# Patient Record
Sex: FEMALE | URBAN - METROPOLITAN AREA
[De-identification: names, ages, dates, MRNs, and addresses within clinical notes are randomized per-mention and may not be internally consistent; named-entity substitution may affect disease eponyms.]

---

## 2022-01-10 ENCOUNTER — APPOINTMENT (OUTPATIENT)
Dept: URBAN - METROPOLITAN AREA CLINIC 263 | Age: 77
Setting detail: DERMATOLOGY
End: 2022-01-11

## 2022-01-10 DIAGNOSIS — L57.0 ACTINIC KERATOSIS: ICD-10-CM

## 2022-01-10 DIAGNOSIS — L30.4 ERYTHEMA INTERTRIGO: ICD-10-CM

## 2022-01-10 DIAGNOSIS — L72.8 OTHER FOLLICULAR CYSTS OF THE SKIN AND SUBCUTANEOUS TISSUE: ICD-10-CM

## 2022-01-10 DIAGNOSIS — L82.1 OTHER SEBORRHEIC KERATOSIS: ICD-10-CM

## 2022-01-10 DIAGNOSIS — L65.8 OTHER SPECIFIED NONSCARRING HAIR LOSS: ICD-10-CM

## 2022-01-10 PROBLEM — L30.9 DERMATITIS, UNSPECIFIED: Status: ACTIVE | Noted: 2022-01-10

## 2022-01-10 PROCEDURE — 17000 DESTRUCT PREMALG LESION: CPT

## 2022-01-10 PROCEDURE — 17003 DESTRUCT PREMALG LES 2-14: CPT

## 2022-01-10 PROCEDURE — OTHER LIQUID NITROGEN: OTHER

## 2022-01-10 PROCEDURE — OTHER COUNSELING: OTHER

## 2022-01-10 PROCEDURE — OTHER DIAGNOSIS COMMENT: OTHER

## 2022-01-10 PROCEDURE — OTHER TREATMENT REGIMEN: OTHER

## 2022-01-10 PROCEDURE — OTHER PRESCRIPTION MEDICATION MANAGEMENT: OTHER

## 2022-01-10 PROCEDURE — OTHER MIPS QUALITY: OTHER

## 2022-01-10 PROCEDURE — 99203 OFFICE O/P NEW LOW 30 MIN: CPT | Mod: 25

## 2022-01-10 PROCEDURE — OTHER CONSULTATION EXCISION: OTHER

## 2022-01-10 PROCEDURE — OTHER PRESCRIPTION: OTHER

## 2022-01-10 RX ORDER — TACROLIMUS 1 MG/G
OINTMENT TOPICAL BID
Qty: 100 | Refills: 0 | Status: ERX | COMMUNITY
Start: 2022-01-10

## 2022-01-10 ASSESSMENT — LOCATION DETAILED DESCRIPTION DERM
LOCATION DETAILED: RIGHT SUPERIOR MEDIAL MIDBACK
LOCATION DETAILED: EPIGASTRIC SKIN
LOCATION DETAILED: RIGHT RADIAL DORSAL HAND
LOCATION DETAILED: RIGHT ELBOW
LOCATION DETAILED: RIGHT LATERAL ABDOMEN

## 2022-01-10 ASSESSMENT — LOCATION SIMPLE DESCRIPTION DERM
LOCATION SIMPLE: RIGHT LOWER BACK
LOCATION SIMPLE: RIGHT HAND
LOCATION SIMPLE: RIGHT ELBOW
LOCATION SIMPLE: ABDOMEN

## 2022-01-10 ASSESSMENT — LOCATION ZONE DERM
LOCATION ZONE: TRUNK
LOCATION ZONE: ARM
LOCATION ZONE: HAND

## 2022-01-10 NOTE — PROCEDURE: LIQUID NITROGEN
Number Of Freeze-Thaw Cycles: 1 freeze-thaw cycle
Post-Care Instructions: I reviewed with the patient in detail post-care instructions. Patient is to wear sunprotection, and avoid picking at any of the treated lesions. Pt may apply Vaseline to crusted or scabbing areas.
Render Post-Care Instructions In Note?: no
Show Applicator Variable?: Yes
Consent: The patient's consent was obtained including but not limited to risks of crusting, scabbing, blistering, scarring, darker or lighter pigmentary change, recurrence, incomplete removal and infection.
Detail Level: Detailed
Duration Of Freeze Thaw-Cycle (Seconds): 5

## 2022-01-10 NOTE — PROCEDURE: PRESCRIPTION MEDICATION MANAGEMENT
Initiate Treatment: tacrolimus 0.1 % topical ointment Bid as needed
Render In Strict Bullet Format?: No
Detail Level: Zone

## 2022-01-10 NOTE — PROCEDURE: DIAGNOSIS COMMENT
Render Risk Assessment In Note?: no
Detail Level: Simple
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
Comment: Patient uses laser cap, will think about medications for future

## 2022-01-10 NOTE — HPI: SECONDARY COMPLAINT
How Severe Is This Condition?: moderate
Additional History: Can’t use minoxidil
How Severe Is This Condition?: mild
Additional History: It was excised before

## 2022-01-10 NOTE — PROCEDURE: MIPS QUALITY
Detail Level: Detailed
Quality 431: Preventive Care And Screening: Unhealthy Alcohol Use - Screening: Patient not identified as an unhealthy alcohol user when screened for unhealthy alcohol use using a systematic screening method
Quality 111:Pneumonia Vaccination Status For Older Adults: Pneumococcal Vaccination Previously Received
Quality 226: Preventive Care And Screening: Tobacco Use: Screening And Cessation Intervention: Patient screened for tobacco use and is an ex/non-smoker
Quality 130: Documentation Of Current Medications In The Medical Record: Current Medications Documented
Quality 110: Preventive Care And Screening: Influenza Immunization: Influenza Immunization Administered during Influenza season

## 2022-01-10 NOTE — HPI: RASH
What Type Of Note Output Would You Prefer (Optional)?: Bullet Format
How Severe Is Your Rash?: moderate
Is This A New Presentation, Or A Follow-Up?: Rash
Additional History: Biopsy was done and it was proven to be lichenoid keratosis

## 2022-01-20 ENCOUNTER — APPOINTMENT (OUTPATIENT)
Dept: URBAN - METROPOLITAN AREA CLINIC 261 | Age: 77
Setting detail: DERMATOLOGY
End: 2022-01-21

## 2022-01-20 DIAGNOSIS — L91.8 OTHER HYPERTROPHIC DISORDERS OF THE SKIN: ICD-10-CM

## 2022-01-20 DIAGNOSIS — L72.8 OTHER FOLLICULAR CYSTS OF THE SKIN AND SUBCUTANEOUS TISSUE: ICD-10-CM

## 2022-01-20 DIAGNOSIS — L57.0 ACTINIC KERATOSIS: ICD-10-CM

## 2022-01-20 PROCEDURE — OTHER EXCISION: OTHER

## 2022-01-20 PROCEDURE — OTHER COUNSELING: OTHER

## 2022-01-20 PROCEDURE — 17000 DESTRUCT PREMALG LESION: CPT | Mod: 79,59

## 2022-01-20 PROCEDURE — OTHER SHAVE REMOVAL (NO PATHOLOGY): OTHER

## 2022-01-20 PROCEDURE — OTHER LIQUID NITROGEN: OTHER

## 2022-01-20 PROCEDURE — 11300 SHAVE SKIN LESION 0.5 CM/<: CPT | Mod: 76,79

## 2022-01-20 PROCEDURE — 12031 INTMD RPR S/A/T/EXT 2.5 CM/<: CPT | Mod: 79,59

## 2022-01-20 PROCEDURE — OTHER MIPS QUALITY: OTHER

## 2022-01-20 PROCEDURE — 11401 EXC TR-EXT B9+MARG 0.6-1 CM: CPT | Mod: 79

## 2022-01-20 PROCEDURE — 11300 SHAVE SKIN LESION 0.5 CM/<: CPT | Mod: 79,76

## 2022-01-20 PROCEDURE — 11300 SHAVE SKIN LESION 0.5 CM/<: CPT | Mod: 79

## 2022-01-20 ASSESSMENT — LOCATION SIMPLE DESCRIPTION DERM
LOCATION SIMPLE: RIGHT POSTERIOR AXILLA
LOCATION SIMPLE: ABDOMEN
LOCATION SIMPLE: RIGHT UPPER ARM
LOCATION SIMPLE: RIGHT AXILLARY VAULT
LOCATION SIMPLE: LEFT LIP

## 2022-01-20 ASSESSMENT — LOCATION ZONE DERM
LOCATION ZONE: AXILLAE
LOCATION ZONE: ARM
LOCATION ZONE: TRUNK
LOCATION ZONE: LIP

## 2022-01-20 ASSESSMENT — LOCATION DETAILED DESCRIPTION DERM
LOCATION DETAILED: LEFT UPPER CUTANEOUS LIP
LOCATION DETAILED: RIGHT POSTERIOR AXILLA
LOCATION DETAILED: RIGHT RIB CAGE
LOCATION DETAILED: RIGHT AXILLARY VAULT
LOCATION DETAILED: RIGHT ANTERIOR PROXIMAL UPPER ARM

## 2022-01-20 NOTE — PROCEDURE: EXCISION
Did You Provide Opioid Counseling: No
Estimated Blood Loss (Cc): minimal
W Plasty Text: The lesion was extirpated to the level of the fat with a #15 scalpel blade.  Given the location of the defect, shape of the defect and the proximity to free margins a W-plasty was deemed most appropriate for repair.  Using a sterile surgical marker, the appropriate transposition arms of the W-plasty were drawn incorporating the defect and placing the expected incisions within the relaxed skin tension lines where possible.    The area thus outlined was incised deep to adipose tissue with a #15 scalpel blade.  The skin margins were undermined to an appropriate distance in all directions utilizing iris scissors.  The opposing transposition arms were then transposed into place in opposite direction and anchored with interrupted buried subcutaneous sutures.
Where Do You Want The Question To Include Opioid Counseling Located?: Case Summary Tab
Intermediate / Complex Repair - Final Wound Length In Cm: 1
Complex Repair And Transposition Flap Text: The defect edges were debeveled with a #15 scalpel blade.  The primary defect was closed partially with a complex linear closure.  Given the location of the remaining defect, shape of the defect and the proximity to free margins a transposition flap was deemed most appropriate for complete closure of the defect.  Using a sterile surgical marker, an appropriate advancement flap was drawn incorporating the defect and placing the expected incisions within the relaxed skin tension lines where possible.    The area thus outlined was incised deep to adipose tissue with a #15 scalpel blade.  The skin margins were undermined to an appropriate distance in all directions utilizing iris scissors.
Suturegard Intro: Intraoperative tissue expansion was performed, utilizing the SUTUREGARD device, in order to reduce wound tension.
Anesthesia Type: 1% lidocaine with epinephrine
Deep Sutures: 3-0 Monocryl
Show Referring Physician Variable: Yes
Retention Suture Text: Retention sutures were placed to support the closure and prevent dehiscence.
Additional Anesthesia Volume In Cc: 6
Post-Care Instructions: I reviewed with the patient in detail post-care instructions. Patient is not to engage in any heavy lifting, exercise, or swimming for the next 14 days. Should the patient develop any fevers, chills, bleeding, severe pain patient will contact the office immediately.
Excision Depth: adipose tissue
Vermilion Border Text: The closure involved the vermilion border.
Skin Substitute Text: The defect edges were debeveled with a #15 scalpel blade.  Given the location of the defect, shape of the defect and the proximity to free margins a skin substitute graft was deemed most appropriate.  The graft material was trimmed to fit the size of the defect. The graft was then placed in the primary defect and oriented appropriately.
O-L Flap Text: The defect edges were debeveled with a #15 scalpel blade.  Given the location of the defect, shape of the defect and the proximity to free margins an O-L flap was deemed most appropriate.  Using a sterile surgical marker, an appropriate advancement flap was drawn incorporating the defect and placing the expected incisions within the relaxed skin tension lines where possible.    The area thus outlined was incised deep to adipose tissue with a #15 scalpel blade.  The skin margins were undermined to an appropriate distance in all directions utilizing iris scissors.
Length To Time In Minutes Device Was In Place: 10
Repair Performed By Another Provider Text (Leave Blank If You Do Not Want): After the tissue was excised the defect was repaired by another provider.
Repair Type: Intermediate
Paramedian Forehead Flap Text: A decision was made to reconstruct the defect utilizing an interpolation axial flap and a staged reconstruction.  A telfa template was made of the defect.  This telfa template was then used to outline the paramedian forehead pedicle flap.  The donor area for the pedicle flap was then injected with anesthesia.  The flap was excised through the skin and subcutaneous tissue down to the layer of the underlying musculature.  The pedicle flap was carefully excised within this deep plane to maintain its blood supply.  The edges of the donor site were undermined.   The donor site was closed in a primary fashion.  The pedicle was then rotated into position and sutured.  Once the tube was sutured into place, adequate blood supply was confirmed with blanching and refill.  The pedicle was then wrapped with xeroform gauze and dressed appropriately with a telfa and gauze bandage to ensure continued blood supply and protect the attached pedicle.
Retention Suture Bite Size: 3 mm
Detail Level: Detailed
Island Pedicle Flap-Requiring Vessel Identification Text: The defect edges were debeveled with a #15 scalpel blade.  Given the location of the defect, shape of the defect and the proximity to free margins an island pedicle advancement flap was deemed most appropriate.  Using a sterile surgical marker, an appropriate advancement flap was drawn, based on the axial vessel mentioned above, incorporating the defect, outlining the appropriate donor tissue and placing the expected incisions within the relaxed skin tension lines where possible.    The area thus outlined was incised deep to adipose tissue with a #15 scalpel blade.  The skin margins were undermined to an appropriate distance in all directions around the primary defect and laterally outward around the island pedicle utilizing iris scissors.  There was minimal undermining beneath the pedicle flap.
Excision Method: Slit
Crescentic Complex Repair Preamble Text (Leave Blank If You Do Not Want): Extensive wide undermining was performed.
Intermediate Repair Preamble Text (Leave Blank If You Do Not Want): Undermining was performed with blunt dissection.
Melolabial Interpolation Flap Text: A decision was made to reconstruct the defect utilizing an interpolation axial flap and a staged reconstruction.  A telfa template was made of the defect.  This telfa template was then used to outline the melolabial interpolation flap.  The donor area for the pedicle flap was then injected with anesthesia.  The flap was excised through the skin and subcutaneous tissue down to the layer of the underlying musculature.  The pedicle flap was carefully excised within this deep plane to maintain its blood supply.  The edges of the donor site were undermined.   The donor site was closed in a primary fashion.  The pedicle was then rotated into position and sutured.  Once the tube was sutured into place, adequate blood supply was confirmed with blanching and refill.  The pedicle was then wrapped with xeroform gauze and dressed appropriately with a telfa and gauze bandage to ensure continued blood supply and protect the attached pedicle.
Ftsg Text: The defect edges were debeveled with a #15 scalpel blade.  Given the location of the defect, shape of the defect and the proximity to free margins a full thickness skin graft was deemed most appropriate.  Using a sterile surgical marker, the primary defect shape was transferred to the donor site. The area thus outlined was incised deep to adipose tissue with a #15 scalpel blade.  The harvested graft was then trimmed of adipose tissue until only dermis and epidermis was left.  The skin margins of the secondary defect were undermined to an appropriate distance in all directions utilizing iris scissors.  The secondary defect was closed with interrupted buried subcutaneous sutures.  The skin edges were then re-apposed with running  sutures.  The skin graft was then placed in the primary defect and oriented appropriately.
Anesthesia Volume In Cc: 0
Complex Repair And Tissue Cultured Epidermal Autograft Text: The defect edges were debeveled with a #15 scalpel blade.  The primary defect was closed partially with a complex linear closure.  Given the location of the defect, shape of the defect and the proximity to free margins an tissue cultured epidermal autograft was deemed most appropriate to repair the remaining defect.  The graft was trimmed to fit the size of the remaining defect.  The graft was then placed in the primary defect, oriented appropriately, and sutured into place.
Complex Repair And Rhombic Flap Text: The defect edges were debeveled with a #15 scalpel blade.  The primary defect was closed partially with a complex linear closure.  Given the location of the remaining defect, shape of the defect and the proximity to free margins a rhombic flap was deemed most appropriate for complete closure of the defect.  Using a sterile surgical marker, an appropriate advancement flap was drawn incorporating the defect and placing the expected incisions within the relaxed skin tension lines where possible.    The area thus outlined was incised deep to adipose tissue with a #15 scalpel blade.  The skin margins were undermined to an appropriate distance in all directions utilizing iris scissors.
Modified Advancement Flap Text: The defect edges were debeveled with a #15 scalpel blade.  Given the location of the defect, shape of the defect and the proximity to free margins a modified advancement flap was deemed most appropriate.  Using a sterile surgical marker, an appropriate advancement flap was drawn incorporating the defect and placing the expected incisions within the relaxed skin tension lines where possible.    The area thus outlined was incised deep to adipose tissue with a #15 scalpel blade.  The skin margins were undermined to an appropriate distance in all directions utilizing iris scissors.
Composite Graft Text: The defect edges were debeveled with a #15 scalpel blade.  Given the location of the defect, shape of the defect, the proximity to free margins and the fact the defect was full thickness a composite graft was deemed most appropriate.  The defect was outline and then transferred to the donor site.  A full thickness graft was then excised from the donor site. The graft was then placed in the primary defect, oriented appropriately and then sutured into place.  The secondary defect was then repaired using a primary closure.
Transposition Flap Text: The defect edges were debeveled with a #15 scalpel blade.  Given the location of the defect and the proximity to free margins a transposition flap was deemed most appropriate.  Using a sterile surgical marker, an appropriate transposition flap was drawn incorporating the defect.    The area thus outlined was incised deep to adipose tissue with a #15 scalpel blade.  The skin margins were undermined to an appropriate distance in all directions utilizing iris scissors.
Split-Thickness Skin Graft Text: The defect edges were debeveled with a #15 scalpel blade.  Given the location of the defect, shape of the defect and the proximity to free margins a split thickness skin graft was deemed most appropriate.  Using a sterile surgical marker, the primary defect shape was transferred to the donor site. The split thickness graft was then harvested.  The skin graft was then placed in the primary defect and oriented appropriately.
Hemostasis: Electrocautery
Trilobed Flap Text: The defect edges were debeveled with a #15 scalpel blade.  Given the location of the defect and the proximity to free margins a trilobed flap was deemed most appropriate.  Using a sterile surgical marker, an appropriate trilobed flap drawn around the defect.    The area thus outlined was incised deep to adipose tissue with a #15 scalpel blade.  The skin margins were undermined to an appropriate distance in all directions utilizing iris scissors.
O-T Advancement Flap Text: The defect edges were debeveled with a #15 scalpel blade.  Given the location of the defect, shape of the defect and the proximity to free margins an O-T advancement flap was deemed most appropriate.  Using a sterile surgical marker, an appropriate advancement flap was drawn incorporating the defect and placing the expected incisions within the relaxed skin tension lines where possible.    The area thus outlined was incised deep to adipose tissue with a #15 scalpel blade.  The skin margins were undermined to an appropriate distance in all directions utilizing iris scissors.
Suturegard Body: The suture ends were repeatedly re-tightened and re-clamped to achieve the desired tissue expansion.
Complex Repair And Melolabial Flap Text: The defect edges were debeveled with a #15 scalpel blade.  The primary defect was closed partially with a complex linear closure.  Given the location of the remaining defect, shape of the defect and the proximity to free margins a melolabial flap was deemed most appropriate for complete closure of the defect.  Using a sterile surgical marker, an appropriate advancement flap was drawn incorporating the defect and placing the expected incisions within the relaxed skin tension lines where possible.    The area thus outlined was incised deep to adipose tissue with a #15 scalpel blade.  The skin margins were undermined to an appropriate distance in all directions utilizing iris scissors.
Complex Repair And Burow's Graft Text: The defect edges were debeveled with a #15 scalpel blade.  The primary defect was closed partially with a complex linear closure.  Given the location of the defect, shape of the defect, the proximity to free margins and the presence of a standing cone deformity a Burow's graft was deemed most appropriate to repair the remaining defect.  The graft was trimmed to fit the size of the remaining defect.  The graft was then placed in the primary defect, oriented appropriately, and sutured into place.
Xenograft Text: The defect edges were debeveled with a #15 scalpel blade.  Given the location of the defect, shape of the defect and the proximity to free margins a xenograft was deemed most appropriate.  The graft was then trimmed to fit the size of the defect.  The graft was then placed in the primary defect and oriented appropriately.
A-T Advancement Flap Text: The defect edges were debeveled with a #15 scalpel blade.  Given the location of the defect, shape of the defect and the proximity to free margins an A-T advancement flap was deemed most appropriate.  Using a sterile surgical marker, an appropriate advancement flap was drawn incorporating the defect and placing the expected incisions within the relaxed skin tension lines where possible.    The area thus outlined was incised deep to adipose tissue with a #15 scalpel blade.  The skin margins were undermined to an appropriate distance in all directions utilizing iris scissors.
Adjacent Tissue Transfer Text: The defect edges were debeveled with a #15 scalpel blade.  Given the location of the defect and the proximity to free margins an adjacent tissue transfer was deemed most appropriate.  Using a sterile surgical marker, an appropriate flap was drawn incorporating the defect and placing the expected incisions within the relaxed skin tension lines where possible.    The area thus outlined was incised deep to adipose tissue with a #15 scalpel blade.  The skin margins were undermined to an appropriate distance in all directions utilizing iris scissors.
Complex Repair And Xenograft Text: The defect edges were debeveled with a #15 scalpel blade.  The primary defect was closed partially with a complex linear closure.  Given the location of the defect, shape of the defect and the proximity to free margins a xenograft was deemed most appropriate to repair the remaining defect.  The graft was trimmed to fit the size of the remaining defect.  The graft was then placed in the primary defect, oriented appropriately, and sutured into place.
Purse String (Simple) Text: Given the location of the defect and the characteristics of the surrounding skin a purse string simple closure was deemed most appropriate.  Undermining was performed circumferentially around the surgical defect.  A purse string suture was then placed and tightened.
Wound Care: Petrolatum
Zygomaticofacial Flap Text: Given the location of the defect, shape of the defect and the proximity to free margins a zygomaticofacial flap was deemed most appropriate for repair.  Using a sterile surgical marker, the appropriate flap was drawn incorporating the defect and placing the expected incisions within the relaxed skin tension lines where possible. The area thus outlined was incised deep to adipose tissue with a #15 scalpel blade with preservation of a vascular pedicle.  The skin margins were undermined to an appropriate distance in all directions utilizing iris scissors.  The flap was then placed into the defect and anchored with interrupted buried subcutaneous sutures.
Hemigard Postcare Instructions: The HEMIGARD strips are to remain completely dry for at least 5-7 days.
Interpolation Flap Text: A decision was made to reconstruct the defect utilizing an interpolation axial flap and a staged reconstruction.  A telfa template was made of the defect.  This telfa template was then used to outline the interpolation flap.  The donor area for the pedicle flap was then injected with anesthesia.  The flap was excised through the skin and subcutaneous tissue down to the layer of the underlying musculature.  The interpolation flap was carefully excised within this deep plane to maintain its blood supply.  The edges of the donor site were undermined.   The donor site was closed in a primary fashion.  The pedicle was then rotated into position and sutured.  Once the tube was sutured into place, adequate blood supply was confirmed with blanching and refill.  The pedicle was then wrapped with xeroform gauze and dressed appropriately with a telfa and gauze bandage to ensure continued blood supply and protect the attached pedicle.
Island Pedicle Flap With Canthal Suspension Text: The defect edges were debeveled with a #15 scalpel blade.  Given the location of the defect, shape of the defect and the proximity to free margins an island pedicle advancement flap was deemed most appropriate.  Using a sterile surgical marker, an appropriate advancement flap was drawn incorporating the defect, outlining the appropriate donor tissue and placing the expected incisions within the relaxed skin tension lines where possible. The area thus outlined was incised deep to adipose tissue with a #15 scalpel blade.  The skin margins were undermined to an appropriate distance in all directions around the primary defect and laterally outward around the island pedicle utilizing iris scissors.  There was minimal undermining beneath the pedicle flap. A suspension suture was placed in the canthal tendon to prevent tension and prevent ectropion.
Mercedes Flap Text: The defect edges were debeveled with a #15 scalpel blade.  Given the location of the defect, shape of the defect and the proximity to free margins a Mercedes flap was deemed most appropriate.  Using a sterile surgical marker, an appropriate advancement flap was drawn incorporating the defect and placing the expected incisions within the relaxed skin tension lines where possible. The area thus outlined was incised deep to adipose tissue with a #15 scalpel blade.  The skin margins were undermined to an appropriate distance in all directions utilizing iris scissors.
Complex Repair And Single Advancement Flap Text: The defect edges were debeveled with a #15 scalpel blade.  The primary defect was closed partially with a complex linear closure.  Given the location of the remaining defect, shape of the defect and the proximity to free margins a single advancement flap was deemed most appropriate for complete closure of the defect.  Using a sterile surgical marker, an appropriate advancement flap was drawn incorporating the defect and placing the expected incisions within the relaxed skin tension lines where possible.    The area thus outlined was incised deep to adipose tissue with a #15 scalpel blade.  The skin margins were undermined to an appropriate distance in all directions utilizing iris scissors.
Complex Repair And Double M Plasty Text: The defect edges were debeveled with a #15 scalpel blade.  The primary defect was closed partially with a complex linear closure.  Given the location of the remaining defect, shape of the defect and the proximity to free margins a double M plasty was deemed most appropriate for complete closure of the defect.  Using a sterile surgical marker, an appropriate advancement flap was drawn incorporating the defect and placing the expected incisions within the relaxed skin tension lines where possible.    The area thus outlined was incised deep to adipose tissue with a #15 scalpel blade.  The skin margins were undermined to an appropriate distance in all directions utilizing iris scissors.
Mustarde Flap Text: The defect edges were debeveled with a #15 scalpel blade.  Given the size, depth and location of the defect and the proximity to free margins a Mustarde flap was deemed most appropriate.  Using a sterile surgical marker, an appropriate flap was drawn incorporating the defect. The area thus outlined was incised with a #15 scalpel blade.  The skin margins were undermined to an appropriate distance in all directions utilizing iris scissors.
Bilobed Transposition Flap Text: The defect edges were debeveled with a #15 scalpel blade.  Given the location of the defect and the proximity to free margins a bilobed transposition flap was deemed most appropriate.  Using a sterile surgical marker, an appropriate bilobe flap drawn around the defect.    The area thus outlined was incised deep to adipose tissue with a #15 scalpel blade.  The skin margins were undermined to an appropriate distance in all directions utilizing iris scissors.
Hatchet Flap Text: The defect edges were debeveled with a #15 scalpel blade.  Given the location of the defect, shape of the defect and the proximity to free margins a hatchet flap was deemed most appropriate.  Using a sterile surgical marker, an appropriate hatchet flap was drawn incorporating the defect and placing the expected incisions within the relaxed skin tension lines where possible.    The area thus outlined was incised deep to adipose tissue with a #15 scalpel blade.  The skin margins were undermined to an appropriate distance in all directions utilizing iris scissors.
Burow's Graft Text: The defect edges were debeveled with a #15 scalpel blade.  Given the location of the defect, shape of the defect, the proximity to free margins and the presence of a standing cone deformity a Burow's skin graft was deemed most appropriate. The standing cone was removed and this tissue was then trimmed to the shape of the primary defect. The adipose tissue was also removed until only dermis and epidermis were left.  The skin margins of the secondary defect were undermined to an appropriate distance in all directions utilizing iris scissors.  The secondary defect was closed with interrupted buried subcutaneous sutures.  The skin edges were then re-apposed with running  sutures.  The skin graft was then placed in the primary defect and oriented appropriately.
Complex Repair And Split-Thickness Skin Graft Text: The defect edges were debeveled with a #15 scalpel blade.  The primary defect was closed partially with a complex linear closure.  Given the location of the defect, shape of the defect and the proximity to free margins a split thickness skin graft was deemed most appropriate to repair the remaining defect.  The graft was trimmed to fit the size of the remaining defect.  The graft was then placed in the primary defect, oriented appropriately, and sutured into place.
Star Wedge Flap Text: The defect edges were debeveled with a #15 scalpel blade.  Given the location of the defect, shape of the defect and the proximity to free margins a star wedge flap was deemed most appropriate.  Using a sterile surgical marker, an appropriate rotation flap was drawn incorporating the defect and placing the expected incisions within the relaxed skin tension lines where possible. The area thus outlined was incised deep to adipose tissue with a #15 scalpel blade.  The skin margins were undermined to an appropriate distance in all directions utilizing iris scissors.
O-Z Flap Text: The defect edges were debeveled with a #15 scalpel blade.  Given the location of the defect, shape of the defect and the proximity to free margins an O-Z flap was deemed most appropriate.  Using a sterile surgical marker, an appropriate transposition flap was drawn incorporating the defect and placing the expected incisions within the relaxed skin tension lines where possible. The area thus outlined was incised deep to adipose tissue with a #15 scalpel blade.  The skin margins were undermined to an appropriate distance in all directions utilizing iris scissors.
No Repair - Repaired With Adjacent Surgical Defect Text (Leave Blank If You Do Not Want): After the excision the defect was repaired concurrently with another surgical defect which was in close approximation.
H Plasty Text: Given the location of the defect, shape of the defect and the proximity to free margins a H-plasty was deemed most appropriate for repair.  Using a sterile surgical marker, the appropriate advancement arms of the H-plasty were drawn incorporating the defect and placing the expected incisions within the relaxed skin tension lines where possible. The area thus outlined was incised deep to adipose tissue with a #15 scalpel blade. The skin margins were undermined to an appropriate distance in all directions utilizing iris scissors.  The opposing advancement arms were then advanced into place in opposite direction and anchored with interrupted buried subcutaneous sutures.
Graft Donor Site Bandage (Optional-Leave Blank If You Don't Want In Note): Steri-strips and a pressure bandage were applied to the donor site.
O-Z Plasty Text: The defect edges were debeveled with a #15 scalpel blade.  Given the location of the defect, shape of the defect and the proximity to free margins an O-Z plasty (double transposition flap) was deemed most appropriate.  Using a sterile surgical marker, the appropriate transposition flaps were drawn incorporating the defect and placing the expected incisions within the relaxed skin tension lines where possible.    The area thus outlined was incised deep to adipose tissue with a #15 scalpel blade.  The skin margins were undermined to an appropriate distance in all directions utilizing iris scissors.  Hemostasis was achieved with electrocautery.  The flaps were then transposed into place, one clockwise and the other counterclockwise, and anchored with interrupted buried subcutaneous sutures.
Tissue Cultured Epidermal Autograft Text: The defect edges were debeveled with a #15 scalpel blade.  Given the location of the defect, shape of the defect and the proximity to free margins a tissue cultured epidermal autograft was deemed most appropriate.  The graft was then trimmed to fit the size of the defect.  The graft was then placed in the primary defect and oriented appropriately.
Z Plasty Text: The lesion was extirpated to the level of the fat with a #15 scalpel blade.  Given the location of the defect, shape of the defect and the proximity to free margins a Z-plasty was deemed most appropriate for repair.  Using a sterile surgical marker, the appropriate transposition arms of the Z-plasty were drawn incorporating the defect and placing the expected incisions within the relaxed skin tension lines where possible.    The area thus outlined was incised deep to adipose tissue with a #15 scalpel blade.  The skin margins were undermined to an appropriate distance in all directions utilizing iris scissors.  The opposing transposition arms were then transposed into place in opposite direction and anchored with interrupted buried subcutaneous sutures.
Dermal Closure: buried vertical mattress
Nasalis-Muscle-Based Myocutaneous Island Pedicle Flap Text: Using a #15 blade, an incision was made around the donor flap to the level of the nasalis muscle. Wide lateral undermining was then performed in both the subcutaneous plane above the nasalis muscle, and in a submuscular plane just above periosteum. This allowed the formation of a free nasalis muscle axial pedicle (based on the angular artery) which was still attached to the actual cutaneous flap, increasing its mobility and vascular viability. Hemostasis was obtained with pinpoint electrocoagulation. The flap was mobilized into position and the pivotal anchor points positioned and stabilized with buried interrupted sutures. Subcutaneous and dermal tissues were closed in a multilayered fashion with sutures. Tissue redundancies were excised, and the epidermal edges were apposed without significant tension and sutured with sutures.
Banner Transposition Flap Text: The defect edges were debeveled with a #15 scalpel blade.  Given the location of the defect and the proximity to free margins a Banner transposition flap was deemed most appropriate.  Using a sterile surgical marker, an appropriate flap drawn around the defect. The area thus outlined was incised deep to adipose tissue with a #15 scalpel blade.  The skin margins were undermined to an appropriate distance in all directions utilizing iris scissors.
V-Y Flap Text: The defect edges were debeveled with a #15 scalpel blade.  Given the location of the defect, shape of the defect and the proximity to free margins a V-Y flap was deemed most appropriate.  Using a sterile surgical marker, an appropriate advancement flap was drawn incorporating the defect and placing the expected incisions within the relaxed skin tension lines where possible.    The area thus outlined was incised deep to adipose tissue with a #15 scalpel blade.  The skin margins were undermined to an appropriate distance in all directions utilizing iris scissors.
Cartilage Graft Text: The defect edges were debeveled with a #15 scalpel blade.  Given the location of the defect, shape of the defect, the fact the defect involved a full thickness cartilage defect a cartilage graft was deemed most appropriate.  An appropriate donor site was identified, cleansed, and anesthetized. The cartilage graft was then harvested and transferred to the recipient site, oriented appropriately and then sutured into place.  The secondary defect was then repaired using a primary closure.
Ear Star Wedge Flap Text: The defect edges were debeveled with a #15 blade scalpel.  Given the location of the defect and the proximity to free margins (helical rim) an ear star wedge flap was deemed most appropriate.  Using a sterile surgical marker, the appropriate flap was drawn incorporating the defect and placing the expected incisions between the helical rim and antihelix where possible.  The area thus outlined was incised through and through with a #15 scalpel blade.
Complex Repair And Rotation Flap Text: The defect edges were debeveled with a #15 scalpel blade.  The primary defect was closed partially with a complex linear closure.  Given the location of the remaining defect, shape of the defect and the proximity to free margins a rotation flap was deemed most appropriate for complete closure of the defect.  Using a sterile surgical marker, an appropriate advancement flap was drawn incorporating the defect and placing the expected incisions within the relaxed skin tension lines where possible.    The area thus outlined was incised deep to adipose tissue with a #15 scalpel blade.  The skin margins were undermined to an appropriate distance in all directions utilizing iris scissors.
Complex Repair And Z Plasty Text: The defect edges were debeveled with a #15 scalpel blade.  The primary defect was closed partially with a complex linear closure.  Given the location of the remaining defect, shape of the defect and the proximity to free margins a Z plasty was deemed most appropriate for complete closure of the defect.  Using a sterile surgical marker, an appropriate advancement flap was drawn incorporating the defect and placing the expected incisions within the relaxed skin tension lines where possible.    The area thus outlined was incised deep to adipose tissue with a #15 scalpel blade.  The skin margins were undermined to an appropriate distance in all directions utilizing iris scissors.
Complex Repair And Modified Advancement Flap Text: The defect edges were debeveled with a #15 scalpel blade.  The primary defect was closed partially with a complex linear closure.  Given the location of the remaining defect, shape of the defect and the proximity to free margins a modified advancement flap was deemed most appropriate for complete closure of the defect.  Using a sterile surgical marker, an appropriate advancement flap was drawn incorporating the defect and placing the expected incisions within the relaxed skin tension lines where possible.    The area thus outlined was incised deep to adipose tissue with a #15 scalpel blade.  The skin margins were undermined to an appropriate distance in all directions utilizing iris scissors.
Island Pedicle Flap Text: The defect edges were debeveled with a #15 scalpel blade.  Given the location of the defect, shape of the defect and the proximity to free margins an island pedicle advancement flap was deemed most appropriate.  Using a sterile surgical marker, an appropriate advancement flap was drawn incorporating the defect, outlining the appropriate donor tissue and placing the expected incisions within the relaxed skin tension lines where possible.    The area thus outlined was incised deep to adipose tissue with a #15 scalpel blade.  The skin margins were undermined to an appropriate distance in all directions around the primary defect and laterally outward around the island pedicle utilizing iris scissors.  There was minimal undermining beneath the pedicle flap.
Epidermal Closure Graft Donor Site (Optional): simple interrupted
Double Island Pedicle Flap Text: The defect edges were debeveled with a #15 scalpel blade.  Given the location of the defect, shape of the defect and the proximity to free margins a double island pedicle advancement flap was deemed most appropriate.  Using a sterile surgical marker, an appropriate advancement flap was drawn incorporating the defect, outlining the appropriate donor tissue and placing the expected incisions within the relaxed skin tension lines where possible.    The area thus outlined was incised deep to adipose tissue with a #15 scalpel blade.  The skin margins were undermined to an appropriate distance in all directions around the primary defect and laterally outward around the island pedicle utilizing iris scissors.  There was minimal undermining beneath the pedicle flap.
Mucosal Advancement Flap Text: Given the location of the defect, shape of the defect and the proximity to free margins a mucosal advancement flap was deemed most appropriate. Incisions were made with a 15 blade scalpel in the appropriate fashion along the cutaneous vermillion border and the mucosal lip. The remaining actinically damaged mucosal tissue was excised.  The mucosal advancement flap was then elevated to the gingival sulcus with care taken to preserve the neurovascular structures and advanced into the primary defect. Care was taken to ensure that precise realignment of the vermilion border was achieved.
Suturegard Retention Suture: 2-0 Nylon
Epidermal Autograft Text: The defect edges were debeveled with a #15 scalpel blade.  Given the location of the defect, shape of the defect and the proximity to free margins an epidermal autograft was deemed most appropriate.  Using a sterile surgical marker, the primary defect shape was transferred to the donor site. The epidermal graft was then harvested.  The skin graft was then placed in the primary defect and oriented appropriately.
Complex Repair And M Plasty Text: The defect edges were debeveled with a #15 scalpel blade.  The primary defect was closed partially with a complex linear closure.  Given the location of the remaining defect, shape of the defect and the proximity to free margins an M plasty was deemed most appropriate for complete closure of the defect.  Using a sterile surgical marker, an appropriate advancement flap was drawn incorporating the defect and placing the expected incisions within the relaxed skin tension lines where possible.    The area thus outlined was incised deep to adipose tissue with a #15 scalpel blade.  The skin margins were undermined to an appropriate distance in all directions utilizing iris scissors.
Orbicularis Oris Muscle Flap Text: The defect edges were debeveled with a #15 scalpel blade.  Given that the defect affected the competency of the oral sphincter an obicularis oris muscle flap was deemed most appropriate to restore this competency and normal muscle function.  Using a sterile surgical marker, an appropriate flap was drawn incorporating the defect. The area thus outlined was incised with a #15 scalpel blade.
Hemigard Intro: Due to skin fragility and wound tension, it was decided to use HEMIGARD adhesive retention suture devices to permit a linear closure. The skin was cleaned and dried for a 6cm distance away from the wound. Excessive hair, if present, was removed to allow for adhesion.
Suture Removal: 14 days
Lip Wedge Excision Repair Text: Given the location of the defect and the proximity to free margins a full thickness wedge repair was deemed most appropriate.  Using a sterile surgical marker, the appropriate repair was drawn incorporating the defect and placing the expected incisions perpendicular to the vermilion border.  The vermilion border was also meticulously outlined to ensure appropriate reapproximation during the repair.  The area thus outlined was incised through and through with a #15 scalpel blade.  The muscularis and dermis were reaproximated with deep sutures following hemostasis. Care was taken to realign the vermilion border before proceeding with the superficial closure.  Once the vermilion was realigned the superfical and mucosal closure was finished.
V-Y Plasty Text: The defect edges were debeveled with a #15 scalpel blade.  Given the location of the defect, shape of the defect and the proximity to free margins an V-Y advancement flap was deemed most appropriate.  Using a sterile surgical marker, an appropriate advancement flap was drawn incorporating the defect and placing the expected incisions within the relaxed skin tension lines where possible.    The area thus outlined was incised deep to adipose tissue with a #15 scalpel blade.  The skin margins were undermined to an appropriate distance in all directions utilizing iris scissors.
Debridement Text: The wound edges were debrided prior to proceeding with the closure to facilitate wound healing.
Dorsal Nasal Flap Text: The defect edges were debeveled with a #15 scalpel blade.  Given the location of the defect and the proximity to free margins a dorsal nasal flap was deemed most appropriate.  Using a sterile surgical marker, an appropriate dorsal nasal flap was drawn around the defect.    The area thus outlined was incised deep to adipose tissue with a #15 scalpel blade.  The skin margins were undermined to an appropriate distance in all directions utilizing iris scissors.
Mastoid Interpolation Flap Text: A decision was made to reconstruct the defect utilizing an interpolation axial flap and a staged reconstruction.  A telfa template was made of the defect.  This telfa template was then used to outline the mastoid interpolation flap.  The donor area for the pedicle flap was then injected with anesthesia.  The flap was excised through the skin and subcutaneous tissue down to the layer of the underlying musculature.  The pedicle flap was carefully excised within this deep plane to maintain its blood supply.  The edges of the donor site were undermined.   The donor site was closed in a primary fashion.  The pedicle was then rotated into position and sutured.  Once the tube was sutured into place, adequate blood supply was confirmed with blanching and refill.  The pedicle was then wrapped with xeroform gauze and dressed appropriately with a telfa and gauze bandage to ensure continued blood supply and protect the attached pedicle.
Complex Repair And Dorsal Nasal Flap Text: The defect edges were debeveled with a #15 scalpel blade.  The primary defect was closed partially with a complex linear closure.  Given the location of the remaining defect, shape of the defect and the proximity to free margins a dorsal nasal flap was deemed most appropriate for complete closure of the defect.  Using a sterile surgical marker, an appropriate flap was drawn incorporating the defect and placing the expected incisions within the relaxed skin tension lines where possible.    The area thus outlined was incised deep to adipose tissue with a #15 scalpel blade.  The skin margins were undermined to an appropriate distance in all directions utilizing iris scissors.
Burow's Advancement Flap Text: The defect edges were debeveled with a #15 scalpel blade.  Given the location of the defect and the proximity to free margins a Burow's advancement flap was deemed most appropriate.  Using a sterile surgical marker, the appropriate advancement flap was drawn incorporating the defect and placing the expected incisions within the relaxed skin tension lines where possible.    The area thus outlined was incised deep to adipose tissue with a #15 scalpel blade.  The skin margins were undermined to an appropriate distance in all directions utilizing iris scissors.
Saucerization Excision Additional Text (Leave Blank If You Do Not Want): The margin was drawn around the clinically apparent lesion.  Incisions were then made along these lines, in a tangential fashion, to the appropriate tissue plane and the lesion was extirpated.
Posterior Auricular Interpolation Flap Text: A decision was made to reconstruct the defect utilizing an interpolation axial flap and a staged reconstruction.  A telfa template was made of the defect.  This telfa template was then used to outline the posterior auricular interpolation flap.  The donor area for the pedicle flap was then injected with anesthesia.  The flap was excised through the skin and subcutaneous tissue down to the layer of the underlying musculature.  The pedicle flap was carefully excised within this deep plane to maintain its blood supply.  The edges of the donor site were undermined.   The donor site was closed in a primary fashion.  The pedicle was then rotated into position and sutured.  Once the tube was sutured into place, adequate blood supply was confirmed with blanching and refill.  The pedicle was then wrapped with xeroform gauze and dressed appropriately with a telfa and gauze bandage to ensure continued blood supply and protect the attached pedicle.
Complex Repair And V-Y Plasty Text: The defect edges were debeveled with a #15 scalpel blade.  The primary defect was closed partially with a complex linear closure.  Given the location of the remaining defect, shape of the defect and the proximity to free margins a V-Y plasty was deemed most appropriate for complete closure of the defect.  Using a sterile surgical marker, an appropriate advancement flap was drawn incorporating the defect and placing the expected incisions within the relaxed skin tension lines where possible.    The area thus outlined was incised deep to adipose tissue with a #15 scalpel blade.  The skin margins were undermined to an appropriate distance in all directions utilizing iris scissors.
Saucerization Depth: dermis and superficial adipose tissue
Dermal Autograft Text: The defect edges were debeveled with a #15 scalpel blade.  Given the location of the defect, shape of the defect and the proximity to free margins a dermal autograft was deemed most appropriate.  Using a sterile surgical marker, the primary defect shape was transferred to the donor site. The area thus outlined was incised deep to adipose tissue with a #15 scalpel blade.  The harvested graft was then trimmed of adipose and epidermal tissue until only dermis was left.  The skin graft was then placed in the primary defect and oriented appropriately.
Perilesional Excision Additional Text (Leave Blank If You Do Not Want): The margin was drawn around the clinically apparent lesion. Incisions were then made along these lines to the appropriate tissue plane and the lesion was extirpated.
Medical Necessity Information: It is in your best interest to select a reason for this procedure from the list below. All of these items fulfill various CMS LCD requirements except lesion extends to a margin.
Undermining Type: Entire Wound
Dressing: pressure dressing
Consent was obtained from the patient. The risks and benefits to therapy were discussed in detail. Specifically, the risks of infection, scarring, bleeding, prolonged wound healing, incomplete removal, allergy to anesthesia, nerve injury and recurrence were addressed. Prior to the procedure, the treatment site was clearly identified and confirmed by the patient. All components of Universal Protocol/PAUSE Rule completed.
Bilateral Helical Rim Advancement Flap Text: The defect edges were debeveled with a #15 blade scalpel.  Given the location of the defect and the proximity to free margins (helical rim) a bilateral helical rim advancement flap was deemed most appropriate.  Using a sterile surgical marker, the appropriate advancement flaps were drawn incorporating the defect and placing the expected incisions between the helical rim and antihelix where possible.  The area thus outlined was incised through and through with a #15 scalpel blade.  With a skin hook and iris scissors, the flaps were gently and sharply undermined and freed up.
Staged Advancement Flap Text: The defect edges were debeveled with a #15 scalpel blade.  Given the location of the defect, shape of the defect and the proximity to free margins a staged advancement flap was deemed most appropriate.  Using a sterile surgical marker, an appropriate advancement flap was drawn incorporating the defect and placing the expected incisions within the relaxed skin tension lines where possible. The area thus outlined was incised deep to adipose tissue with a #15 scalpel blade.  The skin margins were undermined to an appropriate distance in all directions utilizing iris scissors.
Complex Repair And W Plasty Text: The defect edges were debeveled with a #15 scalpel blade.  The primary defect was closed partially with a complex linear closure.  Given the location of the remaining defect, shape of the defect and the proximity to free margins a W plasty was deemed most appropriate for complete closure of the defect.  Using a sterile surgical marker, an appropriate advancement flap was drawn incorporating the defect and placing the expected incisions within the relaxed skin tension lines where possible.    The area thus outlined was incised deep to adipose tissue with a #15 scalpel blade.  The skin margins were undermined to an appropriate distance in all directions utilizing iris scissors.
Complex Repair And Skin Substitute Graft Text: The defect edges were debeveled with a #15 scalpel blade.  The primary defect was closed partially with a complex linear closure.  Given the location of the remaining defect, shape of the defect and the proximity to free margins a skin substitute graft was deemed most appropriate to repair the remaining defect.  The graft was trimmed to fit the size of the remaining defect.  The graft was then placed in the primary defect, oriented appropriately, and sutured into place.
Alar Island Pedicle Flap Text: The defect edges were debeveled with a #15 scalpel blade.  Given the location of the defect, shape of the defect and the proximity to the alar rim an island pedicle advancement flap was deemed most appropriate.  Using a sterile surgical marker, an appropriate advancement flap was drawn incorporating the defect, outlining the appropriate donor tissue and placing the expected incisions within the nasal ala running parallel to the alar rim. The area thus outlined was incised with a #15 scalpel blade.  The skin margins were undermined minimally to an appropriate distance in all directions around the primary defect and laterally outward around the island pedicle utilizing iris scissors.  There was minimal undermining beneath the pedicle flap.
Complex Repair And Double Advancement Flap Text: The defect edges were debeveled with a #15 scalpel blade.  The primary defect was closed partially with a complex linear closure.  Given the location of the remaining defect, shape of the defect and the proximity to free margins a double advancement flap was deemed most appropriate for complete closure of the defect.  Using a sterile surgical marker, an appropriate advancement flap was drawn incorporating the defect and placing the expected incisions within the relaxed skin tension lines where possible.    The area thus outlined was incised deep to adipose tissue with a #15 scalpel blade.  The skin margins were undermined to an appropriate distance in all directions utilizing iris scissors.
Medical Necessity Clause: This procedure was medically necessary because the lesion that was treated was:
Helical Rim Advancement Flap Text: The defect edges were debeveled with a #15 blade scalpel.  Given the location of the defect and the proximity to free margins (helical rim) a double helical rim advancement flap was deemed most appropriate.  Using a sterile surgical marker, the appropriate advancement flaps were drawn incorporating the defect and placing the expected incisions between the helical rim and antihelix where possible.  The area thus outlined was incised through and through with a #15 scalpel blade.  With a skin hook and iris scissors, the flaps were gently and sharply undermined and freed up.
Eliptical Excision Additional Text (Leave Blank If You Do Not Want): The margin was drawn around the clinically apparent lesion.  An elliptical shape was then drawn on the skin incorporating the lesion and margins.  Incisions were then made along these lines to the appropriate tissue plane and the lesion was extirpated.
Advancement Flap (Double) Text: The defect edges were debeveled with a #15 scalpel blade.  Given the location of the defect and the proximity to free margins a double advancement flap was deemed most appropriate.  Using a sterile surgical marker, the appropriate advancement flaps were drawn incorporating the defect and placing the expected incisions within the relaxed skin tension lines where possible.    The area thus outlined was incised deep to adipose tissue with a #15 scalpel blade.  The skin margins were undermined to an appropriate distance in all directions utilizing iris scissors.
Purse String (Intermediate) Text: Given the location of the defect and the characteristics of the surrounding skin a purse string intermediate closure was deemed most appropriate.  Undermining was performed circumferentially around the surgical defect.  A purse string suture was then placed and tightened.
Scalpel Size: 15 blade
Complex Repair And Epidermal Autograft Text: The defect edges were debeveled with a #15 scalpel blade.  The primary defect was closed partially with a complex linear closure.  Given the location of the defect, shape of the defect and the proximity to free margins an epidermal autograft was deemed most appropriate to repair the remaining defect.  The graft was trimmed to fit the size of the remaining defect.  The graft was then placed in the primary defect, oriented appropriately, and sutured into place.
Complex Repair And Bilobe Flap Text: The defect edges were debeveled with a #15 scalpel blade.  The primary defect was closed partially with a complex linear closure.  Given the location of the remaining defect, shape of the defect and the proximity to free margins a bilobe flap was deemed most appropriate for complete closure of the defect.  Using a sterile surgical marker, an appropriate advancement flap was drawn incorporating the defect and placing the expected incisions within the relaxed skin tension lines where possible.    The area thus outlined was incised deep to adipose tissue with a #15 scalpel blade.  The skin margins were undermined to an appropriate distance in all directions utilizing iris scissors.
Rhombic Flap Text: The defect edges were debeveled with a #15 scalpel blade.  Given the location of the defect and the proximity to free margins a rhombic flap was deemed most appropriate.  Using a sterile surgical marker, an appropriate rhombic flap was drawn incorporating the defect.    The area thus outlined was incised deep to adipose tissue with a #15 scalpel blade.  The skin margins were undermined to an appropriate distance in all directions utilizing iris scissors.
Fusiform Excision Additional Text (Leave Blank If You Do Not Want): The margin was drawn around the clinically apparent lesion.  A fusiform shape was then drawn on the skin incorporating the lesion and margins.  Incisions were then made along these lines to the appropriate tissue plane and the lesion was extirpated.
Crescentic Advancement Flap Text: The defect edges were debeveled with a #15 scalpel blade.  Given the location of the defect and the proximity to free margins a crescentic advancement flap was deemed most appropriate.  Using a sterile surgical marker, the appropriate advancement flap was drawn incorporating the defect and placing the expected incisions within the relaxed skin tension lines where possible.    The area thus outlined was incised deep to adipose tissue with a #15 scalpel blade.  The skin margins were undermined to an appropriate distance in all directions utilizing iris scissors.
Excisional Biopsy Additional Text (Leave Blank If You Do Not Want): The margin was drawn around the clinically apparent lesion. An elliptical shape was then drawn on the skin incorporating the lesion and margins.  Incisions were then made along these lines to the appropriate tissue plane and the lesion was extirpated.
Epidermal Sutures: 4-0 Nylon
Cheek Interpolation Flap Text: A decision was made to reconstruct the defect utilizing an interpolation axial flap and a staged reconstruction.  A telfa template was made of the defect.  This telfa template was then used to outline the Cheek Interpolation flap.  The donor area for the pedicle flap was then injected with anesthesia.  The flap was excised through the skin and subcutaneous tissue down to the layer of the underlying musculature.  The interpolation flap was carefully excised within this deep plane to maintain its blood supply.  The edges of the donor site were undermined.   The donor site was closed in a primary fashion.  The pedicle was then rotated into position and sutured.  Once the tube was sutured into place, adequate blood supply was confirmed with blanching and refill.  The pedicle was then wrapped with xeroform gauze and dressed appropriately with a telfa and gauze bandage to ensure continued blood supply and protect the attached pedicle.
Billing Type: Third-Party Bill
Complex Repair And Ftsg Text: The defect edges were debeveled with a #15 scalpel blade.  The primary defect was closed partially with a complex linear closure.  Given the location of the defect, shape of the defect and the proximity to free margins a full thickness skin graft was deemed most appropriate to repair the remaining defect.  The graft was trimmed to fit the size of the remaining defect.  The graft was then placed in the primary defect, oriented appropriately, and sutured into place.
Complex Repair And O-T Advancement Flap Text: The defect edges were debeveled with a #15 scalpel blade.  The primary defect was closed partially with a complex linear closure.  Given the location of the remaining defect, shape of the defect and the proximity to free margins an O-T advancement flap was deemed most appropriate for complete closure of the defect.  Using a sterile surgical marker, an appropriate advancement flap was drawn incorporating the defect and placing the expected incisions within the relaxed skin tension lines where possible.    The area thus outlined was incised deep to adipose tissue with a #15 scalpel blade.  The skin margins were undermined to an appropriate distance in all directions utilizing iris scissors.
Home Suture Removal Text: Patient was provided a home suture removal kit and will remove their sutures at home.  If they have any questions or difficulties they will call the office.
Epidermal Closure: running
Cheek-To-Nose Interpolation Flap Text: A decision was made to reconstruct the defect utilizing an interpolation axial flap and a staged reconstruction.  A telfa template was made of the defect.  This telfa template was then used to outline the Cheek-To-Nose Interpolation flap.  The donor area for the pedicle flap was then injected with anesthesia.  The flap was excised through the skin and subcutaneous tissue down to the layer of the underlying musculature.  The interpolation flap was carefully excised within this deep plane to maintain its blood supply.  The edges of the donor site were undermined.   The donor site was closed in a primary fashion.  The pedicle was then rotated into position and sutured.  Once the tube was sutured into place, adequate blood supply was confirmed with blanching and refill.  The pedicle was then wrapped with xeroform gauze and dressed appropriately with a telfa and gauze bandage to ensure continued blood supply and protect the attached pedicle.
Complex Repair And O-L Flap Text: The defect edges were debeveled with a #15 scalpel blade.  The primary defect was closed partially with a complex linear closure.  Given the location of the remaining defect, shape of the defect and the proximity to free margins an O-L flap was deemed most appropriate for complete closure of the defect.  Using a sterile surgical marker, an appropriate flap was drawn incorporating the defect and placing the expected incisions within the relaxed skin tension lines where possible.    The area thus outlined was incised deep to adipose tissue with a #15 scalpel blade.  The skin margins were undermined to an appropriate distance in all directions utilizing iris scissors.
Spiral Flap Text: The defect edges were debeveled with a #15 scalpel blade.  Given the location of the defect, shape of the defect and the proximity to free margins a spiral flap was deemed most appropriate.  Using a sterile surgical marker, an appropriate rotation flap was drawn incorporating the defect and placing the expected incisions within the relaxed skin tension lines where possible. The area thus outlined was incised deep to adipose tissue with a #15 scalpel blade.  The skin margins were undermined to an appropriate distance in all directions utilizing iris scissors.
Advancement Flap (Single) Text: The defect edges were debeveled with a #15 scalpel blade.  Given the location of the defect and the proximity to free margins a single advancement flap was deemed most appropriate.  Using a sterile surgical marker, an appropriate advancement flap was drawn incorporating the defect and placing the expected incisions within the relaxed skin tension lines where possible.    The area thus outlined was incised deep to adipose tissue with a #15 scalpel blade.  The skin margins were undermined to an appropriate distance in all directions utilizing iris scissors.
Bi-Rhombic Flap Text: The defect edges were debeveled with a #15 scalpel blade.  Given the location of the defect and the proximity to free margins a bi-rhombic flap was deemed most appropriate.  Using a sterile surgical marker, an appropriate rhombic flap was drawn incorporating the defect. The area thus outlined was incised deep to adipose tissue with a #15 scalpel blade.  The skin margins were undermined to an appropriate distance in all directions utilizing iris scissors.
Nasal Turnover Hinge Flap Text: The defect edges were debeveled with a #15 scalpel blade.  Given the size, depth, location of the defect and the defect being full thickness a nasal turnover hinge flap was deemed most appropriate.  Using a sterile surgical marker, an appropriate hinge flap was drawn incorporating the defect. The area thus outlined was incised with a #15 scalpel blade. The flap was designed to recreate the nasal mucosal lining and the alar rim. The skin margins were undermined to an appropriate distance in all directions utilizing iris scissors.
Nostril Rim Text: The closure involved the nostril rim.
Double O-Z Plasty Text: The defect edges were debeveled with a #15 scalpel blade.  Given the location of the defect, shape of the defect and the proximity to free margins a Double O-Z plasty (double transposition flap) was deemed most appropriate.  Using a sterile surgical marker, the appropriate transposition flaps were drawn incorporating the defect and placing the expected incisions within the relaxed skin tension lines where possible. The area thus outlined was incised deep to adipose tissue with a #15 scalpel blade.  The skin margins were undermined to an appropriate distance in all directions utilizing iris scissors.  Hemostasis was achieved with electrocautery.  The flaps were then transposed into place, one clockwise and the other counterclockwise, and anchored with interrupted buried subcutaneous sutures.
Double O-Z Flap Text: The defect edges were debeveled with a #15 scalpel blade.  Given the location of the defect, shape of the defect and the proximity to free margins a Double O-Z flap was deemed most appropriate.  Using a sterile surgical marker, an appropriate transposition flap was drawn incorporating the defect and placing the expected incisions within the relaxed skin tension lines where possible. The area thus outlined was incised deep to adipose tissue with a #15 scalpel blade.  The skin margins were undermined to an appropriate distance in all directions utilizing iris scissors.
Keystone Flap Text: The defect edges were debeveled with a #15 scalpel blade.  Given the location of the defect, shape of the defect a keystone flap was deemed most appropriate.  Using a sterile surgical marker, an appropriate keystone flap was drawn incorporating the defect, outlining the appropriate donor tissue and placing the expected incisions within the relaxed skin tension lines where possible. The area thus outlined was incised deep to adipose tissue with a #15 scalpel blade.  The skin margins were undermined to an appropriate distance in all directions around the primary defect and laterally outward around the flap utilizing iris scissors.
Melolabial Transposition Flap Text: The defect edges were debeveled with a #15 scalpel blade.  Given the location of the defect and the proximity to free margins a melolabial flap was deemed most appropriate.  Using a sterile surgical marker, an appropriate melolabial transposition flap was drawn incorporating the defect.    The area thus outlined was incised deep to adipose tissue with a #15 scalpel blade.  The skin margins were undermined to an appropriate distance in all directions utilizing iris scissors.
Muscle Hinge Flap Text: The defect edges were debeveled with a #15 scalpel blade.  Given the size, depth and location of the defect and the proximity to free margins a muscle hinge flap was deemed most appropriate.  Using a sterile surgical marker, an appropriate hinge flap was drawn incorporating the defect. The area thus outlined was incised with a #15 scalpel blade.  The skin margins were undermined to an appropriate distance in all directions utilizing iris scissors.
Bilobed Flap Text: The defect edges were debeveled with a #15 scalpel blade.  Given the location of the defect and the proximity to free margins a bilobe flap was deemed most appropriate.  Using a sterile surgical marker, an appropriate bilobe flap drawn around the defect.    The area thus outlined was incised deep to adipose tissue with a #15 scalpel blade.  The skin margins were undermined to an appropriate distance in all directions utilizing iris scissors.
Chonodrocutaneous Helical Advancement Flap Text: The defect edges were debeveled with a #15 scalpel blade.  Given the location of the defect and the proximity to free margins a chondrocutaneous helical advancement flap was deemed most appropriate.  Using a sterile surgical marker, the appropriate advancement flap was drawn incorporating the defect and placing the expected incisions within the relaxed skin tension lines where possible.    The area thus outlined was incised deep to adipose tissue with a #15 scalpel blade.  The skin margins were undermined to an appropriate distance in all directions utilizing iris scissors.
Slit Excision Additional Text (Leave Blank If You Do Not Want): A linear line was drawn on the skin overlying the lesion. An incision was made slowly until the lesion was visualized.  Once visualized, the lesion was removed with blunt dissection.
Information: Selecting Yes will display possible errors in your note based on the variables you have selected. This validation is only offered as a suggestion for you. PLEASE NOTE THAT THE VALIDATION TEXT WILL BE REMOVED WHEN YOU FINALIZE YOUR NOTE. IF YOU WANT TO FAX A PRELIMINARY NOTE YOU WILL NEED TO TOGGLE THIS TO 'NO' IF YOU DO NOT WANT IT IN YOUR FAXED NOTE.
Helical Rim Text: The closure involved the helical rim.
Complex Repair And Dermal Autograft Text: The defect edges were debeveled with a #15 scalpel blade.  The primary defect was closed partially with a complex linear closure.  Given the location of the defect, shape of the defect and the proximity to free margins an dermal autograft was deemed most appropriate to repair the remaining defect.  The graft was trimmed to fit the size of the remaining defect.  The graft was then placed in the primary defect, oriented appropriately, and sutured into place.
Rhomboid Transposition Flap Text: The defect edges were debeveled with a #15 scalpel blade.  Given the location of the defect and the proximity to free margins a rhomboid transposition flap was deemed most appropriate.  Using a sterile surgical marker, an appropriate rhomboid flap was drawn incorporating the defect.    The area thus outlined was incised deep to adipose tissue with a #15 scalpel blade.  The skin margins were undermined to an appropriate distance in all directions utilizing iris scissors.
Complex Repair And A-T Advancement Flap Text: The defect edges were debeveled with a #15 scalpel blade.  The primary defect was closed partially with a complex linear closure.  Given the location of the remaining defect, shape of the defect and the proximity to free margins an A-T advancement flap was deemed most appropriate for complete closure of the defect.  Using a sterile surgical marker, an appropriate advancement flap was drawn incorporating the defect and placing the expected incisions within the relaxed skin tension lines where possible.    The area thus outlined was incised deep to adipose tissue with a #15 scalpel blade.  The skin margins were undermined to an appropriate distance in all directions utilizing iris scissors.
O-T Plasty Text: The defect edges were debeveled with a #15 scalpel blade.  Given the location of the defect, shape of the defect and the proximity to free margins an O-T plasty was deemed most appropriate.  Using a sterile surgical marker, an appropriate O-T plasty was drawn incorporating the defect and placing the expected incisions within the relaxed skin tension lines where possible.    The area thus outlined was incised deep to adipose tissue with a #15 scalpel blade.  The skin margins were undermined to an appropriate distance in all directions utilizing iris scissors.
Rotation Flap Text: The defect edges were debeveled with a #15 scalpel blade.  Given the location of the defect, shape of the defect and the proximity to free margins a rotation flap was deemed most appropriate.  Using a sterile surgical marker, an appropriate rotation flap was drawn incorporating the defect and placing the expected incisions within the relaxed skin tension lines where possible.    The area thus outlined was incised deep to adipose tissue with a #15 scalpel blade.  The skin margins were undermined to an appropriate distance in all directions utilizing iris scissors.

## 2022-01-20 NOTE — PROCEDURE: MIPS QUALITY
Quality 431: Preventive Care And Screening: Unhealthy Alcohol Use - Screening: Patient not identified as an unhealthy alcohol user when screened for unhealthy alcohol use using a systematic screening method
Detail Level: Detailed
Quality 111:Pneumonia Vaccination Status For Older Adults: Pneumococcal Vaccination Previously Received
Quality 226: Preventive Care And Screening: Tobacco Use: Screening And Cessation Intervention: Patient screened for tobacco use and is an ex/non-smoker
Quality 130: Documentation Of Current Medications In The Medical Record: Current Medications Documented
Quality 110: Preventive Care And Screening: Influenza Immunization: Influenza Immunization Administered during Influenza season

## 2022-01-20 NOTE — PROCEDURE: LIQUID NITROGEN
Duration Of Freeze Thaw-Cycle (Seconds): 5
Detail Level: Detailed
Post-Care Instructions: I reviewed with the patient in detail post-care instructions. Patient is to wear sunprotection, and avoid picking at any of the treated lesions. Pt may apply Vaseline to crusted or scabbing areas.
Number Of Freeze-Thaw Cycles: 1 freeze-thaw cycle
Show Aperture Variable?: Yes
Consent: The patient's consent was obtained including but not limited to risks of crusting, scabbing, blistering, scarring, darker or lighter pigmentary change, recurrence, incomplete removal and infection.
Render Post-Care Instructions In Note?: no

## 2022-01-20 NOTE — PROCEDURE: SHAVE REMOVAL (NO PATHOLOGY)
X Size Of Lesion In Cm (Optional): 0
Medical Necessity Clause: This procedure was medically necessary because the lesion that was treated was:
Size Of Lesion In Cm: 0.2
Anesthesia Type: 1% lidocaine with epinephrine
Bill For Surgical Tray: no
Wound Care: Petrolatum
Consent was obtained from the patient. The risks and benefits to therapy were discussed in detail. Specifically, the risks of infection, scarring, bleeding, prolonged wound healing, incomplete removal, allergy to anesthesia, nerve injury and recurrence were addressed. Prior to the procedure, the treatment site was clearly identified and confirmed by the patient. All components of Universal Protocol/PAUSE Rule completed.
Hemostasis: Drysol
Post-Care Instructions: I reviewed with the patient in detail post-care instructions. Patient is to keep the biopsy site dry overnight, and then apply bacitracin twice daily until healed. Patient may apply hydrogen peroxide soaks to remove any crusting.
Medical Necessity Information: It is in your best interest to select a reason for this procedure from the list below. All of these items fulfill various CMS LCD requirements except the new and changing color options.
Detail Level: Detailed

## 2022-01-31 ENCOUNTER — APPOINTMENT (OUTPATIENT)
Dept: URBAN - METROPOLITAN AREA CLINIC 261 | Age: 77
Setting detail: DERMATOLOGY
End: 2022-02-01

## 2022-01-31 DIAGNOSIS — Z48.02 ENCOUNTER FOR REMOVAL OF SUTURES: ICD-10-CM

## 2022-01-31 PROCEDURE — OTHER COUNSELING: OTHER

## 2022-01-31 PROCEDURE — OTHER SUTURE REMOVAL (GLOBAL PERIOD): OTHER

## 2022-01-31 PROCEDURE — 99024 POSTOP FOLLOW-UP VISIT: CPT

## 2022-01-31 ASSESSMENT — LOCATION ZONE DERM: LOCATION ZONE: TRUNK

## 2022-01-31 ASSESSMENT — LOCATION DETAILED DESCRIPTION DERM: LOCATION DETAILED: RIGHT RIB CAGE

## 2022-01-31 ASSESSMENT — LOCATION SIMPLE DESCRIPTION DERM: LOCATION SIMPLE: ABDOMEN

## 2022-01-31 NOTE — PROCEDURE: SUTURE REMOVAL (GLOBAL PERIOD)
Detail Level: Detailed
Add 72704 Cpt? (Important Note: In 2017 The Use Of 70549 Is Being Tracked By Cms To Determine Future Global Period Reimbursement For Global Periods): yes

## 2022-06-02 ENCOUNTER — APPOINTMENT (OUTPATIENT)
Dept: URBAN - METROPOLITAN AREA CLINIC 261 | Age: 77
Setting detail: DERMATOLOGY
End: 2022-06-02

## 2022-06-02 DIAGNOSIS — L81.4 OTHER MELANIN HYPERPIGMENTATION: ICD-10-CM

## 2022-06-02 DIAGNOSIS — D485 NEOPLASM OF UNCERTAIN BEHAVIOR OF SKIN: ICD-10-CM

## 2022-06-02 DIAGNOSIS — D22 MELANOCYTIC NEVI: ICD-10-CM

## 2022-06-02 DIAGNOSIS — D18.0 HEMANGIOMA: ICD-10-CM

## 2022-06-02 DIAGNOSIS — L82.1 OTHER SEBORRHEIC KERATOSIS: ICD-10-CM

## 2022-06-02 DIAGNOSIS — L57.0 ACTINIC KERATOSIS: ICD-10-CM

## 2022-06-02 DIAGNOSIS — L82.0 INFLAMED SEBORRHEIC KERATOSIS: ICD-10-CM

## 2022-06-02 DIAGNOSIS — Z12.83 ENCOUNTER FOR SCREENING FOR MALIGNANT NEOPLASM OF SKIN: ICD-10-CM

## 2022-06-02 PROBLEM — D23.71 OTHER BENIGN NEOPLASM OF SKIN OF RIGHT LOWER LIMB, INCLUDING HIP: Status: ACTIVE | Noted: 2022-06-02

## 2022-06-02 PROBLEM — D22.5 MELANOCYTIC NEVI OF TRUNK: Status: ACTIVE | Noted: 2022-06-02

## 2022-06-02 PROBLEM — D48.5 NEOPLASM OF UNCERTAIN BEHAVIOR OF SKIN: Status: ACTIVE | Noted: 2022-06-02

## 2022-06-02 PROBLEM — D18.01 HEMANGIOMA OF SKIN AND SUBCUTANEOUS TISSUE: Status: ACTIVE | Noted: 2022-06-02

## 2022-06-02 PROCEDURE — OTHER COUNSELING: OTHER

## 2022-06-02 PROCEDURE — OTHER MIPS QUALITY: OTHER

## 2022-06-02 PROCEDURE — 99213 OFFICE O/P EST LOW 20 MIN: CPT | Mod: 25

## 2022-06-02 PROCEDURE — 17000 DESTRUCT PREMALG LESION: CPT | Mod: 59

## 2022-06-02 PROCEDURE — 17110 DESTRUCT B9 LESION 1-14: CPT

## 2022-06-02 PROCEDURE — OTHER DIAGNOSIS COMMENT: OTHER

## 2022-06-02 PROCEDURE — 17003 DESTRUCT PREMALG LES 2-14: CPT | Mod: 59

## 2022-06-02 PROCEDURE — OTHER LIQUID NITROGEN: OTHER

## 2022-06-02 ASSESSMENT — LOCATION DETAILED DESCRIPTION DERM
LOCATION DETAILED: SUPERIOR LUMBAR SPINE
LOCATION DETAILED: RIGHT INFERIOR LATERAL NECK
LOCATION DETAILED: LEFT INFERIOR MEDIAL UPPER BACK
LOCATION DETAILED: RIGHT INFERIOR MEDIAL MALAR CHEEK
LOCATION DETAILED: LEFT LATERAL PROXIMAL PRETIBIAL REGION
LOCATION DETAILED: EPIGASTRIC SKIN
LOCATION DETAILED: RIGHT SUPERIOR MEDIAL LOWER BACK
LOCATION DETAILED: RIGHT ANTERIOR PROXIMAL UPPER ARM
LOCATION DETAILED: LEFT POSTERIOR NECK
LOCATION DETAILED: RIGHT DISTAL PRETIBIAL REGION
LOCATION DETAILED: MIDDLE STERNUM
LOCATION DETAILED: RIGHT SUPERIOR UPPER BACK
LOCATION DETAILED: LEFT DISTAL PRETIBIAL REGION
LOCATION DETAILED: RIGHT DISTAL PRETIBIAL REGION
LOCATION DETAILED: LEFT LATERAL FOREHEAD
LOCATION DETAILED: RIGHT MID-UPPER BACK
LOCATION DETAILED: LEFT NASAL SIDEWALL
LOCATION DETAILED: SUBXIPHOID
LOCATION DETAILED: LEFT SUPERIOR TEMPLE

## 2022-06-02 ASSESSMENT — LOCATION SIMPLE DESCRIPTION DERM
LOCATION SIMPLE: RIGHT UPPER ARM
LOCATION SIMPLE: LEFT NOSE
LOCATION SIMPLE: ABDOMEN
LOCATION SIMPLE: RIGHT PRETIBIAL REGION
LOCATION SIMPLE: RIGHT UPPER BACK
LOCATION SIMPLE: RIGHT ANTERIOR NECK
LOCATION SIMPLE: RIGHT PRETIBIAL REGION
LOCATION SIMPLE: RIGHT LOWER BACK
LOCATION SIMPLE: POSTERIOR NECK
LOCATION SIMPLE: LEFT UPPER BACK
LOCATION SIMPLE: RIGHT CHEEK
LOCATION SIMPLE: LEFT FOREHEAD
LOCATION SIMPLE: CHEST
LOCATION SIMPLE: LEFT TEMPLE
LOCATION SIMPLE: LOWER BACK
LOCATION SIMPLE: LEFT PRETIBIAL REGION

## 2022-06-02 ASSESSMENT — LOCATION ZONE DERM
LOCATION ZONE: ARM
LOCATION ZONE: LEG
LOCATION ZONE: NOSE
LOCATION ZONE: FACE
LOCATION ZONE: TRUNK
LOCATION ZONE: NECK
LOCATION ZONE: LEG

## 2022-06-02 NOTE — PROCEDURE: DIAGNOSIS COMMENT
Render Risk Assessment In Note?: no
Comment: patient declined biopsy, feels that it is resolving.
Detail Level: Simple

## 2022-06-02 NOTE — PROCEDURE: LIQUID NITROGEN
Render Post-Care Instructions In Note?: no
Consent: The patient's consent was obtained including but not limited to risks of crusting, scabbing, blistering, scarring, darker or lighter pigmentary change, recurrence, incomplete removal and infection.
Spray Paint Text: The liquid nitrogen was applied to the skin utilizing a spray paint frosting technique.
Show Spray Paint Technique Variable?: Yes
Detail Level: Detailed
Duration Of Freeze Thaw-Cycle (Seconds): 5-10
Post-Care Instructions: I reviewed with the patient in detail post-care instructions. Patient is to wear sunprotection, and avoid picking at any of the treated lesions. Pt may apply Vaseline to crusted or scabbing areas.
Medical Necessity Information: It is in your best interest to select a reason for this procedure from the list below. All of these items fulfill various CMS LCD requirements except the new and changing color options.
Duration Of Freeze Thaw-Cycle (Seconds): 5
Number Of Freeze-Thaw Cycles: 1 freeze-thaw cycle
Medical Necessity Clause: This procedure was medically necessary because the lesions that were treated were:

## 2022-06-02 NOTE — PROCEDURE: MIPS QUALITY
Quality 111:Pneumonia Vaccination Status For Older Adults: Pneumococcal Vaccination Previously Received
Quality 431: Preventive Care And Screening: Unhealthy Alcohol Use - Screening: Patient not identified as an unhealthy alcohol user when screened for unhealthy alcohol use using a systematic screening method
Detail Level: Detailed
Quality 130: Documentation Of Current Medications In The Medical Record: Current Medications Documented
Quality 226: Preventive Care And Screening: Tobacco Use: Screening And Cessation Intervention: Patient screened for tobacco use and is an ex/non-smoker
Quality 110: Preventive Care And Screening: Influenza Immunization: Influenza Immunization Administered during Influenza season

## 2022-07-28 ENCOUNTER — APPOINTMENT (OUTPATIENT)
Dept: URBAN - METROPOLITAN AREA CLINIC 261 | Age: 77
Setting detail: DERMATOLOGY
End: 2022-07-28

## 2022-07-28 DIAGNOSIS — L82.0 INFLAMED SEBORRHEIC KERATOSIS: ICD-10-CM

## 2022-07-28 PROCEDURE — 99212 OFFICE O/P EST SF 10 MIN: CPT

## 2022-07-28 PROCEDURE — OTHER MIPS QUALITY: OTHER

## 2022-07-28 PROCEDURE — OTHER DEFER: OTHER

## 2022-07-28 PROCEDURE — OTHER COUNSELING: OTHER

## 2022-07-28 ASSESSMENT — LOCATION SIMPLE DESCRIPTION DERM: LOCATION SIMPLE: RIGHT SHOULDER

## 2022-07-28 ASSESSMENT — LOCATION DETAILED DESCRIPTION DERM: LOCATION DETAILED: RIGHT ANTERIOR SHOULDER

## 2022-07-28 ASSESSMENT — LOCATION ZONE DERM: LOCATION ZONE: ARM

## 2022-07-28 NOTE — PROCEDURE: DEFER
Introduction Text (Please End With A Colon): The following procedure was deferred:
Instructions (Optional): Patient will call to schedule after lumpectomy
Detail Level: Detailed

## 2022-07-28 NOTE — HPI: SKIN LESION
What Type Of Note Output Would You Prefer (Optional)?: Bullet Format
Has Your Skin Lesion Been Treated?: not been treated
Is This A New Presentation, Or A Follow-Up?: Skin Lesions
Additional History: Patient states NUB on right lower leg is resolving and doesn’t need a biopsy.

## 2022-08-29 ENCOUNTER — APPOINTMENT (OUTPATIENT)
Dept: URBAN - METROPOLITAN AREA CLINIC 261 | Age: 77
Setting detail: DERMATOLOGY
End: 2022-08-29

## 2022-08-29 DIAGNOSIS — L82.0 INFLAMED SEBORRHEIC KERATOSIS: ICD-10-CM

## 2022-08-29 PROCEDURE — OTHER MIPS QUALITY: OTHER

## 2022-08-29 PROCEDURE — OTHER LIQUID NITROGEN: OTHER

## 2022-08-29 PROCEDURE — OTHER COUNSELING: OTHER

## 2022-08-29 PROCEDURE — 17111 DESTRUCT LESION 15 OR MORE: CPT

## 2022-08-29 ASSESSMENT — LOCATION DETAILED DESCRIPTION DERM
LOCATION DETAILED: LEFT LATERAL SUPERIOR CHEST
LOCATION DETAILED: LEFT ANTERIOR SHOULDER
LOCATION DETAILED: LEFT DORSAL RING METACARPOPHALANGEAL JOINT
LOCATION DETAILED: LEFT DORSAL SMALL METACARPOPHALANGEAL JOINT
LOCATION DETAILED: LEFT MEDIAL BREAST 10-11:00 REGION
LOCATION DETAILED: LEFT SUPERIOR UPPER BACK
LOCATION DETAILED: LEFT SUPERIOR MEDIAL UPPER BACK
LOCATION DETAILED: RIGHT LATERAL SUPERIOR CHEST
LOCATION DETAILED: LEFT LATERAL ABDOMEN
LOCATION DETAILED: UPPER STERNUM
LOCATION DETAILED: LEFT ULNAR DORSAL HAND
LOCATION DETAILED: RIGHT RADIAL DORSAL HAND
LOCATION DETAILED: LEFT MEDIAL SUPERIOR CHEST
LOCATION DETAILED: RIGHT ULNAR DORSAL HAND

## 2022-08-29 ASSESSMENT — LOCATION SIMPLE DESCRIPTION DERM
LOCATION SIMPLE: LEFT BREAST
LOCATION SIMPLE: ABDOMEN
LOCATION SIMPLE: RIGHT HAND
LOCATION SIMPLE: LEFT SHOULDER
LOCATION SIMPLE: LEFT UPPER BACK
LOCATION SIMPLE: LEFT HAND
LOCATION SIMPLE: CHEST

## 2022-08-29 ASSESSMENT — LOCATION ZONE DERM
LOCATION ZONE: TRUNK
LOCATION ZONE: HAND
LOCATION ZONE: ARM

## 2022-08-29 NOTE — PROCEDURE: MIPS QUALITY
Lens Treatment:
Quality 431: Preventive Care And Screening: Unhealthy Alcohol Use - Screening: Patient not identified as an unhealthy alcohol user when screened for unhealthy alcohol use using a systematic screening method
Detail Level: Detailed
Quality 226: Preventive Care And Screening: Tobacco Use: Screening And Cessation Intervention: Patient screened for tobacco use and is an ex/non-smoker
Quality 130: Documentation Of Current Medications In The Medical Record: Current Medications Documented

## 2022-08-29 NOTE — PROCEDURE: LIQUID NITROGEN
Show Spray Paint Technique Variable?: Yes
Render Note In Bullet Format When Appropriate: No
Duration Of Freeze Thaw-Cycle (Seconds): 5-10
Number Of Freeze-Thaw Cycles: 1 freeze-thaw cycle
Detail Level: Detailed
Medical Necessity Information: It is in your best interest to select a reason for this procedure from the list below. All of these items fulfill various CMS LCD requirements except the new and changing color options.
Post-Care Instructions: I reviewed with the patient in detail post-care instructions. Patient is to wear sunprotection, and avoid picking at any of the treated lesions. Pt may apply Vaseline to crusted or scabbing areas.
Spray Paint Text: The liquid nitrogen was applied to the skin utilizing a spray paint frosting technique.
Medical Necessity Clause: This procedure was medically necessary because the lesions that were treated were:
Consent: The patient's consent was obtained including but not limited to risks of crusting, scabbing, blistering, scarring, darker or lighter pigmentary change, recurrence, incomplete removal and infection.

## 2022-08-29 NOTE — PROCEDURE: COUNSELING
[phone]    Detail Level: Detailed

## 2023-02-09 ENCOUNTER — APPOINTMENT (OUTPATIENT)
Dept: URBAN - METROPOLITAN AREA CLINIC 261 | Age: 78
Setting detail: DERMATOLOGY
End: 2023-02-16

## 2023-02-09 DIAGNOSIS — L81.4 OTHER MELANIN HYPERPIGMENTATION: ICD-10-CM

## 2023-02-09 DIAGNOSIS — D22 MELANOCYTIC NEVI: ICD-10-CM

## 2023-02-09 DIAGNOSIS — L82.0 INFLAMED SEBORRHEIC KERATOSIS: ICD-10-CM

## 2023-02-09 DIAGNOSIS — Z12.83 ENCOUNTER FOR SCREENING FOR MALIGNANT NEOPLASM OF SKIN: ICD-10-CM

## 2023-02-09 DIAGNOSIS — D18.0 HEMANGIOMA: ICD-10-CM

## 2023-02-09 DIAGNOSIS — L82.1 OTHER SEBORRHEIC KERATOSIS: ICD-10-CM

## 2023-02-09 PROBLEM — D18.01 HEMANGIOMA OF SKIN AND SUBCUTANEOUS TISSUE: Status: ACTIVE | Noted: 2023-02-09

## 2023-02-09 PROBLEM — D22.5 MELANOCYTIC NEVI OF TRUNK: Status: ACTIVE | Noted: 2023-02-09

## 2023-02-09 PROCEDURE — OTHER MIPS QUALITY: OTHER

## 2023-02-09 PROCEDURE — 17111 DESTRUCT LESION 15 OR MORE: CPT

## 2023-02-09 PROCEDURE — 99213 OFFICE O/P EST LOW 20 MIN: CPT | Mod: 25

## 2023-02-09 PROCEDURE — OTHER COUNSELING: OTHER

## 2023-02-09 PROCEDURE — OTHER LIQUID NITROGEN: OTHER

## 2023-02-09 ASSESSMENT — LOCATION SIMPLE DESCRIPTION DERM
LOCATION SIMPLE: RIGHT ANTERIOR NECK
LOCATION SIMPLE: LEFT FOREARM
LOCATION SIMPLE: RIGHT HAND
LOCATION SIMPLE: LEFT UPPER BACK
LOCATION SIMPLE: RIGHT WRIST
LOCATION SIMPLE: POSTERIOR NECK
LOCATION SIMPLE: ABDOMEN
LOCATION SIMPLE: RIGHT THUMB
LOCATION SIMPLE: CHEST
LOCATION SIMPLE: LEFT ANTERIOR NECK
LOCATION SIMPLE: RIGHT LOWER BACK
LOCATION SIMPLE: RIGHT UPPER BACK
LOCATION SIMPLE: RIGHT FOREARM
LOCATION SIMPLE: LEFT HAND

## 2023-02-09 ASSESSMENT — LOCATION DETAILED DESCRIPTION DERM
LOCATION DETAILED: LEFT RADIAL DORSAL HAND
LOCATION DETAILED: LEFT DORSAL THUMB METACARPOPHALANGEAL JOINT
LOCATION DETAILED: RIGHT INFERIOR UPPER BACK
LOCATION DETAILED: RIGHT DISTAL DORSAL FOREARM
LOCATION DETAILED: RIGHT DORSAL WRIST
LOCATION DETAILED: RIGHT RADIAL DORSAL HAND
LOCATION DETAILED: LEFT MID-UPPER BACK
LOCATION DETAILED: RIGHT SUPERIOR UPPER BACK
LOCATION DETAILED: 3RD WEB SPACE RIGHT HAND
LOCATION DETAILED: LEFT DORSAL SMALL FINGER METACARPOPHALANGEAL JOINT
LOCATION DETAILED: LEFT LATERAL TRAPEZIAL NECK
LOCATION DETAILED: EPIGASTRIC SKIN
LOCATION DETAILED: RIGHT CLAVICULAR NECK
LOCATION DETAILED: 1ST WEB SPACE RIGHT HAND
LOCATION DETAILED: LEFT INFERIOR ANTERIOR NECK
LOCATION DETAILED: RIGHT MEDIAL SUPERIOR CHEST
LOCATION DETAILED: RIGHT INFERIOR MEDIAL MIDBACK
LOCATION DETAILED: LEFT DISTAL DORSAL FOREARM

## 2023-02-09 ASSESSMENT — LOCATION ZONE DERM
LOCATION ZONE: TRUNK
LOCATION ZONE: ARM
LOCATION ZONE: NECK
LOCATION ZONE: HAND
LOCATION ZONE: FINGER

## 2023-02-09 NOTE — PROCEDURE: MIPS QUALITY
Quality 130: Documentation Of Current Medications In The Medical Record: Current Medications Documented
Detail Level: Detailed
Quality 110: Preventive Care And Screening: Influenza Immunization: Influenza Immunization Administered during Influenza season
Quality 226: Preventive Care And Screening: Tobacco Use: Screening And Cessation Intervention: Patient screened for tobacco use and is an ex/non-smoker
Quality 431: Preventive Care And Screening: Unhealthy Alcohol Use - Screening: Patient not identified as an unhealthy alcohol user when screened for unhealthy alcohol use using a systematic screening method
Quality 111:Pneumonia Vaccination Status For Older Adults: Pneumococcal vaccine (PPSV23) administered on or after patient’s 60th birthday and before the end of the measurement period

## 2023-02-09 NOTE — PROCEDURE: LIQUID NITROGEN
Medical Necessity Information: It is in your best interest to select a reason for this procedure from the list below. All of these items fulfill various CMS LCD requirements except the new and changing color options.
Spray Paint Technique: No
Show Spray Paint Technique Variable?: Yes
Medical Necessity Clause: This procedure was medically necessary because the lesions that were treated were:
Post-Care Instructions: I reviewed with the patient in detail post-care instructions. Patient is to wear sunprotection, and avoid picking at any of the treated lesions. Pt may apply Vaseline to crusted or scabbing areas.
Consent: The patient's consent was obtained including but not limited to risks of crusting, scabbing, blistering, scarring, darker or lighter pigmentary change, recurrence, incomplete removal and infection.
Spray Paint Text: The liquid nitrogen was applied to the skin utilizing a spray paint frosting technique.
Duration Of Freeze Thaw-Cycle (Seconds): 5-10
Number Of Freeze-Thaw Cycles: 1 freeze-thaw cycle
Detail Level: Detailed

## 2023-10-30 ENCOUNTER — APPOINTMENT (OUTPATIENT)
Dept: URBAN - METROPOLITAN AREA CLINIC 294 | Age: 78
Setting detail: DERMATOLOGY
End: 2023-11-01

## 2023-10-30 DIAGNOSIS — L82.1 OTHER SEBORRHEIC KERATOSIS: ICD-10-CM

## 2023-10-30 DIAGNOSIS — L82.0 INFLAMED SEBORRHEIC KERATOSIS: ICD-10-CM

## 2023-10-30 PROCEDURE — OTHER LIQUID NITROGEN: OTHER

## 2023-10-30 PROCEDURE — OTHER COUNSELING: OTHER

## 2023-10-30 PROCEDURE — OTHER MIPS QUALITY: OTHER

## 2023-10-30 PROCEDURE — 99212 OFFICE O/P EST SF 10 MIN: CPT | Mod: 25

## 2023-10-30 PROCEDURE — 17110 DESTRUCT B9 LESION 1-14: CPT

## 2023-10-30 ASSESSMENT — LOCATION SIMPLE DESCRIPTION DERM
LOCATION SIMPLE: UPPER BACK
LOCATION SIMPLE: LEFT LOWER BACK
LOCATION SIMPLE: CHEST

## 2023-10-30 ASSESSMENT — LOCATION DETAILED DESCRIPTION DERM
LOCATION DETAILED: LEFT SUPERIOR MEDIAL MIDBACK
LOCATION DETAILED: SUPERIOR THORACIC SPINE
LOCATION DETAILED: LEFT INFERIOR LATERAL LOWER BACK
LOCATION DETAILED: LEFT MEDIAL SUPERIOR CHEST

## 2023-10-30 ASSESSMENT — LOCATION ZONE DERM: LOCATION ZONE: TRUNK

## 2023-10-30 NOTE — PROCEDURE: LIQUID NITROGEN
Show Spray Paint Technique Variable?: Yes
Post-Care Instructions: I reviewed with the patient in detail post-care instructions. Patient is to wear sunprotection, and avoid picking at any of the treated lesions. Pt may apply Vaseline to crusted or scabbing areas.
Detail Level: Detailed
Consent: The patient's consent was obtained including but not limited to risks of crusting, scabbing, blistering, scarring, darker or lighter pigmentary change, recurrence, incomplete removal and infection.
Medical Necessity Information: It is in your best interest to select a reason for this procedure from the list below. All of these items fulfill various CMS LCD requirements except the new and changing color options.
Add 52 Modifier (Optional): no
Spray Paint Text: The liquid nitrogen was applied to the skin utilizing a spray paint frosting technique.
Medical Necessity Clause: This procedure was medically necessary because the lesions that were treated were:

## 2024-02-14 ENCOUNTER — RX ONLY (RX ONLY)
Age: 79
End: 2024-02-14

## 2024-02-14 ENCOUNTER — DOCTOR'S OFFICE (OUTPATIENT)
Dept: URBAN - NONMETROPOLITAN AREA CLINIC 1 | Facility: CLINIC | Age: 79
Setting detail: OPHTHALMOLOGY
End: 2024-02-14
Payer: COMMERCIAL

## 2024-02-14 DIAGNOSIS — H02.834: ICD-10-CM

## 2024-02-14 DIAGNOSIS — H15.9: ICD-10-CM

## 2024-02-14 DIAGNOSIS — H43.813: ICD-10-CM

## 2024-02-14 DIAGNOSIS — H02.831: ICD-10-CM

## 2024-02-14 DIAGNOSIS — H25.13: ICD-10-CM

## 2024-02-14 DIAGNOSIS — H35.373: ICD-10-CM

## 2024-02-14 PROCEDURE — 92134 CPTRZ OPH DX IMG PST SGM RTA: CPT | Performed by: OPHTHALMOLOGY

## 2024-02-14 PROCEDURE — 92004 COMPRE OPH EXAM NEW PT 1/>: CPT | Performed by: OPHTHALMOLOGY

## 2024-02-14 ASSESSMENT — LID POSITION - DERMATOCHALASIS
OS_DERMATOCHALASIS: LUL 1+
OD_DERMATOCHALASIS: RUL 1+

## 2024-02-14 ASSESSMENT — CONFRONTATIONAL VISUAL FIELD TEST (CVF)
OD_FINDINGS: FULL
OS_FINDINGS: FULL

## 2024-02-19 ENCOUNTER — APPOINTMENT (OUTPATIENT)
Dept: URBAN - METROPOLITAN AREA CLINIC 294 | Age: 79
Setting detail: DERMATOLOGY
End: 2024-02-20

## 2024-02-19 DIAGNOSIS — D18.0 HEMANGIOMA: ICD-10-CM

## 2024-02-19 DIAGNOSIS — Z12.83 ENCOUNTER FOR SCREENING FOR MALIGNANT NEOPLASM OF SKIN: ICD-10-CM

## 2024-02-19 DIAGNOSIS — L81.4 OTHER MELANIN HYPERPIGMENTATION: ICD-10-CM

## 2024-02-19 DIAGNOSIS — L82.1 OTHER SEBORRHEIC KERATOSIS: ICD-10-CM

## 2024-02-19 DIAGNOSIS — D485 NEOPLASM OF UNCERTAIN BEHAVIOR OF SKIN: ICD-10-CM

## 2024-02-19 DIAGNOSIS — L82.0 INFLAMED SEBORRHEIC KERATOSIS: ICD-10-CM

## 2024-02-19 DIAGNOSIS — D22 MELANOCYTIC NEVI: ICD-10-CM

## 2024-02-19 PROBLEM — D22.5 MELANOCYTIC NEVI OF TRUNK: Status: ACTIVE | Noted: 2024-02-19

## 2024-02-19 PROBLEM — D48.5 NEOPLASM OF UNCERTAIN BEHAVIOR OF SKIN: Status: ACTIVE | Noted: 2024-02-19

## 2024-02-19 PROBLEM — D18.01 HEMANGIOMA OF SKIN AND SUBCUTANEOUS TISSUE: Status: ACTIVE | Noted: 2024-02-19

## 2024-02-19 PROCEDURE — OTHER BIOPSY BY SHAVE METHOD: OTHER

## 2024-02-19 PROCEDURE — OTHER LIQUID NITROGEN: OTHER

## 2024-02-19 PROCEDURE — 99213 OFFICE O/P EST LOW 20 MIN: CPT | Mod: 25

## 2024-02-19 PROCEDURE — 17111 DESTRUCT LESION 15 OR MORE: CPT

## 2024-02-19 PROCEDURE — 11102 TANGNTL BX SKIN SINGLE LES: CPT | Mod: 59

## 2024-02-19 PROCEDURE — OTHER COUNSELING: OTHER

## 2024-02-19 PROCEDURE — OTHER MIPS QUALITY: OTHER

## 2024-02-19 ASSESSMENT — LOCATION DETAILED DESCRIPTION DERM
LOCATION DETAILED: LEFT MID-UPPER BACK
LOCATION DETAILED: 1ST WEB SPACE RIGHT HAND
LOCATION DETAILED: LEFT DORSAL SMALL FINGER METACARPOPHALANGEAL JOINT
LOCATION DETAILED: RIGHT INFERIOR UPPER BACK
LOCATION DETAILED: RIGHT CLAVICULAR NECK
LOCATION DETAILED: RIGHT INFERIOR MEDIAL MIDBACK
LOCATION DETAILED: LEFT INFERIOR ANTERIOR NECK
LOCATION DETAILED: RIGHT DISTAL DORSAL FOREARM
LOCATION DETAILED: LEFT DORSAL THUMB METACARPOPHALANGEAL JOINT
LOCATION DETAILED: RIGHT DORSAL WRIST
LOCATION DETAILED: LEFT DISTAL DORSAL FOREARM
LOCATION DETAILED: RIGHT RADIAL DORSAL HAND
LOCATION DETAILED: 3RD WEB SPACE RIGHT HAND
LOCATION DETAILED: RIGHT SUPERIOR UPPER BACK
LOCATION DETAILED: EPIGASTRIC SKIN
LOCATION DETAILED: LEFT LATERAL TRAPEZIAL NECK
LOCATION DETAILED: LEFT RADIAL DORSAL HAND
LOCATION DETAILED: RIGHT MEDIAL SUPERIOR CHEST

## 2024-02-19 ASSESSMENT — LOCATION SIMPLE DESCRIPTION DERM
LOCATION SIMPLE: RIGHT HAND
LOCATION SIMPLE: LEFT HAND
LOCATION SIMPLE: RIGHT ANTERIOR NECK
LOCATION SIMPLE: RIGHT WRIST
LOCATION SIMPLE: LEFT ANTERIOR NECK
LOCATION SIMPLE: ABDOMEN
LOCATION SIMPLE: CHEST
LOCATION SIMPLE: POSTERIOR NECK
LOCATION SIMPLE: RIGHT LOWER BACK
LOCATION SIMPLE: RIGHT THUMB
LOCATION SIMPLE: LEFT UPPER BACK
LOCATION SIMPLE: RIGHT FOREARM
LOCATION SIMPLE: LEFT FOREARM
LOCATION SIMPLE: RIGHT UPPER BACK

## 2024-02-19 ASSESSMENT — LOCATION ZONE DERM
LOCATION ZONE: TRUNK
LOCATION ZONE: NECK
LOCATION ZONE: FINGER
LOCATION ZONE: HAND
LOCATION ZONE: ARM

## 2024-02-19 NOTE — PROCEDURE: BIOPSY BY SHAVE METHOD
Detail Level: Detailed
Depth Of Biopsy: dermis
Was A Bandage Applied: Yes
Size Of Lesion In Cm: 0
Biopsy Type: H and E
Biopsy Method: Dermablade
Anesthesia Type: 1% lidocaine with epinephrine
Anesthesia Volume In Cc: 0.5
Hemostasis: Drysol
Wound Care: Petrolatum
Dressing: bandage
Destruction After The Procedure: No
Type Of Destruction Used: Curettage
Curettage Text: The wound bed was treated with curettage after the biopsy was performed.
Cryotherapy Text: The wound bed was treated with cryotherapy after the biopsy was performed.
Electrodesiccation Text: The wound bed was treated with electrodesiccation after the biopsy was performed.
Electrodesiccation And Curettage Text: The wound bed was treated with electrodesiccation and curettage after the biopsy was performed.
Silver Nitrate Text: The wound bed was treated with silver nitrate after the biopsy was performed.
Lab: [id number]
Consent: Written consent was obtained and risks were reviewed including but not limited to scarring, infection, bleeding, scabbing, incomplete removal, nerve damage and allergy to anesthesia.
Post-Care Instructions: I reviewed with the patient in detail post-care instructions. Patient is to keep the biopsy site dry overnight, and then apply bacitracin twice daily until healed. Patient may apply hydrogen peroxide soaks to remove any crusting.
Notification Instructions: Patient will be notified of biopsy results. However, patient instructed to call the office if not contacted within 2 weeks.
Billing Type: Third-Party Bill
Information: Selecting Yes will display possible errors in your note based on the variables you have selected. This validation is only offered as a suggestion for you. PLEASE NOTE THAT THE VALIDATION TEXT WILL BE REMOVED WHEN YOU FINALIZE YOUR NOTE. IF YOU WANT TO FAX A PRELIMINARY NOTE YOU WILL NEED TO TOGGLE THIS TO 'NO' IF YOU DO NOT WANT IT IN YOUR FAXED NOTE.

## 2024-02-29 ENCOUNTER — DOCTOR'S OFFICE (OUTPATIENT)
Dept: URBAN - NONMETROPOLITAN AREA CLINIC 1 | Facility: CLINIC | Age: 79
Setting detail: OPHTHALMOLOGY
End: 2024-02-29
Payer: COMMERCIAL

## 2024-02-29 DIAGNOSIS — H25.11: ICD-10-CM

## 2024-02-29 PROCEDURE — 92136 OPHTHALMIC BIOMETRY: CPT | Mod: RT | Performed by: OPHTHALMOLOGY

## 2024-03-12 ENCOUNTER — AMBUL SURGICAL CARE (OUTPATIENT)
Dept: URBAN - NONMETROPOLITAN AREA SURGERY 1 | Facility: SURGERY | Age: 79
Setting detail: OPHTHALMOLOGY
End: 2024-03-12
Payer: COMMERCIAL

## 2024-03-12 DIAGNOSIS — H25.11: ICD-10-CM

## 2024-03-12 DIAGNOSIS — H25.011: ICD-10-CM

## 2024-03-12 PROCEDURE — G8918 PT W/O PREOP ORDER IV AB PRO: HCPCS | Mod: RT | Performed by: OPHTHALMOLOGY

## 2024-03-12 PROCEDURE — G8907 PT DOC NO EVENTS ON DISCHARG: HCPCS | Mod: RT | Performed by: OPHTHALMOLOGY

## 2024-03-12 PROCEDURE — 66982 XCAPSL CTRC RMVL CPLX WO ECP: CPT | Mod: RT | Performed by: OPHTHALMOLOGY

## 2024-03-13 ENCOUNTER — RX ONLY (RX ONLY)
Age: 79
End: 2024-03-13

## 2024-03-13 ENCOUNTER — DOCTOR'S OFFICE (OUTPATIENT)
Dept: URBAN - NONMETROPOLITAN AREA CLINIC 1 | Facility: CLINIC | Age: 79
Setting detail: OPHTHALMOLOGY
End: 2024-03-13
Payer: COMMERCIAL

## 2024-03-13 DIAGNOSIS — Z96.1: ICD-10-CM

## 2024-03-13 DIAGNOSIS — H25.12: ICD-10-CM

## 2024-03-13 PROCEDURE — 99024 POSTOP FOLLOW-UP VISIT: CPT | Performed by: OPHTHALMOLOGY

## 2024-03-13 PROCEDURE — 92136 OPHTHALMIC BIOMETRY: CPT | Mod: 26,LT | Performed by: OPHTHALMOLOGY

## 2024-03-22 ENCOUNTER — DOCTOR'S OFFICE (OUTPATIENT)
Dept: URBAN - NONMETROPOLITAN AREA CLINIC 1 | Facility: CLINIC | Age: 79
Setting detail: OPHTHALMOLOGY
End: 2024-03-22
Payer: COMMERCIAL

## 2024-03-22 ENCOUNTER — RX ONLY (RX ONLY)
Age: 79
End: 2024-03-22

## 2024-03-22 DIAGNOSIS — Z96.1: ICD-10-CM

## 2024-03-22 DIAGNOSIS — H25.12: ICD-10-CM

## 2024-03-22 PROCEDURE — 99024 POSTOP FOLLOW-UP VISIT: CPT | Performed by: OPHTHALMOLOGY

## 2024-03-22 ASSESSMENT — LID POSITION - DERMATOCHALASIS
OS_DERMATOCHALASIS: LUL 1+
OD_DERMATOCHALASIS: RUL 1+

## 2024-03-25 ENCOUNTER — AMBUL SURGICAL CARE (OUTPATIENT)
Dept: URBAN - NONMETROPOLITAN AREA SURGERY 1 | Facility: SURGERY | Age: 79
Setting detail: OPHTHALMOLOGY
End: 2024-03-25
Payer: COMMERCIAL

## 2024-03-25 DIAGNOSIS — H25.12: ICD-10-CM

## 2024-03-25 DIAGNOSIS — H25.012: ICD-10-CM

## 2024-03-25 PROCEDURE — G8907 PT DOC NO EVENTS ON DISCHARG: HCPCS | Mod: LT | Performed by: OPHTHALMOLOGY

## 2024-03-25 PROCEDURE — G8918 PT W/O PREOP ORDER IV AB PRO: HCPCS | Mod: LT | Performed by: OPHTHALMOLOGY

## 2024-03-25 PROCEDURE — 66982 XCAPSL CTRC RMVL CPLX WO ECP: CPT | Mod: LT | Performed by: OPHTHALMOLOGY

## 2024-03-25 PROCEDURE — 66982 XCAPSL CTRC RMVL CPLX WO ECP: CPT | Mod: 79,LT | Performed by: OPHTHALMOLOGY

## 2024-03-26 ENCOUNTER — DOCTOR'S OFFICE (OUTPATIENT)
Dept: URBAN - NONMETROPOLITAN AREA CLINIC 1 | Facility: CLINIC | Age: 79
Setting detail: OPHTHALMOLOGY
End: 2024-03-26
Payer: COMMERCIAL

## 2024-03-26 DIAGNOSIS — Z96.1: ICD-10-CM

## 2024-03-26 PROBLEM — H25.12 CATARACT NUCLEAR SCLEROSIS;  , LEFT EYE: Status: RESOLVED | Noted: 2024-03-13 | Resolved: 2024-03-26

## 2024-03-26 PROCEDURE — 99024 POSTOP FOLLOW-UP VISIT: CPT | Performed by: OPHTHALMOLOGY

## 2024-03-26 ASSESSMENT — LID POSITION - DERMATOCHALASIS
OS_DERMATOCHALASIS: LUL 1+
OD_DERMATOCHALASIS: RUL 1+

## 2024-04-02 ENCOUNTER — DOCTOR'S OFFICE (OUTPATIENT)
Dept: URBAN - NONMETROPOLITAN AREA CLINIC 1 | Facility: CLINIC | Age: 79
Setting detail: OPHTHALMOLOGY
End: 2024-04-02

## 2024-04-02 DIAGNOSIS — Z96.1: ICD-10-CM

## 2024-04-02 PROCEDURE — 99024 POSTOP FOLLOW-UP VISIT: CPT | Performed by: OPHTHALMOLOGY

## 2024-04-02 ASSESSMENT — LID POSITION - DERMATOCHALASIS
OD_DERMATOCHALASIS: RUL 1+
OS_DERMATOCHALASIS: LUL 1+

## 2024-04-30 ENCOUNTER — DOCTOR'S OFFICE (OUTPATIENT)
Dept: URBAN - NONMETROPOLITAN AREA CLINIC 1 | Facility: CLINIC | Age: 79
Setting detail: OPHTHALMOLOGY
End: 2024-04-30
Payer: COMMERCIAL

## 2024-04-30 DIAGNOSIS — Z96.1: ICD-10-CM

## 2024-04-30 DIAGNOSIS — H52.223: ICD-10-CM

## 2024-04-30 DIAGNOSIS — H52.4: ICD-10-CM

## 2024-04-30 PROCEDURE — 99024 POSTOP FOLLOW-UP VISIT: CPT

## 2024-04-30 PROCEDURE — 92015 DETERMINE REFRACTIVE STATE: CPT

## 2024-04-30 PROCEDURE — 92014 COMPRE OPH EXAM EST PT 1/>: CPT

## 2024-04-30 ASSESSMENT — LID POSITION - DERMATOCHALASIS
OS_DERMATOCHALASIS: LUL 1+
OD_DERMATOCHALASIS: RUL 1+

## 2024-06-06 ENCOUNTER — APPOINTMENT (OUTPATIENT)
Dept: URBAN - METROPOLITAN AREA CLINIC 294 | Age: 79
Setting detail: DERMATOLOGY
End: 2024-06-13

## 2024-06-06 DIAGNOSIS — L82.0 INFLAMED SEBORRHEIC KERATOSIS: ICD-10-CM

## 2024-06-06 PROCEDURE — 17110 DESTRUCT B9 LESION 1-14: CPT

## 2024-06-06 PROCEDURE — OTHER LIQUID NITROGEN: OTHER

## 2024-06-06 PROCEDURE — OTHER MIPS QUALITY: OTHER

## 2024-06-06 PROCEDURE — OTHER COUNSELING: OTHER

## 2024-06-06 ASSESSMENT — LOCATION SIMPLE DESCRIPTION DERM
LOCATION SIMPLE: LEFT SHOULDER
LOCATION SIMPLE: LEFT TEMPLE
LOCATION SIMPLE: CHEST

## 2024-06-06 ASSESSMENT — LOCATION DETAILED DESCRIPTION DERM
LOCATION DETAILED: LEFT MEDIAL TEMPLE
LOCATION DETAILED: LEFT LATERAL SUPERIOR CHEST
LOCATION DETAILED: LEFT ANTERIOR SHOULDER
LOCATION DETAILED: LEFT MID TEMPLE

## 2024-06-06 ASSESSMENT — LOCATION ZONE DERM
LOCATION ZONE: TRUNK
LOCATION ZONE: ARM
LOCATION ZONE: FACE

## 2024-06-06 NOTE — PROCEDURE: LIQUID NITROGEN
Spray Paint Technique: No
Medical Necessity Information: It is in your best interest to select a reason for this procedure from the list below. All of these items fulfill various CMS LCD requirements except the new and changing color options.
Detail Level: Detailed
Show Topical Anesthesia Variable?: Yes
Consent: The patient's consent was obtained including but not limited to risks of crusting, scabbing, blistering, scarring, darker or lighter pigmentary change, recurrence, incomplete removal and infection.
Post-Care Instructions: I reviewed with the patient in detail post-care instructions. Patient is to wear sunprotection, and avoid picking at any of the treated lesions. Pt may apply Vaseline to crusted or scabbing areas.
Spray Paint Text: The liquid nitrogen was applied to the skin utilizing a spray paint frosting technique.
Medical Necessity Clause: This procedure was medically necessary because the lesions that were treated were:

## 2024-07-29 ENCOUNTER — DOCTOR'S OFFICE (OUTPATIENT)
Dept: URBAN - NONMETROPOLITAN AREA CLINIC 1 | Facility: CLINIC | Age: 79
Setting detail: OPHTHALMOLOGY
End: 2024-07-29
Payer: COMMERCIAL

## 2024-07-29 DIAGNOSIS — H35.363: ICD-10-CM

## 2024-07-29 DIAGNOSIS — H35.373: ICD-10-CM

## 2024-07-29 DIAGNOSIS — H26.493: ICD-10-CM

## 2024-07-29 DIAGNOSIS — H02.834: ICD-10-CM

## 2024-07-29 DIAGNOSIS — H15.9: ICD-10-CM

## 2024-07-29 DIAGNOSIS — H02.831: ICD-10-CM

## 2024-07-29 DIAGNOSIS — H43.813: ICD-10-CM

## 2024-07-29 PROCEDURE — 99213 OFFICE O/P EST LOW 20 MIN: CPT | Performed by: OPHTHALMOLOGY

## 2024-07-29 PROCEDURE — 92134 CPTRZ OPH DX IMG PST SGM RTA: CPT | Performed by: OPHTHALMOLOGY

## 2024-07-29 ASSESSMENT — CONFRONTATIONAL VISUAL FIELD TEST (CVF)
OD_FINDINGS: FULL
OS_FINDINGS: FULL

## 2024-07-29 ASSESSMENT — LID POSITION - DERMATOCHALASIS
OS_DERMATOCHALASIS: LUL 1+
OD_DERMATOCHALASIS: RUL 1+

## 2024-08-16 ENCOUNTER — AMBUL SURGICAL CARE (OUTPATIENT)
Dept: URBAN - NONMETROPOLITAN AREA SURGERY 1 | Facility: SURGERY | Age: 79
Setting detail: OPHTHALMOLOGY
End: 2024-08-16
Payer: COMMERCIAL

## 2024-08-16 DIAGNOSIS — H26.491: ICD-10-CM

## 2024-08-16 PROBLEM — H11.31 SUBCONJUCTIVAL HEMORRHAGE; RIGHT EYE: Status: ACTIVE | Noted: 2024-08-16

## 2024-08-16 PROCEDURE — G8907 PT DOC NO EVENTS ON DISCHARG: HCPCS | Performed by: OPHTHALMOLOGY

## 2024-08-16 PROCEDURE — 66821 AFTER CATARACT LASER SURGERY: CPT | Mod: RT | Performed by: OPHTHALMOLOGY

## 2024-08-16 PROCEDURE — G8918 PT W/O PREOP ORDER IV AB PRO: HCPCS | Performed by: OPHTHALMOLOGY

## 2024-08-19 ENCOUNTER — APPOINTMENT (OUTPATIENT)
Dept: URBAN - METROPOLITAN AREA CLINIC 294 | Age: 79
Setting detail: DERMATOLOGY
End: 2024-08-22

## 2024-08-19 DIAGNOSIS — L82.1 OTHER SEBORRHEIC KERATOSIS: ICD-10-CM

## 2024-08-19 DIAGNOSIS — L57.0 ACTINIC KERATOSIS: ICD-10-CM

## 2024-08-19 PROCEDURE — 99212 OFFICE O/P EST SF 10 MIN: CPT | Mod: 25

## 2024-08-19 PROCEDURE — OTHER MIPS QUALITY: OTHER

## 2024-08-19 PROCEDURE — OTHER COUNSELING: OTHER

## 2024-08-19 PROCEDURE — 17000 DESTRUCT PREMALG LESION: CPT

## 2024-08-19 PROCEDURE — OTHER LIQUID NITROGEN: OTHER

## 2024-08-19 PROCEDURE — 17003 DESTRUCT PREMALG LES 2-14: CPT

## 2024-08-19 ASSESSMENT — LOCATION SIMPLE DESCRIPTION DERM
LOCATION SIMPLE: NOSE
LOCATION SIMPLE: RIGHT EYEBROW

## 2024-08-19 ASSESSMENT — LOCATION ZONE DERM
LOCATION ZONE: FACE
LOCATION ZONE: NOSE

## 2024-08-19 ASSESSMENT — LOCATION DETAILED DESCRIPTION DERM
LOCATION DETAILED: NASAL DORSUM
LOCATION DETAILED: RIGHT LATERAL EYEBROW

## 2024-09-20 ENCOUNTER — AMBUL SURGICAL CARE (OUTPATIENT)
Dept: URBAN - NONMETROPOLITAN AREA SURGERY 1 | Facility: SURGERY | Age: 79
Setting detail: OPHTHALMOLOGY
End: 2024-09-20
Payer: COMMERCIAL

## 2024-09-20 DIAGNOSIS — H26.492: ICD-10-CM

## 2024-09-20 PROCEDURE — 66821 AFTER CATARACT LASER SURGERY: CPT | Mod: LT | Performed by: OPHTHALMOLOGY

## 2024-09-20 PROCEDURE — G8918 PT W/O PREOP ORDER IV AB PRO: HCPCS | Performed by: OPHTHALMOLOGY

## 2024-09-20 PROCEDURE — G8907 PT DOC NO EVENTS ON DISCHARG: HCPCS | Performed by: OPHTHALMOLOGY

## 2024-09-20 PROCEDURE — 66821 AFTER CATARACT LASER SURGERY: CPT | Mod: 79,LT | Performed by: OPHTHALMOLOGY

## 2025-01-27 ENCOUNTER — HOSPITAL ENCOUNTER (EMERGENCY)
Facility: HOSPITAL | Age: 80
Discharge: HOME | End: 2025-01-28
Attending: EMERGENCY MEDICINE
Payer: COMMERCIAL

## 2025-01-27 ENCOUNTER — APPOINTMENT (EMERGENCY)
Dept: RADIOLOGY | Facility: HOSPITAL | Age: 80
End: 2025-01-27
Payer: COMMERCIAL

## 2025-01-27 DIAGNOSIS — T14.8XXA BLEEDING FROM WOUND: Primary | ICD-10-CM

## 2025-01-27 LAB
ABO GROUP BLD: NORMAL
ABO GROUP BLD: NORMAL
ALBUMIN SERPL BCG-MCNC: 2.8 G/DL (ref 3.5–5)
ALP SERPL-CCNC: 39 U/L (ref 34–104)
ALT SERPL W P-5'-P-CCNC: 25 U/L (ref 7–52)
ANION GAP SERPL CALCULATED.3IONS-SCNC: 4 MMOL/L (ref 4–13)
ANISOCYTOSIS BLD QL SMEAR: PRESENT
APTT PPP: 34 SECONDS (ref 23–34)
AST SERPL W P-5'-P-CCNC: 22 U/L (ref 13–39)
BASO STIPL BLD QL SMEAR: PRESENT
BASOPHILS # BLD MANUAL: 0.1 THOUSAND/UL (ref 0–0.1)
BASOPHILS NFR MAR MANUAL: 1 % (ref 0–1)
BILIRUB SERPL-MCNC: 0.38 MG/DL (ref 0.2–1)
BLD GP AB SCN SERPL QL: NEGATIVE
BUN SERPL-MCNC: 17 MG/DL (ref 5–25)
CALCIUM ALBUM COR SERPL-MCNC: 9 MG/DL (ref 8.3–10.1)
CALCIUM SERPL-MCNC: 8 MG/DL (ref 8.4–10.2)
CHLORIDE SERPL-SCNC: 100 MMOL/L (ref 96–108)
CO2 SERPL-SCNC: 30 MMOL/L (ref 21–32)
CREAT SERPL-MCNC: 1.55 MG/DL (ref 0.6–1.3)
EOSINOPHIL # BLD MANUAL: 0.61 THOUSAND/UL (ref 0–0.4)
EOSINOPHIL NFR BLD MANUAL: 6 % (ref 0–6)
ERYTHROCYTE [DISTWIDTH] IN BLOOD BY AUTOMATED COUNT: 15.6 % (ref 11.6–15.1)
GFR SERPL CREATININE-BSD FRML MDRD: 31 ML/MIN/1.73SQ M
GLUCOSE SERPL-MCNC: 139 MG/DL (ref 65–140)
HCT VFR BLD AUTO: 21.9 % (ref 34.8–46.1)
HGB BLD-MCNC: 7.2 G/DL (ref 11.5–15.4)
HYPERCHROMIA BLD QL SMEAR: PRESENT
INR PPP: 4.31 (ref 0.85–1.19)
LG PLATELETS BLD QL SMEAR: PRESENT
LYMPHOCYTES # BLD AUTO: 2.03 THOUSAND/UL (ref 0.6–4.47)
LYMPHOCYTES # BLD AUTO: 20 % (ref 14–44)
MCH RBC QN AUTO: 30.6 PG (ref 26.8–34.3)
MCHC RBC AUTO-ENTMCNC: 32.9 G/DL (ref 31.4–37.4)
MCV RBC AUTO: 93 FL (ref 82–98)
MONOCYTES # BLD AUTO: 0.71 THOUSAND/UL (ref 0–1.22)
MONOCYTES NFR BLD: 7 % (ref 4–12)
MYELOCYTE ABSOLUTE CT: 0.2 THOUSAND/UL (ref 0–0.1)
MYELOCYTES NFR BLD MANUAL: 2 % (ref 0–1)
NEUTROPHILS # BLD MANUAL: 6.51 THOUSAND/UL (ref 1.85–7.62)
NEUTS SEG NFR BLD AUTO: 64 % (ref 43–75)
PLATELET # BLD AUTO: 357 THOUSANDS/UL (ref 149–390)
PLATELET BLD QL SMEAR: ADEQUATE
PMV BLD AUTO: 9.2 FL (ref 8.9–12.7)
POIKILOCYTOSIS BLD QL SMEAR: PRESENT
POLYCHROMASIA BLD QL SMEAR: PRESENT
POTASSIUM SERPL-SCNC: 4.6 MMOL/L (ref 3.5–5.3)
PROT SERPL-MCNC: 5.5 G/DL (ref 6.4–8.4)
PROTHROMBIN TIME: 40.8 SECONDS (ref 12.3–15)
RBC # BLD AUTO: 2.35 MILLION/UL (ref 3.81–5.12)
RBC MORPH BLD: PRESENT
RH BLD: POSITIVE
RH BLD: POSITIVE
SODIUM SERPL-SCNC: 134 MMOL/L (ref 135–147)
SPECIMEN EXPIRATION DATE: NORMAL
TARGETS BLD QL SMEAR: PRESENT
WBC # BLD AUTO: 10.17 THOUSAND/UL (ref 4.31–10.16)

## 2025-01-27 PROCEDURE — 86901 BLOOD TYPING SEROLOGIC RH(D): CPT | Performed by: EMERGENCY MEDICINE

## 2025-01-27 PROCEDURE — 99285 EMERGENCY DEPT VISIT HI MDM: CPT | Performed by: EMERGENCY MEDICINE

## 2025-01-27 PROCEDURE — 36430 TRANSFUSION BLD/BLD COMPNT: CPT

## 2025-01-27 PROCEDURE — 85027 COMPLETE CBC AUTOMATED: CPT | Performed by: EMERGENCY MEDICINE

## 2025-01-27 PROCEDURE — 85025 COMPLETE CBC W/AUTO DIFF WBC: CPT | Performed by: EMERGENCY MEDICINE

## 2025-01-27 PROCEDURE — 99283 EMERGENCY DEPT VISIT LOW MDM: CPT

## 2025-01-27 PROCEDURE — 96366 THER/PROPH/DIAG IV INF ADDON: CPT

## 2025-01-27 PROCEDURE — 86850 RBC ANTIBODY SCREEN: CPT | Performed by: EMERGENCY MEDICINE

## 2025-01-27 PROCEDURE — 96365 THER/PROPH/DIAG IV INF INIT: CPT

## 2025-01-27 PROCEDURE — 86900 BLOOD TYPING SEROLOGIC ABO: CPT | Performed by: EMERGENCY MEDICINE

## 2025-01-27 PROCEDURE — 85730 THROMBOPLASTIN TIME PARTIAL: CPT | Performed by: EMERGENCY MEDICINE

## 2025-01-27 PROCEDURE — 86923 COMPATIBILITY TEST ELECTRIC: CPT

## 2025-01-27 PROCEDURE — 85610 PROTHROMBIN TIME: CPT | Performed by: EMERGENCY MEDICINE

## 2025-01-27 PROCEDURE — P9017 PLASMA 1 DONOR FRZ W/IN 8 HR: HCPCS

## 2025-01-27 PROCEDURE — 73630 X-RAY EXAM OF FOOT: CPT

## 2025-01-27 PROCEDURE — 96367 TX/PROPH/DG ADDL SEQ IV INF: CPT

## 2025-01-27 PROCEDURE — P9016 RBC LEUKOCYTES REDUCED: HCPCS

## 2025-01-27 PROCEDURE — 36415 COLL VENOUS BLD VENIPUNCTURE: CPT | Performed by: EMERGENCY MEDICINE

## 2025-01-27 PROCEDURE — 85007 BL SMEAR W/DIFF WBC COUNT: CPT | Performed by: EMERGENCY MEDICINE

## 2025-01-27 PROCEDURE — 80053 COMPREHEN METABOLIC PANEL: CPT | Performed by: EMERGENCY MEDICINE

## 2025-01-27 RX ORDER — AMOXICILLIN 250 MG
1 CAPSULE ORAL DAILY
COMMUNITY

## 2025-01-27 RX ORDER — METOPROLOL TARTRATE 50 MG
50 TABLET ORAL EVERY 12 HOURS SCHEDULED
COMMUNITY

## 2025-01-27 RX ORDER — PANTOPRAZOLE SODIUM 40 MG/1
40 TABLET, DELAYED RELEASE ORAL DAILY
COMMUNITY

## 2025-01-27 RX ORDER — HYDROCODONE BITARTRATE AND ACETAMINOPHEN 5; 325 MG/1; MG/1
1 TABLET ORAL EVERY 6 HOURS PRN
COMMUNITY

## 2025-01-27 RX ORDER — TORSEMIDE 10 MG/1
10 TABLET ORAL DAILY
COMMUNITY

## 2025-01-27 RX ORDER — AMLODIPINE BESYLATE 10 MG/1
10 TABLET ORAL DAILY
COMMUNITY

## 2025-01-27 RX ORDER — SACCHAROMYCES BOULARDII 250 MG
250 CAPSULE ORAL 2 TIMES DAILY
COMMUNITY

## 2025-01-27 RX ORDER — POTASSIUM CHLORIDE 1500 MG/1
20 TABLET, EXTENDED RELEASE ORAL 2 TIMES DAILY
COMMUNITY

## 2025-01-27 RX ORDER — ROSUVASTATIN CALCIUM 10 MG/1
10 TABLET, COATED ORAL DAILY
COMMUNITY

## 2025-01-27 RX ORDER — ACETAMINOPHEN 325 MG/1
650 TABLET ORAL EVERY 6 HOURS PRN
COMMUNITY

## 2025-01-27 RX ORDER — LEVOTHYROXINE SODIUM 75 UG/1
75 TABLET ORAL DAILY
COMMUNITY

## 2025-01-27 RX ORDER — MECLIZINE HYDROCHLORIDE 25 MG/1
TABLET ORAL 3 TIMES DAILY PRN
COMMUNITY

## 2025-01-27 RX ORDER — BENZOCAINE/MENTHOL 6 MG-10 MG
LOZENGE MUCOUS MEMBRANE 4 TIMES DAILY PRN
COMMUNITY

## 2025-01-27 RX ADMIN — PHYTONADIONE 10 MG: 10 INJECTION, EMULSION INTRAMUSCULAR; INTRAVENOUS; SUBCUTANEOUS at 12:49

## 2025-01-27 RX ADMIN — AMPICILLIN SODIUM AND SULBACTAM SODIUM 3 G: 100; 50 INJECTION, POWDER, FOR SOLUTION INTRAVENOUS at 16:57

## 2025-01-27 RX ADMIN — AMPICILLIN SODIUM AND SULBACTAM SODIUM 3 G: 100; 50 INJECTION, POWDER, FOR SOLUTION INTRAVENOUS at 23:19

## 2025-01-27 NOTE — ED PROVIDER NOTES
Time reflects when diagnosis was documented in both MDM as applicable and the Disposition within this note       Time User Action Codes Description Comment    1/27/2025 11:48 AM Timbo Mcneill Add [T14.8XXA] Bleeding from wound           ED Disposition       ED Disposition   Discharge    Condition   Stable    Date/Time   Tue Jan 28, 2025  4:43 PM    Comment                  Assessment & Plan       Medical Decision Making  1009: Patient appears chronically ill but in no acute distress.  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  The patient had bleeding from her surgical site this morning.  Patient is on Coumadin.  INR was sent however this has not returned yet.  Plan to check H&H and coags.  Pressure dressing was placed to the right heel wound.  I will consult with her podiatrist for assistance with care Dr. Anel Good [phone].      1100: Discussed with Dr. Cisse, she accepts patient for transfer to [facility].  No further bleeding with pressure dressing.    Amount and/or Complexity of Data Reviewed  Labs: ordered.  Radiology: ordered.     Details: Right foot x-rays--Expected postoperative changes from the resection of the calcaneus plantar aspect     No discrete new lytic lesion               Medications   phytonadione (AQUA-MEPHYTON) 10 mg/mL 10 mg in sodium chloride 0.9 % 50 mL IVPB (0 mg Intravenous Stopped 1/27/25 1319)       ED Risk Strat Scores                          SBIRT 20yo+      Flowsheet Row Most Recent Value   Initial Alcohol Screen: US AUDIT-C     1. How often do you have a drink containing alcohol? 0 Filed at: 01/27/2025 0948   2. How many drinks containing alcohol do you have on a typical day you are drinking?  0 Filed at: 01/27/2025 0948   3b. FEMALE Any Age, or MALE 65+: How often do you have 4 or more drinks on one occassion?  0 Filed at: 01/27/2025 0948   Audit-C Score 0 Filed at: 01/27/2025 0948   MARRY: How many times in the past year have you...    Used an illegal drug or used a prescription medication for non-medical reasons? Never Filed at: 01/27/2025 0948                            History of Present Illness       Chief Complaint   Patient presents with    Wound Check     Had R calcaneous procedure done 1/17 at [facility]. Wound vac was in place per EMS and staff removed it Staff reports since 0915 bleeding. Pt c/o pain  Ace wrap in place as a pressure bandage from staff at SNF, no active bleeding noted       Past Medical History:   Diagnosis Date    Anemia     Cellulitis     Disease of thyroid gland     GERD (gastroesophageal reflux disease)     Hyperlipidemia     Hypertension     Obesity     Osteomyelitis (HCC)     Pneumonia     Pulmonary embolism (HCC)       Past Surgical History:   Procedure Laterality Date    FOOT SURGERY      JOINT REPLACEMENT        History reviewed. No pertinent family history.   Social History     Tobacco Use    Smoking status: Never     Passive exposure: Never    Smokeless tobacco: Never   Vaping Use    Vaping status: Never Used   Substance Use Topics    Alcohol use: Never    Drug use: Never      E-Cigarette/Vaping    E-Cigarette Use Never User       E-Cigarette/Vaping Substances      I have reviewed and agree with the history as documented.       History provided by:  Medical records, patient and nursing home ([facility] nurse)  Medical Problem  Location:  Right foot bleeding  Severity:  Moderate  Onset quality:  Sudden  Duration:  1 hour  Timing:  Constant  Progression:  Unchanged  Chronicity:  New  Context:  Pt underwent surgical debridement for calcaneal osteomyelitis right foot 1/17/25, placed in wound vac, PICC for IV antibx, at NH, nurse noted blood at foot of bed with rounds at 9 am  Relieved by:  Nothing  Worsened by:  Nothing  Ineffective treatments:  None  tried  Associated symptoms: no abdominal pain, no chest pain, no cough, no diarrhea, no ear pain, no fatigue, no fever, no headaches, no nausea, no rash, no rhinorrhea, no shortness of breath, no sore throat and no vomiting    Risk factors:  On coumadin      Review of Systems   Constitutional:  Negative for chills, fatigue and fever.   HENT:  Negative for ear discharge, ear pain, rhinorrhea and sore throat.    Eyes:  Negative for pain and visual disturbance.   Respiratory:  Negative for cough and shortness of breath.    Cardiovascular:  Negative for chest pain and palpitations.   Gastrointestinal:  Negative for abdominal pain, diarrhea, nausea and vomiting.   Endocrine: Negative for polydipsia, polyphagia and polyuria.   Genitourinary:  Negative for difficulty urinating, dysuria, flank pain and hematuria.   Musculoskeletal:  Negative for arthralgias and back pain.   Skin:  Positive for wound. Negative for color change and rash.   Allergic/Immunologic: Negative for immunocompromised state.   Neurological:  Negative for dizziness, seizures, syncope, weakness and headaches.   Psychiatric/Behavioral:  Negative for confusion and self-injury. The patient is not nervous/anxious.    All other systems reviewed and are negative.          Objective       ED Triage Vitals [01/27/25 0948]   Temperature Pulse Blood Pressure Respirations SpO2 Patient Position - Orthostatic VS   97.8 °F (36.6 °C) 78 123/59 18 99 % Sitting      Temp Source Heart Rate Source BP Location FiO2 (%) Pain Score    Temporal Monitor Right arm -- 8      Vitals      Date and Time Temp Pulse SpO2 Resp BP Pain Score FACES Pain Rating User   01/28/25 1900 -- 74 95 % 22 124/70 -- -- LAK   01/28/25 1700 -- 61 96 % 18 130/63 -- -- LAK   01/28/25 1600 -- 55 96 % 16 132/63 -- -- LAK   01/28/25 1500 -- 60 97 % -- -- -- -- LAK   01/28/25 1400 -- 53 96 % 17 126/60 -- -- PK   01/28/25 1200 -- 67 96 % 15 113/59 -- -- PK   01/28/25 1000 -- 79 94 % -- 118/58 -- -- PK    01/28/25 0953 97.6 °F (36.4 °C) 75 95 % 19 114/58 -- -- PK   01/28/25 0715 -- 69 94 % 17 109/53 -- -- PK   01/28/25 0600 -- 49 96 % 16 128/59 -- -- RR   01/28/25 0400 -- 50 91 % -- 120/58 -- -- SR   01/27/25 2300 -- 45 98 % 16 119/55 -- -- NB   01/27/25 2245 -- 45 99 % 16 129/57 -- -- NB   01/27/25 2230 -- 44 100 % 16 97/51 -- -- NB   01/27/25 2215 -- 45 99 % 16 102/55 -- -- NB   01/27/25 2200 -- 51 100 % 16 103/50 -- -- NB   01/27/25 2145 -- 52 99 % 15 120/59 -- -- NB   01/27/25 2130 -- 58 100 % 15 120/59 -- -- NB   01/27/25 2115 -- 50 100 % 16 109/55 -- -- NB   01/27/25 2100 -- 48 99 % 16 112/55 -- -- NB   01/27/25 2045 97.5 °F (36.4 °C) 51 99 % 15 105/57 No Pain -- NB   01/27/25 2037 97.4 °F (36.3 °C) 51 99 % 16 103/51 -- -- NB   01/27/25 2030 97.4 °F (36.3 °C) 51 100 % 16 103/51 No Pain -- NB   01/27/25 2015 -- 51 100 % 16 113/58 -- -- NB   01/27/25 2000 97.7 °F (36.5 °C) 61 99 % 16 105/53 -- -- NB   01/27/25 1945 -- 77 95 % 17 105/53 No Pain -- NB   01/27/25 1930 -- 57 95 % 16 99/56 -- -- NB   01/27/25 1915 -- 58 96 % 16 111/60 -- -- NB   01/27/25 1910 97.6 °F (36.4 °C) 59 97 % 16 102/56 -- -- BF   01/27/25 1900 -- 63 96 % 16 101/59 -- -- BF   01/27/25 1858 -- 63 96 % -- 101/59 -- -- BF   01/27/25 1845 98.4 °F (36.9 °C) 60 96 % 16 98/57 -- -- BF   01/27/25 1815 -- 57 96 % 16 97/55 -- -- BF   01/27/25 1800 -- 58 96 % 14 108/57 -- -- BF   01/27/25 1745 -- 61 96 % 16 105/59 -- -- BF   01/27/25 1730 -- 61 96 % 18 110/57 -- -- BF   01/27/25 1716 -- -- 95 % 16 -- -- -- BF   01/27/25 1715 97.5 °F (36.4 °C) 61 -- 16 107/59 -- -- BF   01/27/25 1645 97.7 °F (36.5 °C) 62 95 % 16 117/59 -- -- BF   01/27/25 1630 -- 60 96 % 16 109/57 -- -- BF   01/27/25 1615 -- 70 95 % 16 111/59 -- -- BF   01/27/25 1600 -- 61 96 % 16 112/56 -- -- BF   01/27/25 1544 -- 73 96 % 16 120/65 -- -- BF   01/27/25 1500 -- 68 97 % 16 109/56 -- -- BF   01/27/25 1440 -- 69 96 % 16 100/59 -- -- BF   01/27/25 1435 98.2 °F (36.8 °C) 70 97 % 16 99/55 --  --    01/27/25 1430 98.2 °F (36.8 °C) 70 97 % 16 96/54 -- --    01/27/25 1422 -- 70 97 % -- 98/57 -- --    01/27/25 1419 97.8 °F (36.6 °C) 71 -- 16 98/57 -- --    01/27/25 1200 -- 55 97 % 16 101/57 -- --    01/27/25 1015 -- 56 100 % 16 101/55 -- --    01/27/25 1000 -- 61 99 % 16 108/55 -- --    01/27/25 0948 97.8 °F (36.6 °C) 78 99 % 18 123/59 8 -- KK            Physical Exam  Vitals and nursing note reviewed.   Constitutional:       General: She is not in acute distress.     Appearance: Normal appearance. She is not ill-appearing, toxic-appearing or diaphoretic.   HENT:      Head: Normocephalic and atraumatic.      Nose: Nose normal. No congestion or rhinorrhea.      Mouth/Throat:      Mouth: Mucous membranes are moist.      Pharynx: Oropharynx is clear. No oropharyngeal exudate or posterior oropharyngeal erythema.   Eyes:      General:         Right eye: No discharge.         Left eye: No discharge.      Extraocular Movements: Extraocular movements intact.      Conjunctiva/sclera: Conjunctivae normal.      Pupils: Pupils are equal, round, and reactive to light.   Cardiovascular:      Rate and Rhythm: Normal rate and regular rhythm.      Pulses: Normal pulses.      Heart sounds: Normal heart sounds. No murmur heard.     No gallop.      Comments: PICC to right upper arm, no swelling, no erythema or tenderness  Pulmonary:      Effort: Pulmonary effort is normal. No respiratory distress.      Breath sounds: Normal breath sounds. No stridor. No wheezing, rhonchi or rales.   Chest:      Chest wall: No tenderness.   Abdominal:      General: Bowel sounds are normal. There is no distension.      Palpations: Abdomen is soft. There is no mass.      Tenderness: There is no abdominal tenderness. There is no right CVA tenderness, left CVA tenderness, guarding or rebound.      Hernia: No hernia is present.   Musculoskeletal:         General: Normal range of motion.      Cervical back: Normal range of motion and  neck supple.   Skin:     General: Skin is warm and dry.      Capillary Refill: Capillary refill takes less than 2 seconds.      Coloration: Skin is pale.      Comments: Deep ulcerative wound to right heel, slow venous bleeding, picture in chart   Neurological:      General: No focal deficit present.      Mental Status: She is alert and oriented to person, place, and time.      Cranial Nerves: No cranial nerve deficit.      Sensory: No sensory deficit.      Motor: No weakness.      Coordination: Coordination normal.      Gait: Gait normal.      Deep Tendon Reflexes: Reflexes normal.   Psychiatric:         Mood and Affect: Mood normal.         Behavior: Behavior normal.         Thought Content: Thought content normal.         Judgment: Judgment normal.         Results Reviewed       Procedure Component Value Units Date/Time    APTT [[id number]]  (Normal) Collected: 01/28/25 1547    Lab Status: Final result Specimen: Blood from Arm, Left Updated: 01/28/25 1622     PTT 24 seconds     Protime-INR [[id number]]  (Abnormal) Collected: 01/28/25 1547    Lab Status: Final result Specimen: Blood from Arm, Left Updated: 01/28/25 1622     Protime 15.6 seconds      INR 1.20    Narrative:      INR Therapeutic Range    Indication                                             INR Range      Atrial Fibrillation                                               2.0-3.0  Hypercoagulable State                                    2.0.2.3  Left Ventricular Asist Device                            2.0-3.0  Mechanical Heart Valve                                  -    Aortic(with afib, MI, embolism, HF, LA enlargement,    and/or coagulopathy)                                     2.0-3.0 (2.5-3.5)     Mitral                                                             2.5-3.5  Prosthetic/Bioprosthetic Heart Valve               2.0-3.0  Venous thromboembolism (VTE: VT, PE        2.0-3.0    CBC and differential [[id number]]  (Abnormal) Collected: 01/28/25  1547    Lab Status: Final result Specimen: Blood from Arm, Left Updated: 01/28/25 1556     WBC 9.96 Thousand/uL      RBC 2.39 Million/uL      Hemoglobin 7.3 g/dL      Hematocrit 22.3 %      MCV 93 fL      MCH 30.5 pg      MCHC 32.7 g/dL      RDW 15.3 %      MPV 9.3 fL      Platelets 247 Thousands/uL     Narrative:      See Manual Diff Done Within 3 Days  This is an appended report.  These results have been appended to a previously verified report.    CBC and differential [[id number]]  (Abnormal) Collected: 01/28/25 0012    Lab Status: Final result Specimen: Blood from Central Venous Line Updated: 01/28/25 0040     WBC 15.57 Thousand/uL      RBC 2.40 Million/uL      Hemoglobin 7.4 g/dL      Hematocrit 22.3 %      MCV 93 fL      MCH 30.8 pg      MCHC 33.2 g/dL      RDW 14.9 %      MPV 9.2 fL      Platelets 245 Thousands/uL      nRBC 0 /100 WBCs      Absolute Immature Grans >0.50 Thousand/uL     Narrative:      See Manual Diff Done Within 3 Days    Manual Differential(PHLEBS Do Not Order) [[id number]]  (Abnormal) Collected: 01/27/25 1036    Lab Status: Final result Specimen: Blood from Arm, Right Updated: 01/27/25 1125     Segmented % 64 %      Lymphocytes % 20 %      Monocytes % 7 %      Eosinophils % 6 %      Basophils % 1 %      Myelocytes % 2 %      Absolute Neutrophils 6.51 Thousand/uL      Absolute Lymphocytes 2.03 Thousand/uL      Absolute Monocytes 0.71 Thousand/uL      Absolute Eosinophils 0.61 Thousand/uL      Absolute Basophils 0.10 Thousand/uL      Absolute Myelocytes 0.20 Thousand/uL      Total Counted --     RBC Morphology Present     Platelet Estimate Adequate     Large Platelet Present     Anisocytosis Present     Basophilic Stippling Present     Hypochromia Present     Poikilocytes Present     Polychromasia Present     Target Cells Present    Comprehensive metabolic panel [[id number]]  (Abnormal) Collected: 01/27/25 1036    Lab Status: Final result Specimen: Blood from Arm, Right Updated: 01/27/25 1104      Sodium 134 mmol/L      Potassium 4.6 mmol/L      Chloride 100 mmol/L      CO2 30 mmol/L      ANION GAP 4 mmol/L      BUN 17 mg/dL      Creatinine 1.55 mg/dL      Glucose 139 mg/dL      Calcium 8.0 mg/dL      Corrected Calcium 9.0 mg/dL      AST 22 U/L      ALT 25 U/L      Alkaline Phosphatase 39 U/L      Total Protein 5.5 g/dL      Albumin 2.8 g/dL      Total Bilirubin 0.38 mg/dL      eGFR 31 ml/min/1.73sq m     Narrative:      National Kid[employer] Foundation guidelines for Chronic Kidney Disease (CKD):     Stage 1 with normal or high GFR (GFR > 90 mL/min/1.73 square meters)    Stage 2 Mild CKD (GFR = 60-89 mL/min/1.73 square meters)    Stage 3A Moderate CKD (GFR = 45-59 mL/min/1.73 square meters)    Stage 3B Moderate CKD (GFR = 30-44 mL/min/1.73 square meters)    Stage 4 Severe CKD (GFR = 15-29 mL/min/1.73 square meters)    Stage 5 End Stage CKD (GFR <15 mL/min/1.73 square meters)  Note: GFR calculation is accurate only with a steady state creatinine    Protime-INR [[id number]]  (Abnormal) Collected: 01/27/25 1036    Lab Status: Final result Specimen: Blood from Arm, Right Updated: 01/27/25 1102     Protime 40.8 seconds      INR 4.31    Narrative:      INR Therapeutic Range    Indication                                             INR Range      Atrial Fibrillation                                               2.0-3.0  Hypercoagulable State                                    2.0.2.3  Left Ventricular Asist Device                            2.0-3.0  Mechanical Heart Valve                                  -    Aortic(with afib, MI, embolism, HF, LA enlargement,    and/or coagulopathy)                                     2.0-3.0 (2.5-3.5)     Mitral                                                             2.5-3.5  Prosthetic/Bioprosthetic Heart Valve               2.0-3.0  Venous thromboembolism (VTE: VT, PE        2.0-3.0    APTT [[id number]]  (Normal) Collected: 01/27/25 1036    Lab Status: Final result  Specimen: Blood from Arm, Right Updated: 01/27/25 1102     PTT 34 seconds     CBC and differential [[id number]]  (Abnormal) Collected: 01/27/25 1036    Lab Status: Final result Specimen: Blood from Arm, Right Updated: 01/27/25 1048     WBC 10.17 Thousand/uL      RBC 2.35 Million/uL      Hemoglobin 7.2 g/dL      Hematocrit 21.9 %      MCV 93 fL      MCH 30.6 pg      MCHC 32.9 g/dL      RDW 15.6 %      MPV 9.2 fL      Platelets 357 Thousands/uL     Narrative:      This is an appended report.  These results have been appended to a previously verified report.            XR foot 3+ views RIGHT   Final Interpretation by Cait Voss MD (01/27 1146)      Expected postoperative changes from the resection of the calcaneus plantar aspect      No discrete new lytic lesion         Computerized Assisted Algorithm (CAA) may have been used to analyze all applicable images.         Resident: Arvind Rollins I, the attending radiologist, have reviewed the images and agree with the final report above.      Workstation performed: [id number]             Procedures    ED Medication and Procedure Management   Prior to Admission Medications   Prescriptions Last Dose Informant Patient Reported? Taking?   HYDROcodone-acetaminophen (NORCO) 5-325 mg per tablet   Yes Yes   Sig: Take 1 tablet by mouth every 6 (six) hours as needed for pain   acetaminophen (TYLENOL) 325 mg tablet   Yes Yes   Sig: Take 650 mg by mouth every 6 (six) hours as needed for mild pain   amLODIPine (NORVASC) 10 mg tablet   Yes Yes   Sig: Take 10 mg by mouth daily   hydrocortisone 1 % cream  Self Yes Yes   Sig: Apply topically 4 (four) times a day as needed for rash   levothyroxine 75 mcg tablet   Yes Yes   Sig: Take 75 mcg by mouth daily   magnesium hydroxide (MILK OF MAGNESIA) 400 mg/5 mL oral suspension   Yes Yes   Sig: Take by mouth daily as needed for constipation   meclizine (ANTIVERT) 25 mg tablet  Self Yes Yes   Sig: Take by mouth 3 (three) times a day as  needed for dizziness   metoprolol tartrate (LOPRESSOR) 50 mg tablet   Yes Yes   Sig: Take 50 mg by mouth every 12 (twelve) hours   pantoprazole (PROTONIX) 40 mg tablet   Yes Yes   Sig: Take 40 mg by mouth daily   potassium chloride (Klor-Con M20) 20 mEq tablet   Yes Yes   Sig: Take 20 mEq by mouth 2 (two) times a day   rosuvastatin (CRESTOR) 10 MG tablet   Yes Yes   Sig: Take 10 mg by mouth daily   saccharomyces boulardii (FLORASTOR) 250 mg capsule  Self Yes Yes   Sig: Take 250 mg by mouth 2 (two) times a day   senna-docusate sodium (SENOKOT S) 8.6-50 mg per tablet   Yes Yes   Sig: Take 1 tablet by mouth daily   torsemide (DEMADEX) 10 mg tablet   Yes Yes   Sig: Take 10 mg by mouth daily      Facility-Administered Medications: None     Discharge Medication List as of 1/28/2025  4:46 PM        CONTINUE these medications which have NOT CHANGED    Details   acetaminophen (TYLENOL) 325 mg tablet Take 650 mg by mouth every 6 (six) hours as needed for mild pain, Historical Med      amLODIPine (NORVASC) 10 mg tablet Take 10 mg by mouth daily, Historical Med      HYDROcodone-acetaminophen (NORCO) 5-325 mg per tablet Take 1 tablet by mouth every 6 (six) hours as needed for pain, Historical Med      hydrocortisone 1 % cream Apply topically 4 (four) times a day as needed for rash, Historical Med      levothyroxine 75 mcg tablet Take 75 mcg by mouth daily, Historical Med      magnesium hydroxide (MILK OF MAGNESIA) 400 mg/5 mL oral suspension Take by mouth daily as needed for constipation, Historical Med      meclizine (ANTIVERT) 25 mg tablet Take by mouth 3 (three) times a day as needed for dizziness, Historical Med      metoprolol tartrate (LOPRESSOR) 50 mg tablet Take 50 mg by mouth every 12 (twelve) hours, Historical Med      pantoprazole (PROTONIX) 40 mg tablet Take 40 mg by mouth daily, Historical Med      potassium chloride (Klor-Con M20) 20 mEq tablet Take 20 mEq by mouth 2 (two) times a day, Historical Med       rosuvastatin (CRESTOR) 10 MG tablet Take 10 mg by mouth daily, Historical Med      saccharomyces boulardii (FLORASTOR) 250 mg capsule Take 250 mg by mouth 2 (two) times a day, Historical Med      senna-docusate sodium (SENOKOT S) 8.6-50 mg per tablet Take 1 tablet by mouth daily, Historical Med      torsemide (DEMADEX) 10 mg tablet Take 10 mg by mouth daily, Historical Med           No discharge procedures on file.  ED SEPSIS DOCUMENTATION   Time reflects when diagnosis was documented in both MDM as applicable and the Disposition within this note       Time User Action Codes Description Comment    1/27/2025 11:48 AM Timbo Mcneill Add [T14.8XXA] Bleeding from wound                  Timbo Mcneill MD  01/31/25 0490

## 2025-01-27 NOTE — ED CARE HANDOFF
Emergency Department Sign Out Note        Sign out and transfer of care from Dr. Mcneill. See Separate Emergency Department note.     The patient, Alecia Kay, was evaluated by the previous provider for bleeding wound.    Workup Completed:  Labs, imaging.     ED Course / Workup Pending (followup):  78 yo F. Bleeding from foot wound, wound infection. Dr. Duckworth accepted transfer at [facility]. IV Vit K, FFP administered. Has a PICC for IV abx--recent osteomyelitis.     IV abx ordered. Repeat CBC pending. Awaiting transport. The case was signed out to Dr. Anna. Plan discussed.     Procedures  Medical Decision Making  Amount and/or Complexity of Data Reviewed  Labs: ordered.  Radiology: ordered.      Disposition  Final diagnoses:   Bleeding from wound     Time reflects when diagnosis was documented in both MDM as applicable and the Disposition within this note       Time User Action Codes Description Comment    1/27/2025 11:48 AM Timbo Mcneill Add [T14.8XXA] Bleeding from wound           ED Disposition       ED Disposition   Transfer to Another Facility - Out of Network    Condition   --    Date/Time   Mon Jan 27, 2025 11:48 AM    Comment                  MD Documentation      Flowsheet Row Most Recent Value   Patient Condition The patient has been stabilized such that within reasonable medical probability, no material deterioration of the patient condition or the condition of the unborn child(peewee) is likely to result from the transfer   Reason for Transfer Level of Care needed not available at this facility   Benefits of Transfer Specialized equipment and/or services available at the receiving facility (Include comment)________________________, Continuity of care   Risks of Transfer Potential for delay in receiving treatment, Loss of IV, Increased discomfort during transfer, Possible worsening of condition or death during transfer, Potential deterioration of medical condition   Accepting Physician   Anel Cisse   Accepting Facility Name, [facility]   Sending MD Timbo Mcneill MD   Provider Certification General risk, such as traffic hazards, adverse weather conditions, rough terrain or turbulence, possible failure of equipment (including vehicle or aircraft), or consequences of actions of persons outside the control of the transport personnel, Unanticipated needs of medical equipment and personnel during transport, Risk of worsening condition, The possibility of a transport vehicle being unavailable          RN Documentation      Flowsheet Row Most Recent Value   Accepting Facility Name, [facility]          Follow-up Information    None       Patient's Medications   Discharge Prescriptions    No medications on file     No discharge procedures on file.       ED Provider  Electronically Signed by     Maxi Fuller DO  01/28/25 0005

## 2025-01-27 NOTE — EMTALA/ACUTE CARE TRANSFER
[facility] EMERGENCY DEPARTMENT  [address] [address]  Dept: [phone]      EMTALA TRANSFER CONSENT    NAME Alecia Kay                                         1945                              MRN [id number]    I have been informed of my rights regarding examination, treatment, and transfer   by Dr. Timbo Mcneill MD    Benefits: Specialized equipment and/or services available at the receiving facility (Include comment)________________________, Continuity of care    Risks: Potential for delay in receiving treatment, Loss of IV, Increased discomfort during transfer, Possible worsening of condition or death during transfer, Potential deterioration of medical condition      Consent for Transfer:  I acknowledge that my medical condition has been evaluated and explained to me by the emergency department physician or other qualified medical person and/or my attending physician, who has recommended that I be transferred to the service of  Accepting Physician: Dr. Anel Cisse at Accepting Facility Name, City & State : [facility]. The above potential benefits of such transfer, the potential risks associated with such transfer, and the probable risks of not being transferred have been explained to me, and I fully understand them.  The doctor has explained that, in my case, the benefits of transfer outweigh the risks.  I agree to be transferred.    I authorize the performance of emergency medical procedures and treatments upon me in both transit and upon arrival at the receiving facility.  Additionally, I authorize the release of any and all medical records to the receiving facility and request they be transported with me, if possible.  I understand that the safest mode of transportation during a medical emergency is an ambulance and that the Hospital advocates the use of this mode of transport. Risks of traveling to the receiving facility by car, including  absence of medical control, life sustaining equipment, such as oxygen, and medical personnel has been explained to me and I fully understand them.    (ANTHONY CORRECT BOX BELOW)  [  ]  I consent to the stated transfer and to be transported by ambulance/helicopter.  [  ]  I consent to the stated transfer, but refuse transportation by ambulance and accept full responsibility for my transportation by car.  I understand the risks of non-ambulance transfers and I exonerate the Hospital and its staff from any deterioration in my condition that results from this refusal.    X___________________________________________    DATE  25  TIME________  Signature of patient or legally responsible individual signing on patient behalf           RELATIONSHIP TO PATIENT_________________________          Provider Certification    NAME Alecia Kay                                         1945                              MRN [id number]    A medical screening exam was performed on the above named patient.  Based on the examination:    Condition Necessitating Transfer The encounter diagnosis was Bleeding from wound.    Patient Condition: The patient has been stabilized such that within reasonable medical probability, no material deterioration of the patient condition or the condition of the unborn child(peewee) is likely to result from the transfer    Reason for Transfer: Level of Care needed not available at this facility    Transfer Requirements: Facility [facility]   Space available and qualified personnel available for treatment as acknowledged by    Agreed to accept transfer and to provide appropriate medical treatment as acknowledged by       Dr. Anel iCsse  Appropriate medical records of the examination and treatment of the patient are provided at the time of transfer   STAFF INITIAL WHEN COMPLETED _______  Transfer will be performed by qualified personnel from    and appropriate transfer equipment as  required, including the use of necessary and appropriate life support measures.    Provider Certification: I have examined the patient and explained the following risks and benefits of being transferred/refusing transfer to the patient/family:  General risk, such as traffic hazards, adverse weather conditions, rough terrain or turbulence, possible failure of equipment (including vehicle or aircraft), or consequences of actions of persons outside the control of the transport personnel, Unanticipated needs of medical equipment and personnel during transport, Risk of worsening condition, The possibility of a transport vehicle being unavailable      Based on these reasonable risks and benefits to the patient and/or the unborn child(peewee), and based upon the information available at the time of the patient’s examination, I certify that the medical benefits reasonably to be expected from the provision of appropriate medical treatments at another medical facility outweigh the increasing risks, if any, to the individual’s medical condition, and in the case of labor to the unborn child, from effecting the transfer.    X____________________________________________ DATE 01/27/25        TIME_______      ORIGINAL - SEND TO MEDICAL RECORDS   COPY - SEND WITH PATIENT DURING TRANSFER

## 2025-01-28 VITALS
DIASTOLIC BLOOD PRESSURE: 70 MMHG | HEIGHT: 63 IN | RESPIRATION RATE: 22 BRPM | SYSTOLIC BLOOD PRESSURE: 124 MMHG | HEART RATE: 74 BPM | TEMPERATURE: 97.6 F | BODY MASS INDEX: 36.02 KG/M2 | WEIGHT: 203.26 LBS | OXYGEN SATURATION: 95 %

## 2025-01-28 LAB
ABO GROUP BLD BPU: NORMAL
APTT PPP: 24 SECONDS (ref 23–34)
BPU ID: NORMAL
CROSSMATCH: NORMAL
ERYTHROCYTE [DISTWIDTH] IN BLOOD BY AUTOMATED COUNT: 14.9 % (ref 11.6–15.1)
ERYTHROCYTE [DISTWIDTH] IN BLOOD BY AUTOMATED COUNT: 15.3 % (ref 11.6–15.1)
HCT VFR BLD AUTO: 22.3 % (ref 34.8–46.1)
HCT VFR BLD AUTO: 22.3 % (ref 34.8–46.1)
HGB BLD-MCNC: 7.3 G/DL (ref 11.5–15.4)
HGB BLD-MCNC: 7.4 G/DL (ref 11.5–15.4)
IMM GRANULOCYTES # BLD AUTO: >0.5 THOUSAND/UL (ref 0–0.2)
INR PPP: 1.2 (ref 0.85–1.19)
MCH RBC QN AUTO: 30.5 PG (ref 26.8–34.3)
MCH RBC QN AUTO: 30.8 PG (ref 26.8–34.3)
MCHC RBC AUTO-ENTMCNC: 32.7 G/DL (ref 31.4–37.4)
MCHC RBC AUTO-ENTMCNC: 33.2 G/DL (ref 31.4–37.4)
MCV RBC AUTO: 93 FL (ref 82–98)
MCV RBC AUTO: 93 FL (ref 82–98)
NRBC BLD AUTO-RTO: 0 /100 WBCS
PLATELET # BLD AUTO: 245 THOUSANDS/UL (ref 149–390)
PLATELET # BLD AUTO: 247 THOUSANDS/UL (ref 149–390)
PMV BLD AUTO: 9.2 FL (ref 8.9–12.7)
PMV BLD AUTO: 9.3 FL (ref 8.9–12.7)
PROTHROMBIN TIME: 15.6 SECONDS (ref 12.3–15)
RBC # BLD AUTO: 2.39 MILLION/UL (ref 3.81–5.12)
RBC # BLD AUTO: 2.4 MILLION/UL (ref 3.81–5.12)
UNIT DISPENSE STATUS: NORMAL
UNIT PRODUCT CODE: NORMAL
UNIT PRODUCT VOLUME: 331 ML
UNIT PRODUCT VOLUME: 341 ML
UNIT PRODUCT VOLUME: 350 ML
UNIT RH: NORMAL
WBC # BLD AUTO: 15.57 THOUSAND/UL (ref 4.31–10.16)
WBC # BLD AUTO: 9.96 THOUSAND/UL (ref 4.31–10.16)

## 2025-01-28 PROCEDURE — 36415 COLL VENOUS BLD VENIPUNCTURE: CPT | Performed by: STUDENT IN AN ORGANIZED HEALTH CARE EDUCATION/TRAINING PROGRAM

## 2025-01-28 PROCEDURE — 85730 THROMBOPLASTIN TIME PARTIAL: CPT | Performed by: EMERGENCY MEDICINE

## 2025-01-28 PROCEDURE — 85027 COMPLETE CBC AUTOMATED: CPT | Performed by: STUDENT IN AN ORGANIZED HEALTH CARE EDUCATION/TRAINING PROGRAM

## 2025-01-28 PROCEDURE — 96366 THER/PROPH/DIAG IV INF ADDON: CPT

## 2025-01-28 PROCEDURE — 85610 PROTHROMBIN TIME: CPT | Performed by: EMERGENCY MEDICINE

## 2025-01-28 PROCEDURE — 85027 COMPLETE CBC AUTOMATED: CPT | Performed by: EMERGENCY MEDICINE

## 2025-01-28 RX ADMIN — AMPICILLIN SODIUM AND SULBACTAM SODIUM 3 G: 100; 50 INJECTION, POWDER, FOR SOLUTION INTRAVENOUS at 10:32

## 2025-01-28 RX ADMIN — AMPICILLIN SODIUM AND SULBACTAM SODIUM 3 G: 100; 50 INJECTION, POWDER, FOR SOLUTION INTRAVENOUS at 15:53

## 2025-01-28 RX ADMIN — AMPICILLIN SODIUM AND SULBACTAM SODIUM 3 G: 100; 50 INJECTION, POWDER, FOR SOLUTION INTRAVENOUS at 05:01

## 2025-01-28 NOTE — ED NOTES
Patient placed into inpatient bed for comfort.  Bed is in lowest position with rails up and call bell within reach.  Patient states no complaints at this time.     Edan Ordonez  01/27/25 1375

## 2025-01-28 NOTE — ED NOTES
Patient used bed pan.  Patient given water and Cheerios per request.   Patient denies pain at this time. All questions answered.      James Singh  01/28/25 0922

## 2025-01-28 NOTE — ED NOTES
Call placed to [facility] inquiring about patient returning to their facility. I informed them our physician spoke with the orthopedic team from [facility] and said if the bleeding is stopped and the wound vac can get replaced she can be discharged home. Nurse stated they were under the assumption she was not coming back there because her son (nephew) took her clothing for her at [facility] and not everything out of her room.     [facility] called and stated they wanted and INR to see where her level is before she returns because of the suction of the wound vac causing her foot to bleed again. I told them they just put an order in for it and it should be back in 30-45 minutes. They asked for a call with the result so they can notify their physician to adjust her medications.               Norman Bass RN  01/28/25 1672

## 2025-01-28 NOTE — ED NOTES
[facility] called back asking if we could keep her another night because they are short staffed. They were informed if there is no reason to admit her then we can not keep her and she will be returning     Norman Bass RN  01/28/25 2601

## 2025-01-28 NOTE — ED NOTES
[facility] She still refused to take the patient. Our house supervisor name and number was given, round trip placed.     Jose Bland, KARIS  01/28/25 1175

## 2025-01-28 NOTE — ED CARE HANDOFF
Emergency Department Sign Out Note        Sign out and transfer of care from Dr. Dowling. See Separate Emergency Department note.     The patient, Alecia Kay, was evaluated by the previous provider for bleeding operative wound, supratherapeutic INR.    Workup Completed:  Labs, imaging.    ED Course / Workup Pending (followup):  Wound vac reapplication.                                  ED Course as of 01/28/25 1647   Tue Jan 28, 2025   1528 Assumed care of patient awaiting disposition.  Needs wound vac that was removed at nursing home.  Status post wound debridement, had wound vac in place, but she began bleeding and it was removed.  Her INR was supratherapeutic.  This was treated, and bleeding has since stopped.  She had been waiting for transfer to [facility], but had not yet been transferred due to capacity.  With bleeding stopped, she will be able to be discharged from the ED back to [facility] if the wound vac can be reapplied.  [facility] is currently working to locate the wound VAC.  If wound VAC cannot be reapplied, may need admission for wound care.   [facility] was able to locate the patient's wound VAC.  Anticipate being able to reapply it tomorrow.  INR is now 1.2.  Hemoglobin is stable.  Bleeding is controlled.  Patient is stable for discharge from the emergency department back to [facility].     Procedures  Medical Decision Making  Amount and/or Complexity of Data Reviewed  Labs: ordered.  Radiology: ordered.            Disposition  Final diagnoses:   Bleeding from wound     Time reflects when diagnosis was documented in both MDM as applicable and the Disposition within this note       Time User Action Codes Description Comment    1/27/2025 11:48 AM Timbo Mcneill Add [T14.8XXA] Bleeding from wound           ED Disposition       ED Disposition   Discharge    Condition   Stable    Date/Time   Tue Jan 28, 2025  4:43 PM    Comment                  MD  Documentation      Flowsheet Row Most Recent Value   Patient Condition The patient has been stabilized such that within reasonable medical probability, no material deterioration of the patient condition or the condition of the unborn child(peewee) is likely to result from the transfer   Reason for Transfer Level of Care needed not available at this facility   Benefits of Transfer Specialized equipment and/or services available at the receiving facility (Include comment)________________________, Continuity of care   Risks of Transfer Potential for delay in receiving treatment, Loss of IV, Increased discomfort during transfer, Possible worsening of condition or death during transfer, Potential deterioration of medical condition   Accepting Physician Dr. Anel Cisse   Accepting Facility Name, [facility]   Sending MD Timbo Mcneill MD   Provider Certification General risk, such as traffic hazards, adverse weather conditions, rough terrain or turbulence, possible failure of equipment (including vehicle or aircraft), or consequences of actions of persons outside the control of the transport personnel, Unanticipated needs of medical equipment and personnel during transport, Risk of worsening condition, The possibility of a transport vehicle being unavailable          RN Documentation      Flowsheet Row Most Recent Value   Accepting Facility Name, [facility]          Follow-up Information       Follow up With Specialties Details Why Contact Info Additional Information    Rambo Perera,  Family Medicine   [address] [address] [phone]       Follow-up with your podiatrist as previously arranged/instructed.         [facility] Emergency Department Emergency Medicine  As needed, If symptoms worsen [address] [facility] [address] [phone] [facility] Emergency Department, [address] [facility] [facility]  [place], [address]          Patient's Medications   Discharge Prescriptions    No medications on file     No discharge procedures on file.       ED Provider  Electronically Signed by     Velasquez Domínguez MD  01/28/25 2616

## 2025-01-28 NOTE — ED NOTES
Called patients nephew and informed him of the delay in transferring her to [facility] and he understood and had no questions.      Norman Bass RN  01/27/25 3433

## 2025-01-28 NOTE — ED NOTES
Patient placed on bedpan.   States that she would not like a diet order at this time.     James Singh  01/28/25 0708

## 2025-01-28 NOTE — DISCHARGE INSTRUCTIONS
"Patient Education     Caring for an open surgical wound   The Basics   Written by the doctors and editors at [facility]   What is a surgical wound? -- A surgical wound is a cut that the doctor makes during surgery. It is also called an \"incision.\"  After surgery, the wound is typically closed with stitches. Stitches are also called \"sutures.\" Sometimes, a surgical wound breaks open. This is most often due to infection, but can happen for other reasons, too. Depending on the cause, the doctor might not close the wound right away, and instead leave it open to heal on its own.  It's important to take care of a surgical wound as it heals. This is especially true for open wounds. That's because if germs get into the body through the wound, it could cause a serious infection. The medical term for this is \"surgical site infection.\"  You might need a family member or friend to help you care for your surgical wound. Some people have a home health nurse come to their home to help with this.  How do I care for myself at home? -- Ask the doctor or nurse what you should do when you go home. Make sure that you understand exactly what you need to do to care for yourself. Ask questions if there is anything you do not understand.  You should also:   Keep your wound covered as it heals. This will help protect it from germs that could cause infection.   Follow your doctor's instructions for when and how to change your wound dressing. If your wound needs to be \"packed,\" your doctor or nurse will show you how to do this. It involves putting gauze or other special material into the wound. This packing material helps absorb drainage if the wound is wet or provide moisture if the wound is dried out. It also helps keep the wound clean as it heals. The packing material is covered with a special bandage to keep the dressing in place and protect your clothing.   Always wash your hands before and after touching the wound, dressing, packing, or " "bandage.   Look closely at the wound to check that the area is healing each time you change the dressings. If there is a lot of drainage or the amount of pus is increasing, tell your doctor. Look at the skin around the open wound for signs of infection like skin color changes or swelling.   Follow your doctor's instructions about whether it is OK to get your wound wet or soak it in water.   Avoid smoking. If you smoke, it can take longer for your wound to heal.   Avoid activities or sports that could hurt your wound while it is healing. Your doctor or nurse will tell you when you can do these things again.  What follow-up care do I need? -- Your doctor or nurse will tell you when you need to make a follow-up appointment. If so, make sure that you know when and where to go.  People with open wounds often see their doctor once every 1 to 2 weeks. If the wound is healing well, the time between visits might be longer.  Your doctor will tell you if your wound can be re-closed with stitches. In some cases, you might need a separate surgery to cover and protect the wound. This can involve placement of a \"skin graft\" or other tissue to fill in the space. Your doctor will tell you if you are likely to need this.  When should I call the doctor? -- Call your doctor or nurse if you have any signs of an infection. These might include:   Changes in skin color, swelling, warmth, or increased pain around the wound   Any fluid draining from the wound, including pus, blood, or watery fluid, especially if it has a bad smell   Streaks on the skin going away from the wound, or streaks going up your arm or leg   Fever  All topics are updated as new evidence becomes available and our peer review process is complete.  This topic retrieved from WebNotes on: May 02, 2024.  Topic 142433 Version 4.0  Release: 32.4.3 - C32.122  © 2024 UpToDate, Inc. and/or its affiliates. All rights reserved.  Consumer Information Use and Disclaimer "   Disclaimer: This generalized information is a limited summary of diagnosis, treatment, and/or medication information. It is not meant to be comprehensive and should be used as a tool to help the user understand and/or assess potential diagnostic and treatment options. It does NOT include all information about conditions, treatments, medications, side effects, or risks that may apply to a specific patient. It is not intended to be medical advice or a substitute for the medical advice, diagnosis, or treatment of a health care provider based on the health care provider's examination and assessment of a patient's specific and unique circumstances. Patients must speak with a health care provider for complete information about their health, medical questions, and treatment options, including any risks or benefits regarding use of medications. This information does not endorse any treatments or medications as safe, effective, or approved for treating a specific patient. UpToDate, Inc. and its affiliates disclaim any warranty or liability relating to this information or the use thereof.The use of this information is governed by the Terms of Use, available at https://www.wolterssendwithusuwer.com/en/know/clinical-effectiveness-terms. 2024© UpToDate, Inc. and its affiliates and/or licensors. All rights reserved.  Copyright   © 2024 UpToDate, Inc. and/or its affiliates. All rights reserved.

## 2025-01-28 NOTE — ED CARE HANDOFF
Emergency Department Sign Out Note        Sign out and transfer of care from Dr. Anna at 6 AM. See Separate Emergency Department note.     The patient, Alecia Kay, was evaluated by the previous provider for bleeding foot ulcer.    Workup Completed:  Initial evaluation as well as wound packing and supratherapeutic INR reversed    ED Course / Workup Pending (followup):  Patient has remained stable throughout the ED course.  The accepting facility has stated that there will be no bed available today.  I therefore called and spoke with patient's podiatrist, Dr. Good.  She states that the patient was initially being transferred because of the bleeding from the wound.  She states as this has resolved the patient may be discharged back to the nursing facility for continued wound care at that site.  Currently awaiting callback from the nursing facility to determine if patient's wound VAC is available at that facility.  Will sign patient out to oncoming provider Dr Domínguez for final disposition                                  ED Course as of 01/28/25 1533   Tue Jan 28, 2025   1515 Discussed with patient's podiatrist, agrees patient may be safely discharged to her nursing facility as bleeding from wound has currently stopped.   1525 Signed out to Dr. Domínguez pending dispo back to nursing facility most likely     Procedures  Medical Decision Making  Amount and/or Complexity of Data Reviewed  Labs: ordered.  Radiology: ordered.            Disposition  Final diagnoses:   Bleeding from wound     Time reflects when diagnosis was documented in both MDM as applicable and the Disposition within this note       Time User Action Codes Description Comment    1/27/2025 11:48 AM Timbo Mcneill Add [T14.8XXA] Bleeding from wound           ED Disposition       ED Disposition   Transfer to Another Facility - Out of Network    Condition   --    Date/Time   Mon Jan 27, 2025 11:48 AM    Comment                  MD Documentation       Flowsheet Row Most Recent Value   Patient Condition The patient has been stabilized such that within reasonable medical probability, no material deterioration of the patient condition or the condition of the unborn child(peewee) is likely to result from the transfer   Reason for Transfer Level of Care needed not available at this facility   Benefits of Transfer Specialized equipment and/or services available at the receiving facility (Include comment)________________________, Continuity of care   Risks of Transfer Potential for delay in receiving treatment, Loss of IV, Increased discomfort during transfer, Possible worsening of condition or death during transfer, Potential deterioration of medical condition   Accepting Physician Dr. Anel Cisse   Accepting Facility Name, [facility]   Sending MD Timbo Mcneill MD   Provider Certification General risk, such as traffic hazards, adverse weather conditions, rough terrain or turbulence, possible failure of equipment (including vehicle or aircraft), or consequences of actions of persons outside the control of the transport personnel, Unanticipated needs of medical equipment and personnel during transport, Risk of worsening condition, The possibility of a transport vehicle being unavailable          RN Documentation      Flowsheet Row Most Recent Value   Accepting Facility Name, [facility]          Follow-up Information    None       Patient's Medications   Discharge Prescriptions    No medications on file     No discharge procedures on file.       ED Provider  Electronically Signed by     Demar Dowling MD  01/28/25 9277

## 2025-02-10 ENCOUNTER — APPOINTMENT (OUTPATIENT)
Dept: URBAN - METROPOLITAN AREA CLINIC 294 | Age: 80
Setting detail: DERMATOLOGY
End: 2025-02-11

## 2025-02-10 DIAGNOSIS — L82.1 OTHER SEBORRHEIC KERATOSIS: ICD-10-CM

## 2025-02-10 DIAGNOSIS — Z12.83 ENCOUNTER FOR SCREENING FOR MALIGNANT NEOPLASM OF SKIN: ICD-10-CM

## 2025-02-10 DIAGNOSIS — D22 MELANOCYTIC NEVI: ICD-10-CM

## 2025-02-10 DIAGNOSIS — L82.0 INFLAMED SEBORRHEIC KERATOSIS: ICD-10-CM

## 2025-02-10 DIAGNOSIS — L81.4 OTHER MELANIN HYPERPIGMENTATION: ICD-10-CM

## 2025-02-10 DIAGNOSIS — D18.0 HEMANGIOMA: ICD-10-CM

## 2025-02-10 PROBLEM — D22.5 MELANOCYTIC NEVI OF TRUNK: Status: ACTIVE | Noted: 2025-02-10

## 2025-02-10 PROBLEM — D18.01 HEMANGIOMA OF SKIN AND SUBCUTANEOUS TISSUE: Status: ACTIVE | Noted: 2025-02-10

## 2025-02-10 PROCEDURE — 99213 OFFICE O/P EST LOW 20 MIN: CPT | Mod: 25

## 2025-02-10 PROCEDURE — 17110 DESTRUCT B9 LESION 1-14: CPT

## 2025-02-10 PROCEDURE — OTHER COUNSELING: OTHER

## 2025-02-10 PROCEDURE — OTHER LIQUID NITROGEN: OTHER

## 2025-02-10 PROCEDURE — OTHER MIPS QUALITY: OTHER

## 2025-02-10 ASSESSMENT — LOCATION SIMPLE DESCRIPTION DERM
LOCATION SIMPLE: CHEST
LOCATION SIMPLE: LEFT UPPER BACK
LOCATION SIMPLE: RIGHT EYEBROW
LOCATION SIMPLE: LEFT POSTERIOR THIGH
LOCATION SIMPLE: ABDOMEN
LOCATION SIMPLE: RIGHT UPPER BACK
LOCATION SIMPLE: RIGHT THIGH

## 2025-02-10 ASSESSMENT — LOCATION DETAILED DESCRIPTION DERM
LOCATION DETAILED: LEFT DISTAL POSTERIOR THIGH
LOCATION DETAILED: LEFT MID-UPPER BACK
LOCATION DETAILED: RIGHT INFERIOR UPPER BACK
LOCATION DETAILED: RIGHT LATERAL EYEBROW
LOCATION DETAILED: RIGHT ANTERIOR PROXIMAL THIGH
LOCATION DETAILED: RIGHT SUPERIOR UPPER BACK
LOCATION DETAILED: LEFT DISTAL LATERAL POSTERIOR THIGH
LOCATION DETAILED: RIGHT MEDIAL SUPERIOR CHEST
LOCATION DETAILED: EPIGASTRIC SKIN

## 2025-02-10 ASSESSMENT — LOCATION ZONE DERM
LOCATION ZONE: TRUNK
LOCATION ZONE: FACE
LOCATION ZONE: LEG

## 2025-02-10 NOTE — PROCEDURE: LIQUID NITROGEN
Show Aperture Variable?: Yes
Render Note In Bullet Format When Appropriate: No
Spray Paint Text: The liquid nitrogen was applied to the skin utilizing a spray paint frosting technique.
Medical Necessity Clause: This procedure was medically necessary because the lesions that were treated were:
Medical Necessity Information: It is in your best interest to select a reason for this procedure from the list below. All of these items fulfill various CMS LCD requirements except the new and changing color options.
Consent: The patient's consent was obtained including but not limited to risks of crusting, scabbing, blistering, scarring, darker or lighter pigmentary change, recurrence, incomplete removal and infection.
Post-Care Instructions: I reviewed with the patient in detail post-care instructions. Patient is to wear sunprotection, and avoid picking at any of the treated lesions. Pt may apply Vaseline to crusted or scabbing areas.
Detail Level: Detailed

## 2025-03-03 ENCOUNTER — HOSPITAL ENCOUNTER (INPATIENT)
Facility: HOSPITAL | Age: 80
LOS: 2 days | Discharge: NURSING, REHAB OR LONG-TERM CARE | DRG: 682 | End: 2025-03-05
Attending: STUDENT IN AN ORGANIZED HEALTH CARE EDUCATION/TRAINING PROGRAM | Admitting: INTERNAL MEDICINE
Payer: COMMERCIAL

## 2025-03-03 ENCOUNTER — APPOINTMENT (INPATIENT)
Dept: ULTRASOUND IMAGING | Facility: HOSPITAL | Age: 80
DRG: 682 | End: 2025-03-03
Payer: COMMERCIAL

## 2025-03-03 DIAGNOSIS — N17.9 ACUTE KIDNEY INJURY (HCC): Primary | ICD-10-CM

## 2025-03-03 PROBLEM — I10 HYPERTENSION: Status: ACTIVE | Noted: 2025-03-03

## 2025-03-03 PROBLEM — M86.171 ACUTE OSTEOMYELITIS OF RIGHT CALCANEUS (HCC): Status: ACTIVE | Noted: 2025-03-03

## 2025-03-03 PROBLEM — E03.9 HYPOTHYROIDISM: Status: ACTIVE | Noted: 2025-03-03

## 2025-03-03 PROBLEM — N18.30 CHRONIC KIDNEY DISEASE, STAGE III (MODERATE) (HCC): Status: ACTIVE | Noted: 2025-03-03

## 2025-03-03 PROBLEM — E87.8 ELECTROLYTE IMBALANCE: Status: ACTIVE | Noted: 2025-03-03

## 2025-03-03 LAB
ANION GAP SERPL CALCULATED.3IONS-SCNC: 11 MMOL/L (ref 4–13)
BACTERIA UR QL AUTO: ABNORMAL /HPF
BASE EX.OXY STD BLDV CALC-SCNC: 80.2 % (ref 60–80)
BASE EXCESS BLDV CALC-SCNC: -1.6 MMOL/L
BASOPHILS # BLD AUTO: 0.09 THOUSANDS/ÂΜL (ref 0–0.1)
BASOPHILS NFR BLD AUTO: 2 % (ref 0–1)
BILIRUB UR QL STRIP: ABNORMAL
BUN SERPL-MCNC: 17 MG/DL (ref 5–25)
CALCIUM SERPL-MCNC: 8.6 MG/DL (ref 8.4–10.2)
CHLORIDE SERPL-SCNC: 98 MMOL/L (ref 96–108)
CLARITY UR: ABNORMAL
CO2 SERPL-SCNC: 25 MMOL/L (ref 21–32)
COLOR UR: YELLOW
CREAT SERPL-MCNC: 2.54 MG/DL (ref 0.6–1.3)
EOSINOPHIL # BLD AUTO: 0.3 THOUSAND/ÂΜL (ref 0–0.61)
EOSINOPHIL NFR BLD AUTO: 6 % (ref 0–6)
ERYTHROCYTE [DISTWIDTH] IN BLOOD BY AUTOMATED COUNT: 13.8 % (ref 11.6–15.1)
GFR SERPL CREATININE-BSD FRML MDRD: 17 ML/MIN/1.73SQ M
GLUCOSE SERPL-MCNC: 81 MG/DL (ref 65–140)
GLUCOSE UR STRIP-MCNC: NEGATIVE MG/DL
HCO3 BLDV-SCNC: 23.7 MMOL/L (ref 24–30)
HCT VFR BLD AUTO: 30.9 % (ref 34.8–46.1)
HGB BLD-MCNC: 10.1 G/DL (ref 11.5–15.4)
HGB UR QL STRIP.AUTO: NEGATIVE
HYALINE CASTS #/AREA URNS LPF: ABNORMAL /LPF
IMM GRANULOCYTES # BLD AUTO: 0.02 THOUSAND/UL (ref 0–0.2)
IMM GRANULOCYTES NFR BLD AUTO: 0 % (ref 0–2)
KETONES UR STRIP-MCNC: NEGATIVE MG/DL
LEUKOCYTE ESTERASE UR QL STRIP: ABNORMAL
LYMPHOCYTES # BLD AUTO: 1.41 THOUSANDS/ÂΜL (ref 0.6–4.47)
LYMPHOCYTES NFR BLD AUTO: 26 % (ref 14–44)
MAGNESIUM SERPL-MCNC: 1.4 MG/DL (ref 1.9–2.7)
MCH RBC QN AUTO: 30.2 PG (ref 26.8–34.3)
MCHC RBC AUTO-ENTMCNC: 32.7 G/DL (ref 31.4–37.4)
MCV RBC AUTO: 93 FL (ref 82–98)
MONOCYTES # BLD AUTO: 0.53 THOUSAND/ÂΜL (ref 0.17–1.22)
MONOCYTES NFR BLD AUTO: 10 % (ref 4–12)
NEUTROPHILS # BLD AUTO: 3.14 THOUSANDS/ÂΜL (ref 1.85–7.62)
NEUTS SEG NFR BLD AUTO: 56 % (ref 43–75)
NITRITE UR QL STRIP: NEGATIVE
NON-SQ EPI CELLS URNS QL MICRO: ABNORMAL /HPF
NRBC BLD AUTO-RTO: 0 /100 WBCS
O2 CT BLDV-SCNC: 12.5 ML/DL
OTHER STN SPEC: ABNORMAL
PCO2 BLDV: 42 MM HG (ref 42–50)
PH BLDV: 7.37 [PH] (ref 7.3–7.4)
PH UR STRIP.AUTO: 5.5 [PH]
PLATELET # BLD AUTO: 183 THOUSANDS/UL (ref 149–390)
PLATELET # BLD AUTO: 219 THOUSANDS/UL (ref 149–390)
PMV BLD AUTO: 11.2 FL (ref 8.9–12.7)
PMV BLD AUTO: 11.5 FL (ref 8.9–12.7)
PO2 BLDV: 45.9 MM HG (ref 35–45)
POTASSIUM SERPL-SCNC: 4.3 MMOL/L (ref 3.5–5.3)
PROT UR STRIP-MCNC: NEGATIVE MG/DL
RBC # BLD AUTO: 3.34 MILLION/UL (ref 3.81–5.12)
RBC #/AREA URNS AUTO: ABNORMAL /HPF
SODIUM SERPL-SCNC: 134 MMOL/L (ref 135–147)
SP GR UR STRIP.AUTO: 1.02 (ref 1–1.03)
UROBILINOGEN UR QL STRIP.AUTO: 0.2 E.U./DL
WBC # BLD AUTO: 5.49 THOUSAND/UL (ref 4.31–10.16)
WBC #/AREA URNS AUTO: ABNORMAL /HPF

## 2025-03-03 PROCEDURE — 85025 COMPLETE CBC W/AUTO DIFF WBC: CPT | Performed by: STUDENT IN AN ORGANIZED HEALTH CARE EDUCATION/TRAINING PROGRAM

## 2025-03-03 PROCEDURE — 83735 ASSAY OF MAGNESIUM: CPT | Performed by: STUDENT IN AN ORGANIZED HEALTH CARE EDUCATION/TRAINING PROGRAM

## 2025-03-03 PROCEDURE — 96365 THER/PROPH/DIAG IV INF INIT: CPT

## 2025-03-03 PROCEDURE — 81001 URINALYSIS AUTO W/SCOPE: CPT | Performed by: INTERNAL MEDICINE

## 2025-03-03 PROCEDURE — 99283 EMERGENCY DEPT VISIT LOW MDM: CPT

## 2025-03-03 PROCEDURE — 82805 BLOOD GASES W/O2 SATURATION: CPT | Performed by: STUDENT IN AN ORGANIZED HEALTH CARE EDUCATION/TRAINING PROGRAM

## 2025-03-03 PROCEDURE — 76775 US EXAM ABDO BACK WALL LIM: CPT

## 2025-03-03 PROCEDURE — 36415 COLL VENOUS BLD VENIPUNCTURE: CPT | Performed by: STUDENT IN AN ORGANIZED HEALTH CARE EDUCATION/TRAINING PROGRAM

## 2025-03-03 PROCEDURE — 87205 SMEAR GRAM STAIN: CPT | Performed by: INTERNAL MEDICINE

## 2025-03-03 PROCEDURE — 85049 AUTOMATED PLATELET COUNT: CPT | Performed by: INTERNAL MEDICINE

## 2025-03-03 PROCEDURE — 99285 EMERGENCY DEPT VISIT HI MDM: CPT | Performed by: STUDENT IN AN ORGANIZED HEALTH CARE EDUCATION/TRAINING PROGRAM

## 2025-03-03 PROCEDURE — 80048 BASIC METABOLIC PNL TOTAL CA: CPT | Performed by: STUDENT IN AN ORGANIZED HEALTH CARE EDUCATION/TRAINING PROGRAM

## 2025-03-03 PROCEDURE — 99223 1ST HOSP IP/OBS HIGH 75: CPT | Performed by: INTERNAL MEDICINE

## 2025-03-03 RX ORDER — PANTOPRAZOLE SODIUM 40 MG/1
40 TABLET, DELAYED RELEASE ORAL DAILY
Status: DISCONTINUED | OUTPATIENT
Start: 2025-03-04 | End: 2025-03-05 | Stop reason: HOSPADM

## 2025-03-03 RX ORDER — HEPARIN SODIUM 5000 [USP'U]/ML
5000 INJECTION, SOLUTION INTRAVENOUS; SUBCUTANEOUS EVERY 8 HOURS SCHEDULED
Status: DISCONTINUED | OUTPATIENT
Start: 2025-03-03 | End: 2025-03-05 | Stop reason: HOSPADM

## 2025-03-03 RX ORDER — LEVOTHYROXINE SODIUM 75 UG/1
75 TABLET ORAL DAILY
Status: DISCONTINUED | OUTPATIENT
Start: 2025-03-04 | End: 2025-03-05 | Stop reason: HOSPADM

## 2025-03-03 RX ORDER — METOPROLOL TARTRATE 50 MG
50 TABLET ORAL EVERY 12 HOURS SCHEDULED
Status: DISCONTINUED | OUTPATIENT
Start: 2025-03-03 | End: 2025-03-05 | Stop reason: HOSPADM

## 2025-03-03 RX ORDER — SODIUM CHLORIDE, SODIUM GLUCONATE, SODIUM ACETATE, POTASSIUM CHLORIDE, MAGNESIUM CHLORIDE, SODIUM PHOSPHATE, DIBASIC, AND POTASSIUM PHOSPHATE .53; .5; .37; .037; .03; .012; .00082 G/100ML; G/100ML; G/100ML; G/100ML; G/100ML; G/100ML; G/100ML
100 INJECTION, SOLUTION INTRAVENOUS CONTINUOUS
Status: DISPENSED | OUTPATIENT
Start: 2025-03-03 | End: 2025-03-05

## 2025-03-03 RX ORDER — PRAVASTATIN SODIUM 80 MG/1
80 TABLET ORAL
Status: DISCONTINUED | OUTPATIENT
Start: 2025-03-03 | End: 2025-03-05 | Stop reason: HOSPADM

## 2025-03-03 RX ORDER — SODIUM CHLORIDE, SODIUM GLUCONATE, SODIUM ACETATE, POTASSIUM CHLORIDE, MAGNESIUM CHLORIDE, SODIUM PHOSPHATE, DIBASIC, AND POTASSIUM PHOSPHATE .53; .5; .37; .037; .03; .012; .00082 G/100ML; G/100ML; G/100ML; G/100ML; G/100ML; G/100ML; G/100ML
1000 INJECTION, SOLUTION INTRAVENOUS ONCE
Status: COMPLETED | OUTPATIENT
Start: 2025-03-03 | End: 2025-03-03

## 2025-03-03 RX ORDER — MAGNESIUM SULFATE HEPTAHYDRATE 40 MG/ML
2 INJECTION, SOLUTION INTRAVENOUS ONCE
Status: COMPLETED | OUTPATIENT
Start: 2025-03-03 | End: 2025-03-03

## 2025-03-03 RX ORDER — AMOXICILLIN 250 MG
1 CAPSULE ORAL DAILY
Status: DISCONTINUED | OUTPATIENT
Start: 2025-03-04 | End: 2025-03-05 | Stop reason: HOSPADM

## 2025-03-03 RX ORDER — ACETAMINOPHEN 325 MG/1
650 TABLET ORAL EVERY 6 HOURS PRN
Status: DISCONTINUED | OUTPATIENT
Start: 2025-03-03 | End: 2025-03-05 | Stop reason: HOSPADM

## 2025-03-03 RX ADMIN — SODIUM CHLORIDE, SODIUM GLUCONATE, SODIUM ACETATE, POTASSIUM CHLORIDE, MAGNESIUM CHLORIDE, SODIUM PHOSPHATE, DIBASIC, AND POTASSIUM PHOSPHATE 100 ML/HR: .53; .5; .37; .037; .03; .012; .00082 INJECTION, SOLUTION INTRAVENOUS at 17:26

## 2025-03-03 RX ADMIN — HEPARIN SODIUM 5000 UNITS: 5000 INJECTION INTRAVENOUS; SUBCUTANEOUS at 20:28

## 2025-03-03 RX ADMIN — MAGNESIUM SULFATE HEPTAHYDRATE 2 G: 40 INJECTION, SOLUTION INTRAVENOUS at 17:25

## 2025-03-03 RX ADMIN — SODIUM CHLORIDE, SODIUM GLUCONATE, SODIUM ACETATE, POTASSIUM CHLORIDE, MAGNESIUM CHLORIDE, SODIUM PHOSPHATE, DIBASIC, AND POTASSIUM PHOSPHATE 1000 ML: .53; .5; .37; .037; .03; .012; .00082 INJECTION, SOLUTION INTRAVENOUS at 14:48

## 2025-03-03 RX ADMIN — METOPROLOL TARTRATE 50 MG: 50 TABLET, FILM COATED ORAL at 20:28

## 2025-03-03 RX ADMIN — PRAVASTATIN SODIUM 80 MG: 80 TABLET ORAL at 17:25

## 2025-03-03 NOTE — H&P
H&P - Hospitalist   Name: Alecia Kay 80 y.o. female I MRN: [id number]  Unit/Bed#: ED 12 I Date of Admission: 3/3/2025   Date of Service: 3/3/2025 I Hospital Day: 0     Assessment & Plan  Acute kidney injury (HCC)  Admission creatinine of 2.5.  Creatinine yesterday at the nursing facility was 2.6.  Baseline between 1.4-1.6  GAY likely in the setting of poor oral intake as patient reports poor appetite  Cannot rule out interstitial nephritis with recent IV antibiotic use.  Follow-up on urinalysis with microscopy  Gentle IV hydration  Hold torsemide  Follow-up on renal ultrasound  Avoid hypotension and nephrotoxic medications  Follow-up on urine eosinophils  If renal functions do not improve with hydration, will have nephrology evaluate for further management.    Acute osteomyelitis of right calcaneus (Formerly [facility])  Patient has a history of right calcaneus osteomyelitis.    She is s/p bedside I&D per Podiatry 1/10/25, wound culture 1/10 with Streptococcus canis, MRSA nares negative, blood cultures neg,   MRI 1/11 consistent with medial calcaneus osteomyelitis,   s/p operative debridement with partial calcanectomy 1/13/25 by Podiatry,   operative culture with E faecalis, E avium, CoNS and Diphtheroids  pathology with osteomyelitis,  Patient completed 6 weeks of IV Unasyn on 228.  Subsequently placed on Augmentin  Currently nonweightbearing right foot  Continue with outpatient podiatry and ID follow-up   PT OT evaluation will be obtained.  Patient will remain nonweightbearing right heel until she follows up with her outpatient podiatrist  Chronic kidney disease, stage III (moderate) (Formerly [facility])  Lab Results   Component Value Date    EGFR 17 03/03/2025    EGFR 18 (L) 03/01/2025    EGFR 21 (L) 02/27/2025    CREATININE 2.54 (H) 03/03/2025    CREATININE 2.6 (H) 03/01/2025    CREATININE 2.31 (H) 02/27/2025   Baseline creatinine 1.4-1.6.  Creatinine elevated today.  See management under GAY  Hypothyroidism  Continue with thyroid  replacement therapy  Hypertension  Continue metoprolol with holding parameters.  Hold amlodipine and torsemide.  Electrolyte imbalance  Mild hyponatremia and hypomagnesemia likely in setting of poor oral intake and ongoing diuretic use.  Hold diuretics.  Continue with electrolyte replacement.  Continue with gentle IV hydration and magnesium replacement.  Follow-up labs in AM.      VTE Pharmacologic Prophylaxis:   High Risk (Score >/= 5) - Pharmacological DVT Prophylaxis Ordered: heparin. Sequential Compression Devices Ordered.  Code Status: level 3 DNR  Discussion with family: Patient declined call to .     Anticipated Length of Stay: Patient will be admitted on an inpatient basis with an anticipated length of stay of greater than 2 midnights secondary to management as documented above.    History of Present Illness   Chief Complaint: Abnormal labs    Alecia Kay is a 80 y.o. female with a PMH of hypertension, hyperlipidemia, CKD stage III, GERD, hypothyroidism, right calcaneal osteomyelitis s/p completion of 6 weeks of IV Unasyn now on Augmentin who presents with abnormal labs, poor oral intake..  Creatinine on admission around 2.54.  Patient recently completed IV Unasyn for right calcaneal osteomyelitis on 228 and subsequently started on Augmentin.  States for the last 1 month she has not had very poor oral intake.  Has not been able to get around secondary to nonweightbearing status of the right foot.  Denies any nausea, vomiting but does report poor oral intake.  Denies any urinary symptoms.  Denies any fever, chills    Review of Systems   Constitutional:  Positive for activity change, appetite change and fatigue.   HENT: Negative.     Respiratory: Negative.     Gastrointestinal: Negative.    Endocrine: Negative.    Musculoskeletal:  Positive for gait problem.   Neurological:  Positive for weakness.   Hematological: Negative.    Psychiatric/Behavioral: Negative.         Historical  Information   Past Medical History:   Diagnosis Date    Anemia     Cellulitis     Disease of thyroid gland     GERD (gastroesophageal reflux disease)     Hyperlipidemia     Hypertension     Obesity     Osteomyelitis (HCC)     Pneumonia     Pulmonary embolism (HCC)      Past Surgical History:   Procedure Laterality Date    FOOT SURGERY      JOINT REPLACEMENT       Social History     Tobacco Use    Smoking status: Never     Passive exposure: Never    Smokeless tobacco: Never   Vaping Use    Vaping status: Never Used   Substance and Sexual Activity    Alcohol use: Never    Drug use: Never    Sexual activity: Not on file     E-Cigarette/Vaping    E-Cigarette Use Never User      E-Cigarette/Vaping Substances       Social History:  Marital Status:      Meds/Allergies   I have reveiwed home medications using records provided by [facility].  Prior to Admission medications    Medication Sig Start Date End Date Taking? Authorizing Provider   acetaminophen (TYLENOL) 325 mg tablet Take 650 mg by mouth every 6 (six) hours as needed for mild pain    Historical Provider, MD   amLODIPine (NORVASC) 10 mg tablet Take 10 mg by mouth daily    Historical Provider, MD   HYDROcodone-acetaminophen (NORCO) 5-325 mg per tablet Take 1 tablet by mouth every 6 (six) hours as needed for pain    Historical Provider, MD   hydrocortisone 1 % cream Apply topically 4 (four) times a day as needed for rash    Historical Provider, MD   levothyroxine 75 mcg tablet Take 75 mcg by mouth daily    Historical Provider, MD   magnesium hydroxide (MILK OF MAGNESIA) 400 mg/5 mL oral suspension Take by mouth daily as needed for constipation    Historical Provider, MD   meclizine (ANTIVERT) 25 mg tablet Take by mouth 3 (three) times a day as needed for dizziness    Historical Provider, MD   metoprolol tartrate (LOPRESSOR) 50 mg tablet Take 50 mg by mouth every 12 (twelve) hours    Historical Provider, MD   pantoprazole (PROTONIX) 40 mg tablet Take 40 mg by mouth  daily    Historical Provider, MD   potassium chloride (Klor-Con M20) 20 mEq tablet Take 20 mEq by mouth 2 (two) times a day    Historical Provider, MD   rosuvastatin (CRESTOR) 10 MG tablet Take 10 mg by mouth daily    Historical Provider, MD   saccharomyces boulardii (FLORASTOR) 250 mg capsule Take 250 mg by mouth 2 (two) times a day    Historical Provider, MD   senna-docusate sodium (SENOKOT S) 8.6-50 mg per tablet Take 1 tablet by mouth daily    Historical Provider, MD   torsemide (DEMADEX) 10 mg tablet Take 10 mg by mouth daily    Historical Provider, MD     Allergies   Allergen Reactions    Neomycin-Polymyxin-Hc Other (See Comments)     Unknown reaction      Penicillins Hives     Patient states she got hive, but has had penicillin medication in the past and was ok.     Quinine Other (See Comments)     Unknown       Objective :  Temp:  [98.2 °F (36.8 °C)] 98.2 °F (36.8 °C)  HR:  [52-56] 56  BP: (110-131)/(55-58) 110/55  Resp:  [16-18] 16  SpO2:  [92 %-93 %] 92 %  O2 Device: None (Room air)    Physical Exam  Constitutional:       General: She is not in acute distress.     Appearance: She is not ill-appearing.   HENT:      Mouth/Throat:      Mouth: Mucous membranes are dry.   Eyes:      Extraocular Movements: Extraocular movements intact.      Pupils: Pupils are equal, round, and reactive to light.   Cardiovascular:      Rate and Rhythm: Normal rate and regular rhythm.   Pulmonary:      Effort: Pulmonary effort is normal.   Abdominal:      General: Abdomen is flat. Bowel sounds are normal.      Palpations: Abdomen is soft.   Musculoskeletal:      Right lower leg: No edema.      Comments: Rt foot dressing in place   Skin:     General: Skin is warm.   Neurological:      General: No focal deficit present.      Mental Status: She is alert and oriented to person, place, and time. Mental status is at baseline.          Lines/Drains:      Central Line:  Goal for removal: Will discontinue when meds requiring line are  completed. Will discontinue when peripheral access obtained.              Lab Results: I have reviewed the following results:  Results from last 7 days   Lab Units 03/03/25  1342   WBC Thousand/uL 5.49   HEMOGLOBIN g/dL 10.1*   HEMATOCRIT % 30.9*   PLATELETS Thousands/uL 183   SEGS PCT % 56   LYMPHO PCT % 26   MONO PCT % 10   EOS PCT % 6     Results from last 7 days   Lab Units 03/03/25  1342 03/01/25  1234 02/27/25  0522   SODIUM mmol/L 134*   < > 140   POTASSIUM mmol/L 4.3   < > 4.3   CHLORIDE mmol/L 98   < > 99*   CO2 mmol/L 25   < > 25   BUN mg/dL 17   < > 16   CREATININE mg/dL 2.54*   < > 2.31*   ANION GAP mmol/L 11   < > 16*   CALCIUM mg/dL 8.6   < > 8.3*   ALBUMIN g/dL  --   --  2.8*   TOTAL BILIRUBIN mg/dL  --   --  0.3   ALK PHOS U/L  --   --  41   ALT U/L  --   --  4   AST U/L  --   --  13   GLUCOSE RANDOM mg/dL 81   < > 65    < > = values in this interval not displayed.     Results from last 7 days   Lab Units 02/28/25  0508   INR  3         Lab Results   Component Value Date    HGBA1C 5.7 (H) 11/13/2024    HGBA1C 5.8 (H) 06/17/2024    HGBA1C 5.8 (H) 12/04/2023                 Administrative Statements       ** Please Note: This note has been constructed using a voice recognition system. **

## 2025-03-03 NOTE — ASSESSMENT & PLAN NOTE
Patient has a history of right calcaneus osteomyelitis.    She is s/p bedside I&D per Podiatry 1/10/25, wound culture 1/10 with Streptococcus canis, MRSA nares negative, blood cultures neg,   MRI 1/11 consistent with medial calcaneus osteomyelitis,   s/p operative debridement with partial calcanectomy 1/13/25 by Podiatry,   operative culture with E faecalis, E avium, CoNS and Diphtheroids  pathology with osteomyelitis,  Patient completed 6 weeks of IV Unasyn on 228.  Subsequently placed on Augmentin  Currently nonweightbearing right foot  Continue with outpatient podiatry and ID follow-up   PT OT evaluation will be obtained.  Patient will remain nonweightbearing right heel until she follows up with her outpatient podiatrist

## 2025-03-03 NOTE — ASSESSMENT & PLAN NOTE
Mild hyponatremia and hypomagnesemia likely in setting of poor oral intake and ongoing diuretic use.  Hold diuretics.  Continue with electrolyte replacement.  Continue with gentle IV hydration and magnesium replacement.  Follow-up labs in AM.

## 2025-03-03 NOTE — ASSESSMENT & PLAN NOTE
Lab Results   Component Value Date    EGFR 17 03/03/2025    EGFR 18 (L) 03/01/2025    EGFR 21 (L) 02/27/2025    CREATININE 2.54 (H) 03/03/2025    CREATININE 2.6 (H) 03/01/2025    CREATININE 2.31 (H) 02/27/2025   Baseline creatinine 1.4-1.6.  Creatinine elevated today.  See management under GAY

## 2025-03-03 NOTE — ASSESSMENT & PLAN NOTE
Admission creatinine of 2.5.  Creatinine yesterday at the nursing facility was 2.6.  Baseline between 1.4-1.6  GAY likely in the setting of poor oral intake as patient reports poor appetite  Cannot rule out interstitial nephritis with recent IV antibiotic use.  Follow-up on urinalysis with microscopy  Gentle IV hydration  Hold torsemide  Follow-up on renal ultrasound  Avoid hypotension and nephrotoxic medications  Follow-up on urine eosinophils  If renal functions do not improve with hydration, will have nephrology evaluate for further management.

## 2025-03-03 NOTE — ED PROVIDER NOTES
Time reflects when diagnosis was documented in both MDM as applicable and the Disposition within this note       Time User Action Codes Description Comment    3/3/2025  3:06 PM Huajohnshilpi Maxi Add [N17.9] Acute kidney injury (HCC)           ED Disposition       ED Disposition   Admit    Condition   Stable    Date/Time   Mon Mar 3, 2025  3:06 PM    Comment                  Assessment & Plan       Medical Decision Making  This patient presents with abnormal labs.   Diagnostic considerations include acute kidney injury, obstructed ureteral stone, cystitis, renal failure, dehydration.    1:33 PM  Vital signs reviewed.  Patient states that she has been having decreased oral intake since January.  Creatinine 2.6 from 2.31.  Per chart review, the patient's baseline creatinine is between 1.4 and 1.6.  Will repeat labs/urinalysis, administer IV fluids.    2:26 PM  Creatinine 2.54.  Will obtain renal ultrasound.  IV fluids administered.  Admitted to the hospitalist service.      Problems Addressed:  Acute kidney injury (HCC): acute illness or injury    Amount and/or Complexity of Data Reviewed  Labs: ordered.  Radiology: ordered.  Discussion of management or test interpretation with external provider(s): The case and treatment plan discussed with hospital medicine.    Risk  Prescription drug management.  Decision regarding hospitalization.      Medications   multi-electrolyte (ISOLYTE-S PH 7.4) bolus 1,000 mL (1,000 mL Intravenous New Bag 3/3/25 1448)       ED Risk Strat Scores        SBIRT 20yo+      Flowsheet Row Most Recent Value   Initial Alcohol Screen: US AUDIT-C     1. How often do you have a drink containing alcohol? 0 Filed at: 03/03/2025 1235   2. How many drinks containing alcohol do you have on a typical day you are drinking?  0 Filed at: 03/03/2025 1235   3b. FEMALE Any Age, or MALE 65+: How often do you have 4 or more drinks on one occassion? 0 Filed at: 03/03/2025 1235   Audit-C Score 0 Filed at: 03/03/2025  1235   MARRY: How many times in the past year have you...    Used an illegal drug or used a prescription medication for non-medical reasons? Never Filed at: 03/03/2025 1235          History of Present Illness       Chief Complaint   Patient presents with    Abnormal Lab     Elevated creat level of 2.31 that was collected 2/27  Sent from SNF today for same.   Has been taking IV unaysn for recent R calcenectomy for osteo. Taking PO Augmentin  Patient admits to decreased appetite and decreased fluids.       Past Medical History:   Diagnosis Date    Anemia     Cellulitis     Disease of thyroid gland     GERD (gastroesophageal reflux disease)     Hyperlipidemia     Hypertension     Obesity     Osteomyelitis (HCC)     Pneumonia     Pulmonary embolism (HCC)       Past Surgical History:   Procedure Laterality Date    FOOT SURGERY      JOINT REPLACEMENT        History reviewed. No pertinent family history.   Social History     Tobacco Use    Smoking status: Never     Passive exposure: Never    Smokeless tobacco: Never   Vaping Use    Vaping status: Never Used   Substance Use Topics    Alcohol use: Never    Drug use: Never      E-Cigarette/Vaping    E-Cigarette Use Never User       E-Cigarette/Vaping Substances      I have reviewed and agree with the history as documented.     Patient presents with abnormal labs.  It was reported by the patient skilled nursing facility that her creatinine has been uptrending since 2/27/2025.  Patient states that she has been having decreased oral intake since that time.      History provided by:  Patient, EMS personnel and medical records  Evaluation of Abnormal Diagnostic Test  Time since result:  2/27, 3/1  Patient referred by:  Nursing home  Resulting agency:  External  Result type: chemistry    Chemistry:     Creatinine:  High    Review of Systems   Constitutional:  Positive for activity change, appetite change and fatigue.   Genitourinary:  Positive for decreased urine volume. Negative  for difficulty urinating, dysuria, flank pain, frequency, pelvic pain and urgency.   Musculoskeletal:  Negative for back pain.   All other systems reviewed and are negative.    Objective       ED Triage Vitals [03/03/25 1235]   Temperature Pulse Blood Pressure Respirations SpO2 Patient Position - Orthostatic VS   98.2 °F (36.8 °C) 55 131/58 18 92 % Sitting      Temp Source Heart Rate Source BP Location FiO2 (%) Pain Score    Temporal Monitor Right arm -- No Pain      Vitals      Date and Time Temp Pulse SpO2 Resp BP Pain Score FACES Pain Rating User   03/03/25 1626 -- -- -- -- -- No Pain -- AL   03/03/25 1621 97.7 °F (36.5 °C) 66 92 % 18 129/55 -- -- DII   03/03/25 1620 97.7 °F (36.5 °C) 64 83 % 18 129/55 -- -- DII   03/03/25 1300 -- 56 92 % 16 110/55 -- -- RR   03/03/25 1245 -- 52 93 % 18 131/58 -- -- BF   03/03/25 1235 98.2 °F (36.8 °C) 55 92 % 18 131/58 No Pain -- KK            Physical Exam  Vitals and nursing note reviewed.   Constitutional:       General: She is not in acute distress.     Appearance: She is ill-appearing. She is not toxic-appearing.   HENT:      Head: Normocephalic and atraumatic.      Right Ear: External ear normal.      Left Ear: External ear normal.   Eyes:      General: No scleral icterus.        Right eye: No discharge.         Left eye: No discharge.      Extraocular Movements: Extraocular movements intact.      Conjunctiva/sclera: Conjunctivae normal.   Cardiovascular:      Rate and Rhythm: Normal rate and regular rhythm.      Heart sounds: Murmur heard.   Pulmonary:      Effort: Pulmonary effort is normal. No respiratory distress.      Breath sounds: Normal breath sounds. No wheezing or rhonchi.   Chest:      Chest wall: No tenderness.   Abdominal:      General: Bowel sounds are normal.      Palpations: Abdomen is soft.      Tenderness: There is no abdominal tenderness. There is no right CVA tenderness, left CVA tenderness, guarding or rebound.   Skin:     General: Skin is warm and  dry.   Neurological:      General: No focal deficit present.      Mental Status: She is alert and oriented to person, place, and time.   Psychiatric:         Mood and Affect: Mood normal.         Behavior: Behavior normal.         Results Reviewed       Procedure Component Value Units Date/Time    UA w Reflex to Microscopic w Reflex to Culture [[id number]]  (Abnormal) Collected: 03/03/25 1630    Lab Status: Final result Specimen: Urine, Clean Catch Updated: 03/03/25 1634     Color, UA Yellow     Clarity, UA Slightly Cloudy     Specific Gravity, UA 1.020     pH, UA 5.5     Leukocytes, UA Small     Nitrite, UA Negative     Protein, UA Negative mg/dl      Glucose, UA Negative mg/dl      Ketones, UA Negative mg/dl      Urobilinogen, UA 0.2 E.U./dl      Bilirubin, UA Small     Occult Blood, UA Negative    Basic metabolic panel [[id number]]  (Abnormal) Collected: 03/03/25 1342    Lab Status: Final result Specimen: Blood from Arm, Right Updated: 03/03/25 1410     Sodium 134 mmol/L      Potassium 4.3 mmol/L      Chloride 98 mmol/L      CO2 25 mmol/L      ANION GAP 11 mmol/L      BUN 17 mg/dL      Creatinine 2.54 mg/dL      Glucose 81 mg/dL      Calcium 8.6 mg/dL      eGFR 17 ml/min/1.73sq m     Narrative:      National Kidney Disease Foundation guidelines for Chronic Kidney Disease (CKD):     Stage 1 with normal or high GFR (GFR > 90 mL/min/1.73 square meters)    Stage 2 Mild CKD (GFR = 60-89 mL/min/1.73 square meters)    Stage 3A Moderate CKD (GFR = 45-59 mL/min/1.73 square meters)    Stage 3B Moderate CKD (GFR = 30-44 mL/min/1.73 square meters)    Stage 4 Severe CKD (GFR = 15-29 mL/min/1.73 square meters)    Stage 5 End Stage CKD (GFR <15 mL/min/1.73 square meters)  Note: GFR calculation is accurate only with a steady state creatinine    Magnesium [[id number]]  (Abnormal) Collected: 03/03/25 1342    Lab Status: Final result Specimen: Blood from Arm, Right Updated: 03/03/25 1410     Magnesium 1.4 mg/dL     Blood gas, venous  [[id number]]  (Abnormal) Collected: 03/03/25 1342    Lab Status: Final result Specimen: Blood from Arm, Right Updated: 03/03/25 1353     pH, Chris 7.369     pCO2, Chris 42.0 mm Hg      pO2, Chris 45.9 mm Hg      HCO3, Chris 23.7 mmol/L      Base Excess, Chris -1.6 mmol/L      O2 Content, Chris 12.5 ml/dL      O2 HGB, VENOUS 80.2 %     CBC and differential [[id number]]  (Abnormal) Collected: 03/03/25 1342    Lab Status: Final result Specimen: Blood from Arm, Right Updated: 03/03/25 1350     WBC 5.49 Thousand/uL      RBC 3.34 Million/uL      Hemoglobin 10.1 g/dL      Hematocrit 30.9 %      MCV 93 fL      MCH 30.2 pg      MCHC 32.7 g/dL      RDW 13.8 %      MPV 11.5 fL      Platelets 183 Thousands/uL      nRBC 0 /100 WBCs      Segmented % 56 %      Immature Grans % 0 %      Lymphocytes % 26 %      Monocytes % 10 %      Eosinophils Relative 6 %      Basophils Relative 2 %      Absolute Neutrophils 3.14 Thousands/µL      Absolute Immature Grans 0.02 Thousand/uL      Absolute Lymphocytes 1.41 Thousands/µL      Absolute Monocytes 0.53 Thousand/µL      Eosinophils Absolute 0.30 Thousand/µL      Basophils Absolute 0.09 Thousands/µL             US kidney and bladder   Final Interpretation by Ignacio Barragan MD (03/03 1638)      Normal.            Workstation performed: [id number]             Procedures    ED Medication and Procedure Management   Prior to Admission Medications   Prescriptions Last Dose Informant Patient Reported? Taking?   HYDROcodone-acetaminophen (NORCO) 5-325 mg per tablet   Yes No   Sig: Take 1 tablet by mouth every 6 (six) hours as needed for pain   acetaminophen (TYLENOL) 325 mg tablet   Yes No   Sig: Take 650 mg by mouth every 6 (six) hours as needed for mild pain   amLODIPine (NORVASC) 10 mg tablet   Yes No   Sig: Take 10 mg by mouth daily   hydrocortisone 1 % cream  Self Yes No   Sig: Apply topically 4 (four) times a day as needed for rash   levothyroxine 75 mcg tablet   Yes No   Sig: Take 75 mcg by mouth daily    magnesium hydroxide (MILK OF MAGNESIA) 400 mg/5 mL oral suspension   Yes No   Sig: Take by mouth daily as needed for constipation   meclizine (ANTIVERT) 25 mg tablet  Self Yes No   Sig: Take by mouth 3 (three) times a day as needed for dizziness   metoprolol tartrate (LOPRESSOR) 50 mg tablet   Yes No   Sig: Take 50 mg by mouth every 12 (twelve) hours   pantoprazole (PROTONIX) 40 mg tablet   Yes No   Sig: Take 40 mg by mouth daily   potassium chloride (Klor-Con M20) 20 mEq tablet   Yes No   Sig: Take 20 mEq by mouth 2 (two) times a day   rosuvastatin (CRESTOR) 10 MG tablet   Yes No   Sig: Take 10 mg by mouth daily   saccharomyces boulardii (FLORASTOR) 250 mg capsule  Self Yes No   Sig: Take 250 mg by mouth 2 (two) times a day   senna-docusate sodium (SENOKOT S) 8.6-50 mg per tablet   Yes No   Sig: Take 1 tablet by mouth daily   torsemide (DEMADEX) 10 mg tablet   Yes No   Sig: Take 10 mg by mouth daily      Facility-Administered Medications: None     Current Discharge Medication List        CONTINUE these medications which have NOT CHANGED    Details   acetaminophen (TYLENOL) 325 mg tablet Take 650 mg by mouth every 6 (six) hours as needed for mild pain      amLODIPine (NORVASC) 10 mg tablet Take 10 mg by mouth daily      HYDROcodone-acetaminophen (NORCO) 5-325 mg per tablet Take 1 tablet by mouth every 6 (six) hours as needed for pain      hydrocortisone 1 % cream Apply topically 4 (four) times a day as needed for rash      levothyroxine 75 mcg tablet Take 75 mcg by mouth daily      magnesium hydroxide (MILK OF MAGNESIA) 400 mg/5 mL oral suspension Take by mouth daily as needed for constipation      meclizine (ANTIVERT) 25 mg tablet Take by mouth 3 (three) times a day as needed for dizziness      metoprolol tartrate (LOPRESSOR) 50 mg tablet Take 50 mg by mouth every 12 (twelve) hours      pantoprazole (PROTONIX) 40 mg tablet Take 40 mg by mouth daily      potassium chloride (Klor-Con M20) 20 mEq tablet Take 20 mEq  by mouth 2 (two) times a day      rosuvastatin (CRESTOR) 10 MG tablet Take 10 mg by mouth daily      saccharomyces boulardii (FLORASTOR) 250 mg capsule Take 250 mg by mouth 2 (two) times a day      senna-docusate sodium (SENOKOT S) 8.6-50 mg per tablet Take 1 tablet by mouth daily      torsemide (DEMADEX) 10 mg tablet Take 10 mg by mouth daily           No discharge procedures on file.  ED SEPSIS DOCUMENTATION   Time reflects when diagnosis was documented in both MDM as applicable and the Disposition within this note       Time User Action Codes Description Comment    3/3/2025  3:06 PM Maxi Fuller Add [N17.9] Acute kidney injury (HCC)                  Maxi Fuller DO  03/03/25 1959

## 2025-03-04 PROBLEM — M86.671 CHRONIC OSTEOMYELITIS OF TOE OF RIGHT FOOT (HCC): Status: ACTIVE | Noted: 2025-03-03

## 2025-03-04 LAB
ALBUMIN SERPL BCG-MCNC: 2.8 G/DL (ref 3.5–5)
ALP SERPL-CCNC: 40 U/L (ref 34–104)
ALT SERPL W P-5'-P-CCNC: 6 U/L (ref 7–52)
ANION GAP SERPL CALCULATED.3IONS-SCNC: 11 MMOL/L (ref 4–13)
AST SERPL W P-5'-P-CCNC: 14 U/L (ref 13–39)
BILIRUB SERPL-MCNC: 0.33 MG/DL (ref 0.2–1)
BUN SERPL-MCNC: 14 MG/DL (ref 5–25)
CALCIUM ALBUM COR SERPL-MCNC: 9.2 MG/DL (ref 8.3–10.1)
CALCIUM SERPL-MCNC: 8.2 MG/DL (ref 8.4–10.2)
CHLORIDE SERPL-SCNC: 100 MMOL/L (ref 96–108)
CO2 SERPL-SCNC: 26 MMOL/L (ref 21–32)
CREAT SERPL-MCNC: 2.19 MG/DL (ref 0.6–1.3)
EOSINOPHIL NFR URNS MANUAL: 0 %
GFR SERPL CREATININE-BSD FRML MDRD: 20 ML/MIN/1.73SQ M
GLUCOSE SERPL-MCNC: 75 MG/DL (ref 65–140)
MAGNESIUM SERPL-MCNC: 2.2 MG/DL (ref 1.9–2.7)
POTASSIUM SERPL-SCNC: 3.8 MMOL/L (ref 3.5–5.3)
PROT SERPL-MCNC: 5.4 G/DL (ref 6.4–8.4)
SODIUM SERPL-SCNC: 137 MMOL/L (ref 135–147)

## 2025-03-04 PROCEDURE — 97530 THERAPEUTIC ACTIVITIES: CPT

## 2025-03-04 PROCEDURE — 97167 OT EVAL HIGH COMPLEX 60 MIN: CPT

## 2025-03-04 PROCEDURE — 83735 ASSAY OF MAGNESIUM: CPT | Performed by: INTERNAL MEDICINE

## 2025-03-04 PROCEDURE — 80053 COMPREHEN METABOLIC PANEL: CPT | Performed by: INTERNAL MEDICINE

## 2025-03-04 PROCEDURE — 97163 PT EVAL HIGH COMPLEX 45 MIN: CPT

## 2025-03-04 PROCEDURE — 99232 SBSQ HOSP IP/OBS MODERATE 35: CPT | Performed by: FAMILY MEDICINE

## 2025-03-04 RX ADMIN — HEPARIN SODIUM 5000 UNITS: 5000 INJECTION INTRAVENOUS; SUBCUTANEOUS at 05:27

## 2025-03-04 RX ADMIN — PRAVASTATIN SODIUM 80 MG: 80 TABLET ORAL at 16:35

## 2025-03-04 RX ADMIN — HEPARIN SODIUM 5000 UNITS: 5000 INJECTION INTRAVENOUS; SUBCUTANEOUS at 16:35

## 2025-03-04 RX ADMIN — SENNOSIDES AND DOCUSATE SODIUM 1 TABLET: 8.6; 5 TABLET ORAL at 08:28

## 2025-03-04 RX ADMIN — SODIUM CHLORIDE, SODIUM GLUCONATE, SODIUM ACETATE, POTASSIUM CHLORIDE, MAGNESIUM CHLORIDE, SODIUM PHOSPHATE, DIBASIC, AND POTASSIUM PHOSPHATE 100 ML/HR: .53; .5; .37; .037; .03; .012; .00082 INJECTION, SOLUTION INTRAVENOUS at 16:36

## 2025-03-04 RX ADMIN — LEVOTHYROXINE SODIUM 75 MCG: 75 TABLET ORAL at 05:27

## 2025-03-04 RX ADMIN — HEPARIN SODIUM 5000 UNITS: 5000 INJECTION INTRAVENOUS; SUBCUTANEOUS at 20:46

## 2025-03-04 RX ADMIN — SODIUM CHLORIDE, SODIUM GLUCONATE, SODIUM ACETATE, POTASSIUM CHLORIDE, MAGNESIUM CHLORIDE, SODIUM PHOSPHATE, DIBASIC, AND POTASSIUM PHOSPHATE 100 ML/HR: .53; .5; .37; .037; .03; .012; .00082 INJECTION, SOLUTION INTRAVENOUS at 05:26

## 2025-03-04 RX ADMIN — METOPROLOL TARTRATE 50 MG: 50 TABLET, FILM COATED ORAL at 20:46

## 2025-03-04 RX ADMIN — PANTOPRAZOLE SODIUM 40 MG: 40 TABLET, DELAYED RELEASE ORAL at 08:28

## 2025-03-04 RX ADMIN — METOPROLOL TARTRATE 50 MG: 50 TABLET, FILM COATED ORAL at 08:28

## 2025-03-04 NOTE — ASSESSMENT & PLAN NOTE
Patient has a history of right calcaneus osteomyelitis.    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    Subsequently placed on Augmentin  Currently nonweightbearing right foot  Continue with outpatient podiatry and ID follow-up   PT OT evaluation will be obtained.  Patient will remain nonweightbearing right heel until she follows up with her outpatient podiatrist  Patient record reviewed-patient is to remain nonweightbearing until wound is completely healed

## 2025-03-04 NOTE — PROGRESS NOTES
Progress Note - Hospitalist   Name: Alecia Kay 80 y.o. female I MRN: [id number]  Unit/Bed#: -01 I Date of Admission: 3/3/2025   Date of Service: 3/4/2025 I Hospital Day: 1    Assessment & Plan  Acute kidney injury (HCC)  Patient presented yesterday (3/3) from skilled nursing facility with reports of generalized weakness  Admission creatinine of 2.5. Baseline between 1.4-1.6  GAY l prerenal and secondary to poor oral intake as patient reports poor appetite  Creatinine improving today  Continue gentle IV hydration over next 24 hours  Hold torsemide over next 24 hours  renal ultrasound completed today (3/4): Normal  Avoid hypotension and nephrotoxic medications  If renal functions do not improve with hydration, will have nephrology evaluate for further management.    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    Subsequently placed on Augmentin  Currently nonweightbearing right foot  Continue with outpatient podiatry and ID follow-up   PT OT evaluation will be obtained.  Patient will remain nonweightbearing right heel until she follows up with her outpatient podiatrist  Patient record reviewed-patient is to remain nonweightbearing until wound is completely healed  Chronic kidney disease, stage III (moderate) ([facility])  Lab Results   Component Value Date    EGFR 20 03/04/2025    EGFR 17 03/03/2025    EGFR 18 (L) 03/01/2025    CREATININE 2.19 (H) 03/04/2025    CREATININE 2.54 (H) 03/03/2025    CREATININE 2.6 (H) 03/01/2025   Baseline  creatinine 1.4-1.6.  Creatinine elevated on admission see management under GAY  Hypothyroidism  Continue with thyroid replacement therapy  Chronic condition/stable  Hypertension  Continue metoprolol with holding parameters.  Hold amlodipine and torsemide.  Normotensive-chronic condition/stable  Electrolyte imbalance  Mild hyponatremia and hypomagnesemia likely in setting of poor oral intake and ongoing diuretic use.  Hold diuretics.  Continue with electrolyte replacement.  Continue with gentle IV hydration and magnesium replacement.  Follow-up labs in AM.    VTE Pharmacologic Prophylaxis: VTE Score: 4 High Risk (Score >/= 5) - Pharmacological DVT Prophylaxis Ordered: heparin. Sequential Compression Devices Ordered.    Mobility:   Basic Mobility Inpatient Raw Score: 8  -HL Goal: 3: Sit at edge of bed  JH-HLM Achieved: 5: Stand (1 or more minutes)  -HLM Goal NOT achieved. Continue with multidisciplinary rounding and encourage appropriate mobility to improve upon -HLM goals.    Patient Centered Rounds: I performed bedside rounds with nursing staff today.   Discussions with Specialists or Other Care Team Provider:     Education and Discussions with Family / Patient: Patient declined call to .     Current Length of Stay: 1 day(s)  Current Patient Status: Inpatient   Certification Statement: The patient will continue to require additional inpatient hospital stay due to acute kidney injury  Discharge Plan: Anticipate discharge in 24-48 hrs to rehab facility.    Code Status: Level 3 - DNAR and DNI    Subjective   Patient examined at bedside.  Feels tired.  Tells me that she does not drink water or fluids often when not in the hospital.  Denies pain.    Objective :  Temp:  [97.2 °F (36.2 °C)-97.9 °F (36.6 °C)] 97.2 °F (36.2 °C)  HR:  [55-61] 56  BP: (119-126)/(55-62) 119/61  Resp:  [18-20] 18  SpO2:  [87 %-100 %] 99 %  O2 Device: None (Room air)  Nasal Cannula O2 Flow Rate (L/min):  [1 L/min] 1  L/min    Body mass index is 34.64 kg/m².     Input and Output Summary (last 24 hours):     Intake/Output Summary (Last 24 hours) at 3/4/2025 1621  Last data filed at 3/4/2025 0820  Gross per 24 hour   Intake 220 ml   Output 500 ml   Net -280 ml       Physical Exam  Constitutional:       General: She is not in acute distress.     Appearance: She is not ill-appearing.   HENT:      Mouth/Throat:      Mouth: Mucous membranes are dry.   Eyes:      Extraocular Movements: Extraocular movements intact.      Pupils: Pupils are equal, round, and reactive to light.   Cardiovascular:      Rate and Rhythm: Normal rate and regular rhythm.   Pulmonary:      Effort: Pulmonary effort is normal.   Abdominal:      General: Abdomen is flat. Bowel sounds are normal.      Palpations: Abdomen is soft.   Musculoskeletal:      Right lower leg: No edema.      Comments: Rt foot dressing in place   Skin:     General: Skin is warm.   Neurological:      General: No focal deficit present.      Mental Status: She is alert and oriented to person, place, and time. Mental status is at baseline.     Lines/Drains:      Central Line:  Goal for removal: Will discontinue when hemodynamically stable.               Lab Results: I have reviewed the following results:   Results from last 7 days   Lab Units 03/03/25  1729 03/03/25  1342   WBC Thousand/uL  --  5.49   HEMOGLOBIN g/dL  --  10.1*   HEMATOCRIT %  --  30.9*   PLATELETS Thousands/uL 219 183   SEGS PCT %  --  56   LYMPHO PCT %  --  26   MONO PCT %  --  10   EOS PCT %  --  6     Results from last 7 days   Lab Units 03/04/25  0611   SODIUM mmol/L 137   POTASSIUM mmol/L 3.8   CHLORIDE mmol/L 100   CO2 mmol/L 26   BUN mg/dL 14   CREATININE mg/dL 2.19*   ANION GAP mmol/L 11   CALCIUM mg/dL 8.2*   ALBUMIN g/dL 2.8*   TOTAL BILIRUBIN mg/dL 0.33   ALK PHOS U/L 40   ALT U/L 6*   AST U/L 14   GLUCOSE RANDOM mg/dL 75     Results from last 7 days   Lab Units 02/28/25  0508   INR  3                   Recent  Cultures (last 7 days):         Imaging Results Review: No pertinent imaging studies reviewed.  Other Study Results Review: No additional pertinent studies reviewed.    Last 24 Hours Medication List:     Current Facility-Administered Medications:     acetaminophen (TYLENOL) tablet 650 mg, Q6H PRN    heparin (porcine) subcutaneous injection 5,000 Units, Q8H MELANY **AND** [COMPLETED] Platelet count, Once    levothyroxine tablet 75 mcg, Daily    magnesium hydroxide (MILK OF MAGNESIA) oral suspension 15 mL, Daily PRN    metoprolol tartrate (LOPRESSOR) tablet 50 mg, Q12H MELANY    multi-electrolyte (PLASMALYTE-A/ISOLYTE-S PH 7.4) IV solution, Continuous, Last Rate: 100 mL/hr (03/04/25 0526)    pantoprazole (PROTONIX) EC tablet 40 mg, Daily    pravastatin (PRAVACHOL) tablet 80 mg, Daily With Dinner    senna-docusate sodium (SENOKOT S) 8.6-50 mg per tablet 1 tablet, Daily    Administrative Statements   Today, Patient Was Seen By: Norman Blake, DO  I have spent a total time of 34 minutes in caring for this patient on the day of the visit/encounter including Patient and family education, Risk factor reductions, Counseling / Coordination of care, Reviewing/placing orders in the medical record (including tests, medications, and/or procedures), and Obtaining or reviewing history  .    **Please Note: This note may have been constructed using a voice recognition system.**

## 2025-03-04 NOTE — ASSESSMENT & PLAN NOTE
Lab Results   Component Value Date    EGFR 20 03/04/2025    EGFR 17 03/03/2025    EGFR 18 (L) 03/01/2025    CREATININE 2.19 (H) 03/04/2025    CREATININE 2.54 (H) 03/03/2025    CREATININE 2.6 (H) 03/01/2025   Baseline creatinine 1.4-1.6.  Creatinine elevated on admission see management under GAY

## 2025-03-04 NOTE — PHYSICAL THERAPY NOTE
PHYSICAL THERAPY EVALUATION        NAME:  Alecia Kay  DATE: 03/04/25    AGE:   80 y.o.  Mrn:   [id number]  ADMIT DX:  Abnormal laboratory test [R89.9]  Acute kidney injury (HCC) [N17.9]    Past Medical History:   Diagnosis Date    Anemia     Cellulitis     Disease of thyroid gland     GERD (gastroesophageal reflux disease)     Hyperlipidemia     Hypertension     Obesity     Osteomyelitis (HCC)     Pneumonia     Pulmonary embolism (HCC)      Length Of Stay: 1  Performed at least 2 patient identifiers during session: Name and Birthday         PHYSICAL THERAPY EVALUATION:       03/04/25 1424   PT Last Visit   PT Visit Date 03/04/25   Note Type   Note type Evaluation   Pain Assessment   Pain Assessment Tool 0-10   Pain Score No Pain   Restrictions/Precautions   Weight Bearing Precautions Per Order Yes   RLE Weight Bearing Per Order NWB   Other Precautions Chair Alarm;Bed Alarm;Fall Risk;Multiple lines;WBS   Home Living   Type of Home House   Home Layout Two level;Performs ADLs on one level;Able to live on main level with bedroom/bathroom  (4-5 RHODA no rail; but reports she can ascend 1 RHODA on her son's side of the half double home)   Bathroom Shower/Tub Walk-in shower   Bathroom Toilet Standard  (with foam seat)   Home Equipment Walker  (lift chair recliner)   Additional Comments Pt arrives to [facility] from STR.  Has been using [employer] Lift at the facility.   Prior Function   Level of [place] Independent with ADLs;Independent with functional mobility  (prior to January)   Lives With   (Nephew (pt reports he is disabled))   Receives Help From Family   IADLs Independent with meal prep;Independent with medication management;Family/Friend/Other provides transportation   Falls in the last 6 months   (3)   Comments IND prior to January.   General   Family/Caregiver Present No   Cognition   Overall Cognitive Status WFL   Arousal/Participation Alert   Orientation Level Oriented X4   Comments impaired insight   LLE  "Assessment   LLE Assessment X   Strength LLE   L Hip Flexion 3/5   L Hip Extension 3-/5   L Knee Extension 4-/5   L Ankle Dorsiflexion 4-/5   L Ankle Plantar Flexion 3/5   Light Touch   RLE Light Touch Grossly intact   LLE Light Touch Grossly intact   Transfers   Sit to Stand 3  Moderate assistance  (pt with difficulty maintaining NWB status of R LE)   Additional items Assist x 1;Increased time required;Verbal cues   Stand to Sit 3  Moderate assistance   Additional items Assist x 1;Increased time required;Verbal cues   Stand pivot Unable to assess  (pt unable)   Additional Comments RW   Ambulation/Elevation   Gait Assistance Not tested  (Pt unable/unable to hop)   Stair Management Assistance Not tested   Balance   Static Sitting Fair +   Dynamic Sitting Fair   Static Standing Poor +   Endurance Deficit   Endurance Deficit Yes   Activity Tolerance   Activity Tolerance Patient limited by fatigue   Medical Staff Made Aware OT [place]   Assessment   Prognosis Fair   Problem List Decreased strength;Decreased endurance;Impaired balance;Decreased mobility;Obesity;Decreased skin integrity;Orthopedic restrictions;Impaired judgement   Barriers to Discharge Other (Comment)   Barriers to Discharge Comments NWB status; high fall risk; current assist level for mobility   Goals   Patient Goals \"I want to go home to my cats\"   STG Expiration Date 03/18/25   PT Treatment Day 1   Plan   Treatment/Interventions Functional transfer training;LE strengthening/ROM;Elevations;Therapeutic exercise;Endurance training;Patient/family training;Equipment eval/education;Bed mobility;Gait training;Compensatory technique education;Spoke to case management;OT   PT Frequency 3-5x/wk   Discharge Recommendation   Rehab Resource Intensity Level, PT II (Moderate Resource Intensity)   Additional Comments return to STR   AM-PAC Basic Mobility Inpatient   Turning in Flat Bed Without Bedrails 2   Lying on Back to Sitting on Edge of Flat Bed Without Bedrails 1 "   Moving Bed to Chair 1   Standing Up From Chair Using Arms 2   Walk in Room 1   Climb 3-5 Stairs With Railing 1   Basic Mobility Inpatient Raw Score 8   Turning Head Towards Sound 4   Follow Simple Instructions 4   Low Function Basic Mobility Raw Score  16   Low Function Basic Mobility Standardized Score  25.72   [facility] Highest Level Of Mobility   -HL Goal 3: Sit at edge of bed   JH-HLM Achieved 5: Stand (1 or more minutes)   Additional Treatment Session   Start Time 1500   End Time 1513   Treatment Assessment Pt tolerates tx session poor - fair.  Her NWB status of the R LE is a major limiting factor.  TB transfer completed however pt requiring A x 2 to complete.   Equipment Use Pt completes TB transfer from drop arm recliner to bed with mod A x 1 and a 2nd person to ensure NWB of the R LE.  She completes sit to supine max A x 1.  Requires A x 2 for boost up in bed/repositioning.   End of Consult   Patient Position at End of Consult Supine;Bed/Chair alarm activated;All needs within reach     (Please find full objective findings from PT assessment regarding body systems outlined above).     Assessment: Pt is a 80 y.o. female seen for PT evaluation s/p admission to [facility] on 3/3/2025 with Acute kidney injury (HCC).  Order placed for PT services.  Upon evaluation: Pt is presenting with impaired functional mobility due to decreased strength, decreased endurance, impaired balance, impaired judgment, orthopedic restrictions, fall risk, and impaired skin integrity requiring  mod assistance for transfers. Pt's clinical presentation is currently unstable/unpredictable given the functional mobility deficits above, especially weakness and decreased activity tolerance, coupled with fall risks as indicated by AM-PAC 6-Clicks: 08/24 as well as hx of falls, impaired balance, and obesity and combined with medical complications of abnormal CBC, abnormal sodium values, readmission to hospital, and  need for input for mobility technique/safety.  Pt's PMHx and comorbidities that may affect physical performance and progress include: h/o PE, HTN, and obesity. Personal factors affecting pt at time of IE include: anxiety, advanced age, inability to perform IADLs, inability to perform ADLs, inability to ambulate household distances, inability to navigate community distances, limited insight into impairments, and recent fall(s)/fall history. Pt will benefit from continued skilled PT services to address deficits as defined above and to maximize level of functional mobility to facilitate return toward PLOF and improved QOL. From PT/mobility standpoint, recommendation at time of d/c would be Level II (Moderate Resource Intensity pending progress in order to reduce fall risk and maximize pt's functional independence and consistency with mobility in order to facilitate return to PLOF.     The patient's AM-PAC Basic Mobility Inpatient Short Form Raw Score is 8. A Raw score of less than or equal to 16 suggests the patient may benefit from discharge to post-acute rehabilitation services. Please also refer to the recommendation of the Physical Therapist for safe discharge planning.       Goals:   Pt will perform bed mobility tasks with modified I to reposition in bed and prepare for transfers.  Pt will perform transfers with  min A  to decrease burden of care and demonstrate compliance with WB limitations and prepare for ambulation. Pt will ambulate with RW for >/= 10 ft with   min A   to decrease risk for falls and improve activity tolerance and to access home environment.            Jorge Alberto Gilbert, PT,DPT

## 2025-03-04 NOTE — PLAN OF CARE
Problem: Prexisting or High Potential for Compromised Skin Integrity  Goal: Skin integrity is maintained or improved  Description: INTERVENTIONS:  - Identify patients at risk for skin breakdown  - Assess and monitor skin integrity  - Assess and monitor nutrition and hydration status  - Monitor labs   - Assess for incontinence   - Turn and reposition patient  - Assist with mobility/ambulation  - Relieve pressure over bony prominences  - Avoid friction and shearing  - Provide appropriate hygiene as needed including keeping skin clean and dry  - Evaluate need for skin moisturizer/barrier cream  - Collaborate with interdisciplinary team   - Patient/family teaching  - Consider wound care consult   3/4/2025 0022 by Dorothy Leary RN  Outcome: Progressing  3/4/2025 0022 by Dorothy Leary RN  Outcome: Progressing     Problem: PAIN - ADULT  Goal: Verbalizes/displays adequate comfort level or baseline comfort level  Description: Interventions:  - Encourage patient to monitor pain and request assistance  - Assess pain using appropriate pain scale  - Administer analgesics based on type and severity of pain and evaluate response  - Implement non-pharmacological measures as appropriate and evaluate response  - Consider cultural and social influences on pain and pain management  - Notify physician/advanced practitioner if interventions unsuccessful or patient reports new pain  Outcome: Progressing     Problem: INFECTION - ADULT  Goal: Absence or prevention of progression during hospitalization  Description: INTERVENTIONS:  - Assess and monitor for signs and symptoms of infection  - Monitor lab/diagnostic results  - Monitor all insertion sites, i.e. indwelling lines, tubes, and drains  - Monitor endotracheal if appropriate and nasal secretions for changes in amount and color  - [place] appropriate cooling/warming therapies per order  - Administer medications as ordered  - Instruct and encourage patient and family to use good hand  hygiene technique  - Identify and instruct in appropriate isolation precautions for identified infection/condition  Outcome: Progressing  Goal: Absence of fever/infection during neutropenic period  Description: INTERVENTIONS:  - Monitor WBC    Outcome: Progressing     Problem: SAFETY ADULT  Goal: Patient will remain free of falls  Description: INTERVENTIONS:  - Educate patient/family on patient safety including physical limitations  - Instruct patient to call for assistance with activity   - Consult OT/PT to assist with strengthening/mobility   - Keep Call bell within reach  - Keep bed low and locked with side rails adjusted as appropriate  - Keep care items and personal belongings within reach  - Initiate and maintain comfort rounds  - Make Fall Risk Sign visible to staff  - Offer Toileting every 2 Hours, in advance of need  - Initiate/Maintain bed alarm  - Obtain necessary fall risk management equipment: non-skid socks  - Apply yellow socks and bracelet for high fall risk patients  - Consider moving patient to room near nurses station  Outcome: Progressing  Goal: Maintain or return to baseline ADL function  Description: INTERVENTIONS:  -  Assess patient's ability to carry out ADLs; assess patient's baseline for ADL function and identify physical deficits which impact ability to perform ADLs (bathing, care of mouth/teeth, toileting, grooming, dressing, etc.)  - Assess/evaluate cause of self-care deficits   - Assess range of motion  - Assess patient's mobility; develop plan if impaired  - Assess patient's need for assistive devices and provide as appropriate  - Encourage maximum independence but intervene and supervise when necessary  - Involve family in performance of ADLs  - Assess for home care needs following discharge   - Consider OT consult to assist with ADL evaluation and planning for discharge  - Provide patient education as appropriate  Outcome: Progressing  Goal: Maintains/Returns to pre admission  functional level  Description: INTERVENTIONS:  - Perform AM-PAC 6 Click Basic Mobility/ Daily Activity assessment daily.  - Set and communicate daily mobility goal to care team and patient/family/caregiver.   - Collaborate with rehabilitation services on mobility goals if consulted  - Perform Range of Motion 3 times a day.  - Reposition patient every 2 hours.  - Dangle patient 3 times a day  - Stand patient 3 times a day  - Ambulate patient 3 times a day  - Out of bed to chair 3 times a day   - Out of bed for meals 3 times a day  - Out of bed for toileting  - Record patient progress and toleration of activity level   Outcome: Progressing     Problem: DISCHARGE PLANNING  Goal: Discharge to home or other facility with appropriate resources  Description: INTERVENTIONS:  - Identify barriers to discharge w/patient and caregiver  - Arrange for needed discharge resources and transportation as appropriate  - Identify discharge learning needs (meds, wound care, etc.)  - Arrange for interpretive services to assist at discharge as needed  - Refer to Case Management Department for coordinating discharge planning if the patient needs post-hospital services based on physician/advanced practitioner order or complex needs related to functional status, cognitive ability, or social support system  Outcome: Progressing     Problem: Knowledge Deficit  Goal: Patient/family/caregiver demonstrates understanding of disease process, treatment plan, medications, and discharge instructions  Description: Complete learning assessment and assess knowledge base.  Interventions:  - Provide teaching at level of understanding  - Provide teaching via preferred learning methods  Outcome: Progressing

## 2025-03-04 NOTE — ASSESSMENT & PLAN NOTE
Patient presented yesterday (3/3) from skilled nursing facility with reports of generalized weakness  Admission creatinine of 2.5. Baseline between 1.4-1.6  GAY l prerenal and secondary to poor oral intake as patient reports poor appetite  Creatinine improving today  Continue gentle IV hydration over next 24 hours  Hold torsemide over next 24 hours  renal ultrasound completed today (3/4): Normal  Avoid hypotension and nephrotoxic medications  If renal functions do not improve with hydration, will have nephrology evaluate for further management.

## 2025-03-04 NOTE — CASE MANAGEMENT
Case Management Assessment & Discharge Planning Note    Patient name Alecia Kay  Location /-01 MRN [id number]  : 1945 Date 3/4/2025       Current Admission Date: 3/3/2025  Current Admission Diagnosis:Acute kidney injury (HCC)   Patient Active Problem List    Diagnosis Date Noted Date Diagnosed    Acute osteomyelitis of right calcaneus (HCC) 2025     Acute kidney injury (HCC) 2025     Chronic kidney disease, stage III (moderate) (HCC) 2025     Hypothyroidism 2025     Hypertension 2025     Electrolyte imbalance 2025       LOS (days): 1  Geometric Mean LOS (GMLOS) (days): 3  Days to GMLOS:2.1     OBJECTIVE:    Risk of Unplanned Readmission Score: 14.45         Current admission status: Inpatient  Referral Reason: Other (Disposition Planning)    Preferred Pharmacy:   [facility] [place] [address]  [address] [address]  Phone: [phone] Fax: [phone]    Primary Care Provider: Rambo Perera DO    Primary Insurance: BLUE CROSS [employer] REP  Secondary Insurance:     ASSESSMENT:  Active Health Care Proxies    There are no active Health Care Proxies on file.       Advance Directives  Does patient have a Health Care POA?: No  Was patient offered paperwork?: Yes (pt declined)  Does patient currently have a Health Care decision maker?: Yes, please see Health Care Proxy section  Does patient have Advance Directives?: No  Was patient offered paperwork?: Yes (pt declined)  Primary Contact: Red Capellan, nephew         Readmission Root Cause  30 Day Readmission: No    Patient Information  Admitted from:: Facility  Mental Status: Alert  During Assessment patient was accompanied by: Not accompanied during assessment  Assessment information provided by:: Patient  Primary Caregiver: Other (Comment)  Caregiver's Name:: [facility] staff  Caregiver's Relationship to Patient:: Other  (Specify)  Caregiver's Telephone Number:: [phone]  Support Systems: Son, Family members  County of Residence: [facility]  What city do you live in?: [place]  Home entry access options. Select all that apply.: Stairs  Number of steps to enter home.: 5  Do the steps have railings?: No  Type of Current Residence: 2 [place] home  Upon entering residence, is there a bedroom on the main floor (no further steps)?: No (pt sleeps in recliner located on first floor)  A bedroom is located on the following floor levels of residence (select all that apply):: 2nd Floor  Upon entering residence, is there a bathroom on the main floor (no further steps)?: Yes (bathroom w/ shower)  Number of steps to 2nd floor from main floor: One Flight  Living Arrangements: Lives w/ Extended Family  Is patient a ?: No    Activities of Daily Living Prior to Admission  Functional Status: Assistance  Completes ADLs independently?: No  Level of ADL dependence: Assistance  Ambulates independently?: No  Level of ambulatory dependence: Assistance  Does patient use assisted devices?: Yes  Assisted Devices (DME) used: Lamberto lift  Does patient currently own DME?: Yes  What DME does the patient currently own?: Walker  Does patient have a history of Outpatient Therapy (PT/OT)?: No  Does the patient have a history of Short-Term Rehab?: Yes ([facility])  Does patient have a history of HHC?: No  Does patient currently have HHC?: No         Patient Information Continued  Income Source: SSI/SSD  Does patient have prescription coverage?: Yes  Does patient receive dialysis treatments?: No  Does patient have a history of substance abuse?: No  Does patient have a history of Mental Health Diagnosis?: No         Means of Transportation  Means of Transport to Appts:: Family transport          DISCHARGE DETAILS:    Discharge planning discussed with:: patient  Freedom of Choice: Yes  Comments - Freedom of Choice: AIDIN referral to [facility] STR  for SUJATA  CM contacted family/caregiver?: No- see comments (CM will follow up with family when discahrge planning known)                  Requested Home Health Care         Is the patient interested in HHC at discharge?: No    DME Referral Provided  Referral made for DME?: No    Other Referral/Resources/Interventions Provided:  Interventions: Facility Return, Short Term Rehab  Referral Comments: [facility]                                                           Accepting Facility Name, City & State : [facility]  Receiving Facility/Agency Phone Number: [phone]  Facility/Agency Fax Number: [phone]         CM met with patient at the bedside, baseline information was obtained. CM discussed the role of CM in helping the patient develop a discharge plan and assist the patient in carry out their plan.     Patient reports she lives in 2 story home with first floor living space, 5 steps to enter.  Patient reports if she does the one step, however, to get into her sons home, which is next to her home, she can walk through his living room and be at her front porch, not having to do the steps.  Patient reports her nephew, Red, resides with her to assist with ADLs.    Patient reports she has been at [facility] since 1/24/25 following surgery to right heel and need for antibiotics.  Patient reports because she has been non weight bearing the facility has been using the guerrero to get her out of bed to the chair.  Patient reports she has not practiced walking with RW non weight bearing.    CM submitted AIDIN referral to [facility] for SUJATA.  Patient will require auth to return.     CM to follow for medical clearance for discharge.

## 2025-03-04 NOTE — OCCUPATIONAL THERAPY NOTE
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 (HCC)    Chronic kidney disease, stage III (moderate) (HCC)    Hypothyroidism    Hypertension    Electrolyte imbalance    Past Medical History  Past Medical History:   Diagnosis Date    Anemia     Cellulitis     Disease of thyroid gland     GERD (gastroesophageal reflux disease)     Hyperlipidemia     Hypertension     Obesity     Osteomyelitis (HCC)     Pneumonia     Pulmonary embolism (HCC)      Past Surgical History  Past Surgical History:   Procedure Laterality Date    FOOT SURGERY      JOINT REPLACEMENT          03/04/25 6001   Note Type   Note type Evaluation   Pain Assessment   Pain Assessment Tool 0-10   Pain Score No Pain   Restrictions/Precautions   Weight Bearing Precautions Per Order Yes   RLE Weight Bearing Per Order NWB   Other Precautions Chair Alarm;Bed Alarm;WBS;Fall Risk;Limb alert  (RUE limb alert)   Home Living   Type of Home House  (4-5 RHODA with a rail vs 1 RHODA)   Home Layout Two level;Performs ADLs on one level;Able to live on main level with bedroom/bathroom   Bathroom Shower/Tub Walk-in shower   Bathroom Toilet Standard  (with a foam seat over top to raise it)   Bathroom Equipment Grab bars in shower;Shower chair   Home Equipment Walker;Other (Comment)  (recliner lift chair)   Additional Comments Pt [facility] from [facility] where she had been receiving STR. She wants to return home. Previously was living in a half double home with nephew.   Prior Function   Level of [place] Independent with ADLs;Independent with functional mobility   Lives With Family  (Nephew)   Receives Help From Family   IADLs Independent with meal prep;Family/Friend/Other provides transportation;Independent with medication management   Falls in the last 6 months 1 to 4  (3)   Comments  Prior to January of this year pt was independent with ADLs and functional mobility without AD.   ADL   UB Dressing Assistance 5  Supervision/Setup   UB Dressing Deficit Verbal cueing;Supervision/safety;Increased time to complete   LB Dressing Assistance 3  Moderate Assistance   LB Dressing Deficit Thread RLE into underwear;Thread LLE into underwear;Pull up over hips   Additional Comments Pt able to doff/don the L sock while seated in recliner chair. Would require more assistance for pulling up pants/underwear up over hips in standing and for pericare.   Bed Mobility   Additional Comments Pt received sitting OOB in recliner chair upon start of session.   Transfers   Sit to Stand 3  Moderate assistance   Additional items Assist x 1;Increased time required;Verbal cues   Stand to Sit 3  Moderate assistance   Additional items Assist x 1;Increased time required;Verbal cues   Stand pivot Unable to assess   Additional Comments Using RW, pt noted to be WBing through the RLE despite cues to NWB   Balance   Static Sitting Fair +   Dynamic Sitting Fair   Static Standing Poor +   Activity Tolerance   Activity Tolerance Patient limited by fatigue   Medical Staff Made Aware PT, Jorge Alberto and Cora Olmos   Nurse Made Aware RN, Tracy   RUE Assessment   RUE Assessment WFL   LUE Assessment   LUE Assessment WFL   Hand Function   Gross Motor Coordination Functional   Fine Motor Coordination Functional   Cognition   Overall Cognitive Status WFL   Arousal/Participation Alert;Cooperative   Attention Within functional limits   Orientation Level Oriented X4   Memory Within functional limits   Following Commands Follows one step commands without difficulty   Assessment   Limitation Decreased ADL status;Decreased endurance;Decreased self-care trans;Decreased high-level ADLs   Prognosis Fair   Assessment Pt is a 80 y.o. female, admitted to [facility] 3/3/2025 d/t experiencing abnormal creatinine levels. Dx: GAY. Pt with PMHx impacting  their performance during ADL tasks, including: anemia, cellulitis, disease of thyroid gland, GERD, HLD, HTN, obesity, osteomyelitis, pneumonia, foot surgery, joint replacement. Prior to admissions to the hospital/STR, Pt was performing ADLs without physical assistance. Most IADLs without physical assistance. Functional transfers/ambulation without physical assistance. Cognitive status PTA was WFL. OT order placed to assess Pt's ADLs, cognitive status, and performance during functional tasks in order to maximize safety and independence while making most appropriate d/c recommendations. PT/OT co-evaluation completed at this time d/t significant mobility deficits and safety concerns. Pt's clinical presentation is currently unstable/unpredictable given new onset deficits that affect Pt's occupational performance and ability to safely return to PLOF including decrease activity tolerance, decrease standing balance, decrease performance during ADL tasks, decrease generalized strength, decrease activity engagement, decrease performance during functional transfers, limited family support, and high fall risk combined with medical complications of abnormal renal lab values, abnormal H&H, abnormal CBC, abnormal sodium values, fear/retreat, and need for input for mobility technique/safety. Personal factors affecting Pt at time of initial evaluation include: step(s) to enter environment, multi-level environment, advanced age, inability to perform IADLs, inability to perform ADLs, inability to ambulate household distances, inability to navigate community distances, limited insight into impairments, decreased initiation and engagement, recent fall(s)/fall history, and questionable non-compliance. Pt will benefit from continued skilled OT services to address deficits as defined above and to maximize level independence/participation during ADLs and functional tasks to facilitate return toward PLOF and improved quality of life. From  an occupational therapy standpoint, Level II (Moderate Resource Intensity is recommended upon d/c.   Plan   Treatment Interventions ADL retraining;Functional transfer training;Endurance training;Patient/family training;Equipment evaluation/education;Compensatory technique education;Continued evaluation;Cardiac education;Energy conservation;Activityengagement   Goal Expiration Date 03/18/25   OT Treatment Day 1   OT Frequency 2-3x/wk   Discharge Recommendation   Rehab Resource Intensity Level, OT II (Moderate Resource Intensity)   AM-PAC Daily Activity Inpatient   Lower Body Dressing 2   Bathing 2   Toileting 2   Upper Body Dressing 3   Grooming 3   Eating 4   Daily Activity Raw Score 16   Daily Activity Standardized Score (Calc for Raw Score >=11) 35.96   AM-PAC Applied Cognition Inpatient   Following a Speech/Presentation 3   Understanding Ordinary Conversation 4   Taking Medications 3   Remembering Where Things Are Placed or Put Away 4   Remembering List of 4-5 Errands 3   Taking Care of Complicated Tasks 3   Applied Cognition Raw Score 20   Applied Cognition Standardized Score 41.76   Additional Treatment Session   Start Time 1500   End Time 1513   Treatment Assessment Pt tolerated treatment fairly. She is limited by fatigue and inability to maintain NWB status of RLE. She will benefit from continued skilled OT services to maximize her independence with ADLs and functional mobility. Pt able to comb her hair while seated in recliner chair with setup. Pt seated in drop arm recliner chair, laterally scooting to her L back to bed via transfer board with mod assist x 1 and assist x 2nd person to maintain NWB status of RLE. Max assist x 1 for LE management for sit > supine. Assist x 2 in bed for repositioning.   End of Consult   Patient Position at End of Consult Supine;Bed/Chair alarm activated;All needs within reach     The patient's raw score on the AM-PAC Daily Activity Inpatient Short Form is 16. A raw score of  less than 19 suggests the patient may benefit from discharge to post-acute rehabilitation services. Please refer to the recommendation of the Occupational Therapist for safe discharge planning.    Pt goals to be met by 3/18/2025:    Pt will demonstrate ability to complete grooming/hygiene tasks @ mod I after set-up.  Pt will demonstrate ability to complete supine<>sit @ mod I in order to increase safety and independence during ADL tasks.  Pt will demonstrate ability to complete UB ADLs including washing/dressing @ mod I in order to increase performance and participation during meaningful tasks  Pt will demonstrate ability to complete LB dressing @ mod I in order to increase safety and independence during meaningful tasks.   Pt will demonstrate ability to buzz/doff socks/shoes while sitting EOB/chair @ mod I in order to increase safety and independence during meaningful tasks.   Pt will demonstrate ability to complete toileting tasks including CM and pericare @ mod I in order to increase safety and independence during meaningful tasks.  Pt will demonstrate ability to complete EOB, chair, toilet/commode transfers @ mod I in order to increase performance and participation during functional tasks.  Pt will demonstrate ability to stand for 3 minutes while maintaining F+ balance with use of RW for UB support PRN.  Pt will demonstrate ability to tolerate 10 minute OT session with no vc'ing for deep breathing or use of energy conservation techniques in order to increase activity tolerance during functional tasks.   Pt will demonstrate Good carryover of use of energy conservation/compensatory strategies during ADLs and functional tasks in order to increase safety and reduce risk for falls.   Pt will demonstrate Good attention and participation in continued evaluation of functional ambulation house hold distances in order to assist with safe d/c planning.  Pt will attend to continued cognitive assessments 100% of the time in  order to provide most appropriate d/c recommendations.   Pt will follow 100% simple 2-step commands and be A&O x4 consistently with environmental cues to increase participation in functional activities.   Pt will identify 3 areas of interest/hobbies and 1 intervention on how to incorporate into daily life in order to increase interaction with environment and peers as well as increase participation in meaningful tasks.   Pt will demonstrate 100% carryover of BUE HEP in order to increase BUE MS and increase performance during functional tasks upon d/c home.    Santino Hendricks, MS, OTR/L

## 2025-03-04 NOTE — PROGRESS NOTES
Patient:    MRN:  [id number]    Randy Request ID:  [id number]    Level of care reserved:  Skilled Nursing Facility    Partner Reserved:  [facility], [place] [address] [phone]    Clinical needs requested:    Geography searched:  10 miles around [phone]    Start of Service:    Request sent:  10:10am EST on 3/4/2025 by Annmarie Mendoza    Partner reserved:  11:26am EST on 3/4/2025 by Annmarie Mendoza    Choice list shared:  11:26am EST on 3/4/2025 by Annmarie Mendoza

## 2025-03-04 NOTE — PLAN OF CARE
Problem: PHYSICAL THERAPY ADULT  Goal: Performs mobility at highest level of function for planned discharge setting.  See evaluation for individualized goals.  Description: Treatment/Interventions: Functional transfer training, LE strengthening/ROM, Elevations, Therapeutic exercise, Endurance training, Patient/family training, Equipment eval/education, Bed mobility, Gait training, Compensatory technique education, Spoke to case management, OT          See flowsheet documentation for full assessment, interventions and recommendations.  Note: Prognosis: Fair  Problem List: Decreased strength, Decreased endurance, Impaired balance, Decreased mobility, Obesity, Decreased skin integrity, Orthopedic restrictions, Impaired judgement  Assessment: Pt is a 80 y.o. female seen for PT evaluation s/p admission to [facility] on 3/3/2025 with Acute kidney injury (HCC).  Order placed for PT services.  Upon evaluation: Pt is presenting with impaired functional mobility due to decreased strength, decreased endurance, impaired balance, impaired judgment, orthopedic restrictions, fall risk, and impaired skin integrity requiring  mod assistance for transfers. Pt's clinical presentation is currently unstable/unpredictable given the functional mobility deficits above, especially weakness and decreased activity tolerance, coupled with fall risks as indicated by AM-PAC 6-Clicks: 08/24 as well as hx of falls, impaired balance, and obesity and combined with medical complications of abnormal CBC, abnormal sodium values, readmission to hospital, and need for input for mobility technique/safety.  Pt's PMHx and comorbidities that may affect physical performance and progress include: h/o PE, HTN, and obesity. Personal factors affecting pt at time of IE include: anxiety, advanced age, inability to perform IADLs, inability to perform ADLs, inability to ambulate household distances, inability to navigate community distances,  limited insight into impairments, and recent fall(s)/fall history. Pt will benefit from continued skilled PT services to address deficits as defined above and to maximize level of functional mobility to facilitate return toward PLOF and improved QOL. From PT/mobility standpoint, recommendation at time of d/c would be Level II (Moderate Resource Intensity pending progress in order to reduce fall risk and maximize pt's functional independence and consistency with mobility in order to facilitate return to PLOF.  Barriers to Discharge: Other (Comment)  Barriers to Discharge Comments: NWB status; high fall risk; current assist level for mobility  Rehab Resource Intensity Level, PT: II (Moderate Resource Intensity)    See flowsheet documentation for full assessment.

## 2025-03-04 NOTE — UTILIZATION REVIEW
Initial Clinical Review    Admission: Date/Time/Statement:   Admission Orders (From admission, onward)       Ordered        03/03/25 1507  INPATIENT ADMISSION  Once                          Orders Placed This Encounter   Procedures    INPATIENT ADMISSION     Standing Status:   Standing     Number of Occurrences:   1     Level of Care:   Med Surg [16]     Estimated length of stay:   More than 2 Midnights     Certification:   I certify that inpatient services are medically necessary for this patient for a duration of greater than two midnights. See H&P and MD Progress Notes for additional information about the patient's course of treatment.     ED Arrival Information       Expected   3/3/2025 00:00    Arrival   3/3/2025 12:29    Acuity   Urgent              Means of arrival   Ambulance    Escorted by   [facility]    Service   Hospitalist    Admission type   Emergency              Arrival complaint   abnormal lab             Chief Complaint   Patient presents with    Abnormal Lab     Elevated creat level of 2.31 that was collected 2/27  Sent from SNF today for same.   Has been taking IV unaysn for recent R calcenectomy for osteo. Taking PO Augmentin  Patient admits to decreased appetite and decreased fluids.       Initial Presentation: 80 y.o. female to ED via EMS from SNF  Present to ED with abnormal labs, poor oral intake..  Creatinine on admission around 2.54 (baseline 1.4-1.6). also endorses has not been able to get around secondary to nonweightbearing status of the right foot. Na 134; Mg 1.4  PMHX hypertension, hyperlipidemia, CKD stage III, GERD, hypothyroidism, right calcaneal osteomyelitis s/p completion of 6 weeks of IV Unasyn now on Augmentin   Admitted to [facility] with DX: Acute kidney injury   PLAN: cont ivf; recd Mg iv x1; monitor labs; f/u renal U/S; hold diuretic; f/u urine eosinophils; PT/ OT eval - tx      Anticipated Length of Stay/Certification Statement: Patient will be admitted on an inpatient  basis with an anticipated length of stay of greater than 2 midnights secondary to management as documented above.       Date: 3/4/25      Day 2      Plan:       Date: 3/5/25    Day 3: Has surpassed a 2nd midnight with active treatments and services.          ED Treatment-Medication Administration from 03/03/2025 1214 to 03/03/2025 1615         Date/Time Order Dose Route Action     03/03/2025 1448 multi-electrolyte (ISOLYTE-S PH 7.4) bolus 1,000 mL 1,000 mL Intravenous New Bag            Scheduled Medications:  heparin (porcine), 5,000 Units, Subcutaneous, Q8H MELANY  levothyroxine, 75 mcg, Oral, Daily  metoprolol tartrate, 50 mg, Oral, Q12H MELANY  pantoprazole, 40 mg, Oral, Daily  pravastatin, 80 mg, Oral, Daily With Dinner  senna-docusate sodium, 1 tablet, Oral, Daily    magnesium sulfate 2 g/50 mL IVPB (premix) 2 g  Dose: 2 g  Freq: Once Route: IV  Last Dose: 2 g (03/03/25 1725)  Start: 03/03/25 1700 End: 03/03/25 1925      Continuous IV Infusions:  multi-electrolyte, 100 mL/hr, Intravenous, Continuous      PRN Meds:  acetaminophen, 650 mg, Oral, Q6H PRN  magnesium hydroxide, 15 mL, Oral, Daily PRN      ED Triage Vitals [03/03/25 1235]   Temperature Pulse Respirations Blood Pressure SpO2 Pain Score   98.2 °F (36.8 °C) 55 18 131/58 92 % No Pain     Weight (last 2 days)       Date/Time Weight    03/04/25 0600 85.9 (189.38)    03/03/25 1626 85.6 (188.71)    03/03/25 1235 89.2 (196.65)            Vital Signs (last 3 days)       Date/Time Temp Pulse Resp BP MAP (mmHg) SpO2 Calculated FIO2 (%) - Nasal Cannula Nasal Cannula O2 Flow Rate (L/min) O2 Device Patient Position - Orthostatic VS Pain    03/04/25 0954 -- -- -- -- -- -- -- -- None (Room air) -- No Pain    03/04/25 07:33:02 97.5 °F (36.4 °C) 55 20 124/62 83 97 % -- -- -- -- --    03/03/25 2336 -- -- -- -- -- 100 % 24 1 L/min Nasal cannula -- --    03/03/25 2300 -- -- -- -- -- 87 % -- -- None (Room air) -- --    03/03/25 20:25:55 97.9 °F (36.6 °C) 61 -- 126/55 79 93 % --  -- -- -- --    03/03/25 1958 -- -- -- -- -- 94 % -- -- None (Room air) -- No Pain    03/03/25 1626 -- -- -- -- -- -- -- -- -- -- No Pain    03/03/25 16:21:55 97.7 °F (36.5 °C) 66 18 129/55 80 92 % -- -- -- -- --    03/03/25 16:20:03 97.7 °F (36.5 °C) 64 18 129/55 80 83 % -- -- -- -- --    03/03/25 1300 -- 56 16 110/55 78 92 % -- -- None (Room air) Sitting --    03/03/25 1245 -- 52 18 131/58 84 93 % -- -- None (Room air) -- --    03/03/25 1235 98.2 °F (36.8 °C) 55 18 131/58 -- 92 % -- -- None (Room air) Sitting No Pain              Pertinent Labs/Diagnostic Test Results:   Radiology:  US kidney and bladder   Final Interpretation by Ignacio Barragan MD (03/03 1638)      Normal.            Workstation performed: [id number]             Results from last 7 days   Lab Units 03/03/25  1729 03/03/25  1342   WBC Thousand/uL  --  5.49   HEMOGLOBIN g/dL  --  10.1*   HEMATOCRIT %  --  30.9*   PLATELETS Thousands/uL 219 183   TOTAL NEUT ABS Thousands/µL  --  3.14        Results from last 7 days   Lab Units 03/04/25  0611 03/03/25  1342   SODIUM mmol/L 137 134*   POTASSIUM mmol/L 3.8 4.3   CHLORIDE mmol/L 100 98   CO2 mmol/L 26 25   ANION GAP mmol/L 11 11   BUN mg/dL 14 17   CREATININE mg/dL 2.19* 2.54*   EGFR ml/min/1.73sq m 20 17   CALCIUM mg/dL 8.2* 8.6   MAGNESIUM mg/dL 2.2 1.4*     Results from last 7 days   Lab Units 03/04/25  0611   AST U/L 14   ALT U/L 6*   ALK PHOS U/L 40   TOTAL PROTEIN g/dL 5.4*   ALBUMIN g/dL 2.8*   TOTAL BILIRUBIN mg/dL 0.33        Results from last 7 days   Lab Units 03/04/25  0611 03/03/25  1342   GLUCOSE RANDOM mg/dL 75 81        Results from last 7 days   Lab Units 03/03/25  1342   PH THONY  7.369   PCO2 THONY mm Hg 42.0   PO2 THONY mm Hg 45.9*   HCO3 THONY mmol/L 23.7*   BASE EXC THONY mmol/L -1.6   O2 CONTENT THONY ml/dL 12.5   O2 HGB, VENOUS % 80.2*           Results from last 7 days   Lab Units 03/03/25  1630   CLARITY UA  Slightly Cloudy   COLOR UA  Yellow   SPEC GRAV UA  1.020   PH UA  5.5   GLUCOSE  UA mg/dl Negative   KETONES UA mg/dl Negative   BLOOD UA  Negative   PROTEIN UA mg/dl Negative   NITRITE UA  Negative   BILIRUBIN UA  Small*   UROBILINOGEN UA E.U./dl 0.2   LEUKOCYTES UA  Small*   WBC UA /hpf 2-4   RBC UA /hpf None Seen   BACTERIA UA /hpf Occasional   EPITHELIAL CELLS WET PREP /hpf Occasional          Past Medical History:   Diagnosis Date    Anemia     Cellulitis     Disease of thyroid gland     GERD (gastroesophageal reflux disease)     Hyperlipidemia     Hypertension     Obesity     Osteomyelitis (HCC)     Pneumonia     Pulmonary embolism (HCC)      Present on Admission:   Acute osteomyelitis of right calcaneus (HCC)   Acute kidney injury (HCC)   Chronic kidney disease, stage III (moderate) (HCC)   Hypothyroidism   Hypertension   Electrolyte imbalance      Admitting Diagnosis: Abnormal laboratory test [R89.9]  Acute kidney injury (HCC) [N17.9]  Age/Sex: 80 y.o. female    Network Utilization Review Department  ATTENTION: Please call with any questions or concerns to [phone] and carefully listen to the prompts so that you are directed to the right person. All voicemails are confidential.   For Discharge needs, contact Care Management DC Support Team at [phone] opt. 2  Send all requests for admission clinical reviews, approved or denied determinations and any other requests to dedicated fax number below belonging to the campus where the patient is receiving treatment. List of dedicated fax numbers for the Facilities:  FACILITY NAME UR FAX NUMBER   ADMISSION DENIALS (Administrative/Medical Necessity) [phone]   DISCHARGE SUPPORT TEAM (NETWORK) [phone]   PARENT CHILD HEALTH (Maternity/NICU/Pediatrics) [phone]   [facility] [phone]   [facility] [phone]   [facility] [phone]   [facility] [phone]   [facility] [phone]   [facility]  [facility] [phone]   [facility] [phone]   [facility] [phone]   [facility] [phone]   [facility] [phone]   [facility] [phone]   [facility] [phone]

## 2025-03-04 NOTE — ASSESSMENT & PLAN NOTE
Continue metoprolol with holding parameters.  Hold amlodipine and torsemide.  Normotensive-chronic condition/stable

## 2025-03-05 VITALS
HEIGHT: 62 IN | BODY MASS INDEX: 36.07 KG/M2 | TEMPERATURE: 97.9 F | WEIGHT: 195.99 LBS | DIASTOLIC BLOOD PRESSURE: 58 MMHG | HEART RATE: 54 BPM | RESPIRATION RATE: 18 BRPM | SYSTOLIC BLOOD PRESSURE: 124 MMHG | OXYGEN SATURATION: 92 %

## 2025-03-05 PROBLEM — E43 SEVERE PROTEIN-CALORIE MALNUTRITION (HCC): Status: ACTIVE | Noted: 2025-03-05

## 2025-03-05 LAB
ANION GAP SERPL CALCULATED.3IONS-SCNC: 11 MMOL/L (ref 4–13)
BUN SERPL-MCNC: 12 MG/DL (ref 5–25)
CALCIUM SERPL-MCNC: 8.1 MG/DL (ref 8.4–10.2)
CHLORIDE SERPL-SCNC: 100 MMOL/L (ref 96–108)
CO2 SERPL-SCNC: 26 MMOL/L (ref 21–32)
CREAT SERPL-MCNC: 2.04 MG/DL (ref 0.6–1.3)
ERYTHROCYTE [DISTWIDTH] IN BLOOD BY AUTOMATED COUNT: 13.9 % (ref 11.6–15.1)
GFR SERPL CREATININE-BSD FRML MDRD: 22 ML/MIN/1.73SQ M
GLUCOSE SERPL-MCNC: 71 MG/DL (ref 65–140)
HCT VFR BLD AUTO: 25.6 % (ref 34.8–46.1)
HGB BLD-MCNC: 8.6 G/DL (ref 11.5–15.4)
MAGNESIUM SERPL-MCNC: 2 MG/DL (ref 1.9–2.7)
MCH RBC QN AUTO: 30.6 PG (ref 26.8–34.3)
MCHC RBC AUTO-ENTMCNC: 33.6 G/DL (ref 31.4–37.4)
MCV RBC AUTO: 91 FL (ref 82–98)
PLATELET # BLD AUTO: 216 THOUSANDS/UL (ref 149–390)
PMV BLD AUTO: 10.8 FL (ref 8.9–12.7)
POTASSIUM SERPL-SCNC: 3.3 MMOL/L (ref 3.5–5.3)
RBC # BLD AUTO: 2.81 MILLION/UL (ref 3.81–5.12)
SODIUM SERPL-SCNC: 137 MMOL/L (ref 135–147)
WBC # BLD AUTO: 5.18 THOUSAND/UL (ref 4.31–10.16)

## 2025-03-05 PROCEDURE — 99239 HOSP IP/OBS DSCHRG MGMT >30: CPT | Performed by: FAMILY MEDICINE

## 2025-03-05 PROCEDURE — 80048 BASIC METABOLIC PNL TOTAL CA: CPT | Performed by: INTERNAL MEDICINE

## 2025-03-05 PROCEDURE — 85027 COMPLETE CBC AUTOMATED: CPT | Performed by: FAMILY MEDICINE

## 2025-03-05 PROCEDURE — 83735 ASSAY OF MAGNESIUM: CPT | Performed by: FAMILY MEDICINE

## 2025-03-05 RX ORDER — SODIUM CHLORIDE, SODIUM GLUCONATE, SODIUM ACETATE, POTASSIUM CHLORIDE, MAGNESIUM CHLORIDE, SODIUM PHOSPHATE, DIBASIC, AND POTASSIUM PHOSPHATE .53; .5; .37; .037; .03; .012; .00082 G/100ML; G/100ML; G/100ML; G/100ML; G/100ML; G/100ML; G/100ML
100 INJECTION, SOLUTION INTRAVENOUS CONTINUOUS
Status: DISCONTINUED | OUTPATIENT
Start: 2025-03-05 | End: 2025-03-05 | Stop reason: HOSPADM

## 2025-03-05 RX ADMIN — METOPROLOL TARTRATE 50 MG: 50 TABLET, FILM COATED ORAL at 10:15

## 2025-03-05 RX ADMIN — HEPARIN SODIUM 5000 UNITS: 5000 INJECTION INTRAVENOUS; SUBCUTANEOUS at 05:00

## 2025-03-05 RX ADMIN — SODIUM CHLORIDE, SODIUM GLUCONATE, SODIUM ACETATE, POTASSIUM CHLORIDE, MAGNESIUM CHLORIDE, SODIUM PHOSPHATE, DIBASIC, AND POTASSIUM PHOSPHATE 100 ML/HR: .53; .5; .37; .037; .03; .012; .00082 INJECTION, SOLUTION INTRAVENOUS at 02:57

## 2025-03-05 RX ADMIN — HEPARIN SODIUM 5000 UNITS: 5000 INJECTION INTRAVENOUS; SUBCUTANEOUS at 15:40

## 2025-03-05 RX ADMIN — LEVOTHYROXINE SODIUM 75 MCG: 75 TABLET ORAL at 05:00

## 2025-03-05 RX ADMIN — PANTOPRAZOLE SODIUM 40 MG: 40 TABLET, DELAYED RELEASE ORAL at 05:00

## 2025-03-05 RX ADMIN — PRAVASTATIN SODIUM 80 MG: 80 TABLET ORAL at 15:40

## 2025-03-05 NOTE — DISCHARGE SUMMARY
Discharge Summary - Hospitalist   Name: Alecia Kay 80 y.o. female I MRN: [id number]  Unit/Bed#: -01 I Date of Admission: 3/3/2025   Date of Service: 3/5/2025 I Hospital Day: 2     Assessment & Plan  Acute kidney injury (HCC)  Patient presented  (3/3) from skilled nursing facility with reports of generalized weakness  Admission creatinine of 2.5. 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    Subsequently placed on Augmentin  Currently nonweightbearing right foot  Continue with outpatient podiatry and ID follow-up   PT OT evaluation occurred and patient will discharge to short-term rehab  Patient will remain nonweightbearing right heel until she follows up with her outpatient podiatrist  Patient record reviewed-patient is to remain nonweightbearing until wound is completely healed  Chronic kidney disease, stage III (moderate) ([facility])  Lab Results   Component Value Date    EGFR 22 03/05/2025    EGFR 20 03/04/2025    EGFR 17 03/03/2025    CREATININE 2.04 (H) 03/05/2025     CREATININE 2.19 (H) 03/04/2025    CREATININE 2.54 (H) 03/03/2025   Baseline creatinine 1.4-1.6.  Creatinine elevated on admission see management under GAY  Hypothyroidism  Continue with thyroid replacement therapy  Chronic condition/stable  Hypertension  Continue metoprolol with holding parameters.  Hold torsemide.  Normotensive-chronic condition/stable  Electrolyte imbalance  Repleted and resolved  Severe protein-calorie malnutrition (HCC)  Malnutrition Findings:   Adult Malnutrition type: Acute illness  Adult Degree of Malnutrition: Other severe protein calorie malnutrition  Malnutrition Characteristics: Inadequate energy, Weight loss                  360 Statement: Acute severe malnutrition related to poor oral intake/poor appetite as evidenced by < 50% estimated energy intake > 5 days, 10.6-14.5% weight loss x 2 mo (218-228lb UBW in January, 3/5/25 195lb). Treated with: Continue regular diet. Trial ensure clear daily. Recommend daily weights for nutrition monitoring.    BMI Findings:           Body mass index is 35.85 kg/m².        Medical Problems        MESSAGE TO PCP (Rambo Perera DO) FOR FOLLOW UP:   Thank you for allowing us to participate in the care of your patient, Alecia Kay, who was hospitalized from 3/3/2025 through 03/05/25 with the admitting diagnosis of acute kidney injury/volume depletion.      Medication Changes:  Torsemide being held on discharge-will require daily weight/volume monitoring to determine appropriateness of reinitiation in the outpatient setting.  Patient with repeated episodes of acute kidney injury/hypovolemia  Outpatient testing recommended:    If you have any additional questions or would like to discuss further, please feel free to contact me.  Norman Blake DO  [facility] Internal Medicine, Hospitalist, [phone]     Admission Date:   Admission Orders (From admission, onward)       Ordered        03/03/25 1507  INPATIENT ADMISSION  Once             "03/03/25 1507  Inpatient Admission  Once                          Discharge Date: 03/05/25    Consultations During Hospital Stay:  None    Procedures Performed:   None none    Significant Findings / Test Results:   None    Incidental Findings:   None      Test Results Pending at Discharge (will require follow up):   None     Complications: None    Reason for Admission: GAY    Hospital Course:   Alecia Kay is a 80 y.o. female patient who originally presented to the hospital on 3/3/2025 due to hypovolemia.  Noted acute on chronic renal dysfunction.  Prerenal in the setting of decreased intake in the outpatient setting.  With compliance of diuretic regimen.  Diuretic held over the course of hospitalization and patient initiated on fluids with improvement in creatinine.  Index suspicion for prerenal acute kidney injury.  Electing to discontinue torsemide low-dose upon discharge.  Will require close outpatient monitoring of daily weights/BP, overall volume status.  Evaluated by physical and Occupational Therapy and deemed appropriate candidate for discharge to short-term rehab.  Notably, history of lower extremity osteomyelitis with wound remaining.  Outpatient podiatry record reviewed-to remain nonweightbearing until fully healed.          Please see above list of diagnoses and related plan for additional information.     Condition at Discharge: good    Discharge Day Visit / Exam:   Subjective: Feeling well without complaint  Vitals: Blood Pressure: (!) 133/104 (03/05/25 1015)  Pulse: 58 (03/05/25 1015)  Temperature: 97.7 °F (36.5 °C) (03/05/25 0613)  Temp Source: Temporal (03/03/25 1235)  Respirations: 18 (03/04/25 1517)  Height: 5' 2\" (157.5 cm) (03/05/25 1026)  Weight - Scale: 88.9 kg (195 lb 15.8 oz) (03/05/25 0524)  SpO2: 93 % (03/05/25 0814)  Physical Exam   Constitutional:       General: She is not in acute distress.     Appearance: She is not ill-appearing.   HENT:      Mouth/Throat:      Mouth: Mucous " membranes are dry.   Eyes:      Extraocular Movements: Extraocular movements intact.      Pupils: Pupils are equal, round, and reactive to light.   Cardiovascular:      Rate and Rhythm: Normal rate and regular rhythm.   Pulmonary:      Effort: Pulmonary effort is normal.   Abdominal:      General: Abdomen is flat. Bowel sounds are normal.      Palpations: Abdomen is soft.   Musculoskeletal:      Right lower leg: No edema.      Comments: Rt foot dressing in place   Skin:     General: Skin is warm.   Neurological:      General: No focal deficit present.      Mental Status: She is alert and oriented to person, place, and time. Mental status is at baseline.       Discussion with Family: Patient declined call to .     Discharge instructions/Information to patient and family:   See after visit summary for information provided to patient and family.      Provisions for Follow-Up Care:  See after visit summary for information related to follow-up care and any pertinent home health orders.      Mobility at time of Discharge:   Basic Mobility Inpatient Raw Score: 12  JH-HLM Goal: 4: Move to chair/commode  JH-HLM Achieved: 2: Bed activities/Dependent transfer  HLM Goal achieved. Continue to encourage appropriate mobility.     Disposition:   Other Skilled Nursing Facility at      Planned Readmission:     Discharge Medications:  See after visit summary for reconciled discharge medications provided to patient and/or family.      Administrative Statements   Discharge Statement:  I have spent a total time of 34 minutes in caring for this patient on the day of the visit/encounter. >30 minutes of time was spent on: Prognosis, Risks and benefits of tx options, Importance of tx compliance, Risk factor reductions, Impressions, and Documenting in the medical record.    **Please Note: This note may have been constructed using a voice recognition system**

## 2025-03-05 NOTE — CASE MANAGEMENT
Case Management Discharge Planning Note    Patient name Alecia Kay  Location /-01 MRN [id number]  : 1945 Date 3/5/2025       Current Admission Date: 3/3/2025  Current Admission Diagnosis:Acute kidney injury (HCC)   Patient Active Problem List    Diagnosis Date Noted Date Diagnosed    Chronic osteomyelitis of toe of right foot (HCC) 2025     Acute kidney injury (HCC) 2025     Chronic kidney disease, stage III (moderate) (HCC) 2025     Hypothyroidism 2025     Hypertension 2025     Electrolyte imbalance 2025       LOS (days): 2  Geometric Mean LOS (GMLOS) (days): 3  Days to GMLOS:1.1     OBJECTIVE:  Risk of Unplanned Readmission Score: 14.51         Current admission status: Inpatient   Preferred Pharmacy:   [place], [place] - [address]  [address] [address]  Phone: [phone] Fax: [phone]    Primary Care Provider: Rambo Perera DO    Primary Insurance: BLUE CROSS  REP  Secondary Insurance:     DISCHARGE DETAILS:                                                                                                               Facility Insurance Auth Number: [id number]

## 2025-03-05 NOTE — CASE MANAGEMENT
[facility] received request for authorization from Care Manager.  Authorization request submitted for: [facility]  Facility Name: [facility] NPI: [id number]   Facility MD: Latanya Wesley NPI: [id number]  Authorization initiated by contacting insurance: [facility]  Via: [employer]   Clinicals submitted via Portal attachment   Pending Reference #: [id number]     Care Manager notified: Annmarie Mendoza     Updates to authorization status will be noted in chart. Please reach out to CM for updates on any clinical information.

## 2025-03-05 NOTE — CASE MANAGEMENT
Case Management Discharge Planning Note    Patient name Alecia Kay  Location /-01 MRN [id number]  : 1945 Date 3/5/2025       Current Admission Date: 3/3/2025  Current Admission Diagnosis:Acute kidney injury (HCC)   Patient Active Problem List    Diagnosis Date Noted Date Diagnosed    Severe protein-calorie malnutrition (HCC) 2025     Chronic osteomyelitis of toe of right foot (HCC) 2025     Acute kidney injury (HCC) 2025     Chronic kidney disease, stage III (moderate) (HCC) 2025     Hypothyroidism 2025     Hypertension 2025     Electrolyte imbalance 2025       LOS (days): 2  Geometric Mean LOS (GMLOS) (days): 3  Days to GMLOS:1     OBJECTIVE:  Risk of Unplanned Readmission Score: 14.51         Current admission status: Inpatient   Preferred Pharmacy:   [facility] [address]  [address] [address]  Phone: [phone] Fax: [phone]    Primary Care Provider: Rambo Perera DO    Primary Insurance: BLUE CROSS [facility] REP  Secondary Insurance:     DISCHARGE DETAILS:    Discharge planning discussed with:: patient and Red martinez           Were Treatment Team discharge recommendations reviewed with patient/caregiver?: Yes  Did patient/caregiver verbalize understanding of patient care needs?: Yes       Contacts  Patient Contacts: michelle Hathaway  Relationship to Patient:: Family  Contact Method: Phone  Phone Number: [phone]  Reason/Outcome: Continuity of Care, Discharge Planning                        Treatment Team Recommendation: Facility Return, SNF  Discharge Destination Plan:: Facility Return, SNF  Transport at Discharge : Stretcher van        Transported by (Company and Unit #): [employer]  ETA of Transport (Date): 25  ETA of Transport (Time): 1655                          CM Met with patient and nephRed vo, to let them know patient transport time  to [facility] SNF.     CM Made [facility] aware in AIDIN of transport time.

## 2025-03-05 NOTE — ASSESSMENT & PLAN NOTE
Malnutrition Findings:   Adult Malnutrition type: Acute illness  Adult Degree of Malnutrition: Other severe protein calorie malnutrition  Malnutrition Characteristics: Inadequate energy, Weight loss                  360 Statement: Acute severe malnutrition related to poor oral intake/poor appetite as evidenced by < 50% estimated energy intake > 5 days, 10.6-14.5% weight loss x 2 mo (218-228lb UBW in January, 3/5/25 195lb). Treated with: Continue regular diet. Trial ensure clear daily. Recommend daily weights for nutrition monitoring.    BMI Findings:           Body mass index is 35.85 kg/m².

## 2025-03-05 NOTE — MALNUTRITION/BMI
This medical record reflects one or more clinical indicators suggestive of malnutrition.    Malnutrition Findings:   Adult Malnutrition type: Acute illness  Adult Degree of Malnutrition: Other severe protein calorie malnutrition  Malnutrition Characteristics: Inadequate energy, Weight loss                360 Statement: Acute severe malnutrition related to poor oral intake/poor appetite as evidenced by < 50% estimated energy intake > 5 days, 10.6-14.5% weight loss x 2 mo (218-228lb UBW in January, 3/5/25 195lb). Treated with: Continue regular diet. Trial ensure clear daily. Recommend daily weights for nutrition monitoring.    BMI Findings:           Body mass index is 35.85 kg/m².     See Nutrition note dated 3/5/25 for additional details.  Completed nutrition assessment is viewable in the nutrition documentation.

## 2025-03-05 NOTE — ASSESSMENT & PLAN NOTE
Patient presented  (3/3) from skilled nursing facility with reports of generalized weakness  Admission creatinine of 2.5. Baseline between 1.4-1.6  GAY l prerenal and secondary to poor oral intake as patient reports poor appetite  Creatinine continues to improve today (3/5)  Continued gentle IV hydration over hospitalization  Hold torsemide upon discharge  Reinitiation of diuretic therapy based on daily weights/overall volume status in the outpatient setting-will require close outpatient monitoring  renal ultrasound completed(3/4): Normal  Avoid hypotension and nephrotoxic medications

## 2025-03-05 NOTE — ASSESSMENT & PLAN NOTE
Continue metoprolol with holding parameters.  Hold torsemide.  Normotensive-chronic condition/stable

## 2025-03-05 NOTE — ASSESSMENT & PLAN NOTE
Lab Results   Component Value Date    EGFR 22 03/05/2025    EGFR 20 03/04/2025    EGFR 17 03/03/2025    CREATININE 2.04 (H) 03/05/2025    CREATININE 2.19 (H) 03/04/2025    CREATININE 2.54 (H) 03/03/2025   Baseline creatinine 1.4-1.6.  Creatinine elevated on admission see management under GAY

## 2025-03-05 NOTE — UTILIZATION REVIEW
Continued Stay Review  SEE INITIAL REVIEW COMPLETED BY [facility] RN  Date: 3/5                          Current Patient Class: Inpatient  Current Level of Care:      HPI:80 y.o. female initially admitted on 3/3     Assessment/Plan: Date: 3/5  Day 3: Has surpassed a 2nd midnight with active treatments and services. Initially admitted for gui, chronic osteomyelitis. Creat improved to 2.04  Continue NWB.  As per PT/OT eval, recommending SNF. Continue with gentle iv hydration.     3/4 UPdate:  Gui prerenal and due to poor po intake.  NWB to RLE. Renal US WNL.  Creat today is 2.19, from initial 2.6.    Medications:   Scheduled Medications:  heparin (porcine), 5,000 Units, Subcutaneous, Q8H MELANY  levothyroxine, 75 mcg, Oral, Daily  metoprolol tartrate, 50 mg, Oral, Q12H MELANY  pantoprazole, 40 mg, Oral, Daily  pravastatin, 80 mg, Oral, Daily With Dinner  senna-docusate sodium, 1 tablet, Oral, Daily      Continuous IV Infusions:  multi-electrolyte, 100 mL/hr, Intravenous, Continuous      PRN Meds:  acetaminophen, 650 mg, Oral, Q6H PRN  magnesium hydroxide, 15 mL, Oral, Daily PRN      Discharge Plan: TBD    Vital Signs (last 3 days)       Date/Time Temp Pulse Resp BP MAP (mmHg) SpO2 Calculated FIO2 (%) - Nasal Cannula Nasal Cannula O2 Flow Rate (L/min) O2 Device Patient Position - Orthostatic VS Pain    03/05/25 1015 -- 58 -- 133/104 -- -- -- -- -- -- --    03/05/25 0814 -- 63 -- -- -- 93 % -- -- None (Room air) -- --    03/05/25 06:13:19 97.7 °F (36.5 °C) -- -- 124/59 81 -- -- -- -- -- --    03/05/25 06:13:04 97.7 °F (36.5 °C) -- -- 124/59 81 -- -- -- -- -- --    03/04/25 20:49:16 97.7 °F (36.5 °C) 56 -- 127/65 86 97 % -- -- -- -- --    03/04/25 2046 -- 56 -- 127/65 -- -- -- -- -- -- --    03/04/25 2001 -- -- -- -- -- 92 % -- -- None (Room air) -- No Pain    03/04/25 15:17:23 97.2 °F (36.2 °C) 56 18 119/61 80 99 % -- -- -- -- --    03/04/25 1425 -- -- -- -- -- -- -- -- -- -- No Pain    03/04/25 1424 -- -- -- -- -- -- -- --  -- -- No Pain    03/04/25 0954 -- -- -- -- -- -- -- -- None (Room air) -- No Pain    03/04/25 07:33:02 97.5 °F (36.4 °C) 55 20 124/62 83 97 % -- -- -- -- --    03/03/25 2336 -- -- -- -- -- 100 % 24 1 L/min Nasal cannula -- --    03/03/25 2300 -- -- -- -- -- 87 % -- -- None (Room air) -- --    03/03/25 20:25:55 97.9 °F (36.6 °C) 61 -- 126/55 79 93 % -- -- -- -- --    03/03/25 1958 -- -- -- -- -- 94 % -- -- None (Room air) -- No Pain    03/03/25 1626 -- -- -- -- -- -- -- -- -- -- No Pain    03/03/25 16:21:55 97.7 °F (36.5 °C) 66 18 129/55 80 92 % -- -- -- -- --    03/03/25 16:20:03 97.7 °F (36.5 °C) 64 18 129/55 80 83 % -- -- -- -- --    03/03/25 1300 -- 56 16 110/55 78 92 % -- -- None (Room air) Sitting --    03/03/25 1245 -- 52 18 131/58 84 93 % -- -- None (Room air) -- --    03/03/25 1235 98.2 °F (36.8 °C) 55 18 131/58 -- 92 % -- -- None (Room air) Sitting No Pain          Weight (last 2 days)       Date/Time Weight    03/05/25 0524 88.9 (195.99)    03/04/25 0600 85.9 (189.38)    03/03/25 1626 85.6 (188.71)    03/03/25 1235 89.2 (196.65)            Pertinent Labs/Diagnostic Results:   Radiology:  US kidney and bladder   Final Interpretation by Ignacio Barragan MD (03/03 1638)      Normal.            Workstation performed: [id number]           Cardiology:        Results from last 7 days   Lab Units 03/05/25  0500 03/03/25  1729 03/03/25  1342   WBC Thousand/uL 5.18  --  5.49   HEMOGLOBIN g/dL 8.6*  --  10.1*   HEMATOCRIT % 25.6*  --  30.9*   PLATELETS Thousands/uL 216 219 183   TOTAL NEUT ABS Thousands/µL  --   --  3.14         Results from last 7 days   Lab Units 03/05/25  0500 03/04/25  0611 03/03/25  1342 03/01/25  1234 02/27/25  0522   SODIUM mmol/L 137 137 134* 135 140   POTASSIUM mmol/L 3.3* 3.8 4.3 4.8 4.3   CHLORIDE mmol/L 100 100 98 98* 99*   CO2 mmol/L 26 26 25 25 25   ANION GAP mmol/L 11 11 11 12* 16*   BUN mg/dL 12 14 17 19 16   CREATININE mg/dL 2.04* 2.19* 2.54* 2.6* 2.31*   EGFR ml/min/1.73sq m 22 20  "17 18* 21*   CALCIUM mg/dL 8.1* 8.2* 8.6 8* 8.3*   MAGNESIUM mg/dL 2.0 2.2 1.4*  --   --      Results from last 7 days   Lab Units 03/04/25  0611 02/27/25  0522   AST U/L 14 13   ALT U/L 6* 4   ALK PHOS U/L 40 41   TOTAL PROTEIN g/dL 5.4* 5.2*   ALBUMIN g/dL 2.8* 2.8*   TOTAL BILIRUBIN mg/dL 0.33 0.3         Results from last 7 days   Lab Units 03/05/25  0500 03/04/25  0611 03/03/25  1342 03/01/25  1234 02/27/25  0522   GLUCOSE RANDOM mg/dL 71 75 81 65 65             No results found for: \"BETA-HYDROXYBUTYRATE\"       Results from last 7 days   Lab Units 03/03/25  1342   PH THONY  7.369   PCO2 THONY mm Hg 42.0   PO2 THONY mm Hg 45.9*   HCO3 THONY mmol/L 23.7*   BASE EXC THONY mmol/L -1.6   O2 CONTENT THONY ml/dL 12.5   O2 HGB, VENOUS % 80.2*           Results from last 7 days   Lab Units 02/28/25  0508 02/27/25  0522   INR  3 3.7           Results from last 7 days   Lab Units 02/27/25  0522   C-REACTIVE PROTEIN mg/L 4.7             Results from last 7 days   Lab Units 03/03/25  1630   CLARITY UA  Slightly Cloudy   COLOR UA  Yellow   SPEC GRAV UA  1.020   PH UA  5.5   GLUCOSE UA mg/dl Negative   KETONES UA mg/dl Negative   BLOOD UA  Negative   PROTEIN UA mg/dl Negative   NITRITE UA  Negative   BILIRUBIN UA  Small*   UROBILINOGEN UA E.U./dl 0.2   LEUKOCYTES UA  Small*   WBC UA /hpf 2-4   RBC UA /hpf None Seen   BACTERIA UA /hpf Occasional   EPITHELIAL CELLS WET PREP /hpf Occasional         Network Utilization Review Department  ATTENTION: Please call with any questions or concerns to [phone] and carefully listen to the prompts so that you are directed to the right person. All voicemails are confidential.   For Discharge needs, contact Care Management DC Support Team at [phone] opt. [phone]  Send all requests for admission clinical reviews, approved or denied determinations and any other requests to dedicated fax number below belonging to the campus where the patient is receiving treatment. List of dedicated fax numbers for the " Facilities:  FACILITY NAME UR FAX NUMBER   ADMISSION DENIALS (Administrative/Medical Necessity) [phone]   DISCHARGE SUPPORT TEAM (NETWORK) [phone]   PARENT CHILD HEALTH (Maternity/NICU/Pediatrics) [phone]   [facility] [phone]   [facility] [phone]   [facility] [phone]   [facility] [phone]   [facility] [phone]   [facility] [phone]   [facility] [phone]   [facility] [phone]   [facility] [phone]   [facility] [phone]   [facility] [phone]   [facility] [phone]

## 2025-03-05 NOTE — PLAN OF CARE
Problem: Prexisting or High Potential for Compromised Skin Integrity  Goal: Skin integrity is maintained or improved  Description: INTERVENTIONS:  - Identify patients at risk for skin breakdown  - Assess and monitor skin integrity  - Assess and monitor nutrition and hydration status  - Monitor labs   - Assess for incontinence   - Turn and reposition patient  - Assist with mobility/ambulation  - Relieve pressure over bony prominences  - Avoid friction and shearing  - Provide appropriate hygiene as needed including keeping skin clean and dry  - Evaluate need for skin moisturizer/barrier cream  - Collaborate with interdisciplinary team   - Patient/family teaching  - Consider wound care consult   Outcome: Progressing     Problem: PAIN - ADULT  Goal: Verbalizes/displays adequate comfort level or baseline comfort level  Description: Interventions:  - Encourage patient to monitor pain and request assistance  - Assess pain using appropriate pain scale  - Administer analgesics based on type and severity of pain and evaluate response  - Implement non-pharmacological measures as appropriate and evaluate response  - Consider cultural and social influences on pain and pain management  - Notify physician/advanced practitioner if interventions unsuccessful or patient reports new pain  Outcome: Progressing     Problem: INFECTION - ADULT  Goal: Absence or prevention of progression during hospitalization  Description: INTERVENTIONS:  - Assess and monitor for signs and symptoms of infection  - Monitor lab/diagnostic results  - Monitor all insertion sites, i.e. indwelling lines, tubes, and drains  - Monitor endotracheal if appropriate and nasal secretions for changes in amount and color  - [place] appropriate cooling/warming therapies per order  - Administer medications as ordered  - Instruct and encourage patient and family to use good hand hygiene technique  - Identify and instruct in appropriate isolation precautions for  identified infection/condition  Outcome: Progressing  Goal: Absence of fever/infection during neutropenic period  Description: INTERVENTIONS:  - Monitor WBC    Outcome: Progressing     Problem: SAFETY ADULT  Goal: Patient will remain free of falls  Description: INTERVENTIONS:  - Educate patient/family on patient safety including physical limitations  - Instruct patient to call for assistance with activity   - Consult OT/PT to assist with strengthening/mobility   - Keep Call bell within reach  - Keep bed low and locked with side rails adjusted as appropriate  - Keep care items and personal belongings within reach  - Initiate and maintain comfort rounds  - Make Fall Risk Sign visible to staff  - Offer Toileting every 2 Hours, in advance of need  - Initiate/Maintain bed alarm  - Obtain necessary fall risk management equipment: non-skid socks  - Apply yellow socks and bracelet for high fall risk patients  - Consider moving patient to room near nurses station  Outcome: Progressing  Goal: Maintain or return to baseline ADL function  Description: INTERVENTIONS:  -  Assess patient's ability to carry out ADLs; assess patient's baseline for ADL function and identify physical deficits which impact ability to perform ADLs (bathing, care of mouth/teeth, toileting, grooming, dressing, etc.)  - Assess/evaluate cause of self-care deficits   - Assess range of motion  - Assess patient's mobility; develop plan if impaired  - Assess patient's need for assistive devices and provide as appropriate  - Encourage maximum independence but intervene and supervise when necessary  - Involve family in performance of ADLs  - Assess for home care needs following discharge   - Consider OT consult to assist with ADL evaluation and planning for discharge  - Provide patient education as appropriate  Outcome: Progressing  Goal: Maintains/Returns to pre admission functional level  Description: INTERVENTIONS:  - Perform AM-PAC 6 Click Basic Mobility/  Daily Activity assessment daily.  - Set and communicate daily mobility goal to care team and patient/family/caregiver.   - Collaborate with rehabilitation services on mobility goals if consulted  - Perform Range of Motion 3 times a day.  - Reposition patient every 2 hours.  - Dangle patient 3 times a day  - Stand patient 3 times a day  - Ambulate patient 3 times a day  - Out of bed to chair 3 times a day   - Out of bed for meals 3 times a day  - Out of bed for toileting  - Record patient progress and toleration of activity level   Outcome: Progressing     Problem: DISCHARGE PLANNING  Goal: Discharge to home or other facility with appropriate resources  Description: INTERVENTIONS:  - Identify barriers to discharge w/patient and caregiver  - Arrange for needed discharge resources and transportation as appropriate  - Identify discharge learning needs (meds, wound care, etc.)  - Arrange for interpretive services to assist at discharge as needed  - Refer to Case Management Department for coordinating discharge planning if the patient needs post-hospital services based on physician/advanced practitioner order or complex needs related to functional status, cognitive ability, or social support system  Outcome: Progressing     Problem: Knowledge Deficit  Goal: Patient/family/caregiver demonstrates understanding of disease process, treatment plan, medications, and discharge instructions  Description: Complete learning assessment and assess knowledge base.  Interventions:  - Provide teaching at level of understanding  - Provide teaching via preferred learning methods  Outcome: Progressing     Problem: Nutrition/Hydration-ADULT  Goal: Nutrient/Hydration intake appropriate for improving, restoring or maintaining nutritional needs  Description: Monitor and assess patient's nutrition/hydration status for malnutrition. Collaborate with interdisciplinary team and initiate plan and interventions as ordered.  Monitor patient's weight  and dietary intake as ordered or per policy. Utilize nutrition screening tool and intervene as necessary. Determine patient's food preferences and provide high-protein, high-caloric foods as appropriate.     INTERVENTIONS:  - Monitor oral intake, urinary output, labs, and treatment plans  - Assess nutrition and hydration status and recommend course of action  - Evaluate amount of meals eaten  - Assist patient with eating if necessary   - Allow adequate time for meals  - Recommend/ encourage appropriate diets, oral nutritional supplements, and vitamin/mineral supplements  - Order, calculate, and assess calorie counts as needed  - Recommend, monitor, and adjust tube feedings and TPN/PPN based on assessed needs  - Assess need for intravenous fluids  - Provide specific nutrition/hydration education as appropriate  - Include patient/family/caregiver in decisions related to nutrition  Outcome: Progressing

## 2025-03-05 NOTE — CASE MANAGEMENT
Case Management Discharge Planning Note    Patient name Alecia Kay  Location /-01 MRN [id number]  : 1945 Date 3/5/2025       Current Admission Date: 3/3/2025  Current Admission Diagnosis:Acute kidney injury (HCC)   Patient Active Problem List    Diagnosis Date Noted Date Diagnosed    Chronic osteomyelitis of toe of right foot (HCC) 2025     Acute kidney injury (HCC) 2025     Chronic kidney disease, stage III (moderate) (HCC) 2025     Hypothyroidism 2025     Hypertension 2025     Electrolyte imbalance 2025       LOS (days): 2  Geometric Mean LOS (GMLOS) (days): 3  Days to GMLOS:1.3     OBJECTIVE:  Risk of Unplanned Readmission Score: 14.32         Current admission status: Inpatient   Preferred Pharmacy:   [facility] [address]  [address] [address]  Phone: [phone] Fax: [phone]    Primary Care Provider: Rambo Perera DO    Primary Insurance: [employer] REP  Secondary Insurance:     DISCHARGE DETAILS:           CM left voice message for Red martinez, asking for call back from him or patients son confirming patients discharge disposition is to return to [facility] as patient requests.    CM submitted auth for patient to return to [facility] STR.  CM to follow.     1045 CM received message from eRd martinez, confirming plan is for patient to return to [facility] STR.

## 2025-03-05 NOTE — CASE MANAGEMENT
Received call from Alecia @ [facility] (P#: [phone]). Stated new auth will be voided ([id number]). Rep stated there is an Interruptive Stay policy which allows patient to readmit to facility if it is within 3 consecutive days. Stated auth will continue previous approval.     Facility Name: [facility]   Approved Auth#: [id number]  NRD: 03/07  Submit next review to #: [phone]    Care Manager notified: Annmarie Mendoza     Please reach out to  for updates on any clinical information.

## 2025-03-05 NOTE — CASE MANAGEMENT
Case Management Discharge Planning Note    Patient name Alecia Kay  Location /-01 MRN [id number]  : 1945 Date 3/5/2025       Current Admission Date: 3/3/2025  Current Admission Diagnosis:Acute kidney injury (HCC)   Patient Active Problem List    Diagnosis Date Noted Date Diagnosed    Chronic osteomyelitis of toe of right foot (HCC) 2025     Acute kidney injury (HCC) 2025     Chronic kidney disease, stage III (moderate) (HCC) 2025     Hypothyroidism 2025     Hypertension 2025     Electrolyte imbalance 2025       LOS (days): 2  Geometric Mean LOS (GMLOS) (days): 3  Days to GMLOS:1     OBJECTIVE:  Risk of Unplanned Readmission Score: 14.51         Current admission status: Inpatient   Preferred Pharmacy:   [facility] [address]  [address] [address]  Phone: [phone] Fax: [phone]    Primary Care Provider: Rambo Perera DO    Primary Insurance: [employer] REP  Secondary Insurance:     DISCHARGE DETAILS:    Discharge planning discussed with:: patient and nepheweRd           Were Treatment Team discharge recommendations reviewed with patient/caregiver?: Yes  Did patient/caregiver verbalize understanding of patient care needs?: Yes       Contacts  Patient Contacts: michelle Hathaway  Relationship to Patient:: Family  Contact Method: Phone  Phone Number: [phone]  Reason/Outcome: Continuity of Care, Discharge Planning                        Treatment Team Recommendation: Facility Return, SNF  Discharge Destination Plan:: Facility Return, SNF  Transport at Discharge : Stretcher van                  SANJANA made [facility] aware, in AIDIN, of auth information for patients return.    SANJANA submitted [employer] referral for stretcher van transport of patient.

## 2025-03-05 NOTE — PLAN OF CARE
Problem: Prexisting or High Potential for Compromised Skin Integrity  Goal: Skin integrity is maintained or improved  Description: INTERVENTIONS:  - Identify patients at risk for skin breakdown  - Assess and monitor skin integrity  - Assess and monitor nutrition and hydration status  - Monitor labs   - Assess for incontinence   - Turn and reposition patient  - Assist with mobility/ambulation  - Relieve pressure over bony prominences  - Avoid friction and shearing  - Provide appropriate hygiene as needed including keeping skin clean and dry  - Evaluate need for skin moisturizer/barrier cream  - Collaborate with interdisciplinary team   - Patient/family teaching  - Consider wound care consult   3/5/2025 1415 by Rayna Méndez RN  Outcome: Adequate for Discharge  3/5/2025 1257 by Rayna Méndez RN  Outcome: Progressing     Problem: PAIN - ADULT  Goal: Verbalizes/displays adequate comfort level or baseline comfort level  Description: Interventions:  - Encourage patient to monitor pain and request assistance  - Assess pain using appropriate pain scale  - Administer analgesics based on type and severity of pain and evaluate response  - Implement non-pharmacological measures as appropriate and evaluate response  - Consider cultural and social influences on pain and pain management  - Notify physician/advanced practitioner if interventions unsuccessful or patient reports new pain  3/5/2025 1415 by Rayna Méndez RN  Outcome: Adequate for Discharge  3/5/2025 1257 by Rayna Méndez RN  Outcome: Progressing     Problem: INFECTION - ADULT  Goal: Absence or prevention of progression during hospitalization  Description: INTERVENTIONS:  - Assess and monitor for signs and symptoms of infection  - Monitor lab/diagnostic results  - Monitor all insertion sites, i.e. indwelling lines, tubes, and drains  - Monitor endotracheal if appropriate and nasal secretions for changes in amount and color  - [place] appropriate cooling/warming therapies  per order  - Administer medications as ordered  - Instruct and encourage patient and family to use good hand hygiene technique  - Identify and instruct in appropriate isolation precautions for identified infection/condition  3/5/2025 1415 by Rayna Méndez RN  Outcome: Adequate for Discharge  3/5/2025 1257 by Rayna Méndez RN  Outcome: Progressing  Goal: Absence of fever/infection during neutropenic period  Description: INTERVENTIONS:  - Monitor WBC    3/5/2025 1415 by Rayna Méndez RN  Outcome: Adequate for Discharge  3/5/2025 1257 by Rayna Méndez RN  Outcome: Progressing     Problem: SAFETY ADULT  Goal: Patient will remain free of falls  Description: INTERVENTIONS:  - Educate patient/family on patient safety including physical limitations  - Instruct patient to call for assistance with activity   - Consult OT/PT to assist with strengthening/mobility   - Keep Call bell within reach  - Keep bed low and locked with side rails adjusted as appropriate  - Keep care items and personal belongings within reach  - Initiate and maintain comfort rounds  - Make Fall Risk Sign visible to staff  - Offer Toileting every 2 Hours, in advance of need  - Initiate/Maintain bed alarm  - Obtain necessary fall risk management equipment: non-skid socks  - Apply yellow socks and bracelet for high fall risk patients  - Consider moving patient to room near nurses station  3/5/2025 1415 by Rayna Méndez RN  Outcome: Adequate for Discharge  3/5/2025 1257 by Rayna Méndez RN  Outcome: Progressing  Goal: Maintain or return to baseline ADL function  Description: INTERVENTIONS:  -  Assess patient's ability to carry out ADLs; assess patient's baseline for ADL function and identify physical deficits which impact ability to perform ADLs (bathing, care of mouth/teeth, toileting, grooming, dressing, etc.)  - Assess/evaluate cause of self-care deficits   - Assess range of motion  - Assess patient's mobility; develop plan if impaired  - Assess patient's need for  assistive devices and provide as appropriate  - Encourage maximum independence but intervene and supervise when necessary  - Involve family in performance of ADLs  - Assess for home care needs following discharge   - Consider OT consult to assist with ADL evaluation and planning for discharge  - Provide patient education as appropriate  3/5/2025 1415 by Rayna Méndez RN  Outcome: Adequate for Discharge  3/5/2025 1257 by Rayna Méndez RN  Outcome: Progressing  Goal: Maintains/Returns to pre admission functional level  Description: INTERVENTIONS:  - Perform AM-PAC 6 Click Basic Mobility/ Daily Activity assessment daily.  - Set and communicate daily mobility goal to care team and patient/family/caregiver.   - Collaborate with rehabilitation services on mobility goals if consulted  - Perform Range of Motion 3 times a day.  - Reposition patient every 2 hours.  - Dangle patient 3 times a day  - Stand patient 3 times a day  - Ambulate patient 3 times a day  - Out of bed to chair 3 times a day   - Out of bed for meals 3 times a day  - Out of bed for toileting  - Record patient progress and toleration of activity level   3/5/2025 1415 by Rayna Méndez RN  Outcome: Adequate for Discharge  3/5/2025 1257 by Rayna Méndez RN  Outcome: Progressing     Problem: DISCHARGE PLANNING  Goal: Discharge to home or other facility with appropriate resources  Description: INTERVENTIONS:  - Identify barriers to discharge w/patient and caregiver  - Arrange for needed discharge resources and transportation as appropriate  - Identify discharge learning needs (meds, wound care, etc.)  - Arrange for interpretive services to assist at discharge as needed  - Refer to Case Management Department for coordinating discharge planning if the patient needs post-hospital services based on physician/advanced practitioner order or complex needs related to functional status, cognitive ability, or social support system  3/5/2025 1415 by Rayna Méndez RN  Outcome:  Adequate for Discharge  3/5/2025 1257 by Rayna Méndez RN  Outcome: Progressing     Problem: Knowledge Deficit  Goal: Patient/family/caregiver demonstrates understanding of disease process, treatment plan, medications, and discharge instructions  Description: Complete learning assessment and assess knowledge base.  Interventions:  - Provide teaching at level of understanding  - Provide teaching via preferred learning methods  3/5/2025 1415 by Rayna Méndez RN  Outcome: Adequate for Discharge  3/5/2025 1257 by Rayna Méndez RN  Outcome: Progressing     Problem: PHYSICAL THERAPY ADULT  Goal: Performs mobility at highest level of function for planned discharge setting.  See evaluation for individualized goals.  Description: Treatment/Interventions: Functional transfer training, LE strengthening/ROM, Elevations, Therapeutic exercise, Endurance training, Patient/family training, Equipment eval/education, Bed mobility, Gait training, Compensatory technique education, Spoke to case management, OT          See flowsheet documentation for full assessment, interventions and recommendations.  Outcome: Adequate for Discharge     Problem: OCCUPATIONAL THERAPY ADULT  Goal: Performs self-care activities at highest level of function for planned discharge setting.  See evaluation for individualized goals.  Description: Treatment Interventions: ADL retraining, Functional transfer training, Endurance training, Patient/family training, Equipment evaluation/education, Compensatory technique education, Continued evaluation, Cardiac education, Energy conservation, Activityengagement          See flowsheet documentation for full assessment, interventions and recommendations.   Outcome: Adequate for Discharge     Problem: Nutrition/Hydration-ADULT  Goal: Nutrient/Hydration intake appropriate for improving, restoring or maintaining nutritional needs  Description: Monitor and assess patient's nutrition/hydration status for malnutrition. Collaborate  with interdisciplinary team and initiate plan and interventions as ordered.  Monitor patient's weight and dietary intake as ordered or per policy. Utilize nutrition screening tool and intervene as necessary. Determine patient's food preferences and provide high-protein, high-caloric foods as appropriate.     INTERVENTIONS:  - Monitor oral intake, urinary output, labs, and treatment plans  - Assess nutrition and hydration status and recommend course of action  - Evaluate amount of meals eaten  - Assist patient with eating if necessary   - Allow adequate time for meals  - Recommend/ encourage appropriate diets, oral nutritional supplements, and vitamin/mineral supplements  - Order, calculate, and assess calorie counts as needed  - Recommend, monitor, and adjust tube feedings and TPN/PPN based on assessed needs  - Assess need for intravenous fluids  - Provide specific nutrition/hydration education as appropriate  - Include patient/family/caregiver in decisions related to nutrition  3/5/2025 1415 by Rayna Méndez RN  Outcome: Adequate for Discharge  3/5/2025 1257 by Rayna Méndez RN  Outcome: Progressing

## 2025-03-05 NOTE — PLAN OF CARE
Problem: Prexisting or High Potential for Compromised Skin Integrity  Goal: Skin integrity is maintained or improved  Description: INTERVENTIONS:  - Identify patients at risk for skin breakdown  - Assess and monitor skin integrity  - Assess and monitor nutrition and hydration status  - Monitor labs   - Assess for incontinence   - Turn and reposition patient  - Assist with mobility/ambulation  - Relieve pressure over bony prominences  - Avoid friction and shearing  - Provide appropriate hygiene as needed including keeping skin clean and dry  - Evaluate need for skin moisturizer/barrier cream  - Collaborate with interdisciplinary team   - Patient/family teaching  - Consider wound care consult   Outcome: Progressing     Problem: PAIN - ADULT  Goal: Verbalizes/displays adequate comfort level or baseline comfort level  Description: Interventions:  - Encourage patient to monitor pain and request assistance  - Assess pain using appropriate pain scale  - Administer analgesics based on type and severity of pain and evaluate response  - Implement non-pharmacological measures as appropriate and evaluate response  - Consider cultural and social influences on pain and pain management  - Notify physician/advanced practitioner if interventions unsuccessful or patient reports new pain  Outcome: Progressing     Problem: INFECTION - ADULT  Goal: Absence or prevention of progression during hospitalization  Description: INTERVENTIONS:  - Assess and monitor for signs and symptoms of infection  - Monitor lab/diagnostic results  - Monitor all insertion sites, i.e. indwelling lines, tubes, and drains  - Monitor endotracheal if appropriate and nasal secretions for changes in amount and color  - [place] appropriate cooling/warming therapies per order  - Administer medications as ordered  - Instruct and encourage patient and family to use good hand hygiene technique  - Identify and instruct in appropriate isolation precautions for  identified infection/condition  Outcome: Progressing  Goal: Absence of fever/infection during neutropenic period  Description: INTERVENTIONS:  - Monitor WBC    Outcome: Progressing     Problem: SAFETY ADULT  Goal: Patient will remain free of falls  Description: INTERVENTIONS:  - Educate patient/family on patient safety including physical limitations  - Instruct patient to call for assistance with activity   - Consult OT/PT to assist with strengthening/mobility   - Keep Call bell within reach  - Keep bed low and locked with side rails adjusted as appropriate  - Keep care items and personal belongings within reach  - Initiate and maintain comfort rounds  - Make Fall Risk Sign visible to staff  - Offer Toileting every 2 Hours, in advance of need  - Initiate/Maintain bed alarm  - Obtain necessary fall risk management equipment: non-skid socks  - Apply yellow socks and bracelet for high fall risk patients  - Consider moving patient to room near nurses station  Outcome: Progressing  Goal: Maintain or return to baseline ADL function  Description: INTERVENTIONS:  -  Assess patient's ability to carry out ADLs; assess patient's baseline for ADL function and identify physical deficits which impact ability to perform ADLs (bathing, care of mouth/teeth, toileting, grooming, dressing, etc.)  - Assess/evaluate cause of self-care deficits   - Assess range of motion  - Assess patient's mobility; develop plan if impaired  - Assess patient's need for assistive devices and provide as appropriate  - Encourage maximum independence but intervene and supervise when necessary  - Involve family in performance of ADLs  - Assess for home care needs following discharge   - Consider OT consult to assist with ADL evaluation and planning for discharge  - Provide patient education as appropriate  Outcome: Progressing  Goal: Maintains/Returns to pre admission functional level  Description: INTERVENTIONS:  - Perform AM-PAC 6 Click Basic Mobility/  Daily Activity assessment daily.  - Set and communicate daily mobility goal to care team and patient/family/caregiver.   - Collaborate with rehabilitation services on mobility goals if consulted  - Perform Range of Motion 3 times a day.  - Reposition patient every 2 hours.  - Dangle patient 3 times a day  - Stand patient 3 times a day  - Ambulate patient 3 times a day  - Out of bed to chair 3 times a day   - Out of bed for meals 3 times a day  - Out of bed for toileting  - Record patient progress and toleration of activity level   Outcome: Progressing     Problem: DISCHARGE PLANNING  Goal: Discharge to home or other facility with appropriate resources  Description: INTERVENTIONS:  - Identify barriers to discharge w/patient and caregiver  - Arrange for needed discharge resources and transportation as appropriate  - Identify discharge learning needs (meds, wound care, etc.)  - Arrange for interpretive services to assist at discharge as needed  - Refer to Case Management Department for coordinating discharge planning if the patient needs post-hospital services based on physician/advanced practitioner order or complex needs related to functional status, cognitive ability, or social support system  Outcome: Progressing     Problem: Knowledge Deficit  Goal: Patient/family/caregiver demonstrates understanding of disease process, treatment plan, medications, and discharge instructions  Description: Complete learning assessment and assess knowledge base.  Interventions:  - Provide teaching at level of understanding  - Provide teaching via preferred learning methods  Outcome: Progressing

## 2025-03-05 NOTE — ASSESSMENT & PLAN NOTE
Patient has a history of right calcaneus osteomyelitis.    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    Subsequently placed on Augmentin  Currently nonweightbearing right foot  Continue with outpatient podiatry and ID follow-up   PT OT evaluation occurred and patient will discharge to short-term rehab  Patient will remain nonweightbearing right heel until she follows up with her outpatient podiatrist  Patient record reviewed-patient is to remain nonweightbearing until wound is completely healed

## 2025-03-06 NOTE — UTILIZATION REVIEW
NOTIFICATION OF ADMISSION DISCHARGE   This is a Notification of Discharge from [facility]. Please be advised that this patient has been discharge from our facility. Below you will find the admission and discharge date and time including the patient’s disposition.   UTILIZATION REVIEW CONTACT:  Katherine Dwyer  Utilization   Network Utilization Review Department  Phone: [phone] x carefully listen to the prompts. All voicemails are confidential.  Email: [email or web address]     ADMISSION INFORMATION  PRESENTATION DATE: 3/3/2025 12:29 PM  OBERVATION ADMISSION DATE: N/A  INPATIENT ADMISSION DATE: 3/3/25  3:07 PM   DISCHARGE DATE: 3/5/2025  5:06 PM   DISPOSITION:Non [facility] SNF/TCU/SNU    Network Utilization Review Department  ATTENTION: Please call with any questions or concerns to [phone] and carefully listen to the prompts so that you are directed to the right person. All voicemails are confidential.   For Discharge needs, contact Care Management DC Support Team at [phone] opt. 2  Send all requests for admission clinical reviews, approved or denied determinations and any other requests to dedicated fax number below belonging to the campus where the patient is receiving treatment. List of dedicated fax numbers for the Facilities:  FACILITY NAME UR FAX NUMBER   ADMISSION DENIALS (Administrative/Medical Necessity) [phone]   DISCHARGE SUPPORT TEAM ([facility]) [phone]   PARENT CHILD HEALTH (Maternity/NICU/Pediatrics) [phone]   [facility] [phone]   [facility] [phone]   [facility] [phone]   [facility] [phone]   [facility] [phone]   [facility] [phone]   [facility] [phone]   [facility]  [place] [phone]   [facility] [phone]   [facility] [phone]   [facility] [phone]   [facility] [phone]

## 2025-03-31 ENCOUNTER — APPOINTMENT (OUTPATIENT)
Dept: URBAN - METROPOLITAN AREA CLINIC 294 | Age: 80
Setting detail: DERMATOLOGY
End: 2025-03-31

## 2025-03-31 DIAGNOSIS — D18.0 HEMANGIOMA: ICD-10-CM

## 2025-03-31 DIAGNOSIS — L259 CONTACT DERMATITIS AND OTHER ECZEMA, UNSPECIFIED CAUSE: ICD-10-CM

## 2025-03-31 DIAGNOSIS — Z71.89 OTHER SPECIFIED COUNSELING: ICD-10-CM

## 2025-03-31 DIAGNOSIS — L57.0 ACTINIC KERATOSIS: ICD-10-CM

## 2025-03-31 DIAGNOSIS — L82.1 OTHER SEBORRHEIC KERATOSIS: ICD-10-CM

## 2025-03-31 PROBLEM — L23.9 ALLERGIC CONTACT DERMATITIS, UNSPECIFIED CAUSE: Status: ACTIVE | Noted: 2025-03-31

## 2025-03-31 PROBLEM — D18.01 HEMANGIOMA OF SKIN AND SUBCUTANEOUS TISSUE: Status: ACTIVE | Noted: 2025-03-31

## 2025-03-31 PROCEDURE — 17000 DESTRUCT PREMALG LESION: CPT

## 2025-03-31 PROCEDURE — OTHER ADDITIONAL NOTES: OTHER

## 2025-03-31 PROCEDURE — OTHER COUNSELING: OTHER

## 2025-03-31 PROCEDURE — 99213 OFFICE O/P EST LOW 20 MIN: CPT | Mod: 25

## 2025-03-31 PROCEDURE — OTHER LIQUID NITROGEN: OTHER

## 2025-03-31 PROCEDURE — OTHER MIPS QUALITY: OTHER

## 2025-03-31 ASSESSMENT — LOCATION DETAILED DESCRIPTION DERM
LOCATION DETAILED: LEFT MEDIAL MALAR CHEEK
LOCATION DETAILED: RIGHT SUPERIOR LATERAL NECK
LOCATION DETAILED: RIGHT DISTAL PRETIBIAL REGION
LOCATION DETAILED: LEFT CENTRAL TEMPLE
LOCATION DETAILED: LEFT SUPERIOR VERMILION LIP

## 2025-03-31 ASSESSMENT — LOCATION SIMPLE DESCRIPTION DERM
LOCATION SIMPLE: RIGHT PRETIBIAL REGION
LOCATION SIMPLE: LEFT LIP
LOCATION SIMPLE: LEFT TEMPLE
LOCATION SIMPLE: RIGHT ANTERIOR NECK
LOCATION SIMPLE: LEFT CHEEK

## 2025-03-31 ASSESSMENT — LOCATION ZONE DERM
LOCATION ZONE: LIP
LOCATION ZONE: LEG
LOCATION ZONE: NECK
LOCATION ZONE: FACE

## 2025-03-31 NOTE — HPI: SKIN LESION
What Type Of Note Output Would You Prefer (Optional)?: Bullet Format
How Severe Is Your Skin Lesion?: mild
Is This A New Presentation, Or A Follow-Up?: Skin Lesion
Which Family Member (Optional)?: Paternal uncle

## 2025-03-31 NOTE — HPI: RASH
What Type Of Note Output Would You Prefer (Optional)?: Bullet Format
How Severe Is Your Rash?: mild
Is This A New Presentation, Or A Follow-Up?: Rash
Additional History: Patient states she had foot surgery about a month ago where they placed gauze near right ankle and she reacted with a red itchy rash. \\n
No

## 2025-03-31 NOTE — PROCEDURE: LIQUID NITROGEN
98.5
Show Applicator Variable?: Yes
Consent: The patient's consent was obtained including but not limited to risks of crusting, scabbing, blistering, scarring, darker or lighter pigmentary change, recurrence, incomplete removal and infection.
Render Post-Care Instructions In Note?: no
Detail Level: Detailed
Number Of Freeze-Thaw Cycles: 1 freeze-thaw cycle
Post-Care Instructions: I reviewed with the patient in detail post-care instructions. Patient is to wear sunprotection, and avoid picking at any of the treated lesions. Pt may apply Vaseline to crusted or scabbing areas.
Duration Of Freeze Thaw-Cycle (Seconds): 0

## 2025-04-25 ENCOUNTER — APPOINTMENT (EMERGENCY)
Dept: CT IMAGING | Facility: HOSPITAL | Age: 80
End: 2025-04-25
Payer: COMMERCIAL

## 2025-04-25 ENCOUNTER — HOSPITAL ENCOUNTER (INPATIENT)
Facility: HOSPITAL | Age: 80
LOS: 7 days | Discharge: ANOTHER HOSPITAL | End: 2025-05-02
Attending: EMERGENCY MEDICINE | Admitting: FAMILY MEDICINE
Payer: COMMERCIAL

## 2025-04-25 DIAGNOSIS — R26.2 AMBULATORY DYSFUNCTION: ICD-10-CM

## 2025-04-25 DIAGNOSIS — R11.2 NAUSEA AND VOMITING, UNSPECIFIED VOMITING TYPE: ICD-10-CM

## 2025-04-25 DIAGNOSIS — M86.671 CHRONIC OSTEOMYELITIS OF TOE OF RIGHT FOOT (HCC): ICD-10-CM

## 2025-04-25 DIAGNOSIS — N17.9 ACUTE KIDNEY INJURY (HCC): ICD-10-CM

## 2025-04-25 DIAGNOSIS — E83.42 HYPOMAGNESEMIA: ICD-10-CM

## 2025-04-25 DIAGNOSIS — R53.1 GENERALIZED WEAKNESS: Primary | ICD-10-CM

## 2025-04-25 DIAGNOSIS — E87.6 HYPOKALEMIA: ICD-10-CM

## 2025-04-25 DIAGNOSIS — N39.0 ACUTE UTI: ICD-10-CM

## 2025-04-25 DIAGNOSIS — I26.99 ACUTE PULMONARY EMBOLISM (HCC): ICD-10-CM

## 2025-04-25 PROBLEM — R60.0 BILATERAL LOWER EXTREMITY EDEMA: Status: ACTIVE | Noted: 2025-04-25

## 2025-04-25 PROBLEM — N30.00 ACUTE CYSTITIS: Status: ACTIVE | Noted: 2025-04-25

## 2025-04-25 LAB
2HR DELTA HS TROPONIN: -1 NG/L
ALBUMIN SERPL BCG-MCNC: 3.3 G/DL (ref 3.5–5)
ALP SERPL-CCNC: 58 U/L (ref 34–104)
ALT SERPL W P-5'-P-CCNC: 6 U/L (ref 7–52)
ANION GAP SERPL CALCULATED.3IONS-SCNC: 17 MMOL/L (ref 4–13)
APTT PPP: 28 SECONDS (ref 23–34)
AST SERPL W P-5'-P-CCNC: 13 U/L (ref 13–39)
ATRIAL RATE: 61 BPM
BACTERIA UR QL AUTO: ABNORMAL /HPF
BASOPHILS # BLD AUTO: 0.02 THOUSANDS/ÂΜL (ref 0–0.1)
BASOPHILS NFR BLD AUTO: 0 % (ref 0–1)
BILIRUB SERPL-MCNC: 0.81 MG/DL (ref 0.2–1)
BILIRUB UR QL STRIP: ABNORMAL
BUN SERPL-MCNC: 27 MG/DL (ref 5–25)
CALCIUM ALBUM COR SERPL-MCNC: 9.9 MG/DL (ref 8.3–10.1)
CALCIUM SERPL-MCNC: 9.3 MG/DL (ref 8.4–10.2)
CARDIAC TROPONIN I PNL SERPL HS: 47 NG/L (ref ?–50)
CARDIAC TROPONIN I PNL SERPL HS: 48 NG/L (ref ?–50)
CHLORIDE SERPL-SCNC: 102 MMOL/L (ref 96–108)
CLARITY UR: ABNORMAL
CO2 SERPL-SCNC: 22 MMOL/L (ref 21–32)
COLOR UR: YELLOW
CREAT SERPL-MCNC: 1.87 MG/DL (ref 0.6–1.3)
EOSINOPHIL # BLD AUTO: 0 THOUSAND/ÂΜL (ref 0–0.61)
EOSINOPHIL NFR BLD AUTO: 0 % (ref 0–6)
ERYTHROCYTE [DISTWIDTH] IN BLOOD BY AUTOMATED COUNT: 14.6 % (ref 11.6–15.1)
FLUAV AG UPPER RESP QL IA.RAPID: NEGATIVE
FLUBV AG UPPER RESP QL IA.RAPID: NEGATIVE
GFR SERPL CREATININE-BSD FRML MDRD: 24 ML/MIN/1.73SQ M
GLUCOSE SERPL-MCNC: 89 MG/DL (ref 65–140)
GLUCOSE UR STRIP-MCNC: NEGATIVE MG/DL
HCT VFR BLD AUTO: 30 % (ref 34.8–46.1)
HGB BLD-MCNC: 9.7 G/DL (ref 11.5–15.4)
HGB UR QL STRIP.AUTO: ABNORMAL
IMM GRANULOCYTES # BLD AUTO: 0.09 THOUSAND/UL (ref 0–0.2)
IMM GRANULOCYTES NFR BLD AUTO: 1 % (ref 0–2)
INR PPP: 1.18 (ref 0.85–1.19)
KETONES UR STRIP-MCNC: NEGATIVE MG/DL
LEUKOCYTE ESTERASE UR QL STRIP: ABNORMAL
LIPASE SERPL-CCNC: 14 U/L (ref 11–82)
LYMPHOCYTES # BLD AUTO: 1.11 THOUSANDS/ÂΜL (ref 0.6–4.47)
LYMPHOCYTES NFR BLD AUTO: 15 % (ref 14–44)
MAGNESIUM SERPL-MCNC: 1.5 MG/DL (ref 1.9–2.7)
MCH RBC QN AUTO: 28.9 PG (ref 26.8–34.3)
MCHC RBC AUTO-ENTMCNC: 32.3 G/DL (ref 31.4–37.4)
MCV RBC AUTO: 89 FL (ref 82–98)
MONOCYTES # BLD AUTO: 0.44 THOUSAND/ÂΜL (ref 0.17–1.22)
MONOCYTES NFR BLD AUTO: 6 % (ref 4–12)
NEUTROPHILS # BLD AUTO: 5.88 THOUSANDS/ÂΜL (ref 1.85–7.62)
NEUTS SEG NFR BLD AUTO: 78 % (ref 43–75)
NITRITE UR QL STRIP: NEGATIVE
NON-SQ EPI CELLS URNS QL MICRO: ABNORMAL /HPF
NRBC BLD AUTO-RTO: 0 /100 WBCS
P AXIS: 17 DEGREES
PH UR STRIP.AUTO: 6 [PH]
PLATELET # BLD AUTO: 223 THOUSANDS/UL (ref 149–390)
PMV BLD AUTO: 9.8 FL (ref 8.9–12.7)
POTASSIUM SERPL-SCNC: 3 MMOL/L (ref 3.5–5.3)
PR INTERVAL: 170 MS
PROT SERPL-MCNC: 6.6 G/DL (ref 6.4–8.4)
PROT UR STRIP-MCNC: ABNORMAL MG/DL
PROTHROMBIN TIME: 15.4 SECONDS (ref 12.3–15)
QRS AXIS: -21 DEGREES
QRSD INTERVAL: 100 MS
QT INTERVAL: 424 MS
QTC INTERVAL: 426 MS
RBC # BLD AUTO: 3.36 MILLION/UL (ref 3.81–5.12)
RBC #/AREA URNS AUTO: ABNORMAL /HPF
SARS-COV+SARS-COV-2 AG RESP QL IA.RAPID: NEGATIVE
SODIUM SERPL-SCNC: 141 MMOL/L (ref 135–147)
SP GR UR STRIP.AUTO: 1.02 (ref 1–1.03)
T WAVE AXIS: 119 DEGREES
UROBILINOGEN UR QL STRIP.AUTO: 0.2 E.U./DL
VENTRICULAR RATE: 61 BPM
WBC # BLD AUTO: 7.54 THOUSAND/UL (ref 4.31–10.16)
WBC #/AREA URNS AUTO: ABNORMAL /HPF

## 2025-04-25 PROCEDURE — 87086 URINE CULTURE/COLONY COUNT: CPT | Performed by: EMERGENCY MEDICINE

## 2025-04-25 PROCEDURE — 99285 EMERGENCY DEPT VISIT HI MDM: CPT

## 2025-04-25 PROCEDURE — 87186 SC STD MICRODIL/AGAR DIL: CPT | Performed by: EMERGENCY MEDICINE

## 2025-04-25 PROCEDURE — 85610 PROTHROMBIN TIME: CPT | Performed by: EMERGENCY MEDICINE

## 2025-04-25 PROCEDURE — 83690 ASSAY OF LIPASE: CPT | Performed by: EMERGENCY MEDICINE

## 2025-04-25 PROCEDURE — 71250 CT THORAX DX C-: CPT

## 2025-04-25 PROCEDURE — 85025 COMPLETE CBC W/AUTO DIFF WBC: CPT | Performed by: EMERGENCY MEDICINE

## 2025-04-25 PROCEDURE — 80053 COMPREHEN METABOLIC PANEL: CPT | Performed by: EMERGENCY MEDICINE

## 2025-04-25 PROCEDURE — 96375 TX/PRO/DX INJ NEW DRUG ADDON: CPT

## 2025-04-25 PROCEDURE — 87077 CULTURE AEROBIC IDENTIFY: CPT | Performed by: EMERGENCY MEDICINE

## 2025-04-25 PROCEDURE — 99285 EMERGENCY DEPT VISIT HI MDM: CPT | Performed by: EMERGENCY MEDICINE

## 2025-04-25 PROCEDURE — 96367 TX/PROPH/DG ADDL SEQ IV INF: CPT

## 2025-04-25 PROCEDURE — 84484 ASSAY OF TROPONIN QUANT: CPT | Performed by: EMERGENCY MEDICINE

## 2025-04-25 PROCEDURE — 87804 INFLUENZA ASSAY W/OPTIC: CPT | Performed by: EMERGENCY MEDICINE

## 2025-04-25 PROCEDURE — 93010 ELECTROCARDIOGRAM REPORT: CPT | Performed by: INTERNAL MEDICINE

## 2025-04-25 PROCEDURE — 96361 HYDRATE IV INFUSION ADD-ON: CPT

## 2025-04-25 PROCEDURE — 96365 THER/PROPH/DIAG IV INF INIT: CPT

## 2025-04-25 PROCEDURE — 74176 CT ABD & PELVIS W/O CONTRAST: CPT

## 2025-04-25 PROCEDURE — 81001 URINALYSIS AUTO W/SCOPE: CPT | Performed by: EMERGENCY MEDICINE

## 2025-04-25 PROCEDURE — 87811 SARS-COV-2 COVID19 W/OPTIC: CPT | Performed by: EMERGENCY MEDICINE

## 2025-04-25 PROCEDURE — 83735 ASSAY OF MAGNESIUM: CPT | Performed by: EMERGENCY MEDICINE

## 2025-04-25 PROCEDURE — 36415 COLL VENOUS BLD VENIPUNCTURE: CPT | Performed by: EMERGENCY MEDICINE

## 2025-04-25 PROCEDURE — 99223 1ST HOSP IP/OBS HIGH 75: CPT | Performed by: FAMILY MEDICINE

## 2025-04-25 PROCEDURE — 85730 THROMBOPLASTIN TIME PARTIAL: CPT | Performed by: EMERGENCY MEDICINE

## 2025-04-25 PROCEDURE — 93005 ELECTROCARDIOGRAM TRACING: CPT

## 2025-04-25 RX ORDER — ONDANSETRON 2 MG/ML
4 INJECTION INTRAMUSCULAR; INTRAVENOUS ONCE
Status: COMPLETED | OUTPATIENT
Start: 2025-04-25 | End: 2025-04-25

## 2025-04-25 RX ORDER — HEPARIN SODIUM 5000 [USP'U]/ML
5000 INJECTION, SOLUTION INTRAVENOUS; SUBCUTANEOUS EVERY 8 HOURS SCHEDULED
Status: DISCONTINUED | OUTPATIENT
Start: 2025-04-25 | End: 2025-04-27

## 2025-04-25 RX ORDER — WARFARIN SODIUM 5 MG/1
5 TABLET ORAL
COMMUNITY
End: 2025-05-20

## 2025-04-25 RX ORDER — PANTOPRAZOLE SODIUM 40 MG/1
40 TABLET, DELAYED RELEASE ORAL DAILY
Status: DISCONTINUED | OUTPATIENT
Start: 2025-04-26 | End: 2025-04-28

## 2025-04-25 RX ORDER — AMOXICILLIN 250 MG
1 CAPSULE ORAL DAILY
Status: DISCONTINUED | OUTPATIENT
Start: 2025-04-26 | End: 2025-05-02 | Stop reason: HOSPADM

## 2025-04-25 RX ORDER — CEFTRIAXONE 1 G/50ML
1000 INJECTION, SOLUTION INTRAVENOUS EVERY 24 HOURS
Status: DISCONTINUED | OUTPATIENT
Start: 2025-04-26 | End: 2025-04-27

## 2025-04-25 RX ORDER — SODIUM CHLORIDE 9 MG/ML
75 INJECTION, SOLUTION INTRAVENOUS CONTINUOUS
Status: DISCONTINUED | OUTPATIENT
Start: 2025-04-25 | End: 2025-05-01

## 2025-04-25 RX ORDER — POTASSIUM CHLORIDE 1500 MG/1
40 TABLET, EXTENDED RELEASE ORAL ONCE
Status: COMPLETED | OUTPATIENT
Start: 2025-04-25 | End: 2025-04-25

## 2025-04-25 RX ORDER — PRAVASTATIN SODIUM 80 MG/1
80 TABLET ORAL
Status: DISCONTINUED | OUTPATIENT
Start: 2025-04-25 | End: 2025-05-02 | Stop reason: HOSPADM

## 2025-04-25 RX ORDER — ONDANSETRON 2 MG/ML
4 INJECTION INTRAMUSCULAR; INTRAVENOUS EVERY 6 HOURS PRN
Status: DISCONTINUED | OUTPATIENT
Start: 2025-04-25 | End: 2025-05-02 | Stop reason: HOSPADM

## 2025-04-25 RX ORDER — CEFTRIAXONE 1 G/50ML
1000 INJECTION, SOLUTION INTRAVENOUS EVERY 24 HOURS
Status: DISCONTINUED | OUTPATIENT
Start: 2025-04-25 | End: 2025-04-25

## 2025-04-25 RX ORDER — MAGNESIUM SULFATE 1 G/100ML
1 INJECTION INTRAVENOUS ONCE
Status: COMPLETED | OUTPATIENT
Start: 2025-04-25 | End: 2025-04-25

## 2025-04-25 RX ORDER — CEFTRIAXONE 1 G/50ML
1000 INJECTION, SOLUTION INTRAVENOUS ONCE
Status: COMPLETED | OUTPATIENT
Start: 2025-04-25 | End: 2025-04-25

## 2025-04-25 RX ORDER — POTASSIUM CHLORIDE 1500 MG/1
20 TABLET, EXTENDED RELEASE ORAL 2 TIMES DAILY
Status: DISCONTINUED | OUTPATIENT
Start: 2025-04-26 | End: 2025-04-26

## 2025-04-25 RX ORDER — MIRTAZAPINE 15 MG/1
7.5 TABLET, FILM COATED ORAL
Status: DISCONTINUED | OUTPATIENT
Start: 2025-04-25 | End: 2025-05-02 | Stop reason: HOSPADM

## 2025-04-25 RX ORDER — AMLODIPINE BESYLATE 10 MG/1
10 TABLET ORAL DAILY
Status: DISCONTINUED | OUTPATIENT
Start: 2025-04-26 | End: 2025-05-02 | Stop reason: HOSPADM

## 2025-04-25 RX ORDER — METOPROLOL TARTRATE 50 MG
50 TABLET ORAL EVERY 12 HOURS SCHEDULED
Status: DISCONTINUED | OUTPATIENT
Start: 2025-04-25 | End: 2025-05-02 | Stop reason: HOSPADM

## 2025-04-25 RX ORDER — ACETAMINOPHEN 325 MG/1
650 TABLET ORAL EVERY 6 HOURS PRN
Status: DISCONTINUED | OUTPATIENT
Start: 2025-04-25 | End: 2025-05-02 | Stop reason: HOSPADM

## 2025-04-25 RX ORDER — MAGNESIUM SULFATE HEPTAHYDRATE 40 MG/ML
2 INJECTION, SOLUTION INTRAVENOUS ONCE
Status: COMPLETED | OUTPATIENT
Start: 2025-04-25 | End: 2025-04-25

## 2025-04-25 RX ORDER — LEVOTHYROXINE SODIUM 75 UG/1
75 TABLET ORAL DAILY
Status: DISCONTINUED | OUTPATIENT
Start: 2025-04-26 | End: 2025-05-02 | Stop reason: HOSPADM

## 2025-04-25 RX ORDER — MECLIZINE HYDROCHLORIDE 25 MG/1
25 TABLET ORAL 3 TIMES DAILY PRN
Status: DISCONTINUED | OUTPATIENT
Start: 2025-04-25 | End: 2025-05-02 | Stop reason: HOSPADM

## 2025-04-25 RX ADMIN — SODIUM CHLORIDE 1000 ML: 0.9 INJECTION, SOLUTION INTRAVENOUS at 13:05

## 2025-04-25 RX ADMIN — POTASSIUM CHLORIDE 40 MEQ: 1500 TABLET, EXTENDED RELEASE ORAL at 14:26

## 2025-04-25 RX ADMIN — MIRTAZAPINE 7.5 MG: 15 TABLET, FILM COATED ORAL at 22:51

## 2025-04-25 RX ADMIN — MAGNESIUM SULFATE HEPTAHYDRATE 1 G: 1 INJECTION, SOLUTION INTRAVENOUS at 14:26

## 2025-04-25 RX ADMIN — HEPARIN SODIUM 5000 UNITS: 5000 INJECTION INTRAVENOUS; SUBCUTANEOUS at 22:51

## 2025-04-25 RX ADMIN — MAGNESIUM SULFATE HEPTAHYDRATE 2 G: 2 INJECTION, SOLUTION INTRAVENOUS at 17:33

## 2025-04-25 RX ADMIN — CEFTRIAXONE 1000 MG: 1 INJECTION, SOLUTION INTRAVENOUS at 15:44

## 2025-04-25 RX ADMIN — ONDANSETRON 4 MG: 2 INJECTION INTRAMUSCULAR; INTRAVENOUS at 13:05

## 2025-04-25 RX ADMIN — PRAVASTATIN SODIUM 80 MG: 80 TABLET ORAL at 19:23

## 2025-04-25 RX ADMIN — SODIUM CHLORIDE 100 ML/HR: 0.9 INJECTION, SOLUTION INTRAVENOUS at 19:15

## 2025-04-25 NOTE — ASSESSMENT & PLAN NOTE
Low mag and K on admission  Replenish and monitor daily   Managed on chronic K 20meq BID, continue

## 2025-04-25 NOTE — H&P
H&P - Hospitalist   Name: Alecia Kay 80 y.o. female I MRN: [id number]  Unit/Bed#: -01 I Date of Admission: 4/25/2025   Date of Service: 4/25/2025 I Hospital Day: 0     Assessment & Plan  Generalized weakness  POA with complaints of generalized weakness and poor appetite. Signed herself out of [facility] on 4/24, was home for one day when she started feeling weaker  Per OP , having cyclic vomiting at home and not taking any of her medications  Admits to poor appetite for the past month, will trial remeron   Had dilation of esophagus outpatient 4/22  Acute cystitis and mild electrolyte derangements with larry on admission  IV hydration and antibiotics  Replenish electrolytes  Flu/covid/rsv negative   CT CAP without acute findings   PT/OT consulted   Acute kidney injury (HCC)  Baseline around 1.3, admitted at 1.8  Start gentle IVF   Monitor daily bmp  Avoid nephrotoxic agents and hypotension   Hypertension  Continue home amlodipine and metoprolol   Electrolyte imbalance  Low mag and K on admission  Replenish and monitor daily   Managed on chronic K 20meq BID, continue   Acute cystitis  UA + and endorses dysuria  Await urine culture  Most recent being 4/3 with klebsiella, susceptible to rocephin   Start rocephin   Chronic osteomyelitis of toe of right foot (HCC)  Patient record reviewed-patient is to remain nonweightbearing until wound is completely healed   Completed 6 weeks IV abx  Wound care   Bilateral lower extremity edema  Was recently taken off of torsemide during last hospitalization  Caution with gentle hydration at this time  May need diuretics as needed  Also may benefit from changing amlodipine to a different blood pressure medication  Venous duplex pending, as patient does not ambulate at baseline      VTE Pharmacologic Prophylaxis:   Moderate Risk (Score 3-4) - Pharmacological DVT Prophylaxis Ordered: heparin.  Code Status: Level 1 - Full Code   Discussion with family:  "Attempted to update  (nephew) via phone. Left voicemail.     Anticipated Length of Stay: Patient will be admitted on an inpatient basis with an anticipated length of stay of greater than 2 midnights secondary to generalized weakness requiring pt/ot  consulted and iv hydration .    History of Present Illness   Chief Complaint: \"I havent been eating and I was throwing up this morning\"    Alecia Kay is a 80 y.o. female with a PMH of CKD, HTN,  hypothyrodisim and chronic osteo of right foot who presents with generalized weakness. Patient signed herself out of [facility] on 4/24, because she was \"sick of it\" and went home. Since coming home, patient has not been ambulatory, eaten or taken medications. Was throwing up morning of admission. States that she did not eat for the last month due to poor appetite. Endorses dysuria. Denies any falls or overt pain. Does not have trouble swallowing, states that she was nauseous this morning when trying to take medications and that is why she threw up.    Review of Systems   Constitutional:  Negative for fatigue and fever.   HENT:  Negative for congestion and rhinorrhea.    Respiratory:  Negative for cough, chest tightness, shortness of breath and wheezing.    Cardiovascular:  Negative for chest pain, palpitations and leg swelling.   Gastrointestinal:  Positive for nausea and vomiting. Negative for abdominal distention, abdominal pain, constipation and diarrhea.   Genitourinary:  Positive for dysuria. Negative for difficulty urinating.   Musculoskeletal:  Negative for arthralgias and back pain.   Skin:  Positive for wound.   Neurological:  Positive for weakness. Negative for dizziness, syncope, light-headedness and headaches.   Psychiatric/Behavioral:  Negative for agitation, behavioral problems and confusion.        Historical Information   Past Medical History:   Diagnosis Date    Anemia     Cellulitis     Disease of thyroid gland     GERD " (gastroesophageal reflux disease)     Hyperlipidemia     Hypertension     Obesity     Osteomyelitis (HCC)     Pneumonia     Pulmonary embolism (HCC)      Past Surgical History:   Procedure Laterality Date    FOOT SURGERY      JOINT REPLACEMENT       Social History     Tobacco Use    Smoking status: Never     Passive exposure: Never    Smokeless tobacco: Never   Vaping Use    Vaping status: Never Used   Substance and Sexual Activity    Alcohol use: Never    Drug use: Never    Sexual activity: Not on file     E-Cigarette/Vaping    E-Cigarette Use Never User      E-Cigarette/Vaping Substances     History reviewed. No pertinent family history.  Social History:  Marital Status:    Patient Pre-hospital Living Situation: Home  Patient Pre-hospital Level of Mobility: walks  Patient Pre-hospital Diet Restrictions: none    Meds/Allergies   I have reviewed home medications with patient personally.  Prior to Admission medications    Medication Sig Start Date End Date Taking? Authorizing Provider   acetaminophen (TYLENOL) 325 mg tablet Take 650 mg by mouth every 6 (six) hours as needed for mild pain    Historical Provider, MD   amLODIPine (NORVASC) 10 mg tablet Take 10 mg by mouth daily    Historical Provider, MD   hydrocortisone 1 % cream Apply topically 4 (four) times a day as needed for rash    Historical Provider, MD   levothyroxine 75 mcg tablet Take 75 mcg by mouth daily    Historical Provider, MD   magnesium hydroxide (MILK OF MAGNESIA) 400 mg/5 mL oral suspension Take by mouth daily as needed for constipation    Historical Provider, MD   meclizine (ANTIVERT) 25 mg tablet Take by mouth 3 (three) times a day as needed for dizziness    Historical Provider, MD   metoprolol tartrate (LOPRESSOR) 50 mg tablet Take 50 mg by mouth every 12 (twelve) hours    Historical Provider, MD   pantoprazole (PROTONIX) 40 mg tablet Take 40 mg by mouth daily    Historical Provider, MD   potassium chloride (Klor-Con M20) 20 mEq tablet  Take 20 mEq by mouth 2 (two) times a day    Historical Provider, MD   rosuvastatin (CRESTOR) 10 MG tablet Take 10 mg by mouth daily    Historical Provider, MD   saccharomyces boulardii (FLORASTOR) 250 mg capsule Take 250 mg by mouth 2 (two) times a day    Historical Provider, MD   senna-docusate sodium (SENOKOT S) 8.6-50 mg per tablet Take 1 tablet by mouth daily    Historical Provider, MD     Allergies   Allergen Reactions    Neomycin-Polymyxin-Hc Other (See Comments)     Unknown reaction      Penicillins Hives     Patient states she got hive, but has had penicillin medication in the past and was ok.     Quinine Other (See Comments)     Unknown    Amoxicillin-Pot Clavulanate Itching       Objective :  Temp:  [97.8 °F (36.6 °C)] 97.8 °F (36.6 °C)  HR:  [46-77] 46  BP: ()/(48-58) 132/56  Resp:  [16] 16  SpO2:  [92 %-97 %] 97 %  O2 Device: None (Room air)    Physical Exam  Vitals reviewed.   Constitutional:       General: She is not in acute distress.     Appearance: Normal appearance. She is obese. She is ill-appearing.   HENT:      Head: Normocephalic and atraumatic.      Nose: Nose normal.      Mouth/Throat:      Mouth: Mucous membranes are dry.      Pharynx: Oropharynx is clear.   Eyes:      Extraocular Movements: Extraocular movements intact.      Conjunctiva/sclera: Conjunctivae normal.   Cardiovascular:      Rate and Rhythm: Normal rate and regular rhythm.      Pulses: Normal pulses.      Heart sounds: Normal heart sounds. No murmur heard.  Pulmonary:      Effort: Pulmonary effort is normal. No respiratory distress.      Breath sounds: Normal breath sounds. No wheezing or rhonchi.   Abdominal:      General: Abdomen is flat. Bowel sounds are normal. There is no distension.      Palpations: Abdomen is soft.      Tenderness: There is no abdominal tenderness. There is no guarding.   Musculoskeletal:         General: Normal range of motion.      Cervical back: Normal range of motion.      Right lower leg:  Edema present.      Left lower leg: Edema present.   Skin:     General: Skin is warm.   Neurological:      General: No focal deficit present.      Mental Status: She is alert and oriented to person, place, and time. Mental status is at baseline.      Motor: No weakness.   Psychiatric:         Mood and Affect: Mood normal.         Behavior: Behavior normal.         Thought Content: Thought content normal.         Judgment: Judgment normal.          Lines/Drains:               Lab Results: I have reviewed the following results:  Results from last 7 days   Lab Units 04/25/25  1307   WBC Thousand/uL 7.54   HEMOGLOBIN g/dL 9.7*   HEMATOCRIT % 30.0*   PLATELETS Thousands/uL 223   SEGS PCT % 78*   LYMPHO PCT % 15   MONO PCT % 6   EOS PCT % 0     Results from last 7 days   Lab Units 04/25/25  1307   SODIUM mmol/L 141   POTASSIUM mmol/L 3.0*   CHLORIDE mmol/L 102   CO2 mmol/L 22   BUN mg/dL 27*   CREATININE mg/dL 1.87*   ANION GAP mmol/L 17*   CALCIUM mg/dL 9.3   ALBUMIN g/dL 3.3*   TOTAL BILIRUBIN mg/dL 0.81   ALK PHOS U/L 58   ALT U/L 6*   AST U/L 13   GLUCOSE RANDOM mg/dL 89     Results from last 7 days   Lab Units 04/25/25  1307   INR  1.18         Lab Results   Component Value Date    HGBA1C 5.7 (H) 11/13/2024    HGBA1C 5.8 (H) 06/17/2024    HGBA1C 5.8 (H) 12/04/2023           Imaging Results Review: I reviewed radiology reports from this admission including: CT chest and CT abdomen/pelvis.      Administrative Statements     ** Please Note: This note has been constructed using a voice recognition system. **

## 2025-04-25 NOTE — PLAN OF CARE
Problem: Prexisting or High Potential for Compromised Skin Integrity  Goal: Skin integrity is maintained or improved  Description: INTERVENTIONS:- Identify patients at risk for skin breakdown- Assess and monitor skin integrity- Assess and monitor nutrition and hydration status- Monitor labs - Assess for incontinence - Turn and reposition patient- Assist with mobility/ambulation- Relieve pressure over bony prominences- Avoid friction and shearing- Provide appropriate hygiene as needed including keeping skin clean and dry- Evaluate need for skin moisturizer/barrier cream- Collaborate with interdisciplinary team - Patient/family teaching- Consider wound care consult   Outcome: Progressing     Problem: PAIN - ADULT  Goal: Verbalizes/displays adequate comfort level or baseline comfort level  Description: Interventions:- Encourage patient to monitor pain and request assistance- Assess pain using appropriate pain scale- Administer analgesics based on type and severity of pain and evaluate response- Implement non-pharmacological measures as appropriate and evaluate response- Consider cultural and social influences on pain and pain management- Notify physician/advanced practitioner if interventions unsuccessful or patient reports new pain  Outcome: Progressing     Problem: INFECTION - ADULT  Goal: Absence or prevention of progression during hospitalization  Description: INTERVENTIONS:- Assess and monitor for signs and symptoms of infection- Monitor lab/diagnostic results- Monitor all insertion sites, i.e. indwelling lines, tubes, and drains- Monitor endotracheal if appropriate and nasal secretions for changes in amount and color- [place] appropriate cooling/warming therapies per order- Administer medications as ordered- Instruct and encourage patient and family to use good hand hygiene technique- Identify and instruct in appropriate isolation precautions for identified infection/condition  Outcome: Progressing  Goal:  Absence of fever/infection during neutropenic period  Description: INTERVENTIONS:- Monitor WBC  Outcome: Progressing     Problem: SAFETY ADULT  Goal: Patient will remain free of falls  Description: INTERVENTIONS:- Educate patient/family on patient safety including physical limitations- Instruct patient to call for assistance with activity - Consult OT/PT to assist with strengthening/mobility - Keep Call bell within reach- Keep bed low and locked with side rails adjusted as appropriate- Keep care items and personal belongings within reach- Initiate and maintain comfort rounds- Make Fall Risk Sign visible to staff- Offer Toileting every 2 Hours, in advance of need- Initiate/Maintain bed alarm- Obtain necessary fall risk management equipment: - Apply yellow socks and bracelet for high fall risk patients- Consider moving patient to room near nurses station  Outcome: Progressing  Goal: Maintain or return to baseline ADL function  Description: INTERVENTIONS:-  Assess patient's ability to carry out ADLs; assess patient's baseline for ADL function and identify physical deficits which impact ability to perform ADLs (bathing, care of mouth/teeth, toileting, grooming, dressing, etc.)- Assess/evaluate cause of self-care deficits - Assess range of motion- Assess patient's mobility; develop plan if impaired- Assess patient's need for assistive devices and provide as appropriate- Encourage maximum independence but intervene and supervise when necessary- Involve family in performance of ADLs- Assess for home care needs following discharge - Consider OT consult to assist with ADL evaluation and planning for discharge- Provide patient education as appropriate  Outcome: Progressing  Goal: Maintains/Returns to pre admission functional level  Description: INTERVENTIONS:- Perform AM-PAC 6 Click Basic Mobility/ Daily Activity assessment daily.- Set and communicate daily mobility goal to care team and patient/family/caregiver. -  Collaborate with rehabilitation services on mobility goals if consulted- Perform Range of Motion 3 times a day.- Reposition patient every 2 hours.- Dangle patient 3 times a day- Stand patient 3 times a day- Ambulate patient 3 times a day- Out of bed to chair 3 times a day - Out of bed for meals 3 times a day- Out of bed for toileting- Record patient progress and toleration of activity level   Outcome: Progressing

## 2025-04-25 NOTE — ASSESSMENT & PLAN NOTE
UA + and endorses dysuria  Await urine culture  Most recent being 4/3 with klebsiella, susceptible to rocephin   Start rocephin

## 2025-04-25 NOTE — ASSESSMENT & PLAN NOTE
POA with complaints of generalized weakness and poor appetite. Signed herself out of [facility] on 4/24, was home for one day when she started feeling weaker  Per OP , having cyclic vomiting at home and not taking any of her medications  Admits to poor appetite for the past month, will trial remeron   Had dilation of esophagus outpatient 4/22  Acute cystitis and mild electrolyte derangements with larry on admission  IV hydration and antibiotics  Replenish electrolytes  Flu/covid/rsv negative   CT CAP without acute findings   PT/OT consulted

## 2025-04-25 NOTE — ASSESSMENT & PLAN NOTE
Baseline around 1.3, admitted at 1.8  Start gentle IVF   Monitor daily bmp  Avoid nephrotoxic agents and hypotension

## 2025-04-25 NOTE — ASSESSMENT & PLAN NOTE
Was recently taken off of torsemide during last hospitalization  Caution with gentle hydration at this time  May need diuretics as needed  Also may benefit from changing amlodipine to a different blood pressure medication  Venous duplex pending, as patient does not ambulate at baseline

## 2025-04-25 NOTE — ED PROVIDER NOTES
Time reflects when diagnosis was documented in both MDM as applicable and the Disposition within this note       Time User Action Codes Description Comment    4/25/2025  4:55 PM Velasquez Domínguez [R53.1] Generalized weakness     4/25/2025  4:55 PM Velasquez Domínguez [R26.2] Ambulatory dysfunction     4/25/2025  4:55 PM Velasquez Domínguez [N17.9] Acute kidney injury (HCC)     4/25/2025  4:56 PM Velasquez Domínguez [N39.0] Acute UTI     4/25/2025  4:56 PM Velasquez Domínguez [E87.6] Hypokalemia     4/25/2025  9:17 PM Velasquez Domínguez [E83.42] Hypomagnesemia           ED Disposition       ED Disposition   Admit    Condition   Stable    Date/Time   Fri Apr 25, 2025  4:55 PM    Comment   Case was discussed with Dr. Salas and the patient's admission status was agreed to be Admission Status: inpatient status to the service of Dr. Salas.               Assessment & Plan       Medical Decision Making  Patient was seen and evaluated for her presentation as outlined.  Patient at risk for acute coronary syndrome, dehydration, electrolyte disturbance, acute infection, acute abdominal process, other.  Workup as shown.  Found to have mild acute kidney injury as well as mild hypokalemia.  Replacement given.  IV hydration given.  CT scan of the chest, abdomen, pelvis shows small hiatal hernia, no complication from recent esophageal dilatation.  Urinalysis shows urinary tract infection.  IV antibiotics given.  Also mild hypomagnesemia noted, replacement given.  Hospitalist consulted for admission, case discussed with Dr. Salas, who accepted the patient for admission and further management as indicated.    Amount and/or Complexity of Data Reviewed  Labs: ordered.  Radiology: ordered.  ECG/medicine tests: ordered and independent interpretation performed.     Details: EKG by my interpretation demonstrates normal sinus rhythm at a ventricular rate of 61 bpm.  Normal axis, normal intervals.  No acute ST elevations.  Lateral ST and T wave  abnormality present.  Minimal LVH present.  No prior EKGs available for comparison.    Risk  Prescription drug management.  Decision regarding hospitalization.             Medications   sodium chloride 0.9 % bolus 1,000 mL (0 mL Intravenous Stopped 4/25/25 1509)   ondansetron (ZOFRAN) injection 4 mg (4 mg Intravenous Given 4/25/25 1305)   potassium chloride (Klor-Con M20) CR tablet 40 mEq (40 mEq Oral Given 4/25/25 1426)   magnesium sulfate IVPB (premix) SOLN 1 g (0 g Intravenous Stopped 4/25/25 1544)   cefTRIAXone (ROCEPHIN) IVPB (premix in dextrose) 1,000 mg 50 mL (0 mg Intravenous Stopped 4/25/25 1614)   magnesium sulfate 2 g/50 mL IVPB (premix) 2 g (2 g Intravenous New Bag 4/25/25 1733)       ED Risk Strat Scores                    No data recorded        SBIRT 22yo+      Flowsheet Row Most Recent Value   Initial Alcohol Screen: US AUDIT-C     1. How often do you have a drink containing alcohol? 0 Filed at: 04/25/2025 1256   2. How many drinks containing alcohol do you have on a typical day you are drinking?  0 Filed at: 04/25/2025 1256   3b. FEMALE Any Age, or MALE 65+: How often do you have 4 or more drinks on one occassion? 0 Filed at: 04/25/2025 1256   Audit-C Score 0 Filed at: 04/25/2025 1256   MARRY: How many times in the past year have you...    Used an illegal drug or used a prescription medication for non-medical reasons? Never Filed at: 04/25/2025 1256                            History of Present Illness       Chief Complaint   Patient presents with    Weakness - Generalized     Pt arrives via EMS from home with c/o generalized weakness, no appetite. Pt signed herself out of [facility] yesterday.       Past Medical History:   Diagnosis Date    Anemia     Cellulitis     Disease of thyroid gland     GERD (gastroesophageal reflux disease)     Hyperlipidemia     Hypertension     Obesity     Osteomyelitis (HCC)     Pneumonia     Pulmonary embolism (HCC)       Past Surgical History:   Procedure  Laterality Date    FOOT SURGERY      JOINT REPLACEMENT        History reviewed. No pertinent family history.   Social History     Tobacco Use    Smoking status: Never     Passive exposure: Never    Smokeless tobacco: Never   Vaping Use    Vaping status: Never Used   Substance Use Topics    Alcohol use: Never    Drug use: Never      E-Cigarette/Vaping    E-Cigarette Use Never User       E-Cigarette/Vaping Substances      I have reviewed and agree with the history as documented.     Patient presents to the emergency department for evaluation of nausea, poor appetite, difficulty eating, and generalized weakness.  Patient states that she underwent EGD on Tuesday, had esophageal dilatation.  She has a history of hiatal hernia, Wang's esophagus.  Has had nausea and decreased appetite, poor oral intake.  Had been at [facility], but signed her self out yesterday.  This morning, she vomited after trying to take her pills.  Denies any fevers or chills.  Denies any chest or abdominal pain.  No shortness of breath.  No diarrhea.  Reports generalized weakness, difficulty ambulating.  States that she was immobile for a while due to a wound on her right heel that took a while to heal.  She described being deconditioned after being immobile due to her foot healing.  No other complaints, modifying factors, or associated symptoms.        Review of Systems   All other systems reviewed and are negative.          Objective       ED Triage Vitals   Temperature Pulse Blood Pressure Respirations SpO2 Patient Position - Orthostatic VS   04/25/25 1241 04/25/25 1241 04/25/25 1241 04/25/25 1242 04/25/25 1241 04/25/25 1500   97.8 °F (36.6 °C) 77 (!) 96/48 16 96 % Lying      Temp Source Heart Rate Source BP Location FiO2 (%) Pain Score    04/25/25 1241 04/25/25 1600 04/25/25 1500 -- 04/25/25 1240    Temporal Monitor Right arm  No Pain      Vitals      Date and Time Temp Pulse SpO2 Resp BP Pain Score FACES Pain Rating User    04/25/25 1840 -- 44 -- -- -- -- -- MB   04/25/25 1836 -- -- -- 16 -- -- -- MB   04/25/25 1836 97.3 °F (36.3 °C) -- -- -- 107/50 -- -- PL   04/25/25 1821 -- -- 93 % -- -- -- -- PL   04/25/25 1811 -- -- -- -- -- No Pain -- MB   04/25/25 1700 -- -- 97 % 16 132/56 -- -- RR   04/25/25 1600 -- 46 93 % 16 123/58 -- -- RR   04/25/25 1500 -- 47 92 % 16 117/56 No Pain -- RR   04/25/25 1259 -- -- -- -- -- No Pain -- RR   04/25/25 1242 -- -- -- 16 -- -- -- MD   04/25/25 1241 97.8 °F (36.6 °C) 77 96 % -- -- No Pain -- MD   04/25/25 1241 -- -- -- -- 96/48 -- -- RR   04/25/25 1240 -- -- -- -- -- No Pain -- MD            Physical Exam  Vitals and nursing note reviewed.   Constitutional:       General: She is not in acute distress.     Appearance: She is well-developed. She is not ill-appearing or toxic-appearing.   HENT:      Head: Normocephalic and atraumatic.      Mouth/Throat:      Mouth: Mucous membranes are dry.   Eyes:      Extraocular Movements: Extraocular movements intact.      Conjunctiva/sclera: Conjunctivae normal.      Pupils: Pupils are equal, round, and reactive to light.   Cardiovascular:      Rate and Rhythm: Normal rate and regular rhythm.      Pulses: Normal pulses.      Heart sounds: Normal heart sounds. No murmur heard.     No friction rub. No gallop.   Pulmonary:      Effort: Pulmonary effort is normal. No respiratory distress.      Breath sounds: Normal breath sounds. No wheezing, rhonchi or rales.   Abdominal:      General: Abdomen is flat. There is no distension.      Palpations: Abdomen is soft.      Tenderness: There is no abdominal tenderness. There is no guarding or rebound.   Musculoskeletal:         General: No swelling or signs of injury. Normal range of motion.      Cervical back: Normal range of motion and neck supple.   Skin:     General: Skin is warm and dry.      Capillary Refill: Capillary refill takes less than 2 seconds.   Neurological:      General: No focal deficit present.      Mental  Status: She is alert and oriented to person, place, and time.   Psychiatric:         Mood and Affect: Mood normal.         Behavior: Behavior normal.         Results Reviewed       Procedure Component Value Units Date/Time    HS Troponin I 2hr [[id number]]  (Normal) Collected: 04/25/25 1513    Lab Status: Final result Specimen: Blood from Arm, Left Updated: 04/25/25 1557     hs TnI 2hr 47 ng/L      Delta 2hr hsTnI -1 ng/L     Urine Microscopic [[id number]]  (Abnormal) Collected: 04/25/25 1424    Lab Status: Final result Specimen: Urine, Other Updated: 04/25/25 1438     RBC, UA 2-4 /hpf      WBC, UA 30-50 /hpf      Epithelial Cells Occasional /hpf      Bacteria, UA Moderate /hpf     Urine culture [[id number]] Collected: 04/25/25 1424    Lab Status: In process Specimen: Urine, Other Updated: 04/25/25 1438    UA w Reflex to Microscopic w Reflex to Culture [[id number]]  (Abnormal) Collected: 04/25/25 1424    Lab Status: Final result Specimen: Urine, Other Updated: 04/25/25 1433     Color, UA Yellow     Clarity, UA Cloudy     Specific Gravity, UA 1.020     pH, UA 6.0     Leukocytes, UA Moderate     Nitrite, UA Negative     Protein, UA 30 (1+) mg/dl      Glucose, UA Negative mg/dl      Ketones, UA Negative mg/dl      Urobilinogen, UA 0.2 E.U./dl      Bilirubin, UA Moderate     Occult Blood, UA Small    HS Troponin 0hr (reflex protocol) [[id number]]  (Normal) Collected: 04/25/25 1307    Lab Status: Final result Specimen: Blood from Arm, Left Updated: 04/25/25 1340     hs TnI 0hr 48 ng/L     Protime-INR [[id number]]  (Abnormal) Collected: 04/25/25 1307    Lab Status: Final result Specimen: Blood from Arm, Left Updated: 04/25/25 1333     Protime 15.4 seconds      INR 1.18    Narrative:      INR Therapeutic Range    Indication                                             INR Range      Atrial Fibrillation                                               2.0-3.0  Hypercoagulable State                                    2.0.2.3  Left  Ventricular Asist Device                            2.0-3.0  Mechanical Heart Valve                                  -    Aortic(with afib, MI, embolism, HF, LA enlargement,    and/or coagulopathy)                                     2.0-3.0 (2.5-3.5)     Mitral                                                             2.5-3.5  Prosthetic/Bioprosthetic Heart Valve               2.0-3.0  Venous thromboembolism (VTE: VT, PE        2.0-3.0    APTT [[id number]]  (Normal) Collected: 04/25/25 1307    Lab Status: Final result Specimen: Blood from Arm, Left Updated: 04/25/25 1333     PTT 28 seconds     Comprehensive metabolic panel [[id number]]  (Abnormal) Collected: 04/25/25 1307    Lab Status: Final result Specimen: Blood from Arm, Left Updated: 04/25/25 1332     Sodium 141 mmol/L      Potassium 3.0 mmol/L      Chloride 102 mmol/L      CO2 22 mmol/L      ANION GAP 17 mmol/L      BUN 27 mg/dL      Creatinine 1.87 mg/dL      Glucose 89 mg/dL      Calcium 9.3 mg/dL      Corrected Calcium 9.9 mg/dL      AST 13 U/L      ALT 6 U/L      Alkaline Phosphatase 58 U/L      Total Protein 6.6 g/dL      Albumin 3.3 g/dL      Total Bilirubin 0.81 mg/dL      eGFR 24 ml/min/1.73sq m     Narrative:      National Kidney Disease Foundation guidelines for Chronic Kidney Disease (CKD):     Stage 1 with normal or high GFR (GFR > 90 mL/min/1.73 square meters)    Stage 2 Mild CKD (GFR = 60-89 mL/min/1.73 square meters)    Stage 3A Moderate CKD (GFR = 45-59 mL/min/1.73 square meters)    Stage 3B Moderate CKD (GFR = 30-44 mL/min/1.73 square meters)    Stage 4 Severe CKD (GFR = 15-29 mL/min/1.73 square meters)    Stage 5 End Stage CKD (GFR <15 mL/min/1.73 square meters)  Note: GFR calculation is accurate only with a steady state creatinine    Lipase [[id number]]  (Normal) Collected: 04/25/25 1307    Lab Status: Final result Specimen: Blood from Arm, Left Updated: 04/25/25 1332     Lipase 14 u/L     Magnesium [[id number]]  (Abnormal) Collected:  04/25/25 1307    Lab Status: Final result Specimen: Blood from Arm, Left Updated: 04/25/25 1332     Magnesium 1.5 mg/dL     FLU/COVID Rapid Antigen (30 min. TAT) - Preferred screening test in ED [[id number]]  (Normal) Collected: 04/25/25 1307    Lab Status: Final result Specimen: Nares from Nose Updated: 04/25/25 1330     SARS COV Rapid Antigen Negative     Influenza A Rapid Antigen Negative     Influenza B Rapid Antigen Negative    Narrative:      This test has been performed using the [employer] Here[employer] Radha 2 FLU+SARS Antigen test under the Emergency Use Authorization (EUA). This test has been validated by the  and verified by the performing laboratory. The Radha uses lateral flow immunofluorescent sandwich assay to detect SARS-COV, Influenza A and Influenza B Antigen.     The Quidel Radha 2 SARS Antigen test does not differentiate between SARS-CoV and SARS-CoV-2.     Negative results are presumptive and may be confirmed with a molecular assay, if necessary, for patient management. Negative results do not rule out SARS-CoV-2 or influenza infection and should not be used as the sole basis for treatment or patient management decisions. A negative test result may occur if the level of antigen in a sample is below the limit of detection of this test.     Positive results are indicative of the presence of viral antigens, but do not rule out bacterial infection or co-infection with other viruses.     All test results should be used as an adjunct to clinical observations and other information available to the provider.    FOR PEDIATRIC PATIENTS - copy/paste COVID Guidelines URL to browser: [email or web address]    CBC and differential [[id number]]  (Abnormal) Collected: 04/25/25 1307    Lab Status: Final result Specimen: Blood from Arm, Left Updated: 04/25/25 1316     WBC 7.54 Thousand/uL      RBC 3.36 Million/uL      Hemoglobin 9.7 g/dL      Hematocrit 30.0 %      MCV  89 fL      MCH 28.9 pg      MCHC 32.3 g/dL      RDW 14.6 %      MPV 9.8 fL      Platelets 223 Thousands/uL      nRBC 0 /100 WBCs      Segmented % 78 %      Immature Grans % 1 %      Lymphocytes % 15 %      Monocytes % 6 %      Eosinophils Relative 0 %      Basophils Relative 0 %      Absolute Neutrophils 5.88 Thousands/µL      Absolute Immature Grans 0.09 Thousand/uL      Absolute Lymphocytes 1.11 Thousands/µL      Absolute Monocytes 0.44 Thousand/µL      Eosinophils Absolute 0.00 Thousand/µL      Basophils Absolute 0.02 Thousands/µL             CT chest abdomen pelvis wo contrast   Final Interpretation by Misha Encarnacion MD (04/25 1500)      Status post reported esophageal dilatation. No evidence for pneumomediastinum. Small hiatal hernia is noted.      No acute intra-abdominal or pelvic abnormality identified.      Colonic diverticulosis without evidence for acute diverticulitis.               Workstation performed: [id number]          VAS VENOUS DUPLEX - LOWER LIMB BILATERAL    (Results Pending)       Procedures    ED Medication and Procedure Management   Prior to Admission Medications   Prescriptions Last Dose Informant Patient Reported? Taking?   acetaminophen (TYLENOL) 325 mg tablet   Yes No   Sig: Take 650 mg by mouth every 6 (six) hours as needed for mild pain   amLODIPine (NORVASC) 10 mg tablet   Yes Yes   Sig: Take 10 mg by mouth daily   hydrocortisone 1 % cream Not Taking Self Yes No   Sig: Apply topically 4 (four) times a day as needed for rash   Patient not taking: Reported on 4/25/2025   levothyroxine 75 mcg tablet   Yes Yes   Sig: Take 75 mcg by mouth daily   magnesium hydroxide (MILK OF MAGNESIA) 400 mg/5 mL oral suspension   Yes No   Sig: Take by mouth daily as needed for constipation   meclizine (ANTIVERT) 25 mg tablet  Self Yes No   Sig: Take by mouth 3 (three) times a day as needed for dizziness   metoprolol tartrate (LOPRESSOR) 50 mg tablet   Yes Yes   Sig: Take 50 mg by mouth every 12 (twelve) hours    pantoprazole (PROTONIX) 40 mg tablet   Yes Yes   Sig: Take 40 mg by mouth daily   rosuvastatin (CRESTOR) 10 MG tablet   Yes No   Sig: Take 10 mg by mouth daily   saccharomyces boulardii (FLORASTOR) 250 mg capsule  Self Yes No   Sig: Take 250 mg by mouth 2 (two) times a day   senna-docusate sodium (SENOKOT S) 8.6-50 mg per tablet   Yes No   Sig: Take 1 tablet by mouth daily   warfarin (COUMADIN) 5 mg tablet   Yes Yes   Sig: Take 5 mg by mouth daily TAKE 1 AND 1/2 TABLETS(7.5MG) ON MONDAY AND FRIDAY, AND 1 TABLET(5MG) ON THE OTHER DAYS      Facility-Administered Medications: None     Current Discharge Medication List        CONTINUE these medications which have NOT CHANGED    Details   warfarin (COUMADIN) 5 mg tablet Take 5 mg by mouth daily TAKE 1 AND 1/2 TABLETS(7.5MG) ON MONDAY AND FRIDAY, AND 1 TABLET(5MG) ON THE OTHER DAYS      acetaminophen (TYLENOL) 325 mg tablet Take 650 mg by mouth every 6 (six) hours as needed for mild pain      amLODIPine (NORVASC) 10 mg tablet Take 10 mg by mouth daily      hydrocortisone 1 % cream Apply topically 4 (four) times a day as needed for rash      levothyroxine 75 mcg tablet Take 75 mcg by mouth daily      magnesium hydroxide (MILK OF MAGNESIA) 400 mg/5 mL oral suspension Take by mouth daily as needed for constipation      meclizine (ANTIVERT) 25 mg tablet Take by mouth 3 (three) times a day as needed for dizziness      metoprolol tartrate (LOPRESSOR) 50 mg tablet Take 50 mg by mouth every 12 (twelve) hours      pantoprazole (PROTONIX) 40 mg tablet Take 40 mg by mouth daily      rosuvastatin (CRESTOR) 10 MG tablet Take 10 mg by mouth daily      saccharomyces boulardii (FLORASTOR) 250 mg capsule Take 250 mg by mouth 2 (two) times a day      senna-docusate sodium (SENOKOT S) 8.6-50 mg per tablet Take 1 tablet by mouth daily           STOP taking these medications       potassium chloride (Klor-Con M20) 20 mEq tablet Comments:   Reason for Stopping:             No discharge  procedures on file.  ED SEPSIS DOCUMENTATION   Time reflects when diagnosis was documented in both MDM as applicable and the Disposition within this note       Time User Action Codes Description Comment    4/25/2025  4:55 PM Velasquez Domínguez [R53.1] Generalized weakness     4/25/2025  4:55 PM Velasquez Domínguez [R26.2] Ambulatory dysfunction     4/25/2025  4:55 PM Velasquez Domínguez [N17.9] Acute kidney injury (HCC)     4/25/2025  4:56 PM Velasquez Domínguez [N39.0] Acute UTI     4/25/2025  4:56 PM Velasquez Domínguez [E87.6] Hypokalemia     4/25/2025  9:17 PM Velasquez Domínguez [E83.42] Hypomagnesemia                  Velasquez Domínguez MD  04/25/25 0559

## 2025-04-25 NOTE — ASSESSMENT & PLAN NOTE
Patient record reviewed-patient is to remain nonweightbearing until wound is completely healed   Completed 6 weeks IV abx  Wound care

## 2025-04-26 PROBLEM — R11.2 NAUSEA AND VOMITING: Status: ACTIVE | Noted: 2025-04-26

## 2025-04-26 LAB
ALBUMIN SERPL BCG-MCNC: 2.6 G/DL (ref 3.5–5)
ALP SERPL-CCNC: 46 U/L (ref 34–104)
ALT SERPL W P-5'-P-CCNC: 5 U/L (ref 7–52)
ANION GAP SERPL CALCULATED.3IONS-SCNC: 13 MMOL/L (ref 4–13)
AST SERPL W P-5'-P-CCNC: 10 U/L (ref 13–39)
BILIRUB SERPL-MCNC: 0.51 MG/DL (ref 0.2–1)
BUN SERPL-MCNC: 22 MG/DL (ref 5–25)
CALCIUM ALBUM COR SERPL-MCNC: 9.1 MG/DL (ref 8.3–10.1)
CALCIUM SERPL-MCNC: 8 MG/DL (ref 8.4–10.2)
CHLORIDE SERPL-SCNC: 107 MMOL/L (ref 96–108)
CO2 SERPL-SCNC: 22 MMOL/L (ref 21–32)
CREAT SERPL-MCNC: 1.43 MG/DL (ref 0.6–1.3)
ERYTHROCYTE [DISTWIDTH] IN BLOOD BY AUTOMATED COUNT: 14.8 % (ref 11.6–15.1)
GFR SERPL CREATININE-BSD FRML MDRD: 34 ML/MIN/1.73SQ M
GLUCOSE SERPL-MCNC: 69 MG/DL (ref 65–140)
HCT VFR BLD AUTO: 24 % (ref 34.8–46.1)
HGB BLD-MCNC: 7.8 G/DL (ref 11.5–15.4)
MAGNESIUM SERPL-MCNC: 2.1 MG/DL (ref 1.9–2.7)
MCH RBC QN AUTO: 28.9 PG (ref 26.8–34.3)
MCHC RBC AUTO-ENTMCNC: 32.5 G/DL (ref 31.4–37.4)
MCV RBC AUTO: 89 FL (ref 82–98)
PLATELET # BLD AUTO: 185 THOUSANDS/UL (ref 149–390)
PMV BLD AUTO: 9.4 FL (ref 8.9–12.7)
POTASSIUM SERPL-SCNC: 2.4 MMOL/L (ref 3.5–5.3)
POTASSIUM SERPL-SCNC: 3 MMOL/L (ref 3.5–5.3)
PROT SERPL-MCNC: 5.2 G/DL (ref 6.4–8.4)
RBC # BLD AUTO: 2.7 MILLION/UL (ref 3.81–5.12)
SODIUM SERPL-SCNC: 142 MMOL/L (ref 135–147)
WBC # BLD AUTO: 6.28 THOUSAND/UL (ref 4.31–10.16)

## 2025-04-26 PROCEDURE — 83735 ASSAY OF MAGNESIUM: CPT

## 2025-04-26 PROCEDURE — 97166 OT EVAL MOD COMPLEX 45 MIN: CPT

## 2025-04-26 PROCEDURE — 80053 COMPREHEN METABOLIC PANEL: CPT

## 2025-04-26 PROCEDURE — 99232 SBSQ HOSP IP/OBS MODERATE 35: CPT

## 2025-04-26 PROCEDURE — 84132 ASSAY OF SERUM POTASSIUM: CPT

## 2025-04-26 PROCEDURE — 85027 COMPLETE CBC AUTOMATED: CPT

## 2025-04-26 PROCEDURE — 97535 SELF CARE MNGMENT TRAINING: CPT

## 2025-04-26 RX ORDER — POTASSIUM CHLORIDE 14.9 MG/ML
20 INJECTION INTRAVENOUS ONCE
Status: COMPLETED | OUTPATIENT
Start: 2025-04-26 | End: 2025-04-26

## 2025-04-26 RX ORDER — METOCLOPRAMIDE 5 MG/1
5 TABLET ORAL
Status: DISCONTINUED | OUTPATIENT
Start: 2025-04-26 | End: 2025-04-29

## 2025-04-26 RX ORDER — HYDROXYZINE HYDROCHLORIDE 25 MG/1
25 TABLET, FILM COATED ORAL EVERY 6 HOURS PRN
Status: DISCONTINUED | OUTPATIENT
Start: 2025-04-26 | End: 2025-05-01

## 2025-04-26 RX ORDER — POTASSIUM CHLORIDE 29.8 MG/ML
40 INJECTION INTRAVENOUS ONCE
Status: DISCONTINUED | OUTPATIENT
Start: 2025-04-26 | End: 2025-04-26

## 2025-04-26 RX ORDER — POTASSIUM CHLORIDE 1500 MG/1
40 TABLET, EXTENDED RELEASE ORAL 2 TIMES DAILY
Status: DISCONTINUED | OUTPATIENT
Start: 2025-04-26 | End: 2025-05-02 | Stop reason: HOSPADM

## 2025-04-26 RX ADMIN — SODIUM CHLORIDE 75 ML/HR: 0.9 INJECTION, SOLUTION INTRAVENOUS at 21:36

## 2025-04-26 RX ADMIN — POTASSIUM CHLORIDE 20 MEQ: 14.9 INJECTION, SOLUTION INTRAVENOUS at 19:20

## 2025-04-26 RX ADMIN — POTASSIUM CHLORIDE 20 MEQ: 14.9 INJECTION, SOLUTION INTRAVENOUS at 09:25

## 2025-04-26 RX ADMIN — MIRTAZAPINE 7.5 MG: 15 TABLET, FILM COATED ORAL at 21:08

## 2025-04-26 RX ADMIN — HEPARIN SODIUM 5000 UNITS: 5000 INJECTION INTRAVENOUS; SUBCUTANEOUS at 13:12

## 2025-04-26 RX ADMIN — HEPARIN SODIUM 5000 UNITS: 5000 INJECTION INTRAVENOUS; SUBCUTANEOUS at 21:08

## 2025-04-26 RX ADMIN — ONDANSETRON 4 MG: 2 INJECTION INTRAMUSCULAR; INTRAVENOUS at 09:22

## 2025-04-26 RX ADMIN — POTASSIUM CHLORIDE 20 MEQ: 14.9 INJECTION, SOLUTION INTRAVENOUS at 16:48

## 2025-04-26 RX ADMIN — CEFTRIAXONE 1000 MG: 1 INJECTION, SOLUTION INTRAVENOUS at 14:31

## 2025-04-26 RX ADMIN — METOCLOPRAMIDE 5 MG: 5 TABLET ORAL at 16:48

## 2025-04-26 RX ADMIN — HYDROXYZINE HYDROCHLORIDE 25 MG: 25 TABLET, FILM COATED ORAL at 13:12

## 2025-04-26 RX ADMIN — HEPARIN SODIUM 5000 UNITS: 5000 INJECTION INTRAVENOUS; SUBCUTANEOUS at 06:31

## 2025-04-26 RX ADMIN — POTASSIUM CHLORIDE 20 MEQ: 14.9 INJECTION, SOLUTION INTRAVENOUS at 06:24

## 2025-04-26 RX ADMIN — SODIUM CHLORIDE 100 ML/HR: 0.9 INJECTION, SOLUTION INTRAVENOUS at 04:03

## 2025-04-26 RX ADMIN — PANTOPRAZOLE SODIUM 40 MG: 40 TABLET, DELAYED RELEASE ORAL at 09:33

## 2025-04-26 RX ADMIN — METOCLOPRAMIDE 5 MG: 5 TABLET ORAL at 13:12

## 2025-04-26 RX ADMIN — LEVOTHYROXINE SODIUM 75 MCG: 75 TABLET ORAL at 06:30

## 2025-04-26 NOTE — ASSESSMENT & PLAN NOTE
POA with complaints of generalized weakness and poor appetite. Signed herself out of [facility] on 4/24, was home for one day when she started feeling weaker  Per OP , having cyclic vomiting at home and not taking any of her medications  Admits to poor appetite for the past month, will trial remeron   Had dilation of esophagus outpatient 4/22  Acute cystitis and mild electrolyte derangements with larry on admission  IV hydration and antibiotics  Replenish electrolytes  Flu/covid/rsv negative   CT CAP without acute findings   PT/OT consulted

## 2025-04-26 NOTE — PLAN OF CARE
Problem: Prexisting or High Potential for Compromised Skin Integrity  Goal: Skin integrity is maintained or improved  Description: INTERVENTIONS:- Identify patients at risk for skin breakdown- Assess and monitor skin integrity- Assess and monitor nutrition and hydration status- Monitor labs - Assess for incontinence - Turn and reposition patient- Assist with mobility/ambulation- Relieve pressure over bony prominences- Avoid friction and shearing- Provide appropriate hygiene as needed including keeping skin clean and dry- Evaluate need for skin moisturizer/barrier cream- Collaborate with interdisciplinary team - Patient/family teaching- Consider wound care consult   Outcome: Progressing     Problem: PAIN - ADULT  Goal: Verbalizes/displays adequate comfort level or baseline comfort level  Description: Interventions:- Encourage patient to monitor pain and request assistance- Assess pain using appropriate pain scale- Administer analgesics based on type and severity of pain and evaluate response- Implement non-pharmacological measures as appropriate and evaluate response- Consider cultural and social influences on pain and pain management- Notify physician/advanced practitioner if interventions unsuccessful or patient reports new pain  Outcome: Progressing     Problem: INFECTION - ADULT  Goal: Absence or prevention of progression during hospitalization  Description: INTERVENTIONS:- Assess and monitor for signs and symptoms of infection- Monitor lab/diagnostic results- Monitor all insertion sites, i.e. indwelling lines, tubes, and drains- Monitor endotracheal if appropriate and nasal secretions for changes in amount and color- [place] appropriate cooling/warming therapies per order- Administer medications as ordered- Instruct and encourage patient and family to use good hand hygiene technique- Identify and instruct in appropriate isolation precautions for identified infection/condition  Outcome: Progressing  Goal:  Absence of fever/infection during neutropenic period  Description: INTERVENTIONS:- Monitor WBC  Outcome: Progressing     Problem: SAFETY ADULT  Goal: Patient will remain free of falls  Description: INTERVENTIONS:- Educate patient/family on patient safety including physical limitations- Instruct patient to call for assistance with activity - Consult OT/PT to assist with strengthening/mobility - Keep Call bell within reach- Keep bed low and locked with side rails adjusted as appropriate- Keep care items and personal belongings within reach- Initiate and maintain comfort rounds- Make Fall Risk Sign visible to staff- Offer Toileting every   Hours, in advance of need- Initiate/Maintain  alarm- Obtain necessary fall risk management equipment:  - Apply yellow socks and bracelet for high fall risk patients- Consider moving patient to room near nurses station  Outcome: Progressing  Goal: Maintain or return to baseline ADL function  Description: INTERVENTIONS:-  Assess patient's ability to carry out ADLs; assess patient's baseline for ADL function and identify physical deficits which impact ability to perform ADLs (bathing, care of mouth/teeth, toileting, grooming, dressing, etc.)- Assess/evaluate cause of self-care deficits - Assess range of motion- Assess patient's mobility; develop plan if impaired- Assess patient's need for assistive devices and provide as appropriate- Encourage maximum independence but intervene and supervise when necessary- Involve family in performance of ADLs- Assess for home care needs following discharge - Consider OT consult to assist with ADL evaluation and planning for discharge- Provide patient education as appropriate  Outcome: Progressing  Goal: Maintains/Returns to pre admission functional level  Description: INTERVENTIONS:- Perform AM-PAC 6 Click Basic Mobility/ Daily Activity assessment daily.- Set and communicate daily mobility goal to care team and patient/family/caregiver. - Collaborate  with rehabilitation services on mobility goals if consulted- Perform Range of Motion   times a day.- Reposition patient every   hours.- Dangle patient   times a day- Stand patient   times a day- Ambulate patient   times a day- Out of bed to chair   times a day - Out of bed for meals   times a day- Out of bed for toileting- Record patient progress and toleration of activity level   Outcome: Progressing     Problem: DISCHARGE PLANNING  Goal: Discharge to home or other facility with appropriate resources  Description: INTERVENTIONS:- Identify barriers to discharge w/patient and caregiver- Arrange for needed discharge resources and transportation as appropriate- Identify discharge learning needs (meds, wound care, etc.)- Arrange for interpretive services to assist at discharge as needed- Refer to Case Management Department for coordinating discharge planning if the patient needs post-hospital services based on physician/advanced practitioner order or complex needs related to functional status, cognitive ability, or social support system  Outcome: Progressing     Problem: Knowledge Deficit  Goal: Patient/family/caregiver demonstrates understanding of disease process, treatment plan, medications, and discharge instructions  Description: Complete learning assessment and assess knowledge base.Interventions:- Provide teaching at level of understanding- Provide teaching via preferred learning methods  Outcome: Progressing

## 2025-04-26 NOTE — OCCUPATIONAL THERAPY NOTE
"    Occupational Therapy Evaluation     Patient Name: Alecia Kay  Today's Date: 4/26/2025  Problem List  Principal Problem:    Generalized weakness  Active Problems:    Chronic osteomyelitis of toe of right foot (HCC)    Acute kidney injury (HCC)    Hypertension    Electrolyte imbalance    Acute cystitis    Bilateral lower extremity edema    Past Medical History  Past Medical History:   Diagnosis Date    Anemia     Cellulitis     Disease of thyroid gland     GERD (gastroesophageal reflux disease)     Hyperlipidemia     Hypertension     Obesity     Osteomyelitis (HCC)     Pneumonia     Pulmonary embolism (HCC)      Past Surgical History  Past Surgical History:   Procedure Laterality Date    FOOT SURGERY      JOINT REPLACEMENT           04/26/25 0833   OT Last Visit   OT Visit Date 04/26/25   Note Type   Note type Evaluation   Pain Assessment   Pain Assessment Tool 0-10   Pain Score No Pain   Restrictions/Precautions   Weight Bearing Precautions Per Order Yes   RLE Weight Bearing Per Order NWB  (\"Patient record reviewed-patient is to remain nonweightbearing until wound is completely healed\" per MD note 4/25-- pt reports that she was told she is able to weightbear -- will need further clarification)   Other Precautions Chair Alarm;Bed Alarm;Multiple lines;O2;Fall Risk;Pain   Home Living   Type of Home House   Home Layout Two level;Performs ADLs on one level;Able to live on main level with bedroom/bathroom  (pt reports she has first floor set up)   Bathroom Shower/Tub Walk-in shower   Bathroom Toilet Raised   Bathroom Equipment Shower chair;Grab bars in shower;Grab bars around toilet   Bathroom Accessibility Accessible via walker  (not accessible with w/c)   Home Equipment Walker;Wheelchair-manual;Grab bars  (sleeps in a lift recliner that lays back flat like a bed, pt uses lift feature as well to get up out of recliner)   Additional Comments son lives next door in other side of half double and pt can enter up " "his steps with only 1 RHODA with no railing and then get to her home   Prior Function   Level of [place] Needs assistance with ADLs;Needs assistance with functional mobility;Needs assistance with IADLS   Lives With Family  (Nephgilda Moon lives with patient)   Receives Help From Family   IADLs Family/Friend/Other provides transportation;Independent with medication management;Family/Friend/Other provides meals;Family/Friend/Other provides medication management   Falls in the last 6 months 0   Vocational Retired   Comments pt reports nephew is home with her AATs; pt was recently in [facility] for ~1 year per her report and signed herself out and was just back home for less than 48 hours prior to coming into hospital d/t weakness at home   General   Family/Caregiver Present No   Subjective   Subjective \"I feel shaky and nauseous\"   ADL   Where Assessed Chair  (recliner)   Eating Assistance 5  Supervision/Setup   Eating Deficit Setup;Increased time to complete  (pt became nauseous and began vomiting several moments after starting to eat)   Grooming Assistance 5  Supervision/Setup   Grooming Deficit Setup   LB Dressing Assistance 1  Total Assistance   LB Dressing Deficit Setup;Steadying;Requires assistive device for steadying;Verbal cueing;Supervision/safety;Increased time to complete;Don/doff R sock;Don/doff L sock;Thread RLE into underwear;Thread LLE into underwear;Pull up over hips   Bed Mobility   Rolling R 5  Supervision   Supine to Sit 3  Moderate assistance   Additional items Assist x 1;Verbal cues;LE management;Increased time required  (+use of bed rail, HOB slightly elevated)   Transfers   Sit to Stand 3  Moderate assistance   Additional items Assist x 1;Increased time required;Other;Verbal cues  (pt with poor adherence to NWB of R LE)   Stand to Sit 3  Moderate assistance   Additional items Assist x 1;Increased time required;Verbal cues  (pt with poor adherence to NWB of R LE)   Stand pivot 3  Moderate " assistance   Additional items Assist x 1;Increased time required;Verbal cues  (pt with poor adherence to NWB of R LE)   Toilet transfer 3  Moderate assistance   Additional items Assist x 1;Increased time required;Verbal cues  (pt with poor adherence to NWB of R LE)   Additional Comments c RW; pt with poor adherence to NWB of R LE, per her report she is able to bear weight through R LE now-- OT continued to encourage pt to maintain NWB status per MD note   Functional Mobility   Additional Comments did not attempt during session 2* poor application of NWB status to R LE with transfers   Balance   Static Sitting Normal   Dynamic Sitting Good   Static Standing Fair +   Dynamic Standing Fair   Activity Tolerance   Activity Tolerance Patient limited by fatigue  (became sick after attempting to eat breakfast)   Nurse Made Aware Bettye DYSON   RUPATRICIO Assessment   RUE Assessment WFL   LUE Assessment   LUE Assessment WFL   Hand Function   Fine Motor Coordination Functional   Sensation   Light Touch No apparent deficits   Cognition   Overall Cognitive Status Impaired   Arousal/Participation Alert;Responsive;Cooperative   Attention Within functional limits   Orientation Level Oriented X4   Following Commands Follows one step commands without difficulty   Assessment   Limitation Decreased ADL status;Decreased UE strength;Decreased Safe judgement during ADL;Decreased endurance;Decreased high-level ADLs   Prognosis Fair   Assessment Pt is a 80 y.o. female, admitted to [facility] 4/25/2025 d/t experiencing generalized weakness. Dx: generalized weakness. Pt with PMHx impacting their performance during ADL tasks, including: CKD, HTN,  hypothyrodisim and chronic osteo of right foot . Prior to admission to the hospital Pt was performing ADLs with physical assistance. IADLs with physical assistance. Functional transfers/ambulation with physical assistance. Cognitive status was PTA was WFL. OT order placed to assess Pt's ADLs, cognitive status, and  performance during functional tasks in order to maximize safety and independence while making most appropriate d/c recommendations. Pt's clinical presentation is currently evolving given new onset deficits that effect Pt's occupational performance and ability to safely return to PLOF including decrease activity tolerance, decrease standing balance, decrease sitting balance, decrease performance during ADL tasks, decrease safety awareness , increase impulsiveness, decrease UB MS, increased pain, decrease generalized strength, decrease activity engagement, decrease performance during functional transfers, high fall risk, and limited insight to deficits combined with medical complications of hypertension , poor blood pressure control, abnormal renal lab values, low SpO2 values, wounds, decreased skin integrity, multiple readmissions, incontinence, and need for input for mobility technique/safety. Personal factors affecting Pt at time of initial evaluation include: inaccessible home environment, step(s) to enter environment, multi-level environment, availability as recommended, advanced age, past experience, inability to perform IADLs, inability to perform ADLs, new need for AD, inability to ambulate household distances, inability to navigate community distances, limited insight into impairments, decreased initiation and engagement, and questionable non-compliance. Pt will benefit from continued skilled OT services to address deficits as defined above and to maximize level independence/participation during ADLs and functional tasks to facilitate return toward PLOF and improved quality of life. From an occupational therapy standpoint, recommendation at time of d/c would be Level II (Moderate Resource Intensity.   Plan   Treatment Interventions ADL retraining;Functional transfer training;UE strengthening/ROM;Endurance training;Cognitive reorientation;Patient/family training;Equipment evaluation/education;Compensatory  technique education;Activityengagement;Energy conservation   Goal Expiration Date 05/10/25   OT Treatment Day 1   OT Frequency 3-5x/wk   Discharge Recommendation   Rehab Resource Intensity Level, OT II (Moderate Resource Intensity)   AM-PAC Daily Activity Inpatient   Lower Body Dressing 2   Bathing 2   Toileting 2   Upper Body Dressing 3   Grooming 3   Eating 3   Daily Activity Raw Score 15   Daily Activity Standardized Score (Calc for Raw Score >=11) 34.69   AM-PAC Applied Cognition Inpatient   Following a Speech/Presentation 3   Understanding Ordinary Conversation 3   Taking Medications 3   Remembering Where Things Are Placed or Put Away 3   Remembering List of 4-5 Errands 3   Taking Care of Complicated Tasks 3   Applied Cognition Raw Score 18   Applied Cognition Standardized Score 38.07   Additional Treatment Session   Start Time 0900   End Time 0910   Treatment Assessment Pt tolerated today's treatment fair and was pleasant and cooperative throughout. Pt reported need to have BM. OT encouraged pt to complete on BSC after she asked for bed pan-- pt was reluctant but agreeable. Pt completed transfer from EOB to BSC on R side c mod A -- pt with poor adherence to NWB of R LE despite cues and A from therapist. Pt continent of liquid BM-- nursing made aware; and required total A for hygiene and clothing management c use of RW. Pt completed sit > stand from BSC and recliner chair placed behind her to sit down for breakfast. Pt safely seated in recliner chair with chair alarm activated and all needs in reach.   Additional Treatment Day 1       The patient's raw score on the AM-PAC Daily Activity inpatient short form is 15, standardized score is 34.69, less than 39.4. Patients at this level are likely to benefit from DC to post-acute rehabilitation services. Please refer to the recommendation of the Occupational Therapist for safe DC planning.    Pt goals to be met by 5/10:    Pt will demonstrate ability to complete  grooming/hygiene tasks @ mod I after set-up.  Pt will demonstrate ability to complete supine<>sit @ mod I in order to increase safety and independence during ADL tasks.  Pt will demonstrate ability to complete UB ADLs including washing/dressing @ mod I in order to increase performance and participation during meaningful tasks  Pt will demonstrate ability to complete LB dressing @ min A in order to increase safety and independence during meaningful tasks.   Pt will demonstrate ability to buzz/doff socks/shoes while sitting EOB @ supervision in order to increase safety and independence during meaningful tasks.   Pt will demonstrate ability to complete toileting tasks including CM and pericare @ mod A in order to increase safety and independence during meaningful tasks.  Pt will demonstrate ability to complete EOB, chair, toilet/commode transfers @ min A in order to increase performance and participation during functional tasks.  Pt will demonstrate ability to stand for 5 minutes while maintaining fair+ balance with use of LRAD for UB support PRN.  Pt will demonstrate ability to tolerate 30-35 minute OT session with no vc'ing for deep breathing or use of energy conservation techniques in order to increase activity tolerance during functional tasks.   Pt will demonstrate Good carryover of use of energy conservation/compensatory strategies during ADLs and functional tasks in order to increase safety and reduce risk for falls.   Pt will demonstrate Good attention and participation in continued evaluation of functional ambulation house hold distances in order to assist with safe d/c planning.  Pt will attend to continued cognitive assessments 100% of the time in order to provide most appropriate d/c recommendations.   Pt will follow 100% simple 2-step commands and be A&O x4 consistently with environmental cues to increase participation in functional activities.   Pt will identify 3 areas of interest/hobbies and 1  "intervention on how to incorporate into daily life in order to increase interaction with environment and peers as well as increase participation in meaningful tasks.   Pt will demonstrate 100% carryover of BUE HEP in order to increase BUE MS and increase performance during functional tasks upon d/c home.      Pt tolerated today's evaluation and treatment fair and was pleasant and cooperative throughout. Pt required encouragement to attempt tasks and also required education on NWB R LE compliance as she reports she was told she is now able to put weight through R LE however per documentation during hospitalization it is noted that pt should maintain NWB of R LE until wound is healed. Did not progress with any mobility during today's session d/t inability to maintain NWB status [place] [facility]. [place] reports that she just D/C'd home for less than 48 hours after participating in rehab at [facility] for ~1 year-- pt notes she signed herself out because she was \"sick of it and wanted to go home\". Pt noted to feel progressively weaker at home and was brought into hospital-- she noted she has been having a lot of difficulty with lack of appetite and ability for eat for the last few weeks. OT provided pt with breakfast tray towards end of session and after eating a few bites of oatmeal and a banana she noted to feel \"shaky and nauseous\"; emesis bag provided and pt began to vomit. Nursing made aware and came in to room to assess. At end of session, pt safely seated in recliner chair with all needs in reach, chair alarm activated, and nursing present.    Eliz Pruett MS OTR/L    "

## 2025-04-26 NOTE — ASSESSMENT & PLAN NOTE
Low mag and K on admission  Replenish and monitor daily   Managed on chronic K 20meq BID, will increase to 40 meq BID due to hypokalemia

## 2025-04-26 NOTE — PLAN OF CARE
Problem: OCCUPATIONAL THERAPY ADULT  Goal: Performs self-care activities at highest level of function for planned discharge setting.  See evaluation for individualized goals.  Description: Treatment Interventions: ADL retraining, Functional transfer training, UE strengthening/ROM, Endurance training, Cognitive reorientation, Patient/family training, Equipment evaluation/education, Compensatory technique education, Activityengagement, Energy conservation          See flowsheet documentation for full assessment, interventions and recommendations.   Note: Limitation: Decreased ADL status, Decreased UE strength, Decreased Safe judgement during ADL, Decreased endurance, Decreased high-level ADLs  Prognosis: Fair  Assessment: Pt is a 80 y.o. female, admitted to [facility] 4/25/2025 d/t experiencing generalized weakness. Dx: generalized weakness. Pt with PMHx impacting their performance during ADL tasks, including: CKD, HTN,  hypothyrodisim and chronic osteo of right foot . Prior to admission to the hospital Pt was performing ADLs with physical assistance. IADLs with physical assistance. Functional transfers/ambulation with physical assistance. Cognitive status was PTA was WFL. OT order placed to assess Pt's ADLs, cognitive status, and performance during functional tasks in order to maximize safety and independence while making most appropriate d/c recommendations. Pt's clinical presentation is currently evolving given new onset deficits that effect Pt's occupational performance and ability to safely return to OF including decrease activity tolerance, decrease standing balance, decrease sitting balance, decrease performance during ADL tasks, decrease safety awareness , increase impulsiveness, decrease UB MS, increased pain, decrease generalized strength, decrease activity engagement, decrease performance during functional transfers, high fall risk, and limited insight to deficits combined with medical complications of  hypertension , poor blood pressure control, abnormal renal lab values, low SpO2 values, wounds, decreased skin integrity, multiple readmissions, incontinence, and need for input for mobility technique/safety. Personal factors affecting Pt at time of initial evaluation include: inaccessible home environment, step(s) to enter environment, multi-level environment, availability as recommended, advanced age, past experience, inability to perform IADLs, inability to perform ADLs, new need for AD, inability to ambulate household distances, inability to navigate community distances, limited insight into impairments, decreased initiation and engagement, and questionable non-compliance. Pt will benefit from continued skilled OT services to address deficits as defined above and to maximize level independence/participation during ADLs and functional tasks to facilitate return toward PLOF and improved quality of life. From an occupational therapy standpoint, recommendation at time of d/c would be Level II (Moderate Resource Intensity.     Rehab Resource Intensity Level, OT: II (Moderate Resource Intensity)

## 2025-04-26 NOTE — ASSESSMENT & PLAN NOTE
Patient states that she has been having nausea and vomiting for the last month or so. Said that she had imaging done and EGD and nothing was definitive.   States that she has nausea and vomiting mostly associated with meals and around mealtime, will trial with reglan scheduled and see if this helps with patient's nausea.

## 2025-04-26 NOTE — PROGRESS NOTES
Progress Note - Hospitalist   Name: Alecia Kay 80 y.o. female I MRN: [id number]  Unit/Bed#: -01 I Date of Admission: 4/25/2025   Date of Service: 4/26/2025 I Hospital Day: 1    Assessment & Plan  Generalized weakness  POA with complaints of generalized weakness and poor appetite. Signed herself out of [facility] on 4/24, was home for one day when she started feeling weaker  Per OP , having cyclic vomiting at home and not taking any of her medications  Admits to poor appetite for the past month, will trial remeron   Had dilation of esophagus outpatient 4/22  Acute cystitis and mild electrolyte derangements with larry on admission  IV hydration and antibiotics  Replenish electrolytes  Flu/covid/rsv negative   CT CAP without acute findings   PT/OT consulted   Acute kidney injury (HCC)  Baseline around 1.3, admitted at 1.8  Start gentle IVF   Monitor daily bmp  Avoid nephrotoxic agents and hypotension   Hypertension  Continue home amlodipine and metoprolol   Electrolyte imbalance  Low mag and K on admission  Replenish and monitor daily   Managed on chronic K 20meq BID, will increase to 40 meq BID due to hypokalemia  Acute cystitis  UA + and endorses dysuria  Await urine culture  Most recent being 4/3 with klebsiella, susceptible to rocephin   Start rocephin   Chronic osteomyelitis of toe of right foot (HCC)  Patient record reviewed-patient is to remain nonweightbearing until wound is completely healed   Completed 6 weeks IV abx  Wound care   Bilateral lower extremity edema  Was recently taken off of torsemide during last hospitalization  Caution with gentle hydration at this time  May need diuretics as needed  Also may benefit from changing amlodipine to a different blood pressure medication  Venous duplex pending, as patient does not ambulate at baseline  Nausea and vomiting  Patient states that she has been having nausea and vomiting for the last month or so. Said that she had imaging  done and EGD and nothing was definitive.   States that she has nausea and vomiting mostly associated with meals and around mealtime, will trial with reglan scheduled and see if this helps with patient's nausea.     VTE Pharmacologic Prophylaxis:   Moderate Risk (Score 3-4) - Pharmacological DVT Prophylaxis Ordered: heparin.    Mobility:   Basic Mobility Inpatient Raw Score: 11  JH-HLM Goal: 4: Move to chair/commode  JH-HLM Achieved: 4: Move to chair/commode  JH-HLM Goal achieved. Continue to encourage appropriate mobility.    Patient Centered Rounds: I performed bedside rounds with nursing staff today.   Discussions with Specialists or Other Care Team Provider: nursing, case management    Education and Discussions with Family / Patient: Updated  (Red martinez) via phone.    Current Length of Stay: 1 day(s)  Current Patient Status: Inpatient   Certification Statement: The patient will continue to require additional inpatient hospital stay due to electrolyte abnormalities, ivf rehydration, larry, acute cystitis  Discharge Plan: Anticipate discharge in 48-72 hrs to discharge location to be determined pending rehab evaluations.    Code Status: Level 1 - Full Code    Subjective   Seen and examined today, said that she is not doing great today. She said she is still having nausea and vomiting and not feeling her best. She is hopeful that the new medications will help her feel better. No chest pain or SOB.     Objective :  Temp:  [97.2 °F (36.2 °C)-98.3 °F (36.8 °C)] 97.2 °F (36.2 °C)  HR:  [44-92] 57  BP: ()/(50-70) 112/61  Resp:  [16-18] 18  SpO2:  [89 %-100 %] 99 %  O2 Device: Nasal cannula  Nasal Cannula O2 Flow Rate (L/min):  [1 L/min] 1 L/min    Body mass index is 30.19 kg/m².     Input and Output Summary (last 24 hours):     Intake/Output Summary (Last 24 hours) at 4/26/2025 1322  Last data filed at 4/26/2025 0405  Gross per 24 hour   Intake 1150 ml   Output 300 ml   Net 850 ml       Physical  Exam  Vitals reviewed.   Constitutional:       General: She is not in acute distress.     Appearance: She is ill-appearing. She is not toxic-appearing.   HENT:      Head: Normocephalic and atraumatic.      Mouth/Throat:      Mouth: Mucous membranes are dry.   Cardiovascular:      Rate and Rhythm: Normal rate and regular rhythm.      Heart sounds: No murmur heard.  Pulmonary:      Effort: No respiratory distress.      Breath sounds: No stridor. No wheezing.   Abdominal:      General: Bowel sounds are normal. There is no distension.      Palpations: Abdomen is soft. There is no mass.      Tenderness: There is no abdominal tenderness.   Musculoskeletal:      Right lower leg: Edema present.      Left lower leg: Edema present.   Skin:     General: Skin is warm and dry.   Neurological:      Mental Status: She is alert and oriented to person, place, and time.   Psychiatric:         Mood and Affect: Mood normal.         Behavior: Behavior normal.           Lines/Drains:        Telemetry:  Telemetry Orders (From admission, onward)               24 Hour Telemetry Monitoring  Continuous x 24 Hours (Telem)        Expiring   Question:  Reason for 24 Hour Telemetry  Answer:  Metabolic/electrolyte disturbance with high probability of dysrhythmia. K level <3 or >6 OR KCL infusion >10mEq/hr                     Telemetry Reviewed: Normal Sinus Rhythm  Indication for Continued Telemetry Use: Arrthymias requiring medical therapy               Lab Results: I have reviewed the following results:   Results from last 7 days   Lab Units 04/26/25  0513 04/25/25  1307   WBC Thousand/uL 6.28 7.54   HEMOGLOBIN g/dL 7.8* 9.7*   HEMATOCRIT % 24.0* 30.0*   PLATELETS Thousands/uL 185 223   SEGS PCT %  --  78*   LYMPHO PCT %  --  15   MONO PCT %  --  6   EOS PCT %  --  0     Results from last 7 days   Lab Units 04/26/25  0513   SODIUM mmol/L 142   POTASSIUM mmol/L 2.4*   CHLORIDE mmol/L 107   CO2 mmol/L 22   BUN mg/dL 22   CREATININE mg/dL 1.43*    ANION GAP mmol/L 13   CALCIUM mg/dL 8.0*   ALBUMIN g/dL 2.6*   TOTAL BILIRUBIN mg/dL 0.51   ALK PHOS U/L 46   ALT U/L 5*   AST U/L 10*   GLUCOSE RANDOM mg/dL 69     Results from last 7 days   Lab Units 04/25/25  1307   INR  1.18                   Recent Cultures (last 7 days):   Results from last 7 days   Lab Units 04/25/25  1424   URINE CULTURE  >100,000 cfu/ml Gram Negative Holden*       Imaging Results Review: I reviewed radiology reports from this admission including: CT chest and CT abdomen/pelvis.  Other Study Results Review: EKG was reviewed.  EKG was personally reviewed and my interpretation is: Personally Reviewed. NSR. HR 65..    Last 24 Hours Medication List:     Current Facility-Administered Medications:     acetaminophen (TYLENOL) tablet 650 mg, Q6H PRN    amLODIPine (NORVASC) tablet 10 mg, Daily    cefTRIAXone (ROCEPHIN) IVPB (premix in dextrose) 1,000 mg 50 mL, Q24H    heparin (porcine) subcutaneous injection 5,000 Units, Q8H MELANY    hydrOXYzine HCL (ATARAX) tablet 25 mg, Q6H PRN    levothyroxine tablet 75 mcg, Daily    meclizine (ANTIVERT) tablet 25 mg, TID PRN    metoclopramide (REGLAN) tablet 5 mg, TID AC    metoprolol tartrate (LOPRESSOR) tablet 50 mg, Q12H MELANY    mirtazapine (REMERON) tablet 7.5 mg, HS    ondansetron (ZOFRAN) injection 4 mg, Q6H PRN    pantoprazole (PROTONIX) EC tablet 40 mg, Daily    potassium chloride (Klor-Con M20) CR tablet 40 mEq, BID    pravastatin (PRAVACHOL) tablet 80 mg, Daily With Dinner    senna-docusate sodium (SENOKOT S) 8.6-50 mg per tablet 1 tablet, Daily    sodium chloride 0.9 % infusion, Continuous, Last Rate: 75 mL/hr (04/26/25 0825)    Administrative Statements   Today, Patient Was Seen By: Nelly Carnes PA-C    **Please Note: This note may have been constructed using a voice recognition system.**

## 2025-04-26 NOTE — PLAN OF CARE
Problem: Prexisting or High Potential for Compromised Skin Integrity  Goal: Skin integrity is maintained or improved  Description: INTERVENTIONS:- Identify patients at risk for skin breakdown- Assess and monitor skin integrity- Assess and monitor nutrition and hydration status- Monitor labs - Assess for incontinence - Turn and reposition patient- Assist with mobility/ambulation- Relieve pressure over bony prominences- Avoid friction and shearing- Provide appropriate hygiene as needed including keeping skin clean and dry- Evaluate need for skin moisturizer/barrier cream- Collaborate with interdisciplinary team - Patient/family teaching- Consider wound care consult   Outcome: Progressing     Problem: PAIN - ADULT  Goal: Verbalizes/displays adequate comfort level or baseline comfort level  Description: Interventions:- Encourage patient to monitor pain and request assistance- Assess pain using appropriate pain scale- Administer analgesics based on type and severity of pain and evaluate response- Implement non-pharmacological measures as appropriate and evaluate response- Consider cultural and social influences on pain and pain management- Notify physician/advanced practitioner if interventions unsuccessful or patient reports new pain  Outcome: Progressing     Problem: INFECTION - ADULT  Goal: Absence or prevention of progression during hospitalization  Description: INTERVENTIONS:- Assess and monitor for signs and symptoms of infection- Monitor lab/diagnostic results- Monitor all insertion sites, i.e. indwelling lines, tubes, and drains- Monitor endotracheal if appropriate and nasal secretions for changes in amount and color- [place] appropriate cooling/warming therapies per order- Administer medications as ordered- Instruct and encourage patient and family to use good hand hygiene technique- Identify and instruct in appropriate isolation precautions for identified infection/condition  Outcome: Progressing  Goal:  Absence of fever/infection during neutropenic period  Description: INTERVENTIONS:- Monitor WBC  Outcome: Progressing     Problem: SAFETY ADULT  Goal: Patient will remain free of falls  Description: INTERVENTIONS:- Educate patient/family on patient safety including physical limitations- Instruct patient to call for assistance with activity - Consult OT/PT to assist with strengthening/mobility - Keep Call bell within reach- Keep bed low and locked with side rails adjusted as appropriate- Keep care items and personal belongings within reach- Initiate and maintain comfort rounds- Make Fall Risk Sign visible to staff- Offer Toileting every   Hours, in advance of need- Initiate/Maintain  alarm- Obtain necessary fall risk management equipment:  - Apply yellow socks and bracelet for high fall risk patients- Consider moving patient to room near nurses station  Outcome: Progressing  Goal: Maintain or return to baseline ADL function  Description: INTERVENTIONS:-  Assess patient's ability to carry out ADLs; assess patient's baseline for ADL function and identify physical deficits which impact ability to perform ADLs (bathing, care of mouth/teeth, toileting, grooming, dressing, etc.)- Assess/evaluate cause of self-care deficits - Assess range of motion- Assess patient's mobility; develop plan if impaired- Assess patient's need for assistive devices and provide as appropriate- Encourage maximum independence but intervene and supervise when necessary- Involve family in performance of ADLs- Assess for home care needs following discharge - Consider OT consult to assist with ADL evaluation and planning for discharge- Provide patient education as appropriate  Outcome: Progressing  Goal: Maintains/Returns to pre admission functional level  Description: INTERVENTIONS:- Perform AM-PAC 6 Click Basic Mobility/ Daily Activity assessment daily.- Set and communicate daily mobility goal to care team and patient/family/caregiver. - Collaborate  with rehabilitation services on mobility goals if consulted- Perform Range of Motion   times a day.- Reposition patient every   hours.- Dangle patient   times a day- Stand patient   times a day- Ambulate patient   times a day- Out of bed to chair   times a day - Out of bed for meals   times a day- Out of bed for toileting- Record patient progress and toleration of activity level   Outcome: Progressing     Problem: DISCHARGE PLANNING  Goal: Discharge to home or other facility with appropriate resources  Description: INTERVENTIONS:- Identify barriers to discharge w/patient and caregiver- Arrange for needed discharge resources and transportation as appropriate- Identify discharge learning needs (meds, wound care, etc.)- Arrange for interpretive services to assist at discharge as needed- Refer to Case Management Department for coordinating discharge planning if the patient needs post-hospital services based on physician/advanced practitioner order or complex needs related to functional status, cognitive ability, or social support system  Outcome: Progressing     Problem: Knowledge Deficit  Goal: Patient/family/caregiver demonstrates understanding of disease process, treatment plan, medications, and discharge instructions  Description: Complete learning assessment and assess knowledge base.Interventions:- Provide teaching at level of understanding- Provide teaching via preferred learning methods  Outcome: Progressing

## 2025-04-27 ENCOUNTER — APPOINTMENT (INPATIENT)
Dept: RADIOLOGY | Facility: HOSPITAL | Age: 80
End: 2025-04-27
Payer: COMMERCIAL

## 2025-04-27 PROBLEM — R00.0 TACHYCARDIA: Status: ACTIVE | Noted: 2025-04-27

## 2025-04-27 PROBLEM — Z86.718 HISTORY OF DVT (DEEP VEIN THROMBOSIS): Status: ACTIVE | Noted: 2025-04-27

## 2025-04-27 LAB
ANION GAP SERPL CALCULATED.3IONS-SCNC: 10 MMOL/L (ref 4–13)
APTT PPP: 37 SECONDS (ref 23–34)
APTT PPP: 67 SECONDS (ref 23–34)
BUN SERPL-MCNC: 15 MG/DL (ref 5–25)
CALCIUM SERPL-MCNC: 7.7 MG/DL (ref 8.4–10.2)
CHLORIDE SERPL-SCNC: 108 MMOL/L (ref 96–108)
CO2 SERPL-SCNC: 24 MMOL/L (ref 21–32)
CREAT SERPL-MCNC: 1.14 MG/DL (ref 0.6–1.3)
ERYTHROCYTE [DISTWIDTH] IN BLOOD BY AUTOMATED COUNT: 14.8 % (ref 11.6–15.1)
ERYTHROCYTE [DISTWIDTH] IN BLOOD BY AUTOMATED COUNT: 15 % (ref 11.6–15.1)
GFR SERPL CREATININE-BSD FRML MDRD: 45 ML/MIN/1.73SQ M
GLUCOSE SERPL-MCNC: 77 MG/DL (ref 65–140)
HCT VFR BLD AUTO: 25.3 % (ref 34.8–46.1)
HCT VFR BLD AUTO: 26.1 % (ref 34.8–46.1)
HGB BLD-MCNC: 8.1 G/DL (ref 11.5–15.4)
HGB BLD-MCNC: 8.5 G/DL (ref 11.5–15.4)
INR PPP: 1.11 (ref 0.85–1.19)
MAGNESIUM SERPL-MCNC: 1.7 MG/DL (ref 1.9–2.7)
MCH RBC QN AUTO: 28.6 PG (ref 26.8–34.3)
MCH RBC QN AUTO: 29 PG (ref 26.8–34.3)
MCHC RBC AUTO-ENTMCNC: 32 G/DL (ref 31.4–37.4)
MCHC RBC AUTO-ENTMCNC: 32.6 G/DL (ref 31.4–37.4)
MCV RBC AUTO: 89 FL (ref 82–98)
MCV RBC AUTO: 89 FL (ref 82–98)
PLATELET # BLD AUTO: 192 THOUSANDS/UL (ref 149–390)
PLATELET # BLD AUTO: 216 THOUSANDS/UL (ref 149–390)
PMV BLD AUTO: 10 FL (ref 8.9–12.7)
PMV BLD AUTO: 9.7 FL (ref 8.9–12.7)
POTASSIUM SERPL-SCNC: 3 MMOL/L (ref 3.5–5.3)
POTASSIUM SERPL-SCNC: 3.2 MMOL/L (ref 3.5–5.3)
PROTHROMBIN TIME: 14.7 SECONDS (ref 12.3–15)
RBC # BLD AUTO: 2.83 MILLION/UL (ref 3.81–5.12)
RBC # BLD AUTO: 2.93 MILLION/UL (ref 3.81–5.12)
SODIUM SERPL-SCNC: 142 MMOL/L (ref 135–147)
WBC # BLD AUTO: 6.39 THOUSAND/UL (ref 4.31–10.16)
WBC # BLD AUTO: 6.67 THOUSAND/UL (ref 4.31–10.16)

## 2025-04-27 PROCEDURE — 84132 ASSAY OF SERUM POTASSIUM: CPT

## 2025-04-27 PROCEDURE — 99232 SBSQ HOSP IP/OBS MODERATE 35: CPT

## 2025-04-27 PROCEDURE — 85610 PROTHROMBIN TIME: CPT

## 2025-04-27 PROCEDURE — 93005 ELECTROCARDIOGRAM TRACING: CPT

## 2025-04-27 PROCEDURE — 73630 X-RAY EXAM OF FOOT: CPT

## 2025-04-27 PROCEDURE — 83735 ASSAY OF MAGNESIUM: CPT

## 2025-04-27 PROCEDURE — 85730 THROMBOPLASTIN TIME PARTIAL: CPT

## 2025-04-27 PROCEDURE — 85027 COMPLETE CBC AUTOMATED: CPT

## 2025-04-27 PROCEDURE — 85730 THROMBOPLASTIN TIME PARTIAL: CPT | Performed by: FAMILY MEDICINE

## 2025-04-27 PROCEDURE — 80048 BASIC METABOLIC PNL TOTAL CA: CPT

## 2025-04-27 RX ORDER — HEPARIN SODIUM 1000 [USP'U]/ML
2000 INJECTION, SOLUTION INTRAVENOUS; SUBCUTANEOUS EVERY 6 HOURS PRN
Status: DISCONTINUED | OUTPATIENT
Start: 2025-04-27 | End: 2025-04-28

## 2025-04-27 RX ORDER — METOPROLOL TARTRATE 1 MG/ML
5 INJECTION, SOLUTION INTRAVENOUS ONCE
Status: DISCONTINUED | OUTPATIENT
Start: 2025-04-27 | End: 2025-04-29

## 2025-04-27 RX ORDER — POTASSIUM CHLORIDE 14.9 MG/ML
20 INJECTION INTRAVENOUS ONCE
Status: COMPLETED | OUTPATIENT
Start: 2025-04-27 | End: 2025-04-27

## 2025-04-27 RX ORDER — WARFARIN SODIUM 5 MG/1
5 TABLET ORAL
Status: DISCONTINUED | OUTPATIENT
Start: 2025-04-27 | End: 2025-05-02 | Stop reason: HOSPADM

## 2025-04-27 RX ORDER — METOPROLOL TARTRATE 1 MG/ML
INJECTION, SOLUTION INTRAVENOUS
Status: COMPLETED
Start: 2025-04-27 | End: 2025-04-27

## 2025-04-27 RX ORDER — WARFARIN SODIUM 7.5 MG/1
7.5 TABLET ORAL
Status: DISCONTINUED | OUTPATIENT
Start: 2025-04-27 | End: 2025-04-27

## 2025-04-27 RX ORDER — WARFARIN SODIUM 7.5 MG/1
7.5 TABLET ORAL
Status: DISCONTINUED | OUTPATIENT
Start: 2025-04-28 | End: 2025-05-02 | Stop reason: HOSPADM

## 2025-04-27 RX ORDER — HEPARIN SODIUM 10000 [USP'U]/100ML
3-20 INJECTION, SOLUTION INTRAVENOUS
Status: DISCONTINUED | OUTPATIENT
Start: 2025-04-27 | End: 2025-04-28

## 2025-04-27 RX ORDER — CEFAZOLIN SODIUM 2 G/50ML
2000 SOLUTION INTRAVENOUS EVERY 8 HOURS
Status: DISCONTINUED | OUTPATIENT
Start: 2025-04-27 | End: 2025-05-01

## 2025-04-27 RX ORDER — HEPARIN SODIUM 1000 [USP'U]/ML
4000 INJECTION, SOLUTION INTRAVENOUS; SUBCUTANEOUS EVERY 6 HOURS PRN
Status: DISCONTINUED | OUTPATIENT
Start: 2025-04-27 | End: 2025-04-28

## 2025-04-27 RX ORDER — METOCLOPRAMIDE HYDROCHLORIDE 5 MG/ML
10 INJECTION INTRAMUSCULAR; INTRAVENOUS ONCE
Status: DISCONTINUED | OUTPATIENT
Start: 2025-04-27 | End: 2025-04-29

## 2025-04-27 RX ORDER — MAGNESIUM SULFATE HEPTAHYDRATE 40 MG/ML
2 INJECTION, SOLUTION INTRAVENOUS ONCE
Status: COMPLETED | OUTPATIENT
Start: 2025-04-27 | End: 2025-04-27

## 2025-04-27 RX ADMIN — CEFAZOLIN SODIUM 2000 MG: 2 SOLUTION INTRAVENOUS at 23:04

## 2025-04-27 RX ADMIN — CEFAZOLIN SODIUM 2000 MG: 2 SOLUTION INTRAVENOUS at 15:29

## 2025-04-27 RX ADMIN — MAGNESIUM SULFATE HEPTAHYDRATE 2 G: 40 INJECTION, SOLUTION INTRAVENOUS at 07:59

## 2025-04-27 RX ADMIN — SENNOSIDES AND DOCUSATE SODIUM 1 TABLET: 8.6; 5 TABLET ORAL at 08:00

## 2025-04-27 RX ADMIN — METOPROLOL TARTRATE 50 MG: 50 TABLET, FILM COATED ORAL at 08:00

## 2025-04-27 RX ADMIN — POTASSIUM CHLORIDE 20 MEQ: 14.9 INJECTION, SOLUTION INTRAVENOUS at 07:59

## 2025-04-27 RX ADMIN — METOCLOPRAMIDE 5 MG: 5 TABLET ORAL at 15:31

## 2025-04-27 RX ADMIN — HEPARIN SODIUM 5000 UNITS: 5000 INJECTION INTRAVENOUS; SUBCUTANEOUS at 05:03

## 2025-04-27 RX ADMIN — SODIUM CHLORIDE 75 ML/HR: 0.9 INJECTION, SOLUTION INTRAVENOUS at 17:55

## 2025-04-27 RX ADMIN — ONDANSETRON 4 MG: 2 INJECTION INTRAMUSCULAR; INTRAVENOUS at 05:09

## 2025-04-27 RX ADMIN — POTASSIUM CHLORIDE 20 MEQ: 14.9 INJECTION, SOLUTION INTRAVENOUS at 10:48

## 2025-04-27 RX ADMIN — METOCLOPRAMIDE 5 MG: 5 TABLET ORAL at 05:03

## 2025-04-27 RX ADMIN — POTASSIUM CHLORIDE 40 MEQ: 1500 TABLET, EXTENDED RELEASE ORAL at 08:00

## 2025-04-27 RX ADMIN — METOPROLOL TARTRATE 5 MG: 5 INJECTION INTRAVENOUS at 08:24

## 2025-04-27 RX ADMIN — PANTOPRAZOLE SODIUM 40 MG: 40 TABLET, DELAYED RELEASE ORAL at 08:00

## 2025-04-27 RX ADMIN — ERTAPENEM SODIUM 1000 MG: 1 INJECTION, POWDER, LYOPHILIZED, FOR SOLUTION INTRAMUSCULAR; INTRAVENOUS at 15:29

## 2025-04-27 RX ADMIN — LEVOTHYROXINE SODIUM 75 MCG: 75 TABLET ORAL at 05:03

## 2025-04-27 RX ADMIN — WARFARIN SODIUM 5 MG: 5 TABLET ORAL at 17:53

## 2025-04-27 RX ADMIN — AMLODIPINE BESYLATE 10 MG: 10 TABLET ORAL at 08:00

## 2025-04-27 RX ADMIN — HEPARIN SODIUM 12 UNITS/KG/HR: 10000 INJECTION, SOLUTION INTRAVENOUS at 14:15

## 2025-04-27 RX ADMIN — PRAVASTATIN SODIUM 80 MG: 80 TABLET ORAL at 15:31

## 2025-04-27 RX ADMIN — METOCLOPRAMIDE 5 MG: 5 TABLET ORAL at 10:49

## 2025-04-27 NOTE — PHYSICAL THERAPY NOTE
PHYSICAL THERAPY TREATMENT NOTE  NAME:  Alecia Kay  DATE: 04/27/25 04/27/25 0840   Note Type   Note type Cancelled Session   Cancel Reasons Medical status   Additional Comments Per LETA Marmolejo pt not medically appropriate, HR into 160s with rest         PT order received, chart review completed. Pt admitted to [facility] on 4/25/2025 w/ dx of Generalized weakness. Attempted to see pt for PT session this date, however per LETA Marmolejo pt not medically appropriate, HR into 160s with rest. Will continue to follow pt and provide care as pt is appropriate and available.    Bing Torrez, PT,DPT

## 2025-04-27 NOTE — ASSESSMENT & PLAN NOTE
Patient states that she has been having nausea and vomiting for the last month or so. Said that she had imaging done and EGD and nothing was definitive.   States that she has nausea and vomiting mostly associated with meals and around mealtime, will trial with reglan scheduled and see if this helps with patient's nausea.   Said that she felt the Reglan improved her symptoms but still having nausea and vomiting.

## 2025-04-27 NOTE — ASSESSMENT & PLAN NOTE
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 Said that she had unknown cause for her PE/DVT and was placed on coumadin for this. She is still supposed to be on coumadin but is not sure the dosing  States that she doesn't have any known history of atrial fibrillation that she knows of.   PT-INR

## 2025-04-27 NOTE — CASE MANAGEMENT
Case Management Assessment & Discharge Planning Note    Patient name Alecia Kay  Location /-01 MRN [id number]  : 1945 Date 2025       Current Admission Date: 2025  Current Admission Diagnosis:Generalized weakness   Patient Active Problem List    Diagnosis Date Noted Date Diagnosed    History of DVT (deep vein thrombosis) 2025     Tachycardia 2025     Nausea and vomiting 2025     Acute cystitis 2025     Generalized weakness 2025     Bilateral lower extremity edema 2025     Severe protein-calorie malnutrition (HCC) 2025     Chronic osteomyelitis of toe of right foot (HCC) 2025     Acute kidney injury (HCC) 2025     Chronic kidney disease, stage III (moderate) (HCC) 2025     Hypothyroidism 2025     Hypertension 2025     Electrolyte imbalance 2025       LOS (days): 2  Geometric Mean LOS (GMLOS) (days):   Days to GMLOS:     OBJECTIVE:    Risk of Unplanned Readmission Score: 21.1         Current admission status: Inpatient  Referral Reason: Other (Disposition Planning)    Preferred Pharmacy:   [facility] [place] [address] [facility]  [address] [facility] [address]  Phone: [phone] Fax: [phone]    Primary Care Provider: Rambo Perera DO    Primary Insurance: [employer] RE[employer]  Secondary Insurance:     ASSESSMENT:  Active Health Care Proxies    There are no active Health Care Proxies on file.       Advance Directives  Does patient have a Health Care POA?: No  Was patient offered paperwork?: Yes (pt declined)  Does patient currently have a Health Care decision maker?: Yes, please see Health Care Proxy section  Does patient have Advance Directives?: No  Was patient offered paperwork?: Yes (pt declined)  Primary Contact: Red Capellan, nephew         Readmission Root Cause  30 Day Readmission: No    Patient Information  Admitted from:: Home  Mental Status:  Alert  During Assessment patient was accompanied by: Not accompanied during assessment  Assessment information provided by:: Patient  Primary Caregiver: Other (Comment)  Caregiver's Name:: Red Capellan, nephgilda  Caregiver's Relationship to Patient:: Family Member  Caregiver's Telephone Number:: [phone]  Support Systems: Son, Family members  County [facility] Residence: [facility]  What city do you live in?: [place]  Home entry access options. Select all that apply.: Stairs  Number of steps to enter home.: 5  Do the steps have railings?: Yes  Type of Current Residence: 2 [place] home  Upon entering residence, is there a bedroom on the main floor (no further steps)?: No (pt sleeps on first floor in lift recliner chair)  A bedroom is located on the following floor levels of residence (select all that apply):: 2nd Floor  Upon entering residence, is there a bathroom on the main floor (no further steps)?: Yes  Number of steps to 2nd floor from main floor: One Flight  Living Arrangements: Lives w/ Extended Family  Is patient a ?: No    Activities of Daily Living Prior to Admission  Functional Status: Assistance  Completes ADLs independently?: No  Level of ADL dependence: Assistance  Ambulates independently?: Yes  Does patient use assisted devices?: Yes  Assisted Devices (DME) used: Bedside Commode, Wheelchair, Walker, Shower Chair  Does patient currently own DME?: Yes  What DME does the patient currently own?: Bedside Commode, Wheelchair, Shower Chair, Walker  Does patient have a history of Outpatient Therapy (PT/OT)?: No  Does the patient have a history of Short-Term Rehab?: Yes ([facility])  Does patient have a history of HHC?: Yes (unknown agency)  Does patient currently have HHC?: Yes (unknown agency)         Patient Information Continued  Income Source: SSI/SSD  Does patient have prescription coverage?: Yes  Can the patient afford their medications and any related supplies (such as glucometers or test  strips)?: Yes  Does patient receive dialysis treatments?: No  Does patient have a history of substance abuse?: No  Does patient have a history of Mental Health Diagnosis?: No         Means of Transportation  Means of Transport to Appts:: Family transport          DISCHARGE DETAILS:    Discharge planning discussed with:: patient  Freedom of Choice: Yes     CM contacted family/caregiver?: Yes (message left for Red martinez)           04/26/25 CM met with patient at the bedside, baseline information was obtained. CM discussed the role of CM in helping the patient develop a discharge plan and assist the patient in carry out their plan.     Patient resides in 2 story home with first floor living as patient sleeps in recliner lift chair and has bathroom with shower on first floor. Patients nephewRed, resides with patient; he does not work. Patients son, Nabor, resides, next door. Nabor works outside the home.    Patient reports she was at [facility] due to NWB status and need for IV antibiotics. Patient reports she is now allowed to bear weight on right foot so family took patient out of facility. Sounds like this was AMA as no HHC was set up although patient reports she called her insurance and nephew set up HHC for her. CM left voice message for Red martinez, 4/27/25, to gather additional information and clarify.     CM to follow.

## 2025-04-27 NOTE — UTILIZATION REVIEW
"Initial Clinical Review    Admission: Date/Time/Statement:   Admission Orders (From admission, onward)       Ordered        04/25/25 1656  INPATIENT ADMISSION  Once                          Orders Placed This Encounter   Procedures    INPATIENT ADMISSION     Standing Status:   Standing     Number of Occurrences:   1     Level of Care:   Med Surg [16]     Estimated length of stay:   More than 2 Midnights     Certification:   I certify that inpatient services are medically necessary for this patient for a duration of greater than two midnights. See H&P and MD Progress Notes for additional information about the patient's course of treatment.     ED Arrival Information       Expected   -    Arrival   4/25/2025 12:37    Acuity   Urgent              Means of arrival   Ambulance    Escorted by   [facility] [place]ist    Admission type   Emergency              Arrival complaint   Weakness             Chief Complaint   Patient presents with    Weakness - Generalized     Pt arrives via EMS from home with c/o generalized weakness, no appetite. Pt signed herself out of [facility] yesterday.       Initial Presentation: 80 y.o. female presents to ED via  EMS  from home with generalized weakness.   Signed herself out of  SNF  on  4/24, was  \" sick of it\", went home.  Since coming home, has not  ambulated, eaten or taken any meds.  Vomiting the  morning of admission.  States she has not eaten in  1 month due to poor appetite.  + dysuria    Denies any falls.  PMH  is  CKD, HTN, chronic osteo right foot and hypothyroidism.  Recently completed 6 weeks  TAMEKA,  instructed to remain NWB  R foot until wound completely healed. UA  +.   COVID/FLU/RSV negative.   Ct abdomen no acute findings.  Labs show  low MG and  K.   Creatinine  1.8.  Admit  Ip with Generalized weakness, GAY, Acute Cystitis, Electrolyte imbalance and B/L  LE  edema and plan is  monitor labs and replace " electrolytes, PT/OT,  IVF,   TAMEKA, urine culture, wound care  R  foot  and  VDE.      Anticipated Length of Stay/Certification Statement: Patient will be admitted on an inpatient basis with an anticipated length of stay of greater than 2 midnights secondary to generalized weakness requiring pt/ot  consulted and iv hydration .     Date:   4/26   Day 2:   Still with nausea and vomiting, not feeling great.  Continue  PT/OT. Continue  TAMEKA and IVF.  On  O2 1L  NC.   Continue current meds.    Date:    4/27  Day 3: Has surpassed a 2nd midnight with active treatments and services.  Episode of nausea and vomiting this am.  Tachycardic on tele with heart rate  up to 160's sustaining.  EKG  shows Afib, HRE  150's, no H/O afib.  Given  1 dose  IV lopressor, HR improved   to the  60's - 70's.  Placed on  IV heparin and resume coumadin.  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       ED Treatment-Medication Administration from 04/25/2025 1236 to 04/25/2025 1800         Date/Time Order Dose Route Action     04/25/2025 1305 sodium chloride 0.9 % bolus 1,000 mL 1,000 mL Intravenous New Bag     04/25/2025 1305 ondansetron (ZOFRAN) injection 4 mg 4 mg Intravenous Given     04/25/2025 1426 potassium chloride (Klor-Con M20) CR tablet 40 mEq 40 mEq Oral Given     04/25/2025 1426 magnesium sulfate IVPB (premix) SOLN 1 g 1 g Intravenous New Bag     04/25/2025 1544 cefTRIAXone (ROCEPHIN) IVPB (premix in dextrose) 1,000 mg 50 mL 1,000 mg Intravenous New Bag     04/25/2025 1733 magnesium sulfate 2 g/50 mL IVPB (premix) 2 g 2 g Intravenous New Bag            Scheduled Medications:  amLODIPine, 10 mg, Oral, Daily  cefazolin, 2,000 mg, Intravenous, Q8H  ertapenem, 1,000 mg, Intravenous, Q24H  levothyroxine, 75 mcg, Oral, Daily  metoclopramide, 10 mg, Intravenous, Once  metoclopramide, 5 mg, Oral, TID AC  metoprolol, 5 mg, Intravenous, Once  metoprolol tartrate, 50 mg, Oral, Q12H MELANY  mirtazapine, 7.5 mg, Oral,  HS  pantoprazole, 40 mg, Oral, Daily  potassium chloride, 40 mEq, Oral, BID  pravastatin, 80 mg, Oral, Daily With Dinner  senna-docusate sodium, 1 tablet, Oral, Daily  warfarin, 5 mg, Oral, Once per day on Sunday Tuesday Wednesday Thursday Saturday   Or  warfarin, 7.5 mg, Oral, Once per day on Sunday Tuesday Wednesday Thursday Saturday      Continuous IV Infusions:  heparin (porcine), 3-20 Units/kg/hr (Order-Specific), Intravenous, Titrated  sodium chloride, 75 mL/hr, Intravenous, Continuous      PRN Meds:  acetaminophen, 650 mg, Oral, Q6H PRN  heparin (porcine), 2,000 Units, Intravenous, Q6H PRN  heparin (porcine), 4,000 Units, Intravenous, Q6H PRN  hydrOXYzine HCL, 25 mg, Oral, Q6H PRN  meclizine, 25 mg, Oral, TID PRN  ondansetron, 4 mg, Intravenous, Q6H PRN      ED Triage Vitals   Temperature Pulse Respirations Blood Pressure SpO2 Pain Score   04/25/25 1241 04/25/25 1241 04/25/25 1242 04/25/25 1241 04/25/25 1241 04/25/25 1240   97.8 °F (36.6 °C) 77 16 (!) 96/48 96 % No Pain     Weight (last 2 days)       Date/Time Weight    04/25/25 1811 74.9 (165.12)    04/25/25 1241 76.9 (169.53)            Vital Signs (last 3 days)       Date/Time Temp Pulse Resp BP MAP (mmHg) SpO2 Calculated FIO2 (%) - Nasal Cannula Nasal Cannula O2 Flow Rate (L/min) O2 Device Patient Position - Orthostatic VS Sean Coma Scale Score Pain    04/27/25 1100 97.2 °F (36.2 °C) 57 20 123/74 90 98 % 24 1 L/min Nasal cannula Sitting -- --    04/27/25 0700 97.5 °F (36.4 °C) 61 18 129/71 90 93 % 24 1 L/min Nasal cannula Lying -- --    04/27/25 0508 -- 80 -- 132/69 90 -- -- -- -- -- -- --    04/26/25 2108 -- 67 -- 108/59 -- -- -- -- -- -- -- --    04/26/25 1926 -- -- -- -- -- 95 % 24 1 L/min None (Room air) -- 15 No Pain    04/26/25 1500 97.7 °F (36.5 °C) 53 16 158/59 92 97 % 24 1 L/min Nasal cannula Lying -- --    04/26/25 1100 97.2 °F (36.2 °C) 57 18 112/61 78 99 % 24 1 L/min Nasal cannula Sitting -- --    04/26/25 0934 -- 60 -- 124/61 -- -- -- --  -- -- -- --    04/26/25 0925 -- -- -- -- -- 100 % 24 1 L/min -- -- 15 No Pain    04/26/25 0900 -- 92 -- 90/59 69 -- -- -- -- -- -- --    04/26/25 0833 -- -- -- -- -- -- -- -- -- -- -- No Pain    04/26/25 0827 -- 49 -- -- -- -- -- -- -- -- -- --    04/26/25 0700 98.3 °F (36.8 °C) 52 16 130/58 82 97 % 24 1 L/min Nasal cannula Lying -- --    04/25/25 2300 98.2 °F (36.8 °C) 45 -- 108/70 -- 89 % -- -- -- -- -- --    04/25/25 2004 -- -- -- -- -- 91 % -- -- None (Room air) -- 15 No Pain    04/25/25 1840 -- 44 -- -- -- -- -- -- -- -- -- --    04/25/25 1836 97.3 °F (36.3 °C) -- 16 107/50 69 -- -- -- -- -- -- --    04/25/25 1821 -- -- -- -- 127 93 % -- -- None (Room air) -- -- --    04/25/25 1811 -- -- -- -- -- -- -- -- -- -- 15 No Pain    04/25/25 1700 -- -- 16 132/56 84 97 % -- -- None (Room air) Lying -- --    04/25/25 1600 -- 46 16 123/58 84 93 % -- -- None (Room air) Lying -- --    04/25/25 1500 -- 47 16 117/56 81 92 % -- -- None (Room air) Lying -- No Pain    04/25/25 1259 -- -- -- -- -- -- -- -- -- -- 15 No Pain    04/25/25 1242 -- -- 16 -- -- -- -- -- -- -- -- --    04/25/25 1241 97.8 °F (36.6 °C) 77 -- 96/48 -- 96 % -- -- None (Room air) -- -- No Pain    04/25/25 1240 -- -- -- -- -- -- -- -- -- -- -- No Pain              Pertinent Labs/Diagnostic Test Results:   Radiology:  CT chest abdomen pelvis wo contrast   Final Interpretation by Misha Encarnacion MD (04/25 1500)      Status post reported esophageal dilatation. No evidence for pneumomediastinum. Small hiatal hernia is noted.      No acute intra-abdominal or pelvic abnormality identified.      Colonic diverticulosis without evidence for acute diverticulitis.               Workstation performed: [id number]          VAS VENOUS DUPLEX - LOWER LIMB BILATERAL    (Results Pending)     Cardiology:  ECG 12 lead   Final Result by Berto Slater MD (04/25 1244)   Normal sinus rhythm   Minimal voltage criteria for LVH, may be normal variant ( R in aVL )   ST & T wave abnormality,  consider lateral ischemia   Abnormal ECG   No previous ECGs available   Confirmed by Berto Slater [id number]) on 4/25/2025 12:45:56 PM          Results from last 7 days   Lab Units 04/27/25  1351 04/27/25  0507 04/26/25  0513 04/25/25  1307   WBC Thousand/uL 6.67 6.39 6.28 7.54   HEMOGLOBIN g/dL 8.5* 8.1* 7.8* 9.7*   HEMATOCRIT % 26.1* 25.3* 24.0* 30.0*   PLATELETS Thousands/uL 216 192 185 223   TOTAL NEUT ABS Thousands/µL  --   --   --  5.88         Results from last 7 days   Lab Units 04/27/25  0507 04/26/25  1606 04/26/25  0513 04/25/25  1307 04/23/25  0516   SODIUM mmol/L 142  --  142 141 143   POTASSIUM mmol/L 3.0* 3.0* 2.4* 3.0* 2.9*   CHLORIDE mmol/L 108  --  107 102 103   CO2 mmol/L 24  --  22 22 27   ANION GAP mmol/L 10  --  13 17* 13*   BUN mg/dL 15  --  22 27* 23   CREATININE mg/dL 1.14  --  1.43* 1.87* 1.39*   EGFR ml/min/1.73sq m 45  --  34 24 38*   CALCIUM mg/dL 7.7*  --  8.0* 9.3 8.6   MAGNESIUM mg/dL 1.7*  --  2.1 1.5*  --      Results from last 7 days   Lab Units 04/26/25  0513 04/25/25  1307   AST U/L 10* 13   ALT U/L 5* 6*   ALK PHOS U/L 46 58   TOTAL PROTEIN g/dL 5.2* 6.6   ALBUMIN g/dL 2.6* 3.3*   TOTAL BILIRUBIN mg/dL 0.51 0.81         Results from last 7 days   Lab Units 04/27/25  0507 04/26/25  0513 04/25/25  1307 04/23/25  0516   GLUCOSE RANDOM mg/dL 77 69 89 76                   Results from last 7 days   Lab Units 04/25/25  1513 04/25/25  1307   HS TNI 0HR ng/L  --  48   HS TNI 2HR ng/L 47  --    HSTNI D2 ng/L -1  --          Results from last 7 days   Lab Units 04/25/25  1307   PROTIME seconds 15.4*   INR  1.18   PTT seconds 28               Results from last 7 days   Lab Units 04/25/25  1307   LIPASE u/L 14                 Results from last 7 days   Lab Units 04/25/25  1424   CLARITY UA  Cloudy   COLOR UA  Yellow   SPEC GRAV UA  1.020   PH UA  6.0   GLUCOSE UA mg/dl Negative   KETONES UA mg/dl Negative   BLOOD UA  Small*   PROTEIN UA mg/dl 30 (1+)*   NITRITE UA  Negative   BILIRUBIN UA   Moderate*   UROBILINOGEN UA E.U./dl 0.2   LEUKOCYTES UA  Moderate*   WBC UA /hpf 30-50*   RBC UA /hpf 2-4   BACTERIA UA /hpf Moderate*   EPITHELIAL CELLS WET PREP /hpf Occasional               Results from last 7 days   Lab Units 04/25/25  1424   URINE CULTURE  >100,000 cfu/ml Klebsiella pneumoniae*  >100,000 cfu/ml Escherichia coli ESBL*               Present on Admission:   Electrolyte imbalance   Hypertension   Acute kidney injury (HCC)   Chronic osteomyelitis of toe of right foot (HCC)      Admitting Diagnosis: Hypokalemia [E87.6]  Weakness [R53.1]  Acute UTI [N39.0]  Acute kidney injury (HCC) [N17.9]  Generalized weakness [R53.1]  Ambulatory dysfunction [R26.2]  Age/Sex: 80 y.o. female    Network Utilization Review Department  ATTENTION: Please call with any questions or concerns to [phone] and carefully listen to the prompts so that you are directed to the right person. All voicemails are confidential.   For Discharge needs, contact Care Management DC Support Team at [phone] opt. [phone]  Send all requests for admission clinical reviews, approved or denied determinations and any other requests to dedicated fax number below belonging to the campus where the patient is receiving treatment. List of dedicated fax numbers for the Facilities:  FACILITY NAME UR FAX NUMBER   ADMISSION DENIALS (Administrative/Medical Necessity) [phone]   DISCHARGE SUPPORT TEAM (NETWORK) [phone]   PARENT CHILD HEALTH (Maternity/NICU/Pediatrics) [phone]   [facility] [phone]   [facility] [phone]   [facility] [phone]   [facility] [phone]   [facility] [phone]   [facility] [phone]   General acute hospital [phone]   [facility] [phone]   [facility]  [phone]   [facility] [phone]   [facility] [phone]   [facility] [phone]

## 2025-04-27 NOTE — PLAN OF CARE
Problem: Prexisting or High Potential for Compromised Skin Integrity  Goal: Skin integrity is maintained or improved  Description: INTERVENTIONS:- Identify patients at risk for skin breakdown- Assess and monitor skin integrity- Assess and monitor nutrition and hydration status- Monitor labs - Assess for incontinence - Turn and reposition patient- Assist with mobility/ambulation- Relieve pressure over bony prominences- Avoid friction and shearing- Provide appropriate hygiene as needed including keeping skin clean and dry- Evaluate need for skin moisturizer/barrier cream- Collaborate with interdisciplinary team - Patient/family teaching- Consider wound care consult   Outcome: Progressing     Problem: PAIN - ADULT  Goal: Verbalizes/displays adequate comfort level or baseline comfort level  Description: Interventions:- Encourage patient to monitor pain and request assistance- Assess pain using appropriate pain scale- Administer analgesics based on type and severity of pain and evaluate response- Implement non-pharmacological measures as appropriate and evaluate response- Consider cultural and social influences on pain and pain management- Notify physician/advanced practitioner if interventions unsuccessful or patient reports new pain  Outcome: Progressing     Problem: INFECTION - ADULT  Goal: Absence or prevention of progression during hospitalization  Description: INTERVENTIONS:- Assess and monitor for signs and symptoms of infection- Monitor lab/diagnostic results- Monitor all insertion sites, i.e. indwelling lines, tubes, and drains- Monitor endotracheal if appropriate and nasal secretions for changes in amount and color- Terrell appropriate cooling/warming therapies per order- Administer medications as ordered- Instruct and encourage patient and family to use good hand hygiene technique- Identify and instruct in appropriate isolation precautions for identified infection/condition  Outcome: Progressing  Goal:  Absence of fever/infection during neutropenic period  Description: INTERVENTIONS:- Monitor WBC  Outcome: Progressing     Problem: SAFETY ADULT  Goal: Patient will remain free of falls  Description: INTERVENTIONS:- Educate patient/family on patient safety including physical limitations- Instruct patient to call for assistance with activity - Consult OT/PT to assist with strengthening/mobility - Keep Call bell within reach- Keep bed low and locked with side rails adjusted as appropriate- Keep care items and personal belongings within reach- Initiate and maintain comfort rounds- Make Fall Risk Sign visible to staff- Offer Toileting every    Hours, in advance of need- Initiate/Maintain   alarm- Obtain necessary fall risk management equipment:   - Apply yellow socks and bracelet for high fall risk patients- Consider moving patient to room near nurses station  Outcome: Progressing  Goal: Maintain or return to baseline ADL function  Description: INTERVENTIONS:-  Assess patient's ability to carry out ADLs; assess patient's baseline for ADL function and identify physical deficits which impact ability to perform ADLs (bathing, care of mouth/teeth, toileting, grooming, dressing, etc.)- Assess/evaluate cause of self-care deficits - Assess range of motion- Assess patient's mobility; develop plan if impaired- Assess patient's need for assistive devices and provide as appropriate- Encourage maximum independence but intervene and supervise when necessary- Involve family in performance of ADLs- Assess for home care needs following discharge - Consider OT consult to assist with ADL evaluation and planning for discharge- Provide patient education as appropriate  Outcome: Progressing  Goal: Maintains/Returns to pre admission functional level  Description: INTERVENTIONS:- Perform AM-PAC 6 Click Basic Mobility/ Daily Activity assessment daily.- Set and communicate daily mobility goal to care team and patient/family/caregiver. -  Collaborate with rehabilitation services on mobility goals if consulted- Perform Range of Motion    times a day.- Reposition patient every    hours.- Dangle patient    times a day- Stand patient    times a day- Ambulate patient    times a day- Out of bed to chair    times a day - Out of bed for meals                times a day- Out of bed for toileting- Record patient progress and toleration of activity level   Outcome: Progressing     Problem: DISCHARGE PLANNING  Goal: Discharge to home or other facility with appropriate resources  Description: INTERVENTIONS:- Identify barriers to discharge w/patient and caregiver- Arrange for needed discharge resources and transportation as appropriate- Identify discharge learning needs (meds, wound care, etc.)- Arrange for interpretive services to assist at discharge as needed- Refer to Case Management Department for coordinating discharge planning if the patient needs post-hospital services based on physician/advanced practitioner order or complex needs related to functional status, cognitive ability, or social support system  Outcome: Progressing     Problem: Knowledge Deficit  Goal: Patient/family/caregiver demonstrates understanding of disease process, treatment plan, medications, and discharge instructions  Description: Complete learning assessment and assess knowledge base.Interventions:- Provide teaching at level of understanding- Provide teaching via preferred learning methods  Outcome: Progressing

## 2025-04-27 NOTE — ASSESSMENT & PLAN NOTE
Patient record reviewed-patient is to remain nonweightbearing until wound is completely healed   Completed 6 weeks IV abx  Wound care   There is a note from outpatient podiatry on 3/25 to be WBAT in post op shoe   Will consult podiatry for follow up on foot wound and to verify weight bearing status to work with PT/OT

## 2025-04-27 NOTE — ASSESSMENT & PLAN NOTE
Patient with episode of nausea and vomiting on 4/27 in the morning and on telemetry found to have tachycardia up to 160s sustaining. Patient is on lopressor 50 mg bid which patient states is for HTN.   ECG obtained and looked like atrial fibrillation rvr with HR of 150s however patient without history of atrial fibrillation  Given 1 dose IV lopressor and HR improved now sustaining in the 60s-70s.   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   Placed on heparin gtt at this time and resume coumadin.

## 2025-04-27 NOTE — ASSESSMENT & PLAN NOTE
Baseline around 1.3, admitted at 1.8  Start gentle IVF   Improved and normalized.   Monitor daily bmp  Avoid nephrotoxic agents and hypotension

## 2025-04-27 NOTE — ASSESSMENT & PLAN NOTE
UA + and endorses dysuria  Await urine culture  Most recent being 4/3 with klebsiella, susceptible to rocephin   Still having symptoms, urine culture with klebsiella and ESBL e coli - switch to ancef and ertapenem

## 2025-04-27 NOTE — PROGRESS NOTES
Progress Note - Hospitalist   Name: Alecia Kay 80 y.o. female I MRN: [id number]  Unit/Bed#: -01 I Date of Admission: 4/25/2025   Date of Service: 4/27/2025 I Hospital Day: 2    Assessment & Plan  Generalized weakness  POA with complaints of generalized weakness and poor appetite. Signed herself out of [facility] on 4/24, was home for one day when she started feeling weaker  Per OP , having cyclic vomiting at home and not taking any of her medications  Admits to poor appetite for the past month, will trial remeron   Had dilation of esophagus outpatient 4/22  Acute cystitis and mild electrolyte derangements with larry on admission  IV hydration and antibiotics  Replenish electrolytes  Flu/covid/rsv negative   CT CAP without acute findings   PT/OT consulted   Acute kidney injury (HCC)  Baseline around 1.3, admitted at 1.8  Start gentle IVF   Improved and normalized.   Monitor daily bmp  Avoid nephrotoxic agents and hypotension   Acute cystitis  UA + and endorses dysuria  Await urine culture  Most recent being 4/3 with klebsiella, susceptible to rocephin   Still having symptoms, urine culture with klebsiella and ESBL e coli - switch to ancef and ertapenem  Chronic osteomyelitis of toe of right foot (HCC)  Patient record reviewed-patient is to remain nonweightbearing until wound is completely healed   Completed 6 weeks IV abx  Wound care   There is a note from outpatient podiatry on 3/25 to be WBAT in post op shoe   Will consult podiatry for follow up on foot wound and to verify weight bearing status to work with PT/OT   Nausea and vomiting  Patient states that she has been having nausea and vomiting for the last month or so. Said that she had imaging done and EGD and nothing was definitive.   States that she has nausea and vomiting mostly associated with meals and around mealtime, will trial with reglan scheduled and see if this helps with patient's nausea.   Said that she felt the Reglan  improved her symptoms but still having nausea and vomiting.   Tachycardia  Patient with episode of nausea and vomiting on 4/27 in the morning and on telemetry found to have tachycardia up to 160s sustaining. Patient is on lopressor 50 mg bid which patient states is for HTN.   ECG obtained and looked like atrial fibrillation rvr with HR of 150s however patient without history of atrial fibrillation  Given 1 dose IV lopressor and HR improved now sustaining in the 60s-70s.   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   Placed on heparin gtt at this time and resume coumadin.  History of DVT (deep vein thrombosis)  Patient states that she has a history of DVT/PE. Said that she had unknown cause for her PE/DVT and was placed on coumadin for this. She is still supposed to be on coumadin but is not sure the dosing  States that she doesn't have any known history of atrial fibrillation that she knows of.   PT-INR   Bilateral lower extremity edema  Was recently taken off of torsemide during last hospitalization  Caution with gentle hydration at this time  May need diuretics as needed  Also may benefit from changing amlodipine to a different blood pressure medication  Venous duplex pending, as patient does not ambulate at baseline  Hypertension  Continue home amlodipine and metoprolol   Electrolyte imbalance  Low mag and K on admission  Replenish and monitor daily   Managed on chronic K 20meq BID, will increase to 40 meq BID due to hypokalemia    VTE Pharmacologic Prophylaxis:   Moderate Risk (Score 3-4) - Pharmacological DVT Prophylaxis Ordered: heparin drip.    Mobility:   Basic Mobility Inpatient Raw Score: 11  JH-HLM Goal: 4: Move to chair/commode  JH-HLM Achieved: 4: Move to chair/commode  JH-HLM Goal achieved. Continue to encourage appropriate mobility.    Patient Centered Rounds: I performed bedside rounds with nursing staff today.   Discussions with Specialists or Other Care Team  Provider: nursing, case management    Education and Discussions with Family / Patient: Attempted to update  (nephew) via phone. Left voicemail.     Current Length of Stay: 2 day(s)  Current Patient Status: Inpatient   Certification Statement: The patient will continue to require additional inpatient hospital stay due to nausea, vomiting, IVF rehydration, GI and podiatry evaluation  Discharge Plan: Anticipate discharge in 48-72 hrs to discharge location to be determined pending rehab evaluations.    Code Status: Level 1 - Full Code    Subjective   Seen and examined at bedside this morning.  Patient states that she just got done trying to take her pills when she got very nauseous and threw everything back up.  At that point in time her heart rate jumped up into the 160s on the monitor.  Patient denies any current chest pain or shortness of breath.  Feels that the nausea and vomiting is improved at this time.    Reexamined the patient and in the afternoon and patient stated that she was feeling better.  She did not have any further nausea or vomiting episodes.  Heart rate is back down into the 60s.  States that she does take Coumadin at home for history of DVT and PE but she is unsure the dosing.  Discussed results of her EKG showing possible atrial fibrillation and she states that she is not sure if she has ever had a history of that but does not believe so.  She is hopeful that she will continue to feel better    Objective :  Temp:  [97.2 °F (36.2 °C)-97.7 °F (36.5 °C)] 97.2 °F (36.2 °C)  HR:  [53-80] 57  BP: (108-158)/(59-74) 123/74  Resp:  [16-20] 20  SpO2:  [93 %-98 %] 98 %  O2 Device: Nasal cannula  Nasal Cannula O2 Flow Rate (L/min):  [1 L/min] 1 L/min    Body mass index is 30.19 kg/m².     Input and Output Summary (last 24 hours):     Intake/Output Summary (Last 24 hours) at 4/27/2025 1348  Last data filed at 4/27/2025 0501  Gross per 24 hour   Intake 370 ml   Output 1050 ml   Net -680 ml        Physical Exam  Vitals reviewed.   Constitutional:       General: She is not in acute distress.     Appearance: She is ill-appearing. She is not toxic-appearing.   HENT:      Head: Normocephalic and atraumatic.      Mouth/Throat:      Mouth: Mucous membranes are dry.   Cardiovascular:      Rate and Rhythm: Regular rhythm. Tachycardia present.      Heart sounds: No murmur heard.  Pulmonary:      Effort: No respiratory distress.      Breath sounds: No stridor. No wheezing, rhonchi or rales.      Comments: Saturating well on 1 L nasal cannula.  99%  Abdominal:      General: Bowel sounds are normal. There is no distension.      Palpations: Abdomen is soft. There is no mass.      Tenderness: There is no abdominal tenderness.      Comments: No tenderness on palpation of abdomen   Musculoskeletal:      Right lower leg: Edema present.      Left lower leg: Edema present.      Comments: Trace edema bilaterally   Skin:     General: Skin is warm and dry.   Neurological:      Mental Status: She is alert and oriented to person, place, and time.   Psychiatric:         Mood and Affect: Mood normal.         Behavior: Behavior normal.           Lines/Drains:        Telemetry:  Telemetry Orders (From admission, onward)               24 Hour Telemetry Monitoring  Continuous x 24 Hours (Telem)        Expiring   Question:  Reason for 24 Hour Telemetry  Answer:  Metabolic/electrolyte disturbance with high probability of dysrhythmia. K level <3 or >6 OR KCL infusion >10mEq/hr                     Telemetry Reviewed: Normal Sinus Rhythm  Indication for Continued Telemetry Use: Arrthymias requiring medical therapy               Lab Results: I have reviewed the following results:   Results from last 7 days   Lab Units 04/27/25  0507 04/26/25  0513 04/25/25  1307   WBC Thousand/uL 6.39   < > 7.54   HEMOGLOBIN g/dL 8.1*   < > 9.7*   HEMATOCRIT % 25.3*   < > 30.0*   PLATELETS Thousands/uL 192   < > 223   SEGS PCT %  --   --  78*   LYMPHO PCT  %  --   --  15   MONO PCT %  --   --  6   EOS PCT %  --   --  0    < > = values in this interval not displayed.     Results from last 7 days   Lab Units 04/27/25  0507 04/26/25  1606 04/26/25  0513   SODIUM mmol/L 142  --  142   POTASSIUM mmol/L 3.0*   < > 2.4*   CHLORIDE mmol/L 108  --  107   CO2 mmol/L 24  --  22   BUN mg/dL 15  --  22   CREATININE mg/dL 1.14  --  1.43*   ANION GAP mmol/L 10  --  13   CALCIUM mg/dL 7.7*  --  8.0*   ALBUMIN g/dL  --   --  2.6*   TOTAL BILIRUBIN mg/dL  --   --  0.51   ALK PHOS U/L  --   --  46   ALT U/L  --   --  5*   AST U/L  --   --  10*   GLUCOSE RANDOM mg/dL 77  --  69    < > = values in this interval not displayed.     Results from last 7 days   Lab Units 04/25/25  1307   INR  1.18                   Recent Cultures (last 7 days):   Results from last 7 days   Lab Units 04/25/25  1424   URINE CULTURE  >100,000 cfu/ml Klebsiella pneumoniae*  >100,000 cfu/ml Escherichia coli ESBL*       Imaging Results Review: No pertinent imaging studies reviewed.  Other Study Results Review: EKG was reviewed.  EKG was personally reviewed and my interpretation is: Personally Reviewed.  Questionable atrial fibrillation heart rate 150s.  Due to tachycardia, unclear if patient was in SVT or atrial fibrillation, repeating EKG..    Last 24 Hours Medication List:     Current Facility-Administered Medications:     acetaminophen (TYLENOL) tablet 650 mg, Q6H PRN    amLODIPine (NORVASC) tablet 10 mg, Daily    ceFAZolin (ANCEF) IVPB (premix in dextrose) 2,000 mg 50 mL, Q8H    ertapenem (INVanz) 1,000 mg in sodium chloride 0.9 % 50 mL IVPB, Q24H    heparin (porcine) 25,000 units in 0.45% NaCl 250 mL infusion (premix), Titrated    heparin (porcine) injection 2,000 Units, Q6H PRN    heparin (porcine) injection 4,000 Units, Q6H PRN    hydrOXYzine HCL (ATARAX) tablet 25 mg, Q6H PRN    levothyroxine tablet 75 mcg, Daily    meclizine (ANTIVERT) tablet 25 mg, TID PRN    metoclopramide (REGLAN) injection 10 mg,  Once    metoclopramide (REGLAN) tablet 5 mg, TID AC    metoprolol (LOPRESSOR) injection 5 mg, Once    metoprolol tartrate (LOPRESSOR) tablet 50 mg, Q12H MELANY    mirtazapine (REMERON) tablet 7.5 mg, HS    ondansetron (ZOFRAN) injection 4 mg, Q6H PRN    pantoprazole (PROTONIX) EC tablet 40 mg, Daily    potassium chloride (Klor-Con M20) CR tablet 40 mEq, BID    pravastatin (PRAVACHOL) tablet 80 mg, Daily With Dinner    senna-docusate sodium (SENOKOT S) 8.6-50 mg per tablet 1 tablet, Daily    sodium chloride 0.9 % infusion, Continuous, Last Rate: 75 mL/hr (04/26/25 2136)    warfarin (COUMADIN) tablet 5 mg, Once per day on Sunday Tuesday Wednesday Thursday Saturday **OR** warfarin (COUMADIN) tablet 7.5 mg, Once per day on Sunday Tuesday Wednesday Thursday Saturday    Administrative Statements   Today, Patient Was Seen By: Nelly Carnes PA-C    **Please Note: This note may have been constructed using a voice recognition system.**

## 2025-04-28 ENCOUNTER — APPOINTMENT (INPATIENT)
Dept: NON INVASIVE DIAGNOSTICS | Facility: HOSPITAL | Age: 80
End: 2025-04-28
Payer: COMMERCIAL

## 2025-04-28 ENCOUNTER — APPOINTMENT (INPATIENT)
Dept: CT IMAGING | Facility: HOSPITAL | Age: 80
End: 2025-04-28
Payer: COMMERCIAL

## 2025-04-28 ENCOUNTER — APPOINTMENT (INPATIENT)
Dept: MRI IMAGING | Facility: HOSPITAL | Age: 80
End: 2025-04-28
Payer: COMMERCIAL

## 2025-04-28 PROBLEM — K92.1 HEMATOCHEZIA: Status: ACTIVE | Noted: 2025-04-28

## 2025-04-28 PROBLEM — I82.409 ACUTE DVT (DEEP VENOUS THROMBOSIS) (HCC): Status: ACTIVE | Noted: 2025-04-28

## 2025-04-28 LAB
ABO GROUP BLD: NORMAL
ANION GAP SERPL CALCULATED.3IONS-SCNC: 11 MMOL/L (ref 4–13)
APTT PPP: 194 SECONDS (ref 23–34)
APTT PPP: 30 SECONDS (ref 23–34)
ATRIAL RATE: 62 BPM
BACTERIA UR CULT: ABNORMAL
BACTERIA UR CULT: ABNORMAL
BLD GP AB SCN SERPL QL: NEGATIVE
BUN SERPL-MCNC: 10 MG/DL (ref 5–25)
C COLI+JEJUNI TUF STL QL NAA+PROBE: NEGATIVE
C DIFF TOX GENS STL QL NAA+PROBE: NEGATIVE
CALCIUM SERPL-MCNC: 7.4 MG/DL (ref 8.4–10.2)
CHLORIDE SERPL-SCNC: 107 MMOL/L (ref 96–108)
CO2 SERPL-SCNC: 23 MMOL/L (ref 21–32)
CREAT SERPL-MCNC: 1.01 MG/DL (ref 0.6–1.3)
EC STX1+STX2 GENES STL QL NAA+PROBE: NEGATIVE
ERYTHROCYTE [DISTWIDTH] IN BLOOD BY AUTOMATED COUNT: 15 % (ref 11.6–15.1)
ERYTHROCYTE [DISTWIDTH] IN BLOOD BY AUTOMATED COUNT: 15 % (ref 11.6–15.1)
GFR SERPL CREATININE-BSD FRML MDRD: 52 ML/MIN/1.73SQ M
GLUCOSE SERPL-MCNC: 79 MG/DL (ref 65–140)
HCT VFR BLD AUTO: 24.4 % (ref 34.8–46.1)
HCT VFR BLD AUTO: 24.9 % (ref 34.8–46.1)
HCT VFR BLD AUTO: 26 % (ref 34.8–46.1)
HEMOCCULT STL QL IA: POSITIVE
HGB BLD-MCNC: 8 G/DL (ref 11.5–15.4)
HGB BLD-MCNC: 8.1 G/DL (ref 11.5–15.4)
HGB BLD-MCNC: 8.5 G/DL (ref 11.5–15.4)
INR PPP: 1.25 (ref 0.85–1.19)
INR PPP: 1.31 (ref 0.85–1.19)
MAGNESIUM SERPL-MCNC: 2 MG/DL (ref 1.9–2.7)
MCH RBC QN AUTO: 28.6 PG (ref 26.8–34.3)
MCH RBC QN AUTO: 28.9 PG (ref 26.8–34.3)
MCHC RBC AUTO-ENTMCNC: 32.5 G/DL (ref 31.4–37.4)
MCHC RBC AUTO-ENTMCNC: 32.8 G/DL (ref 31.4–37.4)
MCV RBC AUTO: 88 FL (ref 82–98)
MCV RBC AUTO: 88 FL (ref 82–98)
P AXIS: -7 DEGREES
PLATELET # BLD AUTO: 181 THOUSANDS/UL (ref 149–390)
PLATELET # BLD AUTO: 190 THOUSANDS/UL (ref 149–390)
PMV BLD AUTO: 9.5 FL (ref 8.9–12.7)
PMV BLD AUTO: 9.7 FL (ref 8.9–12.7)
POTASSIUM SERPL-SCNC: 2.8 MMOL/L (ref 3.5–5.3)
POTASSIUM SERPL-SCNC: 3.1 MMOL/L (ref 3.5–5.3)
PR INTERVAL: 164 MS
PROTHROMBIN TIME: 16.1 SECONDS (ref 12.3–15)
PROTHROMBIN TIME: 16.7 SECONDS (ref 12.3–15)
QRS AXIS: -14 DEGREES
QRS AXIS: -20 DEGREES
QRS AXIS: -23 DEGREES
QRSD INTERVAL: 88 MS
QRSD INTERVAL: 92 MS
QRSD INTERVAL: 94 MS
QT INTERVAL: 308 MS
QT INTERVAL: 330 MS
QT INTERVAL: 458 MS
QTC INTERVAL: 462 MS
QTC INTERVAL: 464 MS
QTC INTERVAL: 473 MS
RBC # BLD AUTO: 2.77 MILLION/UL (ref 3.81–5.12)
RBC # BLD AUTO: 2.83 MILLION/UL (ref 3.81–5.12)
RH BLD: POSITIVE
SALMONELLA SP SPAO STL QL NAA+PROBE: NEGATIVE
SHIGELLA SP+EIEC IPAH STL QL NAA+PROBE: NEGATIVE
SODIUM SERPL-SCNC: 141 MMOL/L (ref 135–147)
SPECIMEN EXPIRATION DATE: NORMAL
T WAVE AXIS: 11 DEGREES
T WAVE AXIS: 176 DEGREES
T WAVE AXIS: 177 DEGREES
VENTRICULAR RATE: 118 BPM
VENTRICULAR RATE: 142 BPM
VENTRICULAR RATE: 62 BPM
WBC # BLD AUTO: 5.96 THOUSAND/UL (ref 4.31–10.16)
WBC # BLD AUTO: 6.35 THOUSAND/UL (ref 4.31–10.16)

## 2025-04-28 PROCEDURE — 99222 1ST HOSP IP/OBS MODERATE 55: CPT | Performed by: STUDENT IN AN ORGANIZED HEALTH CARE EDUCATION/TRAINING PROGRAM

## 2025-04-28 PROCEDURE — 93970 EXTREMITY STUDY: CPT

## 2025-04-28 PROCEDURE — 97530 THERAPEUTIC ACTIVITIES: CPT

## 2025-04-28 PROCEDURE — 71275 CT ANGIOGRAPHY CHEST: CPT

## 2025-04-28 PROCEDURE — 93970 EXTREMITY STUDY: CPT | Performed by: SURGERY

## 2025-04-28 PROCEDURE — 97535 SELF CARE MNGMENT TRAINING: CPT

## 2025-04-28 PROCEDURE — 85730 THROMBOPLASTIN TIME PARTIAL: CPT

## 2025-04-28 PROCEDURE — 85018 HEMOGLOBIN: CPT

## 2025-04-28 PROCEDURE — 86900 BLOOD TYPING SEROLOGIC ABO: CPT

## 2025-04-28 PROCEDURE — 85014 HEMATOCRIT: CPT

## 2025-04-28 PROCEDURE — 85027 COMPLETE CBC AUTOMATED: CPT

## 2025-04-28 PROCEDURE — 85610 PROTHROMBIN TIME: CPT

## 2025-04-28 PROCEDURE — 97163 PT EVAL HIGH COMPLEX 45 MIN: CPT

## 2025-04-28 PROCEDURE — 87505 NFCT AGENT DETECTION GI: CPT

## 2025-04-28 PROCEDURE — 97110 THERAPEUTIC EXERCISES: CPT

## 2025-04-28 PROCEDURE — G0328 FECAL BLOOD SCRN IMMUNOASSAY: HCPCS

## 2025-04-28 PROCEDURE — 83735 ASSAY OF MAGNESIUM: CPT

## 2025-04-28 PROCEDURE — 85730 THROMBOPLASTIN TIME PARTIAL: CPT | Performed by: FAMILY MEDICINE

## 2025-04-28 PROCEDURE — 84132 ASSAY OF SERUM POTASSIUM: CPT

## 2025-04-28 PROCEDURE — 99232 SBSQ HOSP IP/OBS MODERATE 35: CPT

## 2025-04-28 PROCEDURE — 86901 BLOOD TYPING SEROLOGIC RH(D): CPT

## 2025-04-28 PROCEDURE — 74177 CT ABD & PELVIS W/CONTRAST: CPT

## 2025-04-28 PROCEDURE — 86850 RBC ANTIBODY SCREEN: CPT

## 2025-04-28 PROCEDURE — 80048 BASIC METABOLIC PNL TOTAL CA: CPT

## 2025-04-28 RX ORDER — POTASSIUM CHLORIDE 14.9 MG/ML
20 INJECTION INTRAVENOUS ONCE
Status: COMPLETED | OUTPATIENT
Start: 2025-04-28 | End: 2025-04-28

## 2025-04-28 RX ORDER — HEPARIN SODIUM 1000 [USP'U]/ML
2800 INJECTION, SOLUTION INTRAVENOUS; SUBCUTANEOUS EVERY 6 HOURS PRN
Status: DISCONTINUED | OUTPATIENT
Start: 2025-04-28 | End: 2025-05-02

## 2025-04-28 RX ORDER — HEPARIN SODIUM 1000 [USP'U]/ML
5600 INJECTION, SOLUTION INTRAVENOUS; SUBCUTANEOUS EVERY 6 HOURS PRN
Status: DISCONTINUED | OUTPATIENT
Start: 2025-04-28 | End: 2025-05-02

## 2025-04-28 RX ORDER — PANTOPRAZOLE SODIUM 40 MG/10ML
40 INJECTION, POWDER, LYOPHILIZED, FOR SOLUTION INTRAVENOUS
Status: DISCONTINUED | OUTPATIENT
Start: 2025-04-28 | End: 2025-05-02 | Stop reason: HOSPADM

## 2025-04-28 RX ORDER — HEPARIN SODIUM 10000 [USP'U]/100ML
3-30 INJECTION, SOLUTION INTRAVENOUS
Status: DISCONTINUED | OUTPATIENT
Start: 2025-04-28 | End: 2025-05-02

## 2025-04-28 RX ADMIN — CEFAZOLIN SODIUM 2000 MG: 2 SOLUTION INTRAVENOUS at 08:30

## 2025-04-28 RX ADMIN — PANTOPRAZOLE SODIUM 40 MG: 40 TABLET, DELAYED RELEASE ORAL at 08:13

## 2025-04-28 RX ADMIN — AMLODIPINE BESYLATE 10 MG: 10 TABLET ORAL at 08:12

## 2025-04-28 RX ADMIN — METOCLOPRAMIDE 5 MG: 5 TABLET ORAL at 17:12

## 2025-04-28 RX ADMIN — POTASSIUM CHLORIDE 20 MEQ: 14.9 INJECTION, SOLUTION INTRAVENOUS at 08:13

## 2025-04-28 RX ADMIN — METOCLOPRAMIDE 5 MG: 5 TABLET ORAL at 08:12

## 2025-04-28 RX ADMIN — METOPROLOL TARTRATE 50 MG: 50 TABLET, FILM COATED ORAL at 08:12

## 2025-04-28 RX ADMIN — PANTOPRAZOLE SODIUM 40 MG: 40 INJECTION, POWDER, FOR SOLUTION INTRAVENOUS at 09:34

## 2025-04-28 RX ADMIN — CEFAZOLIN SODIUM 2000 MG: 2 SOLUTION INTRAVENOUS at 20:53

## 2025-04-28 RX ADMIN — POTASSIUM CHLORIDE 20 MEQ: 14.9 INJECTION, SOLUTION INTRAVENOUS at 19:38

## 2025-04-28 RX ADMIN — PRAVASTATIN SODIUM 80 MG: 80 TABLET ORAL at 17:12

## 2025-04-28 RX ADMIN — METOCLOPRAMIDE 5 MG: 5 TABLET ORAL at 11:58

## 2025-04-28 RX ADMIN — ERTAPENEM SODIUM 1000 MG: 1 INJECTION, POWDER, LYOPHILIZED, FOR SOLUTION INTRAMUSCULAR; INTRAVENOUS at 13:23

## 2025-04-28 RX ADMIN — IOHEXOL 75 ML: 350 INJECTION, SOLUTION INTRAVENOUS at 16:42

## 2025-04-28 RX ADMIN — POTASSIUM CHLORIDE 40 MEQ: 1500 TABLET, EXTENDED RELEASE ORAL at 08:13

## 2025-04-28 RX ADMIN — POTASSIUM CHLORIDE 20 MEQ: 14.9 INJECTION, SOLUTION INTRAVENOUS at 09:35

## 2025-04-28 RX ADMIN — POTASSIUM CHLORIDE 20 MEQ: 14.9 INJECTION, SOLUTION INTRAVENOUS at 17:27

## 2025-04-28 RX ADMIN — HEPARIN SODIUM 18 UNITS/KG/HR: 10000 INJECTION, SOLUTION INTRAVENOUS at 17:12

## 2025-04-28 RX ADMIN — CEFAZOLIN SODIUM 2000 MG: 2 SOLUTION INTRAVENOUS at 13:23

## 2025-04-28 RX ADMIN — MIRTAZAPINE 7.5 MG: 15 TABLET, FILM COATED ORAL at 21:00

## 2025-04-28 RX ADMIN — METOPROLOL TARTRATE 50 MG: 50 TABLET, FILM COATED ORAL at 21:00

## 2025-04-28 RX ADMIN — SENNOSIDES AND DOCUSATE SODIUM 1 TABLET: 8.6; 5 TABLET ORAL at 08:12

## 2025-04-28 NOTE — NURSING NOTE
Patient alert staff for the need to void/have BM on bedpan. Assisted as requested, after completion, noted to have maroon tinged liquid with some flecks of stool throughout in bedpan. Shan Garcia PA-C notified. BP stable, new orders noted for bloodwork and verbal order for heparin drip to be held for now. New orders also requested and ordered for fecal tests, done as ordered and results communicated to Shan. Patient denies pain or further complaints, remains lying comfortably in bed. Telemetry monitor recording NSR/ SB heart rate 50-60s

## 2025-04-28 NOTE — PROGRESS NOTES
Patient:    MRN:  [id number]    Aidin Request ID:  [id number]    Level of care reserved:  Home Health Agency    Partner Reserved:  [facility] [place], [place] [id number] [phone]    Clinical needs requested:    Geography searched:  [id number]    Start of Service:    Request sent:  12:47pm EDT on 4/28/2025 by Marilee Justin    Partner reserved:  1:49pm EDT on 4/28/2025 by Marilee Justin    Choice list shared:  1:49pm EDT on 4/28/2025 by Marilee Justin

## 2025-04-28 NOTE — PLAN OF CARE
Problem: Prexisting or High Potential for Compromised Skin Integrity  Goal: Skin integrity is maintained or improved  Description: INTERVENTIONS:- Identify patients at risk for skin breakdown- Assess and monitor skin integrity- Assess and monitor nutrition and hydration status- Monitor labs - Assess for incontinence - Turn and reposition patient- Assist with mobility/ambulation- Relieve pressure over bony prominences- Avoid friction and shearing- Provide appropriate hygiene as needed including keeping skin clean and dry- Evaluate need for skin moisturizer/barrier cream- Collaborate with interdisciplinary team - Patient/family teaching- Consider wound care consult   Outcome: Progressing     Problem: PAIN - ADULT  Goal: Verbalizes/displays adequate comfort level or baseline comfort level  Description: Interventions:- Encourage patient to monitor pain and request assistance- Assess pain using appropriate pain scale- Administer analgesics based on type and severity of pain and evaluate response- Implement non-pharmacological measures as appropriate and evaluate response- Consider cultural and social influences on pain and pain management- Notify physician/advanced practitioner if interventions unsuccessful or patient reports new pain  Outcome: Progressing     Problem: INFECTION - ADULT  Goal: Absence or prevention of progression during hospitalization  Description: INTERVENTIONS:- Assess and monitor for signs and symptoms of infection- Monitor lab/diagnostic results- Monitor all insertion sites, i.e. indwelling lines, tubes, and drains- Monitor endotracheal if appropriate and nasal secretions for changes in amount and color- [place] appropriate cooling/warming therapies per order- Administer medications as ordered- Instruct and encourage patient and family to use good hand hygiene technique- Identify and instruct in appropriate isolation precautions for identified infection/condition  Outcome: Progressing  Goal:  Absence of fever/infection during neutropenic period  Description: INTERVENTIONS:- Monitor WBC  Outcome: Progressing     Problem: SAFETY ADULT  Goal: Patient will remain free of falls  Description: INTERVENTIONS:- Educate patient/family on patient safety including physical limitations- Instruct patient to call for assistance with activity - Consult OT/PT to assist with strengthening/mobility - Keep Call bell within reach- Keep bed low and locked with side rails adjusted as appropriate- Keep care items and personal belongings within reach- Initiate and maintain comfort rounds- Make Fall Risk Sign visible to staff- Offer Toileting every    Hours, in advance of need- Initiate/Maintain   alarm- Obtain necessary fall risk management equipment:   - Apply yellow socks and bracelet for high fall risk patients- Consider moving patient to room near nurses station  Outcome: Progressing  Goal: Maintain or return to baseline ADL function  Description: INTERVENTIONS:-  Assess patient's ability to carry out ADLs; assess patient's baseline for ADL function and identify physical deficits which impact ability to perform ADLs (bathing, care of mouth/teeth, toileting, grooming, dressing, etc.)- Assess/evaluate cause of self-care deficits - Assess range of motion- Assess patient's mobility; develop plan if impaired- Assess patient's need for assistive devices and provide as appropriate- Encourage maximum independence but intervene and supervise when necessary- Involve family in performance of ADLs- Assess for home care needs following discharge - Consider OT consult to assist with ADL evaluation and planning for discharge- Provide patient education as appropriate  Outcome: Progressing  Goal: Maintains/Returns to pre admission functional level  Description: INTERVENTIONS:- Perform AM-PAC 6 Click Basic Mobility/ Daily Activity assessment daily.- Set and communicate daily mobility goal to care team and patient/family/caregiver. -  Collaborate with rehabilitation services on mobility goals if consulted- Perform Range of Motion    times a day.- Reposition patient every    hours.- Dangle patient    times a day- Stand patient    times a day- Ambulate patient    times a day- Out of bed to chair    times a day - Out of bed for meals          times a day- Out of bed for toileting- Record patient progress and toleration of activity level   Outcome: Progressing     Problem: DISCHARGE PLANNING  Goal: Discharge to home or other facility with appropriate resources  Description: INTERVENTIONS:- Identify barriers to discharge w/patient and caregiver- Arrange for needed discharge resources and transportation as appropriate- Identify discharge learning needs (meds, wound care, etc.)- Arrange for interpretive services to assist at discharge as needed- Refer to Case Management Department for coordinating discharge planning if the patient needs post-hospital services based on physician/advanced practitioner order or complex needs related to functional status, cognitive ability, or social support system  Outcome: Progressing     Problem: Knowledge Deficit  Goal: Patient/family/caregiver demonstrates understanding of disease process, treatment plan, medications, and discharge instructions  Description: Complete learning assessment and assess knowledge base.Interventions:- Provide teaching at level of understanding- Provide teaching via preferred learning methods  Outcome: Progressing

## 2025-04-28 NOTE — ASSESSMENT & PLAN NOTE
Was recently taken off of torsemide during last hospitalization  Caution with gentle hydration at this time  May need diuretics as needed  Also may benefit from changing amlodipine to a different blood pressure medication  Venous duplex positive for acute vs subacute DVT bilaterally.

## 2025-04-28 NOTE — ASSESSMENT & PLAN NOTE
Patient with bilateral lower extremity swelling on admission. Venous duplex was ordered and showing acute vs. sub acute DVT in the right popliteal, gastrocnemius, posterior tibial, and peroneal veins and acute vs. sub acute DVT in the left popliteal, posterior tibial, and peroneal veins.   Initially was on heparin gtt due to subtherapeutic INR, held then because patient had bloody stools.   Discussed with GI ok to resume heparin gtt with close monitoring of stools.   Will order CTA chest PE study to rule out any PE as patient does have history.   Could consider sending eliquis for price check to the pharmacy as well - patient states that previously she was not on this because of cost she thinks.

## 2025-04-28 NOTE — OCCUPATIONAL THERAPY NOTE
"  Occupational Therapy Progress Note     Patient Name: Alecia Kay  Today's Date: 4/28/2025  Problem List  Principal Problem:    Generalized weakness  Active Problems:    Chronic osteomyelitis of toe of right foot (HCC)    Acute kidney injury (HCC)    Hypertension    Electrolyte imbalance    Acute cystitis    Bilateral lower extremity edema    Nausea and vomiting    History of DVT (deep vein thrombosis)    Tachycardia            04/28/25 1018   OT Last Visit   OT Visit Date 04/28/25   Pain Assessment   Pain Assessment Tool 0-10   Pain Score No Pain   Restrictions/Precautions   Weight Bearing Precautions Per Order Yes   RLE Weight Bearing Per Order Other  (NWB per chart review, pt reports \"I can put weight on it\")   Other Precautions Chair Alarm;Multiple lines;Telemetry;Fall Risk  (NPO - possible scope.)   ADL   Where Assessed Chair   Grooming Assistance 5  Supervision/Setup   Grooming Deficit Increased time to complete;Brushing hair;Wash/dry hands;Wash/dry face   UB Bathing Assistance 4  Minimal Assistance   UB Bathing Deficit Increased time to complete;Verbal cueing  (A for thoroughness)   LB Bathing Assistance 2  Maximal Assistance   UB Dressing Assistance 4  Minimal Assistance   LB Dressing Assistance 2  Maximal Assistance   Toileting Assistance  2  Maximal Assistance   Toileting Comments Given fxl performance skills + medical complexity, therapist suspecting via clinical judgement + skilled analysis; pt currently requires stated assist above to perform each area of ADL d/t limitations including: low BP's, NWB  to R LE, generalized muscle weakness, sitting/standing balance deficits, fxl activity tolerance limitations, difficulty performing bend/reach-anterior trunk flexion, requires external assist to complete transitional movements, decreased core strength, decreased postural control, instability in stance, safety awareness concerns, and decreased problem solving abilities. Pt able to complete UB " "bathing/dressing tasks while seated in chair, SPV- Min A overall. Pt's BP reading 98/64 at start of seated participation. BP reading 91/60 with prolonged sitting/participation in self care tasks with c/o dizziness. Pt returned to recliner chair with BLEs elevated, BP reading 119/64 and dizziness subsides. Standing tasks deferred this session due to low BP readings. Pt on RA with SPO2 remaining 92% or greater throughout session.   Bed Mobility   Additional Comments OOB in chair at start/end of session, all needs within reach   Transfers   Additional Comments deferred secondary to low bp readings   Functional Mobility   Additional Comments unable to assess   Therapeutic Exercise - ROM   UE-ROM Yes   ROM- Right Upper Extremities   R Shoulder AROM;Flexion;Horizontal ABduction   R Elbow AROM;Elbow flexion;Elbow extension   R Wrist AROM;Wrist flexion;Wrist extension   R Position Seated   R Weight/Reps/Sets x 15 each   RUE ROM Comment Pt also completes scapular retraction/protraction, forearm supination/pronation; x 15 each   ROM - Left Upper Extremities    L Shoulder AROM;Flexion;Horizontal ABduction   L Elbow AROM;Elbow flexion;Elbow extension   L Wrist AROM;Wrist flexion;Wrist extension   L Position Seated   L Weight/Reps/Sets x 15 each   LUE ROM Comment Pt also completes scapular retraction/protraction, forearm supination/pronation; x 15 each   Subjective   Subjective \"I am weak in the legs and dizzy sometimes.\"   Cognition   Overall Cognitive Status Impaired   Arousal/Participation Alert;Cooperative   Attention Attends with cues to redirect   Orientation Level Oriented X4   Memory Decreased long term memory   Following Commands Follows one step commands without difficulty   Activity Tolerance   Activity Tolerance Patient limited by fatigue;Other (Comment)  (low BP readings)   Medical Staff Made Aware KARIS Miner   Assessment   Assessment Patient participated in Skilled OT session this date with interventions consisting " of ADL re training with the use of correct body mechnaics, Energy Conservation techniques, safety awareness and fall prevention techniques, therapeutic exercise to: increase functional use of BUEs, increase BUE muscle strength ,  therapeutic activities to: increase activity tolerance, increase cardiovascular endurance , and increase dynamic sit/ stand balance during functional activity  . Patient agreeable to OT treatment session, upon arrival patient was found seated OOB to Chair. Patient requiring verbal cues for safety, cognitive assistance to anticipate next step, and frequent rest periods. Pt's ind limited by low BP readings this session - see ADL section on flow sheet  for details. Patient continues to be functioning below baseline level, occupational performance remains limited secondary to factors listed above and increased risk for falls and injury. The patient's raw score on the AM-PAC Daily Activity Inpatient Short Form is 15. A raw score of less than 19 suggests the patient may benefit from discharge to post-acute rehabilitation services. Please refer to the recommendation of the Occupational Therapist for safe discharge planning. From OT standpoint, recommendation at time of d/c would be level 2, mod resource intensity.   Plan   Treatment Interventions ADL retraining;UE strengthening/ROM;Endurance training;Compensatory technique education;Energy conservation;Activityengagement   Goal Expiration Date 05/10/25   OT Treatment Day 2   OT Frequency 3-5x/wk   Discharge Recommendation   Rehab Resource Intensity Level, OT II (Moderate Resource Intensity)   AM-PAC Daily Activity Inpatient   Lower Body Dressing 2   Bathing 2   Toileting 2   Upper Body Dressing 3   Grooming 3   Eating 3   Daily Activity Raw Score 15   Daily Activity Standardized Score (Calc for Raw Score >=11) 34.69   AM-PAC Applied Cognition Inpatient   Following a Speech/Presentation 3   Understanding Ordinary Conversation 3   Taking Medications  3   Remembering Where Things Are Placed or Put Away 3   Remembering List of 4-5 Errands 3   Taking Care of Complicated Tasks 3   Applied Cognition Raw Score 18   Applied Cognition Standardized Score 38.07      Pt will benefit from continued OT services in order to maximize (I) c ADL performance, FM c least restrictive AD, and improve overall endurance/strength required to complete functional tasks in preparation for d/c.    Pt left seated in chair at end of session; all needs within reach; all lines intact.    Maria T Finnegan, OTR/L

## 2025-04-28 NOTE — ASSESSMENT & PLAN NOTE
Baseline around 1.3, admitted at 1.8  Start gentle IVF   Improved and normalized.   Monitor daily bmp  Avoid nephrotoxic agents and hypotension   GAY resolved, monitor for new GAY due to receiving IV contrast with CTA

## 2025-04-28 NOTE — ASSESSMENT & PLAN NOTE
Patient states that she has been having nausea and vomiting for the last month or so. Said that she had imaging done and EGD and nothing was definitive.   States that she has nausea and vomiting mostly associated with meals and around mealtime, will trial with reglan scheduled and see if this helps with patient's nausea.   Said that she felt the Reglan improved her symptoms but still having nausea and vomiting.   GI consult: unclear etiology, possibly multifactorial. EGD less than 1 week ago without explanation for symptoms, hiatal hernia could be contributing but unlikely to cause 70 pound weight loss. Getting celiac/mesenteric doppler U/S. Agree with remeron. Could consider colonoscopy.  Still with persistent N/V, will repeat CT AP with contrast.

## 2025-04-28 NOTE — PLAN OF CARE
Problem: Prexisting or High Potential for Compromised Skin Integrity  Goal: Skin integrity is maintained or improved  Description: INTERVENTIONS:- Identify patients at risk for skin breakdown- Assess and monitor skin integrity- Assess and monitor nutrition and hydration status- Monitor labs - Assess for incontinence - Turn and reposition patient- Assist with mobility/ambulation- Relieve pressure over bony prominences- Avoid friction and shearing- Provide appropriate hygiene as needed including keeping skin clean and dry- Evaluate need for skin moisturizer/barrier cream- Collaborate with interdisciplinary team - Patient/family teaching- Consider wound care consult   Outcome: Progressing     Problem: PAIN - ADULT  Goal: Verbalizes/displays adequate comfort level or baseline comfort level  Description: Interventions:- Encourage patient to monitor pain and request assistance- Assess pain using appropriate pain scale- Administer analgesics based on type and severity of pain and evaluate response- Implement non-pharmacological measures as appropriate and evaluate response- Consider cultural and social influences on pain and pain management- Notify physician/advanced practitioner if interventions unsuccessful or patient reports new pain  Outcome: Progressing     Problem: INFECTION - ADULT  Goal: Absence or prevention of progression during hospitalization  Description: INTERVENTIONS:- Assess and monitor for signs and symptoms of infection- Monitor lab/diagnostic results- Monitor all insertion sites, i.e. indwelling lines, tubes, and drains- Monitor endotracheal if appropriate and nasal secretions for changes in amount and color- [place] appropriate cooling/warming therapies per order- Administer medications as ordered- Instruct and encourage patient and family to use good hand hygiene technique- Identify and instruct in appropriate isolation precautions for identified infection/condition  Outcome: Progressing  Goal:  Absence of fever/infection during neutropenic period  Description: INTERVENTIONS:- Monitor WBC  Outcome: Progressing     Problem: SAFETY ADULT  Goal: Patient will remain free of falls  Description: INTERVENTIONS:- Educate patient/family on patient safety including physical limitations- Instruct patient to call for assistance with activity - Consult OT/PT to assist with strengthening/mobility - Keep Call bell within reach- Keep bed low and locked with side rails adjusted as appropriate- Keep care items and personal belongings within reach- Initiate and maintain comfort rounds- Make Fall Risk Sign visible to staff-   Outcome: Progressing  Goal: Maintain or return to baseline ADL function  Description: INTERVENTIONS:-  Assess patient's ability to carry out ADLs; assess patient's baseline for ADL function and identify physical deficits which impact ability to perform ADLs (bathing, care of mouth/teeth, toileting, grooming, dressing, etc.)- Assess/evaluate cause of self-care deficits - Assess range of motion- Assess patient's mobility; develop plan if impaired- Assess patient's need for assistive devices and provide as appropriate- Encourage maximum independence but intervene and supervise when necessary- Involve family in performance of ADLs- Assess for home care needs following discharge - Consider OT consult to assist with ADL evaluation and planning for discharge- Provide patient education as appropriate  Outcome: Progressing  Goal: Maintains/Returns to pre admission functional level  Description: INTERVENTIONS:- Perform AM-PAC 6 Click Basic Mobility/ Daily Activity assessment daily.- Set and communicate daily mobility goal to care team and patient/family/caregiver. - Collaborate with rehabilitation services on mobility goals if consulted-  toileting- Record patient progress and toleration of activity level   Outcome: Progressing

## 2025-04-28 NOTE — PLAN OF CARE
Problem: PHYSICAL THERAPY ADULT  Goal: Performs mobility at highest level of function for planned discharge setting.  See evaluation for individualized goals.  Description: Treatment/Interventions: ADL retraining, Functional transfer training, LE strengthening/ROM, Elevations, Therapeutic exercise, Endurance training, Patient/family training, Equipment eval/education, Bed mobility, Gait training, Compensatory technique education, Spoke to nursing, Spoke to case management, OT          See flowsheet documentation for full assessment, interventions and recommendations.  4/28/2025 0920 by Christi Winston, PT  Note: Prognosis: Good  Problem List: Decreased strength, Decreased endurance, Impaired balance, Decreased mobility, Decreased range of motion, Impaired judgement, Decreased safety awareness, Obesity, Decreased skin integrity  Pt tolerated session poorly reporting dizziness with static standing 30 seconds and needing to sit to recover. Pt continues to require increased assistance to stand and complete spt, unable to do NWB right LE. She requires verbal cues for tehcnique and sequence with mobility. She is further limited by medical course with + occult stool. She is limited by decreased strength, balance, endurance. She will continue to benefit from PT services to maximize LOF.     Barriers to Discharge: Inaccessible home environment, Decreased caregiver support  Barriers to Discharge Comments: requires assistance to complete mobility, fall risk, safety concerns  Rehab Resource Intensity Level, PT: II (Moderate Resource Intensity)    See flowsheet documentation for full assessment.

## 2025-04-28 NOTE — PLAN OF CARE
Problem: PHYSICAL THERAPY ADULT  Goal: Performs mobility at highest level of function for planned discharge setting.  See evaluation for individualized goals.  Description: Treatment/Interventions: ADL retraining, Functional transfer training, LE strengthening/ROM, Elevations, Therapeutic exercise, Endurance training, Patient/family training, Equipment eval/education, Bed mobility, Gait training, Compensatory technique education, Spoke to nursing, Spoke to case management, OT          See flowsheet documentation for full assessment, interventions and recommendations.  Note: Prognosis: Good  Problem List: Decreased strength, Decreased endurance, Impaired balance, Decreased mobility, Decreased range of motion, Impaired judgement, Decreased safety awareness, Obesity, Decreased skin integrity  Assessment: Pt is a 80 y.o. female seen for PT evaluation s/p admission to [facility] on 4/25/2025 with Generalized weakness.  Order placed for PT services.  Upon evaluation: Pt is presenting with impaired functional mobility due to decreased strength, decreased ROM, decreased endurance, impaired balance, gait deviations, decreased safety awareness, impaired judgment, fall risk, LE edema, and impaired skin integrity requiring  minimal assistance for bed mobility, maximal to moderate assistance for transfers, and moderate assistance for ambulation with RW . Pt's clinical presentation is currently unpredictable given the functional mobility deficits above, especially weakness, decreased ROM, edema of extremities, decreased skin integrity, decreased endurance, impaired balance, gait deviations, decreased activity tolerance, decreased functional mobility tolerance, decreased safety awareness, and impaired judgement, coupled with fall risks as indicated by AM-PAC 6-Clicks: 12/24 as well as impaired balance, polypharmacy, impaired judgement, decreased safety awareness, and obesity and combined with medical  complications of hypotension, abnormal H&H, abnormal potassium values, readmission to hospital, need for input for mobility technique/safety, and occult blood, acute cystitis, GAY, tachycardia with episode of tachycardia in 160s yesterday, bilateral LE edema, electrolyte imbalance . Pt's PMHx and comorbidities that may affect physical performance and progress include: h/o DVT, h/o PE, HTN, and chronic osteomyelitis of right foot, CKD, anemia, obesity . Personal factors affecting pt at time of IE include: inaccessible home environment, step(s) to enter environment, limited home support, advanced age, inability to perform IADLs, inability to perform ADLs, inability to navigate level surfaces without external assistance, inability to ambulate household distances, limited insight into impairments, and recent fall(s)/fall history. Pt will benefit from continued skilled PT services to address deficits as defined above and to maximize level of functional mobility to facilitate return toward PLOF and improved QOL. From PT/mobility standpoint, recommendation at time of d/c would be Level II (Moderate Resource Intensity in order to reduce fall risk and maximize pt's functional independence and consistency with mobility. Recommend trial with walker next 1-2 sessions and ther ex next 1-2 sessions.  Barriers to Discharge: Inaccessible home environment, Decreased caregiver support  Barriers to Discharge Comments: requires assistance to complete mobility, fall risk, safety concerns  Rehab Resource Intensity Level, PT: II (Moderate Resource Intensity)    See flowsheet documentation for full assessment.

## 2025-04-28 NOTE — ASSESSMENT & PLAN NOTE
Patient record reviewed-patient is to remain nonweightbearing until wound is completely healed  Completed 6 weeks IV abx  Wound care   There is a note from outpatient podiatry on 3/25 to be WBAT in post op shoe   Will consult podiatry for follow up on foot wound and to verify weight bearing status to work with PT/OT   Podiatry obtaining updated MRI and arterial duplex.

## 2025-04-28 NOTE — PLAN OF CARE
Problem: OCCUPATIONAL THERAPY ADULT  Goal: Performs self-care activities at highest level of function for planned discharge setting.  See evaluation for individualized goals.  Description: Treatment Interventions: ADL retraining, UE strengthening/ROM, Endurance training, Compensatory technique education, Energy conservation, Activityengagement          See flowsheet documentation for full assessment, interventions and recommendations.   4/28/2025 1058 by Maria T Finnegan OT  Outcome: Progressing  Note: Limitation: Decreased ADL status, Decreased UE strength, Decreased Safe judgement during ADL, Decreased endurance, Decreased high-level ADLs  Prognosis: Fair  Assessment: Patient participated in Skilled OT session this date with interventions consisting of ADL re training with the use of correct body mechnaics, Energy Conservation techniques, safety awareness and fall prevention techniques, therapeutic exercise to: increase functional use of BUEs, increase BUE muscle strength ,  therapeutic activities to: increase activity tolerance, increase cardiovascular endurance , and increase dynamic sit/ stand balance during functional activity  . Patient agreeable to OT treatment session, upon arrival patient was found seated OOB to Chair. Patient requiring verbal cues for safety, cognitive assistance to anticipate next step, and frequent rest periods. Pt's ind limited by low BP readings this session - see ADL section on flow sheet  for details. Patient continues to be functioning below baseline level, occupational performance remains limited secondary to factors listed above and increased risk for falls and injury. The patient's raw score on the AM-PAC Daily Activity Inpatient Short Form is 15. A raw score of less than 19 suggests the patient may benefit from discharge to post-acute rehabilitation services. Please refer to the recommendation of the Occupational Therapist for safe discharge planning. From OT standpoint,  recommendation at time of d/c would be level 2, mod resource intensity.     Rehab Resource Intensity Level, OT: II (Moderate Resource Intensity)       4/28/2025 1057 by Maria T Finnegan OT  Outcome: Progressing  Note: Limitation: Decreased ADL status, Decreased UE strength, Decreased Safe judgement during ADL, Decreased endurance, Decreased high-level ADLs  Prognosis: Fair  Assessment: Patient participated in Skilled OT session this date with interventions consisting of ADL re training with the use of correct body mechnaics, Energy Conservation techniques, safety awareness and fall prevention techniques, therapeutic exercise to: increase functional use of BUEs, increase BUE muscle strength ,  therapeutic activities to: increase activity tolerance, increase cardiovascular endurance , and increase dynamic sit/ stand balance during functional activity  . Patient agreeable to OT treatment session, upon arrival patient was found seated OOB to Chair. Patient requiring verbal cues for safety, cognitive assistance to anticipate next step, and frequent rest periods. Pt's ind limited by low BP readings this session - see ADL section on flow sheet  for details. Patient continues to be functioning below baseline level, occupational performance remains limited secondary to factors listed above and increased risk for falls and injury. The patient's raw score on the AM-PAC Daily Activity Inpatient Short Form is 15. A raw score of less than 19 suggests the patient may benefit from discharge to post-acute rehabilitation services. Please refer to the recommendation of the Occupational Therapist for safe discharge planning. From OT standpoint, recommendation at time of d/c would be level 2, mod resource intensity.     Rehab Resource Intensity Level, OT: II (Moderate Resource Intensity)

## 2025-04-28 NOTE — PROGRESS NOTES
Progress Note - Hospitalist   Name: Alecia Kay 80 y.o. female I MRN: [id number]  Unit/Bed#: -01 I Date of Admission: 4/25/2025   Date of Service: 4/28/2025 I Hospital Day: 3    Assessment & Plan  Generalized weakness  POA with complaints of generalized weakness and poor appetite. Signed herself out of [facility] on 4/24, was home for one day when she started feeling weaker  Per OP , having cyclic vomiting at home and not taking any of her medications  Admits to poor appetite for the past month, will trial remeron   Had dilation of esophagus outpatient 4/22  Acute cystitis and mild electrolyte derangements with gay on admission  IV hydration and antibiotics  Replenish electrolytes  Flu/covid/rsv negative   CT CAP without acute findings   PT/OT consulted   Acute kidney injury (HCC)  Baseline around 1.3, admitted at 1.8  Start gentle IVF   Improved and normalized.   Monitor daily bmp  Avoid nephrotoxic agents and hypotension   GAY resolved, monitor for new GAY due to receiving IV contrast with CTA   Acute cystitis  UA + and endorses dysuria  Await urine culture  Most recent being 4/3 with klebsiella, susceptible to rocephin   Still having symptoms, urine culture with klebsiella and ESBL e coli - switch to ancef and ertapenem  Chronic osteomyelitis of toe of right foot (HCC)  Patient record reviewed-patient is to remain nonweightbearing until wound is completely healed  Completed 6 weeks IV abx  Wound care   There is a note from outpatient podiatry on 3/25 to be WBAT in post op shoe   Will consult podiatry for follow up on foot wound and to verify weight bearing status to work with PT/OT   Podiatry obtaining updated MRI and arterial duplex.   Nausea and vomiting  Patient states that she has been having nausea and vomiting for the last month or so. Said that she had imaging done and EGD and nothing was definitive.   States that she has nausea and vomiting mostly associated with meals and  around mealtime, will trial with reglan scheduled and see if this helps with patient's nausea.   Said that she felt the Reglan improved her symptoms but still having nausea and vomiting.   GI consult: unclear etiology, possibly multifactorial. EGD less than 1 week ago without explanation for symptoms, hiatal hernia could be contributing but unlikely to cause 70 pound weight loss. Getting celiac/mesenteric doppler U/S. Agree with remeron. Could consider colonoscopy.  Still with persistent N/V, will repeat CT AP with contrast.   Tachycardia  Patient with episode of nausea and vomiting on 4/27 in the morning and on telemetry found to have tachycardia up to 160s sustaining. Patient is on lopressor 50 mg bid which patient states is for HTN.   ECG obtained and looked like atrial fibrillation rvr with HR of 150s however patient without history of atrial fibrillation  Given 1 dose IV lopressor and HR improved now sustaining in the 60s-70s.   Repeat ECG showing NSR in the evening.   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   Placed on heparin gtt at this time and resume coumadin.  History of DVT (deep vein thrombosis)  Patient states that she has a history of DVT/PE. Said that she had unknown cause for her PE/DVT and was placed on coumadin for this. She is still supposed to be on coumadin but is not sure the dosing  States that she doesn't have any known history of atrial fibrillation that she knows of.   Was not taking coumadin during hospitalization and prior to hospitalization due to nausea and vomiting, said she missed doses.   PT-INR subtherapeutic.   Venous duplex positive for acute vs subacute DVT, see plan under acute dvt.   Bilateral lower extremity edema  Was recently taken off of torsemide during last hospitalization  Caution with gentle hydration at this time  May need diuretics as needed  Also may benefit from changing amlodipine to a different blood pressure medication  Venous  duplex positive for acute vs subacute DVT bilaterally.   Hypertension  Continue home amlodipine and metoprolol   Electrolyte imbalance  Low mag and K on admission  Replenish and monitor daily   Managed on chronic K 20meq BID, will increase to 40 meq BID due to hypokalemia  Continue with IV supplementation until patient can tolerate PO.   Hematochezia  Patient with complaints of bloody bowel movement this morning. Said that she had bright blood streaking the stools and blood in the bedpan. Does also intermittently complain of darker stools, but said that this one today was more bright red.   Heparin gtt was stopped and held and no further bloody stools noted   GI consult: continue PPI daily, continue antiemetics, could consider colonoscopy to rule out underlying malignancy, could be done outpatient.   Acute DVT (deep venous thrombosis) (HCC)  Patient with bilateral lower extremity swelling on admission. Venous duplex was ordered and showing acute vs. sub acute DVT in the right popliteal, gastrocnemius, posterior tibial, and peroneal veins and acute vs. sub acute DVT in the left popliteal, posterior tibial, and peroneal veins.   Initially was on heparin gtt due to subtherapeutic INR, held then because patient had bloody stools.   Discussed with GI, ok to resume heparin gtt with close monitoring of stools.   Will order CTA chest PE study to rule out any PE as patient does have history.   Could consider sending eliquis for price check to the pharmacy as well - patient states that previously she was not on this because of cost she thinks.     VTE Pharmacologic Prophylaxis:   High Risk (Score >/= 5) - Pharmacological DVT Prophylaxis Ordered: heparin drip. Sequential Compression Devices Ordered.    Mobility:   Basic Mobility Inpatient Raw Score: 12  JH-HLM Goal: 4: Move to chair/commode  JH-HLM Achieved: 4: Move to chair/commode  JH-HLM Goal achieved. Continue to encourage appropriate mobility.    Patient Centered Rounds:  I performed bedside rounds with nursing staff today.   Discussions with Specialists or Other Care Team Provider: nursing, case management, GI    Education and Discussions with Family / Patient: Updated  (nephew) at bedside.    Current Length of Stay: 3 day(s)  Current Patient Status: Inpatient   Certification Statement: The patient will continue to require additional inpatient hospital stay due to acute dvt, nausea, vomiting, monitoring stools  Discharge Plan: Anticipate discharge in >72 hrs to rehab vs home with [facility]    Code Status: Level 1 - Full Code    Subjective   Seen and examined today, said that she is not doing well. Still having nausea and vomiting, also was having bloody stools this morning still. No chest pain or SOB right now. No fevers or chills. She is afraid to eat anything because of the nausea and vomiting.     Objective :  Temp:  [97.3 °F (36.3 °C)-97.7 °F (36.5 °C)] 97.3 °F (36.3 °C)  HR:  [50-62] 62  BP: ()/(54-66) 114/62  Resp:  [16-17] 16  SpO2:  [87 %-96 %] 95 %  O2 Device: None (Room air)  Nasal Cannula O2 Flow Rate (L/min):  [1 L/min] 1 L/min    Body mass index is 30.2 kg/m².     Input and Output Summary (last 24 hours):     Intake/Output Summary (Last 24 hours) at 4/28/2025 1514  Last data filed at 4/28/2025 1341  Gross per 24 hour   Intake 0 ml   Output 900 ml   Net -900 ml       Physical Exam  Vitals reviewed.   Constitutional:       General: She is not in acute distress.     Appearance: She is obese. She is ill-appearing. She is not toxic-appearing.   HENT:      Head: Normocephalic and atraumatic.      Mouth/Throat:      Mouth: Mucous membranes are dry.   Cardiovascular:      Rate and Rhythm: Normal rate and regular rhythm.      Heart sounds: No murmur heard.  Pulmonary:      Effort: No respiratory distress.      Breath sounds: No stridor. No wheezing.   Abdominal:      General: Bowel sounds are normal. There is no distension.      Palpations: Abdomen is soft. There  is no mass.      Tenderness: There is no abdominal tenderness.   Musculoskeletal:      Right lower leg: Edema present.      Left lower leg: Edema present.   Skin:     General: Skin is warm and dry.   Neurological:      Mental Status: She is alert and oriented to person, place, and time.   Psychiatric:         Mood and Affect: Mood normal.         Behavior: Behavior normal.           Lines/Drains:        Telemetry:  Telemetry Orders (From admission, onward)               24 Hour Telemetry Monitoring  Continuous x 24 Hours (Telem)        Expiring   Question:  Reason for 24 Hour Telemetry  Answer:  Metabolic/electrolyte disturbance with high probability of dysrhythmia. K level <3 or >6 OR KCL infusion >10mEq/hr                     Telemetry Reviewed: Normal Sinus Rhythm  Indication for Continued Telemetry Use: Arrthymias requiring medical therapy               Lab Results: I have reviewed the following results:   Results from last 7 days   Lab Units 04/28/25  0353 04/26/25  0513 04/25/25  1307   WBC Thousand/uL 6.35   < > 7.54   HEMOGLOBIN g/dL 8.1*   < > 9.7*   HEMATOCRIT % 24.9*   < > 30.0*   PLATELETS Thousands/uL 181   < > 223   SEGS PCT %  --   --  78*   LYMPHO PCT %  --   --  15   MONO PCT %  --   --  6   EOS PCT %  --   --  0    < > = values in this interval not displayed.     Results from last 7 days   Lab Units 04/28/25  0353 04/26/25  1606 04/26/25  0513   SODIUM mmol/L 141   < > 142   POTASSIUM mmol/L 2.8*   < > 2.4*   CHLORIDE mmol/L 107   < > 107   CO2 mmol/L 23   < > 22   BUN mg/dL 10   < > 22   CREATININE mg/dL 1.01   < > 1.43*   ANION GAP mmol/L 11   < > 13   CALCIUM mg/dL 7.4*   < > 8.0*   ALBUMIN g/dL  --   --  2.6*   TOTAL BILIRUBIN mg/dL  --   --  0.51   ALK PHOS U/L  --   --  46   ALT U/L  --   --  5*   AST U/L  --   --  10*   GLUCOSE RANDOM mg/dL 79   < > 69    < > = values in this interval not displayed.     Results from last 7 days   Lab Units 04/28/25  0352   INR  1.25*                   Recent  Cultures (last 7 days):   Results from last 7 days   Lab Units 04/28/25  0422 04/25/25  1424   URINE CULTURE   --  >100,000 cfu/ml Klebsiella pneumoniae*  >100,000 cfu/ml Escherichia coli ESBL*   C DIFF TOXIN B BY PCR  Negative  --        Imaging Results Review: I reviewed radiology reports from this admission including: Ultrasound(s).  Other Study Results Review: EKG was reviewed.  EKG was personally reviewed and my interpretation is: Personally Reviewed. NSR. HR 60..    Last 24 Hours Medication List:     Current Facility-Administered Medications:     acetaminophen (TYLENOL) tablet 650 mg, Q6H PRN    amLODIPine (NORVASC) tablet 10 mg, Daily    ceFAZolin (ANCEF) IVPB (premix in dextrose) 2,000 mg 50 mL, Q8H, Last Rate: 2,000 mg (04/28/25 1323)    ertapenem (INVanz) 1,000 mg in sodium chloride 0.9 % 50 mL IVPB, Q24H, Last Rate: 1,000 mg (04/28/25 1323)    heparin (porcine) 25,000 units in 0.45% NaCl 250 mL infusion (premix), Titrated    heparin (porcine) injection 2,800 Units, Q6H PRN    heparin (porcine) injection 5,600 Units, Q6H PRN    hydrOXYzine HCL (ATARAX) tablet 25 mg, Q6H PRN    levothyroxine tablet 75 mcg, Daily    meclizine (ANTIVERT) tablet 25 mg, TID PRN    metoclopramide (REGLAN) injection 10 mg, Once    metoclopramide (REGLAN) tablet 5 mg, TID AC    metoprolol (LOPRESSOR) injection 5 mg, Once    metoprolol tartrate (LOPRESSOR) tablet 50 mg, Q12H MELANY    mirtazapine (REMERON) tablet 7.5 mg, HS    ondansetron (ZOFRAN) injection 4 mg, Q6H PRN    pantoprazole (PROTONIX) injection 40 mg, Q24H MELANY    [Held by provider] potassium chloride (Klor-Con M20) CR tablet 40 mEq, BID    pravastatin (PRAVACHOL) tablet 80 mg, Daily With Dinner    [Held by provider] senna-docusate sodium (SENOKOT S) 8.6-50 mg per tablet 1 tablet, Daily    sodium chloride 0.9 % infusion, Continuous, Last Rate: 75 mL/hr (04/27/25 8293)    warfarin (COUMADIN) tablet 5 mg, Once per day on Sunday Tuesday Wednesday Thursday Saturday **OR**  [DISCONTINUED] warfarin (COUMADIN) tablet 7.5 mg, Once per day on Sunday Tuesday Wednesday Thursday Saturday    warfarin (COUMADIN) tablet 7.5 mg, Once per day on Monday Friday    Administrative Statements   Today, Patient Was Seen By: Nelly Carnes PA-C    **Please Note: This note may have been constructed using a voice recognition system.**

## 2025-04-28 NOTE — ASSESSMENT & PLAN NOTE
Patient with complaints of bloody bowel movement this morning. Said that she had bright blood streaking the stools and blood in the bedpan. Does also intermittently complain of darker stools, but said that this one today was more bright red.   Heparin gtt was stopped and held and no further bloody stools noted   GI consult: continue PPI daily, continue antiemetics, could consider colonoscopy to rule out underlying malignancy, could be done outpatient.

## 2025-04-28 NOTE — QUICK NOTE
Received notice regarding patient's venous duplex positive for acute vs. sub acute DVT in the right popliteal, gastrocnemius, posterior tibial, and peroneal veins and acute vs. sub acute DVT in the left popliteal, posterior tibial, and peroneal veins. Discussed with GI regarding results, ok to resume heparin gtt with very close monitoring of stools. C diff and stool enteric panel negative.      Will order STAT CTA chest PE study at this time along with abdomen pelvis as patient intermittently with tachycardia, briefly required o2, and was complaining of worsening nausea and vomiting. Follow up on results. Discussed with patient and agreeable to plans at this time.     Nelly Carnes PA-C

## 2025-04-28 NOTE — ASSESSMENT & PLAN NOTE
Low mag and K on admission  Replenish and monitor daily   Managed on chronic K 20meq BID, will increase to 40 meq BID due to hypokalemia  Continue with IV supplementation until patient can tolerate PO.

## 2025-04-28 NOTE — CONSULTS
Consult - Podiatry   Alecia Kay 80 y.o. female MRN: [id number]  Unit/Bed#: -01 Encounter: [id number]    Assessment & Plan     Assessment:  Right heel wound with chronic OM  PVD  Right 2nd toe and left 1st toe DTI    Plan:  - Right heel wound appears nearly healed without SOI. C/w LWC. MRI right heel ordered to assess for possible residual OM (however unlikely due to appearance of wound and lack of SOI)  - LEADs pending  - right foot XR 3v 4/27/25: stable appearance without new erosions nor RHODA  - WBAT surgical shoe B/L  - LWC right heel: betadine, 2x2, allevyn  - DTIs appear stable   - Rest of care per primary and consulting teams    History of Present Illness     HPI:  Alecia Kay is a 80 y.o. female w/  PMH of CKD, HTN, hypothyrodisim and chronic osteo of right calcaneus who presents with generalized weakness and admitted for GAY.     I reviewed recent [facility] (podiatry, ID, hospitalist) notes. Patent has h/o right calcaneal OM treated at [facility]. She is s/p I&D 1/10/25, debridement/bone biopsy/wv 1/13/25. Completed 6wks IV unasyn 2/28/25 and placed on Augmentin per ID 3/17/25. Has been NWB but ok to WB per podiatry note 3/25/25.     Inpatient consult to Podiatry  Consult performed by: Rayna Grullon DPM  Consult ordered by: Nelly Carnes PA-C        Review of Systems   Constitutional: Negative.    HENT: Negative.    Eyes: Negative.    Respiratory: Negative.    Cardiovascular: Negative.    Gastrointestinal: Negative.    Musculoskeletal: Neg   Skin: foot wounds   Neurological: Negative.   Psych: negative.       Historical Information   Past Medical History:   Diagnosis Date    Anemia     Cellulitis     Disease of thyroid gland     GERD (gastroesophageal reflux disease)     Hyperlipidemia     Hypertension     Obesity     Osteomyelitis (HCC)     Pneumonia     Pulmonary embolism (HCC)      Past Surgical History:   Procedure Laterality Date    FOOT SURGERY      JOINT REPLACEMENT    "    Social History   Social History     Substance and Sexual Activity   Alcohol Use Never     Social History     Substance and Sexual Activity   Drug Use Never     Social History     Tobacco Use   Smoking Status Never    Passive exposure: Never   Smokeless Tobacco Never     Family History: History reviewed. No pertinent family history.    Meds/Allergies     Medications Prior to Admission:     amLODIPine (NORVASC) 10 mg tablet    levothyroxine 75 mcg tablet    metoprolol tartrate (LOPRESSOR) 50 mg tablet    pantoprazole (PROTONIX) 40 mg tablet    warfarin (COUMADIN) 5 mg tablet    acetaminophen (TYLENOL) 325 mg tablet    hydrocortisone 1 % cream    magnesium hydroxide (MILK OF MAGNESIA) 400 mg/5 mL oral suspension    meclizine (ANTIVERT) 25 mg tablet    rosuvastatin (CRESTOR) 10 MG tablet    saccharomyces boulardii (FLORASTOR) 250 mg capsule    senna-docusate sodium (SENOKOT S) 8.6-50 mg per tablet  Allergies   Allergen Reactions    Neomycin-Polymyxin-Hc Other (See Comments)     Unknown reaction      Penicillins Hives     Patient states she got hive, but has had penicillin medication in the past and was ok.     Quinine Other (See Comments)     Unknown    Amoxicillin-Pot Clavulanate Itching       Objective   First Vitals:   Blood Pressure: (!) 96/48 (04/25/25 1241)  Pulse: 77 (04/25/25 1241)  Temperature: 97.8 °F (36.6 °C) (04/25/25 1241)  Temp Source: Temporal (04/25/25 1241)  Respirations: 16 (04/25/25 1242)  Height: 5' 2.01\" (157.5 cm) (04/25/25 1811)  Weight - Scale: 76.9 kg (169 lb 8.5 oz) (04/25/25 1241)  SpO2: 96 % (04/25/25 1241)    Current Vitals:   Blood Pressure: 114/62 (04/28/25 0804)  Pulse: 62 (04/28/25 0318)  Temperature: (!) 97.3 °F (36.3 °C) (04/27/25 2224)  Temp Source: Temporal (04/27/25 2224)  Respirations: 16 (04/28/25 0318)  Height: 5' 2\" (157.5 cm) (04/28/25 1131)  Weight - Scale: 74.9 kg (165 lb 2 oz) (04/25/25 1811)  SpO2: 95 % (04/28/25 0755)        /62 (BP Location: Left arm)   Pulse " "62   Temp (!) 97.3 °F (36.3 °C) (Temporal)   Resp 16   Ht 5' 2\" (1.575 m)   Wt 74.9 kg (165 lb 2 oz)   SpO2 95%   BMI 30.20 kg/m²      General Appearance:    Alert, cooperative, no distress   Head:    Normocephalic, without obvious abnormality, atraumatic   Eyes:    PERRL, conjunctiva/corneas clear, EOM's intact        Nose:   Moist mucous membranes   Neck:   Supple, symmetrical, trachea midline   Back:     Symmetric   Lungs:     Respirations unlabored   Heart:    Regular rate and rhythm, S1 and S2 normal, no murmur, rub   or gallop   Abdomen:     Soft, non-tender    B/L LE EXAM   Extremities:   B/L hammer toes.    Pulses:   Palpable. Toes warm. Absent pedal hair.    Skin:   Left 1st toe distal tip DTI.   Right plantar heel very superficial almost healed wound within deep groove of skin at surgical site.   Right dorsal 2nd toe PIPJ DTI.   Neurologic:   Gross sensation is intact. Protective sensation is diminished.       Right 2nd toe    Right heel    left      Lab Results:   No results displayed because visit has over 200 results.                      Invalid input(s): \"LABAEARO\"            Imaging: I have personally reviewed pertinent films in PACS  EKG, Pathology, and Other Studies: I have personally reviewed pertinent reports.      Code Status: Level 1 - Full Code  Advance Directive and Living Will:      Power of :    POLST:          "

## 2025-04-28 NOTE — PLAN OF CARE
Problem: Prexisting or High Potential for Compromised Skin Integrity  Goal: Skin integrity is maintained or improved  Description: INTERVENTIONS:- Identify patients at risk for skin breakdown- Assess and monitor skin integrity- Assess and monitor nutrition and hydration status- Monitor labs - Assess for incontinence - Turn and reposition patient- Assist with mobility/ambulation- Relieve pressure over bony prominences- Avoid friction and shearing- Provide appropriate hygiene as needed including keeping skin clean and dry- Evaluate need for skin moisturizer/barrier cream- Collaborate with interdisciplinary team - Patient/family teaching- Consider wound care consult   Outcome: Progressing     Problem: PAIN - ADULT  Goal: Verbalizes/displays adequate comfort level or baseline comfort level  Description: Interventions:- Encourage patient to monitor pain and request assistance- Assess pain using appropriate pain scale- Administer analgesics based on type and severity of pain and evaluate response- Implement non-pharmacological measures as appropriate and evaluate response- Consider cultural and social influences on pain and pain management- Notify physician/advanced practitioner if interventions unsuccessful or patient reports new pain  Outcome: Progressing     Problem: INFECTION - ADULT  Goal: Absence or prevention of progression during hospitalization  Description: INTERVENTIONS:- Assess and monitor for signs and symptoms of infection- Monitor lab/diagnostic results- Monitor all insertion sites, i.e. indwelling lines, tubes, and drains- Monitor endotracheal if appropriate and nasal secretions for changes in amount and color- [place] appropriate cooling/warming therapies per order- Administer medications as ordered- Instruct and encourage patient and family to use good hand hygiene technique- Identify and instruct in appropriate isolation precautions for identified infection/condition  Outcome: Progressing  Goal:  Absence of fever/infection during neutropenic period  Description: INTERVENTIONS:- Monitor WBC  Outcome: Progressing     Problem: SAFETY ADULT  Goal: Patient will remain free of falls  Description: INTERVENTIONS:- Educate patient/family on patient safety including physical limitations- Instruct patient to call for assistance with activity - Consult OT/PT to assist with strengthening/mobility - Keep Call bell within reach- Keep bed low and locked with side rails adjusted as appropriate- Keep care items and personal belongings within reach- Initiate and maintain comfort rounds- Make Fall Risk Sign visible to staff- Offer Toileting every   Hours, in advance of need- Initiate/Maintain  alarm- Obtain necessary fall risk management equipment:  - Apply yellow socks and bracelet for high fall risk patients- Consider moving patient to room near nurses station  Outcome: Progressing  Goal: Maintain or return to baseline ADL function  Description: INTERVENTIONS:-  Assess patient's ability to carry out ADLs; assess patient's baseline for ADL function and identify physical deficits which impact ability to perform ADLs (bathing, care of mouth/teeth, toileting, grooming, dressing, etc.)- Assess/evaluate cause of self-care deficits - Assess range of motion- Assess patient's mobility; develop plan if impaired- Assess patient's need for assistive devices and provide as appropriate- Encourage maximum independence but intervene and supervise when necessary- Involve family in performance of ADLs- Assess for home care needs following discharge - Consider OT consult to assist with ADL evaluation and planning for discharge- Provide patient education as appropriate  Outcome: Progressing  Goal: Maintains/Returns to pre admission functional level  Description: INTERVENTIONS:- Perform AM-PAC 6 Click Basic Mobility/ Daily Activity assessment daily.- Set and communicate daily mobility goal to care team and patient/family/caregiver. - Collaborate  with rehabilitation services on mobility goals if consulted- Perform Range of Motion   times a day.- Reposition patient every   hours.- Dangle patient   times a day- Stand patient   times a day- Ambulate patient   times a day- Out of bed to chair   times a day - Out of bed for meals   times a day- Out of bed for toileting- Record patient progress and toleration of activity level   Outcome: Progressing     Problem: DISCHARGE PLANNING  Goal: Discharge to home or other facility with appropriate resources  Description: INTERVENTIONS:- Identify barriers to discharge w/patient and caregiver- Arrange for needed discharge resources and transportation as appropriate- Identify discharge learning needs (meds, wound care, etc.)- Arrange for interpretive services to assist at discharge as needed- Refer to Case Management Department for coordinating discharge planning if the patient needs post-hospital services based on physician/advanced practitioner order or complex needs related to functional status, cognitive ability, or social support system  Outcome: Progressing     Problem: Knowledge Deficit  Goal: Patient/family/caregiver demonstrates understanding of disease process, treatment plan, medications, and discharge instructions  Description: Complete learning assessment and assess knowledge base.Interventions:- Provide teaching at level of understanding- Provide teaching via preferred learning methods  Outcome: Progressing     Problem: Nutrition/Hydration-ADULT  Goal: Nutrient/Hydration intake appropriate for improving, restoring or maintaining nutritional needs  Description: Monitor and assess patient's nutrition/hydration status for malnutrition. Collaborate with interdisciplinary team and initiate plan and interventions as ordered.  Monitor patient's weight and dietary intake as ordered or per policy. Utilize nutrition screening tool and intervene as necessary. Determine patient's food preferences and provide high-protein,  high-caloric foods as appropriate. INTERVENTIONS:- Monitor oral intake, urinary output, labs, and treatment plans- Assess nutrition and hydration status and recommend course of action- Evaluate amount of meals eaten- Assist patient with eating if necessary - Allow adequate time for meals- Recommend/ encourage appropriate diets, oral nutritional supplements, and vitamin/mineral supplements- Order, calculate, and assess calorie counts as needed- Recommend, monitor, and adjust tube feedings and TPN/PPN based on assessed needs- Assess need for intravenous fluids- Provide specific nutrition/hydration education as appropriate- Include patient/family/caregiver in decisions related to nutrition  Outcome: Progressing

## 2025-04-28 NOTE — CONSULTS
Consultation - Gastroenterology   Name: Alecia Kay 80 y.o. female I MRN: [id number]  Unit/Bed#: -01 I Date of Admission: 4/25/2025   Date of Service: 4/28/2025 I Hospital Day: 3     Inpatient consult to gastroenterology  Consult performed by: Julianne Salvador PA-C  Consult ordered by: Nelly Carnes PA-C      Physician Requesting Evaluation: Tanna Salas MD   Reason for Evaluation / Principal Problem: nausea and vomiting     Assessment & Plan  Nausea and vomiting  Patient with chronic N/V, poor appetite and associated 70lb weight loss over the past 6 months   CT CAP wo contrast unremarkable   EGD 4/22/25 with hiatal hernia, Wang's esophagus, empiric esophageal dilation, normal stomach and duodenum    Unclear etiology at this time, likely multifactorial given multiple recent admissions with acute osteomyelitis of right foot and then to rehab, overall deconditioning and decline in health   EGD less than 1 week ago without explanation for symptoms, hiatal hernia could be contributing but unlikely to cause 70lb weight loss  Admitting CT CAP without explanation although was a non contrast study due to GAY  Will get celiac/mesenteric doppler US, ordered  Agree with remeron to see if improves appetite  Continue antiemetics  Could consider colonoscopy to r/o underlying malignancy, can be done as outpatient   Could consider outpatient esophagram to evaluate for dysmotility   Continue PPI daily  History of DVT (deep vein thrombosis)  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 year old female with a PMHx DVT/PE on coumadin, GERD, HLD, HTN, chronic osteomyelitis of toe of right foot, hypothyroidism who presented with poor appetite, N/V and weakness x 4 months.     Patient reports she has been feeling nauseous with a poor appetite since January  2025. She reports because of this she has lost 50lbs. She feels that so far no doctor she has seen has been able to figure out the cause of her symptoms. Over the past few weeks her symptoms have worsened which prompted her to come to the ED.     In the ED, labs with electrolyte imbalances, GAY, and +UA. CT AP wo contrast unremarkable. She was admitted with generalized weakness and GI has been consulted due to poor appetite and N/V despite medical treatment.     Upon further questioning, patient reports at time she feels nauseous even when she is not eating however, eating does seem to make nausea worse. Per nursing, she will take 1 bite of yogurt and then spit up some phlegm and state she can't eat anymore due to lack of appetite. The zofran helps minimally. She denies any dysphagia or odynophagia or coughing when eating. No abdominal pain. No change in bowel habits or blood in stool. She has a BM every other day.     Patient underwent outpatient EGD last week on 4/22/25 with findings of large hiatal hernia, Wang's esophagus s/p bx, normal stomach and duodenum, empiric dilation of entire esophagus. She is on PPI daily as an outpatient.     Last colonoscopy was 4/2022 with one polyp removed, diverticulosis, hemorrhoids. No recall due to age.     Weight 11/2024 - 238lbs  Weight on admission 165lbs     Of note, she has multiple admissions over the past few months related to osteomyelitis of her right foot.       Past Medical History:   Diagnosis Date    Anemia     Cellulitis     Disease of thyroid gland     GERD (gastroesophageal reflux disease)     Hyperlipidemia     Hypertension     Obesity     Osteomyelitis (HCC)     Pneumonia     Pulmonary embolism (HCC)      Past Surgical History:   Procedure Laterality Date    FOOT SURGERY      JOINT REPLACEMENT       Social History   Social History     Substance and Sexual Activity   Alcohol Use Never     Social History     Substance and Sexual Activity   Drug Use Never      Social History     Tobacco Use   Smoking Status Never    Passive exposure: Never   Smokeless Tobacco Never       History reviewed. No pertinent family history.      Medications Prior to Admission:     amLODIPine (NORVASC) 10 mg tablet    levothyroxine 75 mcg tablet    metoprolol tartrate (LOPRESSOR) 50 mg tablet    pantoprazole (PROTONIX) 40 mg tablet    warfarin (COUMADIN) 5 mg tablet    acetaminophen (TYLENOL) 325 mg tablet    hydrocortisone 1 % cream    magnesium hydroxide (MILK OF MAGNESIA) 400 mg/5 mL oral suspension    meclizine (ANTIVERT) 25 mg tablet    rosuvastatin (CRESTOR) 10 MG tablet    saccharomyces boulardii (FLORASTOR) 250 mg capsule    senna-docusate sodium (SENOKOT S) 8.6-50 mg per tablet    Current Facility-Administered Medications:     acetaminophen (TYLENOL) tablet 650 mg, Q6H PRN    amLODIPine (NORVASC) tablet 10 mg, Daily    ceFAZolin (ANCEF) IVPB (premix in dextrose) 2,000 mg 50 mL, Q8H, Last Rate: 2,000 mg (04/27/25 2304)    ertapenem (INVanz) 1,000 mg in sodium chloride 0.9 % 50 mL IVPB, Q24H, Last Rate: 1,000 mg (04/27/25 1529)    hydrOXYzine HCL (ATARAX) tablet 25 mg, Q6H PRN    levothyroxine tablet 75 mcg, Daily    meclizine (ANTIVERT) tablet 25 mg, TID PRN    metoclopramide (REGLAN) injection 10 mg, Once    metoclopramide (REGLAN) tablet 5 mg, TID AC    metoprolol (LOPRESSOR) injection 5 mg, Once    metoprolol tartrate (LOPRESSOR) tablet 50 mg, Q12H MELANY    mirtazapine (REMERON) tablet 7.5 mg, HS    ondansetron (ZOFRAN) injection 4 mg, Q6H PRN    pantoprazole (PROTONIX) EC tablet 40 mg, Daily    [Held by provider] potassium chloride (Klor-Con M20) CR tablet 40 mEq, BID    potassium chloride 20 mEq IVPB (premix), Once, Last Rate: 20 mEq (04/28/25 0813) **FOLLOWED BY** potassium chloride 20 mEq IVPB (premix), Once    pravastatin (PRAVACHOL) tablet 80 mg, Daily With Dinner    senna-docusate sodium (SENOKOT S) 8.6-50 mg per tablet 1 tablet, Daily    sodium chloride 0.9 % infusion,  Continuous, Last Rate: 75 mL/hr (04/27/25 1755)    warfarin (COUMADIN) tablet 5 mg, Once per day on Sunday Tuesday Wednesday Thursday Saturday **OR** [DISCONTINUED] warfarin (COUMADIN) tablet 7.5 mg, Once per day on Sunday Tuesday Wednesday Thursday Saturday    warfarin (COUMADIN) tablet 7.5 mg, Once per day on Monday Friday  Allergies   Allergen Reactions    Neomycin-Polymyxin-Hc Other (See Comments)     Unknown reaction      Penicillins Hives     Patient states she got hive, but has had penicillin medication in the past and was ok.     Quinine Other (See Comments)     Unknown    Amoxicillin-Pot Clavulanate Itching       Physical Exam  Constitutional:       General: She is not in acute distress.     Appearance: She is not ill-appearing.   HENT:      Head: Normocephalic and atraumatic.      Mouth/Throat:      Mouth: Mucous membranes are moist.   Eyes:      General: No scleral icterus.  Pulmonary:      Effort: Pulmonary effort is normal. No respiratory distress.   Abdominal:      General: Abdomen is flat. There is no distension.      Palpations: Abdomen is soft.      Tenderness: There is no abdominal tenderness. There is no guarding.   Skin:     General: Skin is warm and dry.      Coloration: Skin is not jaundiced.   Neurological:      Mental Status: She is alert.       Most Recent Vital Signs:  Vitals:    04/28/25 0318 04/28/25 0755 04/28/25 0757 04/28/25 0804   BP: 127/66 (!) 85/61 (!) 87/54 114/62   BP Location: Right arm Left arm Left arm Left arm   Pulse: 62      Resp: 16      Temp:       TempSrc:       SpO2: (!) 89%      Weight:       Height:           Intake/Output Summary (Last 24 hours) at 4/28/2025 0849  Last data filed at 4/28/2025 0541  Gross per 24 hour   Intake 120 ml   Output 1050 ml   Net -930 ml       LABS/IMAGING  Lab Results: I have reviewed all relevant lab results during this hospitalization.    Imaging Studies:  I have reviewed all the relevant images during this hospitalizations    Counseling /  Coordination of Care  Total time spent today 45 minutes. Greater than 50% of total time was spent with the patient and / or family counseling and / or coordination of care.    Julianne Salvador PA-C

## 2025-04-28 NOTE — ASSESSMENT & PLAN NOTE
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 Said that she had unknown cause for her PE/DVT and was placed on coumadin for this. She is still supposed to be on coumadin but is not sure the dosing  States that she doesn't have any known history of atrial fibrillation that she knows of.   Was not taking coumadin during hospitalization and prior to hospitalization due to nausea and vomiting, said she missed doses.   PT-INR subtherapeutic.   Venous duplex positive for acute vs subacute DVT, see plan under acute dvt.

## 2025-04-28 NOTE — ASSESSMENT & PLAN NOTE
Patient with episode of nausea and vomiting on 4/27 in the morning and on telemetry found to have tachycardia up to 160s sustaining. Patient is on lopressor 50 mg bid which patient states is for HTN.   ECG obtained and looked like atrial fibrillation rvr with HR of 150s however patient without history of atrial fibrillation  Given 1 dose IV lopressor and HR improved now sustaining in the 60s-70s.   Repeat ECG showing NSR in the evening.   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   Placed on heparin gtt at this time and resume coumadin.

## 2025-04-28 NOTE — CASE MANAGEMENT
Case Management Discharge Planning Note    Patient name Alecia Kay  Location /-01 MRN [id number]  : 1945 Date 2025       Current Admission Date: 2025  Current Admission Diagnosis:Generalized weakness   Patient Active Problem List    Diagnosis Date Noted Date Diagnosed    History of DVT (deep vein thrombosis) 2025     Tachycardia 2025     Nausea and vomiting 2025     Acute cystitis 2025     Generalized weakness 2025     Bilateral lower extremity edema 2025     Severe protein-calorie malnutrition (HCC) 2025     Chronic osteomyelitis of toe of right foot (HCC) 2025     Acute kidney injury (HCC) 2025     Chronic kidney disease, stage III (moderate) (HCC) 2025     Hypothyroidism 2025     Hypertension 2025     Electrolyte imbalance 2025       LOS (days): 3  Geometric Mean LOS (GMLOS) (days): 3  Days to GMLOS:0.2     OBJECTIVE:  Risk of Unplanned Readmission Score: 22.41         Current admission status: Inpatient   Preferred Pharmacy:   [facility] [place] [address]  [address] [facility] [address]  Phone: [phone] Fax: [phone]    Primary Care Provider: Rambo Perera DO    Primary Insurance: [employer]  Secondary Insurance:     DISCHARGE DETAILS:    Discharge planning discussed with:: patient and Nephew Red Capellan  Freedom of Choice: Yes  Comments - Freedom of Choice: Care team recommendations for STR discussed, pt and nephew are refusing STR. pt is requesting to go home with nephew, who is going to be her caregiver at home. per nephew , [facility] was going to start, but patient was admitted to hospital. patient and nephew requesting home with [facility].  Pt indicates she was just discharged from [facility] on 25, because they did nothing for her. Patient and nephew indicate, pt has a hospital bed, wheelchair,  walker, BSC on a 1st floor set up and pts nephew will be home 24/7 to care for patient. Nephew indicates he is in the process of installing a ramp for the home entrance.   Referral made in aidin for [facility].     CM contacted family/caregiver?: Yes  Were Treatment Team discharge recommendations reviewed with patient/caregiver?: Yes  Did patient/caregiver verbalize understanding of patient care needs?: Yes  Were patient/caregiver advised of the risks associated with not following Treatment Team discharge recommendations?: Yes (refusing STR)    Contacts  Patient Contacts: michelle Hathaway  Relationship to Patient:: Family  Contact Method: Phone  Phone Number: [phone]  Reason/Outcome: Continuity of Care, Discharge Planning    Requested Home Health Care         Is the patient interested in HHC at discharge?: Yes  Home Health Discipline requested:: Nursing, Occupational Therapy, Physical Therapy, Medical Social Work  Home Health Agency Name:: [facility]  HHA External Referral Reason (only applicable if external HHA name selected): Patient has established relationship with provider  Home Health Follow-Up Provider:: PCP  Home Health Services Needed:: Evaluate Functional Status and Safety, Gait/ADL Training, Strengthening/Theraputic Exercises to Improve Function, Other (comment) (medication management)  Homebound Criteria Met:: Uses an Assist Device (i.e. cane, walker, etc), Requires the Assistance of Another Person for Safe Ambulation or to Leave the Home  Supporting Clincal Findings:: Bed Bound or Wheelchair Bound, Limited Endurance    DME Referral Provided  Referral made for DME?: No    Other Referral/Resources/Interventions Provided:  Interventions: HHC         Treatment Team Recommendation: Short Term Rehab  Discharge Destination Plan:: Home with Home Health Care  Transport at Discharge : Family

## 2025-04-28 NOTE — ASSESSMENT & PLAN NOTE
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

## 2025-04-28 NOTE — MALNUTRITION/BMI
This medical record reflects one or more clinical indicators suggestive of malnutrition.    Malnutrition Findings:   Adult Malnutrition type: Acute illness  Adult Degree of Malnutrition: Other severe protein calorie malnutrition  Malnutrition Characteristics: Inadequate energy, Weight loss, Fat loss                360 Statement: Acute severe malnutrition related to limited po intake/intolerance as evidenced by 30.3% wt loss x 4 mo (12/11/25 237lb, 4/25/25 165lb), < 50% estimated energy intake x several months, moderate fat loss to orbitals. Treated with: Advance to soft foods diet as medically appropriate. Recommend ensure plus high PRO strawberry flavor TID. Recommend daily weights for nutrition monitoring.    BMI Findings:           Body mass index is 30.2 kg/m².     See Nutrition note dated 4/28/25 for additional details.  Completed nutrition assessment is viewable in the nutrition documentation.

## 2025-04-28 NOTE — QUICK NOTE
"Updated patient regarding results of CTA chest PE study and positive for PE. Patient already on VTE/PE heparin gtt. Will order echo. Due to CT showing \"Extensive acute thrombus throughout the right upper middle and lower lobe segmental and distal order pulmonary arteries.\" Will discuss with pulmonology tomorrow regarding patient and if any further recommendations. Patient did say that she was not taking her coumadin as she was supposed to be prior to coming to the hospital due to not feeling well and having significant nausea. INR was subtherapeutic on admission. Discussed with patient and nephew regarding plans, agreeable at this time.    Nelly Carnes PA-C  "

## 2025-04-28 NOTE — ASSESSMENT & PLAN NOTE
Patient with chronic N/V, poor appetite and associated 70lb weight loss over the past 6 months   CT CAP wo contrast unremarkable   EGD 4/22/25 with hiatal hernia, Wang's esophagus, empiric esophageal dilation, normal stomach and duodenum    Unclear etiology at this time, likely multifactorial given multiple recent admissions with acute osteomyelitis of right foot and then to rehab, overall deconditioning and decline in health   EGD less than 1 week ago without explanation for symptoms, hiatal hernia could be contributing but unlikely to cause 70lb weight loss  Admitting CT CAP without explanation although was a non contrast study due to GAY  Will get celiac/mesenteric doppler US, ordered  Agree with remeron to see if improves appetite  Continue antiemetics  Could consider colonoscopy to r/o underlying malignancy, can be done as outpatient   Could consider outpatient esophagram to evaluate for dysmotility   Continue PPI daily

## 2025-04-28 NOTE — PHYSICAL THERAPY NOTE
"   PHYSICAL THERAPY EVALUATION  NAME:  Alecia Kay  DATE: 04/28/25    AGE:   80 y.o.  Mrn:   [id number]  ADMIT DX:  Hypokalemia [E87.6]  Weakness [R53.1]  Acute UTI [N39.0]  Acute kidney injury (HCC) [N17.9]  Generalized weakness [R53.1]  Ambulatory dysfunction [R26.2]    Past Medical History:   Diagnosis Date    Anemia     Cellulitis     Disease of thyroid gland     GERD (gastroesophageal reflux disease)     Hyperlipidemia     Hypertension     Obesity     Osteomyelitis (HCC)     Pneumonia     Pulmonary embolism (HCC)      Length Of Stay: 3  Performed at least 2 patient identifiers during session: Name and Birthday  PHYSICAL THERAPY EVALUATION :    04/28/25 0730   PT Last Visit   PT Visit Date 04/28/25   Note Type   Note type Evaluation   Pain Assessment   Pain Assessment Tool 0-10   Pain Score No Pain   Restrictions/Precautions   RLE Weight Bearing Per Order   (per patient, \"I went to a podiatrist in [place] last month and I could bear weight\" Ok to WBAT in post op shoe per office visit 3/25/25 attempted to have patient be NWB until further clarification however patient declined. Pt ultimately WBAT right foot.)   Braces or Orthoses   (reports having shoe for right foot, but not present)   Other Precautions Chair Alarm;Bed Alarm;Fall Risk;Contact/isolation  (strict hand hygiene)   Home Living   Type of Home House  (5 RHODA R HR)   Home Layout Two level;Performs ADLs on one level;Able to live on main level with bedroom/bathroom   Bathroom Shower/Tub Walk-in shower   Bathroom Toilet Raised   Bathroom Equipment Shower chair;Grab bars in shower;Grab bars around toilet   Home Equipment Walker;Wheelchair-manual;Grab bars  (sleeps in a lift recliner that lays back flat like a bed, pt uses lift feature as well to get up out of recliner)   Additional Comments son lives next door in other side of half double and pt can enter up his steps with only 1 RHODA with no railing and then get to her home   Prior Function   Level " "of [place] Needs assistance with ADLs;Needs assistance with functional mobility;Needs assistance with IADLS  (pt reports doesn't need help to get washed and dressed)   Lives With Family  (nephew, Red)   Receives Help From Family   IADLs Family/Friend/Other provides transportation;Family/Friend/Other provides meals;Family/Friend/Other provides medication management   Falls in the last 6 months 0   Comments Reports having assistance from nephew and son, but can get herself washed and dressed and ambulate short distances with assistance with RW.   General   Additional Pertinent History Pt was in ED 1/27/25 for wound check. Pt had R calcaneous procedure done 1/17 at [facility]. Pt had wound vac placed. Discharged to [facility]. Admitted 3/3/25 to 3/5/25 with abnormal labs, GAY. Pt is to remain nonweightbearing until wound is completely healed per notes. Discharged back to [facility]. Per notes, patient signed self out of [facility] on 4/24/25. Then admitted here 4/25/25 c/o weakness. Per patient, went to podiatrist in [place] who said she can bear weight. impaired skin integrity right heel. pt with inc B LE edema.   Family/Caregiver Present No   Cognition   Orientation Level Oriented X4   Following Commands Follows one step commands without difficulty   Subjective   Subjective \"I am allowed t bear weight.\"   RLE Assessment   RLE Assessment   (grossly WFL except ankle DF < neutral. strength 3-/5)   LLE Assessment   LLE Assessment WFL  (ankle DF < neutral, strength grossly 3-/5)   Light Touch   RLE Light Touch Grossly intact   LLE Light Touch Grossly intact   Bed Mobility   Rolling R 5  Supervision   Additional items Bedrails;Increased time required   Supine to Sit 4  Minimal assistance   Additional items Assist x 1;Increased time required;Bedrails;Verbal cues  (trunk management)   Additional Comments HOB elevated > 30 degrees, us eof bedrail.s pt reports sleeping in recliner chair. minAx1 to ahicvee sitting on EOB " "wiht manual cues for trunk management   Transfers   Sit to Stand 2  Maximal assistance   Additional items Assist x 1;Increased time required;Verbal cues   Stand to Sit 3  Moderate assistance   Additional items Assist x 1;Increased time required;Verbal cues   Stand pivot 3  Moderate assistance   Additional items Assist x 1;Increased time required;Verbal cues   Additional Comments RW. verbal cues for hand placement. inc time to ahcieve standing. pt reports being WBAT, declining attempt for NWB right foot. decreased stance right LE noted. maxAx1 to ahcieve standing. stand to sit with modAx1 with manual cues for controlled descent. spt with modAx1 with manual cues for wt shifting and verbal cues for turnin completely. pt with right LE anterior to COG, difficulty wt shifting to advance left LE.   Ambulation/Elevation   Assistive Device Rolling walker   Distance 1' with RW with modAx1 with manual cues for wt shifting and verbal cues for technique and sequence. dec stance right LE with right LE ant wt COG. pt declining NWB at this time despite impaired skin integrity right heel.   Balance   Static Sitting Good   Dynamic Sitting Fair +   Static Standing Poor +   Dynamic Standing Poor   Ambulatory Poor   Endurance Deficit   Endurance Deficit Yes   Endurance Deficit Description fatigue   Activity Tolerance   Activity Tolerance Patient limited by fatigue   Medical Staff Made Aware Madina LAURENT   Nurse Made Aware RNIsidra   Assessment   Prognosis Good   Problem List Decreased strength;Decreased endurance;Impaired balance;Decreased mobility;Decreased range of motion;Impaired judgement;Decreased safety awareness;Obesity;Decreased skin integrity   Barriers to Discharge Inaccessible home environment;Decreased caregiver support   Barriers to Discharge Comments requires assistance to complete mobility, fall risk, safety concerns   Goals   Patient Goals \"Go to rehab.\"   STG Expiration Date 05/12/25   PT Treatment Day 1   Plan " "  Treatment/Interventions ADL retraining;Functional transfer training;LE strengthening/ROM;Elevations;Therapeutic exercise;Endurance training;Patient/family training;Equipment eval/education;Bed mobility;Gait training;Compensatory technique education;Spoke to nursing;Spoke to case management;OT   PT Frequency 3-5x/wk   Discharge Recommendation   Rehab Resource Intensity Level, PT II (Moderate Resource Intensity)   AM-PAC Basic Mobility Inpatient   Turning in Flat Bed Without Bedrails 3   Lying on Back to Sitting on Edge of Flat Bed Without Bedrails 2   Moving Bed to Chair 2   Standing Up From Chair Using Arms 2   Walk in Room 2   Climb 3-5 Stairs With Railing 1   Basic Mobility Inpatient Raw Score 12   Basic Mobility Standardized Score 32.23   [facility] Highest Level Of Mobility   -HL Goal 4: Move to chair/commode   -HLM Achieved 4: Move to chair/commode   Additional Treatment Session   Start Time 0750   End Time 0813   Treatment Assessment Pt tolerated session poorly reporting dizziness with static standing 30 seconds and needing to sit to recover. Pt continues to require increased assistance to stand and complete spt, unable to do NWB right LE. She requires verbal cues for tehcnique and sequence with mobility. She is further limited by medical course with + occult stool. She is limited by decreased strength, balance, endurance. She will continue to benefit from PT services to maximize LOF.   Equipment Use use of RW. sit to stand with modAx1 1st trial and maxAx1 2nd trial from BSC. manual cues for wt shifting and to ahcieve uprihgt posture and verbal cues for hand placement. static standing 30 seconds, pt reports feeling \"dizzy\", instructed to return to sitting. therapist completing pericare for patient and pulled up brief. blood noted in BSC (pt + for occult stool). spt with RW with modAx1 with manual cues for wt shifting. verbal cues for turign compeltely. pt took 2 steps forward with RW with modAx1. pt " with increased WB right foot as she has poor adherence ot NWB. decreased stance  right LE noted. manual cues for wt shifting. further mobility deferred due to WB status and poor tolerance. BP below. discussed current LOF and POC to be updated pending WB status. Pt verbalized understanding. reports she plans on going to rehab as she is having difficulty at home. RN present in room, discussed current status, events of session and current need for assistance.   End of Consult   Patient Position at End of Consult Bedside chair;Bed/Chair alarm activated;All needs within reach        04/28/25 0755 04/28/25 0757 04/28/25 0804   Vitals   Blood Pressure (!) 85/61 (!) 87/54 114/62   MAP (mmHg) 69 65 79   BP Location Left arm Left arm Left arm   Patient Position - Orthostatic VS Sitting  (reported dizziness, resolved with sitting rest.) Sitting  (no complaints) Lying  (reclined in recliner)   Oxygen Therapy   SpO2 95 %  --   --    O2 Device None (Room air)  --   --      (Please find full objective findings from PT assessment regarding body systems outlined above).     Assessment: Pt is a 80 y.o. female seen for PT evaluation s/p admission to [facility] on 4/25/2025 with Generalized weakness.  Order placed for PT services.  Upon evaluation: Pt is presenting with impaired functional mobility due to decreased strength, decreased ROM, decreased endurance, impaired balance, gait deviations, decreased safety awareness, impaired judgment, fall risk, LE edema, and impaired skin integrity requiring  minimal assistance for bed mobility, maximal to moderate assistance for transfers, and moderate assistance for ambulation with RW . Pt's clinical presentation is currently unpredictable given the functional mobility deficits above, especially weakness, decreased ROM, edema of extremities, decreased skin integrity, decreased endurance, impaired balance, gait deviations, decreased activity tolerance, decreased functional  mobility tolerance, decreased safety awareness, and impaired judgement, coupled with fall risks as indicated by AM-PAC 6-Clicks: 12/24 as well as impaired balance, polypharmacy, impaired judgement, decreased safety awareness, and obesity and combined with medical complications of hypotension, abnormal H&H, abnormal potassium values, readmission to hospital, need for input for mobility technique/safety, and occult blood, acute cystitis, GAY, tachycardia with episode of tachycardia in 160s yesterday, bilateral LE edema, electrolyte imbalance . Pt's PMHx and comorbidities that may affect physical performance and progress include: h/o DVT, h/o PE, HTN, and chronic osteomyelitis of right foot, CKD, anemia, obesity . Personal factors affecting pt at time of IE include: inaccessible home environment, step(s) to enter environment, limited home support, advanced age, inability to perform IADLs, inability to perform ADLs, inability to navigate level surfaces without external assistance, inability to ambulate household distances, limited insight into impairments, and recent fall(s)/fall history. Pt will benefit from continued skilled PT services to address deficits as defined above and to maximize level of functional mobility to facilitate return toward PLOF and improved QOL. From PT/mobility standpoint, recommendation at time of d/c would be Level II (Moderate Resource Intensity in order to reduce fall risk and maximize pt's functional independence and consistency with mobility. Recommend trial with walker next 1-2 sessions and ther ex next 1-2 sessions.       The patient's AM-PAC Basic Mobility Inpatient Short Form Raw Score is 12. A Raw score of less than or equal to 16 suggests the patient may benefit from discharge to post-acute rehabilitation services. Please also refer to the recommendation of the Physical Therapist for safe discharge planning.       Goals: Pt will: Perform bed mobility tasks with modified Independent  to reposition in bed and prepare for transfers. Pt will perform transfers with consistent min A of 1 to decrease burden of care, decrease risk for falls, improve activity tolerance, and demonstrate compliance with WB limitations and prepare for ambulation. Pt will ambulate with RW for >/= 25' with  consistent min A of 1  to decrease burden of care, decrease risk for falls, improve activity tolerance, demonstrate compliance with WB limitations, and improve gait quality and to access home environment. Pt will complete 1 step with RW or LRAD and >/= 4 steps with unilateral handrail with consistent modA of 1 to return to home with RHODA, decrease risk for falls, and improve activity tolerance. Pt will participate in objective balance assessment to determine baseline fall risk. Pt will increase B LE strength >/= 1/2 MMT grade to facilitate functional mobility.      Christi Winston, PT,DPT

## 2025-04-28 NOTE — ASSESSMENT & PLAN NOTE
POA with complaints of generalized weakness and poor appetite. Signed herself out of [facility] on 4/24, was home for one day when she started feeling weaker  Per OP , having cyclic vomiting at home and not taking any of her medications  Admits to poor appetite for the past month, will trial remeron   Had dilation of esophagus outpatient 4/22  Acute cystitis and mild electrolyte derangements with larry on admission  IV hydration and antibiotics  Replenish electrolytes  Flu/covid/rsv negative   CT CAP without acute findings   PT/OT consulted

## 2025-04-29 ENCOUNTER — APPOINTMENT (INPATIENT)
Dept: NON INVASIVE DIAGNOSTICS | Facility: HOSPITAL | Age: 80
End: 2025-04-29
Payer: COMMERCIAL

## 2025-04-29 ENCOUNTER — APPOINTMENT (INPATIENT)
Dept: MRI IMAGING | Facility: HOSPITAL | Age: 80
End: 2025-04-29
Payer: COMMERCIAL

## 2025-04-29 PROBLEM — R06.89 ACUTE RESPIRATORY INSUFFICIENCY: Status: ACTIVE | Noted: 2025-04-29

## 2025-04-29 PROBLEM — I26.99 ACUTE PULMONARY EMBOLISM (HCC): Status: ACTIVE | Noted: 2025-04-29

## 2025-04-29 LAB
ALBUMIN SERPL BCG-MCNC: 2.6 G/DL (ref 3.5–5)
ALP SERPL-CCNC: 45 U/L (ref 34–104)
ALT SERPL W P-5'-P-CCNC: <3 U/L (ref 7–52)
ANION GAP SERPL CALCULATED.3IONS-SCNC: 10 MMOL/L (ref 4–13)
AORTIC ROOT: 3.3 CM
APTT PPP: >210 SECONDS (ref 23–34)
AST SERPL W P-5'-P-CCNC: 9 U/L (ref 13–39)
BACTERIA UR QL AUTO: ABNORMAL /HPF
BILIRUB SERPL-MCNC: 0.35 MG/DL (ref 0.2–1)
BILIRUB UR QL STRIP: NEGATIVE
BSA FOR ECHO PROCEDURE: 1.76 M2
BUN SERPL-MCNC: 7 MG/DL (ref 5–25)
CALCIUM ALBUM COR SERPL-MCNC: 8 MG/DL (ref 8.3–10.1)
CALCIUM SERPL-MCNC: 6.9 MG/DL (ref 8.4–10.2)
CHLORIDE SERPL-SCNC: 107 MMOL/L (ref 96–108)
CLARITY UR: CLEAR
CO2 SERPL-SCNC: 25 MMOL/L (ref 21–32)
COLOR UR: YELLOW
CREAT SERPL-MCNC: 0.97 MG/DL (ref 0.6–1.3)
ERYTHROCYTE [DISTWIDTH] IN BLOOD BY AUTOMATED COUNT: 15.4 % (ref 11.6–15.1)
FRACTIONAL SHORTENING: 35 (ref 28–44)
GFR SERPL CREATININE-BSD FRML MDRD: 54 ML/MIN/1.73SQ M
GLUCOSE SERPL-MCNC: 84 MG/DL (ref 65–140)
GLUCOSE UR STRIP-MCNC: NEGATIVE MG/DL
HCT VFR BLD AUTO: 23.7 % (ref 34.8–46.1)
HGB BLD-MCNC: 7.8 G/DL (ref 11.5–15.4)
HGB UR QL STRIP.AUTO: ABNORMAL
INR PPP: 1.52 (ref 0.85–1.19)
INTERVENTRICULAR SEPTUM IN DIASTOLE (PARASTERNAL SHORT AXIS VIEW): 1.1 CM
INTERVENTRICULAR SEPTUM: 1.1 CM (ref 0.6–1.1)
KETONES UR STRIP-MCNC: NEGATIVE MG/DL
LAAS-AP2: 20.9 CM2
LAAS-AP4: 18.7 CM2
LEFT ATRIUM SIZE: 4.2 CM
LEFT ATRIUM VOLUME (MOD BIPLANE): 60 ML
LEFT ATRIUM VOLUME INDEX (MOD BIPLANE): 34.1 ML/M2
LEFT INTERNAL DIMENSION IN SYSTOLE: 3.2 CM (ref 2.1–4)
LEFT VENTRICULAR INTERNAL DIMENSION IN DIASTOLE: 4.9 CM (ref 3.5–6)
LEFT VENTRICULAR POSTERIOR WALL IN END DIASTOLE: 1.2 CM
LEFT VENTRICULAR STROKE VOLUME: 73 ML
LEUKOCYTE ESTERASE UR QL STRIP: NEGATIVE
LV EF US.2D.A4C+ESTIMATED: 65 %
LVSV (TEICH): 73 ML
MCH RBC QN AUTO: 28.9 PG (ref 26.8–34.3)
MCHC RBC AUTO-ENTMCNC: 32.9 G/DL (ref 31.4–37.4)
MCV RBC AUTO: 88 FL (ref 82–98)
NITRITE UR QL STRIP: NEGATIVE
NON-SQ EPI CELLS URNS QL MICRO: ABNORMAL /HPF
PH UR STRIP.AUTO: 6 [PH]
PHOSPHATE SERPL-MCNC: 1.7 MG/DL (ref 2.3–4.1)
PLATELET # BLD AUTO: 177 THOUSANDS/UL (ref 149–390)
PMV BLD AUTO: 9.3 FL (ref 8.9–12.7)
POTASSIUM SERPL-SCNC: 2.8 MMOL/L (ref 3.5–5.3)
POTASSIUM SERPL-SCNC: 3.8 MMOL/L (ref 3.5–5.3)
PROT SERPL-MCNC: 5.3 G/DL (ref 6.4–8.4)
PROT UR STRIP-MCNC: NEGATIVE MG/DL
PROTHROMBIN TIME: 18.6 SECONDS (ref 12.3–15)
RA PRESSURE ESTIMATED: 3 MMHG
RBC # BLD AUTO: 2.7 MILLION/UL (ref 3.81–5.12)
RBC #/AREA URNS AUTO: ABNORMAL /HPF
RIGHT ATRIUM AREA SYSTOLE A4C: 23.5 CM2
RIGHT VENTRICLE ID DIMENSION: 3.3 CM
RV PSP: 45 MMHG
SL CV LEFT ATRIUM LENGTH A2C: 6.1 CM
SL CV LV EF: 65
SL CV PED ECHO LEFT VENTRICLE DIASTOLIC VOLUME (MOD BIPLANE) 2D: 114 ML
SL CV PED ECHO LEFT VENTRICLE SYSTOLIC VOLUME (MOD BIPLANE) 2D: 41 ML
SODIUM SERPL-SCNC: 142 MMOL/L (ref 135–147)
SP GR UR STRIP.AUTO: 1.01 (ref 1–1.03)
TR MAX PG: 42 MMHG
TR PEAK VELOCITY: 3.2 M/S
TRICUSPID VALVE PEAK REGURGITATION VELOCITY: 3.22 M/S
UROBILINOGEN UR QL STRIP.AUTO: 0.2 E.U./DL
WBC # BLD AUTO: 5.3 THOUSAND/UL (ref 4.31–10.16)
WBC #/AREA URNS AUTO: ABNORMAL /HPF

## 2025-04-29 PROCEDURE — 93922 UPR/L XTREMITY ART 2 LEVELS: CPT | Performed by: SURGERY

## 2025-04-29 PROCEDURE — 85027 COMPLETE CBC AUTOMATED: CPT

## 2025-04-29 PROCEDURE — 85610 PROTHROMBIN TIME: CPT

## 2025-04-29 PROCEDURE — 93925 LOWER EXTREMITY STUDY: CPT | Performed by: SURGERY

## 2025-04-29 PROCEDURE — 93005 ELECTROCARDIOGRAM TRACING: CPT

## 2025-04-29 PROCEDURE — 93925 LOWER EXTREMITY STUDY: CPT

## 2025-04-29 PROCEDURE — 99223 1ST HOSP IP/OBS HIGH 75: CPT | Performed by: INTERNAL MEDICINE

## 2025-04-29 PROCEDURE — 85730 THROMBOPLASTIN TIME PARTIAL: CPT | Performed by: FAMILY MEDICINE

## 2025-04-29 PROCEDURE — 99232 SBSQ HOSP IP/OBS MODERATE 35: CPT

## 2025-04-29 PROCEDURE — 84100 ASSAY OF PHOSPHORUS: CPT

## 2025-04-29 PROCEDURE — 73721 MRI JNT OF LWR EXTRE W/O DYE: CPT

## 2025-04-29 PROCEDURE — 80053 COMPREHEN METABOLIC PANEL: CPT

## 2025-04-29 PROCEDURE — 84132 ASSAY OF SERUM POTASSIUM: CPT

## 2025-04-29 PROCEDURE — 81001 URINALYSIS AUTO W/SCOPE: CPT

## 2025-04-29 PROCEDURE — 92610 EVALUATE SWALLOWING FUNCTION: CPT

## 2025-04-29 PROCEDURE — 93306 TTE W/DOPPLER COMPLETE: CPT

## 2025-04-29 PROCEDURE — 93923 UPR/LXTR ART STDY 3+ LVLS: CPT

## 2025-04-29 RX ORDER — POTASSIUM CHLORIDE 14.9 MG/ML
20 INJECTION INTRAVENOUS ONCE
Status: COMPLETED | OUTPATIENT
Start: 2025-04-29 | End: 2025-04-29

## 2025-04-29 RX ORDER — METOCLOPRAMIDE HYDROCHLORIDE 5 MG/ML
5 INJECTION INTRAMUSCULAR; INTRAVENOUS
Status: DISCONTINUED | OUTPATIENT
Start: 2025-04-29 | End: 2025-05-02 | Stop reason: HOSPADM

## 2025-04-29 RX ADMIN — CEFAZOLIN SODIUM 2000 MG: 2 SOLUTION INTRAVENOUS at 22:33

## 2025-04-29 RX ADMIN — METOCLOPRAMIDE HYDROCHLORIDE 5 MG: 5 INJECTION INTRAMUSCULAR; INTRAVENOUS at 11:04

## 2025-04-29 RX ADMIN — POTASSIUM CHLORIDE 20 MEQ: 14.9 INJECTION, SOLUTION INTRAVENOUS at 14:42

## 2025-04-29 RX ADMIN — METOPROLOL TARTRATE 50 MG: 50 TABLET, FILM COATED ORAL at 22:14

## 2025-04-29 RX ADMIN — POTASSIUM CHLORIDE 20 MEQ: 14.9 INJECTION, SOLUTION INTRAVENOUS at 16:42

## 2025-04-29 RX ADMIN — HEPARIN SODIUM 15 UNITS/KG/HR: 10000 INJECTION, SOLUTION INTRAVENOUS at 01:02

## 2025-04-29 RX ADMIN — CEFAZOLIN SODIUM 2000 MG: 2 SOLUTION INTRAVENOUS at 14:36

## 2025-04-29 RX ADMIN — POTASSIUM PHOSPHATE, MONOBASIC POTASSIUM PHOSPHATE, DIBASIC INJECTION, 12 MMOL: 236; 224 SOLUTION, CONCENTRATE INTRAVENOUS at 20:02

## 2025-04-29 RX ADMIN — CEFAZOLIN SODIUM 2000 MG: 2 SOLUTION INTRAVENOUS at 05:53

## 2025-04-29 RX ADMIN — ERTAPENEM SODIUM 1000 MG: 1 INJECTION, POWDER, LYOPHILIZED, FOR SOLUTION INTRAMUSCULAR; INTRAVENOUS at 15:53

## 2025-04-29 RX ADMIN — POTASSIUM CHLORIDE 20 MEQ: 14.9 INJECTION, SOLUTION INTRAVENOUS at 12:49

## 2025-04-29 RX ADMIN — MIRTAZAPINE 7.5 MG: 15 TABLET, FILM COATED ORAL at 22:14

## 2025-04-29 RX ADMIN — HEPARIN SODIUM 12 UNITS/KG/HR: 10000 INJECTION, SOLUTION INTRAVENOUS at 16:29

## 2025-04-29 RX ADMIN — POTASSIUM CHLORIDE 20 MEQ: 14.9 INJECTION, SOLUTION INTRAVENOUS at 11:05

## 2025-04-29 RX ADMIN — SODIUM CHLORIDE 75 ML/HR: 0.9 INJECTION, SOLUTION INTRAVENOUS at 16:40

## 2025-04-29 RX ADMIN — ONDANSETRON 4 MG: 2 INJECTION INTRAMUSCULAR; INTRAVENOUS at 01:00

## 2025-04-29 RX ADMIN — SODIUM CHLORIDE 75 ML/HR: 0.9 INJECTION, SOLUTION INTRAVENOUS at 02:47

## 2025-04-29 RX ADMIN — METOCLOPRAMIDE HYDROCHLORIDE 5 MG: 5 INJECTION INTRAMUSCULAR; INTRAVENOUS at 16:58

## 2025-04-29 RX ADMIN — PRAVASTATIN SODIUM 80 MG: 80 TABLET ORAL at 16:58

## 2025-04-29 RX ADMIN — PANTOPRAZOLE SODIUM 40 MG: 40 INJECTION, POWDER, FOR SOLUTION INTRAVENOUS at 08:01

## 2025-04-29 NOTE — SPEECH THERAPY NOTE
"Speech Language/Pathology  Speech-Language Pathology Bedside Swallow Evaluation      Patient Name: Alecia Kay    Today's Date: 4/29/2025     Summary   Consult received for bedside swallow assessment. Pt admitted w/ generalized weakness, GAY, electrolyte imbalance, acute cystitis. PMHx includes HTN, chronic osteo of R foot, bilateral LE edema. Pt reports nausea/vomiting. EGD completed 4/22, see below but overall no cause noted for excessive vomiting.  Was on a regular texture diet, currently on toast/crackers w/ thin liquids. KARIS Reyes reports pt has denied dysphagia, no vomiting noted today.  In talking to patient, she appears to be a poor historian and it is difficult to gather  symptomology for her. Pt reports January is the last time she ate \"normal\" food. Has upper dentures but does not wear them. Reports she ate without dentures before so does not feel it is a mastication issue. However, when presented w/ toast at lunch she reported she cannot eat it both because she can't chew it and also it will \"gag\" her.  Reports things \"come right back up\" but denies swallowing as an issue. Offered options of pureed food items like mashed potatoes and/or oatmeal, pt stated she is not hungry for stuff like that and she can't even look at pureed food. Only wanted jello, yogurt and nutritional drinks. Messaged RD to relay same.   Pt stated she wants to go home and her plan is to eat the above items, plus possibly mac and cheese/renee's chicken noodle soup. Offered pt same items here but she declined.   Education provided on concern for pt's ability to maintain nutrition w/ foods she will eat/tolerate. Offered a VBS to assess swallowing, pt stated she will not eat solids during it to assess them. Possibly willing to participate in an esophagram which may be more appropriate d/t hx of vomiting.  Seen upright in bed w/ 4oz of yogurt and sips of thins via straw. Pt ate w/ extremely slow rate and small bite size, but " "adequate tolerance and no overt s/s aspiration. No vomiting or regurgitation noted.   Pt states for pills she can swallow small ones but not large ones  Relayed information to Jaleel MCHUGH, will attempt esophagram     Risk/s for Aspiration: Low     Recommended Diet: puree/level 1 diet and thin liquids   Recommended Form of Meds: whole with liquid   Aspiration precautions and swallowing strategies: upright posture  Other Recommendations: Continue frequent oral care        Current Medical Status  Alecia Kay is a 80 y.o. female with a PMH of CKD, HTN,  hypothyrodisim and chronic osteo of right foot who presents with generalized weakness. Patient signed herself out of [facility] on 4/24, because she was \"sick of it\" and went home. Since coming home, patient has not been ambulatory, eaten or taken medications. Was throwing up morning of admission. States that she did not eat for the last month due to poor appetite. Endorses dysuria. Denies any falls or overt pain. Does not have trouble swallowing, states that she was nauseous this morning when trying to take medications and that is why she threw up.    Current Precautions:   Fall  Aspiration    Allergies:  No known food allergies    Past medical history:  Please see H&P for details    Special Studies:  CT Chest:  PULMONARY ARTERIAL TREE: Extensive filling defects throughout right upper, middle and lower lobe segmental and distal order pulmonary arteries consistent with acute emboli. Stable enlargement of the main pulmonary artery measuring 3.9 cm in diameter.     LUNGS: Subsegmental atelectasis in the right lower lobe. No tracheal or endobronchial lesion.     PLEURA: No effusion.    Social/Education/Vocational Hx:  Pt lives alone, plans to go home w/ nephew    Swallow Information   Current Risks for Dysphagia & Aspiration: known history of dysphagia  Current Symptoms/Concerns:  vomiting/declining most food items  Current Diet:  toast/crackers, thin liquids  "   Baseline Diet:  appears to eat only certain natural purees since January      Baseline Assessment   Behavior/Cognition: alert  Speech/Language Status: able to participate in basic conversation  Patient Positioning: upright in bed  Pain Status/Interventions/Response to Interventions:   No report of or nonverbal indications of pain.       Swallow Mechanism Exam  Facial: symmetrical  Labial: WFL  Lingual: WFL  Velum: symmetrical  Mandible: adequate ROM  Dentition:  edentulous upper, natural lower but missing most. Pt has upper dentures but does not wear them to eat  Vocal quality:clear/adequate   Volitional Cough: strong/productive   Respiratory Status: on RA        Consistencies Assessed and Performance   Consistencies Administered: thin liquids and puree    Oral Stage: WFL - of note no solids assessed  Mastication was not assessed d/t pt declining. For puree,  Bolus formation and transfer were functional with no significant oral residue noted.  No overt s/s reduced oral control.    Pharyngeal Stage:  appears WFL  Swallow Mechanics:  Swallowing initiation appeared prompt.  Laryngeal rise was palpated and judged to be within functional limits.  No coughing, throat clearing, change in vocal quality or respiratory status noted today.     Esophageal Concerns: globus sensation reported w/ solids although not observed    Summary and Recommendations (see above)    Results Reviewed with: patient, PA, and dietician     Treatment Recommended: Recommend esophagram- SLP will attempt to assess solids if pt will participate     Frequency of treatment: 1-2x/week    Dysphagia LTG  -Patient will demonstrate safe and effective oral intake (without overt s/s significant oral/pharyngeal dysphagia including s/s penetration or aspiration) for the highest appropriate diet level.     Short Term Goals:  -Pt will tolerate Dysphagia 1/pureed diet and thin liquid with no significant s/s oral or pharyngeal dysphagia across 1-3 diagnostic  session/s    -Patient will tolerate trials of upgraded food and/or liquid texture with no significant s/s of oral or pharyngeal dysphagia including aspiration across 1-3 diagnostic sessions         Speech Therapy Prognosis   Prognosis: poor    Prognosis Considerations: therapeutic potential and pt's willingness to trial food items    More Adams MS CCC-SLP  4/29/2025

## 2025-04-29 NOTE — ASSESSMENT & PLAN NOTE
Patient with episode of nausea and vomiting on 4/27 in the morning and on telemetry found to have tachycardia up to 160s sustaining. Patient is on lopressor 50 mg bid which patient states is for HTN.   ECG obtained and looked like atrial fibrillation rvr with HR of 150s however patient without history of atrial fibrillation  Given 1 dose IV lopressor and HR improved now sustaining in the 60s-70s.   Repeat ECG showing NSR in the evening.   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   Placed on heparin gtt at this time, will resume coumadin in 48 hours.

## 2025-04-29 NOTE — WOUND OSTOMY CARE
Consult Note - Wound   Alecia Kay 80 y.o. female MRN: [id number]  Unit/Bed#: -01 Encounter: [id number]        History and Present Illness:  Patient is a 81 yo female that was admitted to [facility]  for treatment of right foot, pt has podiatry consulted. Preventative skin care recommendations placed.        Skin care plans:  1-Hydraguard to bilateral sacrum, buttock and heels BID and PRN  2-Elevate heels to offload pressure.  3-Ehob cushion in chair when out of bed.  4-Moisturize skin daily with skin nourishing cream.  5-Turn/reposition q2h for pressure re-distribution on skin.     Wound Care will sign off  Harleen SMITHN RN CWON

## 2025-04-29 NOTE — PROGRESS NOTES
Progress Note - Hospitalist   Name: Alecia Kay 80 y.o. female I MRN: [id number]  Unit/Bed#: -01 I Date of Admission: 4/25/2025   Date of Service: 4/29/2025 I Hospital Day: 4    Assessment & Plan  Generalized weakness  POA with complaints of generalized weakness and poor appetite. Signed herself out of [facility] on 4/24, was home for one day when she started feeling weaker  Per OP , having cyclic vomiting at home and not taking any of her medications  Admits to poor appetite for the past month, will trial remeron   Had dilation of esophagus outpatient 4/22  Acute cystitis and mild electrolyte derangements with gay on admission  IV hydration and antibiotics  Replenish electrolytes  Flu/covid/rsv negative   CT CAP without acute findings   PT/OT consult: II (Moderate Resource Intensity)  Acute kidney injury (HCC)  Baseline around 1.3, admitted at 1.8  Start gentle IVF   Improved and normalized.   Monitor daily bmp  Avoid nephrotoxic agents and hypotension   GAY resolved, monitor for new GAY due to receiving IV contrast with CTA   Acute cystitis  UA + and endorses dysuria  Await urine culture  Most recent being 4/3 with klebsiella, susceptible to rocephin   Still having symptoms, urine culture with klebsiella and ESBL e coli - switch to ancef and ertapenem  Chronic osteomyelitis of toe of right foot (HCC)  Patient record reviewed-patient is to remain nonweightbearing until wound is completely healed  Completed 6 weeks IV abx  Wound care   There is a note from outpatient podiatry on 3/25 to be WBAT in post op shoe   Will consult podiatry for follow up on foot wound and to verify weight bearing status to work with PT/OT   Podiatry obtaining updated MRI and arterial duplex.   MRI: No convincing evidence of osteomyelitis. There is mild reactive edema  Arterial duplex pending  Podiatry recommending WBAT surgical shoe B/L  Nausea and vomiting  Patient states that she has been having nausea and  vomiting for the last month or so. Said that she had imaging done and EGD and nothing was definitive.   States that she has nausea and vomiting mostly associated with meals and around mealtime, will trial with reglan scheduled and see if this helps with patient's nausea.   Said that she felt the Reglan improved her symptoms but still having nausea and vomiting.   GI consult: unclear etiology, possibly multifactorial. EGD less than 1 week ago without explanation for symptoms, hiatal hernia could be contributing but unlikely to cause 70 pound weight loss. Getting celiac/mesenteric doppler U/S. Agree with remeron. Could consider colonoscopy.  Still with persistent N/V, repeat CT AP with contrast with no acute abnormalities  Speech evaluated patient and recommending puree with thin liquids along with esophagram  Esophagram ordered.   Tachycardia  Patient with episode of nausea and vomiting on 4/27 in the morning and on telemetry found to have tachycardia up to 160s sustaining. Patient is on lopressor 50 mg bid which patient states is for HTN.   ECG obtained and looked like atrial fibrillation rvr with HR of 150s however patient without history of atrial fibrillation  Given 1 dose IV lopressor and HR improved now sustaining in the 60s-70s.   Repeat ECG showing NSR in the evening.   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   Placed on heparin gtt at this time, will resume coumadin in 48 hours.   History of DVT (deep vein thrombosis)  Patient states that she has a history of DVT/PE. Said that she had unknown cause for her PE/DVT and was placed on coumadin for this. She is still supposed to be on coumadin but is not sure the dosing  States that she doesn't have any known history of atrial fibrillation that she knows of.   Was not taking coumadin during hospitalization and prior to hospitalization due to nausea and vomiting, said she missed doses.   PT-INR subtherapeutic.   Venous duplex positive  for acute vs subacute DVT, see plan under acute dvt.   Bilateral lower extremity edema  Was recently taken off of torsemide during last hospitalization  Caution with gentle hydration at this time  May need diuretics as needed  Also may benefit from changing amlodipine to a different blood pressure medication  Venous duplex positive for acute vs subacute DVT bilaterally.   Hypertension  Continue home amlodipine and metoprolol   Electrolyte imbalance  Low mag and K on admission  Replenish and monitor daily   Managed on chronic K 20meq BID, will increase to 40 meq BID due to hypokalemia  Due to persistent electrolyte abnormalities, may change IVF to continuous potassium infusion  Continue with IV supplementation until patient can tolerate PO.   Hematochezia  Patient with complaints of bloody bowel movement this morning. Said that she had bright blood streaking the stools and blood in the bedpan. Does also intermittently complain of darker stools, but said that this one today was more bright red.   Heparin gtt was stopped and held and no further bloody stools noted   GI consult: continue PPI daily, continue antiemetics, could consider colonoscopy to rule out underlying malignancy, could be done outpatient.   No further episodes at this time.   Acute DVT (deep venous thrombosis) (HCC)  Patient with bilateral lower extremity swelling on admission. Venous duplex was ordered and showing acute vs. sub acute DVT in the right popliteal, gastrocnemius, posterior tibial, and peroneal veins and acute vs. sub acute DVT in the left popliteal, posterior tibial, and peroneal veins.   Unclear if dvt was POA.   Initially was on heparin gtt due to subtherapeutic INR, held then because patient had bloody stools.   Discussed with GI, ok to resume heparin gtt with close monitoring of stools.   CTA chest PE study: Extensive acute thrombus throughout the right upper middle and lower lobe segmental and distal order pulmonary arteries. 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 She said she had workup done, but unsure what exactly was done. Due to this, will resume coumadin after heparin gtt and recommending hematology evaluation outpatient.   Acute pulmonary embolism (HCC)  Found to have acute DVT. STAT CT chest PE study ordered: Extensive acute thrombus throughout the right upper middle and lower lobe segmental and distal order pulmonary arteries. RV/LV ratio 0.9  Unclear if PE was POA.   Echo pending  Suspect current episode of PE/DVT was due to subtherapeutic INR on admission as patient was not taking medications with n/v.   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 Discussed with pulm, due to unclear if any hypercoaguable state, would recommend continuing with coumadin after heparin gtt and hematology follow up.   Pulm consulted  Acute respiratory insufficiency  Having intermittent acute respiratory insuffiencey requiring 1L NC with o2 sats 89%.   Suspect secondary to acute PE.   Appears to desaturate at night  Overnight o2 and home o2 eval prior to discharge  Wean to room air as tolerated    VTE Pharmacologic Prophylaxis:   High Risk (Score >/= 5) - Pharmacological DVT Prophylaxis Ordered: heparin drip. Sequential Compression Devices Ordered.    Mobility:   Basic Mobility Inpatient Raw Score: 12  JH-HLM Goal: 4: Move to chair/commode  JH-HLM Achieved: 4: Move to chair/commode  JH-HLM Goal achieved. Continue to encourage appropriate mobility.    Patient Centered Rounds: I performed bedside rounds with nursing staff today.   Discussions with Specialists or Other Care Team  Provider: nursing, case management, pulm, gi, speech    Education and Discussions with Family / Patient: Attempted to update  (nephew) via phone. Left voicemail.     Current Length of Stay: 4 day(s)  Current Patient Status: Inpatient   Certification Statement: The patient will continue to require additional inpatient hospital stay due to n/v, electrolyte abnormalities, pe  Discharge Plan: Anticipate discharge in >72 hrs to rehab vs home with [facility]    Code Status: Level 1 - Full Code    Subjective   Seen and examined today, said she had 1 episode of vomiting in the AM. No chest pain or SOB at this time. No fevers or chills. She is hopeful that she can start to eat and drink more soon.     Objective :  Temp:  [97.5 °F (36.4 °C)-97.8 °F (36.6 °C)] 97.8 °F (36.6 °C)  HR:  [48-80] 58  BP: (126-138)/(60-71) 126/65  Resp:  [18-20] 20  SpO2:  [89 %-97 %] 91 %  O2 Device: None (Room air)  Nasal Cannula O2 Flow Rate (L/min):  [1 L/min-2 L/min] 1 L/min    Body mass index is 30.18 kg/m².     Input and Output Summary (last 24 hours):     Intake/Output Summary (Last 24 hours) at 4/29/2025 1557  Last data filed at 4/29/2025 1000  Gross per 24 hour   Intake --   Output 1175 ml   Net -1175 ml       Physical Exam  Vitals reviewed.   Constitutional:       General: She is not in acute distress.     Appearance: She is obese. She is ill-appearing. She is not toxic-appearing.   HENT:      Head: Normocephalic and atraumatic.      Mouth/Throat:      Mouth: Mucous membranes are dry.   Cardiovascular:      Rate and Rhythm: Normal rate and regular rhythm.      Heart sounds: No murmur heard.  Pulmonary:      Effort: No respiratory distress.      Breath sounds: No stridor. No wheezing.   Abdominal:      General: Bowel sounds are normal. There is no distension.      Palpations: Abdomen is soft. There is no mass.      Tenderness: There is no abdominal tenderness.   Musculoskeletal:      Right lower leg: Edema present.      Left lower  leg: Edema present.   Skin:     General: Skin is warm and dry.   Neurological:      Mental Status: She is alert and oriented to person, place, and time.   Psychiatric:         Mood and Affect: Mood normal.         Behavior: Behavior normal.         Lines/Drains:        Telemetry:  Telemetry Orders (From admission, onward)               24 Hour Telemetry Monitoring  Continuous x 24 Hours (Telem)        Expiring   Question:  Reason for 24 Hour Telemetry  Answer:  Metabolic/electrolyte disturbance with high probability of dysrhythmia. K level <3 or >6 OR KCL infusion >10mEq/hr                     Telemetry Reviewed: Normal Sinus Rhythm  Indication for Continued Telemetry Use: PE               Lab Results: I have reviewed the following results:   Results from last 7 days   Lab Units 04/29/25  0702 04/26/25  0513 04/25/25  1307   WBC Thousand/uL 5.30   < > 7.54   HEMOGLOBIN g/dL 7.8*   < > 9.7*   HEMATOCRIT % 23.7*   < > 30.0*   PLATELETS Thousands/uL 177   < > 223   SEGS PCT %  --   --  78*   LYMPHO PCT %  --   --  15   MONO PCT %  --   --  6   EOS PCT %  --   --  0    < > = values in this interval not displayed.     Results from last 7 days   Lab Units 04/29/25  0702   SODIUM mmol/L 142   POTASSIUM mmol/L 2.8*   CHLORIDE mmol/L 107   CO2 mmol/L 25   BUN mg/dL 7   CREATININE mg/dL 0.97   ANION GAP mmol/L 10   CALCIUM mg/dL 6.9*   ALBUMIN g/dL 2.6*   TOTAL BILIRUBIN mg/dL 0.35   ALK PHOS U/L 45   ALT U/L <3*   AST U/L 9*   GLUCOSE RANDOM mg/dL 84     Results from last 7 days   Lab Units 04/29/25  0702   INR  1.52*                   Recent Cultures (last 7 days):   Results from last 7 days   Lab Units 04/28/25  0422 04/25/25  1424   URINE CULTURE   --  >100,000 cfu/ml Klebsiella pneumoniae*  >100,000 cfu/ml Escherichia coli ESBL*   C DIFF TOXIN B BY PCR  Negative  --        Imaging Results Review: I reviewed radiology reports from this admission including: CTA chest PE study, venous duplex, MRI.  Other Study Results  Review: No additional pertinent studies reviewed.    Last 24 Hours Medication List:     Current Facility-Administered Medications:     acetaminophen (TYLENOL) tablet 650 mg, Q6H PRN    amLODIPine (NORVASC) tablet 10 mg, Daily    ceFAZolin (ANCEF) IVPB (premix in dextrose) 2,000 mg 50 mL, Q8H, Last Rate: 2,000 mg (04/29/25 1436)    ertapenem (INVanz) 1,000 mg in sodium chloride 0.9 % 50 mL IVPB, Q24H, Last Rate: 1,000 mg (04/28/25 1323)    heparin (porcine) 25,000 units in 0.45% NaCl 250 mL infusion (premix), Titrated, Last Rate: 12 Units/kg/hr (04/29/25 0930)    heparin (porcine) injection 2,800 Units, Q6H PRN    heparin (porcine) injection 5,600 Units, Q6H PRN    hydrOXYzine HCL (ATARAX) tablet 25 mg, Q6H PRN    levothyroxine tablet 75 mcg, Daily    meclizine (ANTIVERT) tablet 25 mg, TID PRN    metoclopramide (REGLAN) injection 5 mg, TID AC    metoprolol tartrate (LOPRESSOR) tablet 50 mg, Q12H MELANY    mirtazapine (REMERON) tablet 7.5 mg, HS    ondansetron (ZOFRAN) injection 4 mg, Q6H PRN    pantoprazole (PROTONIX) injection 40 mg, Q24H MELANY    [Held by provider] potassium chloride (Klor-Con M20) CR tablet 40 mEq, BID    potassium chloride 20 mEq IVPB (premix), Once, Last Rate: 20 mEq (04/29/25 1442) **FOLLOWED BY** potassium chloride 20 mEq IVPB (premix), Once    pravastatin (PRAVACHOL) tablet 80 mg, Daily With Dinner    [Held by provider] senna-docusate sodium (SENOKOT S) 8.6-50 mg per tablet 1 tablet, Daily    sodium chloride 0.9 % infusion, Continuous, Last Rate: 75 mL/hr (04/29/25 0247)    [Held by provider] warfarin (COUMADIN) tablet 5 mg, Once per day on Sunday Tuesday Wednesday Thursday Saturday **OR** [DISCONTINUED] warfarin (COUMADIN) tablet 7.5 mg, Once per day on Sunday Tuesday Wednesday Thursday Saturday    [Held by provider] warfarin (COUMADIN) tablet 7.5 mg, Once per day on Monday Friday    Administrative Statements   Today, Patient Was Seen By: Nelly Carnes PA-C    **Please Note: This note may  have been constructed using a voice recognition system.**

## 2025-04-29 NOTE — ASSESSMENT & PLAN NOTE
POA with complaints of generalized weakness and poor appetite. Signed herself out of [facility] on 4/24, was home for one day when she started feeling weaker  Per OP , having cyclic vomiting at home and not taking any of her medications  Admits to poor appetite for the past month, will trial remeron   Had dilation of esophagus outpatient 4/22  Acute cystitis and mild electrolyte derangements with larry on admission  IV hydration and antibiotics  Replenish electrolytes  Flu/covid/rsv negative   CT CAP without acute findings   PT/OT consult: II (Moderate Resource Intensity)

## 2025-04-29 NOTE — ASSESSMENT & PLAN NOTE
Patient with bilateral lower extremity swelling on admission. Venous duplex was ordered and showing acute vs. sub acute DVT in the right popliteal, gastrocnemius, posterior tibial, and peroneal veins and acute vs. sub acute DVT in the left popliteal, posterior tibial, and peroneal veins.   Unclear if dvt was POA.   Initially was on heparin gtt due to subtherapeutic INR, held then because patient had bloody stools.   Discussed with MAYRA ok to resume heparin gtt with close monitoring of stools.   CTA chest PE study: Extensive acute thrombus throughout the right upper middle and lower lobe segmental and distal order pulmonary arteries. 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 She said she had workup done, but unsure what exactly was done. Due to this, will resume coumadin after heparin gtt and recommending hematology evaluation outpatient.

## 2025-04-29 NOTE — ASSESSMENT & PLAN NOTE
Patient states that she has been having nausea and vomiting for the last month or so. Said that she had imaging done and EGD and nothing was definitive.   States that she has nausea and vomiting mostly associated with meals and around mealtime, will trial with reglan scheduled and see if this helps with patient's nausea.   Said that she felt the Reglan improved her symptoms but still having nausea and vomiting.   GI consult: unclear etiology, possibly multifactorial. EGD less than 1 week ago without explanation for symptoms, hiatal hernia could be contributing but unlikely to cause 70 pound weight loss. Getting celiac/mesenteric doppler U/S. Agree with remeron. Could consider colonoscopy.  Still with persistent N/V, repeat CT AP with contrast with no acute abnormalities  Speech evaluated patient and recommending puree with thin liquids along with esophagram  Esophagram ordered.

## 2025-04-29 NOTE — ASSESSMENT & PLAN NOTE
Seen on room air with SpO2 in the mid 90s  Maintain saturations greater than 88%.  Pulmonary toilet:  Increase activity as tolerated, out of bed as tolerated, cough and deep breathing exercises encourage, incentive spirometry q.1 hour

## 2025-04-29 NOTE — ASSESSMENT & PLAN NOTE
Likely provoked in the setting of an anterior lifestyle and medication compliance.  Continue heparin gtt for now, ptt 60-90 sec  Monitor for signs of bleeding  Bridge to coumadin at discretion of primary team  Recommend outpatient referral to hematology for further AC management.   Echo pending but given she is otherwise hemodynamically stable on room air do not suspect a need for further intervention.

## 2025-04-29 NOTE — PLAN OF CARE
Problem: Nutrition/Hydration-ADULT  Goal: Nutrient/Hydration intake appropriate for improving, restoring or maintaining nutritional needs  Description: Monitor and assess patient's nutrition/hydration status for malnutrition. Collaborate with interdisciplinary team and initiate plan and interventions as ordered.  Monitor patient's weight and dietary intake as ordered or per policy. Utilize nutrition screening tool and intervene as necessary. Determine patient's food preferences and provide high-protein, high-caloric foods as appropriate. INTERVENTIONS:- Monitor oral intake, urinary output, labs, and treatment plans- Assess nutrition and hydration status and recommend course of action- Evaluate amount of meals eaten- Assist patient with eating if necessary - Allow adequate time for meals- Recommend/ encourage appropriate diets, oral nutritional supplements, and vitamin/mineral supplements- Order, calculate, and assess calorie counts as needed- Recommend, monitor, and adjust tube feedings and TPN/PPN based on assessed needs- Assess need for intravenous fluids- Provide specific nutrition/hydration education as appropriate- Include patient/family/caregiver in decisions related to nutrition  Outcome: Progressing     Problem: HEMATOLOGIC - ADULT  Goal: Maintains hematologic stability  Description: INTERVENTIONS- Assess for signs and symptoms of bleeding or hemorrhage- Monitor labs- Administer supportive blood products/factors as ordered and appropriate  Outcome: Progressing

## 2025-04-29 NOTE — ASSESSMENT & PLAN NOTE
Low mag and K on admission  Replenish and monitor daily   Managed on chronic K 20meq BID, will increase to 40 meq BID due to hypokalemia  Due to persistent electrolyte abnormalities, may change IVF to continuous potassium infusion  Continue with IV supplementation until patient can tolerate PO.

## 2025-04-29 NOTE — ASSESSMENT & PLAN NOTE
POA with complaints of generalized weakness and poor appetite. Signed herself out of [facility] on 4/24, was home for one day when she started feeling weaker  Per OP , having cyclic vomiting at home and not taking any of her medications  Admits to poor appetite for the past month, will trial remeron   Had dilation of esophagus outpatient 4/22  Acute cystitis and mild electrolyte derangements with larry on admission  IV hydration and antibiotics  Replenish electrolytes  Flu/covid/rsv negative   CT CAP without acute findings   PT/OT consult: II (Moderate Resource Intensity)

## 2025-04-29 NOTE — ASSESSMENT & PLAN NOTE
Patient record reviewed-patient is to remain nonweightbearing until wound is completely healed  Completed 6 weeks IV abx  Wound care   There is a note from outpatient podiatry on 3/25 to be WBAT in post op shoe   Will consult podiatry for follow up on foot wound and to verify weight bearing status to work with PT/OT   Podiatry obtaining updated MRI and arterial duplex.   MRI: No convincing evidence of osteomyelitis. There is mild reactive edema  Arterial duplex pending  Podiatry recommending WBAT surgical shoe B/L

## 2025-04-29 NOTE — CONSULTS
Consultation - Pulmonology   Name: Alecia Kay 80 y.o. female I MRN: [id number]  Unit/Bed#: -01 I Date of Admission: 4/25/2025   Date of Service: 4/29/2025 I Hospital Day: 4   Inpatient consult to Pulmonology  Consult performed by: Tom Romero PA-C  Consult ordered by: Nelly Carnes PA-C        Physician Requesting Evaluation: Norman Blake DO   Reason for Evaluation / Principal Problem: PE    Assessment & Plan  Acute pulmonary embolism (HCC)  Likely provoked in the setting of an anterior lifestyle and medication compliance.  Continue heparin gtt for now, ptt 60-90 sec  Monitor for signs of bleeding  Bridge to coumadin at discretion of primary team  Recommend outpatient referral to hematology for further AC management.   Echo pending but given she is otherwise hemodynamically stable on room air do not suspect a need for further intervention.   Acute respiratory insufficiency  Seen on room air with SpO2 in the mid 90s  Maintain saturations greater than 88%.  Pulmonary toilet:  Increase activity as tolerated, out of bed as tolerated, cough and deep breathing exercises encourage, incentive spirometry q.1 hour  Hematochezia  Reported one episode  Continue to monitor, trend H&H  Further management per primary team- low threshold to consult GI for for consideration of procedures to investigate bleeding.   Acute DVT (deep venous thrombosis) (HCC)  AC as above    I have discussed the above management plan in detail with the primary service.   Pulmonology service signing off.    History of Present Illness   Alecia Kay is a 80 y.o. female who presents with generalized weakness, nausea, vomiting.  Patient has past medical asbestos for CKD, hypertension, hypothyroidism, chronic osteo of the right foot, previous DVT/PE.  Patient was previously living in a nursing home but states that she got sick COVID and decided to sign herself out and stopped taking all of her medications.  This  included her Coumadin for which she was taking indefinitely for history of DVT/PE.  She came to the ED 4/25 with complaints of nausea, vomiting, anorexia.  She was admitted initially for acute cystitis and GAY.  At the time of her admission it was not noted that she had history of DVT/PE previously on anticoagulation but once this was discovered she was started on ACS low heparin drip.  She then had an episode of hematochezia.  She is also had some documented desaturations prompting CTA chest PE study and lower extremity venous duplex which confirming signs of right-sided PE and bilateral acute versus subacute DVT prompting pulmonary consult.  She is now seen on heparin for VTE.  Patient denies shortness of breath, cough, history of rash, hemoptysis or wheeze.  Denies chest pain or palpitations. Patient cannot recall the cause of previous VTE and feels that it was unprovoked.     Review of Systems   All other systems reviewed and are negative.      Historical Information   Historical Information   Past Medical History:   Diagnosis Date    Anemia     Cellulitis     Disease of thyroid gland     GERD (gastroesophageal reflux disease)     Hyperlipidemia     Hypertension     Obesity     Osteomyelitis (HCC)     Pneumonia     Pulmonary embolism (HCC)      Past Surgical History:   Procedure Laterality Date    FOOT SURGERY      JOINT REPLACEMENT       Social History     Tobacco Use    Smoking status: Never     Passive exposure: Never    Smokeless tobacco: Never   Vaping Use    Vaping status: Never Used   Substance and Sexual Activity    Alcohol use: Never    Drug use: Never    Sexual activity: Not on file     E-Cigarette/Vaping    E-Cigarette Use Never User      E-Cigarette/Vaping Substances     Tobacco History: never  Occupational History: retired   Family History:History reviewed. No pertinent family history.    Objective :  Temp:  [97.5 °F (36.4 °C)-97.8 °F (36.6 °C)] 97.8 °F (36.6 °C)  HR:  [48-80] 80  BP:  (127-138)/(60-71) 138/71  Resp:  [18-20] 20  SpO2:  [89 %-97 %] 92 %  O2 Device: None (Room air)  Nasal Cannula O2 Flow Rate (L/min):  [1 L/min-2 L/min] 1 L/min    Physical Exam  Vitals reviewed.   Constitutional:       Appearance: Normal appearance. She is well-developed.   HENT:      Head: Normocephalic and atraumatic.      Nose: Nose normal.      Mouth/Throat:      Mouth: Mucous membranes are moist.   Eyes:      Extraocular Movements: Extraocular movements intact.   Cardiovascular:      Rate and Rhythm: Normal rate and regular rhythm.      Heart sounds: Normal heart sounds.   Pulmonary:      Effort: Pulmonary effort is normal. No respiratory distress.      Breath sounds: No wheezing, rhonchi or rales.   Musculoskeletal:         General: No swelling.   Skin:     General: Skin is warm and dry.   Neurological:      Mental Status: She is alert. Mental status is at baseline.   Psychiatric:         Mood and Affect: Mood normal.         Behavior: Behavior normal.         Lab Results: I have reviewed the following results:  .     04/29/25  0702   WBC 5.30   HGB 7.8*   HCT 23.7*      SODIUM 142   K 2.8*      CO2 25   BUN 7   CREATININE 0.97   GLUC 84   PHOS 1.7*   AST 9*   ALT <3*   ALB 2.6*   TBILI 0.35   ALKPHOS 45   PTT >210*   INR 1.52*     ABG: No new results in last 24 hours.    Imaging Results Review: I personally reviewed the following image studies in PACS and associated radiology reports: CT PE study with abdomen pelvis with contrast 4/28/25. My interpretation of the radiology images/reports is: Extensive filling defects throughout right upper, middle and lower lobe segmental and subsegmental indicating acute emboli.  Stable enlargement of main pulmonary artery measuring 3.9 cm. RV/LV ratio 0.9..    Echo pending

## 2025-04-29 NOTE — ASSESSMENT & PLAN NOTE
Having intermittent acute respiratory insuffiencey requiring 1L NC with o2 sats 89%.   Suspect secondary to acute PE.   Appears to desaturate at night  Overnight o2 and home o2 eval prior to discharge  Wean to room air as tolerated

## 2025-04-29 NOTE — ASSESSMENT & PLAN NOTE
Patient with complaints of bloody bowel movement this morning. Said that she had bright blood streaking the stools and blood in the bedpan. Does also intermittently complain of darker stools, but said that this one today was more bright red.   Heparin gtt was stopped and held and no further bloody stools noted   GI consult: continue PPI daily, continue antiemetics, could consider colonoscopy to rule out underlying malignancy, could be done outpatient.   No further episodes at this time.

## 2025-04-29 NOTE — ASSESSMENT & PLAN NOTE
Reported one episode  Continue to monitor, trend H&H  Further management per primary team- low threshold to consult GI for for consideration of procedures to investigate bleeding.

## 2025-04-29 NOTE — ASSESSMENT & PLAN NOTE
Patient with episode of nausea and vomiting on 4/27 in the morning and on telemetry found to have tachycardia up to 160s sustaining. Patient is on lopressor 50 mg bid which patient states is for HTN.   ECG obtained and looked like atrial fibrillation rvr with HR of 150s however patient without history of atrial fibrillation  Given 1 dose IV lopressor and HR improved now sustaining in the 60s-70s.   Repeat ECG showing NSR in the evening.   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   Placed on heparin gtt at this time, will resume coumadin in 48 hours (5/1 to start coumadin)

## 2025-04-29 NOTE — ASSESSMENT & PLAN NOTE
Patient with bilateral lower extremity swelling on admission. Venous duplex was ordered and showing acute vs. sub acute DVT in the right popliteal, gastrocnemius, posterior tibial, and peroneal veins and acute vs. sub acute DVT in the left popliteal, posterior tibial, and peroneal veins.   Unclear if DVT was POA.   Initially was on heparin gtt due to subtherapeutic INR, held then because patient had bloody stools.   Discussed with MAYRA ok to resume heparin gtt with close monitoring of stools.   CTA chest PE study: Extensive acute thrombus throughout the right upper middle and lower lobe segmental and distal order pulmonary arteries. 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 She said she had workup done, but unsure what exactly was done. Due to this, will resume coumadin after heparin gtt and recommending hematology evaluation outpatient.   Anticipate resuming coumadin on 5/1

## 2025-04-29 NOTE — ASSESSMENT & PLAN NOTE
Patient states that she has been having nausea and vomiting for the last month or so. Said that she had imaging done and EGD and nothing was definitive.   States that she has nausea and vomiting mostly associated with meals and around mealtime, will trial with reglan scheduled and see if this helps with patient's nausea.   Said that she felt the Reglan improved her symptoms but still having nausea and vomiting.   GI consult: unclear etiology, possibly multifactorial. EGD less than 1 week ago without explanation for symptoms, hiatal hernia could be contributing but unlikely to cause 70 pound weight loss. Getting celiac/mesenteric doppler U/S. Agree with remeron. Could consider colonoscopy.  Still with persistent N/V, repeat CT AP with contrast with no acute abnormalities  Speech evaluated patient and recommending puree with thin liquids along with esophagram  Esophagram ordered along with celiac mesenteric doppler  Discussed with patient that if oral intake doesn't improve oral intake depending on results of tests, will need to discuss enteral nutrition for patient.

## 2025-04-29 NOTE — ASSESSMENT & PLAN NOTE
Low mag and K on admission  Replenish and monitor daily   Managed on chronic K 20meq BID, will increase to 40 meq BID due to hypokalemia  Unable to tolerate PO supplements at this time, may need liquid when tolerating PO.   Continue with IV supplementation until patient can tolerate PO. If remaining persistently hypokalemic, would benefit from possibly D5 with KCL.

## 2025-04-29 NOTE — ASSESSMENT & PLAN NOTE
Found to have acute DVT. STAT CT chest PE study ordered: Extensive acute thrombus throughout the right upper middle and lower lobe segmental and distal order pulmonary arteries. RV/LV ratio 0.9  Unclear if PE was POA.   Echo pending  Suspect current episode of PE/DVT was due to subtherapeutic INR on admission as patient was not taking medications with n/v.   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 Discussed with pulm, due to unclear if any hypercoaguable state, would recommend continuing with coumadin after heparin gtt and hematology follow up.   Pulm consulted: continue with lifelong AC. Outpatient hematology follow up. If no concern for large GI bleed, can resume warfarin for goal of 2-3. Anticipate resuming coumadin on 5/1

## 2025-04-29 NOTE — ASSESSMENT & PLAN NOTE
Found to have acute DVT. STAT CT chest PE study ordered: Extensive acute thrombus throughout the right upper middle and lower lobe segmental and distal order pulmonary arteries. RV/LV ratio 0.9  Unclear if PE was POA.   Echo pending  Suspect current episode of PE/DVT was due to subtherapeutic INR on admission as patient was not taking medications with n/v.   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 Discussed with pulm, due to unclear if any hypercoaguable state, would recommend continuing with coumadin after heparin gtt and hematology follow up.   Pulm consulted

## 2025-04-30 ENCOUNTER — APPOINTMENT (INPATIENT)
Dept: RADIOLOGY | Facility: HOSPITAL | Age: 80
End: 2025-04-30
Payer: COMMERCIAL

## 2025-04-30 ENCOUNTER — APPOINTMENT (INPATIENT)
Dept: NON INVASIVE DIAGNOSTICS | Facility: HOSPITAL | Age: 80
End: 2025-04-30
Payer: COMMERCIAL

## 2025-04-30 LAB
25(OH)D3 SERPL-MCNC: 81.7 NG/ML (ref 30–100)
ANION GAP SERPL CALCULATED.3IONS-SCNC: 9 MMOL/L (ref 4–13)
APTT PPP: 145 SECONDS (ref 23–34)
APTT PPP: 150 SECONDS (ref 23–34)
APTT PPP: 48 SECONDS (ref 23–34)
APTT PPP: 65 SECONDS (ref 23–34)
ATRIAL RATE: 57 BPM
ATRIAL RATE: 63 BPM
BUN SERPL-MCNC: 6 MG/DL (ref 5–25)
CALCIUM SERPL-MCNC: 6.4 MG/DL (ref 8.4–10.2)
CHLORIDE SERPL-SCNC: 108 MMOL/L (ref 96–108)
CO2 SERPL-SCNC: 24 MMOL/L (ref 21–32)
CREAT SERPL-MCNC: 0.85 MG/DL (ref 0.6–1.3)
ERYTHROCYTE [DISTWIDTH] IN BLOOD BY AUTOMATED COUNT: 15.2 % (ref 11.6–15.1)
FERRITIN SERPL-MCNC: 728 NG/ML (ref 30–307)
FOLATE SERPL-MCNC: 2.6 NG/ML
GFR SERPL CREATININE-BSD FRML MDRD: 64 ML/MIN/1.73SQ M
GLUCOSE SERPL-MCNC: 73 MG/DL (ref 65–140)
HCT VFR BLD AUTO: 23.2 % (ref 34.8–46.1)
HGB BLD-MCNC: 7.6 G/DL (ref 11.5–15.4)
INR PPP: 1.5 (ref 0.85–1.19)
IRON SATN MFR SERPL: 99 % (ref 15–50)
IRON SERPL-MCNC: 116 UG/DL (ref 50–212)
MAGNESIUM SERPL-MCNC: 1.3 MG/DL (ref 1.9–2.7)
MCH RBC QN AUTO: 28.9 PG (ref 26.8–34.3)
MCHC RBC AUTO-ENTMCNC: 32.8 G/DL (ref 31.4–37.4)
MCV RBC AUTO: 88 FL (ref 82–98)
P AXIS: 43 DEGREES
P AXIS: 5 DEGREES
PHOSPHATE SERPL-MCNC: 2 MG/DL (ref 2.3–4.1)
PHOSPHATE SERPL-MCNC: 3.9 MG/DL (ref 2.3–4.1)
PLATELET # BLD AUTO: 184 THOUSANDS/UL (ref 149–390)
PMV BLD AUTO: 9.3 FL (ref 8.9–12.7)
POTASSIUM SERPL-SCNC: 3.5 MMOL/L (ref 3.5–5.3)
POTASSIUM SERPL-SCNC: 3.5 MMOL/L (ref 3.5–5.3)
PR INTERVAL: 158 MS
PR INTERVAL: 162 MS
PROTHROMBIN TIME: 18.4 SECONDS (ref 12.3–15)
QRS AXIS: -23 DEGREES
QRS AXIS: -24 DEGREES
QRSD INTERVAL: 86 MS
QRSD INTERVAL: 96 MS
QT INTERVAL: 434 MS
QT INTERVAL: 462 MS
QTC INTERVAL: 444 MS
QTC INTERVAL: 449 MS
RBC # BLD AUTO: 2.63 MILLION/UL (ref 3.81–5.12)
SODIUM SERPL-SCNC: 141 MMOL/L (ref 135–147)
T WAVE AXIS: 135 DEGREES
T WAVE AXIS: 140 DEGREES
TIBC SERPL-MCNC: 117.6 UG/DL (ref 250–450)
TRANSFERRIN SERPL-MCNC: 84 MG/DL (ref 203–362)
UIBC SERPL-MCNC: 2 UG/DL (ref 155–355)
VENTRICULAR RATE: 57 BPM
VENTRICULAR RATE: 63 BPM
VIT B12 SERPL-MCNC: 564 PG/ML (ref 180–914)
WBC # BLD AUTO: 6.17 THOUSAND/UL (ref 4.31–10.16)

## 2025-04-30 PROCEDURE — 83540 ASSAY OF IRON: CPT

## 2025-04-30 PROCEDURE — 84100 ASSAY OF PHOSPHORUS: CPT

## 2025-04-30 PROCEDURE — 97530 THERAPEUTIC ACTIVITIES: CPT

## 2025-04-30 PROCEDURE — 74220 X-RAY XM ESOPHAGUS 1CNTRST: CPT

## 2025-04-30 PROCEDURE — NC001 PR NO CHARGE: Performed by: STUDENT IN AN ORGANIZED HEALTH CARE EDUCATION/TRAINING PROGRAM

## 2025-04-30 PROCEDURE — 99232 SBSQ HOSP IP/OBS MODERATE 35: CPT

## 2025-04-30 PROCEDURE — 80048 BASIC METABOLIC PNL TOTAL CA: CPT

## 2025-04-30 PROCEDURE — 93975 VASCULAR STUDY: CPT

## 2025-04-30 PROCEDURE — 82306 VITAMIN D 25 HYDROXY: CPT

## 2025-04-30 PROCEDURE — 83735 ASSAY OF MAGNESIUM: CPT

## 2025-04-30 PROCEDURE — 85730 THROMBOPLASTIN TIME PARTIAL: CPT

## 2025-04-30 PROCEDURE — 85027 COMPLETE CBC AUTOMATED: CPT

## 2025-04-30 PROCEDURE — 85610 PROTHROMBIN TIME: CPT

## 2025-04-30 PROCEDURE — 82746 ASSAY OF FOLIC ACID SERUM: CPT

## 2025-04-30 PROCEDURE — 82728 ASSAY OF FERRITIN: CPT

## 2025-04-30 PROCEDURE — 83550 IRON BINDING TEST: CPT

## 2025-04-30 PROCEDURE — 82607 VITAMIN B-12: CPT

## 2025-04-30 PROCEDURE — 85730 THROMBOPLASTIN TIME PARTIAL: CPT | Performed by: FAMILY MEDICINE

## 2025-04-30 PROCEDURE — 84132 ASSAY OF SERUM POTASSIUM: CPT

## 2025-04-30 PROCEDURE — 93975 VASCULAR STUDY: CPT | Performed by: SURGERY

## 2025-04-30 RX ORDER — MAGNESIUM SULFATE HEPTAHYDRATE 40 MG/ML
2 INJECTION, SOLUTION INTRAVENOUS
Status: COMPLETED | OUTPATIENT
Start: 2025-04-30 | End: 2025-04-30

## 2025-04-30 RX ADMIN — ERTAPENEM SODIUM 1000 MG: 1 INJECTION, POWDER, LYOPHILIZED, FOR SOLUTION INTRAMUSCULAR; INTRAVENOUS at 14:26

## 2025-04-30 RX ADMIN — LEVOTHYROXINE SODIUM 75 MCG: 75 TABLET ORAL at 05:25

## 2025-04-30 RX ADMIN — IOHEXOL 10 ML: 300 INJECTION, SOLUTION INTRAVENOUS at 11:15

## 2025-04-30 RX ADMIN — PANTOPRAZOLE SODIUM 40 MG: 40 INJECTION, POWDER, FOR SOLUTION INTRAVENOUS at 10:05

## 2025-04-30 RX ADMIN — MAGNESIUM SULFATE HEPTAHYDRATE 2 G: 2 INJECTION, SOLUTION INTRAVENOUS at 07:55

## 2025-04-30 RX ADMIN — CEFAZOLIN SODIUM 2000 MG: 2 SOLUTION INTRAVENOUS at 21:36

## 2025-04-30 RX ADMIN — MIRTAZAPINE 7.5 MG: 15 TABLET, FILM COATED ORAL at 21:40

## 2025-04-30 RX ADMIN — CEFAZOLIN SODIUM 2000 MG: 2 SOLUTION INTRAVENOUS at 13:03

## 2025-04-30 RX ADMIN — MAGNESIUM SULFATE HEPTAHYDRATE 2 G: 2 INJECTION, SOLUTION INTRAVENOUS at 10:09

## 2025-04-30 RX ADMIN — METOCLOPRAMIDE HYDROCHLORIDE 5 MG: 5 INJECTION INTRAMUSCULAR; INTRAVENOUS at 12:05

## 2025-04-30 RX ADMIN — PRAVASTATIN SODIUM 80 MG: 80 TABLET ORAL at 16:34

## 2025-04-30 RX ADMIN — CEFAZOLIN SODIUM 2000 MG: 2 SOLUTION INTRAVENOUS at 05:24

## 2025-04-30 RX ADMIN — POTASSIUM PHOSPHATE, MONOBASIC POTASSIUM PHOSPHATE, DIBASIC INJECTION, 21 MMOL: 236; 224 SOLUTION, CONCENTRATE INTRAVENOUS at 10:16

## 2025-04-30 RX ADMIN — METOCLOPRAMIDE HYDROCHLORIDE 5 MG: 5 INJECTION INTRAMUSCULAR; INTRAVENOUS at 07:50

## 2025-04-30 RX ADMIN — METOCLOPRAMIDE HYDROCHLORIDE 5 MG: 5 INJECTION INTRAMUSCULAR; INTRAVENOUS at 16:36

## 2025-04-30 RX ADMIN — SODIUM CHLORIDE 75 ML/HR: 0.9 INJECTION, SOLUTION INTRAVENOUS at 06:01

## 2025-04-30 RX ADMIN — SODIUM CHLORIDE 75 ML/HR: 0.9 INJECTION, SOLUTION INTRAVENOUS at 21:35

## 2025-04-30 RX ADMIN — HEPARIN SODIUM 2800 UNITS: 1000 INJECTION, SOLUTION INTRAVENOUS; SUBCUTANEOUS at 17:03

## 2025-04-30 NOTE — PHYSICAL THERAPY NOTE
"   PHYSICAL THERAPY TREATMENT NOTE  NAME:  Alecia Kay  DATE: 04/30/25    Length Of Stay: 5  Performed at least 2 patient identifiers during session: Name and Birthday      TREATMENT FLOW SHEET:      04/30/25 5484   PT Last Visit   PT Visit Date 04/30/25   Note Type   Note Type Treatment   Pain Assessment   Pain Assessment Tool 0-10   Pain Score No Pain   Restrictions/Precautions   Weight Bearing Precautions Per Order Yes   RLE Weight Bearing Per Order WBAT  (WBAT surgical shoe)   Braces or Orthoses Other (Comment)  (right surgical shoe)   Other Precautions Chair Alarm;Bed Alarm;Fall Risk;Multiple lines;O2;Telemetry   General   Chart Reviewed Yes   Response to Previous Treatment Patient unable to report, no changes reported from family or staff   Family/Caregiver Present No   Cognition   Orientation Level Oriented to person;Oriented to place;Disoriented to situation  (partially to time with year, not month)   Following Commands Follows one step commands without difficulty   Subjective   Subjective \"I am done.\" (pt sitting on BSC upon arrival to room)   Bed Mobility   Rolling R 3  Moderate assistance   Additional items Assist x 1;Increased time required;Verbal cues   Rolling L 3  Moderate assistance   Additional items Assist x 1;Increased time required;Verbal cues   Sit to Supine 2  Maximal assistance   Additional items Assist x 1;Increased time required;Verbal cues;LE management  (trunk management)   Additional Comments Pt sitting on BSC upon arrival to room. HOB flat with bedrail. modAx1 to roll righ and left and maxAx1 to return to supine with manual assistance for LE and trunk management.   Transfers   Sit to Stand 2  Maximal assistance   Additional items Assist x 1;Increased time required;Verbal cues   Stand to Sit 2  Maximal assistance   Additional items Assist x 1;Increased time required;Verbal cues   Stand pivot 2  Maximal assistance   Additional items Assist x 1;Increased time required;Verbal cues " "  Toilet transfer 2  Maximal assistance   Additional items Assist x 1;Increased time required;Verbal cues;Commode   Additional Comments RW. verbal cues for hand placement for safety. sit to stand with maxAx1 with manual cues to achieve standing. pt with inc posterior lean. maxAx1 to maintain standing 30 seconds due to posterior lean with therapist completing pericare. returned to sitting due to fatigue with modAx1. sit <>stand with maxAx1 with inc time and effort. maxAx1 to complete spt with manual cues for wt shfiting and RW management and verbal cues for technique. pt with difficulty advancing left LE, wide MIGUEL A and shuffled steps. attempting to sit prior to turning completely. lateral scooting to HOB with maxAx1   Ambulation/Elevation   Distance pt unable at this time due to safety concerns, hypotension   Balance   Static Sitting Good   Dynamic Sitting Fair +   Static Standing Poor -   Dynamic Standing Poor -   Endurance Deficit   Endurance Deficit Yes   Endurance Deficit Description fatigue, hypotension   Activity Tolerance   Activity Tolerance Patient limited by fatigue   Nurse Made Aware Kristi DYSON   Assessment   Prognosis Good   Problem List Decreased strength;Decreased endurance;Impaired balance;Decreased mobility;Decreased range of motion;Impaired judgement;Decreased safety awareness;Obesity;Decreased skin integrity   Barriers to Discharge Inaccessible home environment;Decreased caregiver support   Barriers to Discharge Comments requires assistance to complete mobility, fall risk, safety concerns   Goals   Patient Goals \"Get better. I thought I was doing okay.\"   PT Treatment Day 2   Plan   Treatment/Interventions ADL retraining;Functional transfer training;LE strengthening/ROM;Elevations;Therapeutic exercise;Endurance training;Patient/family training;Equipment eval/education;Bed mobility;Gait training;Compensatory technique education;Spoke to nursing;Spoke to case management;OT   Progress No functional " "improvements   PT Frequency 3-5x/wk   Discharge Recommendation   Rehab Resource Intensity Level, PT II (Moderate Resource Intensity)   AM-PAC Basic Mobility Inpatient   Turning in Flat Bed Without Bedrails 2   Lying on Back to Sitting on Edge of Flat Bed Without Bedrails 2   Moving Bed to Chair 2   Standing Up From Chair Using Arms 2   Walk in Room 2   Climb 3-5 Stairs With Railing 1   Basic Mobility Inpatient Raw Score 11   Basic Mobility Standardized Score 30.25   [facility] Highest Level Of Mobility   -HL Goal 4: Move to chair/commode   -HL Achieved 4: Move to chair/commode   Education   Education Provided Mobility training;Assistive device   Patient Reinforcement needed   End of Consult   Patient Position at End of Consult Supine;Bed/Chair alarm activated;All needs within reach        04/30/25 1444 04/30/25 1451   Vitals   Pulse  --  72   Respirations  --  20   Blood Pressure (!) 79/57 110/69   MAP (mmHg) (!) 64 83   BP Location Right arm Right arm   BP Method Automatic Automatic   Patient Position - Orthostatic VS Sitting  (on BSC, c/o dizziness after standing) Lying   Oxygen Therapy   SpO2  --  97 %   SpO2 Activity  --  At Rest   O2 Device  --  Nasal cannula   Nasal Cannula O2 Flow Rate (L/min)  --  2 L/min   Calculated FIO2 (%) - Nasal Cannula  --  28     MRI right ankle/heel negative for residual OM per notes.     Treatment time: 8537-0773    The patient's AM-PAC Basic Mobility Inpatient Short Form Raw Score is 11. A Raw score of less than or equal to 16 suggests the patient may benefit from discharge to post-acute rehabilitation services. Please also refer to the recommendation of the Physical Therapist for safe discharge planning.    Pt tolerated session poorly. She was sitting on BSC upon arrival to room. She requires increased assistance to achieve standing and maintain standing this date. She tolerated standing poorly with c/o feeling dizzy and stated \"I feel like I am going to fall over\" after " returning to sit on BSC for safety due to fatigue. Pt noted to be hypotensive. Completed transfer back to bed for safety with increased assistance for wt shifting. She reports fatigue. She has poor activity tolerance and requires increased assistance this date. She had findings of acute PE, DVT and is currently requiring 2 lpm NC. Surgical shoe provided to patient for WB right LE as MRI was negative for OM. She was NPO this date for testing. She is limited by decreased strength, balance, endurance. She will continue to benefit from PT services to maximize LOF.      Christi Winston, PT,DPT

## 2025-04-30 NOTE — PLAN OF CARE
Problem: Prexisting or High Potential for Compromised Skin Integrity  Goal: Skin integrity is maintained or improved  Description: INTERVENTIONS:- Identify patients at risk for skin breakdown- Assess and monitor skin integrity- Assess and monitor nutrition and hydration status- Monitor labs - Assess for incontinence - Turn and reposition patient- Assist with mobility/ambulation- Relieve pressure over bony prominences- Avoid friction and shearing- Provide appropriate hygiene as needed including keeping skin clean and dry- Evaluate need for skin moisturizer/barrier cream- Collaborate with interdisciplinary team - Patient/family teaching- Consider wound care consult   Outcome: Progressing     Problem: PAIN - ADULT  Goal: Verbalizes/displays adequate comfort level or baseline comfort level  Description: Interventions:- Encourage patient to monitor pain and request assistance- Assess pain using appropriate pain scale- Administer analgesics based on type and severity of pain and evaluate response- Implement non-pharmacological measures as appropriate and evaluate response- Consider cultural and social influences on pain and pain management- Notify physician/advanced practitioner if interventions unsuccessful or patient reports new pain  Outcome: Progressing     Problem: INFECTION - ADULT  Goal: Absence or prevention of progression during hospitalization  Description: INTERVENTIONS:- Assess and monitor for signs and symptoms of infection- Monitor lab/diagnostic results- Monitor all insertion sites, i.e. indwelling lines, tubes, and drains- Monitor endotracheal if appropriate and nasal secretions for changes in amount and color- [place] appropriate cooling/warming therapies per order- Administer medications as ordered- Instruct and encourage patient and family to use good hand hygiene technique- Identify and instruct in appropriate isolation precautions for identified infection/condition  Outcome: Progressing  Goal:  Absence of fever/infection during neutropenic period  Description: INTERVENTIONS:- Monitor WBC  Outcome: Progressing       Problem: Knowledge Deficit  Goal: Patient/family/caregiver demonstrates understanding of disease process, treatment plan, medications, and discharge instructions  Description: Complete learning assessment and assess knowledge base.Interventions:- Provide teaching at level of understanding- Provide teaching via preferred learning methods  Outcome: Progressing     Problem: DISCHARGE PLANNING  Goal: Discharge to home or other facility with appropriate resources  Description: INTERVENTIONS:- Identify barriers to discharge w/patient and caregiver- Arrange for needed discharge resources and transportation as appropriate- Identify discharge learning needs (meds, wound care, etc.)- Arrange for interpretive services to assist at discharge as needed- Refer to Case Management Department for coordinating discharge planning if the patient needs post-hospital services based on physician/advanced practitioner order or complex needs related to functional status, cognitive ability, or social support system  Outcome: Progressing

## 2025-04-30 NOTE — PLAN OF CARE
Problem: Prexisting or High Potential for Compromised Skin Integrity  Goal: Skin integrity is maintained or improved  Description: INTERVENTIONS:- Identify patients at risk for skin breakdown- Assess and monitor skin integrity- Assess and monitor nutrition and hydration status- Monitor labs - Assess for incontinence - Turn and reposition patient- Assist with mobility/ambulation- Relieve pressure over bony prominences- Avoid friction and shearing- Provide appropriate hygiene as needed including keeping skin clean and dry- Evaluate need for skin moisturizer/barrier cream- Collaborate with interdisciplinary team - Patient/family teaching- Consider wound care consult   Outcome: Progressing     Problem: PAIN - ADULT  Goal: Verbalizes/displays adequate comfort level or baseline comfort level  Description: Interventions:- Encourage patient to monitor pain and request assistance- Assess pain using appropriate pain scale- Administer analgesics based on type and severity of pain and evaluate response- Implement non-pharmacological measures as appropriate and evaluate response- Consider cultural and social influences on pain and pain management- Notify physician/advanced practitioner if interventions unsuccessful or patient reports new pain  Outcome: Progressing     Problem: INFECTION - ADULT  Goal: Absence or prevention of progression during hospitalization  Description: INTERVENTIONS:- Assess and monitor for signs and symptoms of infection- Monitor lab/diagnostic results- Monitor all insertion sites, i.e. indwelling lines, tubes, and drains- Monitor endotracheal if appropriate and nasal secretions for changes in amount and color- [place] appropriate cooling/warming therapies per order- Administer medications as ordered- Instruct and encourage patient and family to use good hand hygiene technique- Identify and instruct in appropriate isolation precautions for identified infection/condition  Outcome: Progressing  Goal:  Absence of fever/infection during neutropenic period  Description: INTERVENTIONS:- Monitor WBC  Outcome: Progressing     Problem: SAFETY ADULT  Goal: Patient will remain free of falls  Description: INTERVENTIONS:- Educate patient/family on patient safety including physical limitations- Instruct patient to call for assistance with activity - Consult OT/PT to assist with strengthening/mobility - Keep Call bell within reach- Keep bed low and locked with side rails adjusted as appropriate- Keep care items and personal belongings within reach- Initiate and maintain comfort rounds- Make Fall Risk Sign visible to staff- Offer Toileting every 2 Hours, in advance of need- Initiate/Maintain bed alarm- Obtain necessary fall risk management equipment: - Apply yellow socks and bracelet for high fall risk patients- Consider moving patient to room near nurses station  Outcome: Progressing  Goal: Maintain or return to baseline ADL function  Description: INTERVENTIONS:-  Assess patient's ability to carry out ADLs; assess patient's baseline for ADL function and identify physical deficits which impact ability to perform ADLs (bathing, care of mouth/teeth, toileting, grooming, dressing, etc.)- Assess/evaluate cause of self-care deficits - Assess range of motion- Assess patient's mobility; develop plan if impaired- Assess patient's need for assistive devices and provide as appropriate- Encourage maximum independence but intervene and supervise when necessary- Involve family in performance of ADLs- Assess for home care needs following discharge - Consider OT consult to assist with ADL evaluation and planning for discharge- Provide patient education as appropriate  Outcome: Progressing  Goal: Maintains/Returns to pre admission functional level  Description: INTERVENTIONS:- Perform AM-PAC 6 Click Basic Mobility/ Daily Activity assessment daily.- Set and communicate daily mobility goal to care team and patient/family/caregiver. -  Collaborate with rehabilitation services on mobility goals if consulted.- Reposition patient every 2 hours.- Dangle patient 3 times a day- Stand patient 3 times a day- Ambulate patient 3 times a day- Out of bed to chair 3 times a day - Out of bed for meals 3 times a day- Out of bed for toileting- Record patient progress and toleration of activity level   Outcome: Progressing     Problem: DISCHARGE PLANNING  Goal: Discharge to home or other facility with appropriate resources  Description: INTERVENTIONS:- Identify barriers to discharge w/patient and caregiver- Arrange for needed discharge resources and transportation as appropriate- Identify discharge learning needs (meds, wound care, etc.)- Arrange for interpretive services to assist at discharge as needed- Refer to Case Management Department for coordinating discharge planning if the patient needs post-hospital services based on physician/advanced practitioner order or complex needs related to functional status, cognitive ability, or social support system  Outcome: Progressing     Problem: Knowledge Deficit  Goal: Patient/family/caregiver demonstrates understanding of disease process, treatment plan, medications, and discharge instructions  Description: Complete learning assessment and assess knowledge base.Interventions:- Provide teaching at level of understanding- Provide teaching via preferred learning methods  Outcome: Progressing     Problem: Nutrition/Hydration-ADULT  Goal: Nutrient/Hydration intake appropriate for improving, restoring or maintaining nutritional needs  Description: Monitor and assess patient's nutrition/hydration status for malnutrition. Collaborate with interdisciplinary team and initiate plan and interventions as ordered.  Monitor patient's weight and dietary intake as ordered or per policy. Utilize nutrition screening tool and intervene as necessary. Determine patient's food preferences and provide high-protein, high-caloric foods as  appropriate. INTERVENTIONS:- Monitor oral intake, urinary output, labs, and treatment plans- Assess nutrition and hydration status and recommend course of action- Evaluate amount of meals eaten- Assist patient with eating if necessary - Allow adequate time for meals- Recommend/ encourage appropriate diets, oral nutritional supplements, and vitamin/mineral supplements- Order, calculate, and assess calorie counts as needed- Recommend, monitor, and adjust tube feedings and TPN/PPN based on assessed needs- Assess need for intravenous fluids- Provide specific nutrition/hydration education as appropriate- Include patient/family/caregiver in decisions related to nutrition  Outcome: Progressing     Problem: HEMATOLOGIC - ADULT  Goal: Maintains hematologic stability  Description: INTERVENTIONS- Assess for signs and symptoms of bleeding or hemorrhage- Monitor labs- Administer supportive blood products/factors as ordered and appropriate  Outcome: Progressing

## 2025-04-30 NOTE — PROGRESS NOTES
Progress Note - Hospitalist   Name: Alecia Kay 80 y.o. female I MRN: [id number]  Unit/Bed#: -01 I Date of Admission: 4/25/2025   Date of Service: 4/30/2025 I Hospital Day: 5    Assessment & Plan  Generalized weakness  POA with complaints of generalized weakness and poor appetite. Signed herself out of [facility] on 4/24, was home for one day when she started feeling weaker  Per OP , having cyclic vomiting at home and not taking any of her medications  Admits to poor appetite for the past month, will trial remeron   Had dilation of esophagus outpatient 4/22  Acute cystitis and mild electrolyte derangements with gay on admission  IV hydration and antibiotics  Replenish electrolytes  Flu/covid/rsv negative   CT CAP without acute findings   PT/OT consult: II (Moderate Resource Intensity)  Acute kidney injury (HCC)  Baseline around 1.3, admitted at 1.8  Start gentle IVF   Improved and normalized.   Monitor daily bmp  Avoid nephrotoxic agents and hypotension   GAY resolved, monitor for new GAY due to receiving IV contrast with CTA   Acute cystitis  UA + and endorses dysuria  Await urine culture  Most recent being 4/3 with klebsiella, susceptible to rocephin   Still having symptoms, urine culture with klebsiella and ESBL e coli - switch to ancef and ertapenem  Chronic osteomyelitis of toe of right foot (HCC)  Patient record reviewed-patient is to remain nonweightbearing until wound is completely healed  Completed 6 weeks IV abx  Wound care   There is a note from outpatient podiatry on 3/25 to be WBAT in post op shoe   Will consult podiatry for follow up on foot wound and to verify weight bearing status to work with PT/OT   Podiatry obtaining updated MRI and arterial duplex.   MRI: No convincing evidence of osteomyelitis. There is mild reactive edema  Arterial duplex pending  Podiatry recommending WBAT surgical shoe B/L  Nausea and vomiting  Patient states that she has been having nausea and  vomiting for the last month or so. Said that she had imaging done and EGD and nothing was definitive.   States that she has nausea and vomiting mostly associated with meals and around mealtime, will trial with reglan scheduled and see if this helps with patient's nausea.   Said that she felt the Reglan improved her symptoms but still having nausea and vomiting.   GI consult: unclear etiology, possibly multifactorial. EGD less than 1 week ago without explanation for symptoms, hiatal hernia could be contributing but unlikely to cause 70 pound weight loss. Getting celiac/mesenteric doppler U/S. Agree with remeron. Could consider colonoscopy.  Still with persistent N/V, repeat CT AP with contrast with no acute abnormalities  Speech evaluated patient and recommending puree with thin liquids along with esophagram  Esophagram ordered along with celiac mesenteric doppler  Discussed with patient that if oral intake doesn't improve oral intake depending on results of tests, will need to discuss enteral nutrition for patient.   Tachycardia  Patient with episode of nausea and vomiting on 4/27 in the morning and on telemetry found to have tachycardia up to 160s sustaining. Patient is on lopressor 50 mg bid which patient states is for HTN.   ECG obtained and looked like atrial fibrillation rvr with HR of 150s however patient without history of atrial fibrillation  Given 1 dose IV lopressor and HR improved now sustaining in the 60s-70s.   Repeat ECG showing NSR in the evening.   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   Placed on heparin gtt at this time, will resume coumadin in 48 hours (5/1 to start coumadin)  History of DVT (deep vein thrombosis)  Patient states that she has a history of DVT/PE. Said that she had unknown cause for her PE/DVT and was placed on coumadin for this. She is still supposed to be on coumadin but is not sure the dosing  States that she doesn't have any known history of  atrial fibrillation that she knows of.   Was not taking coumadin during hospitalization and prior to hospitalization due to nausea and vomiting, said she missed doses.   PT-INR subtherapeutic.   Venous duplex positive for acute vs subacute DVT, see plan under acute dvt.   Bilateral lower extremity edema  Was recently taken off of torsemide during last hospitalization  Caution with gentle hydration at this time  May need diuretics as needed  Also may benefit from changing amlodipine to a different blood pressure medication  Venous duplex positive for acute vs subacute DVT bilaterally.   Hypertension  Continue home amlodipine and metoprolol   Electrolyte imbalance  Low mag and K on admission  Replenish and monitor daily   Managed on chronic K 20meq BID, will increase to 40 meq BID due to hypokalemia  Unable to tolerate PO supplements at this time, may need liquid when tolerating PO.   Continue with IV supplementation until patient can tolerate PO. If remaining persistently hypokalemic, would benefit from possibly D5 with KCL.   Hematochezia  Patient with complaints of bloody bowel movement this morning. Said that she had bright blood streaking the stools and blood in the bedpan. Does also intermittently complain of darker stools, but said that this one today was more bright red.   Heparin gtt was stopped and held and no further bloody stools noted   GI consult: continue PPI daily, continue antiemetics, could consider colonoscopy to rule out underlying malignancy, could be done outpatient.   No further episodes at this time.   Acute DVT (deep venous thrombosis) (HCC)  Patient with bilateral lower extremity swelling on admission. Venous duplex was ordered and showing acute vs. sub acute DVT in the right popliteal, gastrocnemius, posterior tibial, and peroneal veins and acute vs. sub acute DVT in the left popliteal, posterior tibial, and peroneal veins.   Unclear if DVT was POA.   Initially was on heparin gtt due to  subtherapeutic INR, held then because patient had bloody stools.   Discussed with GI, ok to resume heparin gtt with close monitoring of stools.   CTA chest PE study: Extensive acute thrombus throughout the right upper middle and lower lobe segmental and distal order pulmonary arteries. 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 She said she had workup done, but unsure what exactly was done. Due to this, will resume coumadin after heparin gtt and recommending hematology evaluation outpatient.   Anticipate resuming coumadin on 5/1  Acute pulmonary embolism (HCC)  Found to have acute DVT. STAT CT chest PE study ordered: Extensive acute thrombus throughout the right upper middle and lower lobe segmental and distal order pulmonary arteries. RV/LV ratio 0.9  Unclear if PE was POA.   Echo pending  Suspect current episode of PE/DVT was due to subtherapeutic INR on admission as patient was not taking medications with n/v.   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 Discussed with pulm, due to unclear if any hypercoaguable state, would recommend continuing with coumadin after heparin gtt and hematology follow up.   Pulm consulted: continue with lifelong AC. Outpatient hematology follow up. If no concern for large GI bleed, can resume warfarin for goal of 2-3. Anticipate resuming coumadin on 5/1  Acute respiratory insufficiency  Having intermittent acute respiratory insuffiencey requiring 1L NC with o2 sats 89%.   Suspect secondary to acute PE.   Appears to desaturate at night  Overnight o2 and home o2 eval prior to discharge  Wean to room air as  tolerated    VTE Pharmacologic Prophylaxis: VTE Score: 8 High Risk (Score >/= 5) - Pharmacological DVT Prophylaxis Ordered: heparin drip. Sequential Compression Devices Ordered.    Mobility:   Basic Mobility Inpatient Raw Score: 11  JH-HLM Goal: 4: Move to chair/commode  JH-HLM Achieved: 4: Move to chair/commode  JH-HLM Goal achieved. Continue to encourage appropriate mobility.    Patient Centered Rounds: I performed bedside rounds with nursing staff today.   Discussions with Specialists or Other Care Team Provider: nursing, case management, GI, nutrition    Education and Discussions with Family / Patient: Attempted to update  (nephew) via phone. Left voicemail.     Current Length of Stay: 5 day(s)  Current Patient Status: Inpatient   Certification Statement: The patient will continue to require additional inpatient hospital stay due to monitoring oral intake, pending esophagram, pending celiac and mesenteric duplex, transition to coumadin  Discharge Plan: Anticipate discharge in >72 hrs to rehab facility.    Code Status: Level 1 - Full Code    Subjective   Seen and examined today, said that she is feeling a little better from the nausea and vomiting end of things. Said that she didn't have as much nausea or vomiting since yesterday. She is hopeful that the tests go well and we can find information as to why she keeps having these symptoms.     Objective :  Temp:  [97.2 °F (36.2 °C)-97.9 °F (36.6 °C)] 97.2 °F (36.2 °C)  HR:  [57-97] 72  BP: ()/(57-70) 110/69  Resp:  [19-20] 20  SpO2:  [90 %-97 %] 97 %  O2 Device: Nasal cannula  Nasal Cannula O2 Flow Rate (L/min):  [2 L/min] 2 L/min    Body mass index is 30.18 kg/m².     Input and Output Summary (last 24 hours):     Intake/Output Summary (Last 24 hours) at 4/30/2025 1517  Last data filed at 4/30/2025 0601  Gross per 24 hour   Intake 1655.72 ml   Output 525 ml   Net 1130.72 ml       Physical Exam  Vitals reviewed.   Constitutional:       General:  She is not in acute distress.     Appearance: She is obese. She is ill-appearing. She is not toxic-appearing.   HENT:      Head: Normocephalic and atraumatic.      Mouth/Throat:      Mouth: Mucous membranes are moist.   Cardiovascular:      Rate and Rhythm: Normal rate and regular rhythm.      Heart sounds: No murmur heard.  Pulmonary:      Effort: No respiratory distress.      Breath sounds: No stridor. No wheezing, rhonchi or rales.      Comments: Saturating around 93% on 1L NC  Abdominal:      General: Bowel sounds are normal. There is no distension.      Palpations: Abdomen is soft. There is no mass.      Tenderness: There is no abdominal tenderness.   Musculoskeletal:      Right lower leg: Edema present.      Left lower leg: Edema present.   Skin:     General: Skin is warm and dry.   Neurological:      Mental Status: She is alert and oriented to person, place, and time.   Psychiatric:         Mood and Affect: Mood normal.         Behavior: Behavior normal.           Lines/Drains:        Telemetry:  Telemetry Orders (From admission, onward)               24 Hour Telemetry Monitoring  Continuous x 24 Hours (Telem)        Expiring   Question:  Reason for 24 Hour Telemetry  Answer:  Metabolic/electrolyte disturbance with high probability of dysrhythmia. K level <3 or >6 OR KCL infusion >10mEq/hr                     Telemetry Reviewed: Normal Sinus Rhythm  Indication for Continued Telemetry Use: PE               Lab Results: I have reviewed the following results:   Results from last 7 days   Lab Units 04/30/25  0533 04/26/25  0513 04/25/25  1307   WBC Thousand/uL 6.17   < > 7.54   HEMOGLOBIN g/dL 7.6*   < > 9.7*   HEMATOCRIT % 23.2*   < > 30.0*   PLATELETS Thousands/uL 184   < > 223   SEGS PCT %  --   --  78*   LYMPHO PCT %  --   --  15   MONO PCT %  --   --  6   EOS PCT %  --   --  0    < > = values in this interval not displayed.     Results from last 7 days   Lab Units 04/30/25  0533 04/29/25  2332 04/29/25  0702    SODIUM mmol/L 141  --  142   POTASSIUM mmol/L 3.5   < > 2.8*   CHLORIDE mmol/L 108  --  107   CO2 mmol/L 24  --  25   BUN mg/dL 6  --  7   CREATININE mg/dL 0.85  --  0.97   ANION GAP mmol/L 9  --  10   CALCIUM mg/dL 6.4*  --  6.9*   ALBUMIN g/dL  --   --  2.6*   TOTAL BILIRUBIN mg/dL  --   --  0.35   ALK PHOS U/L  --   --  45   ALT U/L  --   --  <3*   AST U/L  --   --  9*   GLUCOSE RANDOM mg/dL 73  --  84    < > = values in this interval not displayed.     Results from last 7 days   Lab Units 04/30/25  0533   INR  1.50*                   Recent Cultures (last 7 days):   Results from last 7 days   Lab Units 04/28/25  0422 04/25/25  1424   URINE CULTURE   --  >100,000 cfu/ml Klebsiella pneumoniae*  >100,000 cfu/ml Escherichia coli ESBL*   C DIFF TOXIN B BY PCR  Negative  --        Imaging Results Review: I reviewed radiology reports from this admission including: esophagram, arterial duplex, mri ankle/heel right3.  Other Study Results Review: No additional pertinent studies reviewed.    Last 24 Hours Medication List:     Current Facility-Administered Medications:     acetaminophen (TYLENOL) tablet 650 mg, Q6H PRN    amLODIPine (NORVASC) tablet 10 mg, Daily    ceFAZolin (ANCEF) IVPB (premix in dextrose) 2,000 mg 50 mL, Q8H, Last Rate: 2,000 mg (04/30/25 1303)    ertapenem (INVanz) 1,000 mg in sodium chloride 0.9 % 50 mL IVPB, Q24H, Last Rate: 1,000 mg (04/30/25 1426)    heparin (porcine) 25,000 units in 0.45% NaCl 250 mL infusion (premix), Titrated, Last Rate: 6 Units/kg/hr (04/30/25 0251)    heparin (porcine) injection 2,800 Units, Q6H PRN    heparin (porcine) injection 5,600 Units, Q6H PRN    hydrOXYzine HCL (ATARAX) tablet 25 mg, Q6H PRN    levothyroxine tablet 75 mcg, Daily    meclizine (ANTIVERT) tablet 25 mg, TID PRN    metoclopramide (REGLAN) injection 5 mg, TID AC    metoprolol tartrate (LOPRESSOR) tablet 50 mg, Q12H MELANY    mirtazapine (REMERON) tablet 7.5 mg, HS    ondansetron (ZOFRAN) injection 4 mg, Q6H  PRN    pantoprazole (PROTONIX) injection 40 mg, Q24H MELANY    [Held by provider] potassium chloride (Klor-Con M20) CR tablet 40 mEq, BID    pravastatin (PRAVACHOL) tablet 80 mg, Daily With Dinner    [Held by provider] senna-docusate sodium (SENOKOT S) 8.6-50 mg per tablet 1 tablet, Daily    sodium chloride 0.9 % infusion, Continuous, Last Rate: 75 mL/hr (04/30/25 0601)    [Held by provider] warfarin (COUMADIN) tablet 5 mg, Once per day on Sunday Tuesday Wednesday Thursday Saturday **OR** [DISCONTINUED] warfarin (COUMADIN) tablet 7.5 mg, Once per day on Sunday Tuesday Wednesday Thursday Saturday    [Held by provider] warfarin (COUMADIN) tablet 7.5 mg, Once per day on Monday Friday    Administrative Statements   Today, Patient Was Seen By: Nelly Carnes PA-C    **Please Note: This note may have been constructed using a voice recognition system.**

## 2025-04-30 NOTE — QUICK NOTE
Podiatry Update:     MRI right ankle/heel 4/29/25 reviewed: No evidence of remaining OM    LEADs 4/29/25 reviewed: Diffuse dz w/o focal stenosis. RLE nc/166/109. LLE nc/168/127. Within healing range.       MRI as above negative for residual OM. LEADs WNL. No indication for acute podiatric intervention. F/u outpt with prior podiatrist/wound care provider. WBAT surgical shoe. C/w LWC as instructed prior.

## 2025-04-30 NOTE — SPEECH THERAPY NOTE
Speech Language/Pathology  Pt NPO for esophagram. SLP will f/u once completed    More Adams MS CCC-SLP  4/30/2025

## 2025-04-30 NOTE — PLAN OF CARE
Problem: Nutrition/Hydration-ADULT  Goal: Nutrient/Hydration intake appropriate for improving, restoring or maintaining nutritional needs  Description: Monitor and assess patient's nutrition/hydration status for malnutrition. Collaborate with interdisciplinary team and initiate plan and interventions as ordered.  Monitor patient's weight and dietary intake as ordered or per policy. Utilize nutrition screening tool and intervene as necessary. Determine patient's food preferences and provide high-protein, high-caloric foods as appropriate. INTERVENTIONS:- Monitor oral intake, urinary output, labs, and treatment plans- Assess nutrition and hydration status and recommend course of action- Evaluate amount of meals eaten- Assist patient with eating if necessary - Allow adequate time for meals- Recommend/ encourage appropriate diets, oral nutritional supplements, and vitamin/mineral supplements- Order, calculate, and assess calorie counts as needed- Recommend, monitor, and adjust tube feedings and TPN/PPN based on assessed needs- Assess need for intravenous fluids- Provide specific nutrition/hydration education as appropriate- Include patient/family/caregiver in decisions related to nutrition  Outcome: Not Progressing

## 2025-04-30 NOTE — PLAN OF CARE
"  Problem: PHYSICAL THERAPY ADULT  Goal: Performs mobility at highest level of function for planned discharge setting.  See evaluation for individualized goals.  Description: Treatment/Interventions: ADL retraining, Functional transfer training, LE strengthening/ROM, Elevations, Therapeutic exercise, Endurance training, Patient/family training, Equipment eval/education, Bed mobility, Gait training, Compensatory technique education, Spoke to nursing, Spoke to case management, OT          See flowsheet documentation for full assessment, interventions and recommendations.  Outcome: Not Progressing  Note: Prognosis: Good  Problem List: Decreased strength, Decreased endurance, Impaired balance, Decreased mobility, Decreased range of motion, Impaired judgement, Decreased safety awareness, Obesity, Decreased skin integrity  Assessment: Pt tolerated session poorly. She was sitting on BSC upon arrival to room. She requires increased assistance to achieve standing and maintain standing this date. She tolerated standing poorly with c/o feeling dizzy and stated \"I feel like I am going to fall over\" after returning to sit on BSC for safety due to fatigue. Pt noted to be hypotensive. Completed transfer back to bed for safety with increased assistance for wt shifting. She reports fatigue. She has poor activity tolerance and requires increased assistance this date. She had findings of acute PE, DVT and is currently requiring 2 lpm NC. Surgical shoe provided to patient for WB right LE as MRI was negative for OM. She was NPO this date for testing. She is limited by decreased strength, balance, endurance. She will continue to benefit from PT services to maximize LOF.  Barriers to Discharge: Inaccessible home environment, Decreased caregiver support  Barriers to Discharge Comments: requires assistance to complete mobility, fall risk, safety concerns  Rehab Resource Intensity Level, PT: II (Moderate Resource Intensity)    See flowsheet " documentation for full assessment.

## 2025-05-01 LAB
ANION GAP SERPL CALCULATED.3IONS-SCNC: 8 MMOL/L (ref 4–13)
APTT PPP: 209 SECONDS (ref 23–34)
APTT PPP: 42 SECONDS (ref 23–34)
APTT PPP: 53 SECONDS (ref 23–34)
BUN SERPL-MCNC: 6 MG/DL (ref 5–25)
CALCIUM SERPL-MCNC: 6.4 MG/DL (ref 8.4–10.2)
CHLORIDE SERPL-SCNC: 105 MMOL/L (ref 96–108)
CO2 SERPL-SCNC: 27 MMOL/L (ref 21–32)
CREAT SERPL-MCNC: 0.82 MG/DL (ref 0.6–1.3)
ERYTHROCYTE [DISTWIDTH] IN BLOOD BY AUTOMATED COUNT: 15.3 % (ref 11.6–15.1)
GFR SERPL CREATININE-BSD FRML MDRD: 67 ML/MIN/1.73SQ M
GLUCOSE SERPL-MCNC: 88 MG/DL (ref 65–140)
HCT VFR BLD AUTO: 23.1 % (ref 34.8–46.1)
HCT VFR BLD AUTO: 24.5 % (ref 34.8–46.1)
HGB BLD-MCNC: 7.6 G/DL (ref 11.5–15.4)
HGB BLD-MCNC: 8 G/DL (ref 11.5–15.4)
INR PPP: 1.41 (ref 0.85–1.19)
MAGNESIUM SERPL-MCNC: 1.9 MG/DL (ref 1.9–2.7)
MCH RBC QN AUTO: 28.7 PG (ref 26.8–34.3)
MCHC RBC AUTO-ENTMCNC: 32.9 G/DL (ref 31.4–37.4)
MCV RBC AUTO: 87 FL (ref 82–98)
PHOSPHATE SERPL-MCNC: 2.6 MG/DL (ref 2.3–4.1)
PLATELET # BLD AUTO: 157 THOUSANDS/UL (ref 149–390)
PMV BLD AUTO: 9.1 FL (ref 8.9–12.7)
POTASSIUM SERPL-SCNC: 2.6 MMOL/L (ref 3.5–5.3)
PROTHROMBIN TIME: 17.6 SECONDS (ref 12.3–15)
RBC # BLD AUTO: 2.65 MILLION/UL (ref 3.81–5.12)
SODIUM SERPL-SCNC: 140 MMOL/L (ref 135–147)
WBC # BLD AUTO: 4.65 THOUSAND/UL (ref 4.31–10.16)

## 2025-05-01 PROCEDURE — 85610 PROTHROMBIN TIME: CPT

## 2025-05-01 PROCEDURE — 85730 THROMBOPLASTIN TIME PARTIAL: CPT | Performed by: NURSE PRACTITIONER

## 2025-05-01 PROCEDURE — 83735 ASSAY OF MAGNESIUM: CPT

## 2025-05-01 PROCEDURE — 80048 BASIC METABOLIC PNL TOTAL CA: CPT

## 2025-05-01 PROCEDURE — 99232 SBSQ HOSP IP/OBS MODERATE 35: CPT

## 2025-05-01 PROCEDURE — 85018 HEMOGLOBIN: CPT

## 2025-05-01 PROCEDURE — 84100 ASSAY OF PHOSPHORUS: CPT

## 2025-05-01 PROCEDURE — 97530 THERAPEUTIC ACTIVITIES: CPT

## 2025-05-01 PROCEDURE — 85730 THROMBOPLASTIN TIME PARTIAL: CPT | Performed by: FAMILY MEDICINE

## 2025-05-01 PROCEDURE — 85014 HEMATOCRIT: CPT

## 2025-05-01 PROCEDURE — 97535 SELF CARE MNGMENT TRAINING: CPT

## 2025-05-01 PROCEDURE — 85027 COMPLETE CBC AUTOMATED: CPT

## 2025-05-01 RX ORDER — POTASSIUM CHLORIDE 14.9 MG/ML
20 INJECTION INTRAVENOUS
Status: COMPLETED | OUTPATIENT
Start: 2025-05-01 | End: 2025-05-02

## 2025-05-01 RX ORDER — HYDROXYZINE HYDROCHLORIDE 25 MG/1
25 TABLET, FILM COATED ORAL
Status: DISCONTINUED | OUTPATIENT
Start: 2025-05-01 | End: 2025-05-02 | Stop reason: HOSPADM

## 2025-05-01 RX ADMIN — METOCLOPRAMIDE HYDROCHLORIDE 5 MG: 5 INJECTION INTRAMUSCULAR; INTRAVENOUS at 17:20

## 2025-05-01 RX ADMIN — CEFAZOLIN SODIUM 2000 MG: 2 SOLUTION INTRAVENOUS at 05:35

## 2025-05-01 RX ADMIN — POTASSIUM CHLORIDE 20 MEQ: 14.9 INJECTION, SOLUTION INTRAVENOUS at 10:17

## 2025-05-01 RX ADMIN — HEPARIN SODIUM 2800 UNITS: 1000 INJECTION, SOLUTION INTRAVENOUS; SUBCUTANEOUS at 07:31

## 2025-05-01 RX ADMIN — METOPROLOL TARTRATE 50 MG: 50 TABLET, FILM COATED ORAL at 21:24

## 2025-05-01 RX ADMIN — PANTOPRAZOLE SODIUM 40 MG: 40 INJECTION, POWDER, FOR SOLUTION INTRAVENOUS at 08:11

## 2025-05-01 RX ADMIN — PRAVASTATIN SODIUM 80 MG: 80 TABLET ORAL at 17:21

## 2025-05-01 RX ADMIN — POTASSIUM CHLORIDE 20 MEQ: 14.9 INJECTION, SOLUTION INTRAVENOUS at 12:31

## 2025-05-01 RX ADMIN — AMLODIPINE BESYLATE 10 MG: 10 TABLET ORAL at 08:11

## 2025-05-01 RX ADMIN — MIRTAZAPINE 7.5 MG: 15 TABLET, FILM COATED ORAL at 21:24

## 2025-05-01 RX ADMIN — WARFARIN SODIUM 5 MG: 5 TABLET ORAL at 17:20

## 2025-05-01 RX ADMIN — CEFAZOLIN SODIUM 2000 MG: 2 SOLUTION INTRAVENOUS at 12:33

## 2025-05-01 RX ADMIN — LEVOTHYROXINE SODIUM 75 MCG: 75 TABLET ORAL at 05:35

## 2025-05-01 RX ADMIN — HEPARIN SODIUM 8 UNITS/KG/HR: 10000 INJECTION, SOLUTION INTRAVENOUS at 23:35

## 2025-05-01 RX ADMIN — HYDROXYZINE HYDROCHLORIDE 25 MG: 25 TABLET, FILM COATED ORAL at 12:23

## 2025-05-01 RX ADMIN — HEPARIN SODIUM 5600 UNITS: 1000 INJECTION, SOLUTION INTRAVENOUS; SUBCUTANEOUS at 23:35

## 2025-05-01 RX ADMIN — METOCLOPRAMIDE HYDROCHLORIDE 5 MG: 5 INJECTION INTRAMUSCULAR; INTRAVENOUS at 12:23

## 2025-05-01 RX ADMIN — HYDROXYZINE HYDROCHLORIDE 25 MG: 25 TABLET, FILM COATED ORAL at 17:20

## 2025-05-01 RX ADMIN — METOCLOPRAMIDE HYDROCHLORIDE 5 MG: 5 INJECTION INTRAMUSCULAR; INTRAVENOUS at 08:11

## 2025-05-01 NOTE — ASSESSMENT & PLAN NOTE
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 Said that she had unknown cause for her PE/DVT and was placed on coumadin for this. She is still supposed to be on coumadin but is not sure the dosing  States that she doesn't have any known history of atrial fibrillation that she knows of.   Was not taking coumadin during hospitalization and prior to hospitalization due to nausea and vomiting, said she missed doses.   Venous duplex positive for acute vs subacute DVT, see plan under acute dvt.

## 2025-05-01 NOTE — ASSESSMENT & PLAN NOTE
Patient with bilateral lower extremity swelling on admission. Venous duplex showing acute vs. sub acute DVT in the right popliteal, gastrocnemius, posterior tibial, and peroneal veins and acute vs. sub acute DVT in the left popliteal, posterior tibial, and peroneal veins.   Unclear if DVT was POA.   Initially was on heparin gtt due to subtherapeutic INR, held then because patient had bloody stools.   Discussed with GI, ok to resume heparin gtt with close monitoring of stools.   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 She said she had workup done, but unsure what exactly was done. Due to this, will resume coumadin after heparin gtt and recommending hematology evaluation outpatient.   resuming coumadin on 5/1 along with heparin drip

## 2025-05-01 NOTE — PLAN OF CARE
Problem: Prexisting or High Potential for Compromised Skin Integrity  Goal: Skin integrity is maintained or improved  Description: INTERVENTIONS:- Identify patients at risk for skin breakdown- Assess and monitor skin integrity- Assess and monitor nutrition and hydration status- Monitor labs - Assess for incontinence - Turn and reposition patient- Assist with mobility/ambulation- Relieve pressure over bony prominences- Avoid friction and shearing- Provide appropriate hygiene as needed including keeping skin clean and dry- Evaluate need for skin moisturizer/barrier cream- Collaborate with interdisciplinary team - Patient/family teaching- Consider wound care consult   Outcome: Progressing     Problem: PAIN - ADULT  Goal: Verbalizes/displays adequate comfort level or baseline comfort level  Description: Interventions:- Encourage patient to monitor pain and request assistance- Assess pain using appropriate pain scale- Administer analgesics based on type and severity of pain and evaluate response- Implement non-pharmacological measures as appropriate and evaluate response- Consider cultural and social influences on pain and pain management- Notify physician/advanced practitioner if interventions unsuccessful or patient reports new pain  Outcome: Progressing     Problem: INFECTION - ADULT  Goal: Absence or prevention of progression during hospitalization  Description: INTERVENTIONS:- Assess and monitor for signs and symptoms of infection- Monitor lab/diagnostic results- Monitor all insertion sites, i.e. indwelling lines, tubes, and drains- Monitor endotracheal if appropriate and nasal secretions for changes in amount and color- [place] appropriate cooling/warming therapies per order- Administer medications as ordered- Instruct and encourage patient and family to use good hand hygiene technique- Identify and instruct in appropriate isolation precautions for identified infection/condition  Outcome: Progressing  Goal:  Absence of fever/infection during neutropenic period  Description: INTERVENTIONS:- Monitor WBC  Outcome: Progressing     Problem: SAFETY ADULT  Goal: Patient will remain free of falls  Description: INTERVENTIONS:- Educate patient/family on patient safety including physical limitations- Instruct patient to call for assistance with activity - Consult OT/PT to assist with strengthening/mobility - Keep Call bell within reach- Keep bed low and locked with side rails adjusted as appropriate- Keep care items and personal belongings within reach- Initiate and maintain comfort rounds- Make Fall Risk Sign visible to staff- Offer Toileting every  Hours, in advance of need- Initiate/Maintain alarm- Obtain necessary fall risk management equipment: - Apply yellow socks and bracelet for high fall risk patients- Consider moving patient to room near nurses station  Outcome: Progressing  Goal: Maintain or return to baseline ADL function  Description: INTERVENTIONS:-  Assess patient's ability to carry out ADLs; assess patient's baseline for ADL function and identify physical deficits which impact ability to perform ADLs (bathing, care of mouth/teeth, toileting, grooming, dressing, etc.)- Assess/evaluate cause of self-care deficits - Assess range of motion- Assess patient's mobility; develop plan if impaired- Assess patient's need for assistive devices and provide as appropriate- Encourage maximum independence but intervene and supervise when necessary- Involve family in performance of ADLs- Assess for home care needs following discharge - Consider OT consult to assist with ADL evaluation and planning for discharge- Provide patient education as appropriate  Outcome: Progressing  Goal: Maintains/Returns to pre admission functional level  Description: INTERVENTIONS:- Perform AM-PAC 6 Click Basic Mobility/ Daily Activity assessment daily.- Set and communicate daily mobility goal to care team and patient/family/caregiver. - Collaborate  with rehabilitation services on mobility goals if consulted- Perform Range of Motion  times a day.- Reposition patient every  hours.- Dangle patient  times a day- Stand patient  times a day- Ambulate patient  times a day- Out of bed to chair  times a day - Out of bed for meal times a day- Out of bed for toileting- Record patient progress and toleration of activity level   Outcome: Progressing     Problem: DISCHARGE PLANNING  Goal: Discharge to home or other facility with appropriate resources  Description: INTERVENTIONS:- Identify barriers to discharge w/patient and caregiver- Arrange for needed discharge resources and transportation as appropriate- Identify discharge learning needs (meds, wound care, etc.)- Arrange for interpretive services to assist at discharge as needed- Refer to Case Management Department for coordinating discharge planning if the patient needs post-hospital services based on physician/advanced practitioner order or complex needs related to functional status, cognitive ability, or social support system  Outcome: Progressing     Problem: Knowledge Deficit  Goal: Patient/family/caregiver demonstrates understanding of disease process, treatment plan, medications, and discharge instructions  Description: Complete learning assessment and assess knowledge base.Interventions:- Provide teaching at level of understanding- Provide teaching via preferred learning methods  Outcome: Progressing     Problem: Nutrition/Hydration-ADULT  Goal: Nutrient/Hydration intake appropriate for improving, restoring or maintaining nutritional needs  Description: Monitor and assess patient's nutrition/hydration status for malnutrition. Collaborate with interdisciplinary team and initiate plan and interventions as ordered.  Monitor patient's weight and dietary intake as ordered or per policy. Utilize nutrition screening tool and intervene as necessary. Determine patient's food preferences and provide high-protein,  high-caloric foods as appropriate. INTERVENTIONS:- Monitor oral intake, urinary output, labs, and treatment plans- Assess nutrition and hydration status and recommend course of action- Evaluate amount of meals eaten- Assist patient with eating if necessary - Allow adequate time for meals- Recommend/ encourage appropriate diets, oral nutritional supplements, and vitamin/mineral supplements- Order, calculate, and assess calorie counts as needed- Recommend, monitor, and adjust tube feedings and TPN/PPN based on assessed needs- Assess need for intravenous fluids- Provide specific nutrition/hydration education as appropriate- Include patient/family/caregiver in decisions related to nutrition  Outcome: Progressing     Problem: HEMATOLOGIC - ADULT  Goal: Maintains hematologic stability  Description: INTERVENTIONS- Assess for signs and symptoms of bleeding or hemorrhage- Monitor labs- Administer supportive blood products/factors as ordered and appropriate  Outcome: Progressing

## 2025-05-01 NOTE — ASSESSMENT & PLAN NOTE
Patient record reviewed-patient is to remain nonweightbearing until wound is completely healed  Completed 6 weeks IV abx  Wound care   There is a note from outpatient podiatry on 3/25 to be WBAT in post op shoe   Podiatry obtained updated MRI and arterial duplex.   MRI: No convincing evidence of osteomyelitis. There is mild reactive edema  Arterial duplex with diffuse bilateral disease without significant focal stenosis.  GTP within healing range  Podiatry recommending WBAT surgical shoe B/L

## 2025-05-01 NOTE — PLAN OF CARE
Problem: Prexisting or High Potential for Compromised Skin Integrity  Goal: Skin integrity is maintained or improved  Description: INTERVENTIONS:- Identify patients at risk for skin breakdown- Assess and monitor skin integrity- Assess and monitor nutrition and hydration status- Monitor labs - Assess for incontinence - Turn and reposition patient- Assist with mobility/ambulation- Relieve pressure over bony prominences- Avoid friction and shearing- Provide appropriate hygiene as needed including keeping skin clean and dry- Evaluate need for skin moisturizer/barrier cream- Collaborate with interdisciplinary team - Patient/family teaching- Consider wound care consult   Outcome: Progressing     Problem: PAIN - ADULT  Goal: Verbalizes/displays adequate comfort level or baseline comfort level  Description: Interventions:- Encourage patient to monitor pain and request assistance- Assess pain using appropriate pain scale- Administer analgesics based on type and severity of pain and evaluate response- Implement non-pharmacological measures as appropriate and evaluate response- Consider cultural and social influences on pain and pain management- Notify physician/advanced practitioner if interventions unsuccessful or patient reports new pain  Outcome: Progressing     Problem: INFECTION - ADULT  Goal: Absence or prevention of progression during hospitalization  Description: INTERVENTIONS:- Assess and monitor for signs and symptoms of infection- Monitor lab/diagnostic results- Monitor all insertion sites, i.e. indwelling lines, tubes, and drains- Monitor endotracheal if appropriate and nasal secretions for changes in amount and color- [place] appropriate cooling/warming therapies per order- Administer medications as ordered- Instruct and encourage patient and family to use good hand hygiene technique- Identify and instruct in appropriate isolation precautions for identified infection/condition  Outcome: Progressing  Goal:  Absence of fever/infection during neutropenic period  Description: INTERVENTIONS:- Monitor WBC  Outcome: Progressing     Problem: SAFETY ADULT  Goal: Patient will remain free of falls  Description: INTERVENTIONS:- Educate patient/family on patient safety including physical limitations- Instruct patient to call for assistance with activity - Consult OT/PT to assist with strengthening/mobility - Keep Call bell within reach- Keep bed low and locked with side rails adjusted as appropriate- Keep care items and personal belongings within reach- Initiate and maintain comfort rounds- Make Fall Risk Sign visible to staff- Offer Toileting every   Hours, in advance of need- Initiate/Maintain  alarm- Obtain necessary fall risk management equipment:  - Apply yellow socks and bracelet for high fall risk patients- Consider moving patient to room near nurses station  Outcome: Progressing  Goal: Maintain or return to baseline ADL function  Description: INTERVENTIONS:-  Assess patient's ability to carry out ADLs; assess patient's baseline for ADL function and identify physical deficits which impact ability to perform ADLs (bathing, care of mouth/teeth, toileting, grooming, dressing, etc.)- Assess/evaluate cause of self-care deficits - Assess range of motion- Assess patient's mobility; develop plan if impaired- Assess patient's need for assistive devices and provide as appropriate- Encourage maximum independence but intervene and supervise when necessary- Involve family in performance of ADLs- Assess for home care needs following discharge - Consider OT consult to assist with ADL evaluation and planning for discharge- Provide patient education as appropriate  Outcome: Progressing  Goal: Maintains/Returns to pre admission functional level  Description: INTERVENTIONS:- Perform AM-PAC 6 Click Basic Mobility/ Daily Activity assessment daily.- Set and communicate daily mobility goal to care team and patient/family/caregiver. - Collaborate  with rehabilitation services on mobility goals if consulted- Perform Range of Motion   times a day.- Reposition patient every   hours.- Dangle patient   times a day- Stand patient   times a day- Ambulate patient   times a day- Out of bed to chair   times a day - Out of bed for meals   times a day- Out of bed for toileting- Record patient progress and toleration of activity level   Outcome: Progressing     Problem: DISCHARGE PLANNING  Goal: Discharge to home or other facility with appropriate resources  Description: INTERVENTIONS:- Identify barriers to discharge w/patient and caregiver- Arrange for needed discharge resources and transportation as appropriate- Identify discharge learning needs (meds, wound care, etc.)- Arrange for interpretive services to assist at discharge as needed- Refer to Case Management Department for coordinating discharge planning if the patient needs post-hospital services based on physician/advanced practitioner order or complex needs related to functional status, cognitive ability, or social support system  Outcome: Progressing     Problem: Knowledge Deficit  Goal: Patient/family/caregiver demonstrates understanding of disease process, treatment plan, medications, and discharge instructions  Description: Complete learning assessment and assess knowledge base.Interventions:- Provide teaching at level of understanding- Provide teaching via preferred learning methods  Outcome: Progressing     Problem: Nutrition/Hydration-ADULT  Goal: Nutrient/Hydration intake appropriate for improving, restoring or maintaining nutritional needs  Description: Monitor and assess patient's nutrition/hydration status for malnutrition. Collaborate with interdisciplinary team and initiate plan and interventions as ordered.  Monitor patient's weight and dietary intake as ordered or per policy. Utilize nutrition screening tool and intervene as necessary. Determine patient's food preferences and provide high-protein,  high-caloric foods as appropriate. INTERVENTIONS:- Monitor oral intake, urinary output, labs, and treatment plans- Assess nutrition and hydration status and recommend course of action- Evaluate amount of meals eaten- Assist patient with eating if necessary - Allow adequate time for meals- Recommend/ encourage appropriate diets, oral nutritional supplements, and vitamin/mineral supplements- Order, calculate, and assess calorie counts as needed- Recommend, monitor, and adjust tube feedings and TPN/PPN based on assessed needs- Assess need for intravenous fluids- Provide specific nutrition/hydration education as appropriate- Include patient/family/caregiver in decisions related to nutrition  Outcome: Progressing     Problem: HEMATOLOGIC - ADULT  Goal: Maintains hematologic stability  Description: INTERVENTIONS- Assess for signs and symptoms of bleeding or hemorrhage- Monitor labs- Administer supportive blood products/factors as ordered and appropriate  Outcome: Progressing

## 2025-05-01 NOTE — PLAN OF CARE
Problem: OCCUPATIONAL THERAPY ADULT  Goal: Performs self-care activities at highest level of function for planned discharge setting.  See evaluation for individualized goals.  Description: Treatment Interventions: ADL retraining, UE strengthening/ROM, Endurance training, Compensatory technique education, Energy conservation, Activityengagement          See flowsheet documentation for full assessment, interventions and recommendations.   Outcome: Progressing  Note: Limitation: Decreased ADL status, Decreased UE strength, Decreased Safe judgement during ADL, Decreased endurance, Decreased high-level ADLs  Prognosis: Fair  Assessment: Pt completed OT tx session #1 focused on ADLs/functional transfers. Pt alert and agreeable to participate. Pt continues to be limited by fatigue, poor activity tolerance. Unable to assess further functional mobility as pt wishes to rest after transferring OOB. Pt currently completing UB ADLs @ minimal/moderate assistance, LB ADLs @ maximal/total assistance, and functional transfers with RW @ max assist x 1. Pt received on 2 L O2 NC at start of session, attempted to titrate down to RA however spO2 drops to 88% with pt reports of increased fatigue, pt placed back on 2 L for rest of session. Pt demonstrating G participation and G potential to achieve goals but is currently demonstrating deficits in decrease activity tolerance, decrease standing balance, decrease sitting balance, decrease performance during ADL tasks, decrease cognition, decrease BUE ROM, decrease UB MS, increased pain, decrease generalized strength, decrease activity engagement, decrease performance during functional transfers, decrease FM control, decrease GM control, steps to enter home, limited family support, high fall risk, and limited insight to deficits. Continue to recommend Level II (Moderate Resource Intensity) upon discharge to increase safety and independence in ADL tasks and functional mobility.     Rehab  Resource Intensity Level, OT: II (Moderate Resource Intensity)

## 2025-05-01 NOTE — NURSING NOTE
when in patient room this morning to give meds, she became nauseated immediately after taking one of her pills, had dificulty swallowing with just water (would not go down) and gave some applesauce and became nauseated and had some vomiting. she has not had a good apetite and has not had much intake due to becoming nausea afterwards from my previous shifts.  Kim GILLETTE notified, and stated she will follow up with GI re: above.

## 2025-05-01 NOTE — ASSESSMENT & PLAN NOTE
Patient states that she has been having nausea and vomiting for the last month or so. Said that she had imaging done and EGD and nothing was definitive.   States that she has nausea and vomiting mostly associated with meals and around mealtime, will trial with reglan scheduled and see if this helps with patient's nausea.   Said that she felt the Reglan improved her symptoms but still having nausea and vomiting.   GI consult: unclear etiology, possibly multifactorial. EGD less than 1 week ago without explanation for symptoms, hiatal hernia could be contributing but unlikely to cause 70 pound weight loss. Agree with remeron. Could consider colonoscopy.  Still with persistent N/V, repeat CT AP with contrast with no acute abnormalities  Speech evaluated patient and recommending puree with thin liquids along with esophagram  Esophagram along with celiac mesenteric doppler - relatively unremarkable. Mild esophageal intermittent spasms  Will trial atarax prior to eating as patient thinks her anxiety is playing a role. May benefit from marinol   Discussed with patient that if oral intake doesn't improve oral intake depending on results of tests, will need to discuss enteral nutrition for patient.

## 2025-05-01 NOTE — ASSESSMENT & PLAN NOTE
Patient with chronic N/V, poor appetite and associated 70lb weight loss over the past 6 months   CT CAP wo contrast unremarkable   EGD 4/22/25 with hiatal hernia, Wang's esophagus, empiric esophageal dilation, normal stomach and duodenum    Unclear etiology at this time, likely multifactorial given multiple recent admissions with acute osteomyelitis of right foot and then to rehab, overall deconditioning and decline in health   EGD last week without explanation for symptoms, hiatal hernia could be contributing but unlikely to cause 70lb weight loss  Admitting CT CAP without explanation although was a non contrast study due to GAY  Celiac/mesenteric doppler US - normal  Esophagram yesterday with limited exam, some transient spasm noted but contrast was able to pass into stomach   Overall extensive work up thus far without clear etiology of symptoms, inadequate PO intake at this time   Recommend psych consult for anxiety and trial of anti-anxiety medication prior to mealtimes  Continue antiemetics  Could consider colonoscopy to r/o underlying malignancy, can be done as outpatient   Continue PPI daily

## 2025-05-01 NOTE — ASSESSMENT & PLAN NOTE
UA + and endorses dysuria  urine culture with klebsiella and ESBL e coli - switch to ancef and ertapenem

## 2025-05-01 NOTE — OCCUPATIONAL THERAPY NOTE
Occupational Therapy Progress Note     Patient Name: Alecia Kay  Today's Date: 5/1/2025  Problem List  Principal Problem:    Generalized weakness  Active Problems:    Chronic osteomyelitis of toe of right foot (HCC)    Acute kidney injury (HCC)    Hypertension    Electrolyte imbalance    Acute cystitis    Bilateral lower extremity edema    Nausea and vomiting    History of DVT (deep vein thrombosis)    Tachycardia    Hematochezia    Acute DVT (deep venous thrombosis) (HCC)    Acute pulmonary embolism (HCC)    Acute respiratory insufficiency       05/01/25 1036   Note Type   Note Type Treatment   Pain Assessment   Pain Assessment Tool 0-10   Pain Score No Pain   Restrictions/Precautions   Weight Bearing Precautions Per Order Yes   RLE Weight Bearing Per Order WBAT  (R surgical shoe)   Braces or Orthoses Other (Comment)  (R surgical shoe)   Other Precautions Contact/isolation;Cognitive;Chair Alarm;Bed Alarm;Multiple lines;Telemetry;O2;Fall Risk  (2 L O2 NC)   ADL   Eating Assistance 4  Minimal Assistance   Eating Deficit Beverage management   Eating Comments Pt requiring minimal assistance to bring beverage to her mouth due to tremulous movements of BUEs/hands.   Grooming Assistance 4  Minimal Assistance   Grooming Deficit Wash/dry face;Brushing hair   Grooming Comments Pt able to wash/dry her face in supine provided setup. Also able to apply deodorant and brush her hair with minimal assistance.   UB Bathing Assistance 4  Minimal Assistance   UB Bathing Deficit Abdomen;Perineal area   LB Bathing Assistance 2  Maximal Assistance   LB Bathing Deficit Buttocks;Right upper leg;Left upper leg;Right lower leg including foot;Left lower leg including foot   UB Dressing Assistance 3  Moderate Assistance   UB Dressing Deficit Thread RUE;Thread LUE;Pull over head;Pull around back;Pull down in back;Fasteners   LB Dressing Assistance 1  Total Assistance   LB Dressing Deficit Don/doff R shoe   Toileting Assistance  1  Total  Assistance   Toileting Deficit Perineal hygiene   Bed Mobility   Rolling R 4  Minimal assistance   Additional items Assist x 1;Bedrails;Increased time required;Verbal cues   Rolling L 4  Minimal assistance   Additional items Assist x 1;Bedrails;Increased time required;Verbal cues   Supine to Sit 3  Moderate assistance   Additional items Assist x 1;HOB elevated;Bedrails;Increased time required;Verbal cues;LE management;Other;Comment  (Trunk management)   Transfers   Sit to Stand 2  Maximal assistance   Additional items Assist x 1;Increased time required;Verbal cues   Stand to Sit 3  Moderate assistance   Additional items Assist x 1;Increased time required;Verbal cues   Stand pivot 2  Maximal assistance   Additional items Assist x 1;Increased time required;Verbal cues   Additional Comments Using RW   Cognition   Overall Cognitive Status Impaired   Arousal/Participation Alert;Cooperative   Attention Attends with cues to redirect   Orientation Level Oriented to person;Oriented to place;Oriented to situation  (Oriented to year, disoriented to month)   Memory Decreased short term memory;Decreased recall of recent events   Following Commands Follows one step commands with increased time or repetition   Activity Tolerance   Activity Tolerance Patient limited by fatigue   Medical Staff Made Aware RNAmy   Assessment   Assessment Pt completed OT tx session #1 focused on ADLs/functional transfers. Pt alert and agreeable to participate. Pt continues to be limited by fatigue, poor activity tolerance. Unable to assess further functional mobility as pt wishes to rest after transferring OOB. Pt currently completing UB ADLs @ minimal/moderate assistance, LB ADLs @ maximal/total assistance, and functional transfers with RW @ max assist x 1. Pt received on 2 L O2 NC at start of session, attempted to titrate down to RA however spO2 drops to 88% with pt reports of increased fatigue, pt placed back on 2 L for rest of session. Pt  demonstrating G participation and G potential to achieve goals but is currently demonstrating deficits in decrease activity tolerance, decrease standing balance, decrease sitting balance, decrease performance during ADL tasks, decrease cognition, decrease BUE ROM, decrease UB MS, increased pain, decrease generalized strength, decrease activity engagement, decrease performance during functional transfers, decrease FM control, decrease GM control, steps to enter home, limited family support, high fall risk, and limited insight to deficits. Continue to recommend Level II (Moderate Resource Intensity) upon discharge to increase safety and independence in ADL tasks and functional mobility.   Plan   Treatment Interventions ADL retraining;Functional transfer training;UE strengthening/ROM;Endurance training;Cognitive reorientation;Patient/family training;Equipment evaluation/education;Neuromuscular reeducation;Compensatory technique education;Continued evaluation;Energy conservation;Cardiac education;Activityengagement   Goal Expiration Date 05/10/25   OT Treatment Day 2   OT Frequency 3-5x/wk   Discharge Recommendation   Rehab Resource Intensity Level, OT II (Moderate Resource Intensity)   AM-PAC Daily Activity Inpatient   Lower Body Dressing 1   Bathing 2   Toileting 1   Upper Body Dressing 2   Grooming 3   Eating 3   Daily Activity Raw Score 12   Daily Activity Standardized Score (Calc for Raw Score >=11) 30.6   AM-PAC Applied Cognition Inpatient   Following a Speech/Presentation 2   Understanding Ordinary Conversation 3   Taking Medications 2   Remembering Where Things Are Placed or Put Away 3   Remembering List of 4-5 Errands 2   Taking Care of Complicated Tasks 2   Applied Cognition Raw Score 14   Applied Cognition Standardized Score 32.02   End of Consult   Patient Position at End of Consult Bedside chair;Bed/Chair alarm activated;All needs within reach     The patient's raw score on the AM-PAC Daily Activity  Inpatient Short Form is 12. A raw score of less than 19 suggests the patient may benefit from discharge to post-acute rehabilitation services. Please refer to the recommendation of the Occupational Therapist for safe discharge planning.    Pt goals to be met by 5/10/2025:     Pt will demonstrate ability to complete grooming/hygiene tasks @ mod I after set-up.  Pt will demonstrate ability to complete supine<>sit @ mod I in order to increase safety and independence during ADL tasks.  Pt will demonstrate ability to complete UB ADLs including washing/dressing @ mod I in order to increase performance and participation during meaningful tasks  Pt will demonstrate ability to complete LB dressing @ min A in order to increase safety and independence during meaningful tasks.   Pt will demonstrate ability to buzz/doff socks/shoes while sitting EOB @ supervision in order to increase safety and independence during meaningful tasks.   Pt will demonstrate ability to complete toileting tasks including CM and pericare @ mod A in order to increase safety and independence during meaningful tasks.  Pt will demonstrate ability to complete EOB, chair, toilet/commode transfers @ min A in order to increase performance and participation during functional tasks.  Pt will demonstrate ability to stand for 5 minutes while maintaining fair+ balance with use of LRAD for UB support PRN.  Pt will demonstrate ability to tolerate 30-35 minute OT session with no vc'ing for deep breathing or use of energy conservation techniques in order to increase activity tolerance during functional tasks.   Pt will demonstrate Good carryover of use of energy conservation/compensatory strategies during ADLs and functional tasks in order to increase safety and reduce risk for falls.   Pt will demonstrate Good attention and participation in continued evaluation of functional ambulation house hold distances in order to assist with safe d/c planning.  Pt will attend to  continued cognitive assessments 100% of the time in order to provide most appropriate d/c recommendations.   Pt will follow 100% simple 2-step commands and be A&O x4 consistently with environmental cues to increase participation in functional activities.   Pt will identify 3 areas of interest/hobbies and 1 intervention on how to incorporate into daily life in order to increase interaction with environment and peers as well as increase participation in meaningful tasks.   Pt will demonstrate 100% carryover of BUE HEP in order to increase BUE MS and increase performance during functional tasks upon d/c home.      Santino Hendricks, MS, OTR/L

## 2025-05-01 NOTE — PHYSICAL THERAPY NOTE
"                                                                                  PHYSICAL THERAPY NOTE          Patient Name: Alecia Kay  Today's Date: 5/1/2025 05/01/25 9256   Note Type   Note Type Treatment   Cancel Reasons Refusal       Attempted to see patient for treatment this PM however patient declining. Pt states \"I am too tired and weak.\" Encouraged patient to eat lunch (not eating) as well as participation to facilitate increased strength and mobility to return to home. Patient continued to decline despite max encouragement. Will continue to follow and see patient as medically appropriate at a later time.    Christi Winston, PT,DPT     "

## 2025-05-01 NOTE — ASSESSMENT & PLAN NOTE
Baseline around 1.3, admitted at 1.8  Start gentle IVF   Improved and normalized. Will hold ivf at this time to prevent overload   Monitor daily bmp  Avoid nephrotoxic agents and hypotension   GAY resolved, monitor for new GAY due to receiving IV contrast with CTA

## 2025-05-01 NOTE — ASSESSMENT & PLAN NOTE
Hx of DVT and PE on coumadin at home   Was on heparin here but currently held after bloody BM this AM  Hgb stable   Continue to monitor Hgb and for further bleeding   24-Jun-2021 09:11

## 2025-05-01 NOTE — ASSESSMENT & PLAN NOTE
POA with complaints of generalized weakness and poor appetite. Signed herself out of [facility] on 4/24, was home for one day when she started feeling weaker  Per OP , having cyclic vomiting at home and not taking any of her medications  Admits to poor appetite for the past month, will trial remeron   Had dilation of esophagus outpatient 4/22  Acute cystitis and mild electrolyte derangements with larry on admission  IV hydration and antibiotics  Replenish electrolytes  Flu/covid/rsv negative   CT CAP without acute findings   PT/OT consult: II (Moderate Resource Intensity)

## 2025-05-01 NOTE — PROGRESS NOTES
Progress Note - Hospitalist   Name: Alecia Kay 80 y.o. female I MRN: [id number]  Unit/Bed#: -01 I Date of Admission: 4/25/2025   Date of Service: 5/1/2025 I Hospital Day: 6    Assessment & Plan  Generalized weakness  POA with complaints of generalized weakness and poor appetite. Signed herself out of [facility] on 4/24, was home for one day when she started feeling weaker  Per OP , having cyclic vomiting at home and not taking any of her medications  Admits to poor appetite for the past month, will trial remeron   Had dilation of esophagus outpatient 4/22  Acute cystitis and mild electrolyte derangements with gay on admission  IV hydration and antibiotics  Replenish electrolytes  Flu/covid/rsv negative   CT CAP without acute findings   PT/OT consult: II (Moderate Resource Intensity)  Acute kidney injury (HCC)  Baseline around 1.3, admitted at 1.8  Start gentle IVF   Improved and normalized. Will hold ivf at this time to prevent overload   Monitor daily bmp  Avoid nephrotoxic agents and hypotension   GAY resolved, monitor for new GAY due to receiving IV contrast with CTA   Acute cystitis  UA + and endorses dysuria  urine culture with klebsiella and ESBL e coli - switch to ancef and ertapenem  Chronic osteomyelitis of toe of right foot (HCC)  Patient record reviewed-patient is to remain nonweightbearing until wound is completely healed  Completed 6 weeks IV abx  Wound care   There is a note from outpatient podiatry on 3/25 to be WBAT in post op shoe   Podiatry obtained updated MRI and arterial duplex.   MRI: No convincing evidence of osteomyelitis. There is mild reactive edema  Arterial duplex with diffuse bilateral disease without significant focal stenosis.  GTP within healing range  Podiatry recommending WBAT surgical shoe B/L  Nausea and vomiting  Patient states that she has been having nausea and vomiting for the last month or so. Said that she had imaging done and EGD and nothing  was definitive.   States that she has nausea and vomiting mostly associated with meals and around mealtime, will trial with reglan scheduled and see if this helps with patient's nausea.   Said that she felt the Reglan improved her symptoms but still having nausea and vomiting.   GI consult: unclear etiology, possibly multifactorial. EGD less than 1 week ago without explanation for symptoms, hiatal hernia could be contributing but unlikely to cause 70 pound weight loss. Agree with remeron. Could consider colonoscopy.  Still with persistent N/V, repeat CT AP with contrast with no acute abnormalities  Speech evaluated patient and recommending puree with thin liquids along with esophagram  Esophagram along with celiac mesenteric doppler - relatively unremarkable. Mild esophageal intermittent spasms  Will trial atarax prior to eating as patient thinks her anxiety is playing a role. May benefit from marinol   Discussed with patient that if oral intake doesn't improve oral intake depending on results of tests, will need to discuss enteral nutrition for patient.   Tachycardia  Patient with episode of nausea and vomiting on 4/27 in the morning and on telemetry found to have tachycardia up to 160s sustaining. Patient is on lopressor 50 mg bid which patient states is for HTN.   ECG obtained and looked like atrial fibrillation rvr with HR of 150s however patient without history of atrial fibrillation  Given 1 dose IV lopressor and HR improved now sustaining in the 60s-70s.   Repeat ECG showing NSR in the evening.   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   Placed on heparin gtt at this time, will resume coumadin in 48 hours (5/1 to start coumadin)  History of DVT (deep vein thrombosis)  Patient states that she has a history of DVT/PE. Said that she had unknown cause for her PE/DVT and was placed on coumadin for this. She is still supposed to be on coumadin but is not sure the dosing  States that  she doesn't have any known history of atrial fibrillation that she knows of.   Was not taking coumadin during hospitalization and prior to hospitalization due to nausea and vomiting, said she missed doses.   Venous duplex positive for acute vs subacute DVT, see plan under acute dvt.   Bilateral lower extremity edema  Was recently taken off of torsemide during last hospitalization  Caution with gentle hydration at this time  May need diuretics as needed  Also may benefit from changing amlodipine to a different blood pressure medication  Venous duplex positive for acute vs subacute DVT bilaterally.   Hypertension  Continue home amlodipine and metoprolol   Electrolyte imbalance  Low mag and K on admission  Replenish and monitor daily   Managed on chronic K 20meq BID, will increase to 40 meq BID due to hypokalemia  Unable to tolerate PO supplements at this time, may need liquid when tolerating PO.   Continue with IV supplementation until patient can tolerate PO. If remaining persistently hypokalemic, would benefit from possibly D5 with KCL.   Hematochezia  Patient with complaints of bloody bowel movement this morning. Said that she had bright blood streaking the stools and blood in the bedpan. Does also intermittently complain of darker stools, but said that this one today was more bright red.   Heparin gtt was stopped and held and no further bloody stools noted   GI consult: continue PPI daily, continue antiemetics, could consider colonoscopy to rule out underlying malignancy, could be done outpatient.   No further episodes at this time.   Acute DVT (deep venous thrombosis) (HCC)  Patient with bilateral lower extremity swelling on admission. Venous duplex showing acute vs. sub acute DVT in the right popliteal, gastrocnemius, posterior tibial, and peroneal veins and acute vs. sub acute DVT in the left popliteal, posterior tibial, and peroneal veins.   Unclear if DVT was POA.   Initially was on heparin gtt due to  subtherapeutic INR, held then because patient had bloody stools.   Discussed with GI, ok to resume heparin gtt with close monitoring of stools.   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 She said she had workup done, but unsure what exactly was done. Due to this, will resume coumadin after heparin gtt and recommending hematology evaluation outpatient.   resuming coumadin on 5/1 along with heparin drip   Acute pulmonary embolism (HCC)  Found to have acute DVT. STAT CT chest PE study ordered: Extensive acute thrombus throughout the right upper middle and lower lobe segmental and distal order pulmonary arteries. RV/LV ratio 0.9  Unclear if PE was POA.   Echo without right heart strain  Suspect current episode of PE/DVT was due to subtherapeutic INR on admission as patient was not taking medications with n/v.   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   Pulm consulted: continue with lifelong AC. Outpatient hematology follow up. If no concern for large GI bleed, can resume warfarin for goal of 2-3.   Acute respiratory insufficiency  Having intermittent acute respiratory insuffiencey requiring 1L NC with o2 sats 89%.   Suspect secondary to acute PE.   Appears to desaturate at night  Overnight o2 and home o2 eval prior to discharge  Wean to room air as tolerated    VTE Pharmacologic Prophylaxis: VTE Score: 8 High Risk (Score >/= 5) - Pharmacological DVT Prophylaxis Ordered: heparin drip. Sequential Compression Devices Ordered.    Mobility:   Basic Mobility Inpatient Raw Score: 11  JH-HLM Goal: 4: Move to chair/commode  JH-HLM Achieved: 4: Move to chair/commode  JH-HLM Goal achieved. Continue to encourage appropriate mobility.    Patient Centered Rounds: I performed bedside rounds with nursing staff today.    Discussions with Specialists or Other Care Team Provider: nursing, cm, GI    Education and Discussions with Family / Patient: Attempted to update  (nephew) via phone. Left voicemail.     Current Length of Stay: 6 day(s)  Current Patient Status: Inpatient   Certification Statement: The patient will continue to require additional inpatient hospital stay due to requiring monitoring of oral intake   Discharge Plan: Anticipate discharge in >72 hrs to rehab facility.    Code Status: Level 1 - Full Code    Subjective   Patient states that she thinks she is throwing up because she is anxious and a nervous person. Agreeable to trialing anxiety medication prior to medications/eating. Denies chest pain, shortness of breath or abdominal pain.    Objective :  Temp:  [97.3 °F (36.3 °C)] 97.3 °F (36.3 °C)  HR:  [72-79] 79  BP: ()/(52-88) 107/63  Resp:  [16-20] 16  SpO2:  [93 %-99 %] 93 %  O2 Device: Nasal cannula  Nasal Cannula O2 Flow Rate (L/min):  [2 L/min] 2 L/min    Body mass index is 30.18 kg/m².     Input and Output Summary (last 24 hours):     Intake/Output Summary (Last 24 hours) at 5/1/2025 1148  Last data filed at 5/1/2025 1036  Gross per 24 hour   Intake 0 ml   Output 1075 ml   Net -1075 ml       Physical Exam  Vitals reviewed.   Constitutional:       General: She is not in acute distress.     Appearance: She is obese. She is ill-appearing. She is not toxic-appearing.   HENT:      Head: Normocephalic and atraumatic.      Mouth/Throat:      Mouth: Mucous membranes are moist.   Cardiovascular:      Rate and Rhythm: Normal rate and regular rhythm.      Heart sounds: No murmur heard.  Pulmonary:      Effort: No respiratory distress.      Breath sounds: No stridor. No wheezing, rhonchi or rales.      Comments: Saturating around 93% on 1L NC  Abdominal:      General: Bowel sounds are normal. There is no distension.      Palpations: Abdomen is soft. There is no mass.      Tenderness: There is no  abdominal tenderness.   Musculoskeletal:      Right lower leg: Edema present.      Left lower leg: Edema present.   Skin:     General: Skin is warm and dry.   Neurological:      Mental Status: She is alert and oriented to person, place, and time.   Psychiatric:         Mood and Affect: Mood normal.         Behavior: Behavior normal.           Lines/Drains:        Telemetry:  Telemetry Orders (From admission, onward)               24 Hour Telemetry Monitoring  Continuous x 24 Hours (Telem)        Expiring   Question:  Reason for 24 Hour Telemetry  Answer:  Metabolic/electrolyte disturbance with high probability of dysrhythmia. K level <3 or >6 OR KCL infusion >10mEq/hr                     Telemetry Reviewed: Normal Sinus Rhythm  Indication for Continued Telemetry Use: PE               Lab Results: I have reviewed the following results:   Results from last 7 days   Lab Units 05/01/25  0630 04/26/25  0513 04/25/25  1307   WBC Thousand/uL 4.65   < > 7.54   HEMOGLOBIN g/dL 7.6*   < > 9.7*   HEMATOCRIT % 23.1*   < > 30.0*   PLATELETS Thousands/uL 157   < > 223   SEGS PCT %  --   --  78*   LYMPHO PCT %  --   --  15   MONO PCT %  --   --  6   EOS PCT %  --   --  0    < > = values in this interval not displayed.     Results from last 7 days   Lab Units 05/01/25  0630 04/29/25  2332 04/29/25  0702   SODIUM mmol/L 140   < > 142   POTASSIUM mmol/L 2.6*   < > 2.8*   CHLORIDE mmol/L 105   < > 107   CO2 mmol/L 27   < > 25   BUN mg/dL 6   < > 7   CREATININE mg/dL 0.82   < > 0.97   ANION GAP mmol/L 8   < > 10   CALCIUM mg/dL 6.4*   < > 6.9*   ALBUMIN g/dL  --   --  2.6*   TOTAL BILIRUBIN mg/dL  --   --  0.35   ALK PHOS U/L  --   --  45   ALT U/L  --   --  <3*   AST U/L  --   --  9*   GLUCOSE RANDOM mg/dL 88   < > 84    < > = values in this interval not displayed.     Results from last 7 days   Lab Units 05/01/25  0630   INR  1.41*                   Recent Cultures (last 7 days):   Results from last 7 days   Lab Units 04/28/25  0422  04/25/25  1424   URINE CULTURE   --  >100,000 cfu/ml Klebsiella pneumoniae*  >100,000 cfu/ml Escherichia coli ESBL*   C DIFF TOXIN B BY PCR  Negative  --          Last 24 Hours Medication List:     Current Facility-Administered Medications:     acetaminophen (TYLENOL) tablet 650 mg, Q6H PRN    amLODIPine (NORVASC) tablet 10 mg, Daily    ceFAZolin (ANCEF) IVPB (premix in dextrose) 2,000 mg 50 mL, Q8H, Last Rate: 2,000 mg (05/01/25 0535)    ertapenem (INVanz) 1,000 mg in sodium chloride 0.9 % 50 mL IVPB, Q24H, Last Rate: 1,000 mg (04/30/25 1426)    heparin (porcine) 25,000 units in 0.45% NaCl 250 mL infusion (premix), Titrated, Last Rate: 7 Units/kg/hr (05/01/25 0733)    heparin (porcine) injection 2,800 Units, Q6H PRN    heparin (porcine) injection 5,600 Units, Q6H PRN    hydrOXYzine HCL (ATARAX) tablet 25 mg, TID AC    levothyroxine tablet 75 mcg, Daily    meclizine (ANTIVERT) tablet 25 mg, TID PRN    metoclopramide (REGLAN) injection 5 mg, TID AC    metoprolol tartrate (LOPRESSOR) tablet 50 mg, Q12H MELANY    mirtazapine (REMERON) tablet 7.5 mg, HS    ondansetron (ZOFRAN) injection 4 mg, Q6H PRN    pantoprazole (PROTONIX) injection 40 mg, Q24H MELANY    [Held by provider] potassium chloride (Klor-Con M20) CR tablet 40 mEq, BID    potassium chloride 20 mEq IVPB (premix), Q2H, Last Rate: 20 mEq (05/01/25 1017)    pravastatin (PRAVACHOL) tablet 80 mg, Daily With Dinner    [Held by provider] senna-docusate sodium (SENOKOT S) 8.6-50 mg per tablet 1 tablet, Daily    sodium chloride 0.9 % infusion, Continuous, Last Rate: 75 mL/hr (04/30/25 2135)    warfarin (COUMADIN) tablet 5 mg, Once per day on Sunday Tuesday Wednesday Thursday Saturday **OR** [DISCONTINUED] warfarin (COUMADIN) tablet 7.5 mg, Once per day on Sunday Tuesday Wednesday Thursday Saturday    warfarin (COUMADIN) tablet 7.5 mg, Once per day on Monday Friday    Administrative Statements   Today, Patient Was Seen By: Kim Ardon PA-C    **Please Note: This note may  have been constructed using a voice recognition system.**

## 2025-05-01 NOTE — SPEECH THERAPY NOTE
Speech Language/Pathology  Attempted to see pt prior to and during lunch. First offered upgrade trial of food items pt reported she would eat at home. Pt declined and stated she is unlikely to eat anything at this time. Followed up at lunch, pt declining tray and reports she cannot eat. Reports UE are shaking, SLP encouraged intake however pt declined. SLP will f/u as able/appropriate    More Adams, MS CCC-SLP  5/1/2025

## 2025-05-01 NOTE — ASSESSMENT & PLAN NOTE
Found to have acute DVT. STAT CT chest PE study ordered: Extensive acute thrombus throughout the right upper middle and lower lobe segmental and distal order pulmonary arteries. RV/LV ratio 0.9  Unclear if PE was POA.   Echo without right heart strain  Suspect current episode of PE/DVT was due to subtherapeutic INR on admission as patient was not taking medications with n/v.   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   Pulm consulted: continue with lifelong AC. Outpatient hematology follow up. If no concern for large GI bleed, can resume warfarin for goal of 2-3.    Detail Level: Zone

## 2025-05-01 NOTE — PROGRESS NOTES
"Progress Note - Gastroenterology   Name: Alecia Kay 80 y.o. female I MRN: [id number]  Unit/Bed#: -01 I Date of Admission: 4/25/2025   Date of Service: 5/1/2025 I Hospital Day: 6    Assessment & Plan  Nausea and vomiting  Patient with chronic N/V, poor appetite and associated 70lb weight loss over the past 6 months   CT CAP wo contrast unremarkable   EGD 4/22/25 with hiatal hernia, Wang's esophagus, empiric esophageal dilation, normal stomach and duodenum    Unclear etiology at this time, likely multifactorial given multiple recent admissions with acute osteomyelitis of right foot and then to rehab, overall deconditioning and decline in health   EGD last week without explanation for symptoms, hiatal hernia could be contributing but unlikely to cause 70lb weight loss  Admitting CT CAP without explanation although was a non contrast study due to GAY  Celiac/mesenteric doppler US - normal  Esophagram yesterday with limited exam, some transient spasm noted but contrast was able to pass into stomach   Overall extensive work up thus far without clear etiology of symptoms, inadequate PO intake at this time   Recommend psych consult for anxiety and trial of anti-anxiety medication prior to mealtimes  Continue antiemetics  Could consider colonoscopy to r/o underlying malignancy, can be done as outpatient   Continue PPI daily  History of DVT (deep vein thrombosis)  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 year old female with a PMHx DVT/PE on coumadin, GERD, HLD, HTN, chronic osteomyelitis of toe of right foot, hypothyroidism who presented with poor appetite, N/V and weakness x 4 months.     Patient seen at bedside this AM. Minimal improvement in PO intake since admission. When I asked patient what she thinks is causing her symptoms she states \"its " "my nerves\". She does not have an appetite and is declining even trying solid foods at this point.         Medications Prior to Admission:     amLODIPine (NORVASC) 10 mg tablet    levothyroxine 75 mcg tablet    metoprolol tartrate (LOPRESSOR) 50 mg tablet    pantoprazole (PROTONIX) 40 mg tablet    warfarin (COUMADIN) 5 mg tablet    acetaminophen (TYLENOL) 325 mg tablet    hydrocortisone 1 % cream    magnesium hydroxide (MILK OF MAGNESIA) 400 mg/5 mL oral suspension    meclizine (ANTIVERT) 25 mg tablet    rosuvastatin (CRESTOR) 10 MG tablet    saccharomyces boulardii (FLORASTOR) 250 mg capsule    senna-docusate sodium (SENOKOT S) 8.6-50 mg per tablet    Current Facility-Administered Medications:     acetaminophen (TYLENOL) tablet 650 mg, Q6H PRN    amLODIPine (NORVASC) tablet 10 mg, Daily    ceFAZolin (ANCEF) IVPB (premix in dextrose) 2,000 mg 50 mL, Q8H, Last Rate: 2,000 mg (05/01/25 1233)    ertapenem (INVanz) 1,000 mg in sodium chloride 0.9 % 50 mL IVPB, Q24H, Last Rate: 1,000 mg (04/30/25 1426)    heparin (porcine) 25,000 units in 0.45% NaCl 250 mL infusion (premix), Titrated, Last Rate: 7 Units/kg/hr (05/01/25 0733)    heparin (porcine) injection 2,800 Units, Q6H PRN    heparin (porcine) injection 5,600 Units, Q6H PRN    hydrOXYzine HCL (ATARAX) tablet 25 mg, TID AC    levothyroxine tablet 75 mcg, Daily    meclizine (ANTIVERT) tablet 25 mg, TID PRN    metoclopramide (REGLAN) injection 5 mg, TID AC    metoprolol tartrate (LOPRESSOR) tablet 50 mg, Q12H MELANY    mirtazapine (REMERON) tablet 7.5 mg, HS    ondansetron (ZOFRAN) injection 4 mg, Q6H PRN    pantoprazole (PROTONIX) injection 40 mg, Q24H MELANY    [Held by provider] potassium chloride (Klor-Con M20) CR tablet 40 mEq, BID    potassium chloride 20 mEq IVPB (premix), Q2H, Last Rate: 20 mEq (05/01/25 1231)    pravastatin (PRAVACHOL) tablet 80 mg, Daily With Dinner    [Held by provider] senna-docusate sodium (SENOKOT S) 8.6-50 mg per tablet 1 tablet, Daily    " warfarin (COUMADIN) tablet 5 mg, Once per day on Sunday Tuesday Wednesday Thursday Saturday **OR** [DISCONTINUED] warfarin (COUMADIN) tablet 7.5 mg, Once per day on Sunday Tuesday Wednesday Thursday Saturday    warfarin (COUMADIN) tablet 7.5 mg, Once per day on Monday Friday  Allergies   Allergen Reactions    Neomycin-Polymyxin-Hc Other (See Comments)     Unknown reaction      Penicillins Hives     Patient states she got hive, but has had penicillin medication in the past and was ok.     Quinine Other (See Comments)     Unknown    Amoxicillin-Pot Clavulanate Itching       Physical Exam  Constitutional:       General: She is not in acute distress.     Appearance: She is ill-appearing.   HENT:      Head: Normocephalic and atraumatic.      Mouth/Throat:      Mouth: Mucous membranes are moist.   Eyes:      General: No scleral icterus.  Pulmonary:      Effort: Pulmonary effort is normal. No respiratory distress.   Abdominal:      General: Abdomen is flat. There is no distension.      Palpations: Abdomen is soft.      Tenderness: There is no abdominal tenderness. There is no guarding.   Skin:     General: Skin is warm and dry.      Coloration: Skin is not jaundiced.   Neurological:      Mental Status: She is alert.       Most Recent Vital Signs:  Vitals:    05/01/25 1051 05/01/25 1101 05/01/25 1110 05/01/25 1113   BP: 115/71 106/58 96/52 107/63   BP Location:       Pulse:       Resp:       Temp:       TempSrc:       SpO2:       Weight:       Height:           Intake/Output Summary (Last 24 hours) at 5/1/2025 1307  Last data filed at 5/1/2025 1036  Gross per 24 hour   Intake 0 ml   Output 1075 ml   Net -1075 ml       LABS/IMAGING  Lab Results: I have reviewed all relevant lab results during this hospitalization.    Imaging Studies:  I have reviewed all the relevant images during this hospitalizations    Counseling / Coordination of Care  Total time spent today 30 minutes. Greater than 50% of total time was spent with the  patient and / or family counseling and / or coordination of care.    Julianne Salvador PA-C

## 2025-05-02 ENCOUNTER — HOSPITAL ENCOUNTER (INPATIENT)
Facility: HOSPITAL | Age: 80
LOS: 18 days | Discharge: HOSPICE | DRG: 377 | End: 2025-05-20
Attending: INTERNAL MEDICINE | Admitting: INTERNAL MEDICINE
Payer: COMMERCIAL

## 2025-05-02 VITALS
OXYGEN SATURATION: 94 % | WEIGHT: 173.72 LBS | BODY MASS INDEX: 31.97 KG/M2 | TEMPERATURE: 96.8 F | RESPIRATION RATE: 14 BRPM | SYSTOLIC BLOOD PRESSURE: 158 MMHG | HEIGHT: 62 IN | DIASTOLIC BLOOD PRESSURE: 73 MMHG | HEART RATE: 70 BPM

## 2025-05-02 DIAGNOSIS — G93.41 METABOLIC ENCEPHALOPATHY: ICD-10-CM

## 2025-05-02 DIAGNOSIS — I82.409 ACUTE DVT (DEEP VENOUS THROMBOSIS) (HCC): ICD-10-CM

## 2025-05-02 DIAGNOSIS — I26.99 ACUTE PULMONARY EMBOLISM, UNSPECIFIED PULMONARY EMBOLISM TYPE, UNSPECIFIED WHETHER ACUTE COR PULMONALE PRESENT (HCC): ICD-10-CM

## 2025-05-02 DIAGNOSIS — K92.1 HEMATOCHEZIA: Primary | ICD-10-CM

## 2025-05-02 DIAGNOSIS — D62 ACUTE BLOOD LOSS ANEMIA (ABLA): ICD-10-CM

## 2025-05-02 DIAGNOSIS — I63.9 STROKE (HCC): ICD-10-CM

## 2025-05-02 DIAGNOSIS — R41.0 DELIRIUM: ICD-10-CM

## 2025-05-02 PROBLEM — Z87.39 HISTORY OF OSTEOMYELITIS: Status: ACTIVE | Noted: 2025-03-03

## 2025-05-02 LAB
ABO GROUP BLD: NORMAL
ANION GAP SERPL CALCULATED.3IONS-SCNC: 6 MMOL/L (ref 4–13)
ANION GAP SERPL CALCULATED.3IONS-SCNC: 8 MMOL/L (ref 4–13)
APTT PPP: 45 SECONDS (ref 23–34)
APTT PPP: 49 SECONDS (ref 23–34)
APTT PPP: >210 SECONDS (ref 23–34)
BLD GP AB SCN SERPL QL: NEGATIVE
BUN SERPL-MCNC: 7 MG/DL (ref 5–25)
BUN SERPL-MCNC: 7 MG/DL (ref 5–25)
CALCIUM SERPL-MCNC: 6.8 MG/DL (ref 8.4–10.2)
CALCIUM SERPL-MCNC: 7.2 MG/DL (ref 8.4–10.2)
CHLORIDE SERPL-SCNC: 106 MMOL/L (ref 96–108)
CHLORIDE SERPL-SCNC: 107 MMOL/L (ref 96–108)
CO2 SERPL-SCNC: 27 MMOL/L (ref 21–32)
CO2 SERPL-SCNC: 27 MMOL/L (ref 21–32)
CREAT SERPL-MCNC: 0.78 MG/DL (ref 0.6–1.3)
CREAT SERPL-MCNC: 0.84 MG/DL (ref 0.6–1.3)
ERYTHROCYTE [DISTWIDTH] IN BLOOD BY AUTOMATED COUNT: 15 % (ref 11.6–15.1)
ERYTHROCYTE [DISTWIDTH] IN BLOOD BY AUTOMATED COUNT: 15.2 % (ref 11.6–15.1)
GFR SERPL CREATININE-BSD FRML MDRD: 65 ML/MIN/1.73SQ M
GFR SERPL CREATININE-BSD FRML MDRD: 71 ML/MIN/1.73SQ M
GLUCOSE SERPL-MCNC: 83 MG/DL (ref 65–140)
GLUCOSE SERPL-MCNC: 87 MG/DL (ref 65–140)
GLUCOSE SERPL-MCNC: 88 MG/DL (ref 65–140)
HCT VFR BLD AUTO: 20.9 % (ref 34.8–46.1)
HCT VFR BLD AUTO: 24.9 % (ref 34.8–46.1)
HCT VFR BLD AUTO: 25.1 % (ref 34.8–46.1)
HGB BLD-MCNC: 6.9 G/DL (ref 11.5–15.4)
HGB BLD-MCNC: 8.4 G/DL (ref 11.5–15.4)
HGB BLD-MCNC: 8.5 G/DL (ref 11.5–15.4)
INR PPP: 1.57 (ref 0.85–1.19)
INR PPP: 1.72 (ref 0.85–1.19)
MAGNESIUM SERPL-MCNC: 1.6 MG/DL (ref 1.9–2.7)
MCH RBC QN AUTO: 28.5 PG (ref 26.8–34.3)
MCH RBC QN AUTO: 29.6 PG (ref 26.8–34.3)
MCHC RBC AUTO-ENTMCNC: 33 G/DL (ref 31.4–37.4)
MCHC RBC AUTO-ENTMCNC: 33.9 G/DL (ref 31.4–37.4)
MCV RBC AUTO: 86 FL (ref 82–98)
MCV RBC AUTO: 88 FL (ref 82–98)
PLATELET # BLD AUTO: 148 THOUSANDS/UL (ref 149–390)
PLATELET # BLD AUTO: 156 THOUSANDS/UL (ref 149–390)
PMV BLD AUTO: 10.1 FL (ref 8.9–12.7)
PMV BLD AUTO: 9.5 FL (ref 8.9–12.7)
POTASSIUM SERPL-SCNC: 2.9 MMOL/L (ref 3.5–5.3)
POTASSIUM SERPL-SCNC: 3 MMOL/L (ref 3.5–5.3)
PROTHROMBIN TIME: 19.1 SECONDS (ref 12.3–15)
PROTHROMBIN TIME: 20.4 SECONDS (ref 12.3–15)
RBC # BLD AUTO: 2.42 MILLION/UL (ref 3.81–5.12)
RBC # BLD AUTO: 2.87 MILLION/UL (ref 3.81–5.12)
RH BLD: POSITIVE
SODIUM SERPL-SCNC: 140 MMOL/L (ref 135–147)
SODIUM SERPL-SCNC: 141 MMOL/L (ref 135–147)
SPECIMEN EXPIRATION DATE: NORMAL
WBC # BLD AUTO: 3.96 THOUSAND/UL (ref 4.31–10.16)
WBC # BLD AUTO: 4.94 THOUSAND/UL (ref 4.31–10.16)

## 2025-05-02 PROCEDURE — 99223 1ST HOSP IP/OBS HIGH 75: CPT | Performed by: INTERNAL MEDICINE

## 2025-05-02 PROCEDURE — 94762 N-INVAS EAR/PLS OXIMTRY CONT: CPT

## 2025-05-02 PROCEDURE — 86923 COMPATIBILITY TEST ELECTRIC: CPT

## 2025-05-02 PROCEDURE — 83735 ASSAY OF MAGNESIUM: CPT | Performed by: NURSE PRACTITIONER

## 2025-05-02 PROCEDURE — 86900 BLOOD TYPING SEROLOGIC ABO: CPT

## 2025-05-02 PROCEDURE — 30233N1 TRANSFUSION OF NONAUTOLOGOUS RED BLOOD CELLS INTO PERIPHERAL VEIN, PERCUTANEOUS APPROACH: ICD-10-PCS | Performed by: FAMILY MEDICINE

## 2025-05-02 PROCEDURE — 86901 BLOOD TYPING SEROLOGIC RH(D): CPT

## 2025-05-02 PROCEDURE — 85730 THROMBOPLASTIN TIME PARTIAL: CPT | Performed by: INTERNAL MEDICINE

## 2025-05-02 PROCEDURE — 80048 BASIC METABOLIC PNL TOTAL CA: CPT | Performed by: NURSE PRACTITIONER

## 2025-05-02 PROCEDURE — 80048 BASIC METABOLIC PNL TOTAL CA: CPT

## 2025-05-02 PROCEDURE — P9016 RBC LEUKOCYTES REDUCED: HCPCS

## 2025-05-02 PROCEDURE — 85027 COMPLETE CBC AUTOMATED: CPT | Performed by: NURSE PRACTITIONER

## 2025-05-02 PROCEDURE — 85018 HEMOGLOBIN: CPT

## 2025-05-02 PROCEDURE — 85730 THROMBOPLASTIN TIME PARTIAL: CPT | Performed by: FAMILY MEDICINE

## 2025-05-02 PROCEDURE — 85610 PROTHROMBIN TIME: CPT

## 2025-05-02 PROCEDURE — 85610 PROTHROMBIN TIME: CPT | Performed by: NURSE PRACTITIONER

## 2025-05-02 PROCEDURE — 99239 HOSP IP/OBS DSCHRG MGMT >30: CPT

## 2025-05-02 PROCEDURE — 85730 THROMBOPLASTIN TIME PARTIAL: CPT | Performed by: NURSE PRACTITIONER

## 2025-05-02 PROCEDURE — 85027 COMPLETE CBC AUTOMATED: CPT

## 2025-05-02 PROCEDURE — 85014 HEMATOCRIT: CPT

## 2025-05-02 PROCEDURE — NC001 PR NO CHARGE: Performed by: NURSE PRACTITIONER

## 2025-05-02 PROCEDURE — 86850 RBC ANTIBODY SCREEN: CPT

## 2025-05-02 PROCEDURE — 82948 REAGENT STRIP/BLOOD GLUCOSE: CPT

## 2025-05-02 RX ORDER — WARFARIN SODIUM 7.5 MG/1
7.5 TABLET ORAL
Status: CANCELLED | OUTPATIENT
Start: 2025-05-02

## 2025-05-02 RX ORDER — WARFARIN SODIUM 5 MG/1
5 TABLET ORAL
Status: DISCONTINUED | OUTPATIENT
Start: 2025-05-03 | End: 2025-05-08

## 2025-05-02 RX ORDER — ONDANSETRON 2 MG/ML
4 INJECTION INTRAMUSCULAR; INTRAVENOUS EVERY 6 HOURS PRN
Status: DISCONTINUED | OUTPATIENT
Start: 2025-05-02 | End: 2025-05-02

## 2025-05-02 RX ORDER — CALCIUM GLUCONATE 20 MG/ML
2 INJECTION, SOLUTION INTRAVENOUS ONCE
Status: COMPLETED | OUTPATIENT
Start: 2025-05-02 | End: 2025-05-02

## 2025-05-02 RX ORDER — METOCLOPRAMIDE HYDROCHLORIDE 5 MG/ML
5 INJECTION INTRAMUSCULAR; INTRAVENOUS
Status: CANCELLED | OUTPATIENT
Start: 2025-05-02

## 2025-05-02 RX ORDER — POTASSIUM CHLORIDE 1500 MG/1
40 TABLET, EXTENDED RELEASE ORAL 2 TIMES DAILY
Status: DISCONTINUED | OUTPATIENT
Start: 2025-05-02 | End: 2025-05-05

## 2025-05-02 RX ORDER — LEVOTHYROXINE SODIUM 75 UG/1
75 TABLET ORAL
Status: DISCONTINUED | OUTPATIENT
Start: 2025-05-03 | End: 2025-05-20

## 2025-05-02 RX ORDER — WARFARIN SODIUM 5 MG/1
5 TABLET ORAL
Status: CANCELLED | OUTPATIENT
Start: 2025-05-03

## 2025-05-02 RX ORDER — PANTOPRAZOLE SODIUM 40 MG/10ML
40 INJECTION, POWDER, LYOPHILIZED, FOR SOLUTION INTRAVENOUS EVERY 12 HOURS SCHEDULED
Status: DISCONTINUED | OUTPATIENT
Start: 2025-05-02 | End: 2025-05-06

## 2025-05-02 RX ORDER — PRAVASTATIN SODIUM 80 MG/1
80 TABLET ORAL
Status: DISCONTINUED | OUTPATIENT
Start: 2025-05-03 | End: 2025-05-19

## 2025-05-02 RX ORDER — POTASSIUM CHLORIDE 14.9 MG/ML
20 INJECTION INTRAVENOUS
Status: COMPLETED | OUTPATIENT
Start: 2025-05-02 | End: 2025-05-02

## 2025-05-02 RX ORDER — HEPARIN SODIUM 1000 [USP'U]/ML
3000 INJECTION, SOLUTION INTRAVENOUS; SUBCUTANEOUS EVERY 6 HOURS PRN
Status: DISCONTINUED | OUTPATIENT
Start: 2025-05-02 | End: 2025-05-04

## 2025-05-02 RX ORDER — METOCLOPRAMIDE HYDROCHLORIDE 5 MG/ML
5 INJECTION INTRAMUSCULAR; INTRAVENOUS
Status: DISCONTINUED | OUTPATIENT
Start: 2025-05-03 | End: 2025-05-02

## 2025-05-02 RX ORDER — PRAVASTATIN SODIUM 80 MG/1
80 TABLET ORAL
Status: CANCELLED | OUTPATIENT
Start: 2025-05-02

## 2025-05-02 RX ORDER — PANTOPRAZOLE SODIUM 40 MG/10ML
40 INJECTION, POWDER, LYOPHILIZED, FOR SOLUTION INTRAVENOUS
Status: CANCELLED | OUTPATIENT
Start: 2025-05-03

## 2025-05-02 RX ORDER — MECLIZINE HYDROCHLORIDE 25 MG/1
25 TABLET ORAL 3 TIMES DAILY PRN
Status: CANCELLED | OUTPATIENT
Start: 2025-05-02

## 2025-05-02 RX ORDER — HYDROXYZINE HYDROCHLORIDE 25 MG/1
25 TABLET, FILM COATED ORAL
Status: DISCONTINUED | OUTPATIENT
Start: 2025-05-03 | End: 2025-05-05

## 2025-05-02 RX ORDER — POTASSIUM CHLORIDE 14.9 MG/ML
20 INJECTION INTRAVENOUS
Status: DISCONTINUED | OUTPATIENT
Start: 2025-05-02 | End: 2025-05-02

## 2025-05-02 RX ORDER — METOPROLOL TARTRATE 50 MG
50 TABLET ORAL EVERY 12 HOURS SCHEDULED
Status: CANCELLED | OUTPATIENT
Start: 2025-05-02

## 2025-05-02 RX ORDER — ACETAMINOPHEN 325 MG/1
650 TABLET ORAL EVERY 6 HOURS PRN
Status: DISCONTINUED | OUTPATIENT
Start: 2025-05-02 | End: 2025-05-20 | Stop reason: HOSPADM

## 2025-05-02 RX ORDER — MIRTAZAPINE 7.5 MG/1
7.5 TABLET, FILM COATED ORAL
Status: DISCONTINUED | OUTPATIENT
Start: 2025-05-02 | End: 2025-05-17

## 2025-05-02 RX ORDER — ACETAMINOPHEN 325 MG/1
650 TABLET ORAL EVERY 6 HOURS PRN
Status: CANCELLED | OUTPATIENT
Start: 2025-05-02

## 2025-05-02 RX ORDER — AMOXICILLIN 250 MG
1 CAPSULE ORAL DAILY
Status: DISCONTINUED | OUTPATIENT
Start: 2025-05-03 | End: 2025-05-08

## 2025-05-02 RX ORDER — MAGNESIUM SULFATE HEPTAHYDRATE 40 MG/ML
2 INJECTION, SOLUTION INTRAVENOUS ONCE
Status: COMPLETED | OUTPATIENT
Start: 2025-05-02 | End: 2025-05-02

## 2025-05-02 RX ORDER — HEPARIN SODIUM 1000 [USP'U]/ML
6000 INJECTION, SOLUTION INTRAVENOUS; SUBCUTANEOUS EVERY 6 HOURS PRN
Status: DISCONTINUED | OUTPATIENT
Start: 2025-05-02 | End: 2025-05-04

## 2025-05-02 RX ORDER — METOPROLOL TARTRATE 50 MG
50 TABLET ORAL EVERY 12 HOURS SCHEDULED
Status: DISCONTINUED | OUTPATIENT
Start: 2025-05-02 | End: 2025-05-19

## 2025-05-02 RX ORDER — POTASSIUM CHLORIDE 1500 MG/1
40 TABLET, EXTENDED RELEASE ORAL 2 TIMES DAILY
Status: CANCELLED | OUTPATIENT
Start: 2025-05-02

## 2025-05-02 RX ORDER — HYDROXYZINE HYDROCHLORIDE 25 MG/1
25 TABLET, FILM COATED ORAL
Status: CANCELLED | OUTPATIENT
Start: 2025-05-02

## 2025-05-02 RX ORDER — HEPARIN SODIUM 10000 [USP'U]/100ML
3-30 INJECTION, SOLUTION INTRAVENOUS
Status: DISCONTINUED | OUTPATIENT
Start: 2025-05-02 | End: 2025-05-04

## 2025-05-02 RX ORDER — MIRTAZAPINE 15 MG/1
7.5 TABLET, FILM COATED ORAL
Status: CANCELLED | OUTPATIENT
Start: 2025-05-02

## 2025-05-02 RX ORDER — AMOXICILLIN 250 MG
1 CAPSULE ORAL DAILY
Status: CANCELLED | OUTPATIENT
Start: 2025-05-03

## 2025-05-02 RX ORDER — AMLODIPINE BESYLATE 10 MG/1
10 TABLET ORAL DAILY
Status: CANCELLED | OUTPATIENT
Start: 2025-05-03

## 2025-05-02 RX ORDER — ONDANSETRON 2 MG/ML
4 INJECTION INTRAMUSCULAR; INTRAVENOUS EVERY 4 HOURS PRN
Status: DISCONTINUED | OUTPATIENT
Start: 2025-05-02 | End: 2025-05-20 | Stop reason: HOSPADM

## 2025-05-02 RX ORDER — LEVOTHYROXINE SODIUM 75 UG/1
75 TABLET ORAL DAILY
Status: CANCELLED | OUTPATIENT
Start: 2025-05-03

## 2025-05-02 RX ORDER — PANTOPRAZOLE SODIUM 40 MG/10ML
40 INJECTION, POWDER, LYOPHILIZED, FOR SOLUTION INTRAVENOUS
Status: DISCONTINUED | OUTPATIENT
Start: 2025-05-03 | End: 2025-05-02

## 2025-05-02 RX ORDER — AMLODIPINE BESYLATE 10 MG/1
10 TABLET ORAL DAILY
Status: DISCONTINUED | OUTPATIENT
Start: 2025-05-03 | End: 2025-05-19

## 2025-05-02 RX ORDER — ONDANSETRON 2 MG/ML
4 INJECTION INTRAMUSCULAR; INTRAVENOUS EVERY 6 HOURS PRN
Status: CANCELLED | OUTPATIENT
Start: 2025-05-02

## 2025-05-02 RX ORDER — MECLIZINE HCL 12.5 MG 12.5 MG/1
25 TABLET ORAL 3 TIMES DAILY PRN
Status: DISCONTINUED | OUTPATIENT
Start: 2025-05-02 | End: 2025-05-17

## 2025-05-02 RX ADMIN — POTASSIUM CHLORIDE 20 MEQ: 14.9 INJECTION, SOLUTION INTRAVENOUS at 11:15

## 2025-05-02 RX ADMIN — PANTOPRAZOLE SODIUM 40 MG: 40 INJECTION, POWDER, FOR SOLUTION INTRAVENOUS at 20:41

## 2025-05-02 RX ADMIN — LEVOTHYROXINE SODIUM 75 MCG: 75 TABLET ORAL at 05:36

## 2025-05-02 RX ADMIN — AMLODIPINE BESYLATE 10 MG: 10 TABLET ORAL at 07:32

## 2025-05-02 RX ADMIN — HEPARIN SODIUM 18 UNITS/KG/HR: 10000 INJECTION, SOLUTION INTRAVENOUS at 20:43

## 2025-05-02 RX ADMIN — METOCLOPRAMIDE HYDROCHLORIDE 5 MG: 5 INJECTION INTRAMUSCULAR; INTRAVENOUS at 16:19

## 2025-05-02 RX ADMIN — METOCLOPRAMIDE HYDROCHLORIDE 5 MG: 5 INJECTION INTRAMUSCULAR; INTRAVENOUS at 05:37

## 2025-05-02 RX ADMIN — PANTOPRAZOLE SODIUM 40 MG: 40 INJECTION, POWDER, FOR SOLUTION INTRAVENOUS at 07:33

## 2025-05-02 RX ADMIN — POTASSIUM CHLORIDE 20 MEQ: 14.9 INJECTION, SOLUTION INTRAVENOUS at 12:57

## 2025-05-02 RX ADMIN — METOPROLOL TARTRATE 50 MG: 50 TABLET, FILM COATED ORAL at 07:32

## 2025-05-02 RX ADMIN — CALCIUM GLUCONATE 2 G: 20 INJECTION, SOLUTION INTRAVENOUS at 11:15

## 2025-05-02 RX ADMIN — METOCLOPRAMIDE HYDROCHLORIDE 5 MG: 5 INJECTION INTRAMUSCULAR; INTRAVENOUS at 11:15

## 2025-05-02 RX ADMIN — HYDROXYZINE HYDROCHLORIDE 25 MG: 25 TABLET, FILM COATED ORAL at 05:36

## 2025-05-02 RX ADMIN — MAGNESIUM SULFATE HEPTAHYDRATE 2 G: 2 INJECTION, SOLUTION INTRAVENOUS at 12:56

## 2025-05-02 NOTE — ASSESSMENT & PLAN NOTE
Patient with complaints of bloody bowel movement this morning. Said that she had bright blood streaking the stools and blood in the bedpan. Does also intermittently complain of darker stools, but said that this one today was more bright red.   Heparin gtt was stopped and held and no further bloody stools noted   GI consult: continue PPI daily, continue antiemetics, could consider colonoscopy to rule out underlying malignancy, could be done outpatient.   Large blood bowel movement on 5/2. Given 1 unit PRBC.  GI recommending transfer to tertiary campus for GI monitoring over the weekend. [facility]

## 2025-05-02 NOTE — ASSESSMENT & PLAN NOTE
Patient with episode of nausea and vomiting on 4/27 in the morning and on telemetry found to have tachycardia up to 160s sustaining. Patient is on lopressor 50 mg bid which patient states is for HTN.   ECG obtained and looked like atrial fibrillation rvr with HR of 150s however patient without history of atrial fibrillation  Given 1 dose IV lopressor and HR improved    Repeat ECG showing NSR in the evening.   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   Was placed on heparin gtt, subsequently held for GI bleed

## 2025-05-02 NOTE — EMTALA/ACUTE CARE TRANSFER
[facility] INTENSIVE CARE UNIT  [address] [address]  Dept: [phone]      ACUTE CARE TRANSFER CONSENT    NAME Alecia Kay                                         1945                              MRN [id number]    I have been informed of my rights regarding examination, treatment, and transfer   by Dr. Norman Blake DO    Benefits: Specialized equipment and/or services available at the receiving facility (Include comment)________________________    Risks: Potential for delay in receiving treatment, Potential deterioration of medical condition, Loss of IV, Possible worsening of condition or death during transfer, Increased discomfort during transfer      Consent for Transfer:  I acknowledge that my medical condition has been evaluated and explained to me by the treating physician or other qualified medical person and/or my attending physician, who has recommended that I be transferred to the service of  Accepting Physician: Dr. Friedman at Accepting Facility Name, City & State : [facility]. The above potential benefits of such transfer, the potential risks associated with such transfer, and the probable risks of not being transferred have been explained to me, and I fully understand them.  The doctor has explained that, in my case, the benefits of transfer outweigh the risks.  I agree to be transferred.    I authorize the performance of emergency medical procedures and treatments upon me in both transit and upon arrival at the receiving facility.  Additionally, I authorize the release of any and all medical records to the receiving facility and request they be transported with me, if possible.  I understand that the safest mode of transportation during a medical emergency is an ambulance and that the Hospital advocates the use of this mode of transport. Risks of traveling to the receiving facility by car, including absence of medical control, life  sustaining equipment, such as oxygen, and medical personnel has been explained to me and I fully understand them.    (ANTHONY CORRECT BOX BELOW)  [  ]  I consent to the stated transfer and to be transported by ambulance/helicopter.  [  ]  I consent to the stated transfer, but refuse transportation by ambulance and accept full responsibility for my transportation by car.  I understand the risks of non-ambulance transfers and I exonerate the Hospital and its staff from any deterioration in my condition that results from this refusal.    X___________________________________________    DATE  25  TIME________  Signature of patient or legally responsible individual signing on patient behalf           RELATIONSHIP TO PATIENT_________________________          Provider Certification    NAME Alecia Kay                                         1945                              MRN [id number]    A medical screening exam was performed on the above named patient.  Based on the examination:    Condition Necessitating Transfer GI bleed     Patient Condition: The patient has been stabilized such that within reasonable medical probability, no material deterioration of the patient condition or the condition of the unborn child(peewee) is likely to result from the transfer    Reason for Transfer: Level of Care needed not available at this facility, Patient/Family request    Transfer Requirements: Facility [facility]   Space available and qualified personnel available for treatment as acknowledged by Jyotsna Cuba  Agreed to accept transfer and to provide appropriate medical treatment as acknowledged by       Dr. Friedman  Appropriate medical records of the examination and treatment of the patient are provided at the time of transfer   STAFF INITIAL WHEN COMPLETED ___BAH____  Transfer will be performed by qualified personnel from [facility]  and appropriate transfer equipment as required, including the use of necessary and  appropriate life support measures.    Provider Certification: I have examined the patient and explained the following risks and benefits of being transferred/refusing transfer to the patient/family:  General risk, such as traffic hazards, adverse weather conditions, rough terrain or turbulence, possible failure of equipment (including vehicle or aircraft), or consequences of actions of persons outside the control of the transport personnel, Unanticipated needs of medical equipment and personnel during transport, The possibility of a transport vehicle being unavailable, Risk of worsening condition      Based on these reasonable risks and benefits to the patient and/or the unborn child(peewee), and based upon the information available at the time of the patient’s examination, I certify that the medical benefits reasonably to be expected from the provision of appropriate medical treatments at another medical facility outweigh the increasing risks, if any, to the individual’s medical condition, and in the case of labor to the unborn child, from effecting the transfer.    X__________Kim Ardon PA-C_______________ DATE 05/02/25        TIME____1425___      ORIGINAL - SEND TO MEDICAL RECORDS   COPY - SEND WITH PATIENT DURING TRANSFER

## 2025-05-02 NOTE — H&P
H&P - Hospitalist   Name: Alecia Kay 80 y.o. female I MRN: [id number]  Unit/Bed#: E4 -01 I Date of Admission: 5/2/2025   Date of Service: 5/2/2025 I Hospital Day: 0     Assessment & Plan  Hematochezia  History of hypertension DVT PE CKD 3 hypothyroidism and right heel osteomyelitis initially presented to [facility] 4/25/2025 for generalized weakness  Initially found to have kidney injury and MDR UTI completed course of ertapenem  Subsequently found to have DVT and PE and started on heparin infusion as patient's INR was subtherapeutic  Patient then had hematochezia and was a rapid response  Due to lack of GI services patient has been transferred here and notified GI.  Starting CLD restarting heparin infusion monitoring for any signs of bleeding  Acute respiratory insufficiency  Likely secondary to pulmonary embolism  Currently on 2 L nasal cannula  Acute pulmonary embolism (HCC)  Extensive right upper and lower lobe pulmonary embolism seen on 4/28/2025 CTA  Does have history of DVT and PE on warfarin 5 mg 5 days a week at 7.5 mg Monday and Friday  Was started on heparin infusion but then had GI bleeding.  Discussed with GI.  As bleeding has stopped will restart heparin infusion    Results from last 7 days   Lab Units 05/02/25  1046 05/02/25  0535 05/01/25  0630 04/30/25  0533   INR  1.57* 1.72* 1.41* 1.50*     Acute DVT (deep venous thrombosis) ([facility])  History of DVT   However during 4/25/2025 [facility] admission found to have bilateral acute versus subacute DVTs  Nausea and vomiting  Initial reason for presentation to hospital 4/25/2025  Patient reports no dysphagia but has appetite loss  Smell of food makes her nauseous  GI consulted  History of osteomyelitis  History of right heel osteomyelitis did see podiatry.  MRI negative for acute osteomyelitis  Chronic kidney disease, stage III (moderate) (HCC)  Initially presented to [facility] 4/25/2025 for generalized weakness found to have kidney injury and MDR  UTI  Renal function has returned back to baseline  Torsemide discontinued during March 2025 hospitalization    Results from last 7 days   Lab Units 05/02/25  1046 05/02/25  0535 05/01/25  0630 04/30/25  0533 04/29/25  0702 04/28/25  0353 04/27/25  0507 04/26/25  0513   BUN mg/dL 7 7 6 6 7 10 15 22   CREATININE mg/dL 0.84 0.78 0.82 0.85 0.97 1.01 1.14 1.43*   EGFR ml/min/1.73sq m 65 71 67 64 54 52 45 34     Hypothyroidism  Continue levothyroxine  Hypertension  May need to decrease metoprolol if recurrent bradycardia  Continue amlodipine with hold parameters  Most recently discontinued during March 2025 hospitalization    VTE Pharmacologic Prophylaxis: VTE Score: 6 High Risk (Score >/= 5) - Pharmacological DVT Prophylaxis Ordered: heparin drip. Sequential Compression Devices Ordered.  Code Status: Level 1 - Full Code   Discussion with family: Attempted to update  (son) via phone. Left voicemail.     Anticipated Length of Stay: Patient will be admitted on an inpatient basis with an anticipated length of stay of greater than 2 midnights secondary to hematochezia, pulmonary embolism and DVTs.    Chief Complaint:     Hematochezia    History of Present Illness  Alecia Kay is a 80 y.o. female with a PMH of DVT PE CKD hypertension hypothyroidism who has been transferred for [facility] for GI services.  The patient lives in [place] and states that her illness started January when she was hospitalized at [facility] for osteomyelitis of right foot.  She was rehabbing and has had appetite loss.  She was admitted to [facility] 4/25/2025 found to have kidney injury and MDR UTI.  She has completed a course of ertapenem and her renal function has returned to baseline.  During her hospitalization she was found to have DVT and PEs and was started on a heparin infusion.  Unfortunately this resulted in hematochezia.  She was a rapid response and due to lack of GI services recommendation was for transfer  here for GI backup/evaluation.    On exam she is tolerating a liquid diet.  She denies any odynophagia.  She reports no loss of appetite as the smell of food makes her nauseous.  She denies any chest pain.  She is a bit short of breath.    Review of Systems   Constitutional:  Positive for unexpected weight change. Negative for chills and fever.   HENT:  Negative for facial swelling and trouble swallowing.    Eyes:  Negative for visual disturbance.   Respiratory:  Positive for shortness of breath.    Cardiovascular:  Negative for chest pain and palpitations.   Gastrointestinal:  Positive for blood in stool. Negative for abdominal distention, abdominal pain, diarrhea, nausea and vomiting.   Genitourinary:  Negative for hematuria.   Musculoskeletal:  Negative for back pain and myalgias.   Skin:  Negative for rash.   Neurological:  Negative for facial asymmetry and speech difficulty.   Psychiatric/Behavioral:  The patient is not nervous/anxious.    All other systems reviewed and are negative.      Past Medical and Surgical History:   Past Medical History:   Diagnosis Date    Anemia     Cellulitis     Disease of thyroid gland     GERD (gastroesophageal reflux disease)     Hyperlipidemia     Hypertension     Obesity     Osteomyelitis (HCC)     Pneumonia     Pulmonary embolism (HCC)      Past Surgical History:   Procedure Laterality Date    FOOT SURGERY      JOINT REPLACEMENT       Meds/Allergies:  Allergies:   Allergies   Allergen Reactions    Neomycin-Polymyxin-Hc Other (See Comments)     Unknown reaction      Penicillins Hives     Patient states she got hive, but has had penicillin medication in the past and was ok.     Quinine Other (See Comments)     Unknown    Amoxicillin-Pot Clavulanate Itching     Prior to Admission Medications   Prescriptions Last Dose Informant Patient Reported? Taking?   acetaminophen (TYLENOL) 325 mg tablet Unknown  Yes No   Sig: Take 650 mg by mouth every 6 (six) hours as needed for mild pain    amLODIPine (NORVASC) 10 mg tablet Unknown  Yes No   Sig: Take 10 mg by mouth daily   hydrocortisone 1 % cream Unknown Self Yes No   Sig: Apply topically 4 (four) times a day as needed for rash   Patient not taking: Reported on 4/25/2025   levothyroxine 75 mcg tablet Unknown  Yes No   Sig: Take 75 mcg by mouth daily   magnesium hydroxide (MILK OF MAGNESIA) 400 mg/5 mL oral suspension Unknown  Yes No   Sig: Take by mouth daily as needed for constipation   meclizine (ANTIVERT) 25 mg tablet Unknown Self Yes No   Sig: Take by mouth 3 (three) times a day as needed for dizziness   metoprolol tartrate (LOPRESSOR) 50 mg tablet Unknown  Yes No   Sig: Take 50 mg by mouth every 12 (twelve) hours   pantoprazole (PROTONIX) 40 mg tablet Unknown  Yes No   Sig: Take 40 mg by mouth daily   rosuvastatin (CRESTOR) 10 MG tablet Unknown  Yes No   Sig: Take 10 mg by mouth daily   saccharomyces boulardii (FLORASTOR) 250 mg capsule Unknown Self Yes No   Sig: Take 250 mg by mouth 2 (two) times a day   senna-docusate sodium (SENOKOT S) 8.6-50 mg per tablet Unknown  Yes No   Sig: Take 1 tablet by mouth daily   warfarin (COUMADIN) 5 mg tablet Unknown  Yes No   Sig: Take 5 mg by mouth daily TAKE 1 AND 1/2 TABLETS(7.5MG) ON MONDAY AND FRIDAY, AND 1 TABLET(5MG) ON THE OTHER DAYS      Facility-Administered Medications: None     Social History:     Social History     Socioeconomic History    Marital status:      Spouse name: Not on file    Number of children: Not on file    Years of education: Not on file    Highest education level: Not on file   Occupational History    Not on file   Tobacco Use    Smoking status: Never     Passive exposure: Never    Smokeless tobacco: Never   Vaping Use    Vaping status: Never Used   Substance and Sexual Activity    Alcohol use: Never    Drug use: Never    Sexual activity: Not on file   Other Topics Concern    Not on file   Social History Narrative    Not on file     Social Drivers of Health     Financial  "Resource Strain: Low Risk  (2025)    Received from [facility]    Overall Financial Resource Strain (CARDIA)     Difficulty of Paying Living Expenses: Not very hard   Food Insecurity: No Food Insecurity (2025)    Nursing - Inadequate Food Risk Classification     Worried About Running Out of Food in the Last Year: Not on file     Ran Out of Food in the Last Year: Not on file     Ran Out of Food in the Last Year: Never true   Transportation Needs: No Transportation Needs (2025)    Nursing - Transportation Risk Classification     Lack of Transportation: Not on file     Lack of Transportation: No   Physical Activity: Not on file   Stress: Not on file   Social Connections: Feeling Socially Integrated (2024)    Received from [facility]    OASIS : Social Isolation     How often do you feel lonely or isolated from those around you?: Never   Intimate Partner Violence: Unknown (2025)    Nursing IPS     Feels Physically and Emotionally Safe: Not on file     Physically Hurt by Someone: Not on file     Humiliated or Emotionally Abused by Someone: Not on file     Physically Hurt by Someone: No     Hurt or Threatened by Someone: No   Housing Stability: Unknown (2025)    Nursing: Inadequate Housing Risk Classification     Has Housing: Not on file     Worried About Losing Housing: Not on file     Unable to Get Utilities: Not on file     Unable to Pay for Housing in the Last Year: No     Has Housin     Patient Pre-hospital Living Situation: Home  Patient Pre-hospital Level of Mobility: walks  Patient Pre-hospital Diet Restrictions:     Objective   Vitals:   Blood Pressure: 131/63 (25)  Pulse: 74 (25)  Temperature: (!) 97.2 °F (36.2 °C) (25)  Temp Source: Temporal (25)  Respirations: 20 (25)  Height: 5' 2\" (157.5 cm) (25)  Weight - Scale: 78 kg (171 lb 15.3 oz) (25)  SpO2: 95 % (25 " 1809)    Physical Exam  Vitals reviewed.   Constitutional:       General: She is not in acute distress.     Appearance: Normal appearance.   HENT:      Head: Atraumatic.   Eyes:      General: No scleral icterus.  Cardiovascular:      Rate and Rhythm: Regular rhythm.      Heart sounds: Murmur heard.      No gallop.   Pulmonary:      Effort: No respiratory distress.      Breath sounds: Decreased breath sounds present. No wheezing.   Abdominal:      General: Bowel sounds are normal.      Palpations: Abdomen is soft.      Tenderness: There is no guarding.   Musculoskeletal:         General: No tenderness or deformity.      Right lower leg: Edema present.      Left lower leg: Edema present.   Skin:     General: Skin is warm.      Coloration: Skin is not jaundiced.   Neurological:      General: No focal deficit present.      Mental Status: She is alert.      Motor: No weakness.   Psychiatric:         Mood and Affect: Mood normal.         Additional Data:   Lab Results: I have reviewed the following results:  Results from last 7 days   Lab Units 05/02/25  1620 05/02/25  1046   WBC Thousand/uL  --  4.94   HEMOGLOBIN g/dL 8.4* 8.5*   HEMATOCRIT % 24.9* 25.1*   PLATELETS Thousands/uL  --  148*     Results from last 7 days   Lab Units 05/02/25  1046 05/02/25  0535 05/01/25  0630 04/30/25  1532 04/30/25  0533 04/29/25  2332 04/29/25  0702 04/28/25  1616 04/28/25  0353 04/27/25  1351 04/27/25  0507 04/26/25  1606 04/26/25  0513   SODIUM mmol/L 140 141 140  --  141  --  142  --  141  --  142  --  142   POTASSIUM mmol/L 3.0* 2.9* 2.6* 3.5 3.5 3.8 2.8* 3.1* 2.8* 3.2* 3.0*   < > 2.4*   CHLORIDE mmol/L 107 106 105  --  108  --  107  --  107  --  108  --  107   CO2 mmol/L 27 27 27  --  24  --  25  --  23  --  24  --  22   ANION GAP mmol/L 6 8 8  --  9  --  10  --  11  --  10  --  13   BUN mg/dL 7 7 6  --  6  --  7  --  10  --  15  --  22   CREATININE mg/dL 0.84 0.78 0.82  --  0.85  --  0.97  --  1.01  --  1.14  --  1.43*   CALCIUM  mg/dL 7.2* 6.8* 6.4*  --  6.4*  --  6.9*  --  7.4*  --  7.7*  --  8.0*   ALBUMIN g/dL  --   --   --   --   --   --  2.6*  --   --   --   --   --  2.6*   TOTAL BILIRUBIN mg/dL  --   --   --   --   --   --  0.35  --   --   --   --   --  0.51   ALK PHOS U/L  --   --   --   --   --   --  45  --   --   --   --   --  46   ALT U/L  --   --   --   --   --   --  <3*  --   --   --   --   --  5*   AST U/L  --   --   --   --   --   --  9*  --   --   --   --   --  10*   EGFR ml/min/1.73sq m 65 71 67  --  64  --  54  --  52  --  45  --  34   GLUCOSE RANDOM mg/dL 87 83 88  --  73  --  84  --  79  --  77  --  69    < > = values in this interval not displayed.     Results from last 7 days   Lab Units 05/02/25  1046   INR  1.57*                          Results from last 7 days   Lab Units 04/29/25  0214   COLOR UA  Yellow   CLARITY UA  Clear   SPEC GRAV UA  1.015   PH UA  6.0   LEUKOCYTES UA  Negative   NITRITE UA  Negative   GLUCOSE UA mg/dl Negative   KETONES UA mg/dl Negative   BILIRUBIN UA  Negative   BLOOD UA  Trace-Intact*      Results from last 7 days   Lab Units 04/29/25  0214   RBC UA /hpf 0-1*   WBC UA /hpf 0-1*   EPITHELIAL CELLS WET PREP /hpf Occasional   BACTERIA UA /hpf None Seen            Lines/Drains  Invasive Devices       Peripheral Intravenous Line  Duration             Peripheral IV 05/02/25 Left;Ventral (anterior) Forearm <1 day    Peripheral IV 05/02/25 Proximal;Right;Ventral (anterior) Forearm <1 day              Long-dwell Peripherally Inserted Midline IV Catheter  Duration             Long-Dwell Peripheral IV (Midline) 04/28/25 Left Brachial 4 days                    Imaging:   Personally reviewed the following image studies in PACS and associated radiology reports:    Echo complete w/ contrast if indicated  Result Date: 4/29/2025  Narrative:   Limited study   Left Ventricle: Left ventricular cavity size is normal. Wall thickness is mildly increased. The left ventricular ejection fraction is 65% by visual  estimation. Systolic function is normal. Wall motion is normal.   Right Ventricle: Right ventricular cavity size is normal. Systolic function is normal.   Left Atrium: The atrium is mildly dilated.   Right Atrium: The atrium is mildly dilated.   Aortic Valve: The leaflets are moderately calcified.   Mitral Valve: There is mild annular calcification.   Tricuspid Valve: There is mild regurgitation. The estimated right ventricular systolic pressure is 45.00 mmHg.   IVC/SVC: The right atrial pressure is estimated at 3.0 mmHg. The inferior vena cava is normal in size. Respirophasic changes were normal.     MRI ankle/heel right  wo contrast  Result Date: 4/29/2025  Impression: 1.  No convincing evidence of osteomyelitis. There is mild reactive edema. 2.  Advanced distal Achilles tendinopathy, with a partial-thickness tear involving the deep insertional fibers. 3.  Likely small, focal longitudinal split tear of the peroneus brevis. 4.  Mild posterior tibialis tendinosis and tenosynovitis. 5.  Plantar fasciopathy and postsurgical defect. 6.  Degenerative changes, as described. Workstation performed: [id number]      VAS VENOUS DUPLEX - LOWER LIMB BILATERAL  Result Date: 4/28/2025  Impression: RIGHT LOWER LIMB: There is evidence suggestive of acute vs. sub acute deep vein thrombosis noted in the popliteal, gastrocnemius, paired posterior tibial, and paired peroneal veins. No evidence of superficial thrombophlebitis noted. Popliteal, posterior tibial and anterior tibial arterial Doppler waveforms are triphasic.  LEFT LOWER LIMB: There is evidence suggestive of acute vs. sub acute deep vein thrombosis noted in the popliteal, paired posterior tibial, and paired peroneal veins. No evidence of superficial thrombophlebitis noted. Popliteal, posterior tibial and anterior tibial arterial Doppler waveforms are triphasic.  Technical findings were given to Nelly Carnes PA-C via Epic Secure Chat.  SIGNATURE: Electronically Signed by:  DENISSE MALLORY DO,RPVI on 2025-04-28 09:34:48 PM    CT pe study w abdomen pelvis w contrast  Result Date: 4/28/2025  Impression: 1. Extensive acute thrombus throughout the right upper middle and lower lobe segmental and distal order pulmonary arteries. RV/LV ratio 0.9. 2. No acute intra-abdominal or pelvic process. I personally discussed this study with DENISE BOLAOÑS on 4/28/2025 5:06 PM. Workstation performed: [id number]       CT chest abdomen pelvis wo contrast  Result Date: 4/25/2025  Impression: Status post reported esophageal dilatation. No evidence for pneumomediastinum. Small hiatal hernia is noted. No acute intra-abdominal or pelvic abnormality identified. Colonic diverticulosis without evidence for acute diverticulitis. Workstation performed: [id number]      EKG, Pathology, and Other Studies Reviewed on Admission:   EKG  Result Date: 4/29/2025  Personally reviewed strips with impression of: Sinus rhythm with PVCs    Administrative Statements   Extensive review of charting including previous hospitalization at [facility], [facility], nursing home records, and [facility] hospitalization    ** Please Note: This note has been constructed using a voice recognition system. **

## 2025-05-02 NOTE — RAPID RESPONSE
Rapid Response Note  Alecia Kay 80 y.o. female MRN: [id number]  Unit/Bed#: -01 Encounter: [id number]    Rapid Response Notification(s):   Response called date/time:  5/2/2025 9:59 AM  Response team arrival date/time:  5/2/2025 9:59 AM  Response end date/time:  5/2/2025 10:05 AM  [facility]  Rapid [facility]  Primary reason for rapid response call:  Acute significant bleed    Rapid Response Intervention(s):   Airway:  None  Breathing:  None  Circulation:  None  Fluids administered:  None  Medications administered:  None       Assessment:   Acute GI bleed - large melanotic stool x2 today, in the setting of supratherapeutic APTT on heparin gtt   ABLA due to Acute GI bleed (hgb 6.9)  Acute PE/DVT with h/o prior PE/DVT on coumadin at time of presentation however was with subtherapeutic INR at that time (patient reported noncompliance at time of admission)  Hypokalemia (2.9)  Hypocalcemia (6.8)    Plan:   Continue to hold AC  One unit prbc infusion at this time- continue. Repeat Labs post transfusion with CBC, BMP, coags  Primary team discussing potential transfer for IR (will need IVC filter) and GI (colonoscopy could not be done till 5/5 at [facility])  HD stable but given 2 Bms will transfer to SD2 at this time  Replete K for goal > 4.  Replete Calcium.     Rapid Response Outcome:   Transfer:  Transfer to [facility] 2  Primary service notified of transfer: Yes    Code Status: Level 1 (Full Code)      Family notified: Primary team to notify Family Member       Background/Situation:   Alecia Kay is a 80 y.o. female who presented on 4/25 with nausea, vomiting, and poor appetite. Noted to have a 73 lb weight loss from November 2024 to time of admission. EGD completed prior to admission (4/22) showed hiatal hernia. Pt was on warfarin for hx PE/DVT and was subtherapeutic at time of admit with INR 1.1.  Patient was started on heparin infusion on 4/27. On 4/28, venous duplex  revealed multiple acute versus subacute DVTs bilateral lower extremities. CT PE study completed on 4/28 and showed extensive acute thrombus throughout the right upper middle and lower lobe segmental and distal border pulmonary arteries. On AM of 4/28, heparin was held due to bloody BM, hgb 8.5. GI consulted - considering colonoscopy outpatient. On 4/29, heparin was restarted (hgb 7.8). On 5/2, heparin held again due to suspicion of of GI bleed and supratherapeutic PTT (>210). Hgb 6.9 and one unit prbc ordered. Pt had a second large melanotic stool during blood transfusion. RRT called.    Review of Systems   Constitutional:  Positive for appetite change and fatigue.   HENT: Negative.     Respiratory: Negative.     Cardiovascular: Negative.    Gastrointestinal:  Positive for blood in stool.   Genitourinary: Negative.    Musculoskeletal: Negative.    Neurological: Negative.    Psychiatric/Behavioral: Negative.         Objective:   Vitals:    05/02/25 1000 05/02/25 1000 05/02/25 1002 05/02/25 1002   BP: 134/76 134/76 132/66 132/66   Pulse:  62  56   Resp:       Temp:  97.5 °F (36.4 °C)  97.5 °F (36.4 °C)   TempSrc:       SpO2:  98%  98%   Weight:       Height:         Physical Exam  Vitals reviewed.   Constitutional:       General: She is not in acute distress.     Appearance: She is ill-appearing.   HENT:      Head: Normocephalic and atraumatic.      Mouth/Throat:      Mouth: Mucous membranes are moist.   Eyes:      Conjunctiva/sclera: Conjunctivae normal.   Cardiovascular:      Rate and Rhythm: Normal rate and regular rhythm.   Pulmonary:      Effort: Pulmonary effort is normal.   Abdominal:      General: Bowel sounds are normal. There is no distension.      Palpations: Abdomen is soft.   Musculoskeletal:         General: Swelling present.   Skin:     Capillary Refill: Capillary refill takes less than 2 seconds.      Coloration: Skin is pale.   Neurological:      General: No focal deficit present.

## 2025-05-02 NOTE — ASSESSMENT & PLAN NOTE
History of hypertension DVT PE CKD 3 hypothyroidism and right heel osteomyelitis initially presented to [facility] 4/25/2025 for generalized weakness  Initially found to have kidney injury and MDR UTI completed course of ertapenem  Subsequently found to have DVT and PE and started on heparin infusion as patient's INR was subtherapeutic  Patient then had hematochezia and was a rapid response  Due to lack of GI services patient has been transferred here and notified GI.  Starting CLD restarting heparin infusion monitoring for any signs of bleeding

## 2025-05-02 NOTE — SPEECH THERAPY NOTE
Speech Language/Pathology  Pt was a rapid this morning, now pending transfer to higher level of care. Recommend continued SLP follow up at higher level of care    More Adams MS CCC-SLP  5/2/2025

## 2025-05-02 NOTE — DISCHARGE SUMMARY
Discharge Summary - Hospitalist   Name: Alecia Kay 80 y.o. female I MRN: [id number]  Unit/Bed#: -01 I Date of Admission: 4/25/2025   Date of Service: 5/2/2025 I Hospital Day: 7     Assessment & Plan  Generalized weakness  POA with complaints of generalized weakness and poor appetite. Signed herself out of [facility] on 4/24, was home for one day when she started feeling weaker  Per OP , having cyclic vomiting at home and not taking any of her medications  Admits to poor appetite for the past month, will trial remeron   Had dilation of esophagus outpatient 4/22  Acute cystitis and mild electrolyte derangements with gui on admission  Replenish electrolytes  Flu/covid/rsv negative   CT CAP without acute findings   PT/OT consult: II (Moderate Resource Intensity)  Acute kidney injury (HCC)  Baseline around 1.3, admitted at 1.8  Start gentle IVF   Improved and normalized. Will hold ivf at this time to prevent overload   Monitor daily bmp  Avoid nephrotoxic agents and hypotension   Gui resolved   Acute cystitis  UA + and endorses dysuria  urine culture with klebsiella and ESBL e coli   Completed abx course   Chronic osteomyelitis of toe of right foot (HCC)  Patient record reviewed-patient is to remain nonweightbearing until wound is completely healed  Completed 6 weeks IV abx  Wound care   There is a note from outpatient podiatry on 3/25 to be WBAT in post op shoe   Podiatry obtained updated MRI and arterial duplex.   MRI: No convincing evidence of osteomyelitis. There is mild reactive edema  Arterial duplex with diffuse bilateral disease without significant focal stenosis.  GTP within healing range  Podiatry recommending WBAT surgical shoe B/L  Nausea and vomiting  Patient states that she has been having nausea and vomiting for the last month or so. Said that she had imaging done and EGD and nothing was definitive.   States that she has nausea and vomiting mostly associated with meals and  around mealtime, will trial with reglan scheduled and see if this helps with patient's nausea.   Said that she felt the Reglan improved her symptoms but still having nausea and vomiting.   GI consult: unclear etiology, possibly multifactorial. EGD less than 1 week ago without explanation for symptoms, hiatal hernia could be contributing but unlikely to cause 70 pound weight loss. Agree with remeron. Could consider colonoscopy.  Still with persistent N/V, repeat CT AP with contrast with no acute abnormalities  Speech evaluated patient and recommending puree with thin liquids   Esophagram along with celiac mesenteric doppler - relatively unremarkable. Mild esophageal intermittent spasms  Will trial atarax prior to eating as patient thinks her anxiety is playing a role. May benefit from marinol   Discussed with patient that if oral intake doesn't improve oral intake depending on results of tests, will need to discuss enteral nutrition for patient.   Tachycardia  Patient with episode of nausea and vomiting on 4/27 in the morning and on telemetry found to have tachycardia up to 160s sustaining. Patient is on lopressor 50 mg bid which patient states is for HTN.   ECG obtained and looked like atrial fibrillation rvr with HR of 150s however patient without history of atrial fibrillation  Given 1 dose IV lopressor and HR improved    Repeat ECG showing NSR in the evening.   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   Was placed on heparin gtt, subsequently held for GI bleed   History of DVT (deep vein thrombosis)  Patient states that she has a history of DVT/PE. Said that she had unknown cause for her PE/DVT and was placed on coumadin for this. She is still supposed to be on coumadin but is not sure the dosing  States that she doesn't have any known history of atrial fibrillation that she knows of.   Was not taking coumadin during hospitalization and prior to hospitalization due to nausea and  vomiting, said she missed doses.   Venous duplex positive for acute vs subacute DVT, see plan under acute dvt.   Bilateral lower extremity edema  Was recently taken off of torsemide during last hospitalization  Caution with gentle hydration at this time  May need diuretics as needed  Venous duplex positive for acute vs subacute DVT bilaterally.   Hypertension  Continue home amlodipine and metoprolol   Electrolyte imbalance  Low mag and K on admission  Replenish and monitor daily   Managed on chronic K 20meq BID, will increase to 40 meq BID due to hypokalemia  Unable to tolerate PO supplements at this time, may need liquid when tolerating PO.   Continue with IV supplementation until patient can tolerate PO. If remaining persistently hypokalemic, would benefit from possibly D5 with KCL.   Hematochezia  Patient with complaints of bloody bowel movement this morning. Said that she had bright blood streaking the stools and blood in the bedpan. Does also intermittently complain of darker stools, but said that this one today was more bright red.   Heparin gtt was stopped and held and no further bloody stools noted   GI consult: continue PPI daily, continue antiemetics, could consider colonoscopy to rule out underlying malignancy, could be done outpatient.   Large blood bowel movement on 5/2. Given 1 unit PRBC.  GI recommending transfer to [facility] for GI monitoring over the weekend. Transferring to [facility] danna, [facility] agreeable   Acute DVT (deep venous thrombosis) (HCC)  Patient with bilateral lower extremity swelling on admission. Venous duplex showing acute vs. sub acute DVT in the right popliteal, gastrocnemius, posterior tibial, and peroneal veins and acute vs. sub acute DVT in the left popliteal, posterior tibial, and peroneal veins.   Unclear if DVT was POA.   Initially was on heparin gtt due to subtherapeutic INR, held then because patient had bloody stools. Was then restarted.   Discussed with patient  if there was a reason for her being on coumadin and not DOAC, she is unsure and thinks because of cost, but knew she has to be on lifelong AC due to unprovoked clots.  Holding heparin drip for GI bleed. Likely needs IR consult for IVC filter once at [facility]   Acute pulmonary embolism (HCC)  CT chest PE study: Extensive acute thrombus throughout the right upper middle and lower lobe segmental and distal order pulmonary arteries. RV/LV ratio 0.9  Unclear if PE was POA.   Echo without right heart strain  Suspect current episode of PE/DVT was due to subtherapeutic INR on admission as patient was not taking medications with n/v.   Lifelong AC   Pulm consulted: continue with lifelong AC. Outpatient hematology follow up.   Acute respiratory insufficiency  Having intermittent acute respiratory insuffiencey requiring 1L NC with o2 sats 89%.   Suspect secondary to acute PE.   Appears to desaturate at night  Overnight o2 and home o2 eval prior to discharge  Wean to room air as tolerated     Medical Problems       Resolved Problems  Date Reviewed: 4/30/2025   None       Discharging Physician / Practitioner: Kim Ardon PA-C  PCP: Rambo Perera DO  Admission Date:   Admission Orders (From admission, onward)       Ordered        04/25/25 1656  INPATIENT ADMISSION  Once                          Discharge/Transfer Date: 05/02/25    Disposition:   Transfer to: [facility]  Reason for Transfer: GI bleed  Accepting Provider at [facility]: Dr. Friedman    Consultations During Hospital Stay:  GI  Pulmonology  Podiatry      Procedures Performed:   None     Significant Findings / Test Results:   CT chest abdomen and pelvis with status post esophageal dilation.  No evidence of pneumomediastinum.  Small hiatal hernia is noted.  No acute intra-abdominal or pelvic abnormality finding.  Colonic diverticulosis without evidence for acute diverticulitis  X-ray right foot stable.  No new erosions  Venous duplex with  bilateral acute or subacute deep vein thrombosis  CT PE study with extensive acute thrombus throughout the right upper lobe middle and lower lobe segmental and distal order pulmonary arteries.  RV/LV ratio 0.9.  No acute intra-abdominal or pelvic process  MRI right ankle/heel with no convincing evidence of osteomyelitis.  Mild reactive edema.  Advanced distal Achilles tendinopathy, with a partial thickness tear involving the deep insertional fibers.  Likely small focal longitudinal split tear of the peroneus brevis.  Mild posterior tibialis tendinosis and tenosynovitis.  Plantar fascia apathy and postsurgical defect.  Degenerative changes as described  Esophagram with no evidence of an obvious fixed esophageal obstruction.  Some transient spasm appeared to be present which gave the patient some symptomatology but contrast did eventually pass into the stomach.  Limited examination.  Mesenteric duplex negative    Incidental Findings:   none     Complications:  GI bleed    Reason for Admission: generalized weakness    Hospital Course:   Alecia Kay is a 80 y.o. female patient who originally presented to the hospital on 4/25/2025 due to generalized weakness. Patient with weight loss and poor oral intake for months. Workup with GI and speech unremarkable. Patient remains with poor intake despite starting atarax, remeron and reglan. Pending intake, family aware that feeding tube vs TPN/PICC might be necessary. Patient also found to have bilateral lower extremity acute vs subacute dvts as well as right sided PE. She was started on heparin drip. Patient started with hematochezia Monday which resolved. Then developed further hematochezia on 5/1 and heparin drip was held. Patient was rapid response on 5/2 due to copious large bloody bowel movements with clots. Patient was given one unit PRBC. Patient should have IVC filter placed since she cannot tolerate AC. GI recommending patient have colonoscopy to assess for  "bleeding. Patient is being transferred to [facility] for further GI and IR managements of clots, poor oral intake and acute gi bleed.     Please see above list of diagnoses and related plan for additional information.     Condition at Discharge: fair    Discharge Day Visit / Exam:   Subjective:  patient states that she is feeling fine. Denies chest pain, shortness of breath or abdominal pain. No acute complaints while bleeding. Agreeable to transfer to [facility] for further are. Family at bedside.  Vitals: Blood Pressure: 128/67 (05/02/25 1112)  Pulse: (!) 49 (05/02/25 1112)  Temperature: 97.5 °F (36.4 °C) (05/02/25 1112)  Temp Source: Temporal (05/02/25 1112)  Respirations: 22 (05/02/25 1112)  Height: 5' 2\" (157.5 cm) (04/29/25 1301)  Weight - Scale: 78.8 kg (173 lb 11.6 oz) (05/02/25 1047)  SpO2: 98 % (05/02/25 1112)  Physical Exam  Vitals reviewed.   Constitutional:       General: She is not in acute distress.     Appearance: She is obese. She is ill-appearing. She is not toxic-appearing.   HENT:      Head: Normocephalic and atraumatic.      Mouth/Throat:      Mouth: Mucous membranes are moist.   Cardiovascular:      Rate and Rhythm: Normal rate and regular rhythm.      Heart sounds: No murmur heard.  Pulmonary:      Effort: No respiratory distress.      Breath sounds: No stridor. No wheezing, rhonchi or rales.      Comments: Saturating around 93% on 2L NC  Abdominal:      General: Bowel sounds are normal. There is no distension.      Palpations: Abdomen is soft. There is no mass.      Tenderness: There is no abdominal tenderness.   Musculoskeletal:      Right lower leg: Edema present.      Left lower leg: Edema present.   Skin:     General: Skin is warm and dry.      Coloration: Skin is pale.   Neurological:      Mental Status: She is alert and oriented to person, place, and time.   Psychiatric:         Mood and Affect: Mood normal.         Behavior: Behavior normal.         Discussion with " Family: Updated  (son) at bedside.     Administrative Statements   Discharge Statement:  I have spent a total time of 45 minutes in caring for this patient on the day of the visit/encounter. .    **Please Note: This note may have been constructed using a voice recognition system.**

## 2025-05-02 NOTE — ASSESSMENT & PLAN NOTE
Patient with bilateral lower extremity swelling on admission. Venous duplex showing acute vs. sub acute DVT in the right popliteal, gastrocnemius, posterior tibial, and peroneal veins and acute vs. sub acute DVT in the left popliteal, posterior tibial, and peroneal veins.   Unclear if DVT was POA.   Initially was on heparin gtt due to subtherapeutic INR, held then because patient had bloody stools. Was then restarted.   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  Holding heparin drip for GI bleed. Likely needs IR consult for IVC filter once at [facility]

## 2025-05-02 NOTE — ASSESSMENT & PLAN NOTE
Initial reason for presentation to hospital 4/25/2025  Patient reports no dysphagia but has appetite loss  Smell of food makes her nauseous  GI consulted

## 2025-05-02 NOTE — PROGRESS NOTES
Progress Note - Gastroenterology   Name: Alecia Kay 80 y.o. female I MRN: [id number]  Unit/Bed#: -01 I Date of Admission: 4/25/2025   Date of Service: 5/2/2025 I Hospital Day: 7    Assessment & Plan  Nausea and vomiting  Patient with chronic N/V, poor appetite and associated 70lb weight loss over the past 6 months   CT CAP wo contrast unremarkable   EGD 4/22/25 with hiatal hernia, Wang's esophagus, empiric esophageal dilation, normal stomach and duodenum    Unclear etiology at this time, likely multifactorial given multiple recent admissions with acute osteomyelitis of right foot and then to rehab, overall deconditioning and decline in health   EGD last week without explanation for symptoms, hiatal hernia could be contributing but unlikely to cause 70lb weight loss  Admitting CT CAP without explanation although was a non contrast study due to GAY  Celiac/mesenteric doppler US - normal  Esophagram yesterday with limited exam, some transient spasm noted but contrast was able to pass into stomach   Overall extensive work up thus far without clear etiology of symptoms, inadequate PO intake at this time   Recommend psych consult for anxiety and trial of anti-anxiety medication prior to mealtimes  Continue antiemetics  Could consider colonoscopy to r/o underlying malignancy, can be done as outpatient   Continue PPI daily  History of DVT (deep vein thrombosis)  On coumadin for acute DVT and PE   Rapid response called this AM due to large bloody BM with clots in bed  Drop in Hgb 8 > 6.9 s/p 1U pRBCs with repeat 8.5  EGD last week without any evidence of GI bleeding     Concern for diverticular bleed  Consider transfer for higher level care given need for IVC filter and active GI bleeding going into the weekend without GI coverage at this campus    HPI: Alecia Kay is a 80 y.o. year old female with a PMHx DVT/PE on coumadin, GERD, HLD, HTN, chronic osteomyelitis of toe of right foot, hypothyroidism  who presented with poor appetite, N/V and weakness x 4 months.     Patient seen at bedside this AM after rapid response called. Per nursing, found to have large amount of dark red blood with clots in bed sheets. She is being transferred to the ICU.       Medications Prior to Admission:     amLODIPine (NORVASC) 10 mg tablet    levothyroxine 75 mcg tablet    metoprolol tartrate (LOPRESSOR) 50 mg tablet    pantoprazole (PROTONIX) 40 mg tablet    warfarin (COUMADIN) 5 mg tablet    acetaminophen (TYLENOL) 325 mg tablet    hydrocortisone 1 % cream    magnesium hydroxide (MILK OF MAGNESIA) 400 mg/5 mL oral suspension    meclizine (ANTIVERT) 25 mg tablet    rosuvastatin (CRESTOR) 10 MG tablet    saccharomyces boulardii (FLORASTOR) 250 mg capsule    senna-docusate sodium (SENOKOT S) 8.6-50 mg per tablet    Current Facility-Administered Medications:     acetaminophen (TYLENOL) tablet 650 mg, Q6H PRN    amLODIPine (NORVASC) tablet 10 mg, Daily    hydrOXYzine HCL (ATARAX) tablet 25 mg, TID AC    levothyroxine tablet 75 mcg, Daily    meclizine (ANTIVERT) tablet 25 mg, TID PRN    metoclopramide (REGLAN) injection 5 mg, TID AC    metoprolol tartrate (LOPRESSOR) tablet 50 mg, Q12H MELANY    mirtazapine (REMERON) tablet 7.5 mg, HS    ondansetron (ZOFRAN) injection 4 mg, Q6H PRN    pantoprazole (PROTONIX) injection 40 mg, Q24H MELANY    [Held by provider] potassium chloride (Klor-Con M20) CR tablet 40 mEq, BID    potassium chloride 20 mEq IVPB (premix), Q2H    pravastatin (PRAVACHOL) tablet 80 mg, Daily With Dinner    [Held by provider] senna-docusate sodium (SENOKOT S) 8.6-50 mg per tablet 1 tablet, Daily    [Held by provider] warfarin (COUMADIN) tablet 5 mg, Once per day on Sunday Tuesday Wednesday Thursday Saturday **OR** [DISCONTINUED] warfarin (COUMADIN) tablet 7.5 mg, Once per day on Sunday Tuesday Wednesday Thursday Saturday    [Held by provider] warfarin (COUMADIN) tablet 7.5 mg, Once per day on Monday Friday  Allergies   Allergen  Reactions    Neomycin-Polymyxin-Hc Other (See Comments)     Unknown reaction      Penicillins Hives     Patient states she got hive, but has had penicillin medication in the past and was ok.     Quinine Other (See Comments)     Unknown    Amoxicillin-Pot Clavulanate Itching       Physical Exam  Constitutional:       General: She is not in acute distress.     Appearance: She is ill-appearing.   HENT:      Head: Normocephalic and atraumatic.      Mouth/Throat:      Mouth: Mucous membranes are moist.   Eyes:      General: No scleral icterus.  Pulmonary:      Effort: Pulmonary effort is normal. No respiratory distress.   Abdominal:      General: Abdomen is flat. There is no distension.      Palpations: Abdomen is soft.      Tenderness: There is no abdominal tenderness. There is no guarding.   Skin:     General: Skin is warm and dry.      Coloration: Skin is not jaundiced.   Neurological:      Mental Status: She is alert.       Most Recent Vital Signs:  Vitals:    05/02/25 0734 05/02/25 0803 05/02/25 0805 05/02/25 0815   BP: 113/68 127/71 127/71 127/67   Pulse: 68 67 66 56   Resp:  18     Temp:  (!) 97.1 °F (36.2 °C) (!) 97.3 °F (36.3 °C) (!) 97.3 °F (36.3 °C)   TempSrc:       SpO2: 91%  98% 98%   Weight:       Height:           Intake/Output Summary (Last 24 hours) at 5/2/2025 0903  Last data filed at 5/2/2025 0129  Gross per 24 hour   Intake 150 ml   Output 300 ml   Net -150 ml       LABS/IMAGING  Lab Results: I have reviewed all relevant lab results during this hospitalization.    Imaging Studies:  I have reviewed all the relevant images during this hospitalizations    Counseling / Coordination of Care  Total time spent today 30 minutes. Greater than 50% of total time was spent with the patient and / or family counseling and / or coordination of care.    Julianne Salvador PA-C

## 2025-05-02 NOTE — PLAN OF CARE
Problem: PAIN - ADULT  Goal: Verbalizes/displays adequate comfort level or baseline comfort level  Description: Interventions:- Encourage patient to monitor pain and request assistance- Assess pain using appropriate pain scale- Administer analgesics based on type and severity of pain and evaluate response- Implement non-pharmacological measures as appropriate and evaluate response- Consider cultural and social influences on pain and pain management- Notify physician/advanced practitioner if interventions unsuccessful or patient reports new pain  Outcome: Progressing     Problem: INFECTION - ADULT  Goal: Absence or prevention of progression during hospitalization  Description: INTERVENTIONS:- Assess and monitor for signs and symptoms of infection- Monitor lab/diagnostic results- Monitor all insertion sites, i.e. indwelling lines, tubes, and drains- Monitor endotracheal if appropriate and nasal secretions for changes in amount and color- [place] appropriate cooling/warming therapies per order- Administer medications as ordered- Instruct and encourage patient and family to use good hand hygiene technique- Identify and instruct in appropriate isolation precautions for identified infection/condition  Outcome: Progressing  Goal: Absence of fever/infection during neutropenic period  Description: INTERVENTIONS:- Monitor WBC  Outcome: Progressing     Problem: SAFETY ADULT  Goal: Patient will remain free of falls  Description: INTERVENTIONS:- Educate patient/family on patient safety including physical limitations- Instruct patient to call for assistance with activity - Consult OT/PT to assist with strengthening/mobility - Keep Call bell within reach- Keep bed low and locked with side rails adjusted as appropriate- Keep care items and personal belongings within reach- Initiate and maintain comfort rounds- Make Fall Risk Sign visible to staff- Offer Toileting every 2 Hours, in advance of need- Initiate/Maintain bed alarm-  Obtain necessary fall risk management equipment- Apply yellow socks and bracelet for high fall risk patients- Consider moving patient to room near nurses station  Outcome: Progressing  Goal: Maintain or return to baseline ADL function  Description: INTERVENTIONS:-  Assess patient's ability to carry out ADLs; assess patient's baseline for ADL function and identify physical deficits which impact ability to perform ADLs (bathing, care of mouth/teeth, toileting, grooming, dressing, etc.)- Assess/evaluate cause of self-care deficits - Assess range of motion- Assess patient's mobility; develop plan if impaired- Assess patient's need for assistive devices and provide as appropriate- Encourage maximum independence but intervene and supervise when necessary- Involve family in performance of ADLs- Assess for home care needs following discharge - Consider OT consult to assist with ADL evaluation and planning for discharge- Provide patient education as appropriate  Outcome: Progressing  Goal: Maintains/Returns to pre admission functional level  Description: INTERVENTIONS:- Perform AM-PAC 6 Click Basic Mobility/ Daily Activity assessment daily.- Set and communicate daily mobility goal to care team and patient/family/caregiver. - Collaborate with rehabilitation services on mobility goals if consulted- Perform Range of Motion 4 times a day.- Reposition patient every 2 hours.- Dangle patient 4 times a day- Stand patient 4 times a day- Ambulate patient 4 times a day- Out of bed to chair 4 times a day - Out of bed for meals 4 times a day- Out of bed for toileting- Record patient progress and toleration of activity level   Outcome: Progressing     Problem: DISCHARGE PLANNING  Goal: Discharge to home or other facility with appropriate resources  Description: INTERVENTIONS:- Identify barriers to discharge w/patient and caregiver- Arrange for needed discharge resources and transportation as appropriate- Identify discharge learning needs  (meds, wound care, etc.)- Arrange for interpretive services to assist at discharge as needed- Refer to Case Management Department for coordinating discharge planning if the patient needs post-hospital services based on physician/advanced practitioner order or complex needs related to functional status, cognitive ability, or social support system  Outcome: Progressing     Problem: Knowledge Deficit  Goal: Patient/family/caregiver demonstrates understanding of disease process, treatment plan, medications, and discharge instructions  Description: Complete learning assessment and assess knowledge base.Interventions:- Provide teaching at level of understanding- Provide teaching via preferred learning methods  Outcome: Progressing

## 2025-05-02 NOTE — ASSESSMENT & PLAN NOTE
History of DVT   However during 4/25/2025 [facility] admission found to have bilateral acute versus subacute DVTs

## 2025-05-02 NOTE — CASE MANAGEMENT
Case Management Discharge Planning Note    Patient name Alecia Kay  Location /-01 MRN [id number]  : 1945 Date 2025       Current Admission Date: 2025  Current Admission Diagnosis:Generalized weakness   Patient Active Problem List    Diagnosis Date Noted Date Diagnosed    Acute pulmonary embolism (HCC) 2025     Acute respiratory insufficiency 2025     Hematochezia 2025     Acute DVT (deep venous thrombosis) (HCC) 2025     History of DVT (deep vein thrombosis) 2025     Tachycardia 2025     Nausea and vomiting 2025     Acute cystitis 2025     Generalized weakness 2025     Bilateral lower extremity edema 2025     Severe protein-calorie malnutrition (HCC) 2025     Chronic osteomyelitis of toe of right foot (HCC) 2025     Acute kidney injury (HCC) 2025     Chronic kidney disease, stage III (moderate) (HCC) 2025     Hypothyroidism 2025     Hypertension 2025     Electrolyte imbalance 2025       LOS (days): 7  Geometric Mean LOS (GMLOS) (days): 4  Days to GMLOS:-2.8     OBJECTIVE:  Risk of Unplanned Readmission Score: 24.61         Current admission status: Inpatient   Preferred Pharmacy:   [facility] [address]  [address] [facility] [address]  Phone: [phone] Fax: [phone]    Primary Care Provider: Rambo Perera DO    Primary Insurance: [employer]  Secondary Insurance:     DISCHARGE DETAILS:     Informed patient is being transferred to higher level of care.  Medical necessity was completed and provided to the bedside nurse.

## 2025-05-02 NOTE — PLAN OF CARE
Problem: Prexisting or High Potential for Compromised Skin Integrity  Goal: Skin integrity is maintained or improved  Description: INTERVENTIONS:- Identify patients at risk for skin breakdown- Assess and monitor skin integrity- Assess and monitor nutrition and hydration status- Monitor labs - Assess for incontinence - Turn and reposition patient- Assist with mobility/ambulation- Relieve pressure over bony prominences- Avoid friction and shearing- Provide appropriate hygiene as needed including keeping skin clean and dry- Evaluate need for skin moisturizer/barrier cream- Collaborate with interdisciplinary team - Patient/family teaching- Consider wound care consult   Outcome: Progressing     Problem: PAIN - ADULT  Goal: Verbalizes/displays adequate comfort level or baseline comfort level  Description: Interventions:- Encourage patient to monitor pain and request assistance- Assess pain using appropriate pain scale- Administer analgesics based on type and severity of pain and evaluate response- Implement non-pharmacological measures as appropriate and evaluate response- Consider cultural and social influences on pain and pain management- Notify physician/advanced practitioner if interventions unsuccessful or patient reports new pain  Outcome: Progressing     Problem: INFECTION - ADULT  Goal: Absence or prevention of progression during hospitalization  Description: INTERVENTIONS:- Assess and monitor for signs and symptoms of infection- Monitor lab/diagnostic results- Monitor all insertion sites, i.e. indwelling lines, tubes, and drains- Monitor endotracheal if appropriate and nasal secretions for changes in amount and color- [place] appropriate cooling/warming therapies per order- Administer medications as ordered- Instruct and encourage patient and family to use good hand hygiene technique- Identify and instruct in appropriate isolation precautions for identified infection/condition  Outcome: Progressing  Goal:  Absence of fever/infection during neutropenic period  Description: INTERVENTIONS:- Monitor WBC  Outcome: Progressing     Problem: SAFETY ADULT  Goal: Patient will remain free of falls  Description: INTERVENTIONS:- Educate patient/family on patient safety including physical limitations- Instruct patient to call for assistance with activity - Consult OT/PT to assist with strengthening/mobility - Keep Call bell within reach- Keep bed low and locked with side rails adjusted as appropriate- Keep care items and personal belongings within reach- Initiate and maintain comfort rounds- Make Fall Risk Sign visible to staff- Offer Toileting every    Hours, in advance of need- Initiate/Maintain   alarm- Obtain necessary fall risk management equipment:   - Apply yellow socks and bracelet for high fall risk patients- Consider moving patient to room near nurses station  Outcome: Progressing  Goal: Maintain or return to baseline ADL function  Description: INTERVENTIONS:-  Assess patient's ability to carry out ADLs; assess patient's baseline for ADL function and identify physical deficits which impact ability to perform ADLs (bathing, care of mouth/teeth, toileting, grooming, dressing, etc.)- Assess/evaluate cause of self-care deficits - Assess range of motion- Assess patient's mobility; develop plan if impaired- Assess patient's need for assistive devices and provide as appropriate- Encourage maximum independence but intervene and supervise when necessary- Involve family in performance of ADLs- Assess for home care needs following discharge - Consider OT consult to assist with ADL evaluation and planning for discharge- Provide patient education as appropriate  Outcome: Progressing  Goal: Maintains/Returns to pre admission functional level  Description: INTERVENTIONS:- Perform AM-PAC 6 Click Basic Mobility/ Daily Activity assessment daily.- Set and communicate daily mobility goal to care team and patient/family/caregiver. -  Collaborate with rehabilitation services on mobility goals if consulted- Perform Range of Motion    times a day.- Reposition patient every    hours.- Dangle patient    times a day- Stand patient    times a day- Ambulate patient    times a day- Out of bed to chair    times a day - Out of bed for meals            times a day- Out of bed for toileting- Record patient progress and toleration of activity level   Outcome: Progressing     Problem: DISCHARGE PLANNING  Goal: Discharge to home or other facility with appropriate resources  Description: INTERVENTIONS:- Identify barriers to discharge w/patient and caregiver- Arrange for needed discharge resources and transportation as appropriate- Identify discharge learning needs (meds, wound care, etc.)- Arrange for interpretive services to assist at discharge as needed- Refer to Case Management Department for coordinating discharge planning if the patient needs post-hospital services based on physician/advanced practitioner order or complex needs related to functional status, cognitive ability, or social support system  Outcome: Progressing     Problem: Knowledge Deficit  Goal: Patient/family/caregiver demonstrates understanding of disease process, treatment plan, medications, and discharge instructions  Description: Complete learning assessment and assess knowledge base.Interventions:- Provide teaching at level of understanding- Provide teaching via preferred learning methods  Outcome: Progressing     Problem: Nutrition/Hydration-ADULT  Goal: Nutrient/Hydration intake appropriate for improving, restoring or maintaining nutritional needs  Description: Monitor and assess patient's nutrition/hydration status for malnutrition. Collaborate with interdisciplinary team and initiate plan and interventions as ordered.  Monitor patient's weight and dietary intake as ordered or per policy. Utilize nutrition screening tool and intervene as necessary. Determine patient's food preferences  and provide high-protein, high-caloric foods as appropriate. INTERVENTIONS:- Monitor oral intake, urinary output, labs, and treatment plans- Assess nutrition and hydration status and recommend course of action- Evaluate amount of meals eaten- Assist patient with eating if necessary - Allow adequate time for meals- Recommend/ encourage appropriate diets, oral nutritional supplements, and vitamin/mineral supplements- Order, calculate, and assess calorie counts as needed- Recommend, monitor, and adjust tube feedings and TPN/PPN based on assessed needs- Assess need for intravenous fluids- Provide specific nutrition/hydration education as appropriate- Include patient/family/caregiver in decisions related to nutrition  Outcome: Progressing     Problem: HEMATOLOGIC - ADULT  Goal: Maintains hematologic stability  Description: INTERVENTIONS- Assess for signs and symptoms of bleeding or hemorrhage- Monitor labs- Administer supportive blood products/factors as ordered and appropriate  Outcome: Progressing

## 2025-05-02 NOTE — ASSESSMENT & PLAN NOTE
CT chest PE study: Extensive acute thrombus throughout the right upper middle and lower lobe segmental and distal order pulmonary arteries. RV/LV ratio 0.9  Unclear if PE was POA.   Echo without right heart strain  Suspect current episode of PE/DVT was due to subtherapeutic INR on admission as patient was not taking medications with n/v.   Lifelong AC   Pulm consulted: continue with lifelong AC. Outpatient hematology follow up.

## 2025-05-02 NOTE — ASSESSMENT & PLAN NOTE
Initially presented to [facility] 4/25/2025 for generalized weakness found to have kidney injury and MDR UTI  Renal function has returned back to baseline  Torsemide discontinued during March 2025 hospitalization    Results from last 7 days   Lab Units 05/02/25  1046 05/02/25  0535 05/01/25  0630 04/30/25  0533 04/29/25  0702 04/28/25  0353 04/27/25  0507 04/26/25  0513   BUN mg/dL 7 7 6 6 7 10 15 22   CREATININE mg/dL 0.84 0.78 0.82 0.85 0.97 1.01 1.14 1.43*   EGFR ml/min/1.73sq m 65 71 67 64 54 52 45 34

## 2025-05-02 NOTE — ASSESSMENT & PLAN NOTE
On coumadin for acute DVT and PE   Rapid response called this AM due to large bloody BM with clots in bed  Drop in Hgb 8 > 6.9 s/p 1U pRBCs with repeat 8.5  EGD last week without any evidence of GI bleeding     Concern for diverticular bleed  Consider transfer for higher level care given need for IVC filter and active GI bleeding going into the weekend without GI coverage at this campus

## 2025-05-02 NOTE — ASSESSMENT & PLAN NOTE
Patient states that she has been having nausea and vomiting for the last month or so. Said that she had imaging done and EGD and nothing was definitive.   States that she has nausea and vomiting mostly associated with meals and around mealtime, will trial with reglan scheduled and see if this helps with patient's nausea.   Said that she felt the Reglan improved her symptoms but still having nausea and vomiting.   GI consult: unclear etiology, possibly multifactorial. EGD less than 1 week ago without explanation for symptoms, hiatal hernia could be contributing but unlikely to cause 70 pound weight loss. Agree with remeron. Could consider colonoscopy.  Still with persistent N/V, repeat CT AP with contrast with no acute abnormalities  Speech evaluated patient and recommending puree with thin liquids   Esophagram along with celiac mesenteric doppler - relatively unremarkable. Mild esophageal intermittent spasms  Will trial atarax prior to eating as patient thinks her anxiety is playing a role. May benefit from marinol   Discussed with patient that if oral intake doesn't improve oral intake depending on results of tests, will need to discuss enteral nutrition for patient.

## 2025-05-02 NOTE — PHYSICAL THERAPY NOTE
05/02/25 1000   PT Last Visit   PT Visit Date 05/02/25   Note Type   Note Type Cancelled Session   Cancel Reasons Medical status                                         Physical Therapy Cancellation Note     PT treatment cancelled today due to decline in medical status requiring a Rapid Response to be called followed by transfer to ICU.  Will continue to monitor status and offer PT treatment as ordered when patient is medically stable and appropriate to participate in PT treatment.       Sammi Gardner, PTA

## 2025-05-02 NOTE — QUICK NOTE
Blood consent form obtained with patient at bedside. Given to RN for patient's chart.     Nelly Carnes PA-C

## 2025-05-02 NOTE — ASSESSMENT & PLAN NOTE
May need to decrease metoprolol if recurrent bradycardia  Continue amlodipine with hold parameters  Most recently discontinued during March 2025 hospitalization

## 2025-05-02 NOTE — ASSESSMENT & PLAN NOTE
Extensive right upper and lower lobe pulmonary embolism seen on 4/28/2025 CTA  Does have history of DVT and PE on warfarin 5 mg 5 days a week at 7.5 mg Monday and Friday  Was started on heparin infusion but then had GI bleeding.  Discussed with GI.  As bleeding has stopped will restart heparin infusion    Results from last 7 days   Lab Units 05/02/25  1046 05/02/25  0535 05/01/25  0630 04/30/25  0533   INR  1.57* 1.72* 1.41* 1.50*

## 2025-05-02 NOTE — ASSESSMENT & PLAN NOTE
POA with complaints of generalized weakness and poor appetite. Signed herself out of [facility] on 4/24, was home for one day when she started feeling weaker  Per OP , having cyclic vomiting at home and not taking any of her medications  Admits to poor appetite for the past month, will trial remeron   Had dilation of esophagus outpatient 4/22  Acute cystitis and mild electrolyte derangements with larry on admission  Replenish electrolytes  Flu/covid/rsv negative   CT CAP without acute findings   PT/OT consult: II (Moderate Resource Intensity)

## 2025-05-02 NOTE — ASSESSMENT & PLAN NOTE
Was recently taken off of torsemide during last hospitalization  Caution with gentle hydration at this time  May need diuretics as needed  Venous duplex positive for acute vs subacute DVT bilaterally.

## 2025-05-02 NOTE — TRANSPORTATION MEDICAL NECESSITY
"Section I - General Information    Name of Patient: Alecia Kay                 : 1945    Medicare #: [id number]  Transport Date: 25 (PCS is valid for round trips on this date and for all repetitive trips in the 60-day range as noted below.)  Origin: [facility] INTENSIVE CARE UNIT                                                         Destination: [facility]  Is the pt's stay covered under Medicare Part A (PPS/DRG)   [x]     Closest appropriate facility? If no, why is transport to more distant facility required? Yes  If hospice pt, is this transport related to pt's terminal illness? No       Section II - Medical Necessity Questionnaire  Ambulance transportation is medically necessary only if other means of transport are contraindicated or would be potentially harmful to the patient. To meet this requirement, the patient must either be \"bed confined\" or suffer from a condition such that transport by means other than ambulance is contraindicated by the patient's condition. The following questions must be answered by the medical professional signing below for this form to be valid:    1)  Describe the MEDICAL CONDITION (physical and/or mental) of this patient AT THE TIME OF AMBULANCE TRANSPORT that requires the patient to be transported in an ambulance and why transport by other means is contraindicated by the patient's condition: GI bleed and will need an IVC filter,    2) Is the patient \"bed confined\" as defined below?     Yes  To be \"be confined\" the patient must satisfy all three of the following conditions: (1) unable to get up from bed without Assistance; AND (2) unable to ambulate; AND (3) unable to sit in a chair or wheelchair.    3) Can this patient safely be transported by car or wheelchair van (i.e., seated during transport without a medical attendant or monitoring)?   No    4) In addition to completing questions 1-3 above, please check any of the following conditions that apply*: "   *Note: supporting documentation for any boxes checked must be maintained in the patient's medical records.  If hosp-hosp transfer, describe services needed at 2nd facility not available at 1st facility?   IV meds/fluids required   Medical attendant required   Hemodynamic monitoring required en route    If hosp-hosp transfer, describe services needed at 2nd facility not available at 1st facility?  GI bleed, with blood clots, will need IVC filter         Section III - Signature of Physician or Healthcare Professional  I certify that the above information is true and correct based on my evaluation of this patient, and represent that the patient requires transport by ambulance and that other forms of transport are contraindicated. I understand that this information will be used by the Centers for Medicare and Medicaid Services (CMS) to support the determination of medical necessity for ambulance services, and I represent that I have personal knowledge of the patient's condition at time of transport.    []  If this box is checked, I also certify that the patient is physically or mentally incapable of signing the ambulance service's claim and that the institution with which I am affiliated has furnished care, services, or assistance to the patient.    My signature below is made on behalf of the patient pursuant to 42 CFR §424.36(b)(4). In accordance with 42 CFR §424.37, the specific reason(s) that the patient is physically or mentally incapable of signing the claim form is as follows:       Signature of Physician* or Healthcare Professional______________________________________________________________  Signature Date 05/02/25 (For scheduled repetitive transports, this form is not valid for transports performed more than 60 days after this date)    Printed Name & Credentials of Physician or Healthcare Professional (MD, DO, RN, etc.)_______Dahiana Corrales _________________________  *Form must be signed by patient's  attending physician for scheduled, repetitive transports. For non-repetitive, unscheduled ambulance transports, if unable to obtain the signature of the attending physician, any of the following may sign (choose appropriate option below)  [] Physician Assistant []  Clinical Nurse Specialist []  Registered Nurse  []  Nurse Practitioner  [x] Discharge Planner

## 2025-05-02 NOTE — ASSESSMENT & PLAN NOTE
Baseline around 1.3, admitted at 1.8  Start gentle IVF   Improved and normalized. Will hold ivf at this time to prevent overload   Monitor daily bmp  Avoid nephrotoxic agents and hypotension   Gui resolved

## 2025-05-03 PROBLEM — E87.6 HYPOKALEMIA: Status: ACTIVE | Noted: 2025-05-03

## 2025-05-03 PROBLEM — D62 ACUTE BLOOD LOSS ANEMIA (ABLA): Status: ACTIVE | Noted: 2025-05-03

## 2025-05-03 LAB
ABO GROUP BLD BPU: NORMAL
ALBUMIN SERPL BCG-MCNC: 2.3 G/DL (ref 3.5–5)
ALBUMIN SERPL BCG-MCNC: 2.4 G/DL (ref 3.5–5)
ALP SERPL-CCNC: 49 U/L (ref 34–104)
ALP SERPL-CCNC: 49 U/L (ref 34–104)
ALT SERPL W P-5'-P-CCNC: <3 U/L (ref 7–52)
ALT SERPL W P-5'-P-CCNC: <3 U/L (ref 7–52)
ANION GAP SERPL CALCULATED.3IONS-SCNC: 5 MMOL/L (ref 4–13)
ANION GAP SERPL CALCULATED.3IONS-SCNC: 8 MMOL/L (ref 4–13)
APTT PPP: >210 SECONDS (ref 23–34)
AST SERPL W P-5'-P-CCNC: 11 U/L (ref 13–39)
AST SERPL W P-5'-P-CCNC: 9 U/L (ref 13–39)
BASOPHILS # BLD AUTO: 0.01 THOUSANDS/ÂΜL (ref 0–0.1)
BASOPHILS NFR BLD AUTO: 0 % (ref 0–1)
BILIRUB SERPL-MCNC: 0.46 MG/DL (ref 0.2–1)
BILIRUB SERPL-MCNC: 0.59 MG/DL (ref 0.2–1)
BPU ID: NORMAL
BUN SERPL-MCNC: 8 MG/DL (ref 5–25)
BUN SERPL-MCNC: 8 MG/DL (ref 5–25)
CALCIUM ALBUM COR SERPL-MCNC: 9.2 MG/DL (ref 8.3–10.1)
CALCIUM ALBUM COR SERPL-MCNC: 9.4 MG/DL (ref 8.3–10.1)
CALCIUM SERPL-MCNC: 7.8 MG/DL (ref 8.4–10.2)
CALCIUM SERPL-MCNC: 8.1 MG/DL (ref 8.4–10.2)
CHLORIDE SERPL-SCNC: 106 MMOL/L (ref 96–108)
CHLORIDE SERPL-SCNC: 106 MMOL/L (ref 96–108)
CO2 SERPL-SCNC: 27 MMOL/L (ref 21–32)
CO2 SERPL-SCNC: 28 MMOL/L (ref 21–32)
CREAT SERPL-MCNC: 0.78 MG/DL (ref 0.6–1.3)
CREAT SERPL-MCNC: 0.78 MG/DL (ref 0.6–1.3)
CROSSMATCH: NORMAL
EOSINOPHIL # BLD AUTO: 0.14 THOUSAND/ÂΜL (ref 0–0.61)
EOSINOPHIL NFR BLD AUTO: 3 % (ref 0–6)
ERYTHROCYTE [DISTWIDTH] IN BLOOD BY AUTOMATED COUNT: 14.6 % (ref 11.6–15.1)
ERYTHROCYTE [DISTWIDTH] IN BLOOD BY AUTOMATED COUNT: 14.8 % (ref 11.6–15.1)
ERYTHROCYTE [DISTWIDTH] IN BLOOD BY AUTOMATED COUNT: 14.8 % (ref 11.6–15.1)
GFR SERPL CREATININE-BSD FRML MDRD: 71 ML/MIN/1.73SQ M
GFR SERPL CREATININE-BSD FRML MDRD: 71 ML/MIN/1.73SQ M
GLUCOSE SERPL-MCNC: 129 MG/DL (ref 65–140)
GLUCOSE SERPL-MCNC: 91 MG/DL (ref 65–140)
HCT VFR BLD AUTO: 21.6 % (ref 34.8–46.1)
HCT VFR BLD AUTO: 24.2 % (ref 34.8–46.1)
HCT VFR BLD AUTO: 24.5 % (ref 34.8–46.1)
HCT VFR BLD AUTO: 24.8 % (ref 34.8–46.1)
HGB BLD-MCNC: 7.3 G/DL (ref 11.5–15.4)
HGB BLD-MCNC: 8.3 G/DL (ref 11.5–15.4)
HGB BLD-MCNC: 8.3 G/DL (ref 11.5–15.4)
HGB BLD-MCNC: 8.5 G/DL (ref 11.5–15.4)
IMM GRANULOCYTES # BLD AUTO: 0.06 THOUSAND/UL (ref 0–0.2)
IMM GRANULOCYTES NFR BLD AUTO: 1 % (ref 0–2)
INR PPP: 1.88 (ref 0.85–1.19)
LYMPHOCYTES # BLD AUTO: 1.4 THOUSANDS/ÂΜL (ref 0.6–4.47)
LYMPHOCYTES NFR BLD AUTO: 30 % (ref 14–44)
MAGNESIUM SERPL-MCNC: 1.9 MG/DL (ref 1.9–2.7)
MCH RBC QN AUTO: 29.2 PG (ref 26.8–34.3)
MCH RBC QN AUTO: 29.5 PG (ref 26.8–34.3)
MCH RBC QN AUTO: 30.2 PG (ref 26.8–34.3)
MCHC RBC AUTO-ENTMCNC: 33.5 G/DL (ref 31.4–37.4)
MCHC RBC AUTO-ENTMCNC: 33.8 G/DL (ref 31.4–37.4)
MCHC RBC AUTO-ENTMCNC: 34.7 G/DL (ref 31.4–37.4)
MCV RBC AUTO: 86 FL (ref 82–98)
MCV RBC AUTO: 87 FL (ref 82–98)
MCV RBC AUTO: 88 FL (ref 82–98)
MONOCYTES # BLD AUTO: 0.42 THOUSAND/ÂΜL (ref 0.17–1.22)
MONOCYTES NFR BLD AUTO: 9 % (ref 4–12)
NEUTROPHILS # BLD AUTO: 2.69 THOUSANDS/ÂΜL (ref 1.85–7.62)
NEUTS SEG NFR BLD AUTO: 57 % (ref 43–75)
NRBC BLD AUTO-RTO: 2 /100 WBCS
PHOSPHATE SERPL-MCNC: 2.5 MG/DL (ref 2.3–4.1)
PLATELET # BLD AUTO: 143 THOUSANDS/UL (ref 149–390)
PLATELET # BLD AUTO: 144 THOUSANDS/UL (ref 149–390)
PLATELET # BLD AUTO: 150 THOUSANDS/UL (ref 149–390)
PMV BLD AUTO: 10.1 FL (ref 8.9–12.7)
PMV BLD AUTO: 10.3 FL (ref 8.9–12.7)
PMV BLD AUTO: 9.6 FL (ref 8.9–12.7)
POTASSIUM SERPL-SCNC: 2.9 MMOL/L (ref 3.5–5.3)
POTASSIUM SERPL-SCNC: 3.5 MMOL/L (ref 3.5–5.3)
PROT SERPL-MCNC: 4.5 G/DL (ref 6.4–8.4)
PROT SERPL-MCNC: 4.9 G/DL (ref 6.4–8.4)
PROTHROMBIN TIME: 21.6 SECONDS (ref 12.3–15)
RBC # BLD AUTO: 2.5 MILLION/UL (ref 3.81–5.12)
RBC # BLD AUTO: 2.81 MILLION/UL (ref 3.81–5.12)
RBC # BLD AUTO: 2.81 MILLION/UL (ref 3.81–5.12)
SODIUM SERPL-SCNC: 138 MMOL/L (ref 135–147)
SODIUM SERPL-SCNC: 142 MMOL/L (ref 135–147)
UNIT DISPENSE STATUS: NORMAL
UNIT PRODUCT CODE: NORMAL
UNIT PRODUCT VOLUME: 350 ML
UNIT RH: NORMAL
WBC # BLD AUTO: 4.72 THOUSAND/UL (ref 4.31–10.16)
WBC # BLD AUTO: 4.95 THOUSAND/UL (ref 4.31–10.16)
WBC # BLD AUTO: 5.48 THOUSAND/UL (ref 4.31–10.16)

## 2025-05-03 PROCEDURE — 85027 COMPLETE CBC AUTOMATED: CPT | Performed by: INTERNAL MEDICINE

## 2025-05-03 PROCEDURE — 85610 PROTHROMBIN TIME: CPT | Performed by: INTERNAL MEDICINE

## 2025-05-03 PROCEDURE — 85014 HEMATOCRIT: CPT

## 2025-05-03 PROCEDURE — 85730 THROMBOPLASTIN TIME PARTIAL: CPT | Performed by: INTERNAL MEDICINE

## 2025-05-03 PROCEDURE — 80053 COMPREHEN METABOLIC PANEL: CPT

## 2025-05-03 PROCEDURE — 97167 OT EVAL HIGH COMPLEX 60 MIN: CPT

## 2025-05-03 PROCEDURE — 85018 HEMOGLOBIN: CPT

## 2025-05-03 PROCEDURE — 80053 COMPREHEN METABOLIC PANEL: CPT | Performed by: INTERNAL MEDICINE

## 2025-05-03 PROCEDURE — 94760 N-INVAS EAR/PLS OXIMETRY 1: CPT

## 2025-05-03 PROCEDURE — 99232 SBSQ HOSP IP/OBS MODERATE 35: CPT | Performed by: INTERNAL MEDICINE

## 2025-05-03 PROCEDURE — 97535 SELF CARE MNGMENT TRAINING: CPT

## 2025-05-03 PROCEDURE — 94762 N-INVAS EAR/PLS OXIMTRY CONT: CPT

## 2025-05-03 PROCEDURE — 85025 COMPLETE CBC W/AUTO DIFF WBC: CPT

## 2025-05-03 PROCEDURE — 97530 THERAPEUTIC ACTIVITIES: CPT

## 2025-05-03 PROCEDURE — 83735 ASSAY OF MAGNESIUM: CPT | Performed by: INTERNAL MEDICINE

## 2025-05-03 PROCEDURE — 99222 1ST HOSP IP/OBS MODERATE 55: CPT | Performed by: INTERNAL MEDICINE

## 2025-05-03 PROCEDURE — 84100 ASSAY OF PHOSPHORUS: CPT | Performed by: INTERNAL MEDICINE

## 2025-05-03 RX ORDER — POTASSIUM CHLORIDE 1500 MG/1
20 TABLET, EXTENDED RELEASE ORAL ONCE
Status: COMPLETED | OUTPATIENT
Start: 2025-05-03 | End: 2025-05-03

## 2025-05-03 RX ORDER — SODIUM CHLORIDE, SODIUM GLUCONATE, SODIUM ACETATE, POTASSIUM CHLORIDE, MAGNESIUM CHLORIDE, SODIUM PHOSPHATE, DIBASIC, AND POTASSIUM PHOSPHATE .53; .5; .37; .037; .03; .012; .00082 G/100ML; G/100ML; G/100ML; G/100ML; G/100ML; G/100ML; G/100ML
1000 INJECTION, SOLUTION INTRAVENOUS ONCE
Status: COMPLETED | OUTPATIENT
Start: 2025-05-03 | End: 2025-05-04

## 2025-05-03 RX ADMIN — PANTOPRAZOLE SODIUM 40 MG: 40 INJECTION, POWDER, FOR SOLUTION INTRAVENOUS at 09:58

## 2025-05-03 RX ADMIN — METOPROLOL TARTRATE 50 MG: 50 TABLET, FILM COATED ORAL at 09:57

## 2025-05-03 RX ADMIN — SODIUM CHLORIDE, SODIUM GLUCONATE, SODIUM ACETATE, POTASSIUM CHLORIDE, MAGNESIUM CHLORIDE, SODIUM PHOSPHATE, DIBASIC, AND POTASSIUM PHOSPHATE 1000 ML: .53; .5; .37; .037; .03; .012; .00082 INJECTION, SOLUTION INTRAVENOUS at 20:40

## 2025-05-03 RX ADMIN — HYDROXYZINE HYDROCHLORIDE 25 MG: 25 TABLET, FILM COATED ORAL at 17:38

## 2025-05-03 RX ADMIN — HYDROXYZINE HYDROCHLORIDE 25 MG: 25 TABLET, FILM COATED ORAL at 06:10

## 2025-05-03 RX ADMIN — POTASSIUM CHLORIDE 20 MEQ: 1500 TABLET, EXTENDED RELEASE ORAL at 22:56

## 2025-05-03 RX ADMIN — POLYETHYLENE GLYCOL 3350, SODIUM SULFATE ANHYDROUS, SODIUM BICARBONATE, SODIUM CHLORIDE, POTASSIUM CHLORIDE 4000 ML: 236; 22.74; 6.74; 5.86; 2.97 POWDER, FOR SOLUTION ORAL at 11:43

## 2025-05-03 RX ADMIN — LEVOTHYROXINE SODIUM 75 MCG: 75 TABLET ORAL at 06:10

## 2025-05-03 RX ADMIN — MIRTAZAPINE 7.5 MG: 7.5 TABLET, FILM COATED ORAL at 20:45

## 2025-05-03 RX ADMIN — HEPARIN SODIUM 12 UNITS/KG/HR: 10000 INJECTION, SOLUTION INTRAVENOUS at 15:17

## 2025-05-03 RX ADMIN — POTASSIUM CHLORIDE 40 MEQ: 1500 TABLET, EXTENDED RELEASE ORAL at 17:38

## 2025-05-03 RX ADMIN — PANTOPRAZOLE SODIUM 40 MG: 40 INJECTION, POWDER, FOR SOLUTION INTRAVENOUS at 20:45

## 2025-05-03 RX ADMIN — POTASSIUM CHLORIDE 40 MEQ: 1500 TABLET, EXTENDED RELEASE ORAL at 09:57

## 2025-05-03 RX ADMIN — AMLODIPINE BESYLATE 10 MG: 10 TABLET ORAL at 09:57

## 2025-05-03 RX ADMIN — PRAVASTATIN SODIUM 80 MG: 80 TABLET ORAL at 17:38

## 2025-05-03 RX ADMIN — HYDROXYZINE HYDROCHLORIDE 25 MG: 25 TABLET, FILM COATED ORAL at 11:42

## 2025-05-03 NOTE — ASSESSMENT & PLAN NOTE
Imaging demonstrating extensive right and lower lobe pulmonary embolism on/20 8/2025 CTA.  Previously on warfarin at home.  Currently on heparin drip.

## 2025-05-03 NOTE — OCCUPATIONAL THERAPY NOTE
Occupational Therapy Evaluation     Patient Name: Alecia Kay  Today's Date: 5/3/2025  Problem List  Principal Problem:    Hematochezia  Active Problems:    History of osteomyelitis    Chronic kidney disease, stage III (moderate) (HCC)    Hypothyroidism    Hypertension    Nausea and vomiting    Acute DVT (deep venous thrombosis) (HCC)    Acute pulmonary embolism (HCC)    Acute respiratory insufficiency    Acute blood loss anemia (ABLA)    Hypokalemia    Past Medical History  Past Medical History:   Diagnosis Date    Anemia     Cellulitis     Disease of thyroid gland     GERD (gastroesophageal reflux disease)     Hyperlipidemia     Hypertension     Obesity     Osteomyelitis (HCC)     Pneumonia     Pulmonary embolism (HCC)      Past Surgical History  Past Surgical History:   Procedure Laterality Date    FOOT SURGERY      JOINT REPLACEMENT          05/03/25 1104   OT Last Visit   OT Visit Date 05/03/25   Note Type   Note type Evaluation + Treatment   Pain Assessment   Pain Assessment Tool 0-10   Pain Score No Pain  (at rest, c/o back pain with ptolonged unsupported sitting)   Restrictions/Precautions   RLE Weight Bearing Per Order WBAT  (in surgical shoe per EMR)   Braces or Orthoses Other (Comment)  (surgical shoe)   Other Precautions Contact/isolation;Cognitive;Chair Alarm;Multiple lines;Telemetry;Fall Risk;O2   Home Living   Type of Home House   Home Layout Two level;Performs ADLs on one level;Able to live on main level with bedroom/bathroom   Bathroom Shower/Tub Walk-in shower   Bathroom Toilet Raised   Bathroom Equipment Shower chair;Grab bars in shower;Grab bars around toilet   Bathroom Accessibility Accessible via walker   Home Equipment Walker;Wheelchair-manual  (lift recliner)   Prior Function   Level of Elizabeth Needs assistance with ADLs;Needs assistance with functional mobility;Needs assistance with IADLS  (Prior to January 2025, was (I) with ADLs/IADLs. Was requiring (A) at rehab. Pt signed  "herself out of rehab due to insurance barriers, requiring assistance with all upon d/c.)   Lives With Family  (Nephew)   Receives Help From Family   IADLs Family/Friend/Other provides meals;Family/Friend/Other provides transportation;Family/Friend/Other provides medication management   Falls in the last 6 months 0   Vocational Retired   Comments Pt reports nephew is home at al times. Conflicting information retrieved from evaluation at [facility] - pt reports was in rehab for ~3 months, was (I) prior to Jan 2025.   Lifestyle   Autonomy Pt reports that prior to January 2025, she was mostly independent however therapy evaluations at [facility] suggest pt was at rehab for ~1 year. Pt/family reports pt had to sign herself out of rehab due to insurance coverage. She resides in a 2SH with the support of her nephew. She has 1 RHODA. She is able to reside on main floor with full bed/bath, although sleeps in a lift chair. Walk in shower with SC and GB, raised toilet with GB. Pt has been needing assistance with ADLs / IADLs / functional mobility. She denies falls. (-) .   Reciprocal Relationships Nephew, son   Service to Others Retired   Intrinsic Gratification watching TV   General   Additional Pertinent History Comorbidities affecting pt’s functional performance include a significant PMH of: h/o OM, CKD3, HTN, weakness, b/l LE edema.  Patient with active OT orders and activity orders for OOB to chair.   Family/Caregiver Present Yes  (son, additional family member present for beginning of session)   Subjective   Subjective \"I've been pooping a lot\"   ADL   Where Assessed Edge of bed   Eating Assistance 5  Supervision/Setup   Grooming Assistance 4  Minimal Assistance   UB Bathing Assistance 3  Moderate Assistance   LB Bathing Assistance 2  Maximal Assistance   UB Dressing Assistance 3  Moderate Assistance   LB Dressing Assistance 1  Total Assistance   Toileting Assistance  1  Total Assistance   Bed Mobility   Rolling R 4  Minimal " assistance   Additional items Assist x 1;Bedrails;Increased time required;Verbal cues   Rolling L 4  Minimal assistance   Additional items Assist x 1;Bedrails;Increased time required;Verbal cues   Supine to Sit 3  Moderate assistance   Additional items Assist x 1;Bedrails;Increased time required;Verbal cues;Other  (trunk)   Sit to Supine 3  Moderate assistance   Additional items Assist x 2;Increased time required;Verbal cues;LE management;Other  (trunk)   Additional Comments Once sitting EOB, pt reports dizziness. Completed therex and with prolonged sitting, pt continues to experience dizzines. Checked BP - 62/48. Given hypotension, unable to progress to OOB thus assisted pt back to bed with Ax2.   Transfers   Sit to Stand Unable to assess   Stand to Sit Unable to assess   Functional Mobility   Additional Comments unable to assess   Balance   Static Sitting Fair   Dynamic Sitting Fair -   Activity Tolerance   Activity Tolerance Treatment limited secondary to medical complications (Comment)  (Pt incontinent of bowel - bloody and loose. Hypotension. RN and DO aware.)   Medical Staff Made Aware PT, DO, RN   Nurse Made Aware KARIS Lloyd cleared for therapy evaluation; DO requesting eval   RUE Assessment   RUE Assessment WFL  (grossly 4-/5)   LUE Assessment   LUE Assessment WFL  (grossly 4-/5)   Sensation   Light Touch No apparent deficits   Vision-Basic Assessment   Current Vision No visual deficits   Vision - Complex Assessment   Ocular Range of Motion Intact   Perception   Inattention/Neglect Appears intact   Cognition   Overall Cognitive Status Impaired   Arousal/Participation Responsive;Cooperative   Attention Attends with cues to redirect   Orientation Level Oriented to person;Oriented to place;Disoriented to time;Disoriented to situation   Memory Decreased short term memory;Decreased recall of precautions;Decreased recall of recent events   Following Commands Follows one step commands with increased time or repetition    Assessment   Limitation Decreased ADL status;Decreased UE strength;Decreased Safe judgement during ADL;Decreased cognition;Decreased endurance;Decreased self-care trans;Decreased high-level ADLs   Prognosis Fair;Guarded   Assessment Patient is a 80 y.o. year old female seen for OT eval s/p admit to [facility] on 5/2/2025 with hematochezia, acute DVT and PE, acute respiratory insufficiency, ABLA.  OT consulted to assess ADLs/IADLs/functional mobility and assist w/ D/C planning. Unclear recent functional status. Patient demonstrates the following deficits impacting occupational performance: weakness, decreased strength , decreased balance, decreased activity tolerance, limited functional reach, impaired GMC, impaired problem solving, decreased safety awareness, hypotension, dizziness, increased body habitus , impaired coordination, decreased cardiovascular endurance, and decreased skin integrity . These impairments, as well at pt’s RHODA home environment, difficulty performing ADLs, difficulty performing IADLs, difficulty performing transfers/mobility, limited insight into deficits, compliance, fall risk , functional decline , advanced age, and multiple admissions , limit pt’s ability to safely engage in all baseline areas of occupation. Pt's CLOF as follows: eating/grooming: supervision  and [place], UB ADLs: modA, LB ADLs: maxA and dependent, toileting: dependent, bed mobility: [place] for rolling L / R, modAx1 supine>sit, modAx2 sit>supine, functional transfers: unable to assess, functional mobility: unable to assess, sitting/standing tolerance: 9 min. Pt would benefit from continued skilled OT while in acute setting to address deficits as defined above and to maximize (I) w/ ADLs/functional mobility. Occupational performance areas to address include: eating, grooming, bathing/shower, toilet hygiene, dressing, medication management, socialization, health maintenance, functional mobility, community mobility, and clothing  "management. Based on the aforementioned evaluation, functional performance deficits, and assessments, pt has been identified as a high complexity evaluation. At this time, recommendation for pt to receive post-acute rehabilitation services at a Level II (moderate resource intensity) due to above deficits and CLOF. OT will continue to follow pt 2-5x/wk to address the goals listed below to  w/in 10-14 days.   Goals   Patient Goals return home   LTG Time Frame 10-14   Plan   Treatment Interventions ADL retraining;Functional transfer training;UE strengthening/ROM;Endurance training;Cognitive reorientation;Patient/family training;Equipment evaluation/education;Neuromuscular reeducation;Compensatory technique education;Continued evaluation;Activityengagement;Energy conservation   Goal Expiration Date 25   OT Treatment Day 0   OT Frequency 2-3x/wk;3-5x/wk   Discharge Recommendation   Rehab Resource Intensity Level, OT II (Moderate Resource Intensity)   AM-PAC Daily Activity Inpatient   Lower Body Dressing 1   Bathing 2   Toileting 1   Upper Body Dressing 2   Grooming 3   Eating 3   Daily Activity Raw Score 12   Daily Activity Standardized Score (Calc for Raw Score >=11) 30.6   AM-PAC Applied Cognition Inpatient   Following a Speech/Presentation 3   Understanding Ordinary Conversation 3   Taking Medications 3   Remembering Where Things Are Placed or Put Away 3   Remembering List of 4-5 Errands 2   Taking Care of Complicated Tasks 2   Applied Cognition Raw Score 16   Applied Cognition Standardized Score 35.03   Additional Treatment Session   Start Time 1040   End Time 1104   Treatment Assessment Pt incontinent of bowel - diarrhea appeared with large amount of blood. Reported this to nursing staff. Several rolls L <> R to clean/replace pads. Supine>sit with modAx1. Once EOB, pt does report \"a little dizziness\" - completed b/l UE therex and ankle pumps. Pt continues to report dizziness thus obtained BP: 62/48. " Conversational and following commands. Returned pt to supine, HOB flat. Continues with therex to manage BP sx. BP supine- 109/54. Did elevate HOB with 30* with BP 95/50. Did make RN aware of each BP and positioning. Pt left with CM. Will continue to see pt for OT. Bed alarmed at end of session with call bell within reach.   Additional Treatment Day 1     Occupational Therapy goals: In 7-14 days:     1- Patient will verbalize and demonstrate use of energy conservation/deep breathing technique and work simplification skills during functional activity with no verbal cues.   2- Patient will verbalize and demonstrate good body mechanics and joint protection techniques during ADLs/IADLs with no verbal cues   3- Pt will complete bed mobility at a Mod I level w/ G balance/safety demonstrated to decrease caregiver assistance required   4- Patient will increase OOB/ sitting tolerance to 2-4 hours per day for increased participation in self care and leisure tasks with no s/s of exertion.   5-Patient will increase standing tolerance time to 5 minutes with unilateral UE support to complete sink level ADLs@ mod I level    6- Pt will improve functional transfers to Mod I on/off all surfaces using DME as needed w/ G balance/safety   7- Patient will complete UB ADLs with Chet utilizing appropriate DME/AE PRN   8- Patient will complete LB ADLs with Chet utilizing appropriate DME/AE PRN   9- Patient will complete toileting tasks with Chet with G hygiene/thoroughness utilizing appropriate DME/AE PRN   10- Pt will improve functional mobility during ADL/IADL/leisure tasks to Mod I using DME as needed w/ G balance/safety    11- Pt will be attentive 100% of the time during ongoing cognitive assessment w/ G participation to assist w/ safe d/c planning/recommendations   12- Pt will participate in simulated IADL management task to increase independence to Mod I w/ G safety and endurance   13- Pt will increase BUE strength by 1MM grade via  AROM/AAROM/PROM exercises to increase independence in ADLs and transfers       Deana Garcia, OTR/L

## 2025-05-03 NOTE — PLAN OF CARE
Problem: OCCUPATIONAL THERAPY ADULT  Goal: Performs self-care activities at highest level of function for planned discharge setting.  See evaluation for individualized goals.  Description: Treatment Interventions: ADL retraining, Functional transfer training, UE strengthening/ROM, Endurance training, Cognitive reorientation, Patient/family training, Equipment evaluation/education, Neuromuscular reeducation, Compensatory technique education, Continued evaluation, Activityengagement, Energy conservation          See flowsheet documentation for full assessment, interventions and recommendations.   Note: Limitation: Decreased ADL status, Decreased UE strength, Decreased Safe judgement during ADL, Decreased cognition, Decreased endurance, Decreased self-care trans, Decreased high-level ADLs  Prognosis: Fair, Guarded  Assessment: Patient is a 80 y.o. year old female seen for OT eval s/p admit to [facility] on 5/2/2025 with hematochezia, acute DVT and PE, acute respiratory insufficiency, ABLA.  OT consulted to assess ADLs/IADLs/functional mobility and assist w/ D/C planning. Unclear recent functional status. Patient demonstrates the following deficits impacting occupational performance: weakness, decreased strength , decreased balance, decreased activity tolerance, limited functional reach, impaired GMC, impaired problem solving, decreased safety awareness, hypotension, dizziness, increased body habitus , impaired coordination, decreased cardiovascular endurance, and decreased skin integrity . These impairments, as well at pt’s RHODA home environment, difficulty performing ADLs, difficulty performing IADLs, difficulty performing transfers/mobility, limited insight into deficits, compliance, fall risk , functional decline , advanced age, and multiple admissions , limit pt’s ability to safely engage in all baseline areas of occupation. Pt's CLOF as follows: eating/grooming: supervision  and [place], UB ADLs: modA, LB ADLs: maxA and  dependent, toileting: dependent, bed mobility: [place] for rolling L / R, modAx1 supine>sit, modAx2 sit>supine, functional transfers: unable to assess, functional mobility: unable to assess, sitting/standing tolerance: 9 min. Pt would benefit from continued skilled OT while in acute setting to address deficits as defined above and to maximize (I) w/ ADLs/functional mobility. Occupational performance areas to address include: eating, grooming, bathing/shower, toilet hygiene, dressing, medication management, socialization, health maintenance, functional mobility, community mobility, and clothing management. Based on the aforementioned evaluation, functional performance deficits, and assessments, pt has been identified as a high complexity evaluation. At this time, recommendation for pt to receive post-acute rehabilitation services at a Level II (moderate resource intensity) due to above deficits and CLOF. OT will continue to follow pt 2-5x/wk to address the goals listed below to  w/in 10-14 days.     Rehab Resource Intensity Level, OT: II (Moderate Resource Intensity)

## 2025-05-03 NOTE — ASSESSMENT & PLAN NOTE
Patient with prior history of DVT.  Found to have acute versus subacute bilateral DVTs this admission.  Currently on heparin drip.

## 2025-05-03 NOTE — PROGRESS NOTES
Progress Note - Hospitalist   Name: Alecia Kay 80 y.o. female I MRN: [id number]  Unit/Bed#: E4 -01 I Date of Admission: 5/2/2025   Date of Service: 5/3/2025 I Hospital Day: 1    Assessment & Plan  Hematochezia  History of hypertension DVT PE CKD 3 hypothyroidism and right heel osteomyelitis initially presented to [facility] 4/25/2025 for generalized weakness  Patient did report she was at [facility] but signed out due to running out of SNF coverage and having to self-pay  Initially found to have kidney injury and MDR UTI completed course of ertapenem  Subsequently found to have DVT and PE and started on heparin infusion as patient's INR was subtherapeutic  Patient then had hematochezia and was a rapid response  Due to lack of GI services patient has been transferred here   Starting CLD restarting heparin infusion monitoring for any signs of bleeding  Disposition: PT/OT reevaluate for discharge needs  Acute blood loss anemia (ABLA)  Available baseline hemoglobin appears closer to 10.0  Blood loss anemia due to rectal bleeding  Was transfused 1 unit PRBC 5/3/2025    Results from last 7 days   Lab Units 05/03/25  0606 05/03/25  0005 05/02/25  1620 05/02/25  1046 05/02/25  0535 05/01/25  1752   HEMOGLOBIN g/dL 8.3* 8.3* 8.4* 8.5* 6.9* 8.0*     Acute respiratory insufficiency  Likely secondary to pulmonary embolism  Currently on 2 L nasal cannula  Acute pulmonary embolism (HCC)  Extensive right upper and lower lobe pulmonary embolism seen on 4/28/2025 CTA  Does have history of DVT and PE on warfarin 5 mg 5 days a week at 7.5 mg Monday and Friday  Was started on heparin infusion but then had GI bleeding.  Discussed with GI on admission given stability restart heparin drip    Results from last 7 days   Lab Units 05/03/25  0606 05/02/25  1046 05/02/25  0535 05/01/25  0630   INR  1.88* 1.57* 1.72* 1.41*     Hypokalemia  May be secondary to multiple loose bowel movements.  Added banana flakes  Replace  potassium and recheck tomorrow    Results from last 7 days   Lab Units 05/03/25  0606 05/03/25  0005 05/02/25  1620 05/02/25  1046   HEMOGLOBIN g/dL 8.3* 8.3* 8.4* 8.5*     Acute DVT (deep venous thrombosis) ([facility])  History of DVT   However during 4/25/2025 [facility] admission found to have bilateral acute vs subacute DVTs  Nausea and vomiting  Initial reason for presentation to hospital 4/25/2025  Patient reports no dysphagia but has appetite loss  Smell of food makes her nauseous  GI consulted  History of osteomyelitis  History of right heel osteomyelitis did see podiatry.  MRI negative for acute osteomyelitis  Chronic kidney disease, stage III (moderate) ([facility])  Initially presented to [facility] 4/25/2025 for generalized weakness found to have kidney injury and MDR UTI  Renal function has returned back to baseline  Torsemide discontinued during March 2025 hospitalization    Results from last 7 days   Lab Units 05/03/25  0606 05/02/25  1046 05/02/25  0535 05/01/25  0630 04/30/25  0533 04/29/25  0702 04/28/25  0353 04/27/25  0507   BUN mg/dL 8 7 7 6 6 7 10 15   CREATININE mg/dL 0.78 0.84 0.78 0.82 0.85 0.97 1.01 1.14   EGFR ml/min/1.73sq m 71 65 71 67 64 54 52 45     Hypothyroidism  Continue levothyroxine  Hypertension  May need to decrease metoprolol if recurrent bradycardia  Continue amlodipine with hold parameters  Torsemide recently discontinued during March 2025 hospitalization    VTE Pharmacologic Prophylaxis: VTE Score: 6 High Risk (Score >/= 5) - Pharmacological DVT Prophylaxis Ordered: heparin drip. Sequential Compression Devices Ordered.    Mobility:   Basic Mobility Inpatient Raw Score: 11  JH-HLM Goal: 4: Move to chair/commode  JH-HLM Achieved: 2: Bed activities/Dependent transfer  JH-HLM Goal NOT achieved. Continue with multidisciplinary rounding and encourage appropriate mobility to improve upon JH-HLM goals.    Patient Centered Rounds: I have performed bedside rounds with nursing staff today.  Discussions with  Specialists or Other Care Team Provider:     Education and Discussions with Family / Patient: Updated  (son) via phone.    Current Length of Stay: 1 day(s)  Current Patient Status: Inpatient   Certification Statement: The patient will continue to require additional inpatient hospital stay due to GI bleeding workup  Discharge Plan: Anticipate discharge in >72 hrs to discharge location to be determined pending rehab evaluations.    Code Status: Level 1 - Full Code    Subjective   Patient seen and examined.  Having multiple loose bowel movements    Objective   Vitals:   Temp (24hrs), Av.3 °F (36.3 °C), Min:96.8 °F (36 °C), Max:98 °F (36.7 °C)    Temp:  [96.8 °F (36 °C)-98 °F (36.7 °C)] 97.2 °F (36.2 °C)  HR:  [44-74] 60  Resp:  [14-24] 20  BP: (114-158)/(58-76) 114/64  SpO2:  [92 %-99 %] 93 %  Body mass index is 31.45 kg/m².     Input and Output Summary (last 24 hours):     Intake/Output Summary (Last 24 hours) at 5/3/2025 0917  Last data filed at 2025 2300  Gross per 24 hour   Intake 240 ml   Output 500 ml   Net -260 ml       Physical Exam  Vitals reviewed.   Constitutional:       General: She is not in acute distress.     Appearance: Normal appearance.   HENT:      Head: Atraumatic.   Eyes:      General: No scleral icterus.     Extraocular Movements: Extraocular movements intact.   Cardiovascular:      Rate and Rhythm: Regular rhythm.      Heart sounds:      No gallop.   Pulmonary:      Effort: No respiratory distress.      Breath sounds: No stridor. Decreased breath sounds present.   Abdominal:      General: Bowel sounds are normal.      Palpations: Abdomen is soft.      Tenderness: There is no guarding or rebound.   Musculoskeletal:         General: No tenderness or deformity.   Skin:     General: Skin is warm.      Coloration: Skin is not jaundiced.   Neurological:      General: No focal deficit present.      Mental Status: She is alert.      Motor: No weakness.   Psychiatric:         Mood  and Affect: Mood normal.       Lines/Drains:  Invasive Devices       Peripheral Intravenous Line  Duration             Peripheral IV 05/02/25 Left;Ventral (anterior) Forearm <1 day    Peripheral IV 05/02/25 Proximal;Right;Ventral (anterior) Forearm <1 day              Long-dwell Peripherally Inserted Midline IV Catheter  Duration             Long-Dwell Peripheral IV (Midline) 04/28/25 Left Brachial 4 days                      Telemetry:  Telemetry Orders (From admission, onward)               24 Hour Telemetry Monitoring  Continuous x 24 Hours (Telem)        Expiring   Question:  Reason for 24 Hour Telemetry  Answer:  Metabolic/electrolyte disturbance with high probability of dysrhythmia. K level <3 or >6 OR KCL infusion >10mEq/hr                     Telemetry Reviewed:   Indication for Continued Telemetry Use:                Lab Results: I have reviewed the following results:   Results from last 7 days   Lab Units 05/03/25  0606 05/03/25  0005 05/02/25  1620 05/02/25  1046 05/02/25  0535   WBC Thousand/uL 5.48  --   --  4.94 3.96*   HEMOGLOBIN g/dL 8.3* 8.3* 8.4* 8.5* 6.9*   PLATELETS Thousands/uL 150  --   --  148* 156   MCV fL 88  --   --  88 86   INR  1.88*  --   --  1.57* 1.72*     Results from last 7 days   Lab Units 05/03/25  0606 05/02/25  1046 05/02/25  0535 04/29/25  2332 04/29/25  0702   SODIUM mmol/L 142 140 141   < > 142   POTASSIUM mmol/L 2.9* 3.0* 2.9*   < > 2.8*   CHLORIDE mmol/L 106 107 106   < > 107   CO2 mmol/L 28 27 27   < > 25   ANION GAP mmol/L 8 6 8   < > 10   BUN mg/dL 8 7 7   < > 7   CREATININE mg/dL 0.78 0.84 0.78   < > 0.97   CALCIUM mg/dL 8.1* 7.2* 6.8*   < > 6.9*   ALBUMIN g/dL 2.4*  --   --   --  2.6*   TOTAL BILIRUBIN mg/dL 0.59  --   --   --  0.35   ALK PHOS U/L 49  --   --   --  45   ALT U/L <3*  --   --   --  <3*   AST U/L 9*  --   --   --  9*   EGFR ml/min/1.73sq m 71 65 71   < > 54   GLUCOSE RANDOM mg/dL 91 87 83   < > 84    < > = values in this interval not displayed.     Results  from last 7 days   Lab Units 05/03/25  0606 05/02/25  1046 05/01/25  0630 04/30/25  1532 04/30/25  0533 04/29/25  0702 04/28/25  0353   MAGNESIUM mg/dL 1.9 1.6* 1.9  --  1.3*  --  2.0   PHOSPHORUS mg/dL 2.5  --  2.6 3.9 2.0* 1.7*  --                       Results from last 7 days   Lab Units 05/02/25  1001   POC GLUCOSE mg/dl 88               Recent Cultures (last 7 days):   Results from last 7 days   Lab Units 04/28/25  0422   C DIFF TOXIN B BY PCR  Negative       Imaging:  Reviewed radiology reports from this admission including:  No results found.    Last 24 Hours Medication List:     Current Facility-Administered Medications:     acetaminophen (TYLENOL) tablet 650 mg, Q6H PRN    amLODIPine (NORVASC) tablet 10 mg, Daily    heparin (porcine) 25,000 units in 0.45% NaCl 250 mL infusion (premix), Titrated, Last Rate: 15 Units/kg/hr (05/03/25 0639)    heparin (porcine) injection 3,000 Units, Q6H PRN    heparin (porcine) injection 6,000 Units, Q6H PRN    hydrOXYzine HCL (ATARAX) tablet 25 mg, TID AC    levothyroxine tablet 75 mcg, Early Morning    meclizine (ANTIVERT) tablet 25 mg, TID PRN    metoprolol tartrate (LOPRESSOR) tablet 50 mg, Q12H MELANY    mirtazapine (REMERON) tablet 7.5 mg, HS    ondansetron (ZOFRAN) injection 4 mg, Q4H PRN    pantoprazole (PROTONIX) injection 40 mg, Q12H MELANY    potassium chloride (Klor-Con M20) CR tablet 40 mEq, BID    pravastatin (PRAVACHOL) tablet 80 mg, Daily With Dinner    [Held by provider] senna-docusate sodium (SENOKOT S) 8.6-50 mg per tablet 1 tablet, Daily    [Held by provider] warfarin (COUMADIN) tablet 5 mg, Once per day on Sunday Tuesday Wednesday Thursday Saturday    [Held by provider] warfarin (COUMADIN) tablet 7.5 mg, Once per day on Monday Friday    Administrative Statements   Today, Patient Was Seen By: Calderon Friedman, DO  I have spent a total time of 40 minutes in caring for this patient on the day of the visit/encounter including Patient and family education, Counseling /  Coordination of care, Documenting in the medical record, and Reviewing/placing orders in the medical record (including tests, medications, and/or procedures).    **Please Note: This note may have been constructed using a voice recognition system.**

## 2025-05-03 NOTE — ASSESSMENT & PLAN NOTE
May need to decrease metoprolol if recurrent bradycardia  Continue amlodipine with hold parameters  Torsemide recently discontinued during March 2025 hospitalization

## 2025-05-03 NOTE — ASSESSMENT & PLAN NOTE
Lab Results   Component Value Date    EGFR 71 05/03/2025    EGFR 65 05/02/2025    EGFR 71 05/02/2025    CREATININE 0.78 05/03/2025    CREATININE 0.84 05/02/2025    CREATININE 0.78 05/02/2025

## 2025-05-03 NOTE — ASSESSMENT & PLAN NOTE
History of hypertension DVT PE CKD 3 hypothyroidism and right heel osteomyelitis initially presented to [facility] 4/25/2025 for generalized weakness  Patient did report she was at [facility] but signed out due to running out of SNF coverage and having to self-pay  Initially found to have kidney injury and MDR UTI completed course of ertapenem  Subsequently found to have DVT and PE and started on heparin infusion as patient's INR was subtherapeutic  Patient then had hematochezia and was a rapid response  Due to lack of GI services patient has been transferred here   Starting CLD restarting heparin infusion monitoring for any signs of bleeding  Disposition: PT/OT reevaluate for discharge needs

## 2025-05-03 NOTE — ASSESSMENT & PLAN NOTE
Extensive right upper and lower lobe pulmonary embolism seen on 4/28/2025 CTA  Does have history of DVT and PE on warfarin 5 mg 5 days a week at 7.5 mg Monday and Friday  Was started on heparin infusion but then had GI bleeding.  Discussed with GI on admission given stability restart heparin drip    Results from last 7 days   Lab Units 05/03/25  0606 05/02/25  1046 05/02/25  0535 05/01/25  0630   INR  1.88* 1.57* 1.72* 1.41*

## 2025-05-03 NOTE — CASE MANAGEMENT
Case Management Assessment & Discharge Planning Note    Patient name Alecia Kay  Location [facility] 4 /E4 -* MRN [id number]  : 1945 Date 5/3/2025       Current Admission Date: 2025  Current Admission Diagnosis:Hematochezia   Patient Active Problem List    Diagnosis Date Noted Date Diagnosed    Acute blood loss anemia (ABLA) 2025     Hypokalemia 2025     Acute pulmonary embolism (HCC) 2025     Acute respiratory insufficiency 2025     Hematochezia 2025     Acute DVT (deep venous thrombosis) (HCC) 2025     History of DVT (deep vein thrombosis) 2025     Tachycardia 2025     Nausea and vomiting 2025     Acute cystitis 2025     Generalized weakness 2025     Bilateral lower extremity edema 2025     Severe protein-calorie malnutrition (HCC) 2025     History of osteomyelitis 2025     Acute kidney injury (HCC) 2025     Chronic kidney disease, stage III (moderate) (HCC) 2025     Hypothyroidism 2025     Hypertension 2025     Electrolyte imbalance 2025       LOS (days): 1  Geometric Mean LOS (GMLOS) (days):   Days to GMLOS:     OBJECTIVE:    Risk of Unplanned Readmission Score: 25.52         Current admission status: Inpatient       [place]   [facility] [place] [address]  [address] [address]  Phone: [phone] Fax: [phone]    Primary Care Provider: Rambo Perera DO    Primary Insurance:   Secondary Insurance:     ASSESSMENT:  Active Health Care Proxies       [facility] Representative - Nephew   Primary Phone: [phone] (Mobile)  Home Phone: [phone]                           Readmission Root Cause  30 Day Readmission: No    Patient Information  Admitted from:: Facility (Transfer from [facility])  Mental Status: Alert  During Assessment patient was accompanied by: Not accompanied during  assessment  Assessment information provided by:: Patient, Other - please comment (nephgilda Moon)  Primary Caregiver: Other (Comment)  Caregiver's Name:: Red-nephew  Caregiver's Relationship to Patient:: Family Member  Support Systems: Self, Son, Family members  [place] Residence: [place]  What city do you live in?: [place]  Home entry access options. Select all that apply.: Stairs  Number of steps to enter home.: 5  Do the steps have railings?: Yes  Type of Current Residence: 2 story home  Upon entering residence, is there a bedroom on the main floor (no further steps)?: No (Patient sleeps in lift recliner chair first level)  A bedroom is located on the following floor levels of residence (select all that apply):: 2nd Floor  Upon entering residence, is there a bathroom on the main floor (no further steps)?: Yes  Number of steps to 2nd floor from main floor: One Flight  Living Arrangements: Lives w/ Extended Family  Is patient a ?: No    Activities of Daily Living Prior to Admission  Functional Status: Assistance  Completes ADLs independently?: No  Level of ADL dependence: Assistance  Ambulates independently?: No  Level of ambulatory dependence: Assistance  Does patient use assisted devices?: Yes  Assisted Devices (DME) used: Bedside Commode, Walker, Wheelchair, Shower Chair  Does patient currently own DME?: Yes  What DME does the patient currently own?: Bedside Commode, Shower Chair, Walker, Wheelchair  Does patient have a history of Outpatient Therapy (PT/OT)?: No  Does the patient have a history of Short-Term Rehab?: Yes  Does patient have a history of HHC?: Yes  Does patient currently have HHC?: No    Current Home Health Care  Home Health Agency Name:: [facility]    Patient Information Continued  Income Source: SSI/SSD  Does patient have prescription coverage?: Yes  Can the patient afford their medications and any related supplies (such as glucometers or test strips)?: Yes  Does patient receive  dialysis treatments?: No  Does patient have a history of substance abuse?: No  Does patient have a history of Mental Health Diagnosis?: No         Means of Transportation  Means of Transport to Appts:: Family transport      Social Determinants of Health (SDOH)      Flowsheet Row Most Recent Value   Housing Stability    In the last 12 months, was there a time when you were not able to pay the mortgage or rent on time? N   In the past 12 months, how many times have you moved where you were living? 0   At any time in the past 12 months, were you homeless or living in a shelter (including now)? N   Transportation Needs    In the past 12 months, has lack of transportation kept you from medical appointments or from getting medications? no   In the past 12 months, has lack of transportation kept you from meetings, work, or from getting things needed for daily living? No   Food Insecurity    Within the past 12 months, you worried that your food would run out before you got the money to buy more. Never true   Within the past 12 months, the food you bought just didn't last and you didn't have money to get more. Never true   Utilities    In the past 12 months has the electric, gas, oil, or water company threatened to shut off services in your home? No            DISCHARGE DETAILS:    Discharge planning discussed with:: Patient and nephew Red  Freedom of Choice: Yes     CM contacted family/caregiver?: Yes  Were Treatment Team discharge recommendations reviewed with patient/caregiver?: Yes  Did patient/caregiver verbalize understanding of patient care needs?: Yes  Were patient/caregiver advised of the risks associated with not following Treatment Team discharge recommendations?: Yes    Contacts  Patient Contacts: michelle Hathaway  Relationship to Patient:: Family  Contact Method: Phone  Reason/Outcome: Continuity of Care, Emergency Contact, Discharge Planning    Requested Home Health Care         Is the patient interested  in [facility] at discharge?: Yes  Home Health Discipline requested:: Nursing, Occupational Therapy, Physical Therapy, Medical Social Work  [facility] External Referral Reason (only applicable if external HHA name selected): Patient has established relationship with provider  Home Health Follow-Up Provider:: PCP  Home Health Services Needed:: Evaluate Functional Status and Safety, Gait/ADL Training, Strengthening/Theraputic Exercises to Improve Function, Other (comment) (medication management)  Homebound Criteria Met:: Requires the Assistance of Another Person for Safe Ambulation or to Leave the Home, Uses an Assist Device (i.e. cane, walker, etc)  Supporting Clincal Findings:: Fatigues Easliy in Short Distances, Limited Endurance    DME Referral Provided  Referral made for DME?: No    Other Referral/Resources/Interventions Provided:  Interventions: [facility]  Referral Comments: Referral sent to [facility] for SN/PT/OT/SW    CM spoke with patient at the bedside. CM introduced self and role. Patient recent transfer from [facility]. Reviewed with patient discharge plan of care once medically stable. Patient declining inpatient rehab. Per patient, she has been in/out of hospital and inpatient rehab facilities and wishes to return home with the care of her nephew. Patient stated she does not have a hospital bed at home. Patient sleeps in a lift recliner chair on the first floor. Patient has a bath on first floor for bathing. Patient's nephew is home 24/7 to assist with care. Her son Nabor lives next door.     CM spoke with Red on the phone, [phone]. SANJANA introduced self and role. Red confirmed plan is to have patient return home with him once medically stable. Only DME needed would be oxygen at discharge if patient medically needs it.     CM sent referral via [facility] to [facility] for SN/PT/OT.     All questions/concerns answered at this time.     CM reviewed discharge planning  process including the following: identifying caregivers at home, preference for d/c planning needs,   availability of [facility] to [facility] program, availability of treatment team to discuss questions or concerns patient and/or family may have regarding diagnosis, plan of care, old or new medications and discharge planning .CM will continue to follow for care coordination and update assessment as appropriate.

## 2025-05-03 NOTE — ASSESSMENT & PLAN NOTE
History of DVT   However during 4/25/2025 [facility] admission found to have bilateral acute vs subacute DVTs

## 2025-05-03 NOTE — CONSULTS
Consultation - Gastroenterology   Name: Alecia Kay 80 y.o. female I MRN: [id number]  Unit/Bed#: E4 -01 I Date of Admission: 5/2/2025   Date of Service: 5/3/2025 I Hospital Day: 1       Inpatient consult to gastroenterology     Date/Time  5/3/2025 11:51 AM     Performed by  Renea Harrison DO   Authorized by  Calderon Friedman DO           Physician Requesting Evaluation: Calderon Friedman DO   Reason for Evaluation / Principal Problem: Hematochezia    Assessment & Plan  Hematochezia  80 female with a past medical history of VTE on coumadin prior to admission, GERD with Wang's esophagus, hypertension, HLD, chronic osteomyelitis, and hypothyroidism.  Patient initially presented with generalized weakness and failure to thrive with poor oral intake and a 70 pound weight loss.  Initially treated for GAY/UTI but also started on heparin drip due to DVT/PE.  Following initiation of heparin drip, patient with bright red blood per rectum.  Hemoglobin had declined from 9.7 on admission to 6.9 with associated symptoms and hemodynamic changes.      Overall, symptoms suggestive of lower GI bleed with suspected diverticular source.  Patient ultimately transferred to [facility] for closer monitoring and GI/IR support over the weekend.  Today, patient continues to remain hemodynamically stable but did have an episode of lightheadedness and decline in systolic blood pressure while working with PT.  Does continue to have bloody output but hemoglobin fortunately remains stable at 8.4.  Given ongoing bloody stools and transient hemodynamic changes, we will begin on GoLytely bowel prep today in the event that endoscopic evaluation is more urgently needed.  No definitive plan for weekend colonoscopy at this time.  Overall, suspect that bleeding is likely due to diverticular source which often self resolves and is very difficult to treat endoscopically.  Also unable to fully rule out malignancy especially in setting of patient's  recent weight loss, blood clots, and failure to thrive.  If patient with ongoing stability, we will plan for tentative colonoscopy on Monday.    Plan:  Tentative plan for colonoscopy on Monday  Continue on clear liquids and begin GoLytely bowel prep today  Okay to continue on Protonix 40 mg IV BID although suspect lower GI bleed  Okay to continue heparin gtt; although avoid bolus and hold if supratherapeutic PTT  Trend H&H; transfuse for goal of >7.0   Monitor hemodynamics; avoid episodes of hypotension   Maintain adequate IV access with 2 large bore Ivs  Monitor bowel movements; alert GI team of signs of overt GIB  Additional pain and symptom management per primary team   Acute pulmonary embolism (HCC)  Imaging demonstrating extensive right and lower lobe pulmonary embolism on/20 8/2025 CTA.  Previously on warfarin at home.  Currently on heparin drip.  Acute DVT (deep venous thrombosis) (HCC)  Patient with prior history of DVT.  Found to have acute versus subacute bilateral DVTs this admission.  Currently on heparin drip.    Please contact the SecureChat role for the Gastroenterology service with any questions/concerns.    History of Present Illness   HPI:  Alecia Kay 80F w/ PMH of VTE on coumadin, GERD with Wang's esophagus, hypertension, HLD, chronic osteomyelitis, and hypothyroidism who initially presented to [facility] on/20 5/2025 with complaints of nausea/vomiting and poor oral intake over the last couple of months leading to approximately 70 pound weight loss.  Symptoms have been ongoing for approximately 6-4 months.  Patient with severe generalized weakness prompting admission.  Found to have UTI/GAY for which she was started on treatment.  She was also noted to have acute PE and DVT which required initiation of heparin drip.      Following initiation of heparin, patient started having bright red blood per rectum and had a subsequent rapid response team called due soft systolic blood pressures  with associated lightheadedness/dizziness.  Hemoglobin found to decline down to 6.9 from hemoglobin of 9.7 on presentation.  Patient transfused 1 unit of PRBC with appropriate hemoglobin improved.  Case was discussed with gastroenterology at [facility] given lack of GI coverage over the weekend at [facility].  Patient was therefore transferred to [facility] on 5/2/2025 for ongoing monitoring and potential need for endoscopic evaluation.    Patient seen and evaluated at time of consultation today.  Patient sitting in bed comfortably in no acute distress or visible discomfort.  Denies any lightheadedness/dizziness.  No abdominal pain.  Does continue to have a combination of brown/black/red bowel movements.    Most recent EGD performed 4/22/2025 which reportedly revealed a hiatal hernia with Wang's esophagus.  Empiric esophageal dilation performed.  Normal duodenum and stomach per prior GI progress note documentation.  However, unable to personally find report to confirm.  Last colonoscopy reportedly in 2022.     Review of Systems  Medical History Review: I have reviewed the patient's PMH, PSH, Social History, Family History, Meds, and Allergies   Historical Information   Past Medical History:   Diagnosis Date    Anemia     Cellulitis     Disease of thyroid gland     GERD (gastroesophageal reflux disease)     Hyperlipidemia     Hypertension     Obesity     Osteomyelitis (HCC)     Pneumonia     Pulmonary embolism (HCC)      Past Surgical History:   Procedure Laterality Date    FOOT SURGERY      JOINT REPLACEMENT       Social History     Tobacco Use    Smoking status: Never     Passive exposure: Never    Smokeless tobacco: Never   Vaping Use    Vaping status: Never Used   Substance and Sexual Activity    Alcohol use: Never    Drug use: Never    Sexual activity: Not on file     E-Cigarette/Vaping    E-Cigarette Use Never User      E-Cigarette/Vaping Substances     Family history non-contributory  Social History     Tobacco Use    Smoking  status: Never     Passive exposure: Never    Smokeless tobacco: Never   Vaping Use    Vaping status: Never Used   Substance and Sexual Activity    Alcohol use: Never    Drug use: Never    Sexual activity: Not on file       Current Facility-Administered Medications:     acetaminophen (TYLENOL) tablet 650 mg, Q6H PRN    amLODIPine (NORVASC) tablet 10 mg, Daily    heparin (porcine) 25,000 units in 0.45% NaCl 250 mL infusion (premix), Titrated, Last Rate: 12 Units/kg/hr (05/03/25 1406)    heparin (porcine) injection 3,000 Units, Q6H PRN    heparin (porcine) injection 6,000 Units, Q6H PRN    hydrOXYzine HCL (ATARAX) tablet 25 mg, TID AC    levothyroxine tablet 75 mcg, Early Morning    meclizine (ANTIVERT) tablet 25 mg, TID PRN    metoprolol tartrate (LOPRESSOR) tablet 50 mg, Q12H MELANY    mirtazapine (REMERON) tablet 7.5 mg, HS    ondansetron (ZOFRAN) injection 4 mg, Q4H PRN    pantoprazole (PROTONIX) injection 40 mg, Q12H MELANY    potassium chloride (Klor-Con M20) CR tablet 40 mEq, BID    pravastatin (PRAVACHOL) tablet 80 mg, Daily With Dinner    [Held by provider] senna-docusate sodium (SENOKOT S) 8.6-50 mg per tablet 1 tablet, Daily    [Held by provider] warfarin (COUMADIN) tablet 5 mg, Once per day on Sunday Tuesday Wednesday Thursday Saturday    [Held by provider] warfarin (COUMADIN) tablet 7.5 mg, Once per day on Monday Friday  Prior to Admission Medications   Prescriptions Last Dose Informant Patient Reported? Taking?   acetaminophen (TYLENOL) 325 mg tablet Unknown  Yes No   Sig: Take 650 mg by mouth every 6 (six) hours as needed for mild pain   amLODIPine (NORVASC) 10 mg tablet Unknown  Yes No   Sig: Take 10 mg by mouth daily   hydrocortisone 1 % cream Unknown Self Yes No   Sig: Apply topically 4 (four) times a day as needed for rash   Patient not taking: Reported on 4/25/2025   levothyroxine 75 mcg tablet Unknown  Yes No   Sig: Take 75 mcg by mouth daily   magnesium hydroxide (MILK OF MAGNESIA) 400 mg/5 mL oral  suspension Unknown  Yes No   Sig: Take by mouth daily as needed for constipation   meclizine (ANTIVERT) 25 mg tablet Unknown Self Yes No   Sig: Take by mouth 3 (three) times a day as needed for dizziness   metoprolol tartrate (LOPRESSOR) 50 mg tablet Unknown  Yes No   Sig: Take 50 mg by mouth every 12 (twelve) hours   pantoprazole (PROTONIX) 40 mg tablet Unknown  Yes No   Sig: Take 40 mg by mouth daily   rosuvastatin (CRESTOR) 10 MG tablet Unknown  Yes No   Sig: Take 10 mg by mouth daily   saccharomyces boulardii (FLORASTOR) 250 mg capsule Unknown Self Yes No   Sig: Take 250 mg by mouth 2 (two) times a day   senna-docusate sodium (SENOKOT S) 8.6-50 mg per tablet Unknown  Yes No   Sig: Take 1 tablet by mouth daily   warfarin (COUMADIN) 5 mg tablet Unknown  Yes No   Sig: Take 5 mg by mouth daily TAKE 1 AND 1/2 TABLETS(7.5MG) ON MONDAY AND FRIDAY, AND 1 TABLET(5MG) ON THE OTHER DAYS      Facility-Administered Medications: None     Neomycin-polymyxin-hc, Penicillins, Quinine, and Amoxicillin-pot clavulanate    Objective :  Temp:  [96.8 °F (36 °C)-97.4 °F (36.3 °C)] 97.2 °F (36.2 °C)  HR:  [44-74] 65  BP: (114-158)/(58-73) 127/65  Resp:  [14-20] 20  SpO2:  [92 %-99 %] 93 %  O2 Device: Nasal cannula  Nasal Cannula O2 Flow Rate (L/min):  [1 L/min-2 L/min] 1 L/min    Physical Exam  Constitutional:       General: She is not in acute distress.     Appearance: She is normal weight. She is not ill-appearing or toxic-appearing.   HENT:      Head: Normocephalic.      Nose: Nose normal. No congestion.   Eyes:      General: No scleral icterus.  Cardiovascular:      Rate and Rhythm: Normal rate.      Pulses: Normal pulses.   Pulmonary:      Effort: Pulmonary effort is normal. No respiratory distress.   Abdominal:      General: Abdomen is flat. There is no distension.      Palpations: Abdomen is soft.      Tenderness: There is no abdominal tenderness. There is no guarding or rebound.   Musculoskeletal:      Cervical back: Normal  range of motion.   Skin:     General: Skin is warm.      Capillary Refill: Capillary refill takes less than 2 seconds.      Coloration: Skin is not pale.   Neurological:      Mental Status: She is alert and oriented to person, place, and time. Mental status is at baseline.   Psychiatric:         Mood and Affect: Mood normal.         Behavior: Behavior normal.           Lab Results: I have reviewed the following results:CBC/BMP:   .     05/03/25  0606 05/03/25  1228   WBC 5.48 4.95   HGB 8.3* 8.5*   HCT 24.8* 24.5*    143*   SODIUM 142  --    K 2.9*  --      --    CO2 28  --    BUN 8  --    CREATININE 0.78  --    GLUC 91  --    MG 1.9  --    PHOS 2.5  --     , Creatinine Clearance: Estimated Creatinine Clearance: 54.7 mL/min (by C-G formula based on SCr of 0.78 mg/dL)., LFTs:   .     05/03/25  0606   AST 9*   ALT <3*   ALB 2.4*   TBILI 0.59   ALKPHOS 49    , PTT/INR:  .     05/03/25  0606 05/03/25  1206   PTT  --  >210*   INR 1.88*  --

## 2025-05-03 NOTE — ASSESSMENT & PLAN NOTE
Initially presented to [facility] 4/25/2025 for generalized weakness found to have kidney injury and MDR UTI  Renal function has returned back to baseline  Torsemide discontinued during March 2025 hospitalization    Results from last 7 days   Lab Units 05/03/25  0606 05/02/25  1046 05/02/25  0535 05/01/25  0630 04/30/25  0533 04/29/25  0702 04/28/25  0353 04/27/25  0507   BUN mg/dL 8 7 7 6 6 7 10 15   CREATININE mg/dL 0.78 0.84 0.78 0.82 0.85 0.97 1.01 1.14   EGFR ml/min/1.73sq m 71 65 71 67 64 54 52 45

## 2025-05-03 NOTE — ASSESSMENT & PLAN NOTE
May be secondary to multiple loose bowel movements.  Added banana flakes  Replace potassium and recheck tomorrow    Results from last 7 days   Lab Units 05/03/25  0606 05/03/25  0005 05/02/25  1620 05/02/25  1046   HEMOGLOBIN g/dL 8.3* 8.3* 8.4* 8.5*

## 2025-05-03 NOTE — ASSESSMENT & PLAN NOTE
Available baseline hemoglobin appears closer to 10.0  Blood loss anemia due to rectal bleeding  Was transfused 1 unit PRBC 5/3/2025    Results from last 7 days   Lab Units 05/03/25  0606 05/03/25  0005 05/02/25  1620 05/02/25  1046 05/02/25  0535 05/01/25  1752   HEMOGLOBIN g/dL 8.3* 8.3* 8.4* 8.5* 6.9* 8.0*

## 2025-05-03 NOTE — ASSESSMENT & PLAN NOTE
80 female with a past medical history of VTE on coumadin prior to admission, GERD with Wang's esophagus, hypertension, HLD, chronic osteomyelitis, and hypothyroidism.  Patient initially presented with generalized weakness and failure to thrive with poor oral intake and a 70 pound weight loss.  Initially treated for GAY/UTI but also started on heparin drip due to DVT/PE.  Following initiation of heparin drip, patient with bright red blood per rectum.  Hemoglobin had declined from 9.7 on admission to 6.9 with associated symptoms and hemodynamic changes.      Overall, symptoms suggestive of lower GI bleed with suspected diverticular source.  Patient ultimately transferred to [facility] for closer monitoring and GI/IR support over the weekend.  Today, patient continues to remain hemodynamically stable but did have an episode of lightheadedness and decline in systolic blood pressure while working with PT.  Does continue to have bloody output but hemoglobin fortunately remains stable at 8.4.  Given ongoing bloody stools and transient hemodynamic changes, we will begin on GoLytely bowel prep today in the event that endoscopic evaluation is more urgently needed.  No definitive plan for weekend colonoscopy at this time.  Overall, suspect that bleeding is likely due to diverticular source which often self resolves and is very difficult to treat endoscopically.  Also unable to fully rule out malignancy especially in setting of patient's recent weight loss, blood clots, and failure to thrive.  If patient with ongoing stability, we will plan for tentative colonoscopy on Monday.    Plan:  Tentative plan for colonoscopy on Monday  Continue on clear liquids and begin GoLytely bowel prep today  Okay to continue on Protonix 40 mg IV BID although suspect lower GI bleed  Okay to continue heparin gtt; although avoid bolus and hold if supratherapeutic PTT  Trend H&H; transfuse for goal of >7.0   Monitor hemodynamics; avoid episodes of  hypotension   Maintain adequate IV access with 2 large bore Ivs  Monitor bowel movements; alert GI team of signs of overt GIB  Additional pain and symptom management per primary team

## 2025-05-03 NOTE — PLAN OF CARE
Problem: PAIN - ADULT  Goal: Verbalizes/displays adequate comfort level or baseline comfort level  Description: Interventions:- Encourage patient to monitor pain and request assistance- Assess pain using appropriate pain scale- Administer analgesics based on type and severity of pain and evaluate response- Implement non-pharmacological measures as appropriate and evaluate response- Consider cultural and social influences on pain and pain management- Notify physician/advanced practitioner if interventions unsuccessful or patient reports new pain  Outcome: Progressing     Problem: INFECTION - ADULT  Goal: Absence or prevention of progression during hospitalization  Description: INTERVENTIONS:- Assess and monitor for signs and symptoms of infection- Monitor lab/diagnostic results- Monitor all insertion sites, i.e. indwelling lines, tubes, and drains- Monitor endotracheal if appropriate and nasal secretions for changes in amount and color- [place] appropriate cooling/warming therapies per order- Administer medications as ordered- Instruct and encourage patient and family to use good hand hygiene technique- Identify and instruct in appropriate isolation precautions for identified infection/condition  Outcome: Progressing  Goal: Absence of fever/infection during neutropenic period  Description: INTERVENTIONS:- Monitor WBC  Outcome: Progressing     Problem: SAFETY ADULT  Goal: Patient will remain free of falls  Description: INTERVENTIONS:- Educate patient/family on patient safety including physical limitations- Instruct patient to call for assistance with activity - Consult OT/PT to assist with strengthening/mobility - Keep Call bell within reach- Keep bed low and locked with side rails adjusted as appropriate- Keep care items and personal belongings within reach- Initiate and maintain comfort rounds- Make Fall Risk Sign visible to staff- Offer Toileting every 2 Hours, in advance of need- Initiate/Maintain bed alarm-  Obtain necessary fall risk management equipment- Apply yellow socks and bracelet for high fall risk patients- Consider moving patient to room near nurses station  Outcome: Progressing  Goal: Maintain or return to baseline ADL function  Description: INTERVENTIONS:-  Assess patient's ability to carry out ADLs; assess patient's baseline for ADL function and identify physical deficits which impact ability to perform ADLs (bathing, care of mouth/teeth, toileting, grooming, dressing, etc.)- Assess/evaluate cause of self-care deficits - Assess range of motion- Assess patient's mobility; develop plan if impaired- Assess patient's need for assistive devices and provide as appropriate- Encourage maximum independence but intervene and supervise when necessary- Involve family in performance of ADLs- Assess for home care needs following discharge - Consider OT consult to assist with ADL evaluation and planning for discharge- Provide patient education as appropriate  Outcome: Progressing  Goal: Maintains/Returns to pre admission functional level  Description: INTERVENTIONS:- Perform AM-PAC 6 Click Basic Mobility/ Daily Activity assessment daily.- Set and communicate daily mobility goal to care team and patient/family/caregiver. - Collaborate with rehabilitation services on mobility goals if consulted- Perform Range of Motion 2 times a day.- Reposition patient every 2 hours.- Dangle patient 4 times a day- Stand patient 2 times a day- Ambulate patient 4 times a day- Out of bed to chair 4 times a day - Out of bed for meals 4 times a day- Out of bed for toileting- Record patient progress and toleration of activity level   Outcome: Progressing     Problem: DISCHARGE PLANNING  Goal: Discharge to home or other facility with appropriate resources  Description: INTERVENTIONS:- Identify barriers to discharge w/patient and caregiver- Arrange for needed discharge resources and transportation as appropriate- Identify discharge learning needs  (meds, wound care, etc.)- Arrange for interpretive services to assist at discharge as needed- Refer to Case Management Department for coordinating discharge planning if the patient needs post-hospital services based on physician/advanced practitioner order or complex needs related to functional status, cognitive ability, or social support system  Outcome: Progressing     Problem: Knowledge Deficit  Goal: Patient/family/caregiver demonstrates understanding of disease process, treatment plan, medications, and discharge instructions  Description: Complete learning assessment and assess knowledge base.Interventions:- Provide teaching at level of understanding- Provide teaching via preferred learning methods  Outcome: Progressing

## 2025-05-03 NOTE — RESTORATIVE TECHNICIAN NOTE
Restorative Technician Note      Patient Name: Alecia Kay     Restorative Tech Visit Date: 05/03/25  Note Type: Mobility  Patient Position Upon Consult: Supine  Activity Performed: Repositioned  Assistive Device: -- (patient sit on side the bad)  Patient Position at End of Consult: All needs within reach; Supine; Bed/Chair alarm activated            ns

## 2025-05-04 LAB
ABO GROUP BLD BPU: NORMAL
ABO GROUP BLD BPU: NORMAL
ABO GROUP BLD: NORMAL
ALBUMIN SERPL BCG-MCNC: 2 G/DL (ref 3.5–5)
ALP SERPL-CCNC: 38 U/L (ref 34–104)
ALT SERPL W P-5'-P-CCNC: <3 U/L (ref 7–52)
ANION GAP SERPL CALCULATED.3IONS-SCNC: 8 MMOL/L (ref 4–13)
APTT PPP: 126 SECONDS (ref 23–34)
AST SERPL W P-5'-P-CCNC: 9 U/L (ref 13–39)
BASE EX.OXY STD BLDV CALC-SCNC: 85.2 % (ref 60–80)
BASE EXCESS BLDV CALC-SCNC: 0.7 MMOL/L
BASOPHILS # BLD AUTO: 0.01 THOUSANDS/ÂΜL (ref 0–0.1)
BASOPHILS NFR BLD AUTO: 0 % (ref 0–1)
BILIRUB SERPL-MCNC: 0.41 MG/DL (ref 0.2–1)
BLD GP AB SCN SERPL QL: NEGATIVE
BPU ID: NORMAL
BPU ID: NORMAL
BUN SERPL-MCNC: 7 MG/DL (ref 5–25)
CA-I BLD-SCNC: 1.05 MMOL/L (ref 1.12–1.32)
CALCIUM ALBUM COR SERPL-MCNC: 8.9 MG/DL (ref 8.3–10.1)
CALCIUM SERPL-MCNC: 7.3 MG/DL (ref 8.4–10.2)
CHLORIDE SERPL-SCNC: 107 MMOL/L (ref 96–108)
CO2 SERPL-SCNC: 25 MMOL/L (ref 21–32)
CREAT SERPL-MCNC: 0.72 MG/DL (ref 0.6–1.3)
CROSSMATCH: NORMAL
CROSSMATCH: NORMAL
EOSINOPHIL # BLD AUTO: 0.09 THOUSAND/ÂΜL (ref 0–0.61)
EOSINOPHIL NFR BLD AUTO: 2 % (ref 0–6)
ERYTHROCYTE [DISTWIDTH] IN BLOOD BY AUTOMATED COUNT: 15.4 % (ref 11.6–15.1)
GFR SERPL CREATININE-BSD FRML MDRD: 78 ML/MIN/1.73SQ M
GLUCOSE SERPL-MCNC: 102 MG/DL (ref 65–140)
HCO3 BLDV-SCNC: 25.4 MMOL/L (ref 24–30)
HCT VFR BLD AUTO: 20.7 % (ref 34.8–46.1)
HCT VFR BLD AUTO: 27.3 % (ref 34.8–46.1)
HCT VFR BLD AUTO: 28.6 % (ref 34.8–46.1)
HCT VFR BLD AUTO: 28.6 % (ref 34.8–46.1)
HGB BLD-MCNC: 6.9 G/DL (ref 11.5–15.4)
HGB BLD-MCNC: 9.5 G/DL (ref 11.5–15.4)
HGB BLD-MCNC: 9.7 G/DL (ref 11.5–15.4)
HGB BLD-MCNC: 9.8 G/DL (ref 11.5–15.4)
IMM GRANULOCYTES # BLD AUTO: 0.08 THOUSAND/UL (ref 0–0.2)
IMM GRANULOCYTES NFR BLD AUTO: 2 % (ref 0–2)
INR PPP: 2.07 (ref 0.85–1.19)
LYMPHOCYTES # BLD AUTO: 1.42 THOUSANDS/ÂΜL (ref 0.6–4.47)
LYMPHOCYTES NFR BLD AUTO: 35 % (ref 14–44)
MAGNESIUM SERPL-MCNC: 1.6 MG/DL (ref 1.9–2.7)
MCH RBC QN AUTO: 28.5 PG (ref 26.8–34.3)
MCHC RBC AUTO-ENTMCNC: 33.3 G/DL (ref 31.4–37.4)
MCV RBC AUTO: 86 FL (ref 82–98)
MONOCYTES # BLD AUTO: 0.38 THOUSAND/ÂΜL (ref 0.17–1.22)
MONOCYTES NFR BLD AUTO: 9 % (ref 4–12)
NASAL CANNULA: 2
NEUTROPHILS # BLD AUTO: 2.1 THOUSANDS/ÂΜL (ref 1.85–7.62)
NEUTS SEG NFR BLD AUTO: 52 % (ref 43–75)
NRBC BLD AUTO-RTO: 2 /100 WBCS
O2 CT BLDV-SCNC: 8.5 ML/DL
PCO2 BLDV: 41 MM HG (ref 42–50)
PH BLDV: 7.41 [PH] (ref 7.3–7.4)
PLATELET # BLD AUTO: 116 THOUSANDS/UL (ref 149–390)
PMV BLD AUTO: 9.5 FL (ref 8.9–12.7)
PO2 BLDV: 55.7 MM HG (ref 35–45)
POTASSIUM SERPL-SCNC: 4.1 MMOL/L (ref 3.5–5.3)
PROT SERPL-MCNC: 3.8 G/DL (ref 6.4–8.4)
PROTHROMBIN TIME: 23.2 SECONDS (ref 12.3–15)
RBC # BLD AUTO: 2.42 MILLION/UL (ref 3.81–5.12)
RH BLD: POSITIVE
SODIUM SERPL-SCNC: 140 MMOL/L (ref 135–147)
SPECIMEN EXPIRATION DATE: NORMAL
UNIT DISPENSE STATUS: NORMAL
UNIT DISPENSE STATUS: NORMAL
UNIT PRODUCT CODE: NORMAL
UNIT PRODUCT CODE: NORMAL
UNIT PRODUCT VOLUME: 350 ML
UNIT PRODUCT VOLUME: 350 ML
UNIT RH: NORMAL
UNIT RH: NORMAL
WBC # BLD AUTO: 4.08 THOUSAND/UL (ref 4.31–10.16)

## 2025-05-04 PROCEDURE — 83735 ASSAY OF MAGNESIUM: CPT

## 2025-05-04 PROCEDURE — 30233N1 TRANSFUSION OF NONAUTOLOGOUS RED BLOOD CELLS INTO PERIPHERAL VEIN, PERCUTANEOUS APPROACH: ICD-10-PCS | Performed by: INTERNAL MEDICINE

## 2025-05-04 PROCEDURE — 94760 N-INVAS EAR/PLS OXIMETRY 1: CPT

## 2025-05-04 PROCEDURE — 82330 ASSAY OF CALCIUM: CPT

## 2025-05-04 PROCEDURE — 99232 SBSQ HOSP IP/OBS MODERATE 35: CPT | Performed by: INTERNAL MEDICINE

## 2025-05-04 PROCEDURE — 85018 HEMOGLOBIN: CPT

## 2025-05-04 PROCEDURE — 86920 COMPATIBILITY TEST SPIN: CPT

## 2025-05-04 PROCEDURE — 86901 BLOOD TYPING SEROLOGIC RH(D): CPT

## 2025-05-04 PROCEDURE — 85014 HEMATOCRIT: CPT

## 2025-05-04 PROCEDURE — P9016 RBC LEUKOCYTES REDUCED: HCPCS

## 2025-05-04 PROCEDURE — 94762 N-INVAS EAR/PLS OXIMTRY CONT: CPT

## 2025-05-04 PROCEDURE — 85014 HEMATOCRIT: CPT | Performed by: INTERNAL MEDICINE

## 2025-05-04 PROCEDURE — 85610 PROTHROMBIN TIME: CPT

## 2025-05-04 PROCEDURE — 85018 HEMOGLOBIN: CPT | Performed by: INTERNAL MEDICINE

## 2025-05-04 PROCEDURE — 82805 BLOOD GASES W/O2 SATURATION: CPT

## 2025-05-04 PROCEDURE — 80053 COMPREHEN METABOLIC PANEL: CPT

## 2025-05-04 PROCEDURE — 86900 BLOOD TYPING SEROLOGIC ABO: CPT

## 2025-05-04 PROCEDURE — 86850 RBC ANTIBODY SCREEN: CPT

## 2025-05-04 PROCEDURE — 85730 THROMBOPLASTIN TIME PARTIAL: CPT

## 2025-05-04 PROCEDURE — 85025 COMPLETE CBC W/AUTO DIFF WBC: CPT

## 2025-05-04 RX ORDER — MAGNESIUM SULFATE HEPTAHYDRATE 40 MG/ML
2 INJECTION, SOLUTION INTRAVENOUS ONCE
Status: COMPLETED | OUTPATIENT
Start: 2025-05-04 | End: 2025-05-04

## 2025-05-04 RX ORDER — CALCIUM GLUCONATE 20 MG/ML
2 INJECTION, SOLUTION INTRAVENOUS ONCE
Status: COMPLETED | OUTPATIENT
Start: 2025-05-04 | End: 2025-05-04

## 2025-05-04 RX ORDER — BISACODYL 5 MG/1
10 TABLET, DELAYED RELEASE ORAL SEE ADMIN INSTRUCTIONS
Status: DISCONTINUED | OUTPATIENT
Start: 2025-05-04 | End: 2025-05-08

## 2025-05-04 RX ORDER — SODIUM CHLORIDE, SODIUM LACTATE, POTASSIUM CHLORIDE, CALCIUM CHLORIDE 600; 310; 30; 20 MG/100ML; MG/100ML; MG/100ML; MG/100ML
125 INJECTION, SOLUTION INTRAVENOUS CONTINUOUS
Status: CANCELLED | OUTPATIENT
Start: 2025-05-04

## 2025-05-04 RX ORDER — POLYETHYLENE GLYCOL 3350 17 G/17G
238 POWDER, FOR SOLUTION ORAL ONCE
Status: COMPLETED | OUTPATIENT
Start: 2025-05-04 | End: 2025-05-04

## 2025-05-04 RX ORDER — SODIUM CHLORIDE, SODIUM GLUCONATE, SODIUM ACETATE, POTASSIUM CHLORIDE, MAGNESIUM CHLORIDE, SODIUM PHOSPHATE, DIBASIC, AND POTASSIUM PHOSPHATE .53; .5; .37; .037; .03; .012; .00082 G/100ML; G/100ML; G/100ML; G/100ML; G/100ML; G/100ML; G/100ML
1000 INJECTION, SOLUTION INTRAVENOUS ONCE
Status: COMPLETED | OUTPATIENT
Start: 2025-05-04 | End: 2025-05-04

## 2025-05-04 RX ORDER — SODIUM CHLORIDE, SODIUM GLUCONATE, SODIUM ACETATE, POTASSIUM CHLORIDE, MAGNESIUM CHLORIDE, SODIUM PHOSPHATE, DIBASIC, AND POTASSIUM PHOSPHATE .53; .5; .37; .037; .03; .012; .00082 G/100ML; G/100ML; G/100ML; G/100ML; G/100ML; G/100ML; G/100ML
125 INJECTION, SOLUTION INTRAVENOUS CONTINUOUS
Status: DISCONTINUED | OUTPATIENT
Start: 2025-05-04 | End: 2025-05-05

## 2025-05-04 RX ADMIN — CALCIUM GLUCONATE 2 G: 20 INJECTION, SOLUTION INTRAVENOUS at 04:33

## 2025-05-04 RX ADMIN — MAGNESIUM SULFATE HEPTAHYDRATE 2 G: 40 INJECTION, SOLUTION INTRAVENOUS at 05:57

## 2025-05-04 RX ADMIN — AMLODIPINE BESYLATE 10 MG: 10 TABLET ORAL at 10:00

## 2025-05-04 RX ADMIN — POTASSIUM CHLORIDE 40 MEQ: 1500 TABLET, EXTENDED RELEASE ORAL at 10:00

## 2025-05-04 RX ADMIN — HYDROXYZINE HYDROCHLORIDE 25 MG: 25 TABLET, FILM COATED ORAL at 16:51

## 2025-05-04 RX ADMIN — POTASSIUM CHLORIDE 40 MEQ: 1500 TABLET, EXTENDED RELEASE ORAL at 17:00

## 2025-05-04 RX ADMIN — HYDROXYZINE HYDROCHLORIDE 25 MG: 25 TABLET, FILM COATED ORAL at 12:03

## 2025-05-04 RX ADMIN — SODIUM CHLORIDE, SODIUM GLUCONATE, SODIUM ACETATE, POTASSIUM CHLORIDE, MAGNESIUM CHLORIDE, SODIUM PHOSPHATE, DIBASIC, AND POTASSIUM PHOSPHATE 1000 ML: .53; .5; .37; .037; .03; .012; .00082 INJECTION, SOLUTION INTRAVENOUS at 03:26

## 2025-05-04 RX ADMIN — METOPROLOL TARTRATE 50 MG: 50 TABLET, FILM COATED ORAL at 10:00

## 2025-05-04 RX ADMIN — BISACODYL 10 MG: 5 TABLET, COATED ORAL at 20:17

## 2025-05-04 RX ADMIN — PANTOPRAZOLE SODIUM 40 MG: 40 INJECTION, POWDER, FOR SOLUTION INTRAVENOUS at 10:00

## 2025-05-04 RX ADMIN — SODIUM CHLORIDE, SODIUM GLUCONATE, SODIUM ACETATE, POTASSIUM CHLORIDE, MAGNESIUM CHLORIDE, SODIUM PHOSPHATE, DIBASIC, AND POTASSIUM PHOSPHATE 125 ML/HR: .53; .5; .37; .037; .03; .012; .00082 INJECTION, SOLUTION INTRAVENOUS at 20:00

## 2025-05-04 RX ADMIN — LEVOTHYROXINE SODIUM 75 MCG: 75 TABLET ORAL at 06:18

## 2025-05-04 RX ADMIN — POLYETHYLENE GLYCOL 3350 238 G: 17 POWDER, FOR SOLUTION ORAL at 19:39

## 2025-05-04 RX ADMIN — PANTOPRAZOLE SODIUM 40 MG: 40 INJECTION, POWDER, FOR SOLUTION INTRAVENOUS at 20:18

## 2025-05-04 RX ADMIN — MIRTAZAPINE 7.5 MG: 7.5 TABLET, FILM COATED ORAL at 20:17

## 2025-05-04 RX ADMIN — HYDROXYZINE HYDROCHLORIDE 25 MG: 25 TABLET, FILM COATED ORAL at 06:18

## 2025-05-04 NOTE — ASSESSMENT & PLAN NOTE
Patient with prior history of DVT.  Found to have acute versus subacute bilateral DVTs this admission.  Patient placed on heparin drip but currently on hold in setting of GI bleeding and supratherapeutic PTT

## 2025-05-04 NOTE — SEPSIS NOTE
[facility] Advisories       Sepsis Time Zero - SLIM       Date Triggers    05/04/25 0617 File Doc Flowsheets  Rule - CER: Hospitalist Service  [[id number]]  Rule - CER: Is Not Comfort Care [[id number]]  Flowsheet Row - ROB: Temp [6] (Value: 96 °F (35.6 °C))  Flowsheet Row - ROB: Pulse [8] (Value: 108)  Flowsheet Row - ROB: BP [5] (Value: 73)                      Sepsis Time Zero Documentation: The patient was evaluated and does NOT meet criteria for severe sepsis/septic shock     Received sepsis alert secondary to temp, tachycardia, and SBP of 73. Suspect these were related to pt hematochezia rather than acute infectious etiology. Do not suspect sepsis at this time.

## 2025-05-04 NOTE — PLAN OF CARE
Problem: PAIN - ADULT  Goal: Verbalizes/displays adequate comfort level or baseline comfort level  Description: Interventions:- Encourage patient to monitor pain and request assistance- Assess pain using appropriate pain scale- Administer analgesics based on type and severity of pain and evaluate response- Implement non-pharmacological measures as appropriate and evaluate response- Consider cultural and social influences on pain and pain management- Notify physician/advanced practitioner if interventions unsuccessful or patient reports new pain  Outcome: Progressing     Problem: INFECTION - ADULT  Goal: Absence or prevention of progression during hospitalization  Description: INTERVENTIONS:- Assess and monitor for signs and symptoms of infection- Monitor lab/diagnostic results- Monitor all insertion sites, i.e. indwelling lines, tubes, and drains- Monitor endotracheal if appropriate and nasal secretions for changes in amount and color- [place] appropriate cooling/warming therapies per order- Administer medications as ordered- Instruct and encourage patient and family to use good hand hygiene technique- Identify and instruct in appropriate isolation precautions for identified infection/condition  Outcome: Progressing  Goal: Absence of fever/infection during neutropenic period  Description: INTERVENTIONS:- Monitor WBC  Outcome: Progressing     Problem: SAFETY ADULT  Goal: Patient will remain free of falls  Description: INTERVENTIONS:- Educate patient/family on patient safety including physical limitations- Instruct patient to call for assistance with activity - Consult OT/PT to assist with strengthening/mobility - Keep Call bell within reach- Keep bed low and locked with side rails adjusted as appropriate- Keep care items and personal belongings within reach- Initiate and maintain comfort rounds- Make Fall Risk Sign visible to staff- - Apply yellow socks and bracelet for high fall risk patients- Consider moving  patient to room near nurses station  Outcome: Progressing  Goal: Maintain or return to baseline ADL function  Description: INTERVENTIONS:-  Assess patient's ability to carry out ADLs; assess patient's baseline for ADL function and identify physical deficits which impact ability to perform ADLs (bathing, care of mouth/teeth, toileting, grooming, dressing, etc.)- Assess/evaluate cause of self-care deficits - Assess range of motion- Assess patient's mobility; develop plan if impaired- Assess patient's need for assistive devices and provide as appropriate- Encourage maximum independence but intervene and supervise when necessary- Involve family in performance of ADLs- Assess for home care needs following discharge - Consider OT consult to assist with ADL evaluation and planning for discharge- Provide patient education as appropriate  Outcome: Progressing  Goal: Maintains/Returns to pre admission functional level  Description: INTERVENTIONS:- Perform AM-PAC 6 Click Basic Mobility/ Daily Activity assessment daily.- Set and communicate daily mobility goal to care team and patient/family/caregiver. - Collaborate with rehabilitation services on mobility goals if consulted- Out of bed for toileting- Record patient progress and toleration of activity level   Outcome: Progressing     Problem: DISCHARGE PLANNING  Goal: Discharge to home or other facility with appropriate resources  Description: INTERVENTIONS:- Identify barriers to discharge w/patient and caregiver- Arrange for needed discharge resources and transportation as appropriate- Identify discharge learning needs (meds, wound care, etc.)- Arrange for interpretive services to assist at discharge as needed- Refer to Case Management Department for coordinating discharge planning if the patient needs post-hospital services based on physician/advanced practitioner order or complex needs related to functional status, cognitive ability, or social support system  Outcome:  Progressing     Problem: Knowledge Deficit  Goal: Patient/family/caregiver demonstrates understanding of disease process, treatment plan, medications, and discharge instructions  Description: Complete learning assessment and assess knowledge base.Interventions:- Provide teaching at level of understanding- Provide teaching via preferred learning methods  Outcome: Progressing     Problem: Nutrition/Hydration-ADULT  Goal: Nutrient/Hydration intake appropriate for improving, restoring or maintaining nutritional needs  Description: Monitor and assess patient's nutrition/hydration status for malnutrition. Collaborate with interdisciplinary team and initiate plan and interventions as ordered.  Monitor patient's weight and dietary intake as ordered or per policy. Utilize nutrition screening tool and intervene as necessary. Determine patient's food preferences and provide high-protein, high-caloric foods as appropriate. INTERVENTIONS:- Monitor oral intake, urinary output, labs, and treatment plans- Assess nutrition and hydration status and recommend course of action- Evaluate amount of meals eaten- Assist patient with eating if necessary - Allow adequate time for meals- Recommend/ encourage appropriate diets, oral nutritional supplements, and vitamin/mineral supplements- Order, calculate, and assess calorie counts as needed- Recommend, monitor, and adjust tube feedings and TPN/PPN based on assessed needs- Assess need for intravenous fluids- Provide specific nutrition/hydration education as appropriate- Include patient/family/caregiver in decisions related to nutrition  Outcome: Progressing

## 2025-05-04 NOTE — QUICK NOTE
Asked to eval patient by Jimmy IBARRA PA-C for lower GI bleed and hypotension. Patient had a large episode of BRBPR earlier in the night with previous dark stools. She was given 1L of IVF and had transient BP improvement. Notable Hgb drop of >1g. Blood was not administered athis time. Heparin gtt which is treating current PE which had been supratherapeutic was held. She is actively being prepped for conoloscopy.    Asked to see patient currently given persistent hypotension. On my arrival, patient is actively being transfused 1st unit of PRBC. She is sitting up in bed, in NAD, WDWN without complaints. Mentating at baseline. Abd soft, NTND. Pale color.     A/P:  1) Lower GI bleed, likely diverticular in nature   2) Hemorrhagic shock  3) R pulmonary embolism   Cards/Heme- Shock, hemorrhagic   Transfuse 1 unit of PRBC   If additional hypotension, transuse another unit of PRBC. Continue to hold heparin gtt   There is no acute indication for protamine at this time as patient is not having life threatening hemorrhage   Give 2g calc glucconate now  F/u labs ordered and add on vbg and I scarlett  Prevent hypothermia and acidosis  Will admit to ICU if hypotension persists after 2nd unit and calcium given  Pulm- R Pulmonary Embolism with RV/LV ratio 0.9   Monitor for signs of obstructive shock  No acute indication for thrombectomy in this patient   GI- LGIB  Maintain 2 large bore Ivs at all times  Continue protonix 40mg BID IV  Discuss prompt timing of colonoscopy eval with GI in AM

## 2025-05-04 NOTE — ASSESSMENT & PLAN NOTE
Available baseline hemoglobin appears closer to 10.0  Blood loss anemia due to rectal bleeding  Was transfused 1 unit PRBC 5/3/2025  Transfuse additional 2 units PRBC today.    Results from last 7 days   Lab Units 05/04/25  0815 05/04/25  0245 05/03/25 2042 05/03/25  1228 05/03/25  0606 05/03/25  0005   HEMOGLOBIN g/dL 9.8* 6.9* 7.3* 8.5* 8.3* 8.3*

## 2025-05-04 NOTE — QUICK NOTE
Nursing reached out secondary to blood pressure of 73/42 when checking bp prior to hanging the blood ordered earlier this evening. No further episodes of hematochezia per nursing.   Pt assessed at the bedside, pale appearing, otherwise in no acute distress. Resting comfortably in bed. Denies SOB, palpitations, lightheadedness/dizziness. HRRR. Lungs clear. Bowel sounds normal. Abdomen nondistended/nontender to palpation.    PLAN  Blood currently running, instructed nursing to run at faster rate  Additional 1L IVF bolus ordered  STAT labs ordered  Hgb 6.9    Mg 1.6-> will replete  Ionized calcium, 1.05-> will replete  APTT coming down, now 126 since heparin gtt was held earlier this evening  Discussed case with cortney critical care AP, recommendations appreciated

## 2025-05-04 NOTE — PROGRESS NOTES
Progress Note - Hospitalist   Name: Alecia Kay 80 y.o. female I MRN: [id number]  Unit/Bed#: E4 -01 I Date of Admission: 5/2/2025   Date of Service: 5/4/2025 I Hospital Day: 2    Assessment & Plan  Hematochezia  History of hypertension DVT PE CKD 3 hypothyroidism and right heel osteomyelitis initially presented to [facility] 4/25/2025 for generalized weakness  Patient did report she was at [facility] but signed out due to running out of SNF coverage and having to self-pay  Initially found to have kidney injury and MDR UTI completed course of ertapenem  Subsequently found to have DVT and PE and started on heparin infusion as patient's INR was subtherapeutic  Patient then had hematochezia and was a rapid response  Due to lack of GI services patient has been transferred here   Upon arrival here with stable and after discussion with GI was started on heparin infusion with monitoring for any signs of bleeding  Patient evaluated by critical care this morning as well as GI due to recurrent hematochezia.  Currently heparin drip on hold  Acute blood loss anemia (ABLA)  Available baseline hemoglobin appears closer to 10.0  Blood loss anemia due to rectal bleeding  Was transfused 1 unit PRBC 5/3/2025  Transfuse additional 2 units PRBC today.    Results from last 7 days   Lab Units 05/04/25  0815 05/04/25  0245 05/03/25 2042 05/03/25  1228 05/03/25  0606 05/03/25  0005   HEMOGLOBIN g/dL 9.8* 6.9* 7.3* 8.5* 8.3* 8.3*     Acute respiratory insufficiency  Likely secondary to pulmonary embolism  Currently on 2 L nasal cannula  Acute pulmonary embolism (HCC)  Extensive right upper and lower lobe pulmonary embolism seen on 4/28/2025 CTA  Does have history of DVT and PE on warfarin 5 mg 5 days a week at 7.5 mg Monday and Friday  Was started on heparin infusion but then had GI bleeding.  Heparin drip now on hold until cleared by GI    Results from last 7 days   Lab Units 05/04/25  0245 05/03/25  0606  05/02/25  1046 05/02/25  0535   INR  2.07* 1.88* 1.57* 1.72*     Hypertension  May need to decrease metoprolol if recurrent bradycardia  Continue amlodipine with hold parameters or discontinue if recurrent orthostasis  Torsemide recently discontinued during March 2025 hospitalization  Hypokalemia  May be secondary to multiple loose bowel movements.  Added banana flakes  Potassium has been replaced.  Magnesium replaced and will recheck tomorrow    Results from last 7 days   Lab Units 05/04/25  0245 05/03/25 2042 05/03/25  0606 05/02/25  1046 05/02/25  0535 05/01/25  0630   POTASSIUM mmol/L 4.1 3.5 2.9* 3.0*   < > 2.6*   MAGNESIUM mg/dL 1.6*  --  1.9 1.6*  --  1.9    < > = values in this interval not displayed.     Acute DVT (deep venous thrombosis) ([facility])  History of DVT   However during 4/25/2025 [facility] admission found to have bilateral acute vs subacute DVTs  Nausea and vomiting  Initial reason for presentation to hospital 4/25/2025  Patient reports no dysphagia but has appetite loss  Smell of food makes her nauseous  GI consulted  History of osteomyelitis  History of right heel osteomyelitis did see podiatry.  MRI negative for acute osteomyelitis  Chronic kidney disease, stage III (moderate) ([facility])  Initially presented to [facility] 4/25/2025 for generalized weakness found to have kidney injury and MDR UTI  Renal function has returned back to baseline  Torsemide discontinued during March 2025 hospitalization    Results from last 7 days   Lab Units 05/04/25  0245 05/03/25 2042 05/03/25  0606 05/02/25  1046 05/02/25  0535 05/01/25  0630 04/30/25  0533 04/29/25  0702 04/28/25  0353   BUN mg/dL 7 8 8 7 7 6 6 7 10   CREATININE mg/dL 0.72 0.78 0.78 0.84 0.78 0.82 0.85 0.97 1.01   EGFR ml/min/1.73sq m 78 71 71 65 71 67 64 54 52     Hypothyroidism  Continue levothyroxine    VTE Pharmacologic Prophylaxis: VTE Score: 6 High Risk (Score >/= 5) - Pharmacological DVT Prophylaxis Contraindicated. Sequential Compression Devices  Ordered.    Mobility:   Basic Mobility Inpatient Raw Score: 11  JH-HLM Goal: 4: Move to chair/commode  JH-HLM Achieved: 2: Bed activities/Dependent transfer  JH-HLM Goal NOT achieved. Continue with multidisciplinary rounding and encourage appropriate mobility to improve upon JH-HLM goals.    Patient Centered Rounds: I have performed bedside rounds with nursing staff today.  Discussions with Specialists or Other Care Team Provider: Gastroenterology    Education and Discussions with Family / Patient: Updated  (son) at bedside.    Current Length of Stay: 2 day(s)  Current Patient Status: Inpatient   Certification Statement: The patient will continue to require additional inpatient hospital stay due to GI bleeding  Discharge Plan: Anticipate discharge in 48-72 hrs to discharge location to be determined pending rehab evaluations.    Code Status: Level 1 - Full Code    Subjective   Patient seen and examined.  GI bleeding this morning.  Son at bedside currently no pain    Objective   Vitals:   Temp (24hrs), Av.2 °F (35.7 °C), Min:95.8 °F (35.4 °C), Max:97 °F (36.1 °C)    Temp:  [95.8 °F (35.4 °C)-97 °F (36.1 °C)] 96 °F (35.6 °C)  HR:  [] 75  Resp:  [12-20] 16  BP: ()/(42-77) 117/62  SpO2:  [91 %-98 %] 97 %  Body mass index is 31.45 kg/m².     Input and Output Summary (last 24 hours):     Intake/Output Summary (Last 24 hours) at 2025 1040  Last data filed at 2025 0801  Gross per 24 hour   Intake 4160.41 ml   Output 1 ml   Net 4159.41 ml       Physical Exam  Vitals reviewed.   Constitutional:       General: She is not in acute distress.     Comments: Tired appearing   HENT:      Head: Atraumatic.   Eyes:      General: No scleral icterus.  Cardiovascular:      Rate and Rhythm: Regular rhythm.      Heart sounds:      No gallop.   Pulmonary:      Effort: No respiratory distress.      Breath sounds: Decreased breath sounds present. No wheezing.   Abdominal:      General: Bowel sounds are  normal.      Palpations: Abdomen is soft.      Tenderness: There is no guarding.   Musculoskeletal:      Right lower leg: Edema present.      Left lower leg: Edema present.   Skin:     General: Skin is warm.      Coloration: Skin is not jaundiced.   Neurological:      General: No focal deficit present.      Mental Status: She is alert.      Motor: No weakness.   Psychiatric:         Mood and Affect: Affect is flat.       Lines/Drains:  Invasive Devices       Peripheral Intravenous Line  Duration             Peripheral IV 05/02/25 Left;Ventral (anterior) Forearm 2 days    Peripheral IV 05/02/25 Proximal;Right;Ventral (anterior) Forearm 2 days              Long-dwell Peripherally Inserted Midline IV Catheter  Duration             Long-Dwell Peripheral IV (Midline) 04/28/25 Left Brachial 5 days                          Lab Results: I have reviewed the following results:   Results from last 7 days   Lab Units 05/04/25  0815 05/04/25 0245 05/03/25 2042 05/03/25  1228 05/03/25  0606 05/02/25  1620 05/02/25  1046   WBC Thousand/uL  --  4.08* 4.72 4.95 5.48  --  4.94   HEMOGLOBIN g/dL 9.8* 6.9* 7.3* 8.5* 8.3*   < > 8.5*   PLATELETS Thousands/uL  --  116* 144* 143* 150  --  148*   MCV fL  --  86 86 87 88  --  88   INR   --  2.07*  --   --  1.88*  --  1.57*    < > = values in this interval not displayed.     Results from last 7 days   Lab Units 05/04/25  0245 05/03/25 2042 05/03/25  0606   SODIUM mmol/L 140 138 142   POTASSIUM mmol/L 4.1 3.5 2.9*   CHLORIDE mmol/L 107 106 106   CO2 mmol/L 25 27 28   ANION GAP mmol/L 8 5 8   BUN mg/dL 7 8 8   CREATININE mg/dL 0.72 0.78 0.78   CALCIUM mg/dL 7.3* 7.8* 8.1*   ALBUMIN g/dL 2.0* 2.3* 2.4*   TOTAL BILIRUBIN mg/dL 0.41 0.46 0.59   ALK PHOS U/L 38 49 49   ALT U/L <3* <3* <3*   AST U/L 9* 11* 9*   EGFR ml/min/1.73sq m 78 71 71   GLUCOSE RANDOM mg/dL 102 129 91     Results from last 7 days   Lab Units 05/04/25  0245 05/03/25  0606 05/02/25  1046 05/01/25  0630 04/30/25  1532  04/30/25  0533 04/29/25  0702   MAGNESIUM mg/dL 1.6* 1.9 1.6* 1.9  --  1.3*  --    PHOSPHORUS mg/dL  --  2.5  --  2.6 3.9 2.0* 1.7*                      Results from last 7 days   Lab Units 05/02/25  1001   POC GLUCOSE mg/dl 88               Recent Cultures (last 7 days):   Results from last 7 days   Lab Units 04/28/25  0422   C DIFF TOXIN B BY PCR  Negative       Imaging:  Reviewed radiology reports from this admission including:  No results found.    Last 24 Hours Medication List:     Current Facility-Administered Medications:     acetaminophen (TYLENOL) tablet 650 mg, Q6H PRN    amLODIPine (NORVASC) tablet 10 mg, Daily    hydrOXYzine HCL (ATARAX) tablet 25 mg, TID AC    levothyroxine tablet 75 mcg, Early Morning    meclizine (ANTIVERT) tablet 25 mg, TID PRN    metoprolol tartrate (LOPRESSOR) tablet 50 mg, Q12H MELANY    mirtazapine (REMERON) tablet 7.5 mg, HS    multi-electrolyte (Plasmalyte-A/Isolyte-S PH 7.4/Normosol-R) IV solution, Continuous    ondansetron (ZOFRAN) injection 4 mg, Q4H PRN    pantoprazole (PROTONIX) injection 40 mg, Q12H MELANY    potassium chloride (Klor-Con M20) CR tablet 40 mEq, BID    pravastatin (PRAVACHOL) tablet 80 mg, Daily With Dinner    [Held by provider] senna-docusate sodium (SENOKOT S) 8.6-50 mg per tablet 1 tablet, Daily    [Held by provider] warfarin (COUMADIN) tablet 5 mg, Once per day on Sunday Tuesday Wednesday Thursday Saturday    [Held by provider] warfarin (COUMADIN) tablet 7.5 mg, Once per day on Monday Friday    Administrative Statements   Today, Patient Was Seen By: Calderon Friedman, DO  I have spent a total time of 40 minutes in caring for this patient on the day of the visit/encounter including Patient and family education, Counseling / Coordination of care, Documenting in the medical record, Reviewing/placing orders in the medical record (including tests, medications, and/or procedures), and Communicating with other healthcare professionals .    **Please Note: This note may  have been constructed using a voice recognition system.**

## 2025-05-04 NOTE — ASSESSMENT & PLAN NOTE
May be secondary to multiple loose bowel movements.  Added banana flakes  Potassium has been replaced.  Magnesium replaced and will recheck tomorrow    Results from last 7 days   Lab Units 05/04/25  0245 05/03/25  2042 05/03/25  0606 05/02/25  1046 05/02/25  0535 05/01/25  0630   POTASSIUM mmol/L 4.1 3.5 2.9* 3.0*   < > 2.6*   MAGNESIUM mg/dL 1.6*  --  1.9 1.6*  --  1.9    < > = values in this interval not displayed.

## 2025-05-04 NOTE — QUICK NOTE
Nursing reached out that patient with new large episode of BRBPR. Previously, pt stools dark appearing, black/dark brown with stable hemoglobin during the day.  Pt did have episode of asymptomatic hypotension earlier this evening prior to this episode of 80/53. Pt received 1L V fluid at that time.  Repeat labs were also ordered which demonstrated hgb 7.3 down from 8.5  Bp improved to 93/58 following bolus  Pt assessed at the bedside. Resting comfortably at this time. Denies SOB, palpitations, lightheadedness/dizziness. HRRR. Lungs clear.  Of note, pt started on bowel prep yesterday afternoon for colonscopy    PLAN  Given recurrence of hematochezia with supratherapeutic ptts, will hold heparin gtt for now  Blood consent obtained with one unit of blood ordered at this time  Continue to monitor H&H's closely  Will make patient NPO pending repeat GI eval in AM

## 2025-05-04 NOTE — ASSESSMENT & PLAN NOTE
80 female with a past medical history of VTE on coumadin prior to admission, GERD with Wang's esophagus, hypertension, HLD, chronic osteomyelitis, and hypothyroidism.  Patient initially presented with generalized weakness and failure to thrive with poor oral intake and a 70 pound weight loss.  Initially treated for GAY/UTI but also started on heparin drip due to DVT/PE.  Following initiation of heparin drip, patient with bright red blood per rectum.  Hemoglobin had declined from 9.7 on admission to 6.9 with associated symptoms and hemodynamic changes.      Patient ultimately transferred to [facility] for closer monitoring and GI/IR support over the weekend.  Yesterday and overnight, patient with episodes of hypotension along with symptomatic anemia.  Patient with further decline in hemoglobin down to 6.9 requiring additional PRBC transfusion.  Patient also with bloody bowel movements.  This was occurring in the setting of a supratherapeutic PTT which was unable to be calculated.  Heparin drip placed on hold and PTT improving.  Patient also has now appropriately responded to 2 units of PRBC.  Hemodynamics improve and hemoglobin also currently 9.8.  Will therefore hold on endoscopic evaluation today and continue with additional bowel prep tonight for endoscopic evaluation tomorrow.  Will plan for both EGD/colonoscopy given patient's high risk for anesthesia and need for ongoing anticoagulation use.  Do suspect that bleeding is likely due to diverticular source which often self resolves and is very difficult to treat endoscopically.  Also unable to fully rule out malignancy especially in setting of patient's recent weight loss, blood clots, and failure to thrive.  If patient with change in hemodynamic stability or recurrent hemoglobin drop, can reevaluate need for more urgent endoscopic evaluation.    Plan:  Tentative plan for EGD/colonoscopy on Monday  Continue on clear liquids and n.p.o. at midnight, Dulcolax/MiraLAX  bowel prep today  Okay to continue on Protonix 40 mg IV BID although suspect lower GI bleed  Hold heparin drip given supratherapeutic PTT  Trend H&H; transfuse for goal of >7.0   Monitor hemodynamics; avoid episodes of hypotension   Maintain adequate IV access with 2 large bore Ivs  Monitor bowel movements; alert GI team of signs of overt GIB  Additional pain and symptom management per primary team

## 2025-05-04 NOTE — PROGRESS NOTES
Comparogestefani Note - Gastroenterology   Name: Alecia Kay 80 y.o. female I MRN: [id number]  Unit/Bed#: E4 -01 I Date of Admission: 5/2/2025   Date of Service: 5/4/2025 I Hospital Day: 2    Assessment & Plan  Hematochezia  80 female with a past medical history of VTE on coumadin prior to admission, GERD with Wang's esophagus, hypertension, HLD, chronic osteomyelitis, and hypothyroidism.  Patient initially presented with generalized weakness and failure to thrive with poor oral intake and a 70 pound weight loss.  Initially treated for GAY/UTI but also started on heparin drip due to DVT/PE.  Following initiation of heparin drip, patient with bright red blood per rectum.  Hemoglobin had declined from 9.7 on admission to 6.9 with associated symptoms and hemodynamic changes.      Patient ultimately transferred to [facility] for closer monitoring and GI/IR support over the weekend.  Yesterday and overnight, patient with episodes of hypotension along with symptomatic anemia.  Patient with further decline in hemoglobin down to 6.9 requiring additional PRBC transfusion.  Patient also with bloody bowel movements.  This was occurring in the setting of a supratherapeutic PTT which was unable to be calculated.  Heparin drip placed on hold and PTT improving.  Patient also has now appropriately responded to 2 units of PRBC.  Hemodynamics improve and hemoglobin also currently 9.8.  Will therefore hold on endoscopic evaluation today and continue with additional bowel prep tonight for endoscopic evaluation tomorrow.  Will plan for both EGD/colonoscopy given patient's high risk for anesthesia and need for ongoing anticoagulation use.  Do suspect that bleeding is likely due to diverticular source which often self resolves and is very difficult to treat endoscopically.  Also unable to fully rule out malignancy especially in setting of patient's recent weight loss, blood clots, and failure to thrive.  If patient with change in  hemodynamic stability or recurrent hemoglobin drop, can reevaluate need for more urgent endoscopic evaluation.    Plan:  Tentative plan for EGD/colonoscopy on Monday  Continue on clear liquids and n.p.o. at midnight, Dulcolax/MiraLAX bowel prep today  Okay to continue on Protonix 40 mg IV BID although suspect lower GI bleed  Hold heparin drip given supratherapeutic PTT  Trend H&H; transfuse for goal of >7.0   Monitor hemodynamics; avoid episodes of hypotension   Maintain adequate IV access with 2 large bore Ivs  Monitor bowel movements; alert GI team of signs of overt GIB  Additional pain and symptom management per primary team   Acute pulmonary embolism (HCC)  Imaging demonstrating extensive right and lower lobe pulmonary embolism on/20 8/2025 CTA.  Previously on warfarin at home.  Patient placed on heparin drip but currently on hold in setting of GI bleeding and supratherapeutic PTT  Acute DVT (deep venous thrombosis) (HCC)  Patient with prior history of DVT.  Found to have acute versus subacute bilateral DVTs this admission.  Patient placed on heparin drip but currently on hold in setting of GI bleeding and supratherapeutic PTT      Please contact the SecureChat role for the Gastroenterology service with any questions/concerns.    Subjective  Patient seen examined at bedside.  Sitting in bed comfortably in no acute distress or visible discomfort.  Currently asymptomatic and with no complaints outside of fatigue.  Denies abdominal pain.    Objective :  Temp:  [95.8 °F (35.4 °C)-97 °F (36.1 °C)] (P) 96 °F (35.6 °C)  HR:  [] (P) 70  BP: ()/(42-77) (P) 89/64  Resp:  [12-20] (P) 18  SpO2:  [95 %-98 %] (P) 97 %  O2 Device: (P) Nasal cannula  Nasal Cannula O2 Flow Rate (L/min):  [2 L/min] (P) 2 L/min    Physical Exam  Constitutional:       General: She is not in acute distress.     Appearance: She is normal weight. She is not ill-appearing or toxic-appearing.   HENT:      Head: Normocephalic.      Nose: Nose  normal. No congestion.   Eyes:      General: No scleral icterus.  Cardiovascular:      Rate and Rhythm: Normal rate.      Pulses: Normal pulses.   Pulmonary:      Effort: Pulmonary effort is normal. No respiratory distress.   Abdominal:      General: Abdomen is flat. There is no distension.      Palpations: Abdomen is soft.      Tenderness: There is no abdominal tenderness. There is no guarding or rebound.   Musculoskeletal:      Cervical back: Normal range of motion.   Skin:     General: Skin is warm.      Capillary Refill: Capillary refill takes less than 2 seconds.      Coloration: Skin is not pale.   Neurological:      Mental Status: She is alert and oriented to person, place, and time. Mental status is at baseline.   Psychiatric:         Mood and Affect: Mood normal.         Behavior: Behavior normal.           Lab Results: I have reviewed the following results:CBC/BMP:   .     05/04/25  0245 05/04/25 0815 05/04/25  1215   WBC 4.08*  --   --    HGB 6.9*   < > 9.7*   HCT 20.7*   < > 28.6*   *  --   --    SODIUM 140  --   --    K 4.1  --   --      --   --    CO2 25  --   --    BUN 7  --   --    CREATININE 0.72  --   --    GLUC 102  --   --    CAIONIZED 1.05*  --   --    MG 1.6*  --   --     < > = values in this interval not displayed.    , Creatinine Clearance: Estimated Creatinine Clearance: 59.3 mL/min (by C-G formula based on SCr of 0.72 mg/dL)., LFTs:   .     05/04/25 0245   AST 9*   ALT <3*   ALB 2.0*   TBILI 0.41   ALKPHOS 38    , PTT/INR:  .     05/04/25 0245   *   INR 2.07*        Imaging Results Review: I reviewed radiology reports from this admission including: CT abdomen/pelvis.  Other Study Results Review: No additional pertinent studies reviewed.

## 2025-05-04 NOTE — ASSESSMENT & PLAN NOTE
Initially presented to [facility] 4/25/2025 for generalized weakness found to have kidney injury and MDR UTI  Renal function has returned back to baseline  Torsemide discontinued during March 2025 hospitalization    Results from last 7 days   Lab Units 05/04/25  0245 05/03/25  2042 05/03/25  0606 05/02/25  1046 05/02/25  0535 05/01/25  0630 04/30/25  0533 04/29/25  0702 04/28/25  0353   BUN mg/dL 7 8 8 7 7 6 6 7 10   CREATININE mg/dL 0.72 0.78 0.78 0.84 0.78 0.82 0.85 0.97 1.01   EGFR ml/min/1.73sq m 78 71 71 65 71 67 64 54 52

## 2025-05-04 NOTE — ASSESSMENT & PLAN NOTE
Imaging demonstrating extensive right and lower lobe pulmonary embolism on/20 8/2025 CTA.  Previously on warfarin at home.  Patient placed on heparin drip but currently on hold in setting of GI bleeding and supratherapeutic PTT

## 2025-05-04 NOTE — ASSESSMENT & PLAN NOTE
History of hypertension DVT PE CKD 3 hypothyroidism and right heel osteomyelitis initially presented to [facility] campus 4/25/2025 for generalized weakness  Patient did report she was at [facility] but signed out due to running out of SNF coverage and having to self-pay  Initially found to have kidney injury and MDR UTI completed course of ertapenem  Subsequently found to have DVT and PE and started on heparin infusion as patient's INR was subtherapeutic  Patient then had hematochezia and was a rapid response  Due to lack of GI services patient has been transferred here   Upon arrival here with stable and after discussion with GI was started on heparin infusion with monitoring for any signs of bleeding  Patient evaluated by critical care this morning as well as GI due to recurrent hematochezia.  Currently heparin drip on hold

## 2025-05-04 NOTE — ASSESSMENT & PLAN NOTE
May need to decrease metoprolol if recurrent bradycardia  Continue amlodipine with hold parameters or discontinue if recurrent orthostasis  Torsemide recently discontinued during March 2025 hospitalization

## 2025-05-04 NOTE — SPEECH THERAPY NOTE
Patient is currently NPO pending GI. Will f/u when able/appropriate.    Marija Horne M.S., CCC-SLP  Speech Language Pathologist   Available via Secure Chat  [place] #[id number]  [place] #[id number]

## 2025-05-04 NOTE — ASSESSMENT & PLAN NOTE
Extensive right upper and lower lobe pulmonary embolism seen on 4/28/2025 CTA  Does have history of DVT and PE on warfarin 5 mg 5 days a week at 7.5 mg Monday and Friday  Was started on heparin infusion but then had GI bleeding.  Heparin drip now on hold until cleared by GI    Results from last 7 days   Lab Units 05/04/25  0245 05/03/25  0606 05/02/25  1046 05/02/25  0535   INR  2.07* 1.88* 1.57* 1.72*

## 2025-05-04 NOTE — ASSESSMENT & PLAN NOTE
History of DVT   However during 4/25/2025 [facility] admission found to have bilateral acute vs subacute DVTs

## 2025-05-04 NOTE — PLAN OF CARE
Problem: PAIN - ADULT  Goal: Verbalizes/displays adequate comfort level or baseline comfort level  Description: Interventions:- Encourage patient to monitor pain and request assistance- Assess pain using appropriate pain scale- Administer analgesics based on type and severity of pain and evaluate response- Implement non-pharmacological measures as appropriate and evaluate response- Consider cultural and social influences on pain and pain management- Notify physician/advanced practitioner if interventions unsuccessful or patient reports new pain  Outcome: Progressing     Problem: INFECTION - ADULT  Goal: Absence or prevention of progression during hospitalization  Description: INTERVENTIONS:- Assess and monitor for signs and symptoms of infection- Monitor lab/diagnostic results- Monitor all insertion sites, i.e. indwelling lines, tubes, and drains- Monitor endotracheal if appropriate and nasal secretions for changes in amount and color- [place] appropriate cooling/warming therapies per order- Administer medications as ordered- Instruct and encourage patient and family to use good hand hygiene technique- Identify and instruct in appropriate isolation precautions for identified infection/condition  Outcome: Progressing  Goal: Absence of fever/infection during neutropenic period  Description: INTERVENTIONS:- Monitor WBC  Outcome: Progressing     Problem: SAFETY ADULT  Goal: Patient will remain free of falls  Description: INTERVENTIONS:- Educate patient/family on patient safety including physical limitations- Instruct patient to call for assistance with activity - Consult OT/PT to assist with strengthening/mobility - Keep Call bell within reach- Keep bed low and locked with side rails adjusted as appropriate- Keep care items and personal belongings within reach- Initiate and maintain comfort rounds- Make Fall Risk Sign visible to staff- Offer Toileting every 2 Hours, in advance of need- Initiate/Maintain bed alarm-  Obtain necessary fall risk management equipment- Apply yellow socks and bracelet for high fall risk patients- Consider moving patient to room near nurses station  Outcome: Progressing  Goal: Maintain or return to baseline ADL function  Description: INTERVENTIONS:-  Assess patient's ability to carry out ADLs; assess patient's baseline for ADL function and identify physical deficits which impact ability to perform ADLs (bathing, care of mouth/teeth, toileting, grooming, dressing, etc.)- Assess/evaluate cause of self-care deficits - Assess range of motion- Assess patient's mobility; develop plan if impaired- Assess patient's need for assistive devices and provide as appropriate- Encourage maximum independence but intervene and supervise when necessary- Involve family in performance of ADLs- Assess for home care needs following discharge - Consider OT consult to assist with ADL evaluation and planning for discharge- Provide patient education as appropriate  Outcome: Progressing  Goal: Maintains/Returns to pre admission functional level  Description: INTERVENTIONS:- Perform AM-PAC 6 Click Basic Mobility/ Daily Activity assessment daily.- Set and communicate daily mobility goal to care team and patient/family/caregiver. - Collaborate with rehabilitation services on mobility goals if consulted- Perform Range of Motion 3 times a day.- Reposition patient every 2 hours.- Dangle patient 3 times a day- Stand patient 3 times a day- Ambulate patient 3 times a day- Out of bed to chair 3 times a day - Out of bed for meals 3 times a day- Out of bed for toileting- Record patient progress and toleration of activity level   Outcome: Progressing     Problem: DISCHARGE PLANNING  Goal: Discharge to home or other facility with appropriate resources  Description: INTERVENTIONS:- Identify barriers to discharge w/patient and caregiver- Arrange for needed discharge resources and transportation as appropriate- Identify discharge learning needs  (meds, wound care, etc.)- Arrange for interpretive services to assist at discharge as needed- Refer to Case Management Department for coordinating discharge planning if the patient needs post-hospital services based on physician/advanced practitioner order or complex needs related to functional status, cognitive ability, or social support system  Outcome: Progressing     Problem: Knowledge Deficit  Goal: Patient/family/caregiver demonstrates understanding of disease process, treatment plan, medications, and discharge instructions  Description: Complete learning assessment and assess knowledge base.Interventions:- Provide teaching at level of understanding- Provide teaching via preferred learning methods  Outcome: Progressing     Problem: Nutrition/Hydration-ADULT  Goal: Nutrient/Hydration intake appropriate for improving, restoring or maintaining nutritional needs  Description: Monitor and assess patient's nutrition/hydration status for malnutrition. Collaborate with interdisciplinary team and initiate plan and interventions as ordered.  Monitor patient's weight and dietary intake as ordered or per policy. Utilize nutrition screening tool and intervene as necessary. Determine patient's food preferences and provide high-protein, high-caloric foods as appropriate. INTERVENTIONS:- Monitor oral intake, urinary output, labs, and treatment plans- Assess nutrition and hydration status and recommend course of action- Evaluate amount of meals eaten- Assist patient with eating if necessary - Allow adequate time for meals- Recommend/ encourage appropriate diets, oral nutritional supplements, and vitamin/mineral supplements- Order, calculate, and assess calorie counts as needed- Recommend, monitor, and adjust tube feedings and TPN/PPN based on assessed needs- Assess need for intravenous fluids- Provide specific nutrition/hydration education as appropriate- Include patient/family/caregiver in decisions related to  nutrition  Outcome: Progressing     Problem: Prexisting or High Potential for Compromised Skin Integrity  Goal: Skin integrity is maintained or improved  Description: INTERVENTIONS:- Identify patients at risk for skin breakdown- Assess and monitor skin integrity- Assess and monitor nutrition and hydration status- Monitor labs - Assess for incontinence - Turn and reposition patient- Assist with mobility/ambulation- Relieve pressure over bony prominences- Avoid friction and shearing- Provide appropriate hygiene as needed including keeping skin clean and dry- Evaluate need for skin moisturizer/barrier cream- Collaborate with interdisciplinary team - Patient/family teaching- Consider wound care consult   Outcome: Progressing

## 2025-05-05 ENCOUNTER — ANESTHESIA EVENT (INPATIENT)
Dept: GASTROENTEROLOGY | Facility: HOSPITAL | Age: 80
End: 2025-05-05
Payer: COMMERCIAL

## 2025-05-05 ENCOUNTER — APPOINTMENT (INPATIENT)
Dept: GASTROENTEROLOGY | Facility: HOSPITAL | Age: 80
DRG: 377 | End: 2025-05-05
Attending: STUDENT IN AN ORGANIZED HEALTH CARE EDUCATION/TRAINING PROGRAM
Payer: COMMERCIAL

## 2025-05-05 ENCOUNTER — ANESTHESIA (INPATIENT)
Dept: GASTROENTEROLOGY | Facility: HOSPITAL | Age: 80
End: 2025-05-05
Payer: COMMERCIAL

## 2025-05-05 PROBLEM — M86.60 CHRONIC OSTEOMYELITIS (HCC): Status: ACTIVE | Noted: 2025-05-05

## 2025-05-05 PROBLEM — K22.70 BARRETT'S ESOPHAGUS: Status: ACTIVE | Noted: 2025-05-05

## 2025-05-05 PROBLEM — M76.60 INSERTIONAL ACHILLES TENDINOPATHY: Status: ACTIVE | Noted: 2025-05-05

## 2025-05-05 LAB
ALBUMIN SERPL BCG-MCNC: 2.3 G/DL (ref 3.5–5)
ALP SERPL-CCNC: 45 U/L (ref 34–104)
ALT SERPL W P-5'-P-CCNC: <3 U/L (ref 7–52)
ANION GAP SERPL CALCULATED.3IONS-SCNC: 8 MMOL/L (ref 4–13)
APTT PPP: 38 SECONDS (ref 23–34)
AST SERPL W P-5'-P-CCNC: 11 U/L (ref 13–39)
BILIRUB SERPL-MCNC: 0.54 MG/DL (ref 0.2–1)
BUN SERPL-MCNC: 6 MG/DL (ref 5–25)
CALCIUM ALBUM COR SERPL-MCNC: 9.3 MG/DL (ref 8.3–10.1)
CALCIUM SERPL-MCNC: 7.9 MG/DL (ref 8.4–10.2)
CHLORIDE SERPL-SCNC: 106 MMOL/L (ref 96–108)
CO2 SERPL-SCNC: 27 MMOL/L (ref 21–32)
CREAT SERPL-MCNC: 0.7 MG/DL (ref 0.6–1.3)
GFR SERPL CREATININE-BSD FRML MDRD: 81 ML/MIN/1.73SQ M
GLUCOSE SERPL-MCNC: 83 MG/DL (ref 65–140)
HCT VFR BLD AUTO: 25.8 % (ref 34.8–46.1)
HCT VFR BLD AUTO: 26.6 % (ref 34.8–46.1)
HCT VFR BLD AUTO: 28.3 % (ref 34.8–46.1)
HCT VFR BLD AUTO: 28.7 % (ref 34.8–46.1)
HGB BLD-MCNC: 10.2 G/DL (ref 11.5–15.4)
HGB BLD-MCNC: 9.1 G/DL (ref 11.5–15.4)
HGB BLD-MCNC: 9.2 G/DL (ref 11.5–15.4)
HGB BLD-MCNC: 9.5 G/DL (ref 11.5–15.4)
INR PPP: 1.81 (ref 0.85–1.19)
INR PPP: 1.88 (ref 0.85–1.19)
MAGNESIUM SERPL-MCNC: 1.8 MG/DL (ref 1.9–2.7)
PHOSPHATE SERPL-MCNC: 2.5 MG/DL (ref 2.3–4.1)
POTASSIUM SERPL-SCNC: 3.7 MMOL/L (ref 3.5–5.3)
PROT SERPL-MCNC: 4.3 G/DL (ref 6.4–8.4)
PROTHROMBIN TIME: 20.9 SECONDS (ref 12.3–15)
PROTHROMBIN TIME: 21.5 SECONDS (ref 12.3–15)
SODIUM SERPL-SCNC: 141 MMOL/L (ref 135–147)

## 2025-05-05 PROCEDURE — 99233 SBSQ HOSP IP/OBS HIGH 50: CPT | Performed by: INTERNAL MEDICINE

## 2025-05-05 PROCEDURE — 84100 ASSAY OF PHOSPHORUS: CPT | Performed by: INTERNAL MEDICINE

## 2025-05-05 PROCEDURE — 85730 THROMBOPLASTIN TIME PARTIAL: CPT | Performed by: INTERNAL MEDICINE

## 2025-05-05 PROCEDURE — 85018 HEMOGLOBIN: CPT

## 2025-05-05 PROCEDURE — 0DJD8ZZ INSPECTION OF LOWER INTESTINAL TRACT, VIA NATURAL OR ARTIFICIAL OPENING ENDOSCOPIC: ICD-10-PCS | Performed by: STUDENT IN AN ORGANIZED HEALTH CARE EDUCATION/TRAINING PROGRAM

## 2025-05-05 PROCEDURE — 83735 ASSAY OF MAGNESIUM: CPT | Performed by: INTERNAL MEDICINE

## 2025-05-05 PROCEDURE — 97530 THERAPEUTIC ACTIVITIES: CPT

## 2025-05-05 PROCEDURE — 85610 PROTHROMBIN TIME: CPT | Performed by: INTERNAL MEDICINE

## 2025-05-05 PROCEDURE — 43235 EGD DIAGNOSTIC BRUSH WASH: CPT | Performed by: STUDENT IN AN ORGANIZED HEALTH CARE EDUCATION/TRAINING PROGRAM

## 2025-05-05 PROCEDURE — 45378 DIAGNOSTIC COLONOSCOPY: CPT | Performed by: STUDENT IN AN ORGANIZED HEALTH CARE EDUCATION/TRAINING PROGRAM

## 2025-05-05 PROCEDURE — 85014 HEMATOCRIT: CPT

## 2025-05-05 PROCEDURE — 0DJ08ZZ INSPECTION OF UPPER INTESTINAL TRACT, VIA NATURAL OR ARTIFICIAL OPENING ENDOSCOPIC: ICD-10-PCS | Performed by: STUDENT IN AN ORGANIZED HEALTH CARE EDUCATION/TRAINING PROGRAM

## 2025-05-05 PROCEDURE — 97163 PT EVAL HIGH COMPLEX 45 MIN: CPT

## 2025-05-05 PROCEDURE — 94762 N-INVAS EAR/PLS OXIMTRY CONT: CPT

## 2025-05-05 PROCEDURE — 80053 COMPREHEN METABOLIC PANEL: CPT | Performed by: INTERNAL MEDICINE

## 2025-05-05 PROCEDURE — 97535 SELF CARE MNGMENT TRAINING: CPT

## 2025-05-05 RX ORDER — GLYCOPYRROLATE 0.2 MG/ML
INJECTION INTRAMUSCULAR; INTRAVENOUS AS NEEDED
Status: DISCONTINUED | OUTPATIENT
Start: 2025-05-05 | End: 2025-05-05

## 2025-05-05 RX ORDER — EPHEDRINE SULFATE 50 MG/ML
INJECTION INTRAVENOUS AS NEEDED
Status: DISCONTINUED | OUTPATIENT
Start: 2025-05-05 | End: 2025-05-05

## 2025-05-05 RX ORDER — HEPARIN SODIUM 1000 [USP'U]/ML
3000 INJECTION, SOLUTION INTRAVENOUS; SUBCUTANEOUS EVERY 6 HOURS PRN
Status: DISCONTINUED | OUTPATIENT
Start: 2025-05-05 | End: 2025-05-06

## 2025-05-05 RX ORDER — MICONAZOLE NITRATE 20 MG/G
CREAM TOPICAL 2 TIMES DAILY
Status: DISCONTINUED | OUTPATIENT
Start: 2025-05-05 | End: 2025-05-20 | Stop reason: HOSPADM

## 2025-05-05 RX ORDER — LIDOCAINE HYDROCHLORIDE 20 MG/ML
INJECTION, SOLUTION EPIDURAL; INFILTRATION; INTRACAUDAL; PERINEURAL AS NEEDED
Status: DISCONTINUED | OUTPATIENT
Start: 2025-05-05 | End: 2025-05-05

## 2025-05-05 RX ORDER — HYDROXYZINE HYDROCHLORIDE 25 MG/1
25 TABLET, FILM COATED ORAL EVERY 8 HOURS PRN
Status: DISCONTINUED | OUTPATIENT
Start: 2025-05-05 | End: 2025-05-17

## 2025-05-05 RX ORDER — PHENYLEPHRINE HCL IN 0.9% NACL 1 MG/10 ML
SYRINGE (ML) INTRAVENOUS AS NEEDED
Status: DISCONTINUED | OUTPATIENT
Start: 2025-05-05 | End: 2025-05-05

## 2025-05-05 RX ORDER — MAGNESIUM SULFATE HEPTAHYDRATE 40 MG/ML
2 INJECTION, SOLUTION INTRAVENOUS ONCE
Status: COMPLETED | OUTPATIENT
Start: 2025-05-05 | End: 2025-05-06

## 2025-05-05 RX ORDER — PROPOFOL 10 MG/ML
INJECTION, EMULSION INTRAVENOUS AS NEEDED
Status: DISCONTINUED | OUTPATIENT
Start: 2025-05-05 | End: 2025-05-05

## 2025-05-05 RX ORDER — SODIUM CHLORIDE, SODIUM LACTATE, POTASSIUM CHLORIDE, CALCIUM CHLORIDE 600; 310; 30; 20 MG/100ML; MG/100ML; MG/100ML; MG/100ML
INJECTION, SOLUTION INTRAVENOUS CONTINUOUS PRN
Status: DISCONTINUED | OUTPATIENT
Start: 2025-05-05 | End: 2025-05-05

## 2025-05-05 RX ORDER — HEPARIN SODIUM 1000 [USP'U]/ML
6000 INJECTION, SOLUTION INTRAVENOUS; SUBCUTANEOUS EVERY 6 HOURS PRN
Status: DISCONTINUED | OUTPATIENT
Start: 2025-05-05 | End: 2025-05-06

## 2025-05-05 RX ORDER — HEPARIN SODIUM 10000 [USP'U]/100ML
3-30 INJECTION, SOLUTION INTRAVENOUS
Status: DISCONTINUED | OUTPATIENT
Start: 2025-05-05 | End: 2025-05-06

## 2025-05-05 RX ADMIN — HYDROXYZINE HYDROCHLORIDE 25 MG: 25 TABLET, FILM COATED ORAL at 16:35

## 2025-05-05 RX ADMIN — PROPOFOL 90 MCG/KG/MIN: 10 INJECTION, EMULSION INTRAVENOUS at 15:17

## 2025-05-05 RX ADMIN — Medication 100 MCG: at 15:34

## 2025-05-05 RX ADMIN — Medication 100 MCG: at 15:39

## 2025-05-05 RX ADMIN — LIDOCAINE HYDROCHLORIDE 60 MG: 20 INJECTION, SOLUTION EPIDURAL; INFILTRATION; INTRACAUDAL at 15:16

## 2025-05-05 RX ADMIN — SODIUM CHLORIDE, SODIUM GLUCONATE, SODIUM ACETATE, POTASSIUM CHLORIDE, MAGNESIUM CHLORIDE, SODIUM PHOSPHATE, DIBASIC, AND POTASSIUM PHOSPHATE 125 ML/HR: .53; .5; .37; .037; .03; .012; .00082 INJECTION, SOLUTION INTRAVENOUS at 18:40

## 2025-05-05 RX ADMIN — AMLODIPINE BESYLATE 10 MG: 10 TABLET ORAL at 08:13

## 2025-05-05 RX ADMIN — Medication 100 MCG: at 15:30

## 2025-05-05 RX ADMIN — METOPROLOL TARTRATE 50 MG: 50 TABLET, FILM COATED ORAL at 08:13

## 2025-05-05 RX ADMIN — PANTOPRAZOLE SODIUM 40 MG: 40 INJECTION, POWDER, FOR SOLUTION INTRAVENOUS at 08:13

## 2025-05-05 RX ADMIN — GLYCOPYRROLATE 0.4 MG: 0.4 INJECTION INTRAMUSCULAR; INTRAVENOUS at 15:15

## 2025-05-05 RX ADMIN — HYDROXYZINE HYDROCHLORIDE 25 MG: 25 TABLET, FILM COATED ORAL at 06:22

## 2025-05-05 RX ADMIN — POTASSIUM CHLORIDE 40 MEQ: 1500 TABLET, EXTENDED RELEASE ORAL at 17:18

## 2025-05-05 RX ADMIN — Medication 100 MCG: at 15:22

## 2025-05-05 RX ADMIN — PRAVASTATIN SODIUM 80 MG: 80 TABLET ORAL at 16:35

## 2025-05-05 RX ADMIN — EPHEDRINE SULFATE 15 MG: 50 INJECTION INTRAVENOUS at 15:29

## 2025-05-05 RX ADMIN — MIRTAZAPINE 7.5 MG: 7.5 TABLET, FILM COATED ORAL at 21:45

## 2025-05-05 RX ADMIN — MAGNESIUM SULFATE HEPTAHYDRATE 2 G: 40 INJECTION, SOLUTION INTRAVENOUS at 08:13

## 2025-05-05 RX ADMIN — PANTOPRAZOLE SODIUM 40 MG: 40 INJECTION, POWDER, FOR SOLUTION INTRAVENOUS at 21:45

## 2025-05-05 RX ADMIN — HYDROXYZINE HYDROCHLORIDE 25 MG: 25 TABLET, FILM COATED ORAL at 11:44

## 2025-05-05 RX ADMIN — Medication 40 MG: at 15:35

## 2025-05-05 RX ADMIN — PROPOFOL 40 MG: 10 INJECTION, EMULSION INTRAVENOUS at 15:16

## 2025-05-05 RX ADMIN — EPHEDRINE SULFATE 10 MG: 50 INJECTION INTRAVENOUS at 15:34

## 2025-05-05 RX ADMIN — EPHEDRINE SULFATE 10 MG: 50 INJECTION INTRAVENOUS at 15:39

## 2025-05-05 RX ADMIN — HEPARIN SODIUM 18 UNITS/KG/HR: 10000 INJECTION, SOLUTION INTRAVENOUS at 20:28

## 2025-05-05 RX ADMIN — LEVOTHYROXINE SODIUM 75 MCG: 75 TABLET ORAL at 06:22

## 2025-05-05 RX ADMIN — MICONAZOLE NITRATE: 20 CREAM TOPICAL at 18:37

## 2025-05-05 RX ADMIN — POTASSIUM CHLORIDE 40 MEQ: 1500 TABLET, EXTENDED RELEASE ORAL at 08:13

## 2025-05-05 RX ADMIN — SODIUM CHLORIDE, SODIUM LACTATE, POTASSIUM CHLORIDE, AND CALCIUM CHLORIDE: .6; .31; .03; .02 INJECTION, SOLUTION INTRAVENOUS at 15:05

## 2025-05-05 RX ADMIN — SODIUM CHLORIDE, SODIUM GLUCONATE, SODIUM ACETATE, POTASSIUM CHLORIDE, MAGNESIUM CHLORIDE, SODIUM PHOSPHATE, DIBASIC, AND POTASSIUM PHOSPHATE 125 ML/HR: .53; .5; .37; .037; .03; .012; .00082 INJECTION, SOLUTION INTRAVENOUS at 04:56

## 2025-05-05 NOTE — ANESTHESIA POSTPROCEDURE EVALUATION
Post-Op Assessment Note    CV Status:  Stable    Pain management: adequate       Mental Status:  Awake   Hydration Status:  Stable   PONV Controlled:  Controlled   Airway Patency:  Patent     Post Op Vitals Reviewed: Yes    No anethesia notable event occurred.    Staff: Anesthesiologist           Last Filed PACU Vitals:  Vitals Value Taken Time   Temp     Pulse 70 05/05/25 1550   BP 95/45 05/05/25 1550   Resp 15 05/05/25 1550   SpO2 97 % 05/05/25 1550

## 2025-05-05 NOTE — DISCHARGE INSTR - OTHER ORDERS
Wound Care Plan:   1-Sacral area/Buttocks-Cleanse with soap and water, pat dry.  Apply [place] cream twice daily and as needed.  2-Elevate heels off of bed/chair surface to offload pressure.  3-Offloading air cushion in chair when out of bed.  4-Apply moisturizing skin cream to body daily and as needed.  5-Turn/reposition every 2 hours while in bed and weight shift frequently while in chair for pressure re-distribution on skin.   6-Per Physician order:  Right Heel-Cleanse with soap and water, pat dry.  Apply Betadine swab to area.  Cover with 4 X 4 and silicone edged foam.  Change daily and as needed.

## 2025-05-05 NOTE — ASSESSMENT & PLAN NOTE
History of DVT   However during 4/25/2025 [facility] admission found to have bilateral extensive extensive acute vs subacute DVTs, in the setting of subtherapeutic INR  Continue anticoagulation

## 2025-05-05 NOTE — PHYSICAL THERAPY NOTE
PHYSICAL THERAPY EVALUATION          Patient Name: Alecia Kay  Today's Date: 5/5/2025 05/05/25 1122   PT Last Visit   PT Visit Date 05/05/25   Note Type   Note type Evaluation  ([id number])   Additional Comments and tx [id number]   Pain Assessment   Pain Assessment Tool 0-10   Pain Score No Pain   Restrictions/Precautions   Weight Bearing Precautions Per Order   (wbat BLE per podiatry consult note 4/28)   Braces or Orthoses Other (Comment)  (surgical shoe R)   Other Precautions Contact/isolation;Chair Alarm;Bed Alarm;Cognitive;Multiple lines;Telemetry;O2;Fall Risk  (2L, masimo, IV, level 2 step down)   Home Living   Type of Home House   Home Layout Two level;Able to live on main level with bedroom/bathroom;Stairs to enter with rails   Bathroom Equipment Shower chair   Home Equipment Walker;Wheelchair-manual   Additional Comments 5 RHODA. stays on first fl w full bathroom and sleeps in lift chair   Prior Function   Lives With Family  (nephew)   Receives Help From Family  (nephew home. local son and KYLE)   Comments prior to hospitalization in Jan was indep w RW. since being at [facility] she needs A for everything. states she lost a lot of strength when she was nwb and restricted to wc. unclear if pt was walking at snf then- notes indicate pt was a guerrero transf   General   Additional Pertinent History pt admitted 5/2/25 for hematochezia. PMHx significant for obesity   Cognition   Overall Cognitive Status Impaired   Arousal/Participation Cooperative   Attention Attends with cues to redirect   Orientation Level Oriented to person;Oriented to place;Oriented to time  (general to date, place)   Memory Decreased short term memory;Decreased recall of recent events   Following Commands Follows one step commands with increased time or repetition   Bed Mobility   Supine to Sit 3  Moderate assistance   Additional items Assist x 1;HOB elevated;Bedrails;Increased time required;Verbal  cues   Additional Comments cues for direction   Transfers   Sit to Stand 3  Moderate assistance   Additional items Assist x 2;Increased time required;Verbal cues;Other  (RW)   Stand to Sit 3  Moderate assistance   Additional items Assist x 2;Armrests;Increased time required;Impulsive;Verbal cues;Other  (RW)   Toilet transfer 3  Moderate assistance   Additional items Assist x 2;Increased time required;Verbal cues;Impulsive;Commode;Other  (RW)   Additional Comments cues for direction, safety   Ambulation/Elevation   Gait pattern Poor UE support;Improper Weight shift;Forward Flexion;Decreased foot clearance;Short stride;Excessively slow;Knees flexed;L Knee Mayra   Gait Assistance 3  Moderate assist   Additional items Assist x 2;Verbal cues;Tactile cues  (cues for direction, safety including upright posture and inc use of UE support on walker)   Assistive Device Rolling walker   Distance 2'x2   Balance   Static Standing Poor +   Ambulatory Zero  (Ax2)   Endurance Deficit   Endurance Deficit No   Endurance Deficit Description initially felt dizzy at EOB, vitals 114/71, 66. sx improve w time   Activity Tolerance   Activity Tolerance Treatment limited secondary to medical complications (Comment)  (weakness)   Medical Staff Made Aware Deana OT   Nurse Made Aware yes   Assessment   Prognosis Fair   Problem List Decreased strength;Impaired balance;Decreased mobility;Impaired judgement;Decreased safety awareness;Obesity   Assessment Alecia Kay is a 80 y.o. female admitted to [facility] on 5/2/2025 for Hematochezia. PT was consulted and pt was seen on 5/5/2025 for mobility assessment and d/c planning. Pt presents w high fall risk, contact isolation, level 2 step down, multiple lines. Per chart review, prior to her hospitalization in Jan she was indep w RW. She was dc from the hospital to [facility] rehab and it appears she required a Lamberto for transfers there. She states she was nwb and restricted to wc and therefore lost her  strength. She left the rehab a few days ago and went home under the care of her family (nephew, son). Pt is currently functioning at a mod Ax2 for transfers and ambulation. Pt demonstrated deficits related to strength, balance, cognition. She did not have any LOB while static standing however after taking her first step immediately began to lessen her UE support and her knees began to flex more. Additional time spent on further assessment of transfers and ambulation. Explained and demonstrated to patient prior to transf how to improve her UE/LE support while using the walker. There was a slight improvement in her ability to transf on the second attempt but ultimately she did begin to lack the adequate support to safely make it to the chair and had to be assisted to seated position. Pt will benefit from continued skilled IP PT to address the above mentioned impairments  in order to maximize recovery and increase functional independence when completing mobility and ADLs.   Barriers to Discharge Decreased caregiver support   Barriers to Discharge Comments unclear if family can care for patient at current LOF   Goals   Patient Goals eat something   STG Expiration Date 05/19/25   Short Term Goal #1 1) Pt will perform bed mobility S demonstrating appropriate technique 100% of the time in order to improve function. 2) Pt will perform all transfers mod Ax1 demonstrating safe technique 100% of the time in order to improve ability to negotiate safely in home environment. 3) Amb with least restrictive AD > 5'x1 with mod Ax1 and SBA for safety in order to demonstrate ability to negotiate in home environment. 4) Improve overall strength and balance 1/2 grade in order to optimize ability to perform functional tasks and reduce fall risk. 5) Improve am-pac by 2 to demonstrate functional progress and return to baseline function/reduced caregiver burden. 6) wc mobility >20' at S level.   Plan   Treatment/Interventions Functional  transfer training;LE strengthening/ROM;Therapeutic exercise;Patient/family training;Equipment eval/education;Bed mobility;Gait training;Continued evaluation;Compensatory technique education;Cognitive reorientation;Spoke to nursing;OT;Family   PT Frequency 3-5x/wk   Discharge Recommendation   Rehab Resource Intensity Level, PT II (Moderate Resource Intensity)   Additional Comments if dc home, may benefit from sit<>stand lift   AM-PAC Basic Mobility Inpatient   Turning in Flat Bed Without Bedrails 2   Lying on Back to Sitting on Edge of Flat Bed Without Bedrails 1   Moving Bed to Chair 1   Standing Up From Chair Using Arms 1   Walk in Room 1   Climb 3-5 Stairs With Railing 1   Basic Mobility Inpatient Raw Score 7   Turning Head Towards Sound 3   Follow Simple Instructions 3   Low Function Basic Mobility Raw Score  13   Low Function Basic Mobility Standardized Score  20.14   [facility] Level Of Mobility   -[facility] Goal 2: Bed activities/Dependent transfer   -HL Achieved 5: Stand (1 or more minutes)   End of Consult   Patient Position at End of Consult Bedside chair;Bed/Chair alarm activated;All needs within reach   History: co - morbidities including age, social background, use of assistive device, recent assist for adl's/iadl's, cognition, current experience including fall risk, multiple lines, contact isolation  Exam: impairments in systems including multiple body structures involved; neuromuscular (balance, gait, transfers), cardiopulmonary (vitals), cognition; activity limitations (difficulties executing an action); participation restrictions (problems associated w involvement in life situations), AM-PAC  Clinical: unstable/unpredictable  Complexity: high    No Gamino, PT

## 2025-05-05 NOTE — ASSESSMENT & PLAN NOTE
Patient previously previously on amlodipine/beta-blocker  Torsemide recently discontinued during 3/2025 hospitalization  Blocker held for bradycardia/hypotension  Norvasc held for hypotension

## 2025-05-05 NOTE — PLAN OF CARE
Problem: OCCUPATIONAL THERAPY ADULT  Goal: Performs self-care activities at highest level of function for planned discharge setting.  See evaluation for individualized goals.  Description: Treatment Interventions: ADL retraining, Functional transfer training, UE strengthening/ROM, Endurance training, Cognitive reorientation, Patient/family training, Equipment evaluation/education, Neuromuscular reeducation, Compensatory technique education, Continued evaluation, Activityengagement, Energy conservation          See flowsheet documentation for full assessment, interventions and recommendations.   Outcome: Progressing  Note: Limitation: Decreased ADL status, Decreased UE strength, Decreased Safe judgement during ADL, Decreased cognition, Decreased endurance, Decreased self-care trans, Decreased high-level ADLs  Prognosis: Fair, Guarded  Assessment: Pt seen for OT f/u treatment session focusing on functional activity tolerance, bed mobility, ADLs, functional transfers/mobility, Safe transfer techniques, OOB trial, dynamic sitting balance activity, and dynamic standing balance activity. Upon OT arrival, pt was found supine in bed, agreeable to OT tx session. Pt continues to make progress towards POC. Able to progress OOB to BSC and then chair today. Requires modAx2 for safe transfers/mobility with RW. Demonstrates good flexibility to reach towards LB to don socks however requires assistance to don over heels, requires b/l UE support of RW for static standing to perform LB and toileting tasks. Pt limited with standing tolerance this date to 1-2 minutes. Please refer to flowsheet for functional performance. Provided education on HEP, energy conservation techniques, deep breathing techniques, fall prevention strategies, and overall safety awareness.   Patient requiring verbal cues for safety and verbal cues for pacing through activity steps.  Patient continues to be functioning below baseline level, occupational  performance remains limited secondary to factors listed above and increased risk for falls and injury. Pt seated OOB in chair with chair alarm activated at end of session. Call bell and phone within reach. All needs met and pt reports no further questions for OT at this time. Currently, pt is recommended for level 2 resource intensity at time of d/c. Will continue to see pt per POC to address deficits to facilitate return to PLOF.     Rehab Resource Intensity Level, OT: II (Moderate Resource Intensity)

## 2025-05-05 NOTE — ASSESSMENT & PLAN NOTE
May be secondary to multiple loose bowel movements.  Added banana flakes, and repleted potassium  Hypomagnesemia: Repleted    Results from last 7 days   Lab Units 05/05/25  0452 05/04/25  0245 05/03/25  2042 05/03/25  0606 05/02/25  1046   POTASSIUM mmol/L 3.7 4.1 3.5 2.9* 3.0*   MAGNESIUM mg/dL 1.8* 1.6*  --  1.9 1.6*

## 2025-05-05 NOTE — ANESTHESIA POSTPROCEDURE EVALUATION
Post-Op Assessment Note    CV Status:  Stable    Pain management: adequate       Mental Status:  Alert and awake   Hydration Status:  Euvolemic   PONV Controlled:  Controlled   Airway Patency:  Patent     Post Op Vitals Reviewed: Yes    No anethesia notable event occurred.    Staff: Anesthesiologist           Last Filed PACU Vitals:  Vitals Value Taken Time   Temp     Pulse 72 05/05/25 1620   BP 98/47 05/05/25 1620   Resp 16 05/05/25 1620   SpO2 93 % 05/05/25 1620       Modified Allan:     Vitals Value Taken Time   Activity 2 05/05/25 1621   Respiration 2 05/05/25 1621   Circulation 2 05/05/25 1621   Consciousness 2 05/05/25 1621   Oxygen Saturation 2 05/05/25 1621     Modified Allan Score: 10

## 2025-05-05 NOTE — WOUND OSTOMY CARE
Consult Note - Wound   Alecia Kay 80 y.o. female MRN: [id number]  Unit/Bed#: E4 -01 Encounter: [id number]      History and Present Illness:  80 year old female admitted to hospital for UTI and Hematochezia.  Significant medical history includes HTN, CKD III, DV and respiratory insufficiency.    Assessment Findings:   Patient resting in bed on a standard mattress.  Alert, oriented and agreeable to assessment.  Patient reports that right heel injury has been an ongoing wound for several months, and had recent surgery for osteomyelitis.  Heel is heeling well, orders in by physician for dressing.  Sacrum in pink, macerated, but blanchable, recommend Kenya cream, as it appears to have fungal component.  Wound on right D2 toe, patient reports is from surgical shoe rubbing.  Patient currently NPO for procedure today, being followed by dietary, supplements ordered.  Able to turn in bed with minimal assistance.  Continent of bowel and bladder.  Left heel is intact and blanchable.    Right D2 Toe, Second Anterior-Pink wound, probably from friction of surgical shoe.  Joshua wound is clean, dry, intact.  No drainage.  Left D1 Toe, Great Anterior-Pink area of unknown origin. Joshua wound is clean, dry, intact.  No drainage.  POA Right Heel Wound-Dry, pink, healing surgical site.  Joshua wound area is dry and intact.  There is no drainage.  Coccyx Bilateral Buttocks-Pink erythema with maceration, with uneven edges, possibly a fungal component.  The joshua wound area is dry pink erythema.  No drainage.    See flowsheet for wound details.    Wound Care Plan:   1-Sacral area/Buttocks-Cleanse with soap and water, pat dry.  Apply Kenya cream twice daily and as needed.  2-Elevate heels off of bed/chair surface to offload pressure.  3-Offloading air cushion in chair when out of bed.  4-Apply moisturizing skin cream to body daily and as needed.  5-Turn/reposition every 2 hours while in bed and weight shift frequently while in chair  for pressure re-distribution on skin.   6-Per Physician order:  Right Heel-Cleanse with soap and water, pat dry.  Apply Betadine swab to area.  Cover with 4 X 4 and silicone edged foam.  Change daily and as needed.     Wound care team to follow.  Plan of care reviewed with primary RN.    Wound 05/02/25 Toe D2, second toe Anterior;Right (Active)   Wound Image   05/05/25 0908   Wound Description [place] 05/05/25 0908   Wound Length (cm) 1 cm 05/05/25 0908   Wound Width (cm) 1 cm 05/05/25 0908   Wound Depth (cm) 0 cm 05/05/25 0908   Wound Surface Area (cm^2) 1 cm^2 05/05/25 0908   Wound Volume (cm^3) 0 cm^3 05/05/25 0908   Calculated Wound Volume (cm^3) 0 cm^3 05/05/25 0908   Drainage Amount None 05/05/25 0908   [place]-wound Assessment Clean;Dry;Intact 05/05/25 0908   Treatments Site care 05/05/25 0908   Dressing Open to air 05/05/25 0908   Wound packed? No 05/05/25 0908   Patient Tolerance Tolerated well 05/05/25 0908       Wound 05/02/25 Toe D1, great Anterior;Left (Active)   Wound Image   05/05/25 0909   Wound Description [place] 05/05/25 0909   Wound Length (cm) 0.5 cm 05/05/25 0909   Wound Width (cm) 0.5 cm 05/05/25 0909   Wound Depth (cm) 0 cm 05/05/25 0909   Wound Surface Area (cm^2) 0.25 cm^2 05/05/25 0909   Wound Volume (cm^3) 0 cm^3 05/05/25 0909   Calculated Wound Volume (cm^3) 0 cm^3 05/05/25 0909   Drainage Amount None 05/05/25 0909   Mavis-wound Assessment Clean;Dry;Intact 05/05/25 0909   Treatments Site care 05/05/25 0909   Dressing Open to air 05/05/25 0909   Wound packed? No 05/05/25 0909   Patient Tolerance Tolerated well 05/05/25 0909       Wound 05/02/25 Heel Right (Active)   Wound Image   05/05/25 0906   Wound Description Dry;Pink 05/05/25 0906   Wound Length (cm) 3 cm 05/05/25 0906   Wound Width (cm) 0.5 cm 05/05/25 0906   Wound Depth (cm) 0.3 cm 05/05/25 0906   Wound Surface Area (cm^2) 1.5 cm^2 05/05/25 0906   Wound Volume (cm^3) 0.45 cm^3 05/05/25 0906   Calculated Wound Volume (cm^3) 0.45 cm^3 05/05/25  0906   Drainage Amount None 05/05/25 0906   Mavis-wound Assessment Dry;Intact 05/05/25 0906   Treatments Site care 05/05/25 0906   Dressing Foam, Silicon (eg. Allevyn, etc);Gauze 05/05/25 0906   Wound packed? No 05/05/25 0906   Dressing Changed Reinforced 05/05/25 0906   Patient Tolerance Tolerated well 05/05/25 0906   Dressing Status Clean;Dry;Intact 05/05/25 0906       Wound 05/02/25 Coccyx Right;Left (Active)   Wound Image   05/05/25 0910   Wound Description Pink;Other (Comment) 05/05/25 0910   Wound Depth (cm) 0.1 cm 05/05/25 0910   Drainage Amount None 05/05/25 0910   Mavis-wound Assessment Dry;Erythema;Pink 05/05/25 0910   Treatments Cleansed;Site care 05/05/25 0910   Dressing Foam;Open to air 05/05/25 0910   Wound packed? No 05/05/25 0910   Patient Tolerance Tolerated well 05/05/25 0910         Alondra Borja, RN, BSN

## 2025-05-05 NOTE — ASSESSMENT & PLAN NOTE
Initial reason for presentation to hospital 4/25/2025  Patient reports no dysphagia but has appetite loss  Smell of food makes her nauseous  GI consulted: EGD with Wang's esophagus  Continue proton pump inhibitor

## 2025-05-05 NOTE — ASSESSMENT & PLAN NOTE
Pt is a 79yo female with PMH significant for hypertension, DVT/PE CKD 3, and right heel osteomyelitis initially presented to [facility] 4/25/2025 for generalized weakness    Initially diagnosed with GAY and MDR UTI: completed course of ertapenem  Subsequently found to have DVT and PE, while INR subtherapeutic, and was started on heparin infusion  Patient then had hematochezia and was a rapid response  Due to lack of GI services patient was transferred to [facility]   after discussion with GI was started on heparin infusion subsequent hematochezia and heparin was discontinued   Underwent GI evaluation  5/5 EGD: Wang's esophagus.  Sliding hiatal hernia.  No evidence of bleeding  5/5 colonoscopy: Petechial mucosa in the cecum/ascending colon attributed to barotrauma as it developed during the procedure.  Melanosis in the splenic flexure, descending colon, sigmoid colon, rectosigmoid, and rectum.  Several diverticuli with old blood   D/w GI: Suspect diverticular bleed, since resolved: Noted okay to restart heparin  Restart heparin and monitor close

## 2025-05-05 NOTE — UTILIZATION REVIEW
NOTIFICATION OF ADMISSION DISCHARGE   This is a Notification of Discharge from [facility]. Please be advised that this patient has been discharge from our facility. Below you will find the admission and discharge date and time including the patient’s disposition.   UTILIZATION REVIEW CONTACT:  Utilization Review Assistants  Network Utilization Review Department  Phone: [phone] x carefully listen to the prompts. All voicemails are confidential.  Email: [email or web address]     ADMISSION INFORMATION  PRESENTATION DATE: 4/25/2025 12:38 PM  OBERVATION ADMISSION DATE: N/A  INPATIENT ADMISSION DATE: 4/25/25  4:56 PM   DISCHARGE DATE: 5/2/2025  4:15 PM   DISPOSITION:[facility] Utilization Review Department  ATTENTION: Please call with any questions or concerns to [phone] and carefully listen to the prompts so that you are directed to the right person. All voicemails are confidential.   For Discharge needs, contact Care Management DC Support Team at [phone] opt. 2  Send all requests for admission clinical reviews, approved or denied determinations and any other requests to dedicated fax number below belonging to the campus where the patient is receiving treatment. List of dedicated fax numbers for the Facilities:  FACILITY NAME UR FAX NUMBER   ADMISSION DENIALS (Administrative/Medical Necessity) [phone]   DISCHARGE SUPPORT TEAM ([facility]) [phone]   PARENT CHILD HEALTH (Maternity/NICU/Pediatrics) [phone]   [facility] [phone]   [facility] [phone]   [facility] [phone]   [facility] [phone]   [facility] [phone]   [facility] [phone]   [facility] [phone]   [facility] [phone]   [facility]  [facility] [phone]   Critical access hospital [phone]   [facility] [phone]   [facility] [phone]

## 2025-05-05 NOTE — ASSESSMENT & PLAN NOTE
Extensive right upper and lower lobe pulmonary embolism seen on 4/28/2025 CTA  2D echocardiogram without evidence of right heart strain  Does have history of DVT and PE: Maintained on warfarin 5 mg 5 days a week at 7.5 mg Monday and Friday, however had nausea/vomiting several days prior to admission and was not able to take warfarin  Due to subtherapeutic INR, was started on heparin infusion: was temporarily placed on hold for GI bleed  Okay to start per GI as diverticular bleed has since stopped  Monitor closely    Results from last 7 days   Lab Units 05/05/25  0452 05/04/25  0245 05/03/25  0606 05/02/25  1046   INR  1.88* 2.07* 1.88* 1.57*

## 2025-05-05 NOTE — PLAN OF CARE
Problem: PAIN - ADULT  Goal: Verbalizes/displays adequate comfort level or baseline comfort level  Description: Interventions:- Encourage patient to monitor pain and request assistance- Assess pain using appropriate pain scale- Administer analgesics based on type and severity of pain and evaluate response- Implement non-pharmacological measures as appropriate and evaluate response- Consider cultural and social influences on pain and pain management- Notify physician/advanced practitioner if interventions unsuccessful or patient reports new pain  Outcome: Progressing     Problem: INFECTION - ADULT  Goal: Absence or prevention of progression during hospitalization  Description: INTERVENTIONS:- Assess and monitor for signs and symptoms of infection- Monitor lab/diagnostic results- Monitor all insertion sites, i.e. indwelling lines, tubes, and drains- Monitor endotracheal if appropriate and nasal secretions for changes in amount and color- [place] appropriate cooling/warming therapies per order- Administer medications as ordered- Instruct and encourage patient and family to use good hand hygiene technique- Identify and instruct in appropriate isolation precautions for identified infection/condition  Outcome: Progressing  Goal: Absence of fever/infection during neutropenic period  Description: INTERVENTIONS:- Monitor WBC  Outcome: Progressing     Problem: SAFETY ADULT  Goal: Patient will remain free of falls  Description: INTERVENTIONS:- Educate patient/family on patient safety including physical limitations- Instruct patient to call for assistance with activity - Consult OT/PT to assist with strengthening/mobility - Keep Call bell within reach- Keep bed low and locked with side rails adjusted as appropriate- Keep care items and personal belongings within reach- Initiate and maintain comfort rounds- Make Fall Risk Sign visible to staff- Offer Toileting every 2 Hours, in advance of need- Initiate/Maintain bed alarm-  Obtain necessary fall risk management equipment- Apply yellow socks and bracelet for high fall risk patients- Consider moving patient to room near nurses station  Outcome: Progressing  Goal: Maintain or return to baseline ADL function  Description: INTERVENTIONS:-  Assess patient's ability to carry out ADLs; assess patient's baseline for ADL function and identify physical deficits which impact ability to perform ADLs (bathing, care of mouth/teeth, toileting, grooming, dressing, etc.)- Assess/evaluate cause of self-care deficits - Assess range of motion- Assess patient's mobility; develop plan if impaired- Assess patient's need for assistive devices and provide as appropriate- Encourage maximum independence but intervene and supervise when necessary- Involve family in performance of ADLs- Assess for home care needs following discharge - Consider OT consult to assist with ADL evaluation and planning for discharge- Provide patient education as appropriate  Outcome: Progressing  Goal: Maintains/Returns to pre admission functional level  Description: INTERVENTIONS:- Perform AM-PAC 6 Click Basic Mobility/ Daily Activity assessment daily.- Set and communicate daily mobility goal to care team and patient/family/caregiver. - Collaborate with rehabilitation services on mobility goals if consulted- Perform Range of Motion 3 times a day.- Reposition patient every 2 hours.- Dangle patient 3 times a day- Stand patient 3 times a day- Ambulate patient 3 times a day- Out of bed to chair 3 times a day - Out of bed for meals 3 times a day- Out of bed for toileting- Record patient progress and toleration of activity level   Outcome: Progressing     Problem: DISCHARGE PLANNING  Goal: Discharge to home or other facility with appropriate resources  Description: INTERVENTIONS:- Identify barriers to discharge w/patient and caregiver- Arrange for needed discharge resources and transportation as appropriate- Identify discharge learning needs  (meds, wound care, etc.)- Arrange for interpretive services to assist at discharge as needed- Refer to Case Management Department for coordinating discharge planning if the patient needs post-hospital services based on physician/advanced practitioner order or complex needs related to functional status, cognitive ability, or social support system  Outcome: Progressing     Problem: Knowledge Deficit  Goal: Patient/family/caregiver demonstrates understanding of disease process, treatment plan, medications, and discharge instructions  Description: Complete learning assessment and assess knowledge base.Interventions:- Provide teaching at level of understanding- Provide teaching via preferred learning methods  Outcome: Progressing     Problem: Nutrition/Hydration-ADULT  Goal: Nutrient/Hydration intake appropriate for improving, restoring or maintaining nutritional needs  Description: Monitor and assess patient's nutrition/hydration status for malnutrition. Collaborate with interdisciplinary team and initiate plan and interventions as ordered.  Monitor patient's weight and dietary intake as ordered or per policy. Utilize nutrition screening tool and intervene as necessary. Determine patient's food preferences and provide high-protein, high-caloric foods as appropriate. INTERVENTIONS:- Monitor oral intake, urinary output, labs, and treatment plans- Assess nutrition and hydration status and recommend course of action- Evaluate amount of meals eaten- Assist patient with eating if necessary - Allow adequate time for meals- Recommend/ encourage appropriate diets, oral nutritional supplements, and vitamin/mineral supplements- Order, calculate, and assess calorie counts as needed- Recommend, monitor, and adjust tube feedings and TPN/PPN based on assessed needs- Assess need for intravenous fluids- Provide specific nutrition/hydration education as appropriate- Include patient/family/caregiver in decisions related to  nutrition  Outcome: Progressing     Problem: Prexisting or High Potential for Compromised Skin Integrity  Goal: Skin integrity is maintained or improved  Description: INTERVENTIONS:- Identify patients at risk for skin breakdown- Assess and monitor skin integrity- Assess and monitor nutrition and hydration status- Monitor labs - Assess for incontinence - Turn and reposition patient- Assist with mobility/ambulation- Relieve pressure over bony prominences- Avoid friction and shearing- Provide appropriate hygiene as needed including keeping skin clean and dry- Evaluate need for skin moisturizer/barrier cream- Collaborate with interdisciplinary team - Patient/family teaching- Consider wound care consult   Outcome: Progressing

## 2025-05-05 NOTE — ANESTHESIA PREPROCEDURE EVALUATION
Procedure:  COLONOSCOPY  EGD    Relevant Problems   CARDIO   (+) Hypertension      ENDO   (+) Hypothyroidism      /RENAL   (+) Acute kidney injury (HCC)   (+) Chronic kidney disease, stage III (moderate) (HCC)      HEMATOLOGY   (+) Acute blood loss anemia (ABLA)        Physical Exam    Airway    Mallampati score: I  TM Distance: >3 FB  Neck ROM: full     Dental       Cardiovascular  Cardiovascular exam normal    Pulmonary  Pulmonary exam normal     Other Findings  post-pubertal.      Anesthesia Plan  ASA Score- 3     Anesthesia Type- IV sedation with anesthesia with ASA Monitors.         Additional Monitors:     Airway Plan:            Plan Factors-Exercise tolerance (METS): >4 METS.    Chart reviewed.   Existing labs reviewed. Patient summary reviewed.    Patient is not a current smoker.              Induction- intravenous.    Postoperative Plan-     Perioperative Resuscitation Plan - Level 1 - Full Code.       Informed Consent- Anesthetic plan and risks discussed with patient.  I personally reviewed this patient with the CRNA. Discussed and agreed on the Anesthesia Plan with the CRNA..      NPO Status:  Vitals Value Taken Time   Date of last liquid 05/05/25 05/05/25 1424   Time of last liquid 1145 05/05/25 1424   Date of last solid 05/01/25 05/05/25 1424   Time of last solid 1900 05/05/25 1424

## 2025-05-05 NOTE — PLAN OF CARE
Problem: PHYSICAL THERAPY ADULT  Goal: Performs mobility at highest level of function for planned discharge setting.  See evaluation for individualized goals.  Description: Treatment/Interventions: Functional transfer training, LE strengthening/ROM, Therapeutic exercise, Patient/family training, Equipment eval/education, Bed mobility, Gait training, Continued evaluation, Compensatory technique education, Cognitive reorientation, Spoke to nursing, OT, Family          See flowsheet documentation for full assessment, interventions and recommendations.  Note: Prognosis: Fair  Problem List: Decreased strength, Impaired balance, Decreased mobility, Impaired judgement, Decreased safety awareness, Obesity  Assessment: Alecia Kay is a 80 y.o. female admitted to [facility] on 5/2/2025 for Hematochezia. PT was consulted and pt was seen on 5/5/2025 for mobility assessment and d/c planning. Pt presents w high fall risk, contact isolation, level 2 step down, multiple lines. Per chart review, prior to her hospitalization in Jan she was indep w RW. She was dc from the hospital to sn[facility] rehab and it appears she required a [place] for transfers there. She states she was nwb and restricted to wc and therefore lost her strength. She left the rehab a few days ago and went home under the care of her family (nephew, son). Pt is currently functioning at a mod Ax2 for transfers and ambulation. Pt demonstrated deficits related to strength, balance, cognition. She did not have any LOB while static standing however after taking her first step immediately began to lessen her UE support and her knees began to flex more. Additional time spent on further assessment of transfers and ambulation. Explained and demonstrated to patient prior to transf how to improve her UE/LE support while using the walker. There was a slight improvement in her ability to transf on the second attempt but ultimately she did begin to lack the adequate support to safely  make it to the chair and had to be assisted to seated position. Pt will benefit from continued skilled IP PT to address the above mentioned impairments  in order to maximize recovery and increase functional independence when completing mobility and ADLs.  Barriers to Discharge: Decreased caregiver support  Barriers to Discharge Comments: unclear if family can care for patient at current LOF  Rehab Resource Intensity Level, PT: II (Moderate Resource Intensity)    See flowsheet documentation for full assessment.

## 2025-05-05 NOTE — ASSESSMENT & PLAN NOTE
Per previous records: Chronic osteomyelitis of the toe of right foot  Per podiatry: Weightbearing as tolerated in postop shoe  Completed 6 weeks of IV antibiotics prior to recent admission  MRI right ankle/heel 4/29/2025: No evidence of remaining osteomyelitis  Patient was reevaluated by podiatry 4/30/2025: Weightbearing as tolerated in surgical shoe.  Local wound care.    outpatient podiatry follow-up

## 2025-05-05 NOTE — APP STUDENT NOTE
[facility] STUDENT  Inpatient Progress Note for TRAINING ONLY  Not Part of Legal Medical Record       Progress Note - Alecia Kay 80 y.o. female MRN: [id number]    Unit/Bed#: E4 -01 Encounter: [id number]      Assessment:  Pt is 79y/o F transferred from [facility] due to lack of gi services  Pt signed herself out of [facility] on 4/24, was home one day, then started feeling weak 04/25/2025.   PMH htn, dvt, pe, ckd 3, hypothyroidism, R heel osteomyelitis  Pt was initially found to have kidney injury and MDR UTI, completed course of ertapenem. Pt also was found to have DVT and PE, started on heparin infusion as pt INR was subtherapeutic   Pt then had hematochezia and was a rapid response     Hematochezia   VAS celiac/ mesenteric duplex showed nothing abnormal   FL esophagram showed no evidence of fixed esophageal obstrution, some transient spasm appeared which have pt some symptomatology but contrast did eventually pass into stomach. F/u when pt no longer ill to better assess esophageal motility   Pt eval by critical care 05/04 as well as GI, due to recurrent hematochezia heparin drip on hold    Continue telemetry   Awaiting colonoscopy and EGD    Acute blood loss anemia   Available baseline hgb appears closer to 10.0   Blood loss anemia due to rectal bleeding   Was transfused 1 unit PRBC 5/3/25, transfused additional 2 units PRBC 05/04   Decreased hgb 9.1, hct 25.8    Acute respiratory insufficiency   Likely secondary to pulmonary embolism   Was on 2L nasal cannula     Acute pulmonary embolism   CTA showed extensive right upper and lower lobe pulmonary emoblism on 04/28  Does have hx of dvt and pe on warfarin 5mg. Was started on heparin infusion but then had gi bleed. Dicussed w GI, as bleeding has stopped heparin infusion restarted   INR elevated 1.88, protime elevated 21.5    Heparin drip on hold     Hypokalemia   May be secondary to multiple loose bowel movements  Added banana flakes   K levels improved,  "today 3.7     Acute dvt   Hx of dvt  During 04/25 [facility] admission found to have b/l acute vs subacute DVT    Nausea and vomiting   Initial reason for presentation to hosp 04/25   Pt reports no dysphagia but loss of appetite, smell of food makes her nauseous   GI f/u    Hx osteomyelitis   Hx of R heel osteomyelitis, did see podiatry, MRI negative for acute osteomyelitis     CKD, stage 3   Initially presented to [facility] 4/25 for generalized weakness, found to have kidney injury and MDR UTI   Renal function has returned back to baseline   Torsemide discontinued during March 2025 hospitalization   Aniogap 8, bun 6, cr 0.70, albumin decreased 2.3, total protein decreased 4.3, eGFR 81   Decreased AST 11, decreased ALT 3, ca decreased 7.9    Hypothyroidism   Continue levothyroxine     Hypertension   Metoprolol, decrease if recurrent bradycardia   Continue amlodipine with hold parameters   Torsemide discontinued during march 2025 hospitalization         Subjective:   Pt is 79y/o F who is feeling much better today, pt is much more alert, however takes a while to form complete sentence. Pt denies any n/v but still decreased appetite. Pt denies any abdominal pain, chest pain, cough, fever, or chills.     Objective:   Abnormal vitals : Temp 96.6F, heart rate 54bpm, bp 141/67  Heart: RRR   Lungs: non-labored, clear b/l auscultation  Abdomen: no distention, active BS x4, no tenderness on light or deep palpation    Vitals: Blood pressure 141/67, pulse (!) 54, temperature (!) 96.6 °F (35.9 °C), temperature source Temporal, resp. rate 18, height 5' 2\" (1.575 m), weight 78 kg (171 lb 15.3 oz), SpO2 95%.,Body mass index is 31.45 kg/m².      Intake/Output Summary (Last 24 hours) at 5/5/2025 0804  Last data filed at 5/5/2025 0455  Gross per 24 hour   Intake 2440 ml   Output --   Net 2440 ml       Physical Exam: {Exam, Complete:72223}     Invasive Devices       Peripheral Intravenous Line  Duration             Peripheral IV 05/05/25 " "Left;Ventral (anterior) Wrist <1 day              Long-dwell Peripherally Inserted Midline IV Catheter  Duration             Long-Dwell Peripheral IV (Midline) 04/28/25 Left Brachial 6 days                    Lab, Imaging and other studies: {Results Review Statement:98024::\"No pertinent imaging studies reviewed.\"}  VTE Pharmacologic Prophylaxis: {Pharmacologic VTE Prophylaxis:361730826}  VTE Mechanical Prophylaxis: {Mechanical VTE Prophylaxis:77326}    "

## 2025-05-05 NOTE — ANESTHESIA POSTPROCEDURE EVALUATION
Post-Op Assessment Note    CV Status:  Stable  Pain Score: 0    Pain management: adequate       Mental Status:  Arousable and sleepy   Hydration Status:  Euvolemic   PONV Controlled:  Controlled   Airway Patency:  Patent     Post Op Vitals Reviewed: Yes    No anethesia notable event occurred.    Staff: CRNA           Last Filed PACU Vitals:  Vitals Value Taken Time   Temp     Pulse 70 05/05/25 1550   BP 95/45 05/05/25 1550   Resp 15 05/05/25 1550   SpO2 97 % 05/05/25 1550       Modified Allan:     Vitals Value Taken Time   Activity 1 05/05/25 1552   Respiration 2 05/05/25 1552   Circulation 1 05/05/25 1552   Consciousness 0 05/05/25 1552   Oxygen Saturation 1 05/05/25 1552     Modified Allan Score: 5

## 2025-05-05 NOTE — PROGRESS NOTES
Progress Note - Hospitalist   Name: Alecia Kay 80 y.o. female I MRN: [id number]  Unit/Bed#: E4 -01 I Date of Admission: 5/2/2025   Date of Service: 5/5/2025 I Hospital Day: 3    Assessment & Plan  Hematochezia  Pt is a 79yo female with PMH significant for hypertension, DVT/PE CKD 3, and right heel osteomyelitis initially presented to [facility] 4/25/2025 for generalized weakness    Initially diagnosed with GAY and MDR UTI: completed course of ertapenem  Subsequently found to have DVT and PE, while INR subtherapeutic, and was started on heparin infusion  Patient then had hematochezia and was a rapid response  Due to lack of GI services patient was transferred to [facility]   after discussion with GI was started on heparin infusion subsequent hematochezia and heparin was discontinued   Underwent GI evaluation  5/5 EGD: Wang's esophagus.  Sliding hiatal hernia.  No evidence of bleeding  5/5 colonoscopy: Petechial mucosa in the cecum/ascending colon attributed to barotrauma as it developed during the procedure.  Melanosis in the splenic flexure, descending colon, sigmoid colon, rectosigmoid, and rectum.  Several diverticuli with old blood   D/w GI: Suspect diverticular bleed, since resolved: Noted okay to restart heparin  Restart heparin and monitor close  Acute blood loss anemia (ABLA)  baseline hemoglobin appears to be variable over the last year ranging 7.5-10  Acute blood loss anemia due to hematochezia secondary to diverticular bleed  Was transfused 1 unit PRBC 5/3 & additional 2 units PRBC 5/4.  Hemoglobin currently stable at 9.5, continue to monitor on anticoagulation    Results from last 7 days   Lab Units 05/05/25  1240 05/05/25  0452 05/05/25  0119 05/04/25  1751 05/04/25  1215 05/04/25  0815   HEMOGLOBIN g/dL 9.5* 9.1* 10.2* 9.5* 9.7* 9.8*     Acute pulmonary embolism (HCC)  Extensive right upper and lower lobe pulmonary embolism seen on 4/28/2025 CTA  2D echocardiogram without evidence of right  heart strain  Does have history of DVT and PE: Maintained on warfarin 5 mg 5 days a week at 7.5 mg Monday and Friday, however had nausea/vomiting several days prior to admission and was not able to take warfarin  Due to subtherapeutic INR, was started on heparin infusion: was temporarily placed on hold for GI bleed  Okay to start per GI as diverticular bleed has since stopped  Monitor closely    Results from last 7 days   Lab Units 05/05/25  0452 05/04/25 0245 05/03/25 0606 05/02/25  1046   INR  1.88* 2.07* 1.88* 1.57*     Acute respiratory insufficiency  Likely secondary to pulmonary embolism: Was placed on 2 L nasal cannula  Currently with adequate oxygenation on room air  Hypertension  Patient previously previously on amlodipine/beta-blocker  Torsemide recently discontinued during 3/2025 hospitalization  Blocker held for bradycardia/hypotension  Norvasc held for hypotension  Hypokalemia  May be secondary to multiple loose bowel movements.  Added banana flakes, and repleted potassium  Hypomagnesemia: Repleted    Results from last 7 days   Lab Units 05/05/25  0452 05/04/25 0245 05/03/25 2042 05/03/25 0606 05/02/25  1046   POTASSIUM mmol/L 3.7 4.1 3.5 2.9* 3.0*   MAGNESIUM mg/dL 1.8* 1.6*  --  1.9 1.6*     Acute DVT (deep venous thrombosis) (HCC)  History of DVT   However during 4/25/2025 [facility] admission found to have bilateral extensive extensive acute vs subacute DVTs, in the setting of subtherapeutic INR  Continue anticoagulation  Nausea and vomiting  Initial reason for presentation to hospital 4/25/2025  Patient reports no dysphagia but has appetite loss  Smell of food makes her nauseous  GI consulted: EGD with Wang's esophagus  Continue proton pump inhibitor  History of osteomyelitis  History of right heel osteomyelitis did see podiatry.  MRI negative for acute osteomyelitis  Chronic kidney disease, stage III (moderate) (HCC)  Initially presented to [facility] 4/25/2025 for generalized weakness found to have  kidney injury and MDR UTI  Renal function has returned back to baseline  Torsemide discontinued during March 2025 hospitalization    Results from last 7 days   Lab Units 05/05/25  0452 05/04/25  0245 05/03/25  2042 05/03/25  0606 05/02/25  1046 05/02/25  0535 05/01/25  0630 04/30/25  0533 04/29/25  0702   BUN mg/dL 6 7 8 8 7 7 6 6 7   CREATININE mg/dL 0.70 0.72 0.78 0.78 0.84 0.78 0.82 0.85 0.97   EGFR ml/min/1.73sq m 81 78 71 71 65 71 67 64 54     Hypothyroidism  Continue levothyroxine  Wang's esophagus  Noted on EGD  Will need repeat EGD in 3 years for surveillance  Patient with dysphagia: Continue proton pump inhibitor  Insertional Achilles tendinopathy  MRI with advanced distal Achilles tendinopathy with partial thickness tear involving the deep insertional fibers  Also small focal longitudinal split tear of the peroneus brevis, posterior tibialis tendon stenosis and tenosynovitis, and plantar fasciaopathy  PT/OT  Chronic osteomyelitis (HCC)  Per previous records: Chronic osteomyelitis of the toe of right foot  Per podiatry: Weightbearing as tolerated in postop shoe  Completed 6 weeks of IV antibiotics prior to recent admission  MRI right ankle/heel 4/29/2025: No evidence of remaining osteomyelitis  Patient was reevaluated by podiatry 4/30/2025: Weightbearing as tolerated in surgical shoe.  Local wound care.    outpatient podiatry follow-up    VTE Pharmacologic Prophylaxis: VTE Score: 6 High Risk (Score >/= 5) - Pharmacological DVT Prophylaxis Ordered: heparin drip. Sequential Compression Devices Ordered.    Mobility:   Basic Mobility Inpatient Raw Score: 7  JH-HLM Goal: 2: Bed activities/Dependent transfer  JH-HLM Achieved: 1: Laying in bed  JH-HLM Goal NOT achieved. Continue with multidisciplinary rounding and encourage appropriate mobility to improve upon JH-HLM goals.    Patient Centered Rounds: I performed bedside rounds with nursing staff today.   Discussions with Specialists or Other Care Team  Provider: FERNANDO ALLAN Dr    Education and Discussions with Family / Patient: updated nephew Red Capellan via phone (lives w pt).  Called son Jaron Kay and LM to call me for update.  Updated pt at bedside.    Current Length of Stay: 3 day(s)  Current Patient Status: Inpatient   Certification Statement: The patient will continue to require additional inpatient hospital stay due to GI bleed, ABLA, weakness  Discharge Plan: Anticipate discharge in 48-72 hrs to rehab facility.    Code Status: Level 1 - Full Code    Subjective   Patient notes she feels better.  She denies any current pain anywhere.  Denies any abdominal pain.  Denies any nausea, vomiting, diarrhea.  Notes she was able to eat dinner.  She relates for several months she has had very poor appetite and minimal p.o. intake as well as unintentional weight loss.  She notes she was having difficulty swallowing prior to this admission which has since improved.    She denies any chest pain.  Denies any shortness of breath.  Denies any cough.  Denies any back pain.  She denies any dizziness or lightheadedness.  Objective :  Temp:  [96.2 °F (35.7 °C)-98 °F (36.7 °C)] 97.2 °F (36.2 °C)  HR:  [51-73] 73  BP: ()/(45-71) 90/62  Resp:  [15-20] 20  SpO2:  [91 %-97 %] 92 %  O2 Device: None (Room air)  Nasal Cannula O2 Flow Rate (L/min):  [2 L/min] 2 L/min    Body mass index is 31.45 kg/m².     Input and Output Summary (last 24 hours):     Intake/Output Summary (Last 24 hours) at 5/5/2025 1758  Last data filed at 5/5/2025 1554  Gross per 24 hour   Intake 2197.88 ml   Output 300 ml   Net 1897.88 ml       Physical Exam  General: Very pleasant female.  No acute distress.  Nontachypneic and nondyspneic  Heart: Regular rate and rhythm.  S1-S2 present.  No murmur, rub, gallop  Lungs: Clear to auscultation bilaterally.  Good air movement.  No wheezes, crackles, rhonchi.  No accessory muscle use or respiratory distress  Abdomen: Soft, nontender with palpation.   Nondistended.  Normal active bowel sounds present.  No guarding or rebound.  No peritoneal signs or mass  Extremities: No clubbing, cyanosis, edema.  2+ pedal pulses bilaterally: Right foot in shoe  Neurologic: Awake.  Alert.  Oriented.  Interactive.  Moving all 4 extremities    Lines/Drains:        Telemetry:  Telemetry Orders (From admission, onward)               24 Hour Telemetry Monitoring  Continuous x 24 Hours (Telem)        Expiring   Question:  Reason for 24 Hour Telemetry  Answer:  Arrhythmias requiring acute medical intervention / PPM or ICD malfunction                     Telemetry Reviewed: Sinus Bradycardia  Indication for Continued Telemetry Use: PE               Lab Results: I have reviewed the following results:   Results from last 7 days   Lab Units 05/05/25  1240 05/04/25  0815 05/04/25  0245   WBC Thousand/uL  --   --  4.08*   HEMOGLOBIN g/dL 9.5*   < > 6.9*   HEMATOCRIT % 28.3*   < > 20.7*   PLATELETS Thousands/uL  --   --  116*   SEGS PCT %  --   --  52   LYMPHO PCT %  --   --  35   MONO PCT %  --   --  9   EOS PCT %  --   --  2    < > = values in this interval not displayed.     Results from last 7 days   Lab Units 05/05/25  0452   SODIUM mmol/L 141   POTASSIUM mmol/L 3.7   CHLORIDE mmol/L 106   CO2 mmol/L 27   BUN mg/dL 6   CREATININE mg/dL 0.70   ANION GAP mmol/L 8   CALCIUM mg/dL 7.9*   ALBUMIN g/dL 2.3*   TOTAL BILIRUBIN mg/dL 0.54   ALK PHOS U/L 45   ALT U/L <3*   AST U/L 11*   GLUCOSE RANDOM mg/dL 83     Results from last 7 days   Lab Units 05/05/25  0452   INR  1.88*     Results from last 7 days   Lab Units 05/02/25  1001   POC GLUCOSE mg/dl 88               Recent Cultures (last 7 days):       ==============================================  Imaging  4/30 celiac/mesenteric duplex  The abdominal aorta is patent and normal in caliber, measuring approximately 1.7 cm in its greatest diameter.  The celiac and splenic arteries are patent and normal in caliber. The proximal common hepatic  artery was not visualized.  The main Renal Arteries are patent and normal in caliber bilaterally.   The superior and inferior mesenteric arteries are patent and normal in caliber.     4/30: Barium swallow   projection demonstrates degenerative changes of the spine without obvious free air.  Limited examination   No evidence of an obvious fixed esophageal obstruction. Some transient spasm appeared to be present which gave the patient's some symptomatology but contrast did eventually pass into the stomach.    4/29 Arterial lower extremity duplex  RIGHT LOWER LIMB:  Diffuse disease noted throughout the femoral-popliteal arteries without significant focal stenosis.  Ankle/Brachial index:  Unreliable due to poorly compressible tibial vessels.  PVR/ PPG tracings are normal.  Metatarsal pressure of 166 mmHg  Great toe pressure of 109 mmHg, within the healing range   LEFT LOWER LIMB:  Diffuse disease noted throughout the femoral-popliteal arteries without significant focal stenosis.  Ankle/Brachial index:   Unreliable due to poorly compressible tibial vessels.  PVR/ PPG tracings are normal.  Metatarsal pressure of 168 mmHg  Great toe pressure of 127 mmHg, within the healing range    4/29 MRI right ankle/heel  1.  No convincing evidence of osteomyelitis. There is mild reactive edema.  2.  Advanced distal Achilles tendinopathy, with a partial-thickness tear involving the deep insertional fibers.  3.  Likely small, focal longitudinal split tear of the peroneus brevis.  4.  Mild posterior tibialis tendinosis and tenosynovitis.  5.  Plantar fasciopathy and postsurgical defect.  6.  Degenerative changes, as described.    4/28 CT PE study with abdomen/pelvis  1. Extensive acute thrombus throughout the right upper middle and lower lobe segmental and distal order pulmonary arteries. RV/LV ratio 0.9.  2. No acute intra-abdominal or pelvic process.    4/28 lower extremity venous duplex  RIGHT LOWER LIMB:  There is evidence  suggestive of acute vs. sub acute deep vein thrombosis noted in the popliteal, gastrocnemius, paired posterior tibial, and paired peroneal veins.  No evidence of superficial thrombophlebitis noted.  Popliteal, posterior tibial and anterior tibial arterial Doppler waveforms are  triphasic.  LEFT LOWER LIMB:  There is evidence suggestive of acute vs. sub acute deep vein thrombosis noted in the popliteal, paired posterior tibial, and paired peroneal veins.  No evidence of superficial thrombophlebitis noted.  Popliteal, posterior tibial and anterior tibial arterial Doppler waveforms are triphasic.    4/27 right foot x-ray Stable appearance of the calcaneus. No new erosions     4/25 CT chest/abdomen/pelvis  Status post reported esophageal dilatation. No evidence for pneumomediastinum. Small hiatal hernia is noted.  No acute intra-abdominal or pelvic abnormality identified.  Colonic diverticulosis without evidence for acute diverticulitis.    Micro  4/28 positive FOB  4/28 stool enteric pathogen: neg  4/28: neg c diff  4/25 urine cx: >100,000 Kleb, >100,000ESBL E coli    Procedure  5/5: EGD  The upper third of the esophagus, middle third of the esophagus and lower third of the esophagus appeared normal.  C4M7 Wang's esophagus; electronic chromoendoscopy (NBI) was used  6 cm type I hiatal hernia  The fundus of the stomach, body of the stomach, incisura and antrum appeared normal.  Prior polypectomy site noted at the body along the greater curvature, as evidenced by the convergence of multiple gastric folds.  The duodenal bulb, 1st part of the duodenum and 2nd part of the duodenum appeared normal.  5/5 colonoscopy  The terminal ileum appeared normal.  Mild, localized petechial mucosa in the cecum and ascending colon.    - likely due to barotrauma. The mucosal petechiae developed during the procedure and does not explain her prior episodes of hematochezia  The transverse colon appeared normal.  Melanosis in the splenic  flexure, descending colon, sigmoid colon, rectosigmoid and rectum  Multiple small, scattered diverticula of mild severity with no inflammation containing blood clots and causing mild luminal narrowing (traversable) in the descending colon and sigmoid colon; there was stigmata of recent hemorrhage.    - there was some narrowing and a sharp turn at the rectosigmoid junction. There were several diverticuli that had old blood near the mouth. There was also small amounts of old blood mixed with the bowel prep  Internal small hemorrhoids observed during retroflexion  =============================================    Last 24 Hours Medication List:     Current Facility-Administered Medications:     acetaminophen (TYLENOL) tablet 650 mg, Q6H PRN    amLODIPine (NORVASC) tablet 10 mg, Daily    bisacodyl (DULCOLAX) EC tablet 10 mg, See Admin Instructions    hydrOXYzine HCL (ATARAX) tablet 25 mg, TID AC    levothyroxine tablet 75 mcg, Early Morning    meclizine (ANTIVERT) tablet 25 mg, TID PRN    metoprolol tartrate (LOPRESSOR) tablet 50 mg, Q12H MELANY    mirtazapine (REMERON) tablet 7.5 mg, HS    moisture barrier miconazole 2% cream (aka EDNA MOISTURE BARRIER ANTIFUNGAL CREAM), BID    multi-electrolyte (Plasmalyte-A/Isolyte-S PH 7.4/Normosol-R) IV solution, Continuous, Last Rate: 125 mL/hr (05/05/25 1634)    ondansetron (ZOFRAN) injection 4 mg, Q4H PRN    pantoprazole (PROTONIX) injection 40 mg, Q12H MELANY    potassium chloride (Klor-Con M20) CR tablet 40 mEq, BID    pravastatin (PRAVACHOL) tablet 80 mg, Daily With Dinner    [Held by provider] senna-docusate sodium (SENOKOT S) 8.6-50 mg per tablet 1 tablet, Daily    [Held by provider] warfarin (COUMADIN) tablet 5 mg, Once per day on Sunday Tuesday Wednesday Thursday Saturday    [Held by provider] warfarin (COUMADIN) tablet 7.5 mg, Once per day on Monday Friday    Administrative Statements   Today, Patient Was Seen By: Monique Gonzalez MD      **Please Note: This note may have been  constructed using a voice recognition system.**

## 2025-05-05 NOTE — ASSESSMENT & PLAN NOTE
MRI with advanced distal Achilles tendinopathy with partial thickness tear involving the deep insertional fibers  Also small focal longitudinal split tear of the peroneus brevis, posterior tibialis tendon stenosis and tenosynovitis, and plantar fasciaopathy  PT/OT

## 2025-05-05 NOTE — ASSESSMENT & PLAN NOTE
Initially presented to [facility] 4/25/2025 for generalized weakness found to have kidney injury and MDR UTI  Renal function has returned back to baseline  Torsemide discontinued during March 2025 hospitalization    Results from last 7 days   Lab Units 05/05/25  0452 05/04/25  0245 05/03/25  2042 05/03/25  0606 05/02/25  1046 05/02/25  0535 05/01/25  0630 04/30/25  0533 04/29/25  0702   BUN mg/dL 6 7 8 8 7 7 6 6 7   CREATININE mg/dL 0.70 0.72 0.78 0.78 0.84 0.78 0.82 0.85 0.97   EGFR ml/min/1.73sq m 81 78 71 71 65 71 67 64 54

## 2025-05-05 NOTE — PLAN OF CARE
Problem: PAIN - ADULT  Goal: Verbalizes/displays adequate comfort level or baseline comfort level  Description: Interventions:- Encourage patient to monitor pain and request assistance- Assess pain using appropriate pain scale- Administer analgesics based on type and severity of pain and evaluate response- Implement non-pharmacological measures as appropriate and evaluate response- Consider cultural and social influences on pain and pain management- Notify physician/advanced practitioner if interventions unsuccessful or patient reports new pain  Outcome: Progressing     Problem: INFECTION - ADULT  Goal: Absence or prevention of progression during hospitalization  Description: INTERVENTIONS:- Assess and monitor for signs and symptoms of infection- Monitor lab/diagnostic results- Monitor all insertion sites, i.e. indwelling lines, tubes, and drains- Monitor endotracheal if appropriate and nasal secretions for changes in amount and color- Alborn appropriate cooling/warming therapies per order- Administer medications as ordered- Instruct and encourage patient and family to use good hand hygiene technique- Identify and instruct in appropriate isolation precautions for identified infection/condition  Outcome: Progressing  Goal: Absence of fever/infection during neutropenic period  Description: INTERVENTIONS:- Monitor WBC  Outcome: Progressing     Problem: SAFETY ADULT  Goal: Patient will remain free of falls  Description: INTERVENTIONS:- Educate patient/family on patient safety including physical limitations- Instruct patient to call for assistance with activity - Consult OT/PT to assist with strengthening/mobility - Keep Call bell within reach- Keep bed low and locked with side rails adjusted as appropriate- Keep care items and personal belongings within reach- Initiate and maintain comfort rounds- Make Fall Risk Sign visible to staff- Offer Toileting every 2 Hours, in advance of need- Initiate/Maintain bed alarm-  Outcome: Progressing  Goal: Maintain or return to baseline ADL function  Description: INTERVENTIONS:-  Assess patient's ability to carry out ADLs; assess patient's baseline for ADL function and identify physical deficits which impact ability to perform ADLs (bathing, care of mouth/teeth, toileting, grooming, dressing, etc.)- Assess/evaluate cause of self-care deficits - Assess range of motion- Assess patient's mobility; develop plan if impaired- Assess patient's need for assistive devices and provide as appropriate- Encourage maximum independence but intervene and supervise when necessary- Involve family in performance of ADLs- Assess for home care needs following discharge - Consider OT consult to assist with ADL evaluation and planning for discharge- Provide patient education as appropriate  Outcome: Progressing  Goal: Maintains/Returns to pre admission functional level  Description: INTERVENTIONS:- Perform AM-PAC 6 Click Basic Mobility/ Daily Activity assessment daily.- Set and communicate daily mobility goal to care team and patient/family/caregiver. - Collaborate with rehabilitation services on mobility goals if consulted- Perform Range of Motion 2 times a day.- Reposition patient every 2 hours.- Dangle patient 1 times a day-Outcome: Progressing     Problem: DISCHARGE PLANNING  Goal: Discharge to home or other facility with appropriate resources  Description: INTERVENTIONS:- Identify barriers to discharge w/patient and caregiver- Arrange for needed discharge resources and transportation as appropriate- Identify discharge learning needs (meds, wound care, etc.)- Arrange for interpretive services to assist at discharge as needed- Refer to Case Management Department for coordinating discharge planning if the patient needs post-hospital services based on physician/advanced practitioner order or complex needs related to functional status, cognitive ability, or social support system  Outcome: Progressing     Problem:  Knowledge Deficit  Goal: Patient/family/caregiver demonstrates understanding of disease process, treatment plan, medications, and discharge instructions  Description: Complete learning assessment and assess knowledge base.Interventions:- Provide teaching at level of understanding- Provide teaching via preferred learning methods  Outcome: Progressing     Problem: Nutrition/Hydration-ADULT  Goal: Nutrient/Hydration intake appropriate for improving, restoring or maintaining nutritional needs  Description: Monitor and assess patient's nutrition/hydration status for malnutrition. Collaborate with interdisciplinary team and initiate plan and interventions as ordered.  Monitor patient's weight and dietary intake as ordered or per policy. Utilize nutrition screening tool and intervene as necessary. Determine patient's food preferences and provide high-protein, high-caloric foods as appropriate. INTERVENTIONS:- Monitor oral intake, urinary output, labs, and treatment plans- Assess nutrition and hydration status and recommend course of action- Evaluate amount of meals eaten- Assist patient with eating if necessary - Allow adequate time for meals- Recommend/ encourage appropriate diets, oral nutritional supplements, and vitamin/mineral supplements- Order, calculate, and assess calorie counts as needed- Recommend, monitor, and adjust tube feedings and TPN/PPN based on assessed needs- Assess need for intravenous fluids- Provide specific nutrition/hydration education as appropriate- Include patient/family/caregiver in decisions related to nutrition  Outcome: Progressing     Problem: Prexisting or High Potential for Compromised Skin Integrity  Goal: Skin integrity is maintained or improved  Description: INTERVENTIONS:- Identify patients at risk for skin breakdown- Assess and monitor skin integrity- Assess and monitor nutrition and hydration status- Monitor labs - Assess for incontinence - Turn and reposition patient- Assist with  mobility/ambulation- Relieve pressure over bony prominences- Avoid friction and shearing- Provide appropriate hygiene as needed including keeping skin clean and dry- Evaluate need for skin moisturizer/barrier cream- Collaborate with interdisciplinary team - Patient/family teaching- Consider wound care consult   Outcome: Progressing

## 2025-05-05 NOTE — ASSESSMENT & PLAN NOTE
Likely secondary to pulmonary embolism: Was placed on 2 L nasal cannula  Currently with adequate oxygenation on room air

## 2025-05-05 NOTE — OCCUPATIONAL THERAPY NOTE
Occupational Therapy Progress Note     Patient Name: Alecia Kay  Today's Date: 5/5/2025  Problem List  Principal Problem:    Hematochezia  Active Problems:    History of osteomyelitis    Chronic kidney disease, stage III (moderate) (HCC)    Hypothyroidism    Hypertension    Nausea and vomiting    Acute DVT (deep venous thrombosis) (HCC)    Acute pulmonary embolism (HCC)    Acute respiratory insufficiency    Acute blood loss anemia (ABLA)    Hypokalemia       05/05/25 1123   OT Last Visit   OT Visit Date 05/05/25   Note Type   Note Type Treatment   Pain Assessment   Pain Score No Pain   Restrictions/Precautions   Weight Bearing Precautions Per Order   (wbat RLE per op podiatry note 3/25/25)   Braces or Orthoses Other (Comment)  (surgical shoe R)   Other Precautions Contact/isolation;Chair Alarm;Bed Alarm;Cognitive;Multiple lines;Telemetry;O2;Fall Risk  (2L, masimo, IV, level 2 step down)   ADL   LB Dressing Assistance 2  Maximal Assistance   LB Dressing Deficit Don/doff R sock;Don/doff L sock;Thread RLE into underwear;Thread LLE into underwear;Pull up over hips   LB Dressing Comments able to begin donning socks via reaching down towards feet however unable to bring over heels. Ax2 for standing for clothing management, requires bl UE support of RW to maintain static standing for clothing mngment   Toileting Assistance  1  Total Assistance   Toileting Deficit Perineal hygiene;Clothing management up;Clothing management down;Bedside commode;Verbal cueing;Supervison/safety;Steadying   Bed Mobility   Supine to Sit 3  Moderate assistance   Additional items Assist x 1;HOB elevated;Bedrails;Increased time required;Verbal cues   Transfers   Sit to Stand 3  Moderate assistance   Additional items Assist x 2;Increased time required;Verbal cues;Other  (RW)   Stand to Sit 3  Moderate assistance   Additional items Assist x 2;Armrests;Increased time required;Impulsive;Verbal cues;Other  (RW)   Toilet transfer 3  Moderate  "assistance   Additional items Assist x 2;Increased time required;Verbal cues;Impulsive;Commode;Other  (RW)   Additional Comments cues for direction, safety   Subjective   Subjective \"I got weak because they weren't letting me put weight on my foot before\"   Cognition   Overall Cognitive Status Impaired   Arousal/Participation Alert;Responsive;Cooperative   Attention Attends with cues to redirect   Orientation Level Oriented to person;Oriented to place;Oriented to time  (general to date, place)   Memory Decreased short term memory;Decreased recall of recent events   Following Commands Follows one step commands with increased time or repetition   Activity Tolerance   Activity Tolerance Patient limited by fatigue;Treatment limited secondary to medical complications (Comment)   Medical Staff Made Aware PT   Assessment   Assessment Pt seen for OT f/u treatment session focusing on functional activity tolerance, bed mobility, ADLs, functional transfers/mobility, Safe transfer techniques, OOB trial, dynamic sitting balance activity, and dynamic standing balance activity. Upon OT arrival, pt was found supine in bed, agreeable to OT tx session. Pt continues to make progress towards POC. Able to progress OOB to BSC and then chair today. Requires modAx2 for safe transfers/mobility with RW. Demonstrates good flexibility to reach towards LB to don socks however requires assistance to don over heels, requires b/l UE support of RW for static standing to perform LB and toileting tasks. Pt limited with standing tolerance this date to 1-2 minutes. Please refer to flowsheet for functional performance. Provided education on HEP, energy conservation techniques, deep breathing techniques, fall prevention strategies, and overall safety awareness.   Patient requiring verbal cues for safety and verbal cues for pacing through activity steps.  Patient continues to be functioning below baseline level, occupational performance remains limited " secondary to factors listed above and increased risk for falls and injury. Pt seated OOB in chair with chair alarm activated at end of session. Call bell and phone within reach. All needs met and pt reports no further questions for OT at this time. Currently, pt is recommended for level 2 resource intensity at time of d/c. Will continue to see pt per POC to address deficits to facilitate return to PLOF.   Plan   Treatment Interventions ADL retraining;Functional transfer training;UE strengthening/ROM;Endurance training;Cognitive reorientation;Patient/family training;Equipment evaluation/education;Neuromuscular reeducation;Compensatory technique education;Continued evaluation;Activityengagement;Energy conservation   Goal Expiration Date 05/17/25   OT Treatment Day 1   OT Frequency 2-3x/wk;3-5x/wk   Discharge Recommendation   Rehab Resource Intensity Level, OT II (Moderate Resource Intensity)   AM-PAC Daily Activity Inpatient   Lower Body Dressing 2   Bathing 2   Toileting 2   Upper Body Dressing 3   Grooming 3   Eating 3   Daily Activity Raw Score 15   Daily Activity Standardized Score (Calc for Raw Score >=11) 34.69   AM-PAC Applied Cognition Inpatient   Following a Speech/Presentation 3   Understanding Ordinary Conversation 4   Taking Medications 2   Remembering Where Things Are Placed or Put Away 3   Remembering List of 4-5 Errands 2   Taking Care of Complicated Tasks 2   Applied Cognition Raw Score 16   Applied Cognition Standardized Score 35.03     Deana Garcia    initially felt dizzy at EOB, vitals 114/71, 66. Symptoms improved w prolonged sitting

## 2025-05-05 NOTE — ASSESSMENT & PLAN NOTE
baseline hemoglobin appears to be variable over the last year ranging 7.5-10  Acute blood loss anemia due to hematochezia secondary to diverticular bleed  Was transfused 1 unit PRBC 5/3 & additional 2 units PRBC 5/4.  Hemoglobin currently stable at 9.5, continue to monitor on anticoagulation    Results from last 7 days   Lab Units 05/05/25  1240 05/05/25  0452 05/05/25  0119 05/04/25  1751 05/04/25  1215 05/04/25  0815   HEMOGLOBIN g/dL 9.5* 9.1* 10.2* 9.5* 9.7* 9.8*

## 2025-05-05 NOTE — ASSESSMENT & PLAN NOTE
Noted on EGD  Will need repeat EGD in 3 years for surveillance  Patient with dysphagia: Continue proton pump inhibitor

## 2025-05-06 LAB
ANION GAP SERPL CALCULATED.3IONS-SCNC: 7 MMOL/L (ref 4–13)
APTT PPP: >210 SECONDS (ref 23–34)
APTT PPP: >210 SECONDS (ref 23–34)
BUN SERPL-MCNC: 6 MG/DL (ref 5–25)
CALCIUM SERPL-MCNC: 8 MG/DL (ref 8.4–10.2)
CHLORIDE SERPL-SCNC: 106 MMOL/L (ref 96–108)
CO2 SERPL-SCNC: 26 MMOL/L (ref 21–32)
CREAT SERPL-MCNC: 0.68 MG/DL (ref 0.6–1.3)
ERYTHROCYTE [DISTWIDTH] IN BLOOD BY AUTOMATED COUNT: 17.8 % (ref 11.6–15.1)
GFR SERPL CREATININE-BSD FRML MDRD: 82 ML/MIN/1.73SQ M
GLUCOSE SERPL-MCNC: 110 MG/DL (ref 65–140)
HCT VFR BLD AUTO: 25.8 % (ref 34.8–46.1)
HGB BLD-MCNC: 9 G/DL (ref 11.5–15.4)
MAGNESIUM SERPL-MCNC: 2.1 MG/DL (ref 1.9–2.7)
MCH RBC QN AUTO: 27.8 PG (ref 26.8–34.3)
MCHC RBC AUTO-ENTMCNC: 34.9 G/DL (ref 31.4–37.4)
MCV RBC AUTO: 80 FL (ref 82–98)
PLATELET # BLD AUTO: 108 THOUSANDS/UL (ref 149–390)
PMV BLD AUTO: 10.6 FL (ref 8.9–12.7)
POTASSIUM SERPL-SCNC: 4 MMOL/L (ref 3.5–5.3)
RBC # BLD AUTO: 3.24 MILLION/UL (ref 3.81–5.12)
SODIUM SERPL-SCNC: 139 MMOL/L (ref 135–147)
WBC # BLD AUTO: 4.48 THOUSAND/UL (ref 4.31–10.16)

## 2025-05-06 PROCEDURE — 85027 COMPLETE CBC AUTOMATED: CPT | Performed by: INTERNAL MEDICINE

## 2025-05-06 PROCEDURE — 97530 THERAPEUTIC ACTIVITIES: CPT

## 2025-05-06 PROCEDURE — 99223 1ST HOSP IP/OBS HIGH 75: CPT

## 2025-05-06 PROCEDURE — 80048 BASIC METABOLIC PNL TOTAL CA: CPT | Performed by: INTERNAL MEDICINE

## 2025-05-06 PROCEDURE — 94762 N-INVAS EAR/PLS OXIMTRY CONT: CPT

## 2025-05-06 PROCEDURE — 97535 SELF CARE MNGMENT TRAINING: CPT

## 2025-05-06 PROCEDURE — G0316 PR PROLONG INPT EVAL ADD15 M: HCPCS

## 2025-05-06 PROCEDURE — 92610 EVALUATE SWALLOWING FUNCTION: CPT

## 2025-05-06 PROCEDURE — 85730 THROMBOPLASTIN TIME PARTIAL: CPT | Performed by: INTERNAL MEDICINE

## 2025-05-06 PROCEDURE — 99233 SBSQ HOSP IP/OBS HIGH 50: CPT | Performed by: INTERNAL MEDICINE

## 2025-05-06 PROCEDURE — 83735 ASSAY OF MAGNESIUM: CPT | Performed by: INTERNAL MEDICINE

## 2025-05-06 RX ORDER — ENOXAPARIN SODIUM 100 MG/ML
1 INJECTION SUBCUTANEOUS EVERY 12 HOURS SCHEDULED
Status: DISCONTINUED | OUTPATIENT
Start: 2025-05-06 | End: 2025-05-08

## 2025-05-06 RX ORDER — PANTOPRAZOLE SODIUM 40 MG/1
40 TABLET, DELAYED RELEASE ORAL
Status: DISCONTINUED | OUTPATIENT
Start: 2025-05-06 | End: 2025-05-20

## 2025-05-06 RX ADMIN — ENOXAPARIN SODIUM 80 MG: 100 INJECTION SUBCUTANEOUS at 16:59

## 2025-05-06 RX ADMIN — PANTOPRAZOLE SODIUM 40 MG: 40 INJECTION, POWDER, FOR SOLUTION INTRAVENOUS at 08:29

## 2025-05-06 RX ADMIN — LEVOTHYROXINE SODIUM 75 MCG: 75 TABLET ORAL at 05:52

## 2025-05-06 RX ADMIN — WARFARIN SODIUM 5 MG: 5 TABLET ORAL at 17:04

## 2025-05-06 RX ADMIN — MIRTAZAPINE 7.5 MG: 7.5 TABLET, FILM COATED ORAL at 21:59

## 2025-05-06 RX ADMIN — PANTOPRAZOLE SODIUM 40 MG: 40 TABLET, DELAYED RELEASE ORAL at 16:59

## 2025-05-06 RX ADMIN — MICONAZOLE NITRATE: 20 CREAM TOPICAL at 16:59

## 2025-05-06 RX ADMIN — PRAVASTATIN SODIUM 80 MG: 80 TABLET ORAL at 16:59

## 2025-05-06 RX ADMIN — MICONAZOLE NITRATE: 20 CREAM TOPICAL at 08:29

## 2025-05-06 NOTE — PLAN OF CARE
Problem: PAIN - ADULT  Goal: Verbalizes/displays adequate comfort level or baseline comfort level  Description: Interventions:- Encourage patient to monitor pain and request assistance- Assess pain using appropriate pain scale- Administer analgesics based on type and severity of pain and evaluate response- Implement non-pharmacological measures as appropriate and evaluate response- Consider cultural and social influences on pain and pain management- Notify physician/advanced practitioner if interventions unsuccessful or patient reports new pain  Outcome: Progressing     Problem: INFECTION - ADULT  Goal: Absence or prevention of progression during hospitalization  Description: INTERVENTIONS:- Assess and monitor for signs and symptoms of infection- Monitor lab/diagnostic results- Monitor all insertion sites, i.e. indwelling lines, tubes, and drains- Monitor endotracheal if appropriate and nasal secretions for changes in amount and color- [place] appropriate cooling/warming therapies per order- Administer medications as ordered- Instruct and encourage patient and family to use good hand hygiene technique- Identify and instruct in appropriate isolation precautions for identified infection/condition  Outcome: Progressing  Goal: Absence of fever/infection during neutropenic period  Description: INTERVENTIONS:- Monitor WBC  Outcome: Progressing     Problem: SAFETY ADULT  Goal: Patient will remain free of falls  Description: INTERVENTIONS:- Educate patient/family on patient safety including physical limitations- Instruct patient to call for assistance with activity - Consult OT/PT to assist with strengthening/mobility - Keep Call bell within reach- Keep bed low and locked with side rails adjusted as appropriate- Keep care items and personal belongings within reach- Initiate and maintain comfort rounds- Make Fall Risk Sign visible to staff- Offer Toileting every 2 Hours, in advance of need- Initiate/Maintain bed alarm-  Obtain necessary fall risk management equipment: - Apply yellow socks and bracelet for high fall risk patients- Consider moving patient to room near nurses station  Outcome: Progressing  Goal: Maintain or return to baseline ADL function  Description: INTERVENTIONS:-  Assess patient's ability to carry out ADLs; assess patient's baseline for ADL function and identify physical deficits which impact ability to perform ADLs (bathing, care of mouth/teeth, toileting, grooming, dressing, etc.)- Assess/evaluate cause of self-care deficits - Assess range of motion- Assess patient's mobility; develop plan if impaired- Assess patient's need for assistive devices and provide as appropriate- Encourage maximum independence but intervene and supervise when necessary- Involve family in performance of ADLs- Assess for home care needs following discharge - Consider OT consult to assist with ADL evaluation and planning for discharge- Provide patient education as appropriate  Outcome: Progressing  Goal: Maintains/Returns to pre admission functional level  Description: INTERVENTIONS:- Perform AM-PAC 6 Click Basic Mobility/ Daily Activity assessment daily.- Set and communicate daily mobility goal to care team and patient/family/caregiver. - Collaborate with rehabilitation services on mobility goals if consulted- Perform Range of Motion 3 times a day.- Reposition patient every 2 hours.- Dangle patient 3 times a day- Stand patient 3 times a day- Ambulate patient 3 times a day- Out of bed to chair 3 times a day - Out of bed for meals 3 times a day- Out of bed for toileting- Record patient progress and toleration of activity level   Outcome: Progressing     Problem: DISCHARGE PLANNING  Goal: Discharge to home or other facility with appropriate resources  Description: INTERVENTIONS:- Identify barriers to discharge w/patient and caregiver- Arrange for needed discharge resources and transportation as appropriate- Identify discharge learning  needs (meds, wound care, etc.)- Arrange for interpretive services to assist at discharge as needed- Refer to Case Management Department for coordinating discharge planning if the patient needs post-hospital services based on physician/advanced practitioner order or complex needs related to functional status, cognitive ability, or social support system  Outcome: Progressing     Problem: Knowledge Deficit  Goal: Patient/family/caregiver demonstrates understanding of disease process, treatment plan, medications, and discharge instructions  Description: Complete learning assessment and assess knowledge base.Interventions:- Provide teaching at level of understanding- Provide teaching via preferred learning methods  Outcome: Progressing     Problem: Nutrition/Hydration-ADULT  Goal: Nutrient/Hydration intake appropriate for improving, restoring or maintaining nutritional needs  Description: Monitor and assess patient's nutrition/hydration status for malnutrition. Collaborate with interdisciplinary team and initiate plan and interventions as ordered.  Monitor patient's weight and dietary intake as ordered or per policy. Utilize nutrition screening tool and intervene as necessary. Determine patient's food preferences and provide high-protein, high-caloric foods as appropriate. INTERVENTIONS:- Monitor oral intake, urinary output, labs, and treatment plans- Assess nutrition and hydration status and recommend course of action- Evaluate amount of meals eaten- Assist patient with eating if necessary - Allow adequate time for meals- Recommend/ encourage appropriate diets, oral nutritional supplements, and vitamin/mineral supplements- Order, calculate, and assess calorie counts as needed- Recommend, monitor, and adjust tube feedings and TPN/PPN based on assessed needs- Assess need for intravenous fluids- Provide specific nutrition/hydration education as appropriate- Include patient/family/caregiver in decisions related to  nutrition  Outcome: Progressing     Problem: Prexisting or High Potential for Compromised Skin Integrity  Goal: Skin integrity is maintained or improved  Description: INTERVENTIONS:- Identify patients at risk for skin breakdown- Assess and monitor skin integrity- Assess and monitor nutrition and hydration status- Monitor labs - Assess for incontinence - Turn and reposition patient- Assist with mobility/ambulation- Relieve pressure over bony prominences- Avoid friction and shearing- Provide appropriate hygiene as needed including keeping skin clean and dry- Evaluate need for skin moisturizer/barrier cream- Collaborate with interdisciplinary team - Patient/family teaching- Consider wound care consult   Outcome: Progressing

## 2025-05-06 NOTE — ASSESSMENT & PLAN NOTE
May be secondary to multiple loose bowel movements.  Added banana flakes, and repleted potassium  Hypomagnesemia: Repleted    Results from last 7 days   Lab Units 05/06/25  0320 05/05/25  0452 05/04/25  0245 05/03/25  2042 05/03/25  0606   POTASSIUM mmol/L 4.0 3.7 4.1 3.5 2.9*   MAGNESIUM mg/dL 2.1 1.8* 1.6*  --  1.9

## 2025-05-06 NOTE — CONSULTS
Consultation - Geriatrics   Alecia Kay 80 y.o. female MRN: [id number]  Unit/Bed#: E4 -01 Encounter: [id number]      Assessment/Plan    Acute Encephalopathy  Patient presented to the hospital for generalized weakness   Baseline mentation is alert and oriented x 4 but forgetful at times   Current cause considerations   Suspected to be multifactorial secondary to age, possible underlying cognitive impairment, hematoochezia status post colonoscopy, anesthesia, acute PE/DVT, atarax use, vision impairment, and prolonged hospitalization  UA on admission negative for UTI   No leukocytosis noted on labs today   Hemoglobin stable at 9.0 on labs today   Patient is noted to be mildly confused at times on exam today   She is oriented to person, place, and year  She noted the month to be January and then corrected to June   There was some concern about the patient hallucinating today   She denies hallucinations for me today but notes that she has been having vivid dreams   Her son at the bedside notes that she complained of vivid dreams at rehab as well   She is pleasant, calm, and cooperative on my exam   Identify and treat reversible causes of confusion including infection, dehydration, electrolyte imbalance, anemia, hypoxia, urinary retention, constipation, pain, and sleep disturbance  Maintain delirium precautions   Provide redirection, reorientation, and distraction techniques  Maintain fall and safety precautions   Assist with ADLs/IADLs  Avoid deliriogenic medications such as tramadol, benzodiazepines, anticholinergics, benadryl  Treat pain using geriatric pain protocol   Encourage oral hydration and nutrition   Monitor for constipation and urinary retention   Implement sleep hygiene and limit night time interuptions   Maintain sleep-wake cycle   Encourage early and frequent mobilization   Patient had 2 EKGs on 4/29/2025 revealed a QTc intervals of 444 and 449  If all other interventions are unsuccessful for  acute agitation and behaviors, can consider Zyprexa 2.5 mg IM Q 8 hours prn   Would avoid benzodiazepines such as Ativan if possible as these medications can cause/worsen delirium   Redirect and reorient as needed  Keep mentally, physically, and socially active    Cognitive Screening  Patient has no documented history of memory loss or cognitive impairment   She notes no issues with her memory at baseline   Patient has been having periods of confusion here  Please see cause considerations as discussed above  Patient is alert and oriented to person, place, and year   She notes the month to be January and then corrects to June  She is pleasant, calm, and cooperative   Most recent TSH POC on 11/13/2024 noted to be 1.81  Consider rechecking on routine labs  Most recent vitamin B 12 level on 4/3/2025 noted to be 564  No imaging of the head or brain noted in epic   Patient scored 13/15 on BIMS on 3/5/2025 indicating she is cognitively intact   Maintain delirium precautions as discussed above   Redirect and reorient as needed  Keep physically, mentally and socially active     Deconditioning   Baseline function: needs assistance with ADLs and IADLs (currently)  Prior to January she was independent with ADLs and IADLs  Patient is at increased risk for deconditioning secondary to possible underlying cognitive impairment, hematoochezia status post colonoscopy, anesthesia, acute PE/DVT, atarax use, vision impairment, weakness, gait dysfunction, and prolonged hospitalization  Continue to optimize diet, hydration, and mobility for healing   Chronic Kidney Disease Stage III  Baseline creatinine not entirely clear but recently appears to be 0.8-1.0  Creatinine on labs today noted to be 0.68  Avoid nephrotoxic medication and renal dose medication   Keep hydrated  Type II Diabetes   Most recent hemoglobin A1C on 11/13/2024 noted to be 5.7  Per chart review, she does not appear to be on any medication for this at baseline  Glucose on  labs this morning stable at 110   Continue to monitor glucose on labs   Avoid hypoglycemia   Anemia   Baseline hemoglobin appears to be 8-10  Patient was noted to have hematochezia which has since resolved   Hemoglobin on labs today stable at 9.0  Continue to monitor CBC   Transfuse for hemoglobin < 7   Monitor for signs and symptoms of infection, dehydration, DVT, and skin breakdown    Frailty   Clinical Frail Scale: 6- Moderately Frail (current)  Need help with all outside activities  Need help with stairs and bathing  May need assistance with dressing  Most recent albumin on 5/5/2025 noted to be 2.3  Consider nutrition consult  Encourage protein supplementation     Ambulatory Dysfunction/Falls  Patient notes no recent falls at home   She states she ambulates with a roller walker at baseline   PT/OT consulted to assist with strengthening/mobility and assist with discharge planning to appropriate level of care  Assess patient frequently for physical needs, encourage use of assistant devices as needed and directed by PT/OT  Identify cognitive and physical deficits and behaviors that affect risk of falls  Consider moving patient closer to nursing station to monitor more closely for impulsive behavior which may increase risk of falls  [place] fall and safety precautions   Educate patient/family on patient safety including physical limitations and importance of using call bell for assistance   Modify environment to reduce risk of injury including disconnecting from pole when not in use, ensuring adequate lighting in room and restroom, ensuring that path to restroom is clear and free of trip hazards  Out of bed as tolerated    Impaired Vision   Patient denies vision impairment   She notes she does have glasses for reading   Recommend use of corrective lenses at all appropriate times  Encourage adequate lighting and encourage use of assistance with ambulation  Keep personal belongings close to avoid reaching  Encourage  appropriate footwear at all times  Recommend large font for printed materials provided to patient    Impaired Hearing   Patient denies hearing impairment   She denies the use of hearing aids   Hearing impairment strongly correlated with depression, cognitive impairment, delirium and falls in the older adult  Use hearing aids or sound amplifier  Speak face to face  Use clear dictation and enunciation of words    Dentition/Appetite   Patient denies denture use  She states she has a good appetite at baseline   Per chart review, patient did consume 100% of breakfast today   Encourage use of dentures at all appropriate times  Ensure meal consistency is appropriate for all abilities   Consider nutrition consult   Continue aspiration precautions     Elimination   Patient is continent of bowel and bladder at baseline  She appears to be voiding without difficulty   Patient was noted to have hematochezia and was found to have a diverticular bleed   This has since resolved   Last documented bowel movement was yesterday   Patient is not currently on a bowel regimen   Monitor for constipation and urinary retention     Insomnia   Patient notes no difficulty sleeping at baseline   She states she has been having vivid dreams here   Her son at the bedside notes this occurred when at rehab as well   First line is behavioral therapy   Avoid sedative hypnotics including benzodiazepines and benadryl  Encourage staying awake during the day   Encourage daytime activities and morning exercise   Decrease or eliminate daytime naps   Avoid caffeine especially during late afternoon and evening hours  Establish a nighttime routine  Implement sleep hygiene and limit nighttime interruptions  Can consider melatonin 3 mg daily at bedtime for sleep if needed     Anxiety/Depression  Patient has a documented history of anxiety   PDMP reviewed and patient was prescribed alprazolam 0.25 mg on 4/11/2025  Unclear how often she was taking this   Mood  appears stable on exam today   Continue supportive care     Hematochezia   Patient presented to [facility] with generalized weakness  She was initially noted to have a UTI and GAY  Additionally she was found to have acute DVT and PE  Her INR was subtherapeutic and she was started on a heparin drip  She was later noted to have hematochezia and was a rapid response   She was transferred to [facility] due to lack of GI services   GI initially restarted the heparin infusion but this was discontinued due to a large amount of hematochezia  She underwent and EGD/colonoscopy yesterday   EGD revealed Wang's esophagus with no evidence of bleeding   Colonoscopy revealed several diverticuli with old blood   GI suspected a diverticular bleed   This has since resolved  Heparin has since been restarted without any additional bleeding   Staff has been having difficulty obtaining PTT so bridging therapy will be changed to lovenox   Hemoglobin stable on labs today at 9.0  Management per GI and primary team     Acute Pulmonary Embolism  Patient found to have extensive right upper and lower PE on CTA on 4/28/2025  Echo noted no evidence of heart strain   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   She is maintained on coumadin 5 mg 5 days per week and 7.5 mg Monday and Friday  Patient had nausea/vomiting for several days PTA and she was not able to take this medication   She is currently on heparin which will be adjusted to lovenox due to difficult lab draws   She is being transitioned back to coumadin   Management per primary team     Chronic Osteomyelitis   Patient with previous chronic osteomyelitis of the right foot   Podiatry recommending WBAT in a surgical shoe  She completed 6 weeks of antibiotics PTA   MRI of the right heel and ankle on 4/29/2025 revealed no evidence of remaining osteomyelitis   Patient was re-evaluated by podiatry on 4/30/2025 and no changes to prior recommendations noted   She will require continued outpatient follow-up  "with podiatry on discharge     Home Safety  Patient resides at home with her nephew   She had been independent with ADLs and IADLs   Currently she is requriing assistance with ADLs and IADLs    Advanced Care Planning  Level 1 Full Code    Home Medication Review   [facility] ([phone])  Last filled Jan 8 2025    I have personally reviewed this medication list with the patients pharmacy listed above.       History of Present Illness   Physician Requesting Consult: Monique Gonzalez MD  Reason for Consult / Principal Problem: Delirium  Hx and PE limited by: Mild confusion     HPI: Alecia Kay is a 80 y.o. year old female who has anemia, anxiety, diabetes, history of DVT, hypertension, hypothyroidism, and generalized weakness and she initially presented to the hospital for generalized weakness. Patient noted she had recently signed herself out of [facility] as she was \"sick of it\" and wanted to be discharged home. She left on 4/24 and she was noted to be non-ambulatory and she had not eaten or taken medication for at least 24 hours. She was vomiting on the morning of the admission. She admitted she had not been eating for one month due to poor appetite. She was noted to have a UTI and GAY for which she had been treated. Imaging also revealed acute DVT and PE. She was started on heparin drip but was found to be a rapid response with hematochezia. Due to limited GI resources at [facility] she was transferred to [facility]. GI recommended continuing heparin but she was again noted to have hematochezia. Heparin was paused and patient underwent and EGD and colonoscopy. GI noted that they suspected a diverticular bleed. The bleeding has stopped and heparin has since been restarted. Patient was note to have periods of confusion today. Geriatrics is being consulted for delirium.     The patient states that she resides at home with nephew. She notes that prior to her hospitalization in January she was " independent with ADLs and IADLs. She believes at this time she is still independent with ADLs but needs assistance with IADLs. She notes no issues with her memory at baseline. Her son and son-in-law at the bedside note she did have periods of confusion in the nursing home but overall note no cognitive deficits at baseline. She notes no recent falls. She states she has been ambulating with the walker and she was using this prior to her hospitalizations as well. She notes no vision impairment but states she does have glasses for reading. She denies hearing impairment. She states she does not wear dentures. She notes she has a good appetite at baseline. She notes no issues with voiding or constipation. She states she sleeps well at night.     Care was coordinated with the patients son Jaron and son-in-law Jonathan at the bedside. They note some mild confusion at times while she was at rehab. When the patient noted she was having vivid dreams they note she did complain of that at the rehab as well. I did review delirium with them including possible underlying causes. At this time they are aware that we will monitor for now. All questions and concerns were addressed at the time of the visit.     The patient was seen and evaluated today at the bedside for geriatric consult. She is noted to be sitting out of bed in her recliner chair in no acute distress. She is alert and oriented to person, place, and year. She notes the month to be January and then corrects to June. She is currently denying pain. She denies chest pain and shortness of breath. She states she did have breakfast this morning. She notes she slept okay last night but she has been having vivid dreams.     Care was coordinated with Dr. Gonzalez with internal medicine.     Inpatient consult to Gerontology  Consult performed by: CARLOS Ferguson  Consult ordered by: Monique Gonzalez MD      Review of Systems   Constitutional:  Positive for activity change. Negative  for appetite change and fatigue.   HENT:  Positive for dental problem (missing teeth). Negative for hearing loss.    Eyes:  Positive for visual disturbance (glasses for reading).   Respiratory:  Negative for cough and shortness of breath.    Cardiovascular:  Negative for chest pain and leg swelling.   Gastrointestinal:  Negative for abdominal distention and abdominal pain.   Genitourinary:  Negative for difficulty urinating and dysuria.   Musculoskeletal:  Positive for gait problem. Negative for arthralgias.   Skin:  Negative for color change and pallor.   Neurological:  Positive for weakness. Negative for speech difficulty.   Psychiatric/Behavioral:  Positive for confusion (mild). Negative for agitation, hallucinations (patient notes vivid dreams but denies hallucinations) and sleep disturbance. The patient is not nervous/anxious.        Historical Information   Past Medical History:   Diagnosis Date    Anemia     Cellulitis     Disease of thyroid gland     GERD (gastroesophageal reflux disease)     Hyperlipidemia     Hypertension     Obesity     Osteomyelitis (HCC)     Pneumonia     Pulmonary embolism (HCC)      Past Surgical History:   Procedure Laterality Date    FOOT SURGERY      JOINT REPLACEMENT       Social History   Social History     Substance and Sexual Activity   Alcohol Use Never     Social History     Substance and Sexual Activity   Drug Use Never     Social History     Tobacco Use   Smoking Status Never    Passive exposure: Never   Smokeless Tobacco Never       Family History: History reviewed. No pertinent family history.        Allergies   Allergen Reactions    Neomycin-Polymyxin-Hc Other (See Comments)     Unknown reaction      Penicillins Hives     Patient states she got hive, but has had penicillin medication in the past and was ok.     Quinine Other (See Comments)     Unknown    Amoxicillin-Pot Clavulanate Itching       Objective   Vitals: Blood pressure 114/71, pulse 83, temperature 98.7 °F  "(37.1 °C), temperature source Temporal, resp. rate 15, height 5' 2\" (1.575 m), weight 78 kg (171 lb 15.3 oz), SpO2 95%.,Body mass index is 31.45 kg/m².      Physical Exam  Vitals and nursing note reviewed.   Constitutional:       General: She is not in acute distress.     Appearance: She is obese. She is not ill-appearing.   HENT:      Head: Normocephalic.      Mouth/Throat:      Mouth: Mucous membranes are dry.   Eyes:      General: No scleral icterus.     Conjunctiva/sclera: Conjunctivae normal.   Cardiovascular:      Rate and Rhythm: Normal rate and regular rhythm.   Pulmonary:      Effort: Pulmonary effort is normal. No respiratory distress.   Abdominal:      General: Bowel sounds are normal. There is no distension.      Palpations: Abdomen is soft.      Tenderness: There is no abdominal tenderness.   Musculoskeletal:         General: Swelling (generalized) present. No tenderness.   Skin:     General: Skin is warm and dry.   Neurological:      Mental Status: She is alert.      Motor: Weakness present.      Gait: Gait abnormal.      Comments: Oriented to person, place, and year  Disoriented to month         Lab Results:   Results from last 7 days   Lab Units 05/06/25  0302   WBC Thousand/uL 4.48   HEMOGLOBIN g/dL 9.0*   HEMATOCRIT % 25.8*   PLATELETS Thousands/uL 108*        Results from last 7 days   Lab Units 05/06/25  0320 05/05/25  0452   POTASSIUM mmol/L 4.0 3.7   CHLORIDE mmol/L 106 106   CO2 mmol/L 26 27   BUN mg/dL 6 6   CREATININE mg/dL 0.68 0.70   CALCIUM mg/dL 8.0* 7.9*   ALK PHOS U/L  --  45   ALT U/L  --  <3*   AST U/L  --  11*       Imaging Studies: Results Review Statement: I reviewed radiology reports from this admission including: CT chest PE study with abdomen and pelvis and MRI of the ankle/heel.  EKG, Pathology, and Other Studies: Results Review Statement: I reviewed AM labs and most recent EKG.   VTE Prophylaxis: VTE covered by:  enoxaparin, Subcutaneous, 80 mg at 05/06/25 1581  warfarin, " "Oral, 5 mg at 05/06/25 1704  warfarin, Oral       Code Status: Level 1 - Full Code      I have spent a total time of 90 minutes in caring for this patient on the day of the visit/encounter including Patient and family education, Risk factor reductions, Counseling / Coordination of care, Documenting in the medical record, Reviewing/placing orders in the medical record (including tests, medications, and/or procedures), Obtaining or reviewing history  , and Communicating with other healthcare professionals .      Please note:  Voice-recognition software may have been used in the preparation of this document.  Occasional wrong word or \"sound-alike\" substitutions may have occurred due to the inherent limitations of voice recognition software.  Interpretation should be guided by context.    "

## 2025-05-06 NOTE — RESTORATIVE TECHNICIAN NOTE
Restorative Technician Note      Patient Name: Alecia Kay     Restorative Tech Visit Date: 05/06/25  Note Type: Mobility  Patient Position Upon Consult: Supine  Activity Performed: Ambulated  Assistive Device: Roller walker  Patient Position at End of Consult: Bedside chair; All needs within reach; Bed/Chair alarm activated        ns

## 2025-05-06 NOTE — ASSESSMENT & PLAN NOTE
Initial reason for presentation to hospital 4/25/2025  Resolved after treatment for UTI  GI consulted: EGD with Wang's esophagus  Continue proton pump inhibitor, appetite improved

## 2025-05-06 NOTE — ASSESSMENT & PLAN NOTE
History of DVT   However during 4/25/2025 [facility] admission found to have bilateral extensive extensive acute vs subacute DVTs, in the setting of subtherapeutic INR  Continue anticoagulation

## 2025-05-06 NOTE — PROGRESS NOTES
Progress Note - Hospitalist   Name: Alecia Kay 80 y.o. female I MRN: [id number]  Unit/Bed#: E4 -01 I Date of Admission: 5/2/2025   Date of Service: 5/6/2025 I Hospital Day: 4    Assessment & Plan  Hematochezia  Pt is a 79yo female with PMH significant for hypertension, DVT/PE CKD 3, and right heel osteomyelitis initially presented to [facility] 4/25/2025 for generalized weakness    Initially diagnosed with GYA and MDR UTI: completed course of ertapenem  Subsequently found to have DVT and PE, while INR subtherapeutic, and was started on heparin infusion  Patient then had hematochezia and was a rapid response  Due to lack of GI services patient was transferred to [facility]   At [facility],after discussion with GI, pt was restarted on heparin infusion:  however discontinued after passed large amount of hematochezia  5/5 EGD: Wang's esophagus.  Sliding hiatal hernia.  No evidence of bleeding  5/5 colonoscopy: Petechial mucosa in the cecum/ascending colon attributed to barotrauma as it developed during the procedure.  Melanosis in the splenic flexure, descending colon, sigmoid colon, rectosigmoid, and rectum.  Several diverticuli with old blood   D/w GI: Suspect diverticular bleed, since resolved: Noted okay to restart heparin  Heparin restarted without any additional bleeding: Due to difficulty obtaining PTTs her bridging therapy will be changed to Lovenox  Restart Coumadin  Acute blood loss anemia (ABLA)  baseline hemoglobin appears to be variable over the last year ranging 7.5-10  Acute blood loss anemia due to hematochezia secondary to diverticular bleed  Was transfused 1 unit PRBC 5/3 & additional 2 units PRBC 5/4.  Hemoglobin currently stable at 9.5--> 9.0, continue to monitor on anticoagulation    Results from last 7 days   Lab Units 05/06/25  0302 05/05/25  1842 05/05/25  1240 05/05/25  0452 05/05/25  0119 05/04/25  1751   HEMOGLOBIN g/dL 9.0* 9.2* 9.5* 9.1* 10.2* 9.5*     Acute pulmonary embolism  (HCC)  Extensive right upper and lower lobe pulmonary embolism seen on 4/28/2025 CTA  2D echocardiogram without evidence of right heart strain  Does have history of DVT and PE: Maintained on warfarin 5 mg 5 days a week at 7.5 mg Monday and Friday, however had nausea/vomiting several days prior to admission and was not able to take warfarin (INR 1.18)  Due to subtherapeutic INR, was started on heparin infusion: was temporarily placed on hold for GI bleed  Okay to continue anticoagulation per GI as diverticular bleed has since stopped  Monitor closely    Results from last 7 days   Lab Units 05/05/25 2026 05/05/25  0452 05/04/25  0245 05/03/25  0606   INR  1.81* 1.88* 2.07* 1.88*     Acute respiratory insufficiency  Likely secondary to pulmonary embolism: Was placed on 2 L nasal cannula, since improved  Currently with adequate oxygenation on room air  Hypertension  Patient previously previously on amlodipine/beta-blocker  Torsemide recently discontinued during 3/2025 hospitalization  Blocker HELD for bradycardia/hypotension  Norvasc HELD for hypotension  Hypokalemia  May be secondary to multiple loose bowel movements.  Added banana flakes, and repleted potassium  Hypomagnesemia: Repleted    Results from last 7 days   Lab Units 05/06/25  0320 05/05/25  0452 05/04/25 0245 05/03/25 2042 05/03/25  0606   POTASSIUM mmol/L 4.0 3.7 4.1 3.5 2.9*   MAGNESIUM mg/dL 2.1 1.8* 1.6*  --  1.9     Acute DVT (deep venous thrombosis) (HCC)  History of DVT   However during 4/25/2025 [facility] admission found to have bilateral extensive extensive acute vs subacute DVTs, in the setting of subtherapeutic INR  Continue anticoagulation  Nausea and vomiting  Initial reason for presentation to hospital 4/25/2025  Resolved after treatment for UTI  GI consulted: EGD with Wang's esophagus  Continue proton pump inhibitor, appetite improved  Chronic kidney disease, stage III (moderate) (HCC)  Initially presented to [facility] 4/25/2025 for generalized  weakness found to have kidney injury and MDR UTI  Renal function has returned back to baseline  Torsemide discontinued during March 2025 hospitalization    Results from last 7 days   Lab Units 05/06/25  0320 05/05/25  0452 05/04/25  0245 05/03/25  2042 05/03/25  0606 05/02/25  1046 05/02/25  0535 05/01/25  0630 04/30/25  0533   BUN mg/dL 6 6 7 8 8 7 7 6 6   CREATININE mg/dL 0.68 0.70 0.72 0.78 0.78 0.84 0.78 0.82 0.85   EGFR ml/min/1.73sq m 82 81 78 71 71 65 71 67 64     Hypothyroidism  Continue levothyroxine  Wang's esophagus  Noted on EGD  Will need repeat EGD in 3 years for surveillance  Patient with dysphagia: Continue proton pump inhibitor bid  Insertional Achilles tendinopathy  MRI with advanced distal Achilles tendinopathy with partial thickness tear involving the deep insertional fibers  Also small focal longitudinal split tear of the peroneus brevis, posterior tibialis tendon stenosis and tenosynovitis, and plantar fasciaopathy  PT/OT  Chronic osteomyelitis (HCC)  Per previous records: Chronic osteomyelitis of the toe of right foot  Per podiatry: Weightbearing as tolerated in postop shoe  Completed 6 weeks of IV antibiotics prior to recent admission  MRI right ankle/heel 4/29/2025: No evidence of remaining osteomyelitis  Patient was reevaluated by podiatry 4/30/2025: Weightbearing as tolerated in surgical shoe.  Local wound care.    outpatient podiatry follow-up    VTE Pharmacologic Prophylaxis: VTE Score: 6 High Risk (Score >/= 5) - Pharmacological DVT Prophylaxis Ordered: enoxaparin (Lovenox). Sequential Compression Devices Ordered. Lovenox bridging therapy, changed from heparin drip, and coumadin started    Mobility:   Basic Mobility Inpatient Raw Score: 8  JH-HLM Goal: 3: Sit at edge of bed  JH-HLM Achieved: 2: Bed activities/Dependent transfer  JH-HLM Goal NOT achieved. Continue with multidisciplinary rounding and encourage appropriate mobility to improve upon JH-HLM goals.    Patient Centered  Rounds: I performed bedside rounds with nursing staff today.   Discussions with Specialists or Other Care Team Provider: CM    Education and Discussions with Family / Patient: updated pt at bedside and son Jaron, with son in law at bedside    Current Length of Stay: 4 day(s)  Current Patient Status: Inpatient   Certification Statement: The patient will continue to require additional inpatient hospital stay due to pe/dvt on anticoagulation, diverticular bleed  Discharge Plan: Anticipate discharge in 24-48 hrs to rehab facility.    Code Status: Level 1 - Full Code    Subjective   Pt denies any pain anywhere.  Denies any chest pain.  Denies any abd pain.  Denies any sob/cough.  Denies any n/v/d.  Denies any dizziness or lightheadedness.  Denies any bleeding    Objective :  Temp:  [96.4 °F (35.8 °C)-98.7 °F (37.1 °C)] 98.7 °F (37.1 °C)  HR:  [48-83] 83  BP: ()/(45-71) 114/71  Resp:  [15-20] 15  SpO2:  [92 %-97 %] 95 %  O2 Device: None (Room air)  Nasal Cannula O2 Flow Rate (L/min):  [0.5 L/min-2 L/min] 0.5 L/min    Body mass index is 31.45 kg/m².     Input and Output Summary (last 24 hours):     Intake/Output Summary (Last 24 hours) at 5/6/2025 1004  Last data filed at 5/6/2025 0900  Gross per 24 hour   Intake 1800.86 ml   Output 0 ml   Net 1800.86 ml       Physical Exam  General: Pleasant female.  No acute distress.  Nontachypneic and nondyspneic  Heart: Regular rate and rhythm.  S1-S2 present.  No murmur, rub, or gallop.  Lungs: Clear to auscultation bilaterally.  Good air movement.  No wheezes, crackles, rhonchi.  No accessory muscle use or respiratory distress  Abdomen: Soft, nontender with palpation.  Nondistended.  Normoactive bowel sounds present.  No guarding or rebound.  No peritoneal signs or mass  Extremities: No clubbing, cyanosis, edema.  2+ pedal pulses bilaterally  Neurologic: Awake and alert.  Interactive.  Fluent speech.  Moving all 4 extremities    Lines/Drains:        Telemetry:  Telemetry  Orders (From admission, onward)               24 Hour Telemetry Monitoring  Continuous x 24 Hours (Telem)        Expiring   Question:  Reason for 24 Hour Telemetry  Answer:  Arrhythmias requiring acute medical intervention / PPM or ICD malfunction                     Telemetry Reviewed: Sinus Bradycardia  Indication for Continued Telemetry Use: PE, sinus bradycardia-beta-blocker on hold               Lab Results: I have reviewed the following results:   Results from last 7 days   Lab Units 05/06/25  0302 05/04/25  0815 05/04/25  0245   WBC Thousand/uL 4.48  --  4.08*   HEMOGLOBIN g/dL 9.0*   < > 6.9*   HEMATOCRIT % 25.8*   < > 20.7*   PLATELETS Thousands/uL 108*  --  116*   SEGS PCT %  --   --  52   LYMPHO PCT %  --   --  35   MONO PCT %  --   --  9   EOS PCT %  --   --  2    < > = values in this interval not displayed.     Results from last 7 days   Lab Units 05/06/25  0320 05/05/25  0452   SODIUM mmol/L 139 141   POTASSIUM mmol/L 4.0 3.7   CHLORIDE mmol/L 106 106   CO2 mmol/L 26 27   BUN mg/dL 6 6   CREATININE mg/dL 0.68 0.70   ANION GAP mmol/L 7 8   CALCIUM mg/dL 8.0* 7.9*   ALBUMIN g/dL  --  2.3*   TOTAL BILIRUBIN mg/dL  --  0.54   ALK PHOS U/L  --  45   ALT U/L  --  <3*   AST U/L  --  11*   GLUCOSE RANDOM mg/dL 110 83     Results from last 7 days   Lab Units 05/05/25  2026   INR  1.81*     Results from last 7 days   Lab Units 05/02/25  1001   POC GLUCOSE mg/dl 88       Recent Cultures (last 7 days):       ==============================================  Imaging  4/30 celiac/mesenteric duplex  The abdominal aorta is patent and normal in caliber, measuring approximately 1.7 cm in its greatest diameter.  The celiac and splenic arteries are patent and normal in caliber. The proximal common hepatic artery was not visualized.  The main Renal Arteries are patent and normal in caliber bilaterally.   The superior and inferior mesenteric arteries are patent and normal in caliber.      4/30: Barium swallow    projection demonstrates degenerative changes of the spine without obvious free air.  Limited examination   No evidence of an obvious fixed esophageal obstruction. Some transient spasm appeared to be present which gave the patient's some symptomatology but contrast did eventually pass into the stomach.     4/29 Arterial lower extremity duplex  RIGHT LOWER LIMB:  Diffuse disease noted throughout the femoral-popliteal arteries without significant focal stenosis.  Ankle/Brachial index:  Unreliable due to poorly compressible tibial vessels.  PVR/ PPG tracings are normal.  Metatarsal pressure of 166 mmHg  Great toe pressure of 109 mmHg, within the healing range   LEFT LOWER LIMB:  Diffuse disease noted throughout the femoral-popliteal arteries without significant focal stenosis.  Ankle/Brachial index:   Unreliable due to poorly compressible tibial vessels.  PVR/ PPG tracings are normal.  Metatarsal pressure of 168 mmHg  Great toe pressure of 127 mmHg, within the healing range     4/29 MRI right ankle/heel  1.  No convincing evidence of osteomyelitis. There is mild reactive edema.  2.  Advanced distal Achilles tendinopathy, with a partial-thickness tear involving the deep insertional fibers.  3.  Likely small, focal longitudinal split tear of the peroneus brevis.  4.  Mild posterior tibialis tendinosis and tenosynovitis.  5.  Plantar fasciopathy and postsurgical defect.  6.  Degenerative changes, as described.     4/28 CT PE study with abdomen/pelvis  1. Extensive acute thrombus throughout the right upper middle and lower lobe segmental and distal order pulmonary arteries. RV/LV ratio 0.9.  2. No acute intra-abdominal or pelvic process.     4/28 lower extremity venous duplex  RIGHT LOWER LIMB:  There is evidence suggestive of acute vs. sub acute deep vein thrombosis noted in the popliteal, gastrocnemius, paired posterior tibial, and paired peroneal veins.  No evidence of superficial thrombophlebitis noted.  Popliteal,  posterior tibial and anterior tibial arterial Doppler waveforms are  triphasic.  LEFT LOWER LIMB:  There is evidence suggestive of acute vs. sub acute deep vein thrombosis noted in the popliteal, paired posterior tibial, and paired peroneal veins.  No evidence of superficial thrombophlebitis noted.  Popliteal, posterior tibial and anterior tibial arterial Doppler waveforms are triphasic.     4/27 right foot x-ray Stable appearance of the calcaneus. No new erosions      4/25 CT chest/abdomen/pelvis  Status post reported esophageal dilatation. No evidence for pneumomediastinum. Small hiatal hernia is noted.  No acute intra-abdominal or pelvic abnormality identified.  Colonic diverticulosis without evidence for acute diverticulitis.     Micro  4/28 positive FOB  4/28 stool enteric pathogen: neg  4/28: neg c diff  4/25 urine cx: >100,000 Kleb, >100,000ESBL E coli     Procedure  5/5: EGD  The upper third of the esophagus, middle third of the esophagus and lower third of the esophagus appeared normal.  C4M7 Wang's esophagus; electronic chromoendoscopy (NBI) was used  6 cm type I hiatal hernia  The fundus of the stomach, body of the stomach, incisura and antrum appeared normal.  Prior polypectomy site noted at the body along the greater curvature, as evidenced by the convergence of multiple gastric folds.  The duodenal bulb, 1st part of the duodenum and 2nd part of the duodenum appeared normal.  5/5 colonoscopy  The terminal ileum appeared normal.  Mild, localized petechial mucosa in the cecum and ascending colon.    - likely due to barotrauma. The mucosal petechiae developed during the procedure and does not explain her prior episodes of hematochezia  The transverse colon appeared normal.  Melanosis in the splenic flexure, descending colon, sigmoid colon, rectosigmoid and rectum  Multiple small, scattered diverticula of mild severity with no inflammation containing blood clots and causing mild luminal narrowing  (traversable) in the descending colon and sigmoid colon; there was stigmata of recent hemorrhage.    - there was some narrowing and a sharp turn at the rectosigmoid junction. There were several diverticuli that had old blood near the mouth. There was also small amounts of old blood mixed with the bowel prep  Internal small hemorrhoids observed during retroflexion  =============================================    Last 24 Hours Medication List:     Current Facility-Administered Medications:     acetaminophen (TYLENOL) tablet 650 mg, Q6H PRN    [Held by provider] amLODIPine (NORVASC) tablet 10 mg, Daily    bisacodyl (DULCOLAX) EC tablet 10 mg, See Admin Instructions    heparin (porcine) 25,000 units in 0.45% NaCl 250 mL infusion (premix), Titrated, Last Rate: 15 Units/kg/hr (05/06/25 0554)    heparin (porcine) injection 3,000 Units, Q6H PRN    heparin (porcine) injection 6,000 Units, Q6H PRN    hydrOXYzine HCL (ATARAX) tablet 25 mg, Q8H PRN    levothyroxine tablet 75 mcg, Early Morning    meclizine (ANTIVERT) tablet 25 mg, TID PRN    [Held by provider] metoprolol tartrate (LOPRESSOR) tablet 50 mg, Q12H MELANY    mirtazapine (REMERON) tablet 7.5 mg, HS    moisture barrier miconazole 2% cream (aka EDNA MOISTURE BARRIER ANTIFUNGAL CREAM), BID    ondansetron (ZOFRAN) injection 4 mg, Q4H PRN    pantoprazole (PROTONIX) injection 40 mg, Q12H MELANY    pravastatin (PRAVACHOL) tablet 80 mg, Daily With Dinner    [Held by provider] senna-docusate sodium (SENOKOT S) 8.6-50 mg per tablet 1 tablet, Daily    [Held by provider] warfarin (COUMADIN) tablet 5 mg, Once per day on Sunday Tuesday Wednesday Thursday Saturday    [Held by provider] warfarin (COUMADIN) tablet 7.5 mg, Once per day on Monday Friday    Administrative Statements   Today, Patient Was Seen By: Monique Gonzalez MD      **Please Note: This note may have been constructed using a voice recognition system.**

## 2025-05-06 NOTE — ASSESSMENT & PLAN NOTE
Noted on EGD  Will need repeat EGD in 3 years for surveillance  Patient with dysphagia: Continue proton pump inhibitor bid

## 2025-05-06 NOTE — ASSESSMENT & PLAN NOTE
Extensive right upper and lower lobe pulmonary embolism seen on 4/28/2025 CTA  2D echocardiogram without evidence of right heart strain  Does have history of DVT and PE: Maintained on warfarin 5 mg 5 days a week at 7.5 mg Monday and Friday, however had nausea/vomiting several days prior to admission and was not able to take warfarin (INR 1.18)  Due to subtherapeutic INR, was started on heparin infusion: was temporarily placed on hold for GI bleed  Okay to continue anticoagulation per GI as diverticular bleed has since stopped  Monitor closely    Results from last 7 days   Lab Units 05/05/25 2026 05/05/25  0452 05/04/25  0245 05/03/25  0606   INR  1.81* 1.88* 2.07* 1.88*

## 2025-05-06 NOTE — ASSESSMENT & PLAN NOTE
Likely secondary to pulmonary embolism: Was placed on 2 L nasal cannula, since improved  Currently with adequate oxygenation on room air

## 2025-05-06 NOTE — PLAN OF CARE
Problem: PAIN - ADULT  Goal: Verbalizes/displays adequate comfort level or baseline comfort level  Description: Interventions:- Encourage patient to monitor pain and request assistance- Assess pain using appropriate pain scale- Administer analgesics based on type and severity of pain and evaluate response- Implement non-pharmacological measures as appropriate and evaluate response- Consider cultural and social influences on pain and pain management- Notify physician/advanced practitioner if interventions unsuccessful or patient reports new pain  Outcome: Progressing     Problem: INFECTION - ADULT  Goal: Absence or prevention of progression during hospitalization  Description: INTERVENTIONS:- Assess and monitor for signs and symptoms of infection- Monitor lab/diagnostic results- Monitor all insertion sites, i.e. indwelling lines, tubes, and drains- Monitor endotracheal if appropriate and nasal secretions for changes in amount and color- [place] appropriate cooling/warming therapies per order- Administer medications as ordered- Instruct and encourage patient and family to use good hand hygiene technique- Identify and instruct in appropriate isolation precautions for identified infection/condition  Outcome: Progressing  Goal: Absence of fever/infection during neutropenic period  Description: INTERVENTIONS:- Monitor WBC  Outcome: Progressing     Problem: SAFETY ADULT  Goal: Patient will remain free of falls  Description: INTERVENTIONS:- Educate patient/family on patient safety including physical limitations- Instruct patient to call for assistance with activity - Consult OT/PT to assist with strengthening/mobility - Keep Call bell within reach- Keep bed low and locked with side rails adjusted as appropriate- Keep care items and personal belongings within reach- Initiate and maintain comfort rounds- Make Fall Risk Sign visible to staff- Offer Toileting every 2 Hours, in advance of need- Initiate/Maintain bed alarm-  Addended by: WEST PRESLEY on: 2/3/2017 12:00 PM     Modules accepted: Orders, Medications     Obtain necessary fall risk management equipment: - Apply yellow socks and bracelet for high fall risk patients- Consider moving patient to room near nurses station  Outcome: Progressing  Goal: Maintain or return to baseline ADL function  Description: INTERVENTIONS:-  Assess patient's ability to carry out ADLs; assess patient's baseline for ADL function and identify physical deficits which impact ability to perform ADLs (bathing, care of mouth/teeth, toileting, grooming, dressing, etc.)- Assess/evaluate cause of self-care deficits - Assess range of motion- Assess patient's mobility; develop plan if impaired- Assess patient's need for assistive devices and provide as appropriate- Encourage maximum independence but intervene and supervise when necessary- Involve family in performance of ADLs- Assess for home care needs following discharge - Consider OT consult to assist with ADL evaluation and planning for discharge- Provide patient education as appropriate  Outcome: Progressing  Goal: Maintains/Returns to pre admission functional level  Description: INTERVENTIONS:- Perform AM-PAC 6 Click Basic Mobility/ Daily Activity assessment daily.- Set and communicate daily mobility goal to care team and patient/family/caregiver. - Collaborate with rehabilitation services on mobility goals if consulted- Perform Range of Motion 3 times a day.- Reposition patient every 2 hours.- Dangle patient 3 times a day- Stand patient 3 times a day- Ambulate patient 3 times a day- Out of bed to chair 3 times a day - Out of bed for meals 3 times a day- Out of bed for toileting- Record patient progress and toleration of activity level   Outcome: Progressing     Problem: DISCHARGE PLANNING  Goal: Discharge to home or other facility with appropriate resources  Description: INTERVENTIONS:- Identify barriers to discharge w/patient and caregiver- Arrange for needed discharge resources and transportation as appropriate- Identify discharge learning  needs (meds, wound care, etc.)- Arrange for interpretive services to assist at discharge as needed- Refer to Case Management Department for coordinating discharge planning if the patient needs post-hospital services based on physician/advanced practitioner order or complex needs related to functional status, cognitive ability, or social support system  Outcome: Progressing     Problem: Knowledge Deficit  Goal: Patient/family/caregiver demonstrates understanding of disease process, treatment plan, medications, and discharge instructions  Description: Complete learning assessment and assess knowledge base.Interventions:- Provide teaching at level of understanding- Provide teaching via preferred learning methods  Outcome: Progressing     Problem: Nutrition/Hydration-ADULT  Goal: Nutrient/Hydration intake appropriate for improving, restoring or maintaining nutritional needs  Description: Monitor and assess patient's nutrition/hydration status for malnutrition. Collaborate with interdisciplinary team and initiate plan and interventions as ordered.  Monitor patient's weight and dietary intake as ordered or per policy. Utilize nutrition screening tool and intervene as necessary. Determine patient's food preferences and provide high-protein, high-caloric foods as appropriate. INTERVENTIONS:- Monitor oral intake, urinary output, labs, and treatment plans- Assess nutrition and hydration status and recommend course of action- Evaluate amount of meals eaten- Assist patient with eating if necessary - Allow adequate time for meals- Recommend/ encourage appropriate diets, oral nutritional supplements, and vitamin/mineral supplements- Order, calculate, and assess calorie counts as needed- Recommend, monitor, and adjust tube feedings and TPN/PPN based on assessed needs- Assess need for intravenous fluids- Provide specific nutrition/hydration education as appropriate- Include patient/family/caregiver in decisions related to  nutrition  Outcome: Progressing     Problem: Prexisting or High Potential for Compromised Skin Integrity  Goal: Skin integrity is maintained or improved  Description: INTERVENTIONS:- Identify patients at risk for skin breakdown- Assess and monitor skin integrity- Assess and monitor nutrition and hydration status- Monitor labs - Assess for incontinence - Turn and reposition patient- Assist with mobility/ambulation- Relieve pressure over bony prominences- Avoid friction and shearing- Provide appropriate hygiene as needed including keeping skin clean and dry- Evaluate need for skin moisturizer/barrier cream- Collaborate with interdisciplinary team - Patient/family teaching- Consider wound care consult   Outcome: Progressing

## 2025-05-06 NOTE — CASE MANAGEMENT
Case Management Discharge Planning Note    Patient name Alecia Kay  Location [facility] /E4 -* MRN [id number]  : 1945 Date 2025       Current Admission Date: 2025  Current Admission Diagnosis:Hematochezia   Patient Active Problem List    Diagnosis Date Noted Date Diagnosed    Wang's esophagus 2025     Insertional Achilles tendinopathy 2025     Chronic osteomyelitis (HCC) 2025     Acute blood loss anemia (ABLA) 2025     Hypokalemia 2025     Acute pulmonary embolism (HCC) 2025     Acute respiratory insufficiency 2025     Hematochezia 2025     Acute DVT (deep venous thrombosis) (HCC) 2025     History of DVT (deep vein thrombosis) 2025     Tachycardia 2025     Nausea and vomiting 2025     Acute cystitis 2025     Generalized weakness 2025     Bilateral lower extremity edema 2025     Severe protein-calorie malnutrition (HCC) 2025     History of osteomyelitis 2025     Acute kidney injury (HCC) 2025     Chronic kidney disease, stage III (moderate) (HCC) 2025     Hypothyroidism 2025     Hypertension 2025     Electrolyte imbalance 2025       LOS (days): 4  Geometric Mean LOS (GMLOS) (days): 4.5  Days to GMLOS:0.6     OBJECTIVE:  Risk of Unplanned Readmission Score: 26.05         Current admission status: Inpatient   Preferred Pharmacy:   [place], [place] - [address]  [address] [facility] [facility] [address]  Phone: [phone] Fax: [phone]    Primary Care Provider: Rambo Perera DO    Primary Insurance: [employer]  Secondary Insurance:     DISCHARGE DETAILS:             Additional Comments: LSW covering case today. Pt came from [facility] to [facility]. Pt is not same to return home from this admission and has used all her medicare days for IP Rehab. Pt will need to be option for hope that IP Rehab will  accept her. Pt is not from the [facility], but [facility]. [place] referrals made to  Rehab. Pt might be transfer back to [facility]. Tried to met with son, but he had left already today.

## 2025-05-06 NOTE — PLAN OF CARE
Problem: OCCUPATIONAL THERAPY ADULT  Goal: Performs self-care activities at highest level of function for planned discharge setting.  See evaluation for individualized goals.  Description: Treatment Interventions: Functional transfer training, UE strengthening/ROM, Endurance training, Cognitive reorientation, Patient/family training, Equipment evaluation/education, Neuromuscular reeducation, Compensatory technique education, Energy conservation, Activityengagement          See flowsheet documentation for full assessment, interventions and recommendations.   Outcome: Progressing  Note: Limitation: Decreased ADL status, Decreased UE strength, Decreased Safe judgement during ADL, Decreased cognition, Decreased endurance, Decreased self-care trans, Decreased high-level ADLs  Prognosis: Fair, Guarded  Assessment: Pt seen for skilled OT tx this date. Tx focused on improving strength, activity tolerance and balance, safety awareness to increase independence with self care tasks. Pt tolerated session well. Pt was limited by weakness. Greeted in supine and agreeable.  Pt performed UB dressing/ bathing with modA, LB dressing / bathing maxA , bed mobility modAx1, transfers modAx2, mobility with RW modAx2 RW. Pt demonstrated poor standing balance during functional tasks, no LOB noted.  Pt demonstrated ability to safely and appropriately attend to all tasks during session. Pt required max verbal cuing during session to safely complete tasks. Current OT DC recommendations for pt is level 2 resources.     Rehab Resource Intensity Level, OT: II (Moderate Resource Intensity)

## 2025-05-06 NOTE — SPEECH THERAPY NOTE
"Speech Language/Pathology  Speech/Language Pathology  Assessment    Patient Name: Alecia Kay  Today's Date: 5/6/2025     Problem List  Principal Problem:    Hematochezia  Active Problems:    History of osteomyelitis    Chronic kidney disease, stage III (moderate) (HCC)    Hypothyroidism    Hypertension    Nausea and vomiting    Acute DVT (deep venous thrombosis) (HCC)    Acute pulmonary embolism (HCC)    Acute respiratory insufficiency    Acute blood loss anemia (ABLA)    Hypokalemia    Wang's esophagus    Insertional Achilles tendinopathy    Chronic osteomyelitis (HCC)    Past Medical History  Past Medical History:   Diagnosis Date    Anemia     Cellulitis     Disease of thyroid gland     GERD (gastroesophageal reflux disease)     Hyperlipidemia     Hypertension     Obesity     Osteomyelitis (HCC)     Pneumonia     Pulmonary embolism (HCC)      Past Surgical History  Past Surgical History:   Procedure Laterality Date    FOOT SURGERY      JOINT REPLACEMENT              Bedside Swallow Evaluation:    Summary:  Reportedly w/ dysphagia since January per SLP note at [facility] and stated she was eating natural puree-type foods, yogurt, nutritional drinks, and jello at home. Pt presented w/ intermittent confusion. Able to tell me her nephews name and phone number (called) but was confused to place and time. Spoke out of context. Pt requested a pancake \"only one\" for breakfast, and water. Tray arrived \"don't even open that. I'm not drinking it\" re the ensure. Also refused the apple juice. Agreeable to trying the banana flakes in cold water. \"Well, it's ok I guess\". Encourged to try to drink it. Prompt transfer and swallow w/ liquids.  Pt w/ only bottom frontal dentition. Trialed pancake that was nearly soaked in syrup. Prolonged frontal manipulation.Transfer and swallow appeared wnl. \"It's stuck here\" while pointing to chest. Advised to take a drink. Stated it went down. Tried another bite. Same oral/pharyngeal.  " Transferred half of the bolus and again stated it was stuck in her chest. Removed the food item/tray. Offered her apple sauce, purees. Declined. Stated she didn't really want anything. Mod oral stage. No overt pharyngeal s/s. Suspect esophageal retention given routine barium swallow findings (also 6 cm hiatal hernia). ? Motility. Question swallow may be more efficient as mental status improves.    Recommendations:  Diet: puree for now. F/u for advancement potential.  Liquid:thin  Meds:crush as needed  Supervision: full  Positioning:Upright  Strategies: alert and upright. Slow rate. Alternate w/ liquids.   Pt to take PO/Meds only when fully alert and upright.   Oral care  Aspiration precautions  Reflux precautions  Therapy Prognosis:favorable as mental status clears  Frequency: 2-5x/wk as able and indicated    Consider consult w/:  Rehab  GI  Nutrition    Goal(s):  Dysphagia LTG  -Patient will demonstrate safe and effective oral intake (without overt s/s significant oral/pharyngeal dysphagia including s/s penetration or aspiration) for the highest appropriate diet level.     1.Pt will tolerate least restrictive diet w/out s/s aspiration or oral/pharyngeal difficulties.   2.Pt will will effectively manipulate/masticate and transfer purees/solids w/out s/s dysphagia/aspiration.   3.Pt will tolerate thin liquids w/out s/s aspiration.   -If indicated, patient will comply with a Video/Modified Barium Swallow study for more complete assessment of swallowing anatomy/physiology/aspiration risk and to assess efficacy of treatment techniques so as to best guide treatment plan   H&P/Admit info/ pertinent provider notes: (PMH noted above)  Chief Complaint:     Hematochezia  History of Present Illness  Alecia Kay is a 80 y.o. female with a PMH of DVT PE CKD hypertension hypothyroidism who has been transferred for [facility] for GI services.  The patient lives in [place] and states that her illness started January  when she was hospitalized at [facility] for osteomyelitis of right foot.  She was rehabbing and has had appetite loss.  She was admitted to [facility] 4/25/2025 found to have kidney injury and MDR UTI.  She has completed a course of ertapenem and her renal function has returned to baseline.  During her hospitalization she was found to have DVT and PEs and was started on a heparin infusion.  Unfortunately this resulted in hematochezia.  She was a rapid response and due to lack of GI services recommendation was for transfer here for GI backup/evaluation.  On exam she is tolerating a liquid diet.  She denies any odynophagia.  She reports no loss of appetite as the smell of food makes her nauseous.  She denies any chest pain.  She is a bit short of breath      Special Studies:  5/4 EGD:  IMPRESSION:  The esophagus appeared normal.  C4M7 Wang's esophagus; electronic chromoendoscopy (NBI) was used  6 cm type I hiatal hernia  The fundus of the stomach, body of the stomach, incisura and antrum appeared normal.  Prior polypectomy site noted at the body along the greater curvature, as evidenced by the convergence of multiple gastric folds.  The duodenal bulb, 1st part of the duodenum and 2nd part of the duodenum appeared normal.    RECOMMENDATION:  High dose PPI  Consider EGD in 3 years for Wang's surveillance.    - pathology results from EGD on 04/22/25 confirmed Wang's esophagus without dysplasia     esophagram: 4/30/25  INDICATION: post prandial nausea and vomiting, unremarkable EGD last week, 70lb weight loss, unable to tolerate solid food at this point. Patient with negative EGD last week.   FINDINGS:  The esophagus was somewhat tortuous. Initially there was some delay of emptying with some transient spasm of the distal esophagus however eventually contrast did extend into the stomach. A second swallow demonstrated somewhat less delay.   Gastroesophageal reflux was not observed.   Probable small hiatal  hernia.  IMPRESSION:   projection demonstrates degenerative changes of the spine without obvious free air.  Limited examination  No evidence of an obvious fixed esophageal obstruction. Some transient spasm appeared to be present which gave the patient's some symptomatology but contrast did eventually pass into the stomach.  A follow-up study when the patient is no longer ill is advised to better assess esophageal motility.    Procalcitonin<=0.25ng/ml    WBC  4.31-10.16 Thousand/uL   4.48  5/6       Previous MBS:  None in epic    Patient's goal:none stated    Did the pt report pain? no  If yes, was nursing notified/was it addressed?    Reason for consult:  R/o aspiration  Determine safest and least restrictive diet  h/o dysphagia     Precautions:  Contact    Food Allergies:  nkfa   Current Diet:  regular   Premorbid diet:  Vomiting prior, difficulty eating   O2 requirement:  RA   Social/Prior living Lives w/ nephew??   Voice/Speech:  wnl   Follows commands:  basic   Cognitive status:  Confused but makes needs known     Oral mech exam:  Dentition:frontal bottom., upper dentures not available (noted reported to SLP at [facility] that she does not need them to eat)  Lips (VII):+  Tongue (XII):+  Secretion management: mouth dry    Esophageal stage:  H/o hiatal hernia  H/o EGD  H/o Wang's esophagus    Results d/w:  Pt, nursing, physician

## 2025-05-06 NOTE — OCCUPATIONAL THERAPY NOTE
Occupational Therapy Progress Note     Patient Name: Alecia Kay  Today's Date: 5/6/2025  Problem List  Principal Problem:    Hematochezia  Active Problems:    History of osteomyelitis    Chronic kidney disease, stage III (moderate) (HCC)    Hypothyroidism    Hypertension    Nausea and vomiting    Acute DVT (deep venous thrombosis) (HCC)    Acute pulmonary embolism (HCC)    Acute respiratory insufficiency    Acute blood loss anemia (ABLA)    Hypokalemia    Wang's esophagus    Insertional Achilles tendinopathy    Chronic osteomyelitis (HCC)          05/06/25 0918   OT Last Visit   OT Visit Date 05/06/25   Note Type   Note Type Treatment   Pain Assessment   Pain Assessment Tool 0-10   Pain Score No Pain   Restrictions/Precautions   RLE Weight Bearing Per Order WBAT  (in surgical shoe per EMR)   Other Precautions Chair Alarm;Bed Alarm;Multiple lines;O2;Fall Risk;Pain   ADL   Where Assessed Edge of bed   Eating Assistance 5  Supervision/Setup   Eating Deficit Setup;Supervision/safety;Increased time to complete   Grooming Assistance 5  Supervision/Setup   Grooming Deficit Setup;Wash/dry face;Brushing hair   UB Bathing Assistance 4  Minimal Assistance   UB Bathing Deficit Setup;Verbal cueing;Supervision/safety;Increased time to complete   LB Bathing Assistance 2  Maximal Assistance   LB Bathing Deficit Setup;Verbal cueing;Supervision/safety;Increased time to complete   UB Dressing Assistance 3  Moderate Assistance   UB Dressing Deficit Setup;Verbal cueing;Supervision/safety;Increased time to complete   LB Dressing Assistance 2  Maximal Assistance   LB Dressing Deficit Setup;Verbal cueing;Supervision/safety;Increased time to complete   Toileting Assistance  1  Total Assistance   Functional Standing Tolerance   Time 1min   Activity static standing, stand pivot transfers, mobility x2 feet   Comments RW for support and Ax2   Bed Mobility   Supine to Sit 3  Moderate assistance   Additional items Assist x  1;Increased time required;Verbal cues;LE management   Additional Comments supervision to sit EOB x 5 min with supervision   Transfers   Sit to Stand 3  Moderate assistance   Additional items Assist x 2   Stand to Sit 4  Minimal assistance   Additional items Assist x 2   Stand pivot 4  Minimal assistance   Additional items Assist x 2;Increased time required;Verbal cues   Toilet transfer 3  Moderate assistance   Additional items Assist x 2;Increased time required;Verbal cues;Commode   Additional Comments cues for safety and best tech.   Functional Mobility   Functional Mobility 3  Moderate assistance   Additional Comments Ax2, RW. 2-3 feet. RW.   Additional items Rolling walker   Cognition   Overall Cognitive Status Impaired   Arousal/Participation Alert;Responsive;Cooperative   Attention Attends with cues to redirect   Orientation Level Oriented to person;Oriented to place  (general time- year 2025, month june)   Memory Decreased short term memory;Decreased recall of recent events   Following Commands Follows one step commands with increased time or repetition   Activity Tolerance   Activity Tolerance Patient tolerated treatment well;Patient limited by fatigue;Treatment limited secondary to medical complications (Comment)   Medical Staff Made Aware restorative, RN   Assessment   Assessment Pt seen for skilled OT tx this date. Tx focused on improving strength, activity tolerance and balance, safety awareness to increase independence with self care tasks. Pt tolerated session well. Pt was limited by weakness. Greeted in supine and agreeable.  Pt performed UB dressing/ bathing with modA, LB dressing / bathing maxA , bed mobility modAx1, transfers modAx2, mobility with RW modAx2 RW. Pt demonstrated poor standing balance during functional tasks, no LOB noted.  Pt demonstrated ability to safely and appropriately attend to all tasks during session. Pt required max verbal cuing during session to safely complete tasks.  Current OT DC recommendations for pt is level 2 resources.   Plan   Treatment Interventions Functional transfer training;UE strengthening/ROM;Endurance training;Cognitive reorientation;Patient/family training;Equipment evaluation/education;Neuromuscular reeducation;Compensatory technique education;Energy conservation;Activityengagement   Goal Expiration Date 05/17/25   OT Treatment Day 2   OT Frequency 3-5x/wk   Discharge Recommendation   Rehab Resource Intensity Level, OT II (Moderate Resource Intensity)   AM-PAC Daily Activity Inpatient   Lower Body Dressing 2   Bathing 2   Toileting 2   Upper Body Dressing 2   Grooming 3   Eating 3   Daily Activity Raw Score 14   Daily Activity Standardized Score (Calc for Raw Score >=11) 33.39   AM-PAC Applied Cognition Inpatient   Following a Speech/Presentation 3   Understanding Ordinary Conversation 3   Taking Medications 2   Remembering Where Things Are Placed or Put Away 2   Remembering List of 4-5 Errands 2   Taking Care of Complicated Tasks 2   Applied Cognition Raw Score 14   Applied Cognition Standardized Score 32.02   Doris Calvin, OT

## 2025-05-06 NOTE — ASSESSMENT & PLAN NOTE
baseline hemoglobin appears to be variable over the last year ranging 7.5-10  Acute blood loss anemia due to hematochezia secondary to diverticular bleed  Was transfused 1 unit PRBC 5/3 & additional 2 units PRBC 5/4.  Hemoglobin currently stable at 9.5--> 9.0, continue to monitor on anticoagulation    Results from last 7 days   Lab Units 05/06/25  0302 05/05/25  1842 05/05/25  1240 05/05/25  0452 05/05/25  0119 05/04/25  1751   HEMOGLOBIN g/dL 9.0* 9.2* 9.5* 9.1* 10.2* 9.5*

## 2025-05-06 NOTE — ASSESSMENT & PLAN NOTE
Initially presented to [facility] 4/25/2025 for generalized weakness found to have kidney injury and MDR UTI  Renal function has returned back to baseline  Torsemide discontinued during March 2025 hospitalization    Results from last 7 days   Lab Units 05/06/25  0320 05/05/25  0452 05/04/25  0245 05/03/25  2042 05/03/25  0606 05/02/25  1046 05/02/25  0535 05/01/25  0630 04/30/25  0533   BUN mg/dL 6 6 7 8 8 7 7 6 6   CREATININE mg/dL 0.68 0.70 0.72 0.78 0.78 0.84 0.78 0.82 0.85   EGFR ml/min/1.73sq m 82 81 78 71 71 65 71 67 64

## 2025-05-06 NOTE — UTILIZATION REVIEW
The patient was transferred to [facility] on 05-02-25 from [facility] , where care began on 04-26-25.  7 midnights have already been   passed with active ongoing care.      Continued Stay Review    Date: 05-02-25                          Current Patient Class: Inpatient  Current Level of Care: medical    HPI:80 y.o. female initially admitted on 04-26-25   Current Diagnosis:hematochezia    Assessment/Plan:  80 y.o. female with a PMH of DVT PE CKD hypertension hypothyroidism who has been transferred for [facility] for GI services.  The patient lives in [place] and states that her illness started January when she was hospitalized at [facility] for osteomyelitis of right foot.  She was rehabbing and has had appetite loss.  She was admitted to [facility] 4/25/2025 found to have kidney injury and MDR UTI.  She has completed a course of ertapenem and her renal function has returned to baseline.  During her hospitalization she was found to have DVT and PEs and was started on a heparin infusion.  Unfortunately this resulted in hematochezia.  She was a rapid response and due to lack of GI services recommendation was for transfer here for GI backup/evaluation. On exam murmur decreased breath sounds (+) b/l lower leg edema   Hematochezia  History of hypertension DVT PE CKD 3 hypothyroidism and right heel osteomyelitis initially presented to [facility] 4/25/2025 for generalized weakness  Initially found to have kidney injury and MDR UTI completed course of ertapenem  Subsequently found to have DVT and PE and started on heparin infusion as patient's INR was subtherapeutic  Patient then had hematochezia and was a rapid response  Due to lack of GI services patient has been transferred here and notified GI.  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  Discussed with GI.  As bleeding has stopped will restart heparin infusion  Chronic kidney disease, stage III (moderate) (HCC)  Initially presented to [facility] 4/25/2025 for generalized weakness found to have kidney injury and MDR UTI  Renal function has returned back to baseline  Torsemide discontinued during March 2025 hospitalization  Plan continuous cardio-pulmonary and pulse oximetry, I/O, knee high compression stocking and SCD consult Gerontology. PT/OT/Speech IV heparin drip, continus to hold Coumadin  Anticipated Length of Stay: Patient will be admitted on an inpatient basis with an anticipated length of stay of greater than 2 midnights secondary to hematochezia, pulmonary embolism and DVTs.       Medications:   Scheduled Medications:  [Held by provider] amLODIPine, 10 mg, Oral, Daily  bisacodyl, 10 mg, Oral, See Admin Instructions  levothyroxine, 75 mcg, Oral, Early Morning  [Held by provider] metoprolol tartrate, 50 mg, Oral, Q12H MELANY  mirtazapine, 7.5 mg, Oral, HS  EDNA ANTIFUNGAL, , Topical, BID  pantoprazole, 40 mg, Intravenous, Q12H MELANY  pravastatin, 80 mg, Oral, Daily With Dinner  [Held by provider] senna-docusate sodium, 1 tablet, Oral, Daily  [Held by provider] warfarin, 5 mg, Oral, Once per day on Sunday Tuesday Wednesday Thursday Saturday  [Held by provider] warfarin, 7.5 mg, Oral, Once per day on Monday Friday      Continuous IV Infusions:  heparin (porcine), 3-30 Units/kg/hr (Order-Specific), Intravenous, Titrated      PRN Meds:  acetaminophen, 650 mg, Oral, Q6H PRN  heparin (porcine), 3,000 Units, Intravenous, Q6H PRN  heparin (porcine), 6,000 Units, Intravenous, Q6H PRN  hydrOXYzine HCL, 25 mg, Oral, Q8H PRN  meclizine, 25 mg, Oral, TID  PRN  ondansetron, 4 mg, Intravenous, Q4H PRN      Discharge Plan: TBD    Vital Signs (last 3 days)       Date/Time Temp Pulse Resp BP MAP (mmHg) SpO2 Calculated FIO2 (%) - Nasal Cannula O2 Flow Rate (L/min) Nasal Cannula O2 Flow Rate (L/min) O2 Device Patient Position - Orthostatic VS [place] Coma Scale Score Pain    05/06/25 1522 98.7 °F (37.1 °C) 61 15 111/62 85 98 % -- -- -- None (Room air) Sitting -- --    05/06/25 1044 97.6 °F (36.4 °C) 60 15 94/68 78 100 % -- -- -- None (Room air) Sitting -- --    05/06/25 0918 -- -- -- -- -- -- -- -- -- -- -- -- No Pain    05/06/25 0800 -- -- -- -- -- -- -- -- -- -- -- 14 No Pain    05/06/25 0744 98.7 °F (37.1 °C) 83 15 114/71 92 95 % -- -- -- None (Room air) Lying -- --    05/06/25 0430 -- -- -- -- -- -- -- -- -- -- -- 14 --    05/06/25 0240 96.5 °F (35.8 °C) 48 15 98/57 75 94 % -- -- -- -- Lying -- --    05/06/25 0030 -- -- -- -- -- -- -- -- -- -- -- 14 --    05/06/25 0009 96.5 °F (35.8 °C) 55 18 110/59 78 94 % -- -- -- -- Lying -- --    05/05/25 2030 97.2 °F (36.2 °C) 66 16 97/55 -- 93 % -- -- -- None (Room air) -- 14 No Pain    05/05/25 2007 97.1 °F (36.2 °C) 65 20 96/65 -- 92 % 22 -- 0.5 L/min Nasal cannula Lying -- --    05/05/25 1638 97.2 °F (36.2 °C) 73 20 90/62 -- 92 % -- -- -- None (Room air) Lying -- --    05/05/25 1620 -- 72 16 98/47 -- 93 % -- -- -- None (Room air) -- -- No Pain    05/05/25 1550 -- 70 15 95/45 -- 97 % -- -- -- None (Room air) -- -- No Pain    05/05/25 1520 -- -- -- -- -- -- -- -- -- -- -- 14 No Pain    05/05/25 1425 -- -- -- -- -- 96 % -- -- -- None (Room air) -- -- No Pain    05/05/25 1420 98 °F (36.7 °C) 56 18 -- -- -- -- -- -- -- -- -- --    05/05/25 1135 96.4 °F (35.8 °C) 54 18 172/66 95 95 % 28 -- 2 L/min Nasal cannula Sitting 14 No Pain    05/05/25 1123 -- -- -- -- -- -- -- -- -- -- -- -- No Pain    05/05/25 1122 -- -- -- -- -- -- -- -- -- -- -- -- No Pain    05/05/25 0754 96.6 °F (35.9 °C) 54 18 141/67 96 95 % 28 2 L/min 2 L/min Nasal  cannula Lying -- --    05/05/25 0728 -- -- -- -- -- -- -- -- -- -- -- 14 No Pain    05/05/25 0300 -- -- -- -- -- -- -- -- -- -- -- 14 No Pain    05/05/25 0237 96.7 °F (35.9 °C) 51 18 118/60 84 96 % 28 -- 2 L/min Nasal cannula Lying -- --    05/04/25 2300 -- -- -- -- -- -- -- -- -- -- -- 14 No Pain    05/04/25 2240 96.2 °F (35.7 °C) 65 18 111/71 88 94 % 28 -- 2 L/min Nasal cannula Lying -- --    05/04/25 2036 -- 57 -- 113/57 75 97 % -- -- -- Nasal cannula Lying -- --    05/04/25 1948 96.5 °F (35.8 °C) 68 20 84/60 65 91 % 28 -- 2 L/min Nasal cannula Lying -- --    05/04/25 1930 -- -- -- -- -- -- -- -- -- -- -- 15 No Pain    05/04/25 1544 -- -- -- -- -- -- -- -- -- -- -- 15 No Pain    05/04/25 1500 96.9 °F (36.1 °C) 61 18 92/62 -- 97 % 28 -- 2 L/min Nasal cannula Lying -- --    05/04/25 1348 96 °F (35.6 °C) 70 18 89/64 69 97 % 28 -- 2 L/min Nasal cannula Lying -- --    05/04/25 1300 -- -- -- -- -- -- -- -- -- -- -- -- No Pain    05/04/25 1001 -- -- -- 117/62 85 97 % -- 2 L/min -- Nasal cannula Lying -- --    05/04/25 1000 -- 75 -- 117/62 -- -- -- -- -- -- -- -- --    05/04/25 0930 -- -- -- -- -- -- -- -- -- -- -- 15 --    05/04/25 0816 -- -- -- 106/67 -- -- -- -- -- -- Lying -- --    05/04/25 0810 -- -- -- 86/60 -- -- -- -- -- -- Lying -- --    05/04/25 0800 -- -- -- 77/69 -- -- -- -- -- -- Lying -- --    05/04/25 0700 96 °F (35.6 °C) 114 -- 95/60 -- 96 % -- -- -- Nasal cannula -- -- --    05/04/25 0615 -- 107 -- 97/77 -- -- -- -- -- -- -- -- --    05/04/25 0600 96 °F (35.6 °C) 108 -- 94/62 -- -- -- -- -- -- -- -- --    05/04/25 0500 95.8 °F (35.4 °C) 66 16 96/62 -- -- -- -- -- -- -- -- --    05/04/25 0440 96.1 °F (35.6 °C) 65 16 90/60 -- 97 % 28 -- 2 L/min Nasal cannula -- -- --    05/04/25 0430 -- -- -- -- -- -- -- -- -- -- -- 15 No Pain    05/04/25 0405 96 °F (35.6 °C) 57 16 86/56 -- 97 % -- -- -- Nasal cannula -- -- --    05/04/25 0330 96 °F (35.6 °C) 59 16 98/62 -- -- -- -- -- -- -- -- --    05/04/25 0319 96.4 °F  (35.8 °C) 49 16 94/60 -- -- -- -- -- -- -- -- --    05/04/25 0300 -- -- -- 84/62 -- -- -- -- -- -- -- -- --    05/04/25 0252 96.2 °F (35.7 °C) 50 16 86/60 -- 97 % 28 -- 2 L/min Nasal cannula -- -- --    05/04/25 0230 96 °F (35.6 °C) 44 16 82/54 -- 98 % 28 -- 2 L/min Nasal cannula -- -- --    05/04/25 0225 96.3 °F (35.7 °C) 54 14 80/48 -- 98 % 28 -- 2 L/min Nasal cannula -- -- --    05/04/25 0215 -- -- -- 75/45 -- -- -- -- -- -- -- -- --    05/04/25 0202 96 °F (35.6 °C) 49 12 73/42 -- -- -- -- -- -- -- -- --    05/04/25 0158 96 °F (35.6 °C) 46 16 73/42 -- 95 % 28 -- 2 L/min Nasal cannula -- -- --    05/03/25 2311 96.5 °F (35.8 °C) 64 16 93/58 67 98 % 28 -- 2 L/min Nasal cannula Lying -- --    05/03/25 2300 -- -- -- -- -- -- -- -- -- -- -- 15 --    05/03/25 2028 -- -- -- -- -- -- -- -- -- -- -- 15 No Pain    05/03/25 1940 -- -- -- -- -- 97 % 28 -- 2 L/min Nasal cannula -- -- --    05/03/25 1935 -- -- -- 82/54 -- -- -- -- -- -- Lying -- --    05/03/25 1930 96.3 °F (35.7 °C) 60 14 80/53 58 96 % -- -- -- Nasal cannula Lying -- --    05/03/25 1630 -- -- -- -- -- -- -- -- -- -- -- 15 --    05/03/25 1507 97 °F (36.1 °C) 55 20 110/61 -- 97 % -- -- -- Nasal cannula Lying -- --    05/03/25 1230 -- -- -- -- -- -- -- -- -- -- -- 15 --    05/03/25 1120 -- 65 20 127/65 -- 91 % 24 -- 1 L/min Nasal cannula Lying -- --    05/03/25 1104 -- -- -- -- -- -- -- -- -- -- -- -- No Pain    05/03/25 0836 97.2 °F (36.2 °C) 60 20 114/64 -- 93 % -- -- -- Nasal cannula Lying 15 No Pain    05/03/25 0400 -- -- -- -- -- -- -- -- -- -- -- 15 --    05/03/25 0248 97.4 °F (36.3 °C) 63 20 146/72 101 92 % 24 -- 1 L/min Nasal cannula Lying -- --          Weight (last 2 days)       None            Pertinent Labs/Diagnostic Results:   Radiology:  No orders to display     Cardiology:  No orders to display     GI:  Colonoscopy   Final Result by Addy Flores MD (05/05 2208)   The terminal ileum appeared normal.   Mild petechial mucosa in the cecum and  ascending colon   The transverse colon appeared normal.   Melanosis in the splenic flexure, descending colon, sigmoid colon,    rectosigmoid and rectum   Scattered diverticulosis of mild severity containing blood clots and    causing mild luminal narrowing in the descending colon and sigmoid colon;    there was stigmata of recent hemorrhage   Small hemorrhoids            RECOMMENDATION:   No further screening colonoscopies necessary   Age greater than 65       Hematochezia likely due to diverticular bleeding.   Resume diet   Can resume heparin gtt as there is no active bleeding                     Addy Flores MD          EGD   Final Result by Addy Flores MD (05/05 2213)   The esophagus appeared normal.   C4M7 Wang's esophagus; electronic chromoendoscopy (NBI) was used   6 cm type I hiatal hernia   The fundus of the stomach, body of the stomach, incisura and antrum    appeared normal.   Prior polypectomy site noted at the body along the greater curvature, as    evidenced by the convergence of multiple gastric folds.   The duodenal bulb, 1st part of the duodenum and 2nd part of the duodenum    appeared normal.         RECOMMENDATION:   High dose PPI   Consider EGD in 3 years for Wang's surveillance.     - pathology results from EGD on 04/22/25 confirmed Wang's esophagus    without dysplasia                     Addy Flores MD               Results from last 7 days   Lab Units 05/06/25  0302 05/05/25  1842 05/05/25  1240 05/05/25  0452 05/05/25  0119 05/04/25  0815 05/04/25  0245 05/03/25  2042   WBC Thousand/uL 4.48  --   --   --   --   --  4.08* 4.72   HEMOGLOBIN g/dL 9.0* 9.2* 9.5* 9.1* 10.2*   < > 6.9* 7.3*   HEMATOCRIT % 25.8* 26.6* 28.3* 25.8* 28.7*   < > 20.7* 21.6*   PLATELETS Thousands/uL 108*  --   --   --   --   --  116* 144*   TOTAL NEUT ABS Thousands/µL  --   --   --   --   --   --  2.10 2.69    < > = values in this interval not displayed.         Results from last 7 days   Lab  "Units 05/06/25  0320 05/05/25 0452 05/04/25 0245 05/03/25 2042 05/03/25  0606 05/02/25  1046 05/02/25  0535 05/01/25  0630 04/30/25  1532 04/30/25  0533   SODIUM mmol/L 139 141 140 138 142 140   < > 140  --  141   POTASSIUM mmol/L 4.0 3.7 4.1 3.5 2.9* 3.0*   < > 2.6* 3.5 3.5   CHLORIDE mmol/L 106 106 107 106 106 107   < > 105  --  108   CO2 mmol/L 26 27 25 27 28 27   < > 27  --  24   ANION GAP mmol/L 7 8 8 5 8 6   < > 8  --  9   BUN mg/dL 6 6 7 8 8 7   < > 6  --  6   CREATININE mg/dL 0.68 0.70 0.72 0.78 0.78 0.84   < > 0.82  --  0.85   EGFR ml/min/1.73sq m 82 81 78 71 71 65   < > 67  --  64   CALCIUM mg/dL 8.0* 7.9* 7.3* 7.8* 8.1* 7.2*   < > 6.4*  --  6.4*   CALCIUM, IONIZED mmol/L  --   --  1.05*  --   --   --   --   --   --   --    MAGNESIUM mg/dL 2.1 1.8* 1.6*  --  1.9 1.6*  --  1.9  --  1.3*   PHOSPHORUS mg/dL  --  2.5  --   --  2.5  --   --  2.6 3.9 2.0*    < > = values in this interval not displayed.     Results from last 7 days   Lab Units 05/05/25 0452 05/04/25 0245 05/03/25 2042 05/03/25 0606   AST U/L 11* 9* 11* 9*   ALT U/L <3* <3* <3* <3*   ALK PHOS U/L 45 38 49 49   TOTAL PROTEIN g/dL 4.3* 3.8* 4.5* 4.9*   ALBUMIN g/dL 2.3* 2.0* 2.3* 2.4*   TOTAL BILIRUBIN mg/dL 0.54 0.41 0.46 0.59     Results from last 7 days   Lab Units 05/02/25  1001   POC GLUCOSE mg/dl 88     Results from last 7 days   Lab Units 05/06/25  0320 05/05/25  0452 05/04/25  0245 05/03/25  2042 05/03/25  0606 05/02/25  1046 05/02/25  0535 05/01/25  0630 04/30/25  0533   GLUCOSE RANDOM mg/dL 110 83 102 129 91 87 83 88 73             No results found for: \"BETA-HYDROXYBUTYRATE\"       Results from last 7 days   Lab Units 05/04/25  0253   PH THONY  7.410*   PCO2 THONY mm Hg 41.0*   PO2 THONY mm Hg 55.7*   HCO3 THONY mmol/L 25.4   BASE EXC THONY mmol/L 0.7   O2 CONTENT THONY ml/dL 8.5   O2 HGB, VENOUS % 85.2*                     Results from last 7 days   Lab Units 05/06/25  1259 05/06/25  0251 05/05/25 2026 05/05/25 2022 05/05/25  0452 " 05/04/25  0245   PROTIME seconds  --   --  20.9*  --  21.5* 23.2*   INR   --   --  1.81*  --  1.88* 2.07*   PTT seconds >210* >210*  --  38*  --  126*                             Results from last 7 days   Lab Units 04/30/25  0533   FERRITIN ng/mL 728*   IRON SATURATION % 99*   IRON ug/dL 116   TIBC ug/dL 117.6*     Results from last 7 days   Lab Units 04/30/25  0533   TRANSFERRIN mg/dL 84*     Results from last 7 days   Lab Units 05/04/25  0530 05/03/25  0637   UNIT PRODUCT CODE  [id number]  [id number]   UNIT NUMBER  [id number]  [id number]   UNITABO  A  A A   UNITRH  POS  POS NEG   CROSSMATCH  Compatible  Compatible Compatible   UNIT DISPENSE STATUS  Presumed Trans  Presumed Trans Presumed Trans   UNIT PRODUCT VOL ml 350  350 350                                                                           Network Utilization Review Department  ATTENTION: Please call with any questions or concerns to [phone] and carefully listen to the prompts so that you are directed to the right person. All voicemails are confidential.   For Discharge needs, contact Care Management DC Support Team at [phone] opt. [phone]  Send all requests for admission clinical reviews, approved or denied determinations and any other requests to dedicated fax number below belonging to the campus where the patient is receiving treatment. List of dedicated fax numbers for the Facilities:  FACILITY NAME UR FAX NUMBER   ADMISSION DENIALS (Administrative/Medical Necessity) [phone]   DISCHARGE SUPPORT TEAM (NETWORK) [phone]   PARENT CHILD HEALTH (Maternity/NICU/Pediatrics) [phone]   [facility] [phone]   [facility] [phone]   [facility] [phone]   [facility] [phone]   [facility] [phone]   [facility] [phone]   [facility]  [facility] [phone]   [facility] [phone]   [facility] [phone]   [facility] [phone]   [facility] [phone]   [facility] [phone]

## 2025-05-07 ENCOUNTER — APPOINTMENT (INPATIENT)
Dept: CT IMAGING | Facility: HOSPITAL | Age: 80
DRG: 377 | End: 2025-05-07
Payer: COMMERCIAL

## 2025-05-07 LAB
ANION GAP SERPL CALCULATED.3IONS-SCNC: 8 MMOL/L (ref 4–13)
BASOPHILS # BLD AUTO: 0.01 THOUSANDS/ÂΜL (ref 0–0.1)
BASOPHILS NFR BLD AUTO: 0 % (ref 0–1)
BUN SERPL-MCNC: 6 MG/DL (ref 5–25)
CALCIUM SERPL-MCNC: 8 MG/DL (ref 8.4–10.2)
CHLORIDE SERPL-SCNC: 104 MMOL/L (ref 96–108)
CO2 SERPL-SCNC: 28 MMOL/L (ref 21–32)
CREAT SERPL-MCNC: 0.84 MG/DL (ref 0.6–1.3)
EOSINOPHIL # BLD AUTO: 0.11 THOUSAND/ÂΜL (ref 0–0.61)
EOSINOPHIL NFR BLD AUTO: 2 % (ref 0–6)
ERYTHROCYTE [DISTWIDTH] IN BLOOD BY AUTOMATED COUNT: 17.7 % (ref 11.6–15.1)
GFR SERPL CREATININE-BSD FRML MDRD: 65 ML/MIN/1.73SQ M
GLUCOSE SERPL-MCNC: 80 MG/DL (ref 65–140)
HCT VFR BLD AUTO: 20.8 % (ref 34.8–46.1)
HCT VFR BLD AUTO: 24.7 % (ref 34.8–46.1)
HCT VFR BLD AUTO: 24.8 % (ref 34.8–46.1)
HCT VFR BLD AUTO: 24.8 % (ref 34.8–46.1)
HGB BLD-MCNC: 7 G/DL (ref 11.5–15.4)
HGB BLD-MCNC: 8.4 G/DL (ref 11.5–15.4)
HGB BLD-MCNC: 8.4 G/DL (ref 11.5–15.4)
HGB BLD-MCNC: 8.6 G/DL (ref 11.5–15.4)
IMM GRANULOCYTES # BLD AUTO: 0.05 THOUSAND/UL (ref 0–0.2)
IMM GRANULOCYTES NFR BLD AUTO: 1 % (ref 0–2)
INR PPP: 2.11 (ref 0.85–1.19)
LYMPHOCYTES # BLD AUTO: 1.22 THOUSANDS/ÂΜL (ref 0.6–4.47)
LYMPHOCYTES NFR BLD AUTO: 25 % (ref 14–44)
MCH RBC QN AUTO: 27.3 PG (ref 26.8–34.3)
MCHC RBC AUTO-ENTMCNC: 33.9 G/DL (ref 31.4–37.4)
MCV RBC AUTO: 81 FL (ref 82–98)
MONOCYTES # BLD AUTO: 0.34 THOUSAND/ÂΜL (ref 0.17–1.22)
MONOCYTES NFR BLD AUTO: 7 % (ref 4–12)
NEUTROPHILS # BLD AUTO: 3.09 THOUSANDS/ÂΜL (ref 1.85–7.62)
NEUTS SEG NFR BLD AUTO: 65 % (ref 43–75)
NRBC BLD AUTO-RTO: 1 /100 WBCS
PLATELET # BLD AUTO: 119 THOUSANDS/UL (ref 149–390)
PMV BLD AUTO: 9.8 FL (ref 8.9–12.7)
POTASSIUM SERPL-SCNC: 3.6 MMOL/L (ref 3.5–5.3)
PROTHROMBIN TIME: 23.5 SECONDS (ref 12.3–15)
RBC # BLD AUTO: 3.08 MILLION/UL (ref 3.81–5.12)
SODIUM SERPL-SCNC: 140 MMOL/L (ref 135–147)
WBC # BLD AUTO: 4.82 THOUSAND/UL (ref 4.31–10.16)

## 2025-05-07 PROCEDURE — 80048 BASIC METABOLIC PNL TOTAL CA: CPT | Performed by: INTERNAL MEDICINE

## 2025-05-07 PROCEDURE — 85014 HEMATOCRIT: CPT | Performed by: INTERNAL MEDICINE

## 2025-05-07 PROCEDURE — 05HY33Z INSERTION OF INFUSION DEVICE INTO UPPER VEIN, PERCUTANEOUS APPROACH: ICD-10-PCS | Performed by: INTERNAL MEDICINE

## 2025-05-07 PROCEDURE — 85018 HEMOGLOBIN: CPT | Performed by: INTERNAL MEDICINE

## 2025-05-07 PROCEDURE — 99233 SBSQ HOSP IP/OBS HIGH 50: CPT | Performed by: INTERNAL MEDICINE

## 2025-05-07 PROCEDURE — 85025 COMPLETE CBC W/AUTO DIFF WBC: CPT | Performed by: INTERNAL MEDICINE

## 2025-05-07 PROCEDURE — 74178 CT ABD&PLV WO CNTR FLWD CNTR: CPT

## 2025-05-07 PROCEDURE — P9040 RBC LEUKOREDUCED IRRADIATED: HCPCS

## 2025-05-07 PROCEDURE — 85610 PROTHROMBIN TIME: CPT | Performed by: INTERNAL MEDICINE

## 2025-05-07 PROCEDURE — 99232 SBSQ HOSP IP/OBS MODERATE 35: CPT

## 2025-05-07 PROCEDURE — 30233K1 TRANSFUSION OF NONAUTOLOGOUS FROZEN PLASMA INTO PERIPHERAL VEIN, PERCUTANEOUS APPROACH: ICD-10-PCS | Performed by: INTERNAL MEDICINE

## 2025-05-07 PROCEDURE — 99233 SBSQ HOSP IP/OBS HIGH 50: CPT | Performed by: STUDENT IN AN ORGANIZED HEALTH CARE EDUCATION/TRAINING PROGRAM

## 2025-05-07 PROCEDURE — 99223 1ST HOSP IP/OBS HIGH 75: CPT | Performed by: INTERNAL MEDICINE

## 2025-05-07 PROCEDURE — P9017 PLASMA 1 DONOR FRZ W/IN 8 HR: HCPCS

## 2025-05-07 RX ORDER — POLYETHYLENE GLYCOL 3350 17 G/17G
238 POWDER, FOR SOLUTION ORAL SEE ADMIN INSTRUCTIONS
Status: COMPLETED | OUTPATIENT
Start: 2025-05-07 | End: 2025-05-07

## 2025-05-07 RX ADMIN — PRAVASTATIN SODIUM 80 MG: 80 TABLET ORAL at 16:23

## 2025-05-07 RX ADMIN — PANTOPRAZOLE SODIUM 40 MG: 40 TABLET, DELAYED RELEASE ORAL at 06:25

## 2025-05-07 RX ADMIN — PANTOPRAZOLE SODIUM 40 MG: 40 TABLET, DELAYED RELEASE ORAL at 16:23

## 2025-05-07 RX ADMIN — MICONAZOLE NITRATE: 20 CREAM TOPICAL at 09:17

## 2025-05-07 RX ADMIN — IOHEXOL 100 ML: 350 INJECTION, SOLUTION INTRAVENOUS at 11:26

## 2025-05-07 RX ADMIN — POLYETHYLENE GLYCOL 3350 238 G: 17 POWDER, FOR SOLUTION ORAL at 09:09

## 2025-05-07 RX ADMIN — LEVOTHYROXINE SODIUM 75 MCG: 75 TABLET ORAL at 06:25

## 2025-05-07 RX ADMIN — MICONAZOLE NITRATE: 20 CREAM TOPICAL at 16:23

## 2025-05-07 RX ADMIN — MIRTAZAPINE 7.5 MG: 7.5 TABLET, FILM COATED ORAL at 22:23

## 2025-05-07 NOTE — OCCUPATIONAL THERAPY NOTE
Occupational Therapy         Patient Name: Alecia Kay  Today's Date: 5/7/2025 05/07/25 1602   OT Last Visit   OT Visit Date 05/07/25   Note Type   Note type Cancelled Session   Cancel Reasons Medical status   Additional Comments not medically appropriate for skilled OT this date. will continue to follow and treat as appropriate.     Doris Calvin, OT

## 2025-05-07 NOTE — ASSESSMENT & PLAN NOTE
Pt is a 81yo female with PMH significant for hypertension, DVT/PE CKD 3, and right heel osteomyelitis initially presented to [facility] 4/25/2025 for generalized weakness    Initially diagnosed with GAY and MDR UTI: completed course of ertapenem  Subsequently found to have DVT and PE, while INR subtherapeutic, and was started on heparin infusion  Patient then had hematochezia and was a rapid response  Due to lack of GI services patient was transferred to [facility]   At [facility],after discussion with GI, pt was restarted on heparin infusion:  however discontinued after passed large amount of hematochezia  5/5 EGD: Awng's esophagus.  Sliding hiatal hernia.  No evidence of bleeding  5/5 colonoscopy: Petechial mucosa in the cecum/ascending colon attributed to barotrauma as it developed during the procedure.  Melanosis in the splenic flexure, descending colon, sigmoid colon, rectosigmoid, and rectum.  Several diverticuli with old blood   D/w GI: Suspect diverticular bleed, since resolved: Noted okay to restart heparin  Heparin restarted without any additional bleeding: Due to difficulty obtaining PTTs her bridging therapy was changed to Lovenox  Patient was restarted on Coumadin and received 1 dose last night  Since approximately 4 AM this morning patient has passed multiple episodes of bright red blood per rectum  Lovenox/Coumadin on hold  Systolic blood pressure 106/  Set up 2 units packed red blood cells and 2 units of FFP stat  Discussed with GI: Planning endoscopic evaluation  Care upgraded to stepdown level 2: Monitor closely for need to upgrade to stepdown level 1

## 2025-05-07 NOTE — ASSESSMENT & PLAN NOTE
May be secondary to multiple loose bowel movements.  Added banana flakes, and repleted potassium  Hypomagnesemia: Repleted    Results from last 7 days   Lab Units 05/07/25  0432 05/06/25  0320 05/05/25  0452 05/04/25  0245 05/03/25  2042 05/03/25  0606   POTASSIUM mmol/L 3.6 4.0 3.7 4.1   < > 2.9*   MAGNESIUM mg/dL  --  2.1 1.8* 1.6*  --  1.9    < > = values in this interval not displayed.

## 2025-05-07 NOTE — ASSESSMENT & PLAN NOTE
80 female with a past medical history of VTE on coumadin prior to admission, GERD with Wang's esophagus, hypertension, HLD, chronic osteomyelitis, and hypothyroidism.  Patient initially presented with generalized weakness and failure to thrive with poor oral intake and a 70 pound weight loss.  Initially treated for GAY/UTI but also started on heparin drip due to DVT/PE.  Following initiation of heparin drip, patient with bright red blood per rectum.  Hemoglobin had declined from 9.7 on admission to 6.9 with associated symptoms and hemodynamic changes.      Patient ultimately transferred to [facility] for closer monitoring and GI/IR support over the weekend.  Yesterday and overnight, patient with episodes of hypotension along with symptomatic anemia.  Patient with further decline in hemoglobin down to 6.9 requiring additional PRBC transfusion.  Patient also with bloody bowel movements.  This was occurring in the setting of a supratherapeutic PTT which was unable to be calculated.  Heparin drip placed on hold and PTT improving.  Patient also has now appropriately responded to 2 units of PRBC.  Hemodynamics remained stable. She underwent EGD + colonoscopy on 05/05/25, which showed C4M7 Wang's, old blood near the sigmoid diverticuli and some proximal colonic barotrauma. She was bridged with lovenox and had more hematochezia. She remains stable. Hb slightly dropped    Plan:  Obtain CTA to determine active bleed and help with localization  Discussions should also be had regarding risks vs benefits of AC for her acute PE. She is comfortable and saturating well on RA at this point  Trend H&H; transfuse for goal of >7.0   Monitor hemodynamics; avoid episodes of hypotension   Maintain adequate IV access with 2 large bore Ivs  Additional pain and symptom management per primary team

## 2025-05-07 NOTE — ASSESSMENT & PLAN NOTE
Patient previously previously on amlodipine/beta-blocker  Torsemide recently discontinued during 3/2025 hospitalization  Blocker HELD for bradycardia/hypotension  Norvasc HELD for hypotension  Monitor for  hypotension with hematochezia

## 2025-05-07 NOTE — PLAN OF CARE
Problem: PAIN - ADULT  Goal: Verbalizes/displays adequate comfort level or baseline comfort level  Description: Interventions:- Encourage patient to monitor pain and request assistance- Assess pain using appropriate pain scale- Administer analgesics based on type and severity of pain and evaluate response- Implement non-pharmacological measures as appropriate and evaluate response- Consider cultural and social influences on pain and pain management- Notify physician/advanced practitioner if interventions unsuccessful or patient reports new pain  Outcome: Progressing     Problem: INFECTION - ADULT  Goal: Absence or prevention of progression during hospitalization  Description: INTERVENTIONS:- Assess and monitor for signs and symptoms of infection- Monitor lab/diagnostic results- Monitor all insertion sites, i.e. indwelling lines, tubes, and drains- Monitor endotracheal if appropriate and nasal secretions for changes in amount and color- [place] appropriate cooling/warming therapies per order- Administer medications as ordered- Instruct and encourage patient and family to use good hand hygiene technique- Identify and instruct in appropriate isolation precautions for identified infection/condition  Outcome: Progressing  Goal: Absence of fever/infection during neutropenic period  Description: INTERVENTIONS:- Monitor WBC  Outcome: Progressing     Problem: SAFETY ADULT  Goal: Patient will remain free of falls  Description: INTERVENTIONS:- Educate patient/family on patient safety including physical limitations- Instruct patient to call for assistance with activity - Consult OT/PT to assist with strengthening/mobility - Keep Call bell within reach- Keep bed low and locked with side rails adjusted as appropriate- Keep care items and personal belongings within reach- Initiate and maintain comfort rounds- Make Fall Risk Sign visible to staff- Offer Toileting every 2 Hours, in advance of need- Initiate/Maintain bed alarm-  Obtain necessary fall risk management equipment: - Apply yellow socks and bracelet for high fall risk patients- Consider moving patient to room near nurses station  Outcome: Progressing  Goal: Maintain or return to baseline ADL function  Description: INTERVENTIONS:-  Assess patient's ability to carry out ADLs; assess patient's baseline for ADL function and identify physical deficits which impact ability to perform ADLs (bathing, care of mouth/teeth, toileting, grooming, dressing, etc.)- Assess/evaluate cause of self-care deficits - Assess range of motion- Assess patient's mobility; develop plan if impaired- Assess patient's need for assistive devices and provide as appropriate- Encourage maximum independence but intervene and supervise when necessary- Involve family in performance of ADLs- Assess for home care needs following discharge - Consider OT consult to assist with ADL evaluation and planning for discharge- Provide patient education as appropriate  Outcome: Progressing  Goal: Maintains/Returns to pre admission functional level  Description: INTERVENTIONS:- Perform AM-PAC 6 Click Basic Mobility/ Daily Activity assessment daily.- Set and communicate daily mobility goal to care team and patient/family/caregiver. - Collaborate with rehabilitation services on mobility goals if consulted- Perform Range of Motion 3 times a day.- Reposition patient every 2 hours.- Dangle patient 3 times a day- Stand patient 3 times a day- Ambulate patient 3 times a day- Out of bed to chair 3 times a day - Out of bed for meals 3 times a day- Out of bed for toileting- Record patient progress and toleration of activity level   Outcome: Progressing     Problem: DISCHARGE PLANNING  Goal: Discharge to home or other facility with appropriate resources  Description: INTERVENTIONS:- Identify barriers to discharge w/patient and caregiver- Arrange for needed discharge resources and transportation as appropriate- Identify discharge learning  needs (meds, wound care, etc.)- Arrange for interpretive services to assist at discharge as needed- Refer to Case Management Department for coordinating discharge planning if the patient needs post-hospital services based on physician/advanced practitioner order or complex needs related to functional status, cognitive ability, or social support system  Outcome: Progressing     Problem: Knowledge Deficit  Goal: Patient/family/caregiver demonstrates understanding of disease process, treatment plan, medications, and discharge instructions  Description: Complete learning assessment and assess knowledge base.Interventions:- Provide teaching at level of understanding- Provide teaching via preferred learning methods  Outcome: Progressing     Problem: Nutrition/Hydration-ADULT  Goal: Nutrient/Hydration intake appropriate for improving, restoring or maintaining nutritional needs  Description: Monitor and assess patient's nutrition/hydration status for malnutrition. Collaborate with interdisciplinary team and initiate plan and interventions as ordered.  Monitor patient's weight and dietary intake as ordered or per policy. Utilize nutrition screening tool and intervene as necessary. Determine patient's food preferences and provide high-protein, high-caloric foods as appropriate. INTERVENTIONS:- Monitor oral intake, urinary output, labs, and treatment plans- Assess nutrition and hydration status and recommend course of action- Evaluate amount of meals eaten- Assist patient with eating if necessary - Allow adequate time for meals- Recommend/ encourage appropriate diets, oral nutritional supplements, and vitamin/mineral supplements- Order, calculate, and assess calorie counts as needed- Recommend, monitor, and adjust tube feedings and TPN/PPN based on assessed needs- Assess need for intravenous fluids- Provide specific nutrition/hydration education as appropriate- Include patient/family/caregiver in decisions related to  nutrition  Monitor and assess patient's nutrition/hydration status for malnutrition. Collaborate with interdisciplinary team and initiate plan and interventions as ordered.  Monitor patient's weight and dietary intake as ordered or per policy. Utilize nutrition screening tool and intervene as necessary. Determine patient's food preferences and provide high-protein, high-caloric foods as appropriate. INTERVENTIONS:- Monitor oral intake, urinary output, labs, and treatment plans- Assess nutrition and hydration status and recommend course of action- Evaluate amount of meals eaten- Assist patient with eating if necessary - Allow adequate time for meals- Recommend/ encourage appropriate diets, oral nutritional supplements, and vitamin/mineral supplements- Order, calculate, and assess calorie counts as needed- Recommend, monitor, and adjust tube feedings and TPN/PPN based on assessed needs- Assess need for intravenous fluids- Provide specific nutrition/hydration education as appropriate- Include patient/family/caregiver in decisions related to nutrition  Outcome: Progressing     Problem: Prexisting or High Potential for Compromised Skin Integrity  Goal: Skin integrity is maintained or improved  Description: INTERVENTIONS:- Identify patients at risk for skin breakdown- Assess and monitor skin integrity- Assess and monitor nutrition and hydration status- Monitor labs - Assess for incontinence - Turn and reposition patient- Assist with mobility/ambulation- Relieve pressure over bony prominences- Avoid friction and shearing- Provide appropriate hygiene as needed including keeping skin clean and dry- Evaluate need for skin moisturizer/barrier cream- Collaborate with interdisciplinary team - Patient/family teaching- Consider wound care consult   Outcome: Progressing     Problem: HEMATOLOGIC - ADULT  Goal: Maintains hematologic stability  Description: INTERVENTIONS- Assess for signs and symptoms of bleeding or hemorrhage-  Monitor labs- Administer supportive blood products/factors as ordered and appropriate  Outcome: Progressing

## 2025-05-07 NOTE — PROGRESS NOTES
Progress Note - Hospitalist   Name: Alecia Kay 80 y.o. female I MRN: [id number]  Unit/Bed#: E4 -01 I Date of Admission: 5/2/2025   Date of Service: 5/7/2025 I Hospital Day: 5    Assessment & Plan  Hematochezia  Pt is a 79yo female with PMH significant for hypertension, DVT/PE CKD 3, and right heel osteomyelitis initially presented to [facility] 4/25/2025 for generalized weakness    Initially diagnosed with GAY and MDR UTI: completed course of ertapenem  Subsequently found to have DVT and PE, while INR subtherapeutic, and was started on heparin infusion  Patient then had hematochezia and was a rapid response  Due to lack of GI services patient was transferred to [facility]   At [facility],after discussion with GI, pt was restarted on heparin infusion:  however discontinued after passed large amount of hematochezia  5/5 EGD: Wang's esophagus.  Sliding hiatal hernia.  No evidence of bleeding  5/5 colonoscopy: Petechial mucosa in the cecum/ascending colon attributed to barotrauma as it developed during the procedure.  Melanosis in the splenic flexure, descending colon, sigmoid colon, rectosigmoid, and rectum.  Several diverticuli with old blood   D/w GI: Suspect diverticular bleed, since resolved: Noted okay to restart heparin  Heparin restarted without any additional bleeding: Due to difficulty obtaining PTTs her bridging therapy was changed to Lovenox  Patient was restarted on Coumadin and received 1 dose last night  Since approximately 4 AM this morning patient has passed multiple episodes of bright red blood per rectum  Lovenox/Coumadin on hold  Systolic blood pressure 106/  Set up 2 units packed red blood cells and 2 units of FFP stat  Discussed with GI: Planning endoscopic evaluation  Care upgraded to stepdown level 2: Monitor closely for need to upgrade to stepdown level 1  Acute blood loss anemia (ABLA)  baseline hemoglobin appears to be variable over the last year ranging 7.5-10  Acute blood loss anemia due  to hematochezia secondary to diverticular bleed  Was transfused 1 unit PRBC 5/3 & additional 2 units PRBC 5/4.  Pt with active bleeding  Set up 2u prbc and 2u ffp  Stat h/h    Results from last 7 days   Lab Units 05/07/25  0432 05/06/25  0302 05/05/25  1842 05/05/25  1240 05/05/25  0452 05/05/25  0119   HEMOGLOBIN g/dL 8.4* 9.0* 9.2* 9.5* 9.1* 10.2*     Acute pulmonary embolism (HCC)  Extensive right upper and lower lobe pulmonary embolism seen on 4/28/2025 CTA  2D echocardiogram without evidence of right heart strain  Does have history of DVT and PE: Maintained on warfarin previously, however pt states she had not taken the month prior to admission--(INR 1.18)  Due to subtherapeutic INR, was started on bridging therapy with heparin and then Lovenox  Anticoagulation currently on hold due to hematochezia  2 units of FFP today  Results from last 7 days   Lab Units 05/07/25  0432 05/05/25 2026 05/05/25  0452 05/04/25  0245   INR  2.11* 1.81* 1.88* 2.07*     Acute respiratory insufficiency  Likely secondary to pulmonary embolism: Was placed on 2 L nasal cannula, since improved  Currently with adequate oxygenation on room air  Hypertension  Patient previously previously on amlodipine/beta-blocker  Torsemide recently discontinued during 3/2025 hospitalization  Blocker HELD for bradycardia/hypotension  Norvasc HELD for hypotension  Monitor for  hypotension with hematochezia  Hypokalemia  May be secondary to multiple loose bowel movements.  Added banana flakes, and repleted potassium  Hypomagnesemia: Repleted    Results from last 7 days   Lab Units 05/07/25  0432 05/06/25  0320 05/05/25  0452 05/04/25  0245 05/03/25  2042 05/03/25  0606   POTASSIUM mmol/L 3.6 4.0 3.7 4.1   < > 2.9*   MAGNESIUM mg/dL  --  2.1 1.8* 1.6*  --  1.9    < > = values in this interval not displayed.     Acute DVT (deep venous thrombosis) (HCC)  History of DVT   However during 4/25/2025 [facility] admission found to have bilateral extensive extensive acute  vs subacute DVTs, in the setting of subtherapeutic INR  Pt was on AC as above:  paused for GI bleed eval  Nausea and vomiting  Initial reason for presentation to hospital 4/25/2025  Resolved after treatment for UTI  GI consulted: EGD with Wang's esophagus  Continue proton pump inhibitor, appetite improved  Chronic kidney disease, stage III (moderate) (HCC)  Initially presented to [facility] 4/25/2025 for generalized weakness found to have kidney injury and MDR UTI  Renal function has returned back to baseline  Torsemide discontinued during March 2025 hospitalization    Results from last 7 days   Lab Units 05/07/25  0432 05/06/25  0320 05/05/25  0452 05/04/25  0245 05/03/25  2042 05/03/25  0606 05/02/25  1046 05/02/25  0535 05/01/25  0630   BUN mg/dL 6 6 6 7 8 8 7 7 6   CREATININE mg/dL 0.84 0.68 0.70 0.72 0.78 0.78 0.84 0.78 0.82   EGFR ml/min/1.73sq m 65 82 81 78 71 71 65 71 67     Hypothyroidism  Continue levothyroxine  Wang's esophagus  Noted on EGD  Will need repeat EGD in 3 years for surveillance  Patient with dysphagia: Continue proton pump inhibitor bid  Insertional Achilles tendinopathy  MRI with advanced distal Achilles tendinopathy with partial thickness tear involving the deep insertional fibers  Also small focal longitudinal split tear of the peroneus brevis, posterior tibialis tendon stenosis and tenosynovitis, and plantar fasciaopathy  PT/OT  Chronic osteomyelitis (HCC)  Per previous records: Chronic osteomyelitis of the toe of right foot  Per podiatry: Weightbearing as tolerated in postop shoe  Completed 6 weeks of IV antibiotics prior to recent admission  MRI right ankle/heel 4/29/2025: No evidence of remaining osteomyelitis  Patient was reevaluated by podiatry 4/30/2025: Weightbearing as tolerated in surgical shoe.  Local wound care.    outpatient podiatry follow-up    VTE Pharmacologic Prophylaxis: VTE Score: 6 AC on hold for GI bleed    Mobility:   Basic Mobility Inpatient Raw Score: 12  JH-M  Goal: 4: Move to chair/commode  JH-HLM Achieved: 2: Bed activities/Dependent transfer  JH-HLM Goal NOT achieved. Continue with multidisciplinary rounding and encourage appropriate mobility to improve upon JH-HLM goals.    Patient Centered Rounds: I performed bedside rounds with nursing staff today.   Discussions with Specialists or Other Care Team Provider: GI: Dr. Pickett    Education and Discussions with Family / Patient: called son Jaron and LM with update and to call me.  Called nephew Red Capellan and gave update: He notes he will text Jaron    Current Length of Stay: 5 day(s)  Current Patient Status: Inpatient   Certification Statement: The patient will continue to require additional inpatient hospital stay due to GI bleed  Discharge Plan: Anticipate discharge in >72 hrs to home with home services.    Patient upgraded to stepdown level 2  Notified critical care team patient's tenuous status: Currently hemodynamically stable and appropriate for stepdown level 2 however she decompensates may require transfer to ICU    Code Status: Level 1 - Full Code    Subjective   Patient's night nurse at the bedside notes patient multiple episodes of hematochezia since 4 AM this morning.  Patient passing bright red blood per rectum.  Current episode appears to be 2 cups worth.  Patient denies any abdominal pain or cramping.  Denies any pain anywhere at all.  Denies any nausea or vomiting.  Denies any dizziness or lightheadedness.  Denies any chest pain or difficulty breathing.    Objective :  Temp:  [97.6 °F (36.4 °C)-98.7 °F (37.1 °C)] 98 °F (36.7 °C)  HR:  [60-88] 65  BP: ()/(61-88) 106/61  Resp:  [15-18] 18  SpO2:  [93 %-100 %] 94 %  O2 Device: None (Room air)    Body mass index is 31.45 kg/m².     Input and Output Summary (last 24 hours):     Intake/Output Summary (Last 24 hours) at 5/7/2025 0751  Last data filed at 5/6/2025 1701  Gross per 24 hour   Intake 482.98 ml   Output 650 ml   Net -167.02 ml       Physical  Exam  General: Very pleasant female.  No acute distress.  Nontachypneic and nondyspneic  Heart: Regular rate and rhythm.  S1-S2 present.  No murmur, rub, gallop  Lungs: Clear to auscultation bilaterally.  Good air movement.  No wheezes, crackles, rhonchi.  No accessory muscle use or respiratory stress  Abdomen: Soft, nontender with palpation.  Nondistended.  Normoactive bowel sounds present.  No guarding or rebound.  No peritoneal signs or mass  Extremities: No clubbing, cyanosis, edema.  2+ pedal pulses bilaterally  Neurologic: Awake alert.  Oriented.  Fluent speech.  Not somnolent or lethargic  Lines/Drains:  Telemetry:      Lab Results: I have reviewed the following results:   Results from last 7 days   Lab Units 05/07/25  0432   WBC Thousand/uL 4.82   HEMOGLOBIN g/dL 8.4*   HEMATOCRIT % 24.8*   PLATELETS Thousands/uL 119*   SEGS PCT % 65   LYMPHO PCT % 25   MONO PCT % 7   EOS PCT % 2     Results from last 7 days   Lab Units 05/07/25  0432 05/06/25  0320 05/05/25  0452   SODIUM mmol/L 140   < > 141   POTASSIUM mmol/L 3.6   < > 3.7   CHLORIDE mmol/L 104   < > 106   CO2 mmol/L 28   < > 27   BUN mg/dL 6   < > 6   CREATININE mg/dL 0.84   < > 0.70   ANION GAP mmol/L 8   < > 8   CALCIUM mg/dL 8.0*   < > 7.9*   ALBUMIN g/dL  --   --  2.3*   TOTAL BILIRUBIN mg/dL  --   --  0.54   ALK PHOS U/L  --   --  45   ALT U/L  --   --  <3*   AST U/L  --   --  11*   GLUCOSE RANDOM mg/dL 80   < > 83    < > = values in this interval not displayed.     Results from last 7 days   Lab Units 05/07/25  0432   INR  2.11*     Results from last 7 days   Lab Units 05/02/25  1001   POC GLUCOSE mg/dl 88               Recent Cultures (last 7 days):       ==============================================  Imaging  4/30 celiac/mesenteric duplex  The abdominal aorta is patent and normal in caliber, measuring approximately 1.7 cm in its greatest diameter.  The celiac and splenic arteries are patent and normal in caliber. The proximal common hepatic  artery was not visualized.  The main Renal Arteries are patent and normal in caliber bilaterally.   The superior and inferior mesenteric arteries are patent and normal in caliber.      4/30: Barium swallow   projection demonstrates degenerative changes of the spine without obvious free air.  Limited examination   No evidence of an obvious fixed esophageal obstruction. Some transient spasm appeared to be present which gave the patient's some symptomatology but contrast did eventually pass into the stomach.     4/29 Arterial lower extremity duplex  RIGHT LOWER LIMB:  Diffuse disease noted throughout the femoral-popliteal arteries without significant focal stenosis.  Ankle/Brachial index:  Unreliable due to poorly compressible tibial vessels.  PVR/ PPG tracings are normal.  Metatarsal pressure of 166 mmHg  Great toe pressure of 109 mmHg, within the healing range   LEFT LOWER LIMB:  Diffuse disease noted throughout the femoral-popliteal arteries without significant focal stenosis.  Ankle/Brachial index:   Unreliable due to poorly compressible tibial vessels.  PVR/ PPG tracings are normal.  Metatarsal pressure of 168 mmHg  Great toe pressure of 127 mmHg, within the healing range     4/29 MRI right ankle/heel  1.  No convincing evidence of osteomyelitis. There is mild reactive edema.  2.  Advanced distal Achilles tendinopathy, with a partial-thickness tear involving the deep insertional fibers.  3.  Likely small, focal longitudinal split tear of the peroneus brevis.  4.  Mild posterior tibialis tendinosis and tenosynovitis.  5.  Plantar fasciopathy and postsurgical defect.  6.  Degenerative changes, as described.     4/28 CT PE study with abdomen/pelvis  1. Extensive acute thrombus throughout the right upper middle and lower lobe segmental and distal order pulmonary arteries. RV/LV ratio 0.9.  2. No acute intra-abdominal or pelvic process.     4/28 lower extremity venous duplex  RIGHT LOWER LIMB:  There is evidence  suggestive of acute vs. sub acute deep vein thrombosis noted in the popliteal, gastrocnemius, paired posterior tibial, and paired peroneal veins.  No evidence of superficial thrombophlebitis noted.  Popliteal, posterior tibial and anterior tibial arterial Doppler waveforms are  triphasic.  LEFT LOWER LIMB:  There is evidence suggestive of acute vs. sub acute deep vein thrombosis noted in the popliteal, paired posterior tibial, and paired peroneal veins.  No evidence of superficial thrombophlebitis noted.  Popliteal, posterior tibial and anterior tibial arterial Doppler waveforms are triphasic.     4/27 right foot x-ray Stable appearance of the calcaneus. No new erosions      4/25 CT chest/abdomen/pelvis  Status post reported esophageal dilatation. No evidence for pneumomediastinum. Small hiatal hernia is noted.  No acute intra-abdominal or pelvic abnormality identified.  Colonic diverticulosis without evidence for acute diverticulitis.     Micro  4/28 positive FOB  4/28 stool enteric pathogen: neg  4/28: neg c diff  4/25 urine cx: >100,000 Kleb, >100,000ESBL E coli     Procedure  5/5: EGD  The upper third of the esophagus, middle third of the esophagus and lower third of the esophagus appeared normal.  C4M7 Wang's esophagus; electronic chromoendoscopy (NBI) was used  6 cm type I hiatal hernia  The fundus of the stomach, body of the stomach, incisura and antrum appeared normal.  Prior polypectomy site noted at the body along the greater curvature, as evidenced by the convergence of multiple gastric folds.  The duodenal bulb, 1st part of the duodenum and 2nd part of the duodenum appeared normal.  5/5 colonoscopy  The terminal ileum appeared normal.  Mild, localized petechial mucosa in the cecum and ascending colon.    - likely due to barotrauma. The mucosal petechiae developed during the procedure and does not explain her prior episodes of hematochezia  The transverse colon appeared normal.  Melanosis in the  splenic flexure, descending colon, sigmoid colon, rectosigmoid and rectum  Multiple small, scattered diverticula of mild severity with no inflammation containing blood clots and causing mild luminal narrowing (traversable) in the descending colon and sigmoid colon; there was stigmata of recent hemorrhage.    - there was some narrowing and a sharp turn at the rectosigmoid junction. There were several diverticuli that had old blood near the mouth. There was also small amounts of old blood mixed with the bowel prep  Internal small hemorrhoids observed during retroflexion  =============================================    Last 24 Hours Medication List:     Current Facility-Administered Medications:     acetaminophen (TYLENOL) tablet 650 mg, Q6H PRN    [Held by provider] amLODIPine (NORVASC) tablet 10 mg, Daily    bisacodyl (DULCOLAX) EC tablet 10 mg, See Admin Instructions    [Held by provider] enoxaparin (LOVENOX) subcutaneous injection 80 mg, Q12H MELANY    hydrOXYzine HCL (ATARAX) tablet 25 mg, Q8H PRN    levothyroxine tablet 75 mcg, Early Morning    meclizine (ANTIVERT) tablet 25 mg, TID PRN    [Held by provider] metoprolol tartrate (LOPRESSOR) tablet 50 mg, Q12H MELANY    mirtazapine (REMERON) tablet 7.5 mg, HS    moisture barrier miconazole 2% cream (aka EDNA MOISTURE BARRIER ANTIFUNGAL CREAM), BID    ondansetron (ZOFRAN) injection 4 mg, Q4H PRN    pantoprazole (PROTONIX) EC tablet 40 mg, BID AC    pravastatin (PRAVACHOL) tablet 80 mg, Daily With Dinner    [Held by provider] senna-docusate sodium (SENOKOT S) 8.6-50 mg per tablet 1 tablet, Daily    [Held by provider] warfarin (COUMADIN) tablet 5 mg, Once per day on Sunday Tuesday Wednesday Thursday Saturday    [Held by provider] warfarin (COUMADIN) tablet 7.5 mg, Once per day on Monday Friday    Administrative Statements   Today, Patient Was Seen By: Monique Gonzalez MD      **Please Note: This note may have been constructed using a voice recognition system.**

## 2025-05-07 NOTE — PROGRESS NOTES
Progress Note - Geriatric Medicine   Alecia Kay 80 y.o. female MRN: [id number]  Unit/Bed#: E4 -01 Encounter: [id number]      Assessment/Plan:    Acute Encephalopathy  Patient presented to the hospital for generalized weakness   Baseline mentation is alert and oriented x 4 but forgetful at times   Current cause considerations   Suspected to be multifactorial secondary to age, possible underlying cognitive impairment, hematoochezia status post colonoscopy, anesthesia, acute PE/DVT, atarax use, vision impairment, and prolonged hospitalization  UA on admission negative for UTI   No leukocytosis noted on labs today   Hemoglobin on labs this morning noted to be 8/4  Patient with new episodes of hematochezia this morning   Patients mentation appears to be improved today   She is alert and oriented x 3   She is pleasant, calm, and cooperative on my exam   Identify and treat reversible causes of confusion including infection, dehydration, electrolyte imbalance, anemia, hypoxia, urinary retention, constipation, pain, and sleep disturbance  Maintain delirium precautions   Provide redirection, reorientation, and distraction techniques  Maintain fall and safety precautions   Assist with ADLs/IADLs  Avoid deliriogenic medications such as tramadol, benzodiazepines, anticholinergics, benadryl  Treat pain using geriatric pain protocol   Encourage oral hydration and nutrition   Monitor for constipation and urinary retention   Implement sleep hygiene and limit night time interuptions   Maintain sleep-wake cycle   Encourage early and frequent mobilization   Patient had 2 EKGs on 4/29/2025 revealed a QTc intervals of 444 and 449  If all other interventions are unsuccessful for acute agitation and behaviors, can consider Zyprexa 2.5 mg IM Q 8 hours prn   Would avoid benzodiazepines such as Ativan if possible as these medications can cause/worsen delirium   Redirect and reorient as needed  Keep mentally, physically, and  socially active    Cognitive Screening  Patient has no documented history of memory loss or cognitive impairment   She notes no issues with her memory at baseline   Patient has been having periods of confusion here  Please see cause considerations as discussed above  Patient is alert and oriented x 3 on exam today   She is pleasant, calm, and cooperative   Most recent TSH POC on 11/13/2024 noted to be 1.81  Consider rechecking on routine labs  Most recent vitamin B 12 level on 4/3/2025 noted to be 564  No imaging of the head or brain noted in epic   Patient scored 13/15 on BIMS on 3/5/2025 indicating she is cognitively intact   Maintain delirium precautions as discussed above   Redirect and reorient as needed  Keep physically, mentally and socially active     Deconditioning   Patient is at increased risk for deconditioning secondary to possible underlying cognitive impairment, hematoochezia status post colonoscopy, anesthesia, acute PE/DVT, atarax use, vision impairment, weakness, gait dysfunction, and prolonged hospitalization  Continue to optimize diet, hydration, and mobility for healing   Chronic Kidney Disease Stage III  Baseline creatinine not entirely clear but recently appears to be 0.8-1.0  Creatinine on labs today noted to be 0.84  Avoid nephrotoxic medication and renal dose medication   Keep hydrated  Type II Diabetes   Most recent hemoglobin A1C on 11/13/2024 noted to be 5.7  Per chart review, she does not appear to be on any medication for this at baseline  Glucose on labs this morning stable at 80  Continue to monitor glucose on labs   Avoid hypoglycemia   Anemia   Baseline hemoglobin appears to be 8-10  Patient was noted to have hematochezia which has since resolved   Hemoglobin on labs today stable at 8.4 with a repeat level of 8.6   Continue to monitor CBC   Transfuse for hemoglobin < 7   Monitor for signs and symptoms of infection, dehydration, DVT, and skin breakdown    Frailty   Clinical Frail  Scale: 6- Moderately Frail (current)  Need help with all outside activities  Need help with stairs and bathing  May need assistance with dressing  Most recent albumin on 5/5/2025 noted to be 2.3  Consider nutrition consult  Encourage protein supplementation     Ambulatory Dysfunction/Falls  Patient notes no recent falls at home   She states she ambulates with a roller walker at baseline   PT/OT consulted to assist with strengthening/mobility and assist with discharge planning to appropriate level of care  Assess patient frequently for physical needs, encourage use of assistant devices as needed and directed by PT/OT  Identify cognitive and physical deficits and behaviors that affect risk of falls  Consider moving patient closer to nursing station to monitor more closely for impulsive behavior which may increase risk of falls  [place] fall and safety precautions   Educate patient/family on patient safety including physical limitations and importance of using call bell for assistance   Modify environment to reduce risk of injury including disconnecting from pole when not in use, ensuring adequate lighting in room and restroom, ensuring that path to restroom is clear and free of trip hazards  Out of bed as tolerated    Impaired Vision   Patient denies vision impairment   She notes she does have glasses for reading   Recommend use of corrective lenses at all appropriate times  Encourage adequate lighting and encourage use of assistance with ambulation  Keep personal belongings close to avoid reaching  Encourage appropriate footwear at all times  Recommend large font for printed materials provided to patient    Dentition/Appetite   Patient denies denture use  She states she has a good appetite at baseline   No meals documented since breakfast yesterday   She is now NPO due to new episodes of hematochezia this morning   Encourage use of dentures at all appropriate times  Ensure meal consistency is appropriate for all  abilities   Consider nutrition consult   Continue aspiration precautions     Elimination   Patient is continent of bowel and bladder at baseline  She appears to be voiding without difficulty   Patient was noted to have hematochezia and was found to have a diverticular bleed   The bleeding had resolved but returned again this morning   Last documented bowel movement was today   Patient is not currently on a bowel regimen   Monitor for constipation and urinary retention     Insomnia   Patient notes no difficulty sleeping at baseline   She noted yesterday she had been having some vivid dreams here   Her son at the bedside notes this occurred when at rehab as well   She notes that she slept for a few hours last night but has only been sleeping for a few hours at time here  First line is behavioral therapy   Avoid sedative hypnotics including benzodiazepines and benadryl  Encourage staying awake during the day   Encourage daytime activities and morning exercise   Decrease or eliminate daytime naps   Avoid caffeine especially during late afternoon and evening hours  Establish a nighttime routine  Implement sleep hygiene and limit nighttime interruptions  Can consider melatonin 3 mg daily at bedtime for sleep if needed     Anxiety/Depression  Patient has a documented history of anxiety   PDMP reviewed and patient was prescribed alprazolam 0.25 mg on 4/11/2025  Unclear how often she was taking this   Mood appears stable on exam today   Continue supportive care     Hematochezia   Patient presented to [facility] with generalized weakness  She was initially noted to have a UTI and GAY  Additionally she was found to have acute DVT and PE  Her INR was subtherapeutic and she was started on a heparin drip  She was later noted to have hematochezia and was a rapid response   She was transferred to [facility] due to lack of GI services   GI initially restarted the heparin infusion but this was discontinued due to a large amount of  hematochezia  She underwent and EGD/colonoscopy yesterday   EGD revealed Wang's esophagus with no evidence of bleeding   Colonoscopy revealed several diverticuli with old blood   GI suspected a diverticular bleed   The bleeding had resolved but overnight the patient was noted to be bleeding again   She did receive blood and FFP  Hemoglobin stable on labs this morning at 8.4 with a repeat of 8.6   Anticoagulation is now on hold   GI continues to follow   Management per GI and primary team     Acute Pulmonary Embolism  Patient found to have extensive right upper and lower PE on CTA on 4/28/2025  Echo noted no evidence of heart strain   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   She is maintained on coumadin 5 mg 5 days per week and 7.5 mg Monday and Friday  Patient had nausea/vomiting for several days PTA and she was not able to take this medication   She had been on anticoagulation but this is again on hold due to hematochezia   GI is following for hematochezia   Management per primary team     Chronic Osteomyelitis   Patient with previous chronic osteomyelitis of the right foot   Podiatry recommending WBAT in a surgical shoe  She completed 6 weeks of antibiotics PTA   MRI of the right heel and ankle on 4/29/2025 revealed no evidence of remaining osteomyelitis   Patient was re-evaluated by podiatry on 4/30/2025 and no changes to prior recommendations noted   She will require continued outpatient follow-up with podiatry on discharge       Subjective:   The patient is being seen and evaluated today at the bedside for geriatric follow-up. She is noted to be lying in bed comfortably in no acute distress. She is alert and oriented x 3. She is currently denying pain. She states that she did not sleep very well last night. She denies chest pain and shortness of breath. She admits to feeling very tired today.     Care was coordinated with patients nurse Bridget. She notes no confusion or agitation overnight.       Review of  "Systems   Constitutional:  Positive for activity change, appetite change (currently NPO) and fatigue.   HENT:  Negative for dental problem and hearing loss.    Eyes:  Positive for visual disturbance (glasses for reading).   Respiratory:  Negative for cough and shortness of breath.    Cardiovascular:  Negative for chest pain and leg swelling.   Gastrointestinal:  Positive for blood in stool. Negative for abdominal distention and abdominal pain.   Genitourinary:  Negative for difficulty urinating and dysuria.   Musculoskeletal:  Positive for gait problem. Negative for arthralgias.   Skin:  Negative for color change and pallor.   Neurological:  Positive for weakness. Negative for speech difficulty.   Psychiatric/Behavioral:  Positive for sleep disturbance. Negative for agitation and confusion. The patient is not nervous/anxious.          Objective:     Vitals: Blood pressure 115/57, pulse 61, temperature (!) 92 °F (33.3 °C), temperature source Oral, resp. rate 18, height 5' 2\" (1.575 m), weight 78 kg (171 lb 15.3 oz), SpO2 94%.,Body mass index is 31.45 kg/m².      Intake/Output Summary (Last 24 hours) at 5/7/2025 1359  Last data filed at 5/7/2025 1000  Gross per 24 hour   Intake 720 ml   Output 400 ml   Net 320 ml       Current Medications: Reviewed    Physical Exam:   Physical Exam  Vitals and nursing note reviewed.   Constitutional:       General: She is not in acute distress.     Appearance: She is not ill-appearing.   HENT:      Head: Normocephalic.      Mouth/Throat:      Mouth: Mucous membranes are dry.   Eyes:      General: No scleral icterus.     Conjunctiva/sclera: Conjunctivae normal.   Cardiovascular:      Rate and Rhythm: Normal rate and regular rhythm.   Pulmonary:      Effort: Pulmonary effort is normal. No respiratory distress.   Abdominal:      General: Bowel sounds are normal. There is no distension.      Palpations: Abdomen is soft.      Tenderness: There is no abdominal tenderness.   Musculoskeletal: " "        General: No swelling or tenderness.   Skin:     General: Skin is warm and dry.   Neurological:      General: No focal deficit present.      Mental Status: She is alert and oriented to person, place, and time. Mental status is at baseline.      Motor: Weakness present.      Gait: Gait abnormal.          Invasive Devices       Peripheral Intravenous Line  Duration             Peripheral IV 05/05/25 Left;Ventral (anterior) Wrist 2 days              Long-dwell Peripherally Inserted Midline IV Catheter  Duration             Long-Dwell Peripheral IV (Midline) 05/07/25 Right Basilic <1 day                    Lab, Imaging and other studies: Results Review Statement: I reviewed AM labs.     Please note:  Voice-recognition software may have been used in the preparation of this document.  Occasional wrong word or \"sound-alike\" substitutions may have occurred due to the inherent limitations of voice recognition software.  Interpretation should be guided by context.    "

## 2025-05-07 NOTE — ASSESSMENT & PLAN NOTE
- previously biopsied on her EGD @ [facility]SH on 04/22/25. No dysplasia. Consider repeat EGD in 3 years, although she will be 83 at that point

## 2025-05-07 NOTE — PHYSICAL THERAPY NOTE
PHYSICAL THERAPY NOTE          Patient Name: Alecia Kay  Today's Date: 5/7/2025 05/07/25 1347   PT Last Visit   PT Visit Date 05/07/25   Note Type   Note Type Cancelled Session   Cancel Reasons Medical status  (Pt not medically appropriate for PT/OT interventions at this time, per RN. Will continue to follow as appropriate.)     Todd Del Castillo

## 2025-05-07 NOTE — PROCEDURES
Insert Complex Venous Access Line    Date/Time: 5/7/2025 9:11 AM    Performed by: Yanet Covarrubias RN  Authorized by: Monique Gonzalez MD    Patient location:  Bedside  Consent:     Consent obtained: per protocol no consent required.  Universal protocol:     Procedure explained and questions answered to patient or proxy's satisfaction: yes      Immediately prior to procedure, a time out was called: yes      Relevant documents present and verified: yes      Test results available and properly labeled: yes      Radiology Images displayed and confirmed.  If images not available, report reviewed: yes      Required blood products, implants, devices, and special equipment available: yes      Site/side marked: yes      Patient identity confirmed:  Verbally with patient, arm band, provided demographic data and hospital-assigned identification number  Pre-procedure details:     Hand hygiene: Hand hygiene performed prior to insertion      Sterile barrier technique: All elements of maximal sterile technique followed      Skin preparation:  ChloraPrep    Skin preparation agent: Skin preparation agent completely dried prior to procedure    Procedure details:     Complex Venous Access Line Type: Midline      Complex Venous Access Line Indications: no peripheral vascular access      Orientation:  Right    Location:  Basilic    Catheter size:  18 gauge    Total catheter length (cm):  10    Catheter out on skin (cm):  0    Max flow rate:  999ml/hr    Arm circumference:  43cm    Patient evaluated for contraindications to access (i.e. fistula, thrombosis, etc): Yes      Approach: percutaneous technique used      Patient position:  Flat    Ultrasound image availability:  Not saved    Sterile ultrasound techniques: Sterile gel and sterile probe covers were used      Number of attempts:  1    Successful placement: yes      Landmarks identified: yes      Cath access vessel: midline.  Anesthesia (see MAR for exact dosages):     Anesthesia  method:  None  Post-procedure details:     Post-procedure:  Dressing applied and securement device placed    Assessment:  Blood return through all ports and free fluid flow    Post-procedure complications: none      Patient tolerance of procedure:  Tolerated well, no immediate complications    RUE above antecubital below midline insertion 2 blanchable red circular rings noted on skin around the circumference of the arm present prior to midline insertion, Dr Gonzalez and Primary RN DEYSI Eaton notified  Lot#[id number] 2026-03-31

## 2025-05-07 NOTE — ASSESSMENT & PLAN NOTE
baseline hemoglobin appears to be variable over the last year ranging 7.5-10  Acute blood loss anemia due to hematochezia secondary to diverticular bleed  Was transfused 1 unit PRBC 5/3 & additional 2 units PRBC 5/4.  Pt with active bleeding  Set up 2u prbc and 2u ffp  Stat h/h    Results from last 7 days   Lab Units 05/07/25  0432 05/06/25  0302 05/05/25  1842 05/05/25  1240 05/05/25  0452 05/05/25  0119   HEMOGLOBIN g/dL 8.4* 9.0* 9.2* 9.5* 9.1* 10.2*

## 2025-05-07 NOTE — CONSULTS
Consultation - Pulmonary Medicine   Alecia Kay 80 y.o. female MRN: [id number]  Unit/Bed#: E4 -01 Encounter: [id number]      Physician Requesting Consult: Monique Gonzalez MD  Reason for Consult: Acute PE      Assessment:  Acute PE with acute/subacute DVT while on subtherapeutic Coumadin due to history of PE/DVT  Recurrent hematochezia/lower GI bleed from diverticulosis most likely  Acute blood loss anemia    Plan:   Continue to hold anticoagulation for now until further GI workup/decision with colonoscopy today  I believe the patient needs IVC filter placed for now until establishing stability and no further GI bleeding then to resume anticoagulation as soon as possible when cleared by GI  Since the patient was significantly subtherapeutic on Coumadin on arrival this time and prior to hospitalization probably while developed the DVT then probably this is not a Coumadin failure so we can still treat with Coumadin but if her levels have been fluctuating in the past due to GI disturbance or diet interaction may be it is better to treat with DOAC if possible.  My preference will be DOAC or can consider hematology evaluation.  She will need lifelong anticoagulation  The above discussion was explained to the patient and her family and they verbalized understanding  Discussed with Dr. Gonzalez    ______________________________________________________________________    HPI:    Alecia Kay is a 80 y.o. female who presents initially to [facility] with weakness and failure to thrive with some nausea and vomiting, found to have GAY and also  acute PE with DVT.    Patient has history of PE/DVT 5 years ago, she was kept on Coumadin until this admission, she has chronic foot osteomyelitis according to her and her children she had foot surgery in January of this year and then after that she was sent to the rehab, she has been complaining of upper GI upset and some nausea and vomiting and poor intake over the past few  months.  She was taking the Coumadin during this time but they were not sure whether she was throwing it up.  During hospitalization at [facility] she developed hematochezia and was transferred to [facility]. GI workup showed some bleeding from the left colon and sigmoid diverticulosis, and also Wang's esophagus.  She was started on Lovenox to bridge to warfarin but then had recurrent hematochezia so anticoagulation stopped.  She received blood transfusion with PRBC and also fresh frozen plasma.  She is planned for colonoscopy again today after bleeding scan did not show active bleeding.    Currently patient is laying in bed very comfortable off oxygen, denies any shortness of breath or chest pain, denies legs pain, denies nausea or vomiting or abdominal pain.              Review of Systems:  12 points Full review of systems was performed. Aside from what was mentioned in the HPI, it is otherwise negative.    Historical Information   Past Medical History:   Diagnosis Date    Anemia     Cellulitis     Disease of thyroid gland     GERD (gastroesophageal reflux disease)     Hyperlipidemia     Hypertension     Obesity     Osteomyelitis (HCC)     Pneumonia     Pulmonary embolism (HCC)      Past Surgical History:   Procedure Laterality Date    FOOT SURGERY      JOINT REPLACEMENT       Social History   Social History     Substance and Sexual Activity   Alcohol Use Never     Social History     Tobacco Use   Smoking Status Never    Passive exposure: Never   Smokeless Tobacco Never       Occupational history:  No occupational exposure    Family History:   History reviewed. No pertinent family history.    Medications:  The patient's active and prehospital medications were reviewed.  Current Facility-Administered Medications   Medication Dose Route Frequency Provider Last Rate    acetaminophen  650 mg Oral Q6H PRN Calderon Friedman DO      [Held by provider] amLODIPine  10 mg Oral Daily Calderon Friedman DO      bisacodyl  10  mg Oral See Admin Instructions Renea Harrison DO      [Held by provider] enoxaparin  1 mg/kg Subcutaneous Q12H [facility] Monique Gonzalez MD      hydrOXYzine HCL  25 mg Oral Q8H PRN Monique Gonzalez MD      levothyroxine  75 mcg Oral Early Morning Calderon Friedman DO      meclizine  25 mg Oral TID PRN Calderon Friedman DO      [Held by provider] metoprolol tartrate  50 mg Oral Q12H MELANY Calderon Friedman DO      mirtazapine  7.5 mg Oral HS Calderon Friedman DO      EDNA ANTIFUNGAL   Topical BID Monique Gonzalez MD      ondansetron  4 mg Intravenous Q4H PRN Calderon Friedman DO      pantoprazole  40 mg Oral BID AC Monique Gonzalez MD      pravastatin  80 mg Oral Daily With Dinner Calderon Friedman DO      [Held by provider] senna-docusate sodium  1 tablet Oral Daily Calderon Friedman DO      [Held by provider] warfarin  5 mg Oral Once per day on Sunday Tuesday Wednesday Thursday Saturday Monique Gonzalez MD      [Held by provider] warfarin  7.5 mg Oral Once per day on Monday Friday Monique Gonzalez MD           PhysicalExamination:  Vitals:   Vitals:    05/07/25 0827 05/07/25 0915 05/07/25 1245 05/07/25 1323   BP: 94/64 114/57 119/60 115/57   BP Location: Left arm  Left arm    Pulse: 78 64 64 61   Resp: 18 18 17 18   Temp: (!) 96.9 °F (36.1 °C) (!) 96.7 °F (35.9 °C) (!) 90.4 °F (32.4 °C) (!) 92 °F (33.3 °C)   TempSrc: Temporal  Oral Oral   SpO2: 95%  95% 94%   Weight:       Height:         Temp  Min: 90.4 °F (32.4 °C)  Max: 98.7 °F (37.1 °C)  IBW (Ideal Body Weight): 50.1 kg    SpO2: 94 %,   SpO2 Activity: At Rest,   O2 Device: None (Room air)    General: alert, not in acute distress  HEENT: PERRL, no icteric sclera or cyanosis, no thrush  Neck: Supple, no lymphadenopathy or thyromegaly, no JVD  Lungs: Equal breath sounds and clear auscultations bilaterally, no wheezing or crackles  Heart: S1S2 regular, no murmurs or gallops  Abdomen: soft, nontender, bowel sounds present  Extremities: no edema, no clubbing or cyanosis  Neuro: Alert and oriented x  "3, no focal neurodeficits   Skin: intact, multiple bruises and ecchymoses especially on arms.    Diagnostic Data:  CBC:   Results from last 7 days   Lab Units 05/07/25  0811 05/07/25  0432 05/06/25  0302 05/04/25  0815 05/04/25  0245   WBC Thousand/uL  --  4.82 4.48  --  4.08*   HEMOGLOBIN g/dL 8.6* 8.4* 9.0*   < > 6.9*   HEMATOCRIT % 24.8* 24.8* 25.8*   < > 20.7*   PLATELETS Thousands/uL  --  119* 108*  --  116*    < > = values in this interval not displayed.       CMP:   Results from last 7 days   Lab Units 05/07/25  0432 05/06/25  0320 05/05/25  0452 05/04/25  0245 05/03/25  2042   POTASSIUM mmol/L 3.6 4.0 3.7 4.1 3.5   CHLORIDE mmol/L 104 106 106 107 106   CO2 mmol/L 28 26 27 25 27   BUN mg/dL 6 6 6 7 8   CREATININE mg/dL 0.84 0.68 0.70 0.72 0.78   CALCIUM mg/dL 8.0* 8.0* 7.9* 7.3* 7.8*   ALK PHOS U/L  --   --  45 38 49   ALT U/L  --   --  <3* <3* <3*   AST U/L  --   --  11* 9* 11*     PT/INR:   Lab Results   Component Value Date    INR 2.11 (H) 05/07/2025   ,     Microbiology:         ABG: No results found for: \"PHART\", \"MUB4RPP\", \"PO2ART\", \"HUZ5ODS\", \"H0DJVEER\", \"BEART\", \"SOURCE\"    Imaging:  Chest CT scan reviewed on PACS: Right upper lobe and lower lobe segmental PEs.  Normal lung parenchyma otherwise except for minimal atelectatic changes.    Lower extremity duplex: Acute versus subacute DVT in both lower extremities multiple veins    Cardiac lab/EKG/telemetry/ECHO:             Echocardiogram: LVEF 65%, normal RV, mildly dilated LA/RA, estimated peak PA pressure 45     Code Status: Level 1 - Full Code    Neha Bernard MD    Portions of the record may have been created with voice recognition software. Occasional wrong word or \"sound a like\" substitutions may have occurred due to the inherent limitations of voice recognition software. Read the chart carefully and recognize, using context, where substitutions have occurred.      "

## 2025-05-07 NOTE — ASSESSMENT & PLAN NOTE
History of DVT   However during 4/25/2025 [facility] admission found to have bilateral extensive extensive acute vs subacute DVTs, in the setting of subtherapeutic INR  Pt was on AC as above:  paused for GI bleed eval

## 2025-05-07 NOTE — ASSESSMENT & PLAN NOTE
Initially presented to [facility] 4/25/2025 for generalized weakness found to have kidney injury and MDR UTI  Renal function has returned back to baseline  Torsemide discontinued during March 2025 hospitalization    Results from last 7 days   Lab Units 05/07/25  0432 05/06/25  0320 05/05/25  0452 05/04/25  0245 05/03/25  2042 05/03/25  0606 05/02/25  1046 05/02/25  0535 05/01/25  0630   BUN mg/dL 6 6 6 7 8 8 7 7 6   CREATININE mg/dL 0.84 0.68 0.70 0.72 0.78 0.78 0.84 0.78 0.82   EGFR ml/min/1.73sq m 65 82 81 78 71 71 65 71 67

## 2025-05-07 NOTE — ASSESSMENT & PLAN NOTE
Extensive right upper and lower lobe pulmonary embolism seen on 4/28/2025 CTA  2D echocardiogram without evidence of right heart strain  Does have history of DVT and PE: Maintained on warfarin previously, however pt states she had not taken the month prior to admission--(INR 1.18)  Due to subtherapeutic INR, was started on bridging therapy with heparin and then Lovenox  Anticoagulation currently on hold due to hematochezia  2 units of FFP today  Results from last 7 days   Lab Units 05/07/25  0432 05/05/25 2026 05/05/25  0452 05/04/25  0245   INR  2.11* 1.81* 1.88* 2.07*

## 2025-05-07 NOTE — PROGRESS NOTES
Progress Note - Gastroenterology   Name: Alecia Kay 80 y.o. female I MRN: [id number]  Unit/Bed#: E4 -01 I Date of Admission: 5/2/2025   Date of Service: 5/7/2025 I Hospital Day: 5    Assessment & Plan  Hematochezia  80 female with a past medical history of VTE on coumadin prior to admission, GERD with Wang's esophagus, hypertension, HLD, chronic osteomyelitis, and hypothyroidism.  Patient initially presented with generalized weakness and failure to thrive with poor oral intake and a 70 pound weight loss.  Initially treated for GAY/UTI but also started on heparin drip due to DVT/PE.  Following initiation of heparin drip, patient with bright red blood per rectum.  Hemoglobin had declined from 9.7 on admission to 6.9 with associated symptoms and hemodynamic changes.      Patient ultimately transferred to [facility] for closer monitoring and GI/IR support over the weekend.  Yesterday and overnight, patient with episodes of hypotension along with symptomatic anemia.  Patient with further decline in hemoglobin down to 6.9 requiring additional PRBC transfusion.  Patient also with bloody bowel movements.  This was occurring in the setting of a supratherapeutic PTT which was unable to be calculated.  Heparin drip placed on hold and PTT improving.  Patient also has now appropriately responded to 2 units of PRBC.  Hemodynamics remained stable. She underwent EGD + colonoscopy on 05/05/25, which showed C4M7 Wang's, old blood near the sigmoid diverticuli and some proximal colonic barotrauma. She was bridged with lovenox and had more hematochezia. She remains stable. Hb slightly dropped    Plan:  Obtain CTA to determine active bleed and help with localization  Discussions should also be had regarding risks vs benefits of AC for her acute PE. She is comfortable and saturating well on RA at this point  Trend H&H; transfuse for goal of >7.0   Monitor hemodynamics; avoid episodes of hypotension   Maintain adequate IV  access with 2 large bore Ivs  Additional pain and symptom management per primary team  Acute pulmonary embolism (HCC)  Imaging demonstrating extensive right and lower lobe pulmonary embolism on/20 8/2025 CTA.  Previously on warfarin at home.  Patient placed on heparin drip but currently on hold in setting of GI bleeding and supratherapeutic PTT  Acute DVT (deep venous thrombosis) (HCC)  Patient with prior history of DVT.  Found to have acute versus subacute bilateral DVTs this admission.  Patient placed on heparin drip but currently on hold in setting of GI bleeding and supratherapeutic PTT  Wang's esophagus   - previously biopsied on her EGD @ OSH on 04/22/25. No dysplasia. Consider repeat EGD in 3 years, although she will be 83 at that point    Subjective    NAEO. Had 2 moderate episdoes of hematochezia this morning. First with clots and then a pint's worth of BRBPR. Currently getting blood and FFP to reverse lovenox    Objective :  Temp:  [96.7 °F (35.9 °C)-98.7 °F (37.1 °C)] 96.7 °F (35.9 °C)  HR:  [60-88] 64  BP: ()/(57-88) 114/57  Resp:  [15-18] 18  SpO2:  [93 %-100 %] 95 %  O2 Device: None (Room air)    Physical Exam  Constitutional:       General: She is not in acute distress.     Appearance: She is normal weight. She is not ill-appearing or toxic-appearing.   HENT:      Head: Normocephalic.      Nose: Nose normal. No congestion.   Eyes:      General: No scleral icterus.  Cardiovascular:      Rate and Rhythm: Normal rate.      Pulses: Normal pulses.   Pulmonary:      Effort: Pulmonary effort is normal. No respiratory distress.   Abdominal:      General: Abdomen is flat. There is no distension.      Palpations: Abdomen is soft.      Tenderness: There is no abdominal tenderness. There is no guarding or rebound.   Musculoskeletal:      Cervical back: Normal range of motion.   Skin:     General: Skin is warm.      Capillary Refill: Capillary refill takes less than 2 seconds.      Coloration: Skin is not  pale.   Neurological:      Mental Status: She is alert and oriented to person, place, and time. Mental status is at baseline.   Psychiatric:         Mood and Affect: Mood normal.         Behavior: Behavior normal.     Result Review: I have reviewed all relevant labs, imaging and procedure notes/images.

## 2025-05-07 NOTE — PLAN OF CARE
Problem: PAIN - ADULT  Goal: Verbalizes/displays adequate comfort level or baseline comfort level  Description: Interventions:- Encourage patient to monitor pain and request assistance- Assess pain using appropriate pain scale- Administer analgesics based on type and severity of pain and evaluate response- Implement non-pharmacological measures as appropriate and evaluate response- Consider cultural and social influences on pain and pain management- Notify physician/advanced practitioner if interventions unsuccessful or patient reports new pain  5/7/2025 0432 by Monse Davison RN  Outcome: Progressing  5/7/2025 0432 by Monse Davison RN  Outcome: Progressing     Problem: INFECTION - ADULT  Goal: Absence or prevention of progression during hospitalization  Description: INTERVENTIONS:- Assess and monitor for signs and symptoms of infection- Monitor lab/diagnostic results- Monitor all insertion sites, i.e. indwelling lines, tubes, and drains- Monitor endotracheal if appropriate and nasal secretions for changes in amount and color- [place] appropriate cooling/warming therapies per order- Administer medications as ordered- Instruct and encourage patient and family to use good hand hygiene technique- Identify and instruct in appropriate isolation precautions for identified infection/condition  5/7/2025 0432 by Monse Davison RN  Outcome: Progressing  5/7/2025 0432 by Monse Davison RN  Outcome: Progressing  Goal: Absence of fever/infection during neutropenic period  Description: INTERVENTIONS:- Monitor WBC  5/7/2025 0432 by Monse Davison RN  Outcome: Progressing  5/7/2025 0432 by Monse Davison RN  Outcome: Progressing     Problem: SAFETY ADULT  Goal: Patient will remain free of falls  Description: INTERVENTIONS:- Educate patient/family on patient safety including physical limitations- Instruct patient to call for assistance with activity - Consult OT/PT to assist with strengthening/mobility - Keep Call bell within  reach- Keep bed low and locked with side rails adjusted as appropriate- Keep care items and personal belongings within reach- Initiate and maintain comfort rounds- Make Fall Risk Sign visible to staff- Offer Toileting every 2 Hours, in advance of need- Initiate/Maintain bed alarm- Obtain necessary fall risk management equipment: - Apply yellow socks and bracelet for high fall risk patients- Consider moving patient to room near nurses station  5/7/2025 0432 by Monse Davison RN  Outcome: Progressing  5/7/2025 0432 by Monse Davison RN  Outcome: Progressing  Goal: Maintain or return to baseline ADL function  Description: INTERVENTIONS:-  Assess patient's ability to carry out ADLs; assess patient's baseline for ADL function and identify physical deficits which impact ability to perform ADLs (bathing, care of mouth/teeth, toileting, grooming, dressing, etc.)- Assess/evaluate cause of self-care deficits - Assess range of motion- Assess patient's mobility; develop plan if impaired- Assess patient's need for assistive devices and provide as appropriate- Encourage maximum independence but intervene and supervise when necessary- Involve family in performance of ADLs- Assess for home care needs following discharge - Consider OT consult to assist with ADL evaluation and planning for discharge- Provide patient education as appropriate  5/7/2025 0432 by Monse Davison RN  Outcome: Progressing  5/7/2025 0432 by Monse Davison RN  Outcome: Progressing  Goal: Maintains/Returns to pre admission functional level  Description: INTERVENTIONS:- Perform AM-PAC 6 Click Basic Mobility/ Daily Activity assessment daily.- Set and communicate daily mobility goal to care team and patient/family/caregiver. - Collaborate with rehabilitation services on mobility goals if consulted- Perform Range of Motion 3 times a day.- Reposition patient every 2 hours.- Dangle patient 3 times a day- Stand patient 3 times a day- Ambulate patient 3 times a day-  Out of bed to chair 3 times a day - Out of bed for meals 3 times a day- Out of bed for toileting- Record patient progress and toleration of activity level   5/7/2025 0432 by Monse Davison RN  Outcome: Progressing  5/7/2025 0432 by Monse Davison RN  Outcome: Progressing     Problem: DISCHARGE PLANNING  Goal: Discharge to home or other facility with appropriate resources  Description: INTERVENTIONS:- Identify barriers to discharge w/patient and caregiver- Arrange for needed discharge resources and transportation as appropriate- Identify discharge learning needs (meds, wound care, etc.)- Arrange for interpretive services to assist at discharge as needed- Refer to Case Management Department for coordinating discharge planning if the patient needs post-hospital services based on physician/advanced practitioner order or complex needs related to functional status, cognitive ability, or social support system  5/7/2025 0432 by Monse Davison RN  Outcome: Progressing  5/7/2025 0432 by Monse Davison RN  Outcome: Progressing     Problem: Knowledge Deficit  Goal: Patient/family/caregiver demonstrates understanding of disease process, treatment plan, medications, and discharge instructions  Description: Complete learning assessment and assess knowledge base.Interventions:- Provide teaching at level of understanding- Provide teaching via preferred learning methods  5/7/2025 0432 by Monse Davison RN  Outcome: Progressing  5/7/2025 0432 by Monse Davison RN  Outcome: Progressing     Problem: Nutrition/Hydration-ADULT  Goal: Nutrient/Hydration intake appropriate for improving, restoring or maintaining nutritional needs  Description: Monitor and assess patient's nutrition/hydration status for malnutrition. Collaborate with interdisciplinary team and initiate plan and interventions as ordered.  Monitor patient's weight and dietary intake as ordered or per policy. Utilize nutrition screening tool and intervene as necessary.  Determine patient's food preferences and provide high-protein, high-caloric foods as appropriate. INTERVENTIONS:- Monitor oral intake, urinary output, labs, and treatment plans- Assess nutrition and hydration status and recommend course of action- Evaluate amount of meals eaten- Assist patient with eating if necessary - Allow adequate time for meals- Recommend/ encourage appropriate diets, oral nutritional supplements, and vitamin/mineral supplements- Order, calculate, and assess calorie counts as needed- Recommend, monitor, and adjust tube feedings and TPN/PPN based on assessed needs- Assess need for intravenous fluids- Provide specific nutrition/hydration education as appropriate- Include patient/family/caregiver in decisions related to nutrition  Monitor and assess patient's nutrition/hydration status for malnutrition. Collaborate with interdisciplinary team and initiate plan and interventions as ordered.  Monitor patient's weight and dietary intake as ordered or per policy. Utilize nutrition screening tool and intervene as necessary. Determine patient's food preferences and provide high-protein, high-caloric foods as appropriate. INTERVENTIONS:- Monitor oral intake, urinary output, labs, and treatment plans- Assess nutrition and hydration status and recommend course of action- Evaluate amount of meals eaten- Assist patient with eating if necessary - Allow adequate time for meals- Recommend/ encourage appropriate diets, oral nutritional supplements, and vitamin/mineral supplements- Order, calculate, and assess calorie counts as needed- Recommend, monitor, and adjust tube feedings and TPN/PPN based on assessed needs- Assess need for intravenous fluids- Provide specific nutrition/hydration education as appropriate- Include patient/family/caregiver in decisions related to nutrition  5/7/2025 0432 by Monse Davison RN  Outcome: Progressing  5/7/2025 0432 by Monse Davison RN  Outcome: Progressing     Problem:  Prexisting or High Potential for Compromised Skin Integrity  Goal: Skin integrity is maintained or improved  Description: INTERVENTIONS:- Identify patients at risk for skin breakdown- Assess and monitor skin integrity- Assess and monitor nutrition and hydration status- Monitor labs - Assess for incontinence - Turn and reposition patient- Assist with mobility/ambulation- Relieve pressure over bony prominences- Avoid friction and shearing- Provide appropriate hygiene as needed including keeping skin clean and dry- Evaluate need for skin moisturizer/barrier cream- Collaborate with interdisciplinary team - Patient/family teaching- Consider wound care consult   5/7/2025 0432 by Monse Davison RN  Outcome: Progressing  5/7/2025 0432 by Monse Davison RN  Outcome: Progressing     Problem: HEMATOLOGIC - ADULT  Goal: Maintains hematologic stability  Description: INTERVENTIONS- Assess for signs and symptoms of bleeding or hemorrhage- Monitor labs- Administer supportive blood products/factors as ordered and appropriate  Outcome: Progressing

## 2025-05-08 ENCOUNTER — APPOINTMENT (INPATIENT)
Dept: CT IMAGING | Facility: HOSPITAL | Age: 80
DRG: 377 | End: 2025-05-08
Payer: COMMERCIAL

## 2025-05-08 PROBLEM — R11.2 NAUSEA AND VOMITING: Status: RESOLVED | Noted: 2025-04-26 | Resolved: 2025-05-08

## 2025-05-08 PROBLEM — R47.89 WORD FINDING DIFFICULTY: Status: ACTIVE | Noted: 2025-05-08

## 2025-05-08 LAB
2HR DELTA HS TROPONIN: 3 NG/L
ABO GROUP BLD BPU: NORMAL
ALBUMIN SERPL BCG-MCNC: 2.6 G/DL (ref 3.5–5)
ALP SERPL-CCNC: 55 U/L (ref 34–104)
ALT SERPL W P-5'-P-CCNC: 5 U/L (ref 7–52)
ANION GAP SERPL CALCULATED.3IONS-SCNC: 9 MMOL/L (ref 4–13)
ANION GAP SERPL CALCULATED.3IONS-SCNC: 9 MMOL/L (ref 4–13)
AST SERPL W P-5'-P-CCNC: 13 U/L (ref 13–39)
ATRIAL RATE: 78 BPM
BASOPHILS # BLD AUTO: 0.01 THOUSANDS/ÂΜL (ref 0–0.1)
BASOPHILS NFR BLD AUTO: 0 % (ref 0–1)
BILIRUB SERPL-MCNC: 0.72 MG/DL (ref 0.2–1)
BPU ID: NORMAL
BUN SERPL-MCNC: 6 MG/DL (ref 5–25)
BUN SERPL-MCNC: 8 MG/DL (ref 5–25)
CALCIUM ALBUM COR SERPL-MCNC: 9.5 MG/DL (ref 8.3–10.1)
CALCIUM SERPL-MCNC: 8 MG/DL (ref 8.4–10.2)
CALCIUM SERPL-MCNC: 8.4 MG/DL (ref 8.4–10.2)
CARDIAC TROPONIN I PNL SERPL HS: 18 NG/L (ref ?–50)
CARDIAC TROPONIN I PNL SERPL HS: 21 NG/L (ref ?–50)
CHLORIDE SERPL-SCNC: 100 MMOL/L (ref 96–108)
CHLORIDE SERPL-SCNC: 100 MMOL/L (ref 96–108)
CHOLEST SERPL-MCNC: 107 MG/DL (ref ?–200)
CO2 SERPL-SCNC: 28 MMOL/L (ref 21–32)
CO2 SERPL-SCNC: 30 MMOL/L (ref 21–32)
CREAT SERPL-MCNC: 0.88 MG/DL (ref 0.6–1.3)
CREAT SERPL-MCNC: 0.9 MG/DL (ref 0.6–1.3)
CROSSMATCH: NORMAL
EOSINOPHIL # BLD AUTO: 0.06 THOUSAND/ÂΜL (ref 0–0.61)
EOSINOPHIL NFR BLD AUTO: 2 % (ref 0–6)
ERYTHROCYTE [DISTWIDTH] IN BLOOD BY AUTOMATED COUNT: 16 % (ref 11.6–15.1)
ERYTHROCYTE [DISTWIDTH] IN BLOOD BY AUTOMATED COUNT: 16.3 % (ref 11.6–15.1)
EST. AVERAGE GLUCOSE BLD GHB EST-MCNC: 128 MG/DL
GFR SERPL CREATININE-BSD FRML MDRD: 60 ML/MIN/1.73SQ M
GFR SERPL CREATININE-BSD FRML MDRD: 61 ML/MIN/1.73SQ M
GLUCOSE SERPL-MCNC: 88 MG/DL (ref 65–140)
GLUCOSE SERPL-MCNC: 88 MG/DL (ref 65–140)
GLUCOSE SERPL-MCNC: 94 MG/DL (ref 65–140)
HBA1C MFR BLD: 6.1 %
HCT VFR BLD AUTO: 23.5 % (ref 34.8–46.1)
HCT VFR BLD AUTO: 23.6 % (ref 34.8–46.1)
HCT VFR BLD AUTO: 23.6 % (ref 34.8–46.1)
HDLC SERPL-MCNC: 48 MG/DL
HGB BLD-MCNC: 7.9 G/DL (ref 11.5–15.4)
HGB BLD-MCNC: 8.2 G/DL (ref 11.5–15.4)
HGB BLD-MCNC: 8.2 G/DL (ref 11.5–15.4)
IMM GRANULOCYTES # BLD AUTO: 0.02 THOUSAND/UL (ref 0–0.2)
IMM GRANULOCYTES NFR BLD AUTO: 1 % (ref 0–2)
INR PPP: 1.69 (ref 0.85–1.19)
LDLC SERPL CALC-MCNC: 42 MG/DL (ref 0–100)
LYMPHOCYTES # BLD AUTO: 0.83 THOUSANDS/ÂΜL (ref 0.6–4.47)
LYMPHOCYTES NFR BLD AUTO: 22 % (ref 14–44)
MCH RBC QN AUTO: 28.2 PG (ref 26.8–34.3)
MCH RBC QN AUTO: 28.4 PG (ref 26.8–34.3)
MCHC RBC AUTO-ENTMCNC: 33.6 G/DL (ref 31.4–37.4)
MCHC RBC AUTO-ENTMCNC: 34.7 G/DL (ref 31.4–37.4)
MCV RBC AUTO: 82 FL (ref 82–98)
MCV RBC AUTO: 84 FL (ref 82–98)
MONOCYTES # BLD AUTO: 0.32 THOUSAND/ÂΜL (ref 0.17–1.22)
MONOCYTES NFR BLD AUTO: 8 % (ref 4–12)
NEUTROPHILS # BLD AUTO: 2.58 THOUSANDS/ÂΜL (ref 1.85–7.62)
NEUTS SEG NFR BLD AUTO: 67 % (ref 43–75)
NRBC BLD AUTO-RTO: 1 /100 WBCS
P AXIS: 12 DEGREES
PLATELET # BLD AUTO: 107 THOUSANDS/UL (ref 149–390)
PLATELET # BLD AUTO: 110 THOUSANDS/UL (ref 149–390)
PMV BLD AUTO: 10.3 FL (ref 8.9–12.7)
PMV BLD AUTO: 10.5 FL (ref 8.9–12.7)
POTASSIUM SERPL-SCNC: 3.1 MMOL/L (ref 3.5–5.3)
POTASSIUM SERPL-SCNC: 3.1 MMOL/L (ref 3.5–5.3)
PR INTERVAL: 154 MS
PROT SERPL-MCNC: 4.9 G/DL (ref 6.4–8.4)
PROTHROMBIN TIME: 19.9 SECONDS (ref 12.3–15)
QRS AXIS: -14 DEGREES
QRSD INTERVAL: 98 MS
QT INTERVAL: 392 MS
QTC INTERVAL: 447 MS
RBC # BLD AUTO: 2.8 MILLION/UL (ref 3.81–5.12)
RBC # BLD AUTO: 2.89 MILLION/UL (ref 3.81–5.12)
SODIUM SERPL-SCNC: 137 MMOL/L (ref 135–147)
SODIUM SERPL-SCNC: 139 MMOL/L (ref 135–147)
T WAVE AXIS: 151 DEGREES
TRIGL SERPL-MCNC: 87 MG/DL (ref ?–150)
UNIT DISPENSE STATUS: NORMAL
UNIT PRODUCT CODE: NORMAL
UNIT PRODUCT VOLUME: 203 ML
UNIT PRODUCT VOLUME: 250 ML
UNIT PRODUCT VOLUME: 294 ML
UNIT PRODUCT VOLUME: 297 ML
UNIT PRODUCT VOLUME: 500 ML
UNIT RH: NORMAL
VENTRICULAR RATE: 78 BPM
WBC # BLD AUTO: 3.82 THOUSAND/UL (ref 4.31–10.16)
WBC # BLD AUTO: 4.24 THOUSAND/UL (ref 4.31–10.16)

## 2025-05-08 PROCEDURE — 85018 HEMOGLOBIN: CPT | Performed by: INTERNAL MEDICINE

## 2025-05-08 PROCEDURE — 93010 ELECTROCARDIOGRAM REPORT: CPT

## 2025-05-08 PROCEDURE — 97530 THERAPEUTIC ACTIVITIES: CPT

## 2025-05-08 PROCEDURE — 85027 COMPLETE CBC AUTOMATED: CPT | Performed by: NURSE PRACTITIONER

## 2025-05-08 PROCEDURE — 85014 HEMATOCRIT: CPT | Performed by: INTERNAL MEDICINE

## 2025-05-08 PROCEDURE — 70496 CT ANGIOGRAPHY HEAD: CPT

## 2025-05-08 PROCEDURE — 83036 HEMOGLOBIN GLYCOSYLATED A1C: CPT

## 2025-05-08 PROCEDURE — NC001 PR NO CHARGE: Performed by: INTERNAL MEDICINE

## 2025-05-08 PROCEDURE — 99232 SBSQ HOSP IP/OBS MODERATE 35: CPT | Performed by: STUDENT IN AN ORGANIZED HEALTH CARE EDUCATION/TRAINING PROGRAM

## 2025-05-08 PROCEDURE — 92526 ORAL FUNCTION THERAPY: CPT

## 2025-05-08 PROCEDURE — 93005 ELECTROCARDIOGRAM TRACING: CPT

## 2025-05-08 PROCEDURE — 80048 BASIC METABOLIC PNL TOTAL CA: CPT | Performed by: NURSE PRACTITIONER

## 2025-05-08 PROCEDURE — 99232 SBSQ HOSP IP/OBS MODERATE 35: CPT | Performed by: INTERNAL MEDICINE

## 2025-05-08 PROCEDURE — 85018 HEMOGLOBIN: CPT

## 2025-05-08 PROCEDURE — 99291 CRITICAL CARE FIRST HOUR: CPT

## 2025-05-08 PROCEDURE — 82948 REAGENT STRIP/BLOOD GLUCOSE: CPT

## 2025-05-08 PROCEDURE — 97535 SELF CARE MNGMENT TRAINING: CPT

## 2025-05-08 PROCEDURE — 80061 LIPID PANEL: CPT

## 2025-05-08 PROCEDURE — 80053 COMPREHEN METABOLIC PANEL: CPT | Performed by: INTERNAL MEDICINE

## 2025-05-08 PROCEDURE — 99233 SBSQ HOSP IP/OBS HIGH 50: CPT

## 2025-05-08 PROCEDURE — 97110 THERAPEUTIC EXERCISES: CPT

## 2025-05-08 PROCEDURE — 85610 PROTHROMBIN TIME: CPT | Performed by: INTERNAL MEDICINE

## 2025-05-08 PROCEDURE — 70498 CT ANGIOGRAPHY NECK: CPT

## 2025-05-08 PROCEDURE — 97116 GAIT TRAINING THERAPY: CPT

## 2025-05-08 PROCEDURE — 85025 COMPLETE CBC W/AUTO DIFF WBC: CPT | Performed by: INTERNAL MEDICINE

## 2025-05-08 PROCEDURE — 85014 HEMATOCRIT: CPT

## 2025-05-08 PROCEDURE — 84484 ASSAY OF TROPONIN QUANT: CPT | Performed by: NURSE PRACTITIONER

## 2025-05-08 RX ORDER — POTASSIUM CHLORIDE 14.9 MG/ML
20 INJECTION INTRAVENOUS
Status: COMPLETED | OUTPATIENT
Start: 2025-05-08 | End: 2025-05-08

## 2025-05-08 RX ORDER — ASPIRIN 81 MG/1
81 TABLET, CHEWABLE ORAL DAILY
Status: DISCONTINUED | OUTPATIENT
Start: 2025-05-08 | End: 2025-05-10

## 2025-05-08 RX ADMIN — MICONAZOLE NITRATE: 20 CREAM TOPICAL at 09:21

## 2025-05-08 RX ADMIN — POTASSIUM CHLORIDE 20 MEQ: 14.9 INJECTION, SOLUTION INTRAVENOUS at 08:49

## 2025-05-08 RX ADMIN — POTASSIUM CHLORIDE 20 MEQ: 14.9 INJECTION, SOLUTION INTRAVENOUS at 11:25

## 2025-05-08 RX ADMIN — ASPIRIN 81 MG: 81 TABLET, CHEWABLE ORAL at 15:03

## 2025-05-08 RX ADMIN — MIRTAZAPINE 7.5 MG: 7.5 TABLET, FILM COATED ORAL at 22:36

## 2025-05-08 RX ADMIN — PRAVASTATIN SODIUM 80 MG: 80 TABLET ORAL at 17:34

## 2025-05-08 RX ADMIN — PANTOPRAZOLE SODIUM 40 MG: 40 TABLET, DELAYED RELEASE ORAL at 17:34

## 2025-05-08 RX ADMIN — MICONAZOLE NITRATE: 20 CREAM TOPICAL at 17:34

## 2025-05-08 RX ADMIN — IOHEXOL 85 ML: 350 INJECTION, SOLUTION INTRAVENOUS at 11:54

## 2025-05-08 RX ADMIN — POTASSIUM CHLORIDE 20 MEQ: 14.9 INJECTION, SOLUTION INTRAVENOUS at 20:55

## 2025-05-08 RX ADMIN — POTASSIUM CHLORIDE 20 MEQ: 14.9 INJECTION, SOLUTION INTRAVENOUS at 18:17

## 2025-05-08 RX ADMIN — LEVOTHYROXINE SODIUM 75 MCG: 75 TABLET ORAL at 05:33

## 2025-05-08 NOTE — QUICK NOTE
Addendum: Patient was a rapid response this morning after my initial interview/exam.  Patient had acute onset of expressive aphasia, word finding difficulties as well as nonsensical speech.    Rapid response and then stroke alert was called  Physical exam: Per critical care as rapid response note: Patient with expressive aphasia but no other focal deficits.  CT head/CTA head/neck without acute abnormality    Case was discussed with the neurology team:   Patient will proceed on the stroke pathway: Aspirin/statin  Patient had recent echocardiogram 4/29: Do not need to repeat  MRI: Pending      Patient was reexamined after returning from CAT scan  Sitting in the chair.  Makes eye contact, speaking with multiword sentences in multiple sentences paragraphs however still with nonsensical speech at times and occasional pausing for word finding.  At 1 point she appeared to saying something up on the ceiling.      Continue stroke pathway, neurochecks  Continue telemetry for stroke pathway    If MRI unremarkable suspect patient's presentation may be secondary to toxic metabolic encephalopathy from multiple recent insults to her brain including initial UTI, bilateral PE/DVT, hypotension from acute blood loss anemia and GI bleed, and anesthesia from procedures    Updated patient's son at the bedside during initial rapid response, and then updated him after CT results are back and discussion with neurology team

## 2025-05-08 NOTE — PLAN OF CARE
Problem: OCCUPATIONAL THERAPY ADULT  Goal: Performs self-care activities at highest level of function for planned discharge setting.  See evaluation for individualized goals.  Description: Treatment Interventions: ADL retraining, Functional transfer training, UE strengthening/ROM, Endurance training, Cognitive reorientation, Patient/family training, Equipment evaluation/education, Compensatory technique education, Activityengagement, Energy conservation          See flowsheet documentation for full assessment, interventions and recommendations.   Outcome: Progressing  Note: Limitation: Decreased ADL status, Decreased UE strength, Decreased Safe judgement during ADL, Decreased cognition, Decreased endurance, Decreased self-care trans, Decreased high-level ADLs  Prognosis: Fair, Guarded  Assessment: Pt seen for skilled OT tx this date. Tx focused on improving strength, activity tolerance and balance, safety awareness to increase independence with self care tasks. Pt tolerated session fair. Pt was limited by c/o dizziness. Pt currently NPO for possible IR intervention later.  Pt performed UB dressing/ bathing with [place], LB dressing / bathing maxA, bed mobility rolling [place] and supine<> sit modA, transfers modA, mobility RW lateral side step modA- limited by dizziness. Pt demonstrated poor standing balance during functional tasks.Pt demonstrated ability to safely and appropriately attend to all tasks during session. Pt required max verbal cuing during session to safely complete tasks. Vitals: in supine 128/73.Current OT DC recommendations for pt is level 2 resources.     Rehab Resource Intensity Level, OT: II (Moderate Resource Intensity)

## 2025-05-08 NOTE — RESTORATIVE TECHNICIAN NOTE
Restorative Technician Note      Patient Name: Alecia Kay     Restorative Tech Visit Date: 05/08/25  Note Type: Mobility  Patient Position Upon Consult: Supine  Activity Performed: Repositioned  Patient Position at End of Consult: All needs within reach; Supine; Bed/Chair alarm activated      ns

## 2025-05-08 NOTE — ASSESSMENT & PLAN NOTE
Patient with prior history of DVT.  Found to have acute versus subacute bilateral DVTs this admission.  Pt had LGIB on hep gtt and therapeutic lovenox. Awaiting IVC filter placement

## 2025-05-08 NOTE — PROGRESS NOTES
Progress Note - Gastroenterology   Name: Alecia Kay 80 y.o. female I MRN: [id number]  Unit/Bed#: E4 -01 I Date of Admission: 5/2/2025   Date of Service: 5/8/2025 I Hospital Day: 6    Assessment & Plan  Hematochezia  80 female with a past medical history of VTE on coumadin prior to admission, GERD with Wang's esophagus, hypertension, HLD, chronic osteomyelitis, and hypothyroidism.  Patient initially presented with generalized weakness and failure to thrive with poor oral intake and a 70 pound weight loss.  Initially treated for GAY/UTI but also started on heparin drip due to DVT/PE.  Following initiation of heparin drip, patient with bright red blood per rectum.  Hemoglobin had declined from 9.7 on admission to 6.9 with associated symptoms and hemodynamic changes.      Patient ultimately transferred to [facility] for closer monitoring and GI/IR support over the weekend.  Yesterday and overnight, patient with episodes of hypotension along with symptomatic anemia.  Patient with further decline in hemoglobin down to 6.9 requiring additional PRBC transfusion.  Patient also with bloody bowel movements.  This was occurring in the setting of a supratherapeutic PTT which was unable to be calculated.  Heparin drip placed on hold and PTT improving.  Patient also has now appropriately responded to 2 units of PRBC.  Hemodynamics remained stable. She underwent EGD + colonoscopy on 05/05/25, which showed C4M7 Wang's, old blood near the sigmoid diverticuli and some proximal colonic barotrauma. She was bridged with lovenox and had more hematochezia. She remains stable. She was given pRBC and FFP. CTA neg. She has since had brown stool. Pending IVC filter placement today    Plan:  Agree w/ IVC filter placement  Despite active PE, she is clinically well and hopefully can avoid additional anticoagulation for now given c/f recurrent diverticular bleed  Trend H&H; transfuse for goal of >7.0   Monitor hemodynamics; avoid  episodes of hypotension   Maintain adequate IV access with 2 large bore Ivs  Additional pain and symptom management per primary team  Acute pulmonary embolism (HCC)  Imaging demonstrating extensive right and lower lobe pulmonary embolism on/20 8/2025 CTA.  Previously on warfarin at home. Pt had LGIB on hep gtt and therapeutic lovenox. She is satting well on RA and w/o hypoxia.  Acute DVT (deep venous thrombosis) (HCC)  Patient with prior history of DVT.  Found to have acute versus subacute bilateral DVTs this admission.  Pt had LGIB on hep gtt and therapeutic lovenox. Awaiting IVC filter placement  Wang's esophagus   - previously biopsied on her EGD @ OSH on 04/22/25. No dysplasia. Consider repeat EGD in 3 years, although she will be 83 at that point and risks of continued surveillance may outweigh benefits   - c/w PPI    Subjective    NAEO. No additional hematochezia. Had loose brown stool ovn. She is hungry but is NPO for IVCF placement today.    Objective :  Temp:  [90.4 °F (32.4 °C)-98.1 °F (36.7 °C)] 97.6 °F (36.4 °C)  HR:  [61-72] 69  BP: (112-124)/(57-76) 117/62  Resp:  [17-20] 20  SpO2:  [92 %-98 %] 95 %  O2 Device: None (Room air)    Physical Exam  Constitutional:       General: She is not in acute distress.     Appearance: She is normal weight. She is not ill-appearing or toxic-appearing.   HENT:      Head: Normocephalic.      Nose: Nose normal. No congestion.   Eyes:      General: No scleral icterus.  Cardiovascular:      Rate and Rhythm: Normal rate.      Pulses: Normal pulses.   Pulmonary:      Effort: Pulmonary effort is normal. No respiratory distress.   Abdominal:      General: Abdomen is flat. There is no distension.      Palpations: Abdomen is soft.      Tenderness: There is no abdominal tenderness. There is no guarding or rebound.   Musculoskeletal:      Cervical back: Normal range of motion.   Skin:     General: Skin is warm.      Capillary Refill: Capillary refill takes less than 2 seconds.       Coloration: Skin is not pale.   Neurological:      Mental Status: She is alert and oriented to person, place, and time. Mental status is at baseline.   Psychiatric:         Mood and Affect: Mood normal.         Behavior: Behavior normal.     Result Review: I have reviewed all relevant labs, imaging and procedure notes/images.

## 2025-05-08 NOTE — CONSULTS
Consultation - Neurology   Name: Alecia Kay 80 y.o. female I MRN: [id number]  Unit/Bed#: E4 -01 I Date of Admission: 5/2/2025   Date of Service: 5/8/2025 I Hospital Day: 6   Inpatient consult to Neurology  Consult performed by: CARLOS Fair  Consult ordered by: CARLOS Angelo        Physician Requesting Evaluation: Monique Gonzalez MD   Reason for Evaluation / Principal Problem: Stroke    Assessment & Plan  Word finding difficulty  80 y.o.  female with history of HTN, DVT/PE on Coumadin (patient denied taking the month PTA), CKD and right heel osteomyelitis who is currently admitted for GAY, UTI (completed course of ABX), acute vs subacute bilateral DVTs and PE in the setting of subtherapeutic INR (1.18).      Hospitalization complicated by hematochezia in the setting of diverticular bleed after being placed on heparin gtt (planned to bridge back to home coumadin as patient had been non compliant 1 month PTA).  AC was held and eventually restarted as bleed had resolved.  She was transitioned to Lovenox due to difficulty obtaining PTTs, and she was bridged to Coumadin the evening of 5/6.  In the early morning of 5/7, patient had multiple episodes of bright red blood per rectum and AC was held again.  She has received packed red blood cells and FFP.    Stroke alert on 5/8 for word finding difficulty, LKN 9:30 AM.  She has been on AC since 5/7.  NIHSS 2 (LLE drift).   CT imaging negative for acute intracranial abnormalities, significant intracranial stenosis, LVO or aneurysm.   She was not a candidate for TNK due to bleeding risk, no IR target.    Patient reports she felt closer to baseline after CT imaging was completed.    Plan:  - Stroke pathway  - Discussed with primary team, will initiate Aspirin 81 mg daily with no load. Monitor PLT  - Initiate Atorvastatin 40 mg qHS   - Recommend MRI brain wo, lipid panel and hemoglobin A1c   - Echo completed 4/29/2025: EF 65%, mildly dilated atrium  bilaterally  - Recommend SBP goal 120-140, avoid hypotension   - Euglycemic, normothermic goal  - Continue telemetry  - PT/OT/ST  - Secondary risk factor modification.   - Stroke education  - Frequent neuro checks. Continue to monitor and notify neurology with any changes.  - STAT CT head for any acute change in neuro exam  - Medical management and supportive care per primary team. Correction of any metabolic or infectious disturbances.     Plan discussed with Attending Neurologist, please see attestation for further input/recommendations.      Thrombolytic Decision: Patient not a candidate. Bleeding risk.  Recurrent GI bleeds, currently bleeding per rectum    Recommendations for outpatient neurological follow up have yet to be determined.    History of Present Illness   Hx and PE limited by: Patient appears to be poor historian, history obtained per RN and chart review  Patient last known well: 0930  Stroke alert called: 1140  Neurology time of arrival: 1141  HPI: Alecia Kay is a 80 y.o.  female with history of HTN, DVT/PE on Coumadin (patient denied taking the month PTA), CKD and right heel osteomyelitis initially presented to [facility] for nausea/vomiting resulting in 70 lb weight loss and generalized weakness.  She was found to have GAY and UTI (completed course of ABX).  She was subsequently found to have acute bilateral acute versus subacute DVTs and PE, INR subtherapeutic (1.18).  She was initiated on heparin drip, however was a rapid response for hematochezia after initiation.  Patient was then transferred to [facility] due to lack of GI services at [facility].    Heparin drip was restarted after discussion between primary team and GI, however was discontinued again after passing a large amount of hematochezia.  Per GI, suspect diverticular bleed that had resolved and heparin drip was restarted once again.  This was transition to Lovenox as it was difficult to obtain PTTs, patient was restarted on Coumadin the evening  of 5/6.  In the early morning of 5/7, patient had multiple episodes of bright red blood per rectum and AC was held.  She has received packed red blood cells and FFP.    Rapid response and stroke alert initiated the afternoon of 5/8 for aphasia.  VERONA 9:30 AM per primary RN.  RN went to evaluate patient at approximately 1130 and noted she had word finding difficulty.  NIHSS 2 (2 points for LLE drift).  Initial CT imaging negative for acute intracranial abnormality, significant intracranial stenosis, LVO or aneurysm.  Patient was not a candidate for TNK due to bleeding risk, no IR target.  Patient reports she felt closer to baseline after CT imaging was completed.  Out of concern for acute ischemia, patient was placed on the stroke pathway.        Review of Systems   Unable to perform ROS: Acuity of condition     Historical Information   Past Medical History:   Diagnosis Date    Anemia     Cellulitis     Disease of thyroid gland     GERD (gastroesophageal reflux disease)     Hyperlipidemia     Hypertension     Obesity     Osteomyelitis (HCC)     Pneumonia     Pulmonary embolism (HCC)      Past Surgical History:   Procedure Laterality Date    FOOT SURGERY      JOINT REPLACEMENT       Social History     Tobacco Use    Smoking status: Never     Passive exposure: Never    Smokeless tobacco: Never   Vaping Use    Vaping status: Never Used   Substance and Sexual Activity    Alcohol use: Never    Drug use: Never    Sexual activity: Not on file     E-Cigarette/Vaping    E-Cigarette Use Never User      E-Cigarette/Vaping Substances     History reviewed. No pertinent family history.  Social History     Tobacco Use    Smoking status: Never     Passive exposure: Never    Smokeless tobacco: Never   Vaping Use    Vaping status: Never Used   Substance and Sexual Activity    Alcohol use: Never    Drug use: Never    Sexual activity: Not on file       Current Facility-Administered Medications:     acetaminophen (TYLENOL) tablet 650 mg,  Q6H PRN    [Held by provider] amLODIPine (NORVASC) tablet 10 mg, Daily    bisacodyl (DULCOLAX) EC tablet 10 mg, See Admin Instructions    [Held by provider] enoxaparin (LOVENOX) subcutaneous injection 80 mg, Q12H MELANY    hydrOXYzine HCL (ATARAX) tablet 25 mg, Q8H PRN    levothyroxine tablet 75 mcg, Early Morning    meclizine (ANTIVERT) tablet 25 mg, TID PRN    [Held by provider] metoprolol tartrate (LOPRESSOR) tablet 50 mg, Q12H MELANY    mirtazapine (REMERON) tablet 7.5 mg, HS    moisture barrier miconazole 2% cream (aka EDNA MOISTURE BARRIER ANTIFUNGAL CREAM), BID    ondansetron (ZOFRAN) injection 4 mg, Q4H PRN    pantoprazole (PROTONIX) EC tablet 40 mg, BID AC    potassium chloride 20 mEq IVPB (premix), Q2H, Last Rate: 20 mEq (05/08/25 1125)    pravastatin (PRAVACHOL) tablet 80 mg, Daily With Dinner    [Held by provider] senna-docusate sodium (SENOKOT S) 8.6-50 mg per tablet 1 tablet, Daily    [Held by provider] warfarin (COUMADIN) tablet 5 mg, Once per day on Sunday Tuesday Wednesday Thursday Saturday    [Held by provider] warfarin (COUMADIN) tablet 7.5 mg, Once per day on Monday Friday  Prior to Admission Medications   Prescriptions Last Dose Informant Patient Reported? Taking?   acetaminophen (TYLENOL) 325 mg tablet Unknown  Yes No   Sig: Take 650 mg by mouth every 6 (six) hours as needed for mild pain   amLODIPine (NORVASC) 10 mg tablet Unknown  Yes No   Sig: Take 10 mg by mouth daily   hydrocortisone 1 % cream Unknown Self Yes No   Sig: Apply topically 4 (four) times a day as needed for rash   Patient not taking: Reported on 4/25/2025   levothyroxine 75 mcg tablet Unknown  Yes No   Sig: Take 75 mcg by mouth daily   magnesium hydroxide (MILK OF MAGNESIA) 400 mg/5 mL oral suspension Unknown  Yes No   Sig: Take by mouth daily as needed for constipation   meclizine (ANTIVERT) 25 mg tablet Unknown Self Yes No   Sig: Take by mouth 3 (three) times a day as needed for dizziness   metoprolol tartrate (LOPRESSOR) 50 mg  tablet Unknown  Yes No   Sig: Take 50 mg by mouth every 12 (twelve) hours   pantoprazole (PROTONIX) 40 mg tablet Unknown  Yes No   Sig: Take 40 mg by mouth daily   rosuvastatin (CRESTOR) 10 MG tablet Unknown  Yes No   Sig: Take 10 mg by mouth daily   saccharomyces boulardii (FLORASTOR) 250 mg capsule Unknown Self Yes No   Sig: Take 250 mg by mouth 2 (two) times a day   senna-docusate sodium (SENOKOT S) 8.6-50 mg per tablet Unknown  Yes No   Sig: Take 1 tablet by mouth daily   warfarin (COUMADIN) 5 mg tablet Unknown  Yes No   Sig: Take 5 mg by mouth daily TAKE 1 AND 1/2 TABLETS(7.5MG) ON MONDAY AND FRIDAY, AND 1 TABLET(5MG) ON THE OTHER DAYS      Facility-Administered Medications: None     Neomycin-polymyxin-hc, Penicillins, Quinine, and Amoxicillin-pot clavulanate    Objective :  Temp:  [90.4 °F (32.4 °C)-98.4 °F (36.9 °C)] 98.4 °F (36.9 °C)  HR:  [61-85] 85  BP: (112-137)/(57-76) 137/63  Resp:  [17-20] 20  SpO2:  [92 %-98 %] 96 %  O2 Device: None (Room air)    Physical Exam  Vitals reviewed.   Constitutional:       General: She is not in acute distress.     Appearance: She is obese. She is ill-appearing. She is not diaphoretic.   HENT:      Head: Normocephalic and atraumatic.      Right Ear: External ear normal.      Left Ear: External ear normal.      Nose: Nose normal.      Mouth/Throat:      Mouth: Mucous membranes are moist.   Eyes:      General:         Right eye: No discharge.         Left eye: No discharge.      Extraocular Movements: Extraocular movements intact and EOM normal.      Conjunctiva/sclera: Conjunctivae normal.      Pupils: Pupils are equal, round, and reactive to light.   Cardiovascular:      Rate and Rhythm: Normal rate.   Pulmonary:      Effort: Pulmonary effort is normal. No respiratory distress.   Skin:     General: Skin is warm and dry.      Coloration: Skin is not jaundiced.      Findings: Bruising present.   Neurological:      Mental Status: She is alert.      Comments: See below    Psychiatric:      Comments: Appears anxious       Neurologic Exam     Mental Status     Patient is alert, oriented to self, place (stated hospital), month and year. Could not recall why she presented to the hospital. She is able to follow simple 1 step commands, required cueing for 2 step commands. Normal attention/concentration. Speech is without dysarthria or aphasia, patient noted to be stuttering at times (improved after CT). She is able to name objects, read sentences, and repeat multiple phrases.      Cranial Nerves     CN II   Visual fields full to confrontation.     CN III, IV, VI   Pupils are equal, round, and reactive to light.  Extraocular motions are normal.   Nystagmus: none   Diplopia: none  Upgaze: normal  Downgaze: normal  Conjugate gaze: present    CN V   Facial sensation intact.     CN VII   Facial expression full, symmetric.     CN VIII   CN VIII normal.     CN XII   CN XII normal.     Motor Exam   Right arm pronator drift: absent  Left arm pronator drift: absent    Strength   Strength 5/5 except as noted.   4+/5 R hip flexion, 4/5 L hip flexion     Sensory Exam   Light touch normal.     Gait, Coordination, and Reflexes   No ataxia noted  Tremor noted with movement in b/l hands       NIHSS:  1a.Level of Consciousness: 0 = Alert   1b. LOC Questions: 0 = Answers both correctly   1c. LOC Commands: 0 = Obeys both correctly   2. Best Gaze: 0 = Normal   3. Visual: 0 = No visual field loss   4. Facial Palsy: 0=Normal symmetric movement   5a. Motor Right Arm: 0=No drift, limb holds 90 (or 45) degrees for full 10 seconds   5b. Motor Left Arm: 0=No drift, limb holds 90 (or 45) degrees for full 10 seconds   6a. Motor Right Le=No drift, limb holds 90 (or 45) degrees for full 10 seconds   6b. Motor Left Le=Some effort against gravity, limb cannot get to or maintain (if cured) 90 (or 45) degrees, drifts down to bed, but has some effort against gravity   7. Limb Ataxia:  0=Absent   8. Sensory:  0=Normal; no sensory loss   9. Best Language:  0=No aphasia, normal   10. Dysarthria: 0=Normal articulation   11. Extinction and Inattention (formerly Neglect): 0=No abnormality   Total Score: 2     Time NIHSS was completed: After CT     Modified [place] Score:  Unable to determine currently, will gather additional data      Lab Results: I have reviewed the following results:CBC/BMP:   .     05/08/25  0531   WBC 3.82*   HGB 8.2*   HCT 23.6*   *   SODIUM 139   K 3.1*      CO2 30   BUN 6   CREATININE 0.88   GLUC 88    , Creatinine Clearance: Estimated Creatinine Clearance: 48.5 mL/min (by C-G formula based on SCr of 0.88 mg/dL)., PTT/INR:  .     05/08/25  0531   INR 1.69*        Imaging Results Review: CTA Head/neck    VTE Prophylaxis: Reason for no pharmacologic prophylaxis current GI bleeding    Code Status: Level 1 - Full Code  Advance Directive and Living Will:      Power of :    POLST:      Administrative Statements   Critical Care Time Statement: Upon my evaluation, this patient had a high probability of imminent or life-threatening deterioration due to aphasia, which required my direct attention, intervention, and personal management.  I spent a total of 50 minutes directly providing critical care services, including evaluating for the presence of life-threatening injuries or illnesses and complex medical decision making (to support/prevent further life-threatening deterioration).. This time is exclusive of procedures, teaching, family meetings, and any prior time recorded by providers other than myself.

## 2025-05-08 NOTE — PHYSICAL THERAPY NOTE
Physical Therapy Treatment Note     05/08/25 0947   PT Last Visit   PT Visit Date 05/08/25   Note Type   Note Type Treatment   Pain Assessment   Pain Assessment Tool 0-10   Pain Score No Pain   Restrictions/Precautions   Weight Bearing Precautions Per Order No   RLE Weight Bearing Per Order WBAT   Braces or Orthoses Other (Comment)  (surgical shoe on R foot)   Other Precautions Cognitive;Chair Alarm;Bed Alarm;WBS;Fall Risk;Multiple lines;Telemetry   General   Chart Reviewed Yes   Family/Caregiver Present No   Subjective   Subjective Pt. agreeable to PT   Bed Mobility   Rolling R 4  Minimal assistance   Additional items Assist x 1;Bedrails;Increased time required;Verbal cues;LE management   Rolling L 4  Minimal assistance   Additional items Assist x 1;Bedrails;Increased time required;Verbal cues;LE management   Supine to Sit 3  Moderate assistance   Additional items Assist x 1;Increased time required;Bedrails;HOB elevated;Verbal cues;LE management   Transfers   Sit to Stand 4  Minimal assistance   Additional items Assist x 2;Increased time required;Verbal cues   Stand to Sit 4  Minimal assistance   Additional items Assist x 2;Increased time required;Verbal cues   Stand pivot 4  Minimal assistance   Additional items Assist x 2;Increased time required;Verbal cues   Ambulation/Elevation   Gait pattern Improper Weight shift;Forward Flexion;Decreased foot clearance;Excessively slow;Decreased toe off;Decreased heel strike;Poor UE support;Short stride;Foward flexed   Gait Assistance 4  Minimal assist   Additional items Assist x 2;Verbal cues;Other (Comment)   Assistive Device Rolling walker   Distance 8 steps to the chair from EOB   Balance   Static Sitting Fair   Dynamic Sitting Fair -   Static Standing Fair -   Dynamic Standing Poor +   Ambulatory Poor +   Endurance Deficit   Endurance Deficit No   Activity Tolerance   Activity Tolerance Patient tolerated treatment well   Nurse Made Aware yes   Exercises   THR  Supine;Bilateral;Sitting;AAROM;AROM;20 reps   Assessment   Prognosis Fair   Problem List Decreased strength;Decreased range of motion;Impaired balance;Decreased mobility;Impaired judgement;Decreased cognition;Decreased coordination;Decreased skin integrity;Obesity;Decreased safety awareness   Assessment Pt. noted with retropulsion when initially seated at the EOB. needed cues for maintaining upright balance initially when seated at EOB. Pt. given A for LE Corinne in supine, and for BM. Donned brief prior to getting OOB. Pt. given cues for taking steps to the chair. No knee buckling noted during gait. Pt. reproted dizziness initially when seated at EOB however it subsided. BP read 108/89, 95-97% on RA for Spo2, ad 79 bpm HR. Pt. was seated on chair post session with all needs within reach and chair alarm engaged at the end of the session. Will continue to follow epr PT POC   Barriers to Discharge None   Goals   Patient Goals None reported   STG Expiration Date 05/19/25   PT Treatment Day 3   Plan   Treatment/Interventions Functional transfer training;LE strengthening/ROM;Therapeutic exercise;Patient/family training;Equipment eval/education;Bed mobility;Gait training;Spoke to nursing   Progress Progressing toward goals   PT Frequency 3-5x/wk   Discharge Recommendation   Rehab Resource Intensity Level, PT II (Moderate Resource Intensity)   AM-PAC Basic Mobility Inpatient   Turning in Flat Bed Without Bedrails 2   Lying on Back to Sitting on Edge of Flat Bed Without Bedrails 2   Moving Bed to Chair 2   Standing Up From Chair Using Arms 2   Walk in Room 2   Climb 3-5 Stairs With Railing 2   Basic Mobility Inpatient Raw Score 12   Basic Mobility Standardized Score 32.23   Turning Head Towards Sound 4   Follow Simple Instructions 3   Low Function Basic Mobility Raw Score  19   Low Function Basic Mobility Standardized Score  31.06   [facility] Highest Level Of Mobility   -[facility] Goal 4: Move to chair/commode   -[facility] Achieved  4: Move to chair/commode   End of Consult   Patient Position at End of Consult All needs within reach;Bed/Chair alarm activated;Bedside chair         Remedios Dunn PTA    An AM-PAC basic mobility standardized score less than 42.9 suggest the patient may benefit from discharge to post-acute rehab services.

## 2025-05-08 NOTE — ASSESSMENT & PLAN NOTE
- Patient remains on room air 95%, does not wear home O2  - Any sats greater than 88%  - Pulmonary toileting: Deep breathing cough out of bed as tolerated incentive spirometry        - Pulmonary will sign off  - Outpatient follow-up on an as-needed basis

## 2025-05-08 NOTE — ASSESSMENT & PLAN NOTE
80 female with a past medical history of VTE on coumadin prior to admission, GERD with Wang's esophagus, hypertension, HLD, chronic osteomyelitis, and hypothyroidism.  Patient initially presented with generalized weakness and failure to thrive with poor oral intake and a 70 pound weight loss.  Initially treated for GAY/UTI but also started on heparin drip due to DVT/PE.  Following initiation of heparin drip, patient with bright red blood per rectum.  Hemoglobin had declined from 9.7 on admission to 6.9 with associated symptoms and hemodynamic changes.      Patient ultimately transferred to [facility] for closer monitoring and GI/IR support over the weekend.  Yesterday and overnight, patient with episodes of hypotension along with symptomatic anemia.  Patient with further decline in hemoglobin down to 6.9 requiring additional PRBC transfusion.  Patient also with bloody bowel movements.  This was occurring in the setting of a supratherapeutic PTT which was unable to be calculated.  Heparin drip placed on hold and PTT improving.  Patient also has now appropriately responded to 2 units of PRBC.  Hemodynamics remained stable. She underwent EGD + colonoscopy on 05/05/25, which showed [id number] Wang's, old blood near the sigmoid diverticuli and some proximal colonic barotrauma. She was bridged with lovenox and had more hematochezia. She remains stable. She was given pRBC and FFP. CTA neg. She has since had brown stool. Pending IVC filter placement today    Plan:  Agree w/ IVC filter placement  Despite active PE, she is clinically well and hopefully can avoid additional anticoagulation for now given c/f recurrent diverticular bleed  Trend H&H; transfuse for goal of >7.0   Monitor hemodynamics; avoid episodes of hypotension   Maintain adequate IV access with 2 large bore Ivs  Additional pain and symptom management per primary team

## 2025-05-08 NOTE — SPEECH THERAPY NOTE
Speech Language/Pathology  Pt NPO for IVC filter today. If ordering a diet, Please order her puree  to begin, as she was c/o pancakes getting stuck in her esophagus when last seen andres WATKINS. Spoke w/ nurse. Will f/u as able.

## 2025-05-08 NOTE — ASSESSMENT & PLAN NOTE
-H/O PE/DVY 5 years ago  -Patient was initiated on Coumadin  -Upon admission patient was subtherapeutic on Coumadin

## 2025-05-08 NOTE — ASSESSMENT & PLAN NOTE
May be secondary to multiple loose bowel movements.  Added banana flakes, and repleted potassium  Hypomagnesemia: Repleted    Results from last 7 days   Lab Units 05/08/25  0531 05/07/25  0432 05/06/25  0320 05/05/25  0452 05/04/25  0245 05/03/25  2042 05/03/25  0606   POTASSIUM mmol/L 3.1* 3.6 4.0 3.7 4.1   < > 2.9*   MAGNESIUM mg/dL  --   --  2.1 1.8* 1.6*  --  1.9    < > = values in this interval not displayed.

## 2025-05-08 NOTE — RESTORATIVE TECHNICIAN NOTE
Restorative Technician Note      Patient Name: Alecia Kay     Restorative Tech Visit Date: 05/08/25  Note Type: Mobility  Patient Position Upon Consult: Supine  Activity Performed: Ambulated  Assistive Device: Roller walker  Patient Position at End of Consult: Bedside chair; All needs within reach; Bed/Chair alarm activated            ns

## 2025-05-08 NOTE — PLAN OF CARE
Problem: PHYSICAL THERAPY ADULT  Goal: Performs mobility at highest level of function for planned discharge setting.  See evaluation for individualized goals.  Description: Treatment/Interventions: Functional transfer training, LE strengthening/ROM, Therapeutic exercise, Patient/family training, Equipment eval/education, Bed mobility, Gait training, Continued evaluation, Compensatory technique education, Cognitive reorientation, Spoke to nursing, OT, Family          See flowsheet documentation for full assessment, interventions and recommendations.  Outcome: Progressing  Note: Prognosis: Fair  Problem List: Decreased strength, Decreased range of motion, Impaired balance, Decreased mobility, Impaired judgement, Decreased cognition, Decreased coordination, Decreased skin integrity, Obesity, Decreased safety awareness  Assessment: Pt. noted with retropulsion when initially seated at the EOB. needed cues for maintaining upright balance initially when seated at EOB. Pt. given A for LE Corinne in supine, and for BM. Donned brief prior to getting OOB. Pt. given cues for taking steps to the chair. No knee buckling noted during gait. Pt. reproted dizziness initially when seated at EOB however it subsided. BP read 108/89, 95-97% on RA for Spo2, ad 79 bpm HR. Pt. was seated on chair post session with all needs within reach and chair alarm engaged at the end of the session. Will continue to follow epr PT POC  Barriers to Discharge: None  Barriers to Discharge Comments: unclear if family can care for patient at current [facility]  Rehab Resource Intensity Level, PT: II (Moderate Resource Intensity)    See flowsheet documentation for full assessment.

## 2025-05-08 NOTE — OCCUPATIONAL THERAPY NOTE
Pt seen for skilled OT tx this date. Tx focused on improving strength, activity tolerance and balance, safety awareness to increase independence with self care tasks. Pt tolerated session fair. Pt was limited by c/o dizziness. Pt currently NPO for possible IR intervention later.  Pt performed UB dressing/ bathing with Medina, LB dressing / bathing maxA, bed mobility rolling Medina and supine<> sit modA, transfers modA, mobility RW lateral side step modA- limited by dizziness. Pt demonstrated poor standing balance during functional tasks.Pt demonstrated ability to safely and appropriately attend to all tasks during session. Pt required max verbal cuing during session to safely complete tasks. Vitals: in supine 128/73.Current OT DC recommendations for pt is level 2 resources.

## 2025-05-08 NOTE — OCCUPATIONAL THERAPY NOTE
Occupational Therapy Progress Note     Patient Name: Alecia Kay  Today's Date: 5/8/2025  Problem List  Principal Problem:    Hematochezia  Active Problems:    History of osteomyelitis    Chronic kidney disease, stage III (moderate) (HCC)    Hypothyroidism    Hypertension    Nausea and vomiting    Acute DVT (deep venous thrombosis) (HCC)    Acute pulmonary embolism (HCC)    Acute respiratory insufficiency    Acute blood loss anemia (ABLA)    Hypokalemia    Wang's esophagus    Insertional Achilles tendinopathy    Chronic osteomyelitis (HCC)            05/08/25 0819   OT Last Visit   OT Visit Date 05/08/25   Note Type   Note Type Treatment   Pain Assessment   Pain Assessment Tool FLACC   Pain Rating: FLACC (Rest) - Face 0   Pain Rating: FLACC (Rest) - Legs 0   Pain Rating: FLACC (Rest) - Activity 0   Pain Rating: FLACC (Rest) - Cry 0   Pain Rating: FLACC (Rest) - Consolability 0   Score: FLACC (Rest) 0   Pain Rating: FLACC (Activity) - Face 0   Pain Rating: FLACC (Activity) - Legs 0   Pain Rating: FLACC (Activity) - Activity 0   Pain Rating: FLACC (Activity) - Cry 0   Pain Rating: FLACC (Activity) - Consolability 0   Score: FLACC (Activity) 0   Restrictions/Precautions   RLE Weight Bearing Per Order WBAT   Braces or Orthoses   (surgical shoe)   ADL   Grooming Assistance 4  Minimal Assistance   Grooming Deficit Setup;Verbal cueing;Supervision/safety;Increased time to complete   UB Bathing Assistance 4  Minimal Assistance   UB Bathing Deficit Setup;Verbal cueing;Increased time to complete;Supervision/safety   LB Bathing Assistance 2  Maximal Assistance   LB Bathing Deficit Steadying;Setup;Verbal cueing;Increased time to complete;Supervision/safety   UB Dressing Assistance 4  Minimal Assistance   UB Dressing Deficit Setup;Verbal cueing;Supervision/safety;Increased time to complete   LB Dressing Assistance 2  Maximal Assistance   LB Dressing Deficit Setup;Verbal cueing;Supervision/safety;Increased time to  complete   Toileting Assistance  1  Total Assistance   Functional Standing Tolerance   Time 1 min   Activity LB bathing, static standing.   Comments RW for support, A x 1   Bed Mobility   Rolling R 4  Minimal assistance   Additional items Assist x 1;Increased time required;Verbal cues   Rolling L 4  Minimal assistance   Additional items Assist x 1;Increased time required;Verbal cues   Supine to Sit 3  Moderate assistance   Additional items Assist x 1;Increased time required;Verbal cues   Sit to Supine 3  Moderate assistance   Additional items Assist x 1;Increased time required;Verbal cues;LE management   Transfers   Sit to Stand 3  Moderate assistance   Additional items Assist x 1;Increased time required;Verbal cues   Stand to Sit 3  Moderate assistance   Additional items Assist x 1;Increased time required;Verbal cues   Additional Comments cues for sfaety, hand placement and best tech.   Functional Mobility   Functional Mobility 3  Moderate assistance   Additional Comments Ax1, a few lateral side steps. max cues.   Additional items Rolling walker   Cognition   Overall Cognitive Status Impaired   Arousal/Participation Alert;Responsive;Cooperative   Attention Attends with cues to redirect   Orientation Level Oriented to person;Oriented to place;Oriented to situation;Disoriented to time   Memory Decreased short term memory;Decreased recall of recent events   Following Commands Follows one step commands with increased time or repetition   Additional Activities   Additional Activities Comments Tolerated sitting EOB x 10 min  for ADL tasks. unsupported poor balance.   Activity Tolerance   Activity Tolerance Patient tolerated treatment well;Patient limited by fatigue  (dizziness)   Medical Staff Made Aware restorative. RN   Assessment   Assessment Pt seen for skilled OT tx this date. Tx focused on improving strength, activity tolerance and balance, safety awareness to increase independence with self care tasks. Pt  tolerated session fair. Pt was limited by c/o dizziness. Pt currently NPO for possible IR intervention later.  Pt performed UB dressing/ bathing with Medina, LB dressing / bathing maxA, bed mobility rolling Medina and supine<> sit modA, transfers modA, mobility RW lateral side step modA- limited by dizziness. Pt demonstrated poor standing balance during functional tasks.Pt demonstrated ability to safely and appropriately attend to all tasks during session. Pt required max verbal cuing during session to safely complete tasks. Vitals: in supine 128/73.Current OT DC recommendations for pt is level 2 resources.   Plan   Treatment Interventions ADL retraining;Functional transfer training;UE strengthening/ROM;Endurance training;Cognitive reorientation;Patient/family training;Equipment evaluation/education;Compensatory technique education;Activityengagement;Energy conservation   Goal Expiration Date 05/17/25   OT Treatment Day 3   OT Frequency 3-5x/wk   Discharge Recommendation   Rehab Resource Intensity Level, OT II (Moderate Resource Intensity)   AM-PAC Daily Activity Inpatient   Lower Body Dressing 2   Bathing 2   Toileting 2   Upper Body Dressing 2   Grooming 3   Eating 3   Daily Activity Raw Score 14   Daily Activity Standardized Score (Calc for Raw Score >=11) 33.39   AM-PAC Applied Cognition Inpatient   Following a Speech/Presentation 3   Understanding Ordinary Conversation 3   Taking Medications 2   Remembering Where Things Are Placed or Put Away 2   Remembering List of 4-5 Errands 2   Taking Care of Complicated Tasks 2   Applied Cognition Raw Score 14   Applied Cognition Standardized Score 32.02   Doris Calvin, OT

## 2025-05-08 NOTE — PLAN OF CARE
Problem: PAIN - ADULT  Goal: Verbalizes/displays adequate comfort level or baseline comfort level  Description: Interventions:- Encourage patient to monitor pain and request assistance- Assess pain using appropriate pain scale- Administer analgesics based on type and severity of pain and evaluate response- Implement non-pharmacological measures as appropriate and evaluate response- Consider cultural and social influences on pain and pain management- Notify physician/advanced practitioner if interventions unsuccessful or patient reports new pain  Outcome: Progressing     Problem: INFECTION - ADULT  Goal: Absence or prevention of progression during hospitalization  Description: INTERVENTIONS:- Assess and monitor for signs and symptoms of infection- Monitor lab/diagnostic results- Monitor all insertion sites, i.e. indwelling lines, tubes, and drains- Monitor endotracheal if appropriate and nasal secretions for changes in amount and color- Falkner appropriate cooling/warming therapies per order- Administer medications as ordered- Instruct and encourage patient and family to use good hand hygiene technique- Identify and instruct in appropriate isolation precautions for identified infection/condition  Outcome: Progressing  Goal: Absence of fever/infection during neutropenic period  Description: INTERVENTIONS:- Monitor WBC  Outcome: Progressing     Problem: SAFETY ADULT  Goal: Patient will remain free of falls  Description: INTERVENTIONS:- Educate patient/family on patient safety including physical limitations- Instruct patient to call for assistance with activity - Consult OT/PT to assist with strengthening/mobility - Keep Call bell within reach- Keep bed low and locked with side rails adjusted as appropriate- Keep care items and personal belongings within reach- Initiate and maintain comfort rounds- Make Fall Risk Sign visible to staff- Offer Toileting every 2 Hours, in advance of need- Initiate/Maintain bed alarm-  Obtain necessary fall risk management equipment: - Apply yellow socks and bracelet for high fall risk patients- Consider moving patient to room near nurses station  Outcome: Progressing  Goal: Maintain or return to baseline ADL function  Description: INTERVENTIONS:-  Assess patient's ability to carry out ADLs; assess patient's baseline for ADL function and identify physical deficits which impact ability to perform ADLs (bathing, care of mouth/teeth, toileting, grooming, dressing, etc.)- Assess/evaluate cause of self-care deficits - Assess range of motion- Assess patient's mobility; develop plan if impaired- Assess patient's need for assistive devices and provide as appropriate- Encourage maximum independence but intervene and supervise when necessary- Involve family in performance of ADLs- Assess for home care needs following discharge - Consider OT consult to assist with ADL evaluation and planning for discharge- Provide patient education as appropriate  Outcome: Progressing  Goal: Maintains/Returns to pre admission functional level  Description: INTERVENTIONS:- Perform AM-PAC 6 Click Basic Mobility/ Daily Activity assessment daily.- Set and communicate daily mobility goal to care team and patient/family/caregiver. - Collaborate with rehabilitation services on mobility goals if consulted- Perform Range of Motion 3 times a day.- Reposition patient every 2 hours.- Dangle patient 3 times a day- Stand patient 3 times a day- Ambulate patient 3 times a day- Out of bed to chair 3 times a day - Out of bed for meals 3 times a day- Out of bed for toileting- Record patient progress and toleration of activity level   Outcome: Progressing     Problem: DISCHARGE PLANNING  Goal: Discharge to home or other facility with appropriate resources  Description: INTERVENTIONS:- Identify barriers to discharge w/patient and caregiver- Arrange for needed discharge resources and transportation as appropriate- Identify discharge learning  needs (meds, wound care, etc.)- Arrange for interpretive services to assist at discharge as needed- Refer to Case Management Department for coordinating discharge planning if the patient needs post-hospital services based on physician/advanced practitioner order or complex needs related to functional status, cognitive ability, or social support system  Outcome: Progressing     Problem: Knowledge Deficit  Goal: Patient/family/caregiver demonstrates understanding of disease process, treatment plan, medications, and discharge instructions  Description: Complete learning assessment and assess knowledge base.Interventions:- Provide teaching at level of understanding- Provide teaching via preferred learning methods  Outcome: Progressing     Problem: Nutrition/Hydration-ADULT  Goal: Nutrient/Hydration intake appropriate for improving, restoring or maintaining nutritional needs  Description: Monitor and assess patient's nutrition/hydration status for malnutrition. Collaborate with interdisciplinary team and initiate plan and interventions as ordered.  Monitor patient's weight and dietary intake as ordered or per policy. Utilize nutrition screening tool and intervene as necessary. Determine patient's food preferences and provide high-protein, high-caloric foods as appropriate. INTERVENTIONS:- Monitor oral intake, urinary output, labs, and treatment plans- Assess nutrition and hydration status and recommend course of action- Evaluate amount of meals eaten- Assist patient with eating if necessary - Allow adequate time for meals- Recommend/ encourage appropriate diets, oral nutritional supplements, and vitamin/mineral supplements- Order, calculate, and assess calorie counts as needed- Recommend, monitor, and adjust tube feedings and TPN/PPN based on assessed needs- Assess need for intravenous fluids- Provide specific nutrition/hydration education as appropriate- Include patient/family/caregiver in decisions related to  nutrition  Monitor and assess patient's nutrition/hydration status for malnutrition. Collaborate with interdisciplinary team and initiate plan and interventions as ordered.  Monitor patient's weight and dietary intake as ordered or per policy. Utilize nutrition screening tool and intervene as necessary. Determine patient's food preferences and provide high-protein, high-caloric foods as appropriate. INTERVENTIONS:- Monitor oral intake, urinary output, labs, and treatment plans- Assess nutrition and hydration status and recommend course of action- Evaluate amount of meals eaten- Assist patient with eating if necessary - Allow adequate time for meals- Recommend/ encourage appropriate diets, oral nutritional supplements, and vitamin/mineral supplements- Order, calculate, and assess calorie counts as needed- Recommend, monitor, and adjust tube feedings and TPN/PPN based on assessed needs- Assess need for intravenous fluids- Provide specific nutrition/hydration education as appropriate- Include patient/family/caregiver in decisions related to nutrition  Outcome: Progressing     Problem: Prexisting or High Potential for Compromised Skin Integrity  Goal: Skin integrity is maintained or improved  Description: INTERVENTIONS:- Identify patients at risk for skin breakdown- Assess and monitor skin integrity- Assess and monitor nutrition and hydration status- Monitor labs - Assess for incontinence - Turn and reposition patient- Assist with mobility/ambulation- Relieve pressure over bony prominences- Avoid friction and shearing- Provide appropriate hygiene as needed including keeping skin clean and dry- Evaluate need for skin moisturizer/barrier cream- Collaborate with interdisciplinary team - Patient/family teaching- Consider wound care consult   Outcome: Progressing     Problem: HEMATOLOGIC - ADULT  Goal: Maintains hematologic stability  Description: INTERVENTIONS- Assess for signs and symptoms of bleeding or hemorrhage-  Monitor labs- Administer supportive blood products/factors as ordered and appropriate  Outcome: Progressing

## 2025-05-08 NOTE — TELEMEDICINE
e-Consult (IPC)  - Interventional Radiology  Alecia Kay 80 y.o. female MRN: [id number]  Unit/Bed#: E4 -01 Encounter: [id number]          Interventional Radiology has been consulted to evaluate Alecia Kay    We were consulted by internal medicine concerning this patient with PE/DVT, unable to be anticoagulated.    Inpatient Consult to IR  Consult performed by: Karina Dorsey PA-C  Consult ordered by: Monique Gonzalez MD        05/08/25    Assessment/Recommendation:     80 year old female with pmh of DVT, osteomyelitis, CKD III, presenting with hematochezia. Patient found to have acute right PE and bilateral lower extremity DVTs. Patient was on coumadin but subtherapeutic. IR requested for IVC filter placement due to PE/DVT, unable to be anticoagulated due to hematochezia.    - will plan for IVC filter placement tentative today  - patient is npo  - aaox2. Will obtain consent from [facility] [phone]  - filter may be able to be removed in the future if she were to resume therapeutic anticoagulation without bleeding    11-20 minutes, >50% of the total time devoted to medical consultative verbal/EMR discussion between providers. Written report will be generated in the EMR.     Thank you for allowing Interventional Radiology to participate in the care of Alecia Kay. Please don't hesitate to call or TigerText us with any questions.     Karina Dorsey PA-C

## 2025-05-08 NOTE — CASE MANAGEMENT
Case Management Discharge Planning Note    Patient name Alecia Kay  Location [facility] /E4 -* MRN [id number]  : 1945 Date 2025       Current Admission Date: 2025  Current Admission Diagnosis:Hematochezia   Patient Active Problem List    Diagnosis Date Noted Date Diagnosed    Word finding difficulty 2025     Wang's esophagus 2025     Insertional Achilles tendinopathy 2025     Chronic osteomyelitis (HCC) 2025     Acute blood loss anemia (ABLA) 2025     Hypokalemia 2025     Acute pulmonary embolism (HCC) 2025     Acute respiratory insufficiency 2025     Hematochezia 2025     Acute DVT (deep venous thrombosis) (HCC) 2025     History of DVT (deep vein thrombosis) 2025     Tachycardia 2025     Nausea and vomiting 2025     Acute cystitis 2025     Generalized weakness 2025     Bilateral lower extremity edema 2025     Severe protein-calorie malnutrition (HCC) 2025     History of osteomyelitis 2025     Acute kidney injury (HCC) 2025     Chronic kidney disease, stage III (moderate) (HCC) 2025     Hypothyroidism 2025     Hypertension 2025     Electrolyte imbalance 2025       LOS (days): 6  Geometric Mean LOS (GMLOS) (days): 4.5  Days to GMLOS:-1.5     OBJECTIVE:  Risk of Unplanned Readmission Score: 24.46         Current admission status: Inpatient   Preferred Pharmacy:   [facility] - [address]  [address] [facility] [address]  Phone: [phone] Fax: [phone]    Primary Care Provider: Rambo Perera DO    Primary Insurance: BLUE CROSS  REP  Secondary Insurance:     DISCHARGE DETAILS:                                                                                                 Additional Comments: On  CM had met with son.  Pt lives with her nephew and the son lives in [place].  Per son she  was in [facility] only for a significant time, but he was not sure if she had used all 100 skilled medicare days.  CM called [facility] and they confirmed that she was there from 1/24/25-4/24/25.  However, her last skilled day was on 3/24/25 and had been private pay starting on 3/25/25.  CM also contacted Insurance verification dept and they noted that this was the only facility that had dropped  bills skilled nursing.  Son notified that his mother has about 39 skilled days to use and he was given the AIDIN list for STR review.  On 5/8 pt had an RRT due to sudden change in speech and now ruling out CVA.  Will touch base with family after evaluation is complete.

## 2025-05-08 NOTE — SPEECH THERAPY NOTE
"Speech Language/Pathology    Speech/Language Pathology Progress Note    Patient Name: Alecia Kay  Today's Date: 5/8/2025     Problem List  Principal Problem:    Hematochezia  Active Problems:    History of osteomyelitis    Chronic kidney disease, stage III (moderate) (HCC)    Hypothyroidism    Hypertension    Nausea and vomiting    Acute DVT (deep venous thrombosis) (HCC)    Acute pulmonary embolism (HCC)    Acute respiratory insufficiency    Acute blood loss anemia (ABLA)    Hypokalemia    Wang's esophagus    Insertional Achilles tendinopathy    Chronic osteomyelitis (HCC)    Word finding difficulty       Past Medical History  Past Medical History:   Diagnosis Date    Anemia     Cellulitis     Disease of thyroid gland     GERD (gastroesophageal reflux disease)     Hyperlipidemia     Hypertension     Obesity     Osteomyelitis (HCC)     Pneumonia     Pulmonary embolism (HCC)         Past Surgical History  Past Surgical History:   Procedure Laterality Date    FOOT SURGERY      JOINT REPLACEMENT           Subjective:  Pt alert and verbal w/ son at bedside. Pt stated [place] a farm[place] and was looking for needles. Son stated she has been talking about needles all day. Referred pt to white board. Able to read \"[facility]\". Reoriented to hospital.   Objective:  Rapid response/Stroke alert called earlier. Pt still NPO though procedure was cancelled. Asked by SLIM to evaluate for diet. Overall appeared more alert and verbal than when seen for eval.   Per rapid response note:  Assessment:   Acute change in speech, expressive aphasia with questionable extraocular muscle abnormality  DVT/PE not on anticoagulation currently  Hematochezia -present on this exam  Wang's esophagus  Diverticulosis,  Hypertension  Hyperlipidemia  Chronic osteomyelitis  Hypothyroidism  Plan:   Patient with acute change in speech patient, unable to answer questions appropriately  Given that she has been off anticoagulation for 8 hours, " "will rule out stroke with CT head and CTA head and neck  Check baseline labs with CBC, BMP, coags, troponin   Patient is not hypercapnic or hypoglycemic  Likely not a TNK candidate given hematochezia on exam  Neurology on board from 1 another  Patient may stay as a stepdown level 2 for now    No facial asymmetry Noted. Stated she wasn't hungry but w/ encouragement took a small piece of orange that the son brought in, a few tsps of applesauce, and some water. Son had given her a few very small pieces prior. Minimal dentition. Mastication was frontal, munch-like, and quite prolonged though wfl. Mildly prolonged oral hold w/ applesauce but eventual transfer and swallow response appeared wnl. No cough or wet vocal quality w/ consecutive sips of water by straw. Stated she liked cookies, but refused trial. Declined all other offered PO.  Assessment:  No dysarthria. Confused/disoriented. Able to make basic needs known. \"I'm stubborn\". Accepted only a min amt of food. Munch-like prolonged frontal mastication.   Plan/Recommendations:  NDD2 mechanical soft and thin liquids. Supervision/assist /encouragement for all meals. Aspiration and reflux precautions. Will f/u.     "

## 2025-05-08 NOTE — ASSESSMENT & PLAN NOTE
80 y.o.  female with history of HTN, DVT/PE on Coumadin (patient denied taking the month PTA), CKD and right heel osteomyelitis who is currently admitted for GAY, UTI (completed course of ABX), acute vs subacute bilateral DVTs and PE in the setting of subtherapeutic INR (1.18).      Hospitalization complicated by hematochezia in the setting of diverticular bleed after being placed on heparin gtt (planned to bridge back to home coumadin as patient had been non compliant 1 month PTA).  AC was held and eventually restarted as bleed had resolved.  She was transitioned to Lovenox due to difficulty obtaining PTTs, and she was bridged to Coumadin the evening of 5/6.  In the early morning of 5/7, patient had multiple episodes of bright red blood per rectum and AC was held again.  She has received packed red blood cells and FFP.    Stroke alert on 5/8 for word finding difficulty, LKN 9:30 AM.  She has been on AC since 5/7.  NIHSS 2 (LLE drift).   CT imaging negative for acute intracranial abnormalities, significant intracranial stenosis, LVO or aneurysm.   She was not a candidate for TNK due to bleeding risk, no IR target.    Patient reports she felt closer to baseline after CT imaging was completed.    Plan:  - Stroke pathway  - Discussed with primary team, will initiate Aspirin 81 mg daily with no load. Monitor PLT  - Initiate Atorvastatin 40 mg qHS   - Recommend MRI brain wo, lipid panel and hemoglobin A1c   - Echo completed 4/29/2025: EF 65%, mildly dilated atrium bilaterally  - Recommend SBP goal 120-140, avoid hypotension   - Euglycemic, normothermic goal  - Continue telemetry  - PT/OT/ST  - Secondary risk factor modification.   - Stroke education  - Frequent neuro checks. Continue to monitor and notify neurology with any changes.  - STAT CT head for any acute change in neuro exam  - Medical management and supportive care per primary team. Correction of any metabolic or infectious disturbances.     Plan discussed with  Attending Neurologist, please see attestation for further input/recommendations.

## 2025-05-08 NOTE — ASSESSMENT & PLAN NOTE
Imaging demonstrating extensive right and lower lobe pulmonary embolism on/20 8/2025 CTA.  Previously on warfarin at home. Pt had LGIB on hep gtt and therapeutic lovenox. She is satting well on RA and w/o hypoxia.

## 2025-05-08 NOTE — ASSESSMENT & PLAN NOTE
- previously biopsied on her EGD @ OSH on 04/22/25. No dysplasia. Consider repeat EGD in 3 years, although she will be 83 at that point and risks of continued surveillance may outweigh benefits   - c/w PPI

## 2025-05-08 NOTE — ASSESSMENT & PLAN NOTE
Initially presented to [facility] [place] 4/25/2025 for generalized weakness found to have kidney injury and MDR UTI  Renal function has returned back to baseline  Torsemide discontinued during March 2025 hospitalization    Results from last 7 days   Lab Units 05/08/25  0531 05/07/25  0432 05/06/25  0320 05/05/25  0452 05/04/25  0245 05/03/25  2042 05/03/25  0606 05/02/25  1046 05/02/25  0535   BUN mg/dL 6 6 6 6 7 8 8 7 7   CREATININE mg/dL 0.88 0.84 0.68 0.70 0.72 0.78 0.78 0.84 0.78   EGFR ml/min/1.73sq m 61 65 82 81 78 71 71 65 71

## 2025-05-08 NOTE — PROGRESS NOTES
"Progress Note - Pulmonology   Name: Alecia Kay 80 y.o. female I MRN: [id number]  Unit/Bed#: E4 -01 I Date of Admission: 5/2/2025   Date of Service: 5/8/2025 I Hospital Day: 6     Assessment & Plan  Hematochezia  - GI workup-some bleeding from left colon and sigmoid diverticulitis  -S/P 1UPRBC  Acute DVT (deep venous thrombosis) (HCC)  - Reoccurring DVT  -Subtherapeutic INR not Coumadin failure  -Unfortunately when Coumadin was transitioned to Lovenox and developed hematochezia again  -IVC filter scheduled  -Will need lifelong anticoagulation  -Will need to discuss with hematology about transferring to NOAC as opposed to Coumadin  Acute pulmonary embolism (HCC)  -H/O PE/DVY 5 years ago  -Patient was initiated on Coumadin  -Upon admission patient was subtherapeutic on Coumadin  Acute respiratory insufficiency  - Patient remains on room air 95%, does not wear home O2  - Any sats greater than 88%  - Pulmonary toileting: Deep breathing cough out of bed as tolerated incentive spirometry        - Pulmonary will sign off  - Outpatient follow-up on an as-needed basis    24 Hour Events : na  Subjective : \"I feel ok\"    Objective :  Temp:  [90.4 °F (32.4 °C)-98.1 °F (36.7 °C)] 97.6 °F (36.4 °C)  HR:  [61-72] 69  BP: (112-124)/(57-76) 117/62  Resp:  [17-20] 20  SpO2:  [92 %-98 %] 95 %  O2 Device: None (Room air)    Physical Exam  Constitutional:       General: She is not in acute distress.     Appearance: Normal appearance. She is normal weight. She is not ill-appearing.   HENT:      Head: Normocephalic and atraumatic.      Nose: Nose normal. No congestion or rhinorrhea.      Mouth/Throat:      Mouth: Mucous membranes are dry.      Pharynx: Oropharynx is clear. No oropharyngeal exudate or posterior oropharyngeal erythema.   Cardiovascular:      Rate and Rhythm: Normal rate and regular rhythm.      Pulses: Normal pulses.      Heart sounds: No murmur heard.     No friction rub. No gallop.   Pulmonary:      Effort: " Pulmonary effort is normal. No respiratory distress.      Breath sounds: No stridor. No wheezing, rhonchi or rales.      Comments: Diminished aeration at bases  Chest:      Chest wall: No tenderness.   Abdominal:      General: Abdomen is flat. Bowel sounds are normal.      Palpations: Abdomen is soft.   Musculoskeletal:         General: No swelling or tenderness. Normal range of motion.      Cervical back: Normal range of motion. No rigidity or tenderness.   Skin:     General: Skin is warm and dry.      Coloration: Skin is not jaundiced or pale.   Neurological:      General: No focal deficit present.      Mental Status: She is alert and oriented to person, place, and time. Mental status is at baseline.   Psychiatric:         Mood and Affect: Mood normal.         Behavior: Behavior normal.             Lab Results: I have reviewed the following results:   .     05/08/25  0531   WBC 3.82*   HGB 8.2*   HCT 23.6*   *   SODIUM 139   K 3.1*      CO2 30   BUN 6   CREATININE 0.88   GLUC 88   AST 13   ALT 5*   ALB 2.6*   TBILI 0.72   ALKPHOS 55   INR 1.69*     ABG: No new results in last 24 hours.    Imaging Results Review: I personally reviewed the following image studies/reports in PACS and discussed pertinent findings with Radiology: chest xray. My interpretation of the radiology images/reports is:  .  Other Study Results Review: EKG was reviewed.   PFT Results Reviewed: reviewed

## 2025-05-08 NOTE — ASSESSMENT & PLAN NOTE
History of DVT   However during 4/25/2025 [facility] admission found to have bilateral extensive extensive acute vs subacute DVTs, in the setting of subtherapeutic INR  IVC filter plan due to recurrent bleeding on anticoagulation

## 2025-05-08 NOTE — ASSESSMENT & PLAN NOTE
baseline hemoglobin appears to be variable over the last year ranging 7.5-10  Acute blood loss anemia due to hematochezia secondary to diverticular bleed  Was transfused   1 unit PRBC 5/3   2 units PRBC 5/4.  1 unit packed red blood cells 5/7  2 units FFP 5/7  Acute blood loss anemia secondary to lower GI bleed secondary to GI bleed  Continue to monitor hemoglobin closely      Results from last 7 days   Lab Units 05/08/25  0531 05/08/25  0049 05/07/25 2009 05/07/25  1637 05/07/25  0811 05/07/25  0432   HEMOGLOBIN g/dL 8.2* 8.2* 8.4* 7.0* 8.6* 8.4*

## 2025-05-08 NOTE — ASSESSMENT & PLAN NOTE
Extensive right upper and lower lobe pulmonary embolism seen on 4/28/2025 CTA  2D echocardiogram without evidence of right heart strain  Does have history of DVT and PE: Maintained on warfarin previously, however pt states she had not taken the month prior to admission--(INR 1.18)  Due to subtherapeutic INR, was started on bridging therapy with heparin and then Lovenox, and restarted on Coumadin  Anticoagulation currently contraindicated due to recurrent lower GI bleed on anticoagulation  Appreciate pulmonology evaluation: IVC filter recommended  IR consult: IVC filter placement planned for a.m.

## 2025-05-08 NOTE — ASSESSMENT & PLAN NOTE
Pt is a 79yo female with PMH significant for hypertension, DVT/PE CKD 3, and right heel osteomyelitis initially presented to [facility] 4/25/2025 for generalized weakness    Initially diagnosed with GAY and MDR UTI: completed course of ertapenem  Subsequently found to have DVT and PE, while INR subtherapeutic, and was started on heparin infusion  Patient then had hematochezia and was a rapid response  Due to lack of GI services patient was transferred to [facility]   At [facility],after discussion with GI, pt was restarted on heparin infusion:  however discontinued after passed large amount of hematochezia  5/5 EGD: Wang's esophagus.  Sliding hiatal hernia.  No evidence of bleeding  5/5 colonoscopy: Petechial mucosa in the cecum/ascending colon attributed to barotrauma as it developed during the procedure.  Melanosis in the splenic flexure, descending colon, sigmoid colon, rectosigmoid, and rectum.  Several diverticuli with old blood   Post endoscopy Case was discussed with GI, they suspected diverticular bleed, since resolved, noted anticoagulation could be restarted  Patient was started on anticoagulation with heparin/Lovenox bridging therapy and given 1 dose of Coumadin however had subsequent large hematochezia, with hypotension and acute blood loss anemia, requiring additional transfusions  CT bleeding scan: Unremarkable  She was seen by GI who did not recommend additional endoscopy  Anticoagulation has been completely discontinued  Continue to monitor H&H

## 2025-05-08 NOTE — ASSESSMENT & PLAN NOTE
- Reoccurring DVT  -Subtherapeutic INR not Coumadin failure  -Unfortunately when Coumadin was transitioned to Lovenox and developed hematochezia again  -IVC filter scheduled  -Will need lifelong anticoagulation  -Will need to discuss with hematology about transferring to NOAC as opposed to Coumadin

## 2025-05-08 NOTE — PROGRESS NOTES
Progress Note - Hospitalist   Name: Alecia Kay 80 y.o. female I MRN: [id number]  Unit/Bed#: E4 -01 I Date of Admission: 5/2/2025   Date of Service: 5/8/2025 I Hospital Day: 6    Assessment & Plan  Hematochezia  Pt is a 79yo female with PMH significant for hypertension, DVT/PE CKD 3, and right heel osteomyelitis initially presented to [facility] 4/25/2025 for generalized weakness    Initially diagnosed with GAY and MDR UTI: completed course of ertapenem  Subsequently found to have DVT and PE, while INR subtherapeutic, and was started on heparin infusion  Patient then had hematochezia and was a rapid response  Due to lack of GI services patient was transferred to [facility]   At [facility],after discussion with GI, pt was restarted on heparin infusion:  however discontinued after passed large amount of hematochezia  5/5 EGD: Wang's esophagus.  Sliding hiatal hernia.  No evidence of bleeding  5/5 colonoscopy: Petechial mucosa in the cecum/ascending colon attributed to barotrauma as it developed during the procedure.  Melanosis in the splenic flexure, descending colon, sigmoid colon, rectosigmoid, and rectum.  Several diverticuli with old blood   Post endoscopy Case was discussed with GI, they suspected diverticular bleed, since resolved, noted anticoagulation could be restarted  Patient was started on anticoagulation with heparin/Lovenox bridging therapy and given 1 dose of Coumadin however had subsequent large hematochezia, with hypotension and acute blood loss anemia, requiring additional transfusions  CT bleeding scan: Unremarkable  She was seen by GI who did not recommend additional endoscopy  Anticoagulation has been completely discontinued  Continue to monitor H&H  Acute blood loss anemia (ABLA)  baseline hemoglobin appears to be variable over the last year ranging 7.5-10  Acute blood loss anemia due to hematochezia secondary to diverticular bleed  Was transfused   1 unit PRBC 5/3   2 units PRBC 5/4.  1 unit  packed red blood cells 5/7  2 units FFP 5/7  Acute blood loss anemia secondary to lower GI bleed secondary to GI bleed  Continue to monitor hemoglobin closely      Results from last 7 days   Lab Units 05/08/25  0531 05/08/25  0049 05/07/25 2009 05/07/25  1637 05/07/25  0811 05/07/25  0432   HEMOGLOBIN g/dL 8.2* 8.2* 8.4* 7.0* 8.6* 8.4*     Acute pulmonary embolism (HCC)  Extensive right upper and lower lobe pulmonary embolism seen on 4/28/2025 CTA  2D echocardiogram without evidence of right heart strain  Does have history of DVT and PE: Maintained on warfarin previously, however pt states she had not taken the month prior to admission--(INR 1.18)  Due to subtherapeutic INR, was started on bridging therapy with heparin and then Lovenox, and restarted on Coumadin  Anticoagulation currently contraindicated due to recurrent lower GI bleed on anticoagulation  Appreciate pulmonology evaluation: IVC filter recommended  IR consult: IVC filter placement planned for a.m.  Acute respiratory insufficiency  Likely secondary to pulmonary embolism: Was placed on 2 L nasal cannula, since improved  Currently with adequate oxygenation on room air  Hypertension  Patient previously previously on amlodipine/beta-blocker  Torsemide recently discontinued during 3/2025 hospitalization  Blocker HELD for bradycardia/hypotension  Norvasc HELD for hypotension  Monitor for  hypotension with hematochezia  Hypokalemia  May be secondary to multiple loose bowel movements.  Added banana flakes, and repleted potassium  Hypomagnesemia: Repleted    Results from last 7 days   Lab Units 05/08/25  0531 05/07/25  0432 05/06/25  0320 05/05/25  0452 05/04/25  0245 05/03/25  2042 05/03/25  0606   POTASSIUM mmol/L 3.1* 3.6 4.0 3.7 4.1   < > 2.9*   MAGNESIUM mg/dL  --   --  2.1 1.8* 1.6*  --  1.9    < > = values in this interval not displayed.     Acute DVT (deep venous thrombosis) (HCC)  History of DVT   However during 4/25/2025 [facility] admission found to have  bilateral extensive extensive acute vs subacute DVTs, in the setting of subtherapeutic INR  IVC filter plan due to recurrent bleeding on anticoagulation  Nausea and vomiting (Resolved: 5/8/2025)  Initial reason for presentation to hospital 4/25/2025  Resolved after treatment for UTI  GI consulted: EGD with Wang's esophagus  Continue proton pump inhibitor, appetite improved  Chronic kidney disease, stage III (moderate) (HCC)  Initially presented to [facility] 4/25/2025 for generalized weakness found to have kidney injury and MDR UTI  Renal function has returned back to baseline  Torsemide discontinued during March 2025 hospitalization    Results from last 7 days   Lab Units 05/08/25  0531 05/07/25  0432 05/06/25  0320 05/05/25  0452 05/04/25  0245 05/03/25  2042 05/03/25  0606 05/02/25  1046 05/02/25  0535   BUN mg/dL 6 6 6 6 7 8 8 7 7   CREATININE mg/dL 0.88 0.84 0.68 0.70 0.72 0.78 0.78 0.84 0.78   EGFR ml/min/1.73sq m 61 65 82 81 78 71 71 65 71     Hypothyroidism  Continue levothyroxine  Wang's esophagus  Noted on EGD  Will need repeat EGD in 3 years for surveillance  Patient with dysphagia: Continue proton pump inhibitor bid  Insertional Achilles tendinopathy  MRI with advanced distal Achilles tendinopathy with partial thickness tear involving the deep insertional fibers  Also small focal longitudinal split tear of the peroneus brevis, posterior tibialis tendon stenosis and tenosynovitis, and plantar fasciaopathy  PT/OT  Chronic osteomyelitis (HCC)  Per previous records: Chronic osteomyelitis of the toe of right foot  Per podiatry: Weightbearing as tolerated in postop shoe  Completed 6 weeks of IV antibiotics prior to recent admission  MRI right ankle/heel 4/29/2025: No evidence of remaining osteomyelitis  Patient was reevaluated by podiatry 4/30/2025: Weightbearing as tolerated in surgical shoe.  Local wound care.    outpatient podiatry follow-up    VTE Pharmacologic Prophylaxis: VTE Score: 6 High Risk (Score >/=  5) - Pharmacological DVT Prophylaxis Contraindicated. Sequential Compression Devices Ordered.  Due to recurrent bleeding    Mobility:   Basic Mobility Inpatient Raw Score: 12  JH-HLM Goal: 4: Move to chair/commode  JH-HLM Achieved: 2: Bed activities/Dependent transfer  JH-HLM Goal achieved. Continue to encourage appropriate mobility.    Patient Centered Rounds: I performed bedside rounds with nursing staff today.   Discussions with Specialists or Other Care Team Provider: SANJANA.    Education and Discussions with Family / Patient: Updated patient's son at the bedside    Current Length of Stay: 6 day(s)  Current Patient Status: Inpatient   Certification Statement: The patient will continue to require additional inpatient hospital stay due to GI bleed, recurrent, while on anticoagulation, PE/DVT awaiting IVC filter  Discharge Plan: Anticipate discharge in 48-72 hrs to rehab facility.    Code Status: Level 1 - Full Code    Subjective   Patient was examined and interviewed earlier this morning.  She denied any complaints.  Notes she slept well.  Denied any chest pain.  Denied any shortness of breath or cough.  Denied any abdominal pain.  Denied any nausea or vomiting.  Notes she was tolerating p.o.  Had not passed any additional bloody bowel movements.  Noted fatigue.  Denied any dizziness or lightheadedness    Objective :  Temp:  [90.4 °F (32.4 °C)-98.1 °F (36.7 °C)] 97.6 °F (36.4 °C)  HR:  [61-72] 69  BP: (112-124)/(57-76) 117/62  Resp:  [17-20] 20  SpO2:  [92 %-98 %] 95 %  O2 Device: None (Room air)    Body mass index is 31.45 kg/m².     Input and Output Summary (last 24 hours):     Intake/Output Summary (Last 24 hours) at 5/8/2025 0838  Last data filed at 5/7/2025 2040  Gross per 24 hour   Intake 1283.33 ml   Output 600 ml   Net 683.33 ml       Physical Exam  General: Very pleasant female.  No acute distress.  Nontachypneic and nondyspneic  Heart: Regular rate and rhythm.  S1-S2 present.  No murmur, rub, gallop  Lungs:  Decreased breath sounds at both bases, however no wheezes, crackles, rhonchi.  No accessory muscle use or respiratory distress  Abdomen: Soft, nontender with palpation.  Nondistended.  Normal active bowel sounds present.  No guarding or rebound.  No peritoneal signs or mass  Extremities: No clubbing, cyanosis.  Trace bilateral pretibial edema.  2+ pedal pulses  Neurologic: Awake.  Alert.  Interactive.  No somnolence or lethargy.  Cooperates with exam.  Makes eye contact.  Smiling and joking    Lines/Drains:        Telemetry:  Telemetry Orders (From admission, onward)               24 Hour Telemetry Monitoring  Continuous x 24 Hours (Telem)        Expiring   Question:  Reason for 24 Hour Telemetry  Answer:  Pulmonary Embolism                     Telemetry Reviewed: Normal Sinus Rhythm  Indication for Continued Telemetry Use: No indication for continued use. Will discontinue.                Lab Results: I have reviewed the following results:   Results from last 7 days   Lab Units 05/08/25  0531   WBC Thousand/uL 3.82*   HEMOGLOBIN g/dL 8.2*   HEMATOCRIT % 23.6*   PLATELETS Thousands/uL 110*   SEGS PCT % 67   LYMPHO PCT % 22   MONO PCT % 8   EOS PCT % 2     Results from last 7 days   Lab Units 05/08/25  0531   SODIUM mmol/L 139   POTASSIUM mmol/L 3.1*   CHLORIDE mmol/L 100   CO2 mmol/L 30   BUN mg/dL 6   CREATININE mg/dL 0.88   ANION GAP mmol/L 9   CALCIUM mg/dL 8.4   ALBUMIN g/dL 2.6*   TOTAL BILIRUBIN mg/dL 0.72   ALK PHOS U/L 55   ALT U/L 5*   AST U/L 13   GLUCOSE RANDOM mg/dL 88     Results from last 7 days   Lab Units 05/08/25  0531   INR  1.69*     Results from last 7 days   Lab Units 05/02/25  1001   POC GLUCOSE mg/dl 88               Recent Cultures (last 7 days):         ==============================================  Imaging  4/30 celiac/mesenteric duplex  The abdominal aorta is patent and normal in caliber, measuring approximately 1.7 cm in its greatest diameter.  The celiac and splenic arteries are patent  and normal in caliber. The proximal common hepatic artery was not visualized.  The main Renal Arteries are patent and normal in caliber bilaterally.   The superior and inferior mesenteric arteries are patent and normal in caliber.      4/30: Barium swallow   projection demonstrates degenerative changes of the spine without obvious free air.  Limited examination   No evidence of an obvious fixed esophageal obstruction. Some transient spasm appeared to be present which gave the patient's some symptomatology but contrast did eventually pass into the stomach.     4/29 Arterial lower extremity duplex  RIGHT LOWER LIMB:  Diffuse disease noted throughout the femoral-popliteal arteries without significant focal stenosis.  Ankle/Brachial index:  Unreliable due to poorly compressible tibial vessels.  PVR/ PPG tracings are normal.  Metatarsal pressure of 166 mmHg  Great toe pressure of 109 mmHg, within the healing range   LEFT LOWER LIMB:  Diffuse disease noted throughout the femoral-popliteal arteries without significant focal stenosis.  Ankle/Brachial index:   Unreliable due to poorly compressible tibial vessels.  PVR/ PPG tracings are normal.  Metatarsal pressure of 168 mmHg  Great toe pressure of 127 mmHg, within the healing range     4/29 MRI right ankle/heel  1.  No convincing evidence of osteomyelitis. There is mild reactive edema.  2.  Advanced distal Achilles tendinopathy, with a partial-thickness tear involving the deep insertional fibers.  3.  Likely small, focal longitudinal split tear of the peroneus brevis.  4.  Mild posterior tibialis tendinosis and tenosynovitis.  5.  Plantar fasciopathy and postsurgical defect.  6.  Degenerative changes, as described.     4/28 CT PE study with abdomen/pelvis  1. Extensive acute thrombus throughout the right upper middle and lower lobe segmental and distal order pulmonary arteries. RV/LV ratio 0.9.  2. No acute intra-abdominal or pelvic process.     4/28 lower extremity  venous duplex  RIGHT LOWER LIMB:  There is evidence suggestive of acute vs. sub acute deep vein thrombosis noted in the popliteal, gastrocnemius, paired posterior tibial, and paired peroneal veins.  No evidence of superficial thrombophlebitis noted.  Popliteal, posterior tibial and anterior tibial arterial Doppler waveforms are  triphasic.  LEFT LOWER LIMB:  There is evidence suggestive of acute vs. sub acute deep vein thrombosis noted in the popliteal, paired posterior tibial, and paired peroneal veins.  No evidence of superficial thrombophlebitis noted.  Popliteal, posterior tibial and anterior tibial arterial Doppler waveforms are triphasic.     4/27 right foot x-ray Stable appearance of the calcaneus. No new erosions      4/25 CT chest/abdomen/pelvis  Status post reported esophageal dilatation. No evidence for pneumomediastinum. Small hiatal hernia is noted.  No acute intra-abdominal or pelvic abnormality identified.  Colonic diverticulosis without evidence for acute diverticulitis.     Micro  4/28 positive FOB  4/28 stool enteric pathogen: neg  4/28: neg c diff  4/25 urine cx: >100,000 Kleb, >100,000ESBL E coli     Procedure  5/5: EGD  The upper third of the esophagus, middle third of the esophagus and lower third of the esophagus appeared normal.  C4M7 Wang's esophagus; electronic chromoendoscopy (NBI) was used  6 cm type I hiatal hernia  The fundus of the stomach, body of the stomach, incisura and antrum appeared normal.  Prior polypectomy site noted at the body along the greater curvature, as evidenced by the convergence of multiple gastric folds.  The duodenal bulb, 1st part of the duodenum and 2nd part of the duodenum appeared normal.  5/5 colonoscopy  The terminal ileum appeared normal.  Mild, localized petechial mucosa in the cecum and ascending colon.    - likely due to barotrauma. The mucosal petechiae developed during the procedure and does not explain her prior episodes of hematochezia  The  transverse colon appeared normal.  Melanosis in the splenic flexure, descending colon, sigmoid colon, rectosigmoid and rectum  Multiple small, scattered diverticula of mild severity with no inflammation containing blood clots and causing mild luminal narrowing (traversable) in the descending colon and sigmoid colon; there was stigmata of recent hemorrhage.    - there was some narrowing and a sharp turn at the rectosigmoid junction. There were several diverticuli that had old blood near the mouth. There was also small amounts of old blood mixed with the bowel prep  Internal small hemorrhoids observed during retroflexion  =============================================    Last 24 Hours Medication List:     Current Facility-Administered Medications:     acetaminophen (TYLENOL) tablet 650 mg, Q6H PRN    [Held by provider] amLODIPine (NORVASC) tablet 10 mg, Daily    bisacodyl (DULCOLAX) EC tablet 10 mg, See Admin Instructions    [Held by provider] enoxaparin (LOVENOX) subcutaneous injection 80 mg, Q12H MELANY    hydrOXYzine HCL (ATARAX) tablet 25 mg, Q8H PRN    levothyroxine tablet 75 mcg, Early Morning    meclizine (ANTIVERT) tablet 25 mg, TID PRN    [Held by provider] metoprolol tartrate (LOPRESSOR) tablet 50 mg, Q12H MELANY    mirtazapine (REMERON) tablet 7.5 mg, HS    moisture barrier miconazole 2% cream (aka EDNA MOISTURE BARRIER ANTIFUNGAL CREAM), BID    ondansetron (ZOFRAN) injection 4 mg, Q4H PRN    pantoprazole (PROTONIX) EC tablet 40 mg, BID AC    potassium chloride 20 mEq IVPB (premix), Q2H    pravastatin (PRAVACHOL) tablet 80 mg, Daily With Dinner    [Held by provider] senna-docusate sodium (SENOKOT S) 8.6-50 mg per tablet 1 tablet, Daily    [Held by provider] warfarin (COUMADIN) tablet 5 mg, Once per day on Sunday Tuesday Wednesday Thursday Saturday    [Held by provider] warfarin (COUMADIN) tablet 7.5 mg, Once per day on Monday Friday    Administrative Statements   Today, Patient Was Seen By: Monique Gonzalez,  MD      **Please Note: This note may have been constructed using a voice recognition system.**

## 2025-05-08 NOTE — NURSING NOTE
1130: Patient noted to have new onset aphasia. A rapid response and stroke alert were called for this patient. Head CT ordered.     Mirza Salazar RN

## 2025-05-08 NOTE — RAPID RESPONSE
Rapid Response Note  Alecia Kay 80 y.o. female MRN: [id number]  Unit/Bed#: E4 -01 Encounter: [id number]    Rapid Response Notification(s):   Response called date/time:  5/8/2025 11:34 AM  Response team arrival date/time:  5/8/2025 11:34 AM  Response end date/time:  5/8/2025 11:41 AM  Level of care:  Stepdown 2  Rapid response location:  [facility]  Primary reason for rapid response call:  Acute change in neuro status    Rapid Response Intervention(s):   Airway:  None  Breathing:  None  Circulation:  None  Fluids administered:  None  Medications administered:  None       Assessment:   Acute change in speech, expressive aphasia with questionable extraocular muscle abnormality  DVT/PE not on anticoagulation currently  Hematochezia -present on this exam  Wang's esophagus  Diverticulosis,  Hypertension  Hyperlipidemia  Chronic osteomyelitis  Hypothyroidism    Plan:   Patient with acute change in speech patient, unable to answer questions appropriately  Given that she has been off anticoagulation for 8 hours, will rule out stroke with CT head and CTA head and neck  Check baseline labs with CBC, BMP, coags, troponin   Patient is not hypercapnic or hypoglycemic  Likely not a TNK candidate given hematochezia on exam  Neurology on board from 1 another  Patient may stay as a stepdown level 2 for now     Rapid Response Outcome:   Transfer:  Remain on floor  Primary service notified of transfer: Yes    Code Status: Level 1 (Full Code)      Family notified: Primary team to notify Family Member       Background/Situation:   Alecia Kay is a 80 y.o. female with a past medical history of VTE on Coumadin prior to admission, GERD with Wang's esophagus, hypertension, hyperlipidemia, chronic osteomyelitis, and hypothyroidism who initially presented with generalized weakness and failure to thrive secondary to poor oral intake associated with a 70 pound weight loss.  Patient was initially treated for an  GAY/UTI and also started on heparin drip due to DVT/PE.  The patient subsequently developed hematochezia with a drop in hemoglobin.  Underwent EGD and colonoscopy on 5/5 which showed Wang's esophagus, old blood near the sigmoid diverticuli, and some proximal colonic barotrauma.  Patient was bridged to Lovenox at that time, but had more hematochezia.  The patient has not had anticoagulation in the past 2 days.  Rapid response was called for acute change in speech.  Patient was exhibiting expressive aphasia.    Review of Systems   Unable to perform ROS: Acuity of condition       Objective:   Vitals:    05/07/25 2329 05/08/25 0329 05/08/25 0758 05/08/25 1107   BP: 112/59 113/59 117/62 137/63   BP Location: Left arm Left arm Right arm Left arm   Pulse: 69 70 69 85   Resp: 20 20 20 20   Temp: 97.9 °F (36.6 °C) (!) 95.8 °F (35.4 °C) 97.6 °F (36.4 °C) 98.4 °F (36.9 °C)   TempSrc: Temporal Temporal Temporal Temporal   SpO2: 98% 92% 95% 96%   Weight:       Height:         Physical Exam  Vitals reviewed.   Constitutional:       General: She is not in acute distress.  HENT:      Head: Normocephalic and atraumatic.      Right Ear: External ear normal.      Left Ear: External ear normal.      Nose: Nose normal.      Mouth/Throat:      Mouth: Mucous membranes are moist.      Pharynx: Oropharynx is clear.   Eyes:      Pupils: Pupils are equal, round, and reactive to light.      Comments: Extra-ocular muscles abnormal, the patient would not follow my finger   Cardiovascular:      Rate and Rhythm: Normal rate and regular rhythm.      Pulses: Normal pulses.   Pulmonary:      Effort: Pulmonary effort is normal.      Breath sounds: Normal breath sounds.   Abdominal:      General: Abdomen is flat. Bowel sounds are normal.   Musculoskeletal:      Right lower leg: Edema present.      Left lower leg: Edema present.   Skin:     Findings: Bruising present.   Neurological:      Mental Status: She is alert. She is disoriented.      Cranial  Nerves: Cranial nerve deficit present.      Comments: Expressive aphasia       Luis Muñiz D.O.  PGY-5, Pulmonary/Critical Care  [facility]

## 2025-05-08 NOTE — PLAN OF CARE
Problem: PAIN - ADULT  Goal: Verbalizes/displays adequate comfort level or baseline comfort level  Description: Interventions:- Encourage patient to monitor pain and request assistance- Assess pain using appropriate pain scale- Administer analgesics based on type and severity of pain and evaluate response- Implement non-pharmacological measures as appropriate and evaluate response- Consider cultural and social influences on pain and pain management- Notify physician/advanced practitioner if interventions unsuccessful or patient reports new pain  Outcome: Progressing     Problem: INFECTION - ADULT  Goal: Absence or prevention of progression during hospitalization  Description: INTERVENTIONS:- Assess and monitor for signs and symptoms of infection- Monitor lab/diagnostic results- Monitor all insertion sites, i.e. indwelling lines, tubes, and drains- Monitor endotracheal if appropriate and nasal secretions for changes in amount and color- [place] appropriate cooling/warming therapies per order- Administer medications as ordered- Instruct and encourage patient and family to use good hand hygiene technique- Identify and instruct in appropriate isolation precautions for identified infection/condition  Outcome: Progressing  Goal: Absence of fever/infection during neutropenic period  Description: INTERVENTIONS:- Monitor WBC  Outcome: Progressing     Problem: SAFETY ADULT  Goal: Patient will remain free of falls  Description: INTERVENTIONS:- Educate patient/family on patient safety including physical limitations- Instruct patient to call for assistance with activity - Consult OT/PT to assist with strengthening/mobility - Keep Call bell within reach- Keep bed low and locked with side rails adjusted as appropriate- Keep care items and personal belongings within reach- Initiate and maintain comfort rounds- Make Fall Risk Sign visible to staff- Offer Toileting every 2 Hours, in advance of need- Initiate/Maintain bed alarm-  Obtain necessary fall risk management equipment: - Apply yellow socks and bracelet for high fall risk patients- Consider moving patient to room near nurses station  Outcome: Progressing  Goal: Maintain or return to baseline ADL function  Description: INTERVENTIONS:-  Assess patient's ability to carry out ADLs; assess patient's baseline for ADL function and identify physical deficits which impact ability to perform ADLs (bathing, care of mouth/teeth, toileting, grooming, dressing, etc.)- Assess/evaluate cause of self-care deficits - Assess range of motion- Assess patient's mobility; develop plan if impaired- Assess patient's need for assistive devices and provide as appropriate- Encourage maximum independence but intervene and supervise when necessary- Involve family in performance of ADLs- Assess for home care needs following discharge - Consider OT consult to assist with ADL evaluation and planning for discharge- Provide patient education as appropriate  Outcome: Progressing  Goal: Maintains/Returns to pre admission functional level  Description: INTERVENTIONS:- Perform AM-PAC 6 Click Basic Mobility/ Daily Activity assessment daily.- Set and communicate daily mobility goal to care team and patient/family/caregiver. - Collaborate with rehabilitation services on mobility goals if consulted- Perform Range of Motion 3 times a day.- Reposition patient every 2 hours.- Dangle patient 3 times a day- Stand patient 3 times a day- Ambulate patient 3 times a day- Out of bed to chair 3 times a day - Out of bed for meals 3 times a day- Out of bed for toileting- Record patient progress and toleration of activity level   Outcome: Progressing     Problem: DISCHARGE PLANNING  Goal: Discharge to home or other facility with appropriate resources  Description: INTERVENTIONS:- Identify barriers to discharge w/patient and caregiver- Arrange for needed discharge resources and transportation as appropriate- Identify discharge learning  needs (meds, wound care, etc.)- Arrange for interpretive services to assist at discharge as needed- Refer to Case Management Department for coordinating discharge planning if the patient needs post-hospital services based on physician/advanced practitioner order or complex needs related to functional status, cognitive ability, or social support system  Outcome: Progressing     Problem: Knowledge Deficit  Goal: Patient/family/caregiver demonstrates understanding of disease process, treatment plan, medications, and discharge instructions  Description: Complete learning assessment and assess knowledge base.Interventions:- Provide teaching at level of understanding- Provide teaching via preferred learning methods  Outcome: Progressing     Problem: Nutrition/Hydration-ADULT  Goal: Nutrient/Hydration intake appropriate for improving, restoring or maintaining nutritional needs  Description: Monitor and assess patient's nutrition/hydration status for malnutrition. Collaborate with interdisciplinary team and initiate plan and interventions as ordered.  Monitor patient's weight and dietary intake as ordered or per policy. Utilize nutrition screening tool and intervene as necessary. Determine patient's food preferences and provide high-protein, high-caloric foods as appropriate. INTERVENTIONS:- Monitor oral intake, urinary output, labs, and treatment plans- Assess nutrition and hydration status and recommend course of action- Evaluate amount of meals eaten- Assist patient with eating if necessary - Allow adequate time for meals- Recommend/ encourage appropriate diets, oral nutritional supplements, and vitamin/mineral supplements- Order, calculate, and assess calorie counts as needed- Recommend, monitor, and adjust tube feedings and TPN/PPN based on assessed needs- Assess need for intravenous fluids- Provide specific nutrition/hydration education as appropriate- Include patient/family/caregiver in decisions related to  nutrition  Monitor and assess patient's nutrition/hydration status for malnutrition. Collaborate with interdisciplinary team and initiate plan and interventions as ordered.  Monitor patient's weight and dietary intake as ordered or per policy. Utilize nutrition screening tool and intervene as necessary. Determine patient's food preferences and provide high-protein, high-caloric foods as appropriate. INTERVENTIONS:- Monitor oral intake, urinary output, labs, and treatment plans- Assess nutrition and hydration status and recommend course of action- Evaluate amount of meals eaten- Assist patient with eating if necessary - Allow adequate time for meals- Recommend/ encourage appropriate diets, oral nutritional supplements, and vitamin/mineral supplements- Order, calculate, and assess calorie counts as needed- Recommend, monitor, and adjust tube feedings and TPN/PPN based on assessed needs- Assess need for intravenous fluids- Provide specific nutrition/hydration education as appropriate- Include patient/family/caregiver in decisions related to nutrition  Outcome: Progressing     Problem: Prexisting or High Potential for Compromised Skin Integrity  Goal: Skin integrity is maintained or improved  Description: INTERVENTIONS:- Identify patients at risk for skin breakdown- Assess and monitor skin integrity- Assess and monitor nutrition and hydration status- Monitor labs - Assess for incontinence - Turn and reposition patient- Assist with mobility/ambulation- Relieve pressure over bony prominences- Avoid friction and shearing- Provide appropriate hygiene as needed including keeping skin clean and dry- Evaluate need for skin moisturizer/barrier cream- Collaborate with interdisciplinary team - Patient/family teaching- Consider wound care consult   Outcome: Progressing     Problem: HEMATOLOGIC - ADULT  Goal: Maintains hematologic stability  Description: INTERVENTIONS- Assess for signs and symptoms of bleeding or hemorrhage-  Monitor labs- Administer supportive blood products/factors as ordered and appropriate  Outcome: Progressing

## 2025-05-09 ENCOUNTER — APPOINTMENT (INPATIENT)
Dept: MRI IMAGING | Facility: HOSPITAL | Age: 80
DRG: 377 | End: 2025-05-09
Payer: COMMERCIAL

## 2025-05-09 ENCOUNTER — APPOINTMENT (INPATIENT)
Dept: CT IMAGING | Facility: HOSPITAL | Age: 80
DRG: 377 | End: 2025-05-09
Payer: COMMERCIAL

## 2025-05-09 ENCOUNTER — APPOINTMENT (INPATIENT)
Dept: RADIOLOGY | Facility: HOSPITAL | Age: 80
DRG: 377 | End: 2025-05-09
Attending: PHYSICIAN ASSISTANT
Payer: COMMERCIAL

## 2025-05-09 PROBLEM — E83.42 HYPOMAGNESEMIA: Status: ACTIVE | Noted: 2025-05-09

## 2025-05-09 PROBLEM — Z71.89 GOALS OF CARE, COUNSELING/DISCUSSION: Status: ACTIVE | Noted: 2025-05-09

## 2025-05-09 PROBLEM — E87.6 HYPOKALEMIA: Status: RESOLVED | Noted: 2025-05-03 | Resolved: 2025-05-09

## 2025-05-09 PROBLEM — E16.2 HYPOGLYCEMIA: Status: ACTIVE | Noted: 2025-05-09

## 2025-05-09 LAB
ABO GROUP BLD: NORMAL
ANION GAP SERPL CALCULATED.3IONS-SCNC: 8 MMOL/L (ref 4–13)
BASOPHILS # BLD AUTO: 0.01 THOUSANDS/ÂΜL (ref 0–0.1)
BASOPHILS NFR BLD AUTO: 0 % (ref 0–1)
BLD GP AB SCN SERPL QL: NEGATIVE
BUN SERPL-MCNC: 8 MG/DL (ref 5–25)
CALCIUM SERPL-MCNC: 8.2 MG/DL (ref 8.4–10.2)
CHLORIDE SERPL-SCNC: 102 MMOL/L (ref 96–108)
CO2 SERPL-SCNC: 29 MMOL/L (ref 21–32)
CREAT SERPL-MCNC: 0.92 MG/DL (ref 0.6–1.3)
EOSINOPHIL # BLD AUTO: 0.11 THOUSAND/ÂΜL (ref 0–0.61)
EOSINOPHIL NFR BLD AUTO: 3 % (ref 0–6)
ERYTHROCYTE [DISTWIDTH] IN BLOOD BY AUTOMATED COUNT: 16.7 % (ref 11.6–15.1)
GFR SERPL CREATININE-BSD FRML MDRD: 58 ML/MIN/1.73SQ M
GLUCOSE SERPL-MCNC: 116 MG/DL (ref 65–140)
GLUCOSE SERPL-MCNC: 124 MG/DL (ref 65–140)
GLUCOSE SERPL-MCNC: 160 MG/DL (ref 65–140)
GLUCOSE SERPL-MCNC: 57 MG/DL (ref 65–140)
GLUCOSE SERPL-MCNC: 61 MG/DL (ref 65–140)
GLUCOSE SERPL-MCNC: 89 MG/DL (ref 65–140)
HCT VFR BLD AUTO: 22.2 % (ref 34.8–46.1)
HCT VFR BLD AUTO: 23.2 % (ref 34.8–46.1)
HCT VFR BLD AUTO: 23.4 % (ref 34.8–46.1)
HGB BLD-MCNC: 7.7 G/DL (ref 11.5–15.4)
HGB BLD-MCNC: 7.9 G/DL (ref 11.5–15.4)
HGB BLD-MCNC: 8.2 G/DL (ref 11.5–15.4)
IMM GRANULOCYTES # BLD AUTO: 0.03 THOUSAND/UL (ref 0–0.2)
IMM GRANULOCYTES NFR BLD AUTO: 1 % (ref 0–2)
LYMPHOCYTES # BLD AUTO: 1.06 THOUSANDS/ÂΜL (ref 0.6–4.47)
LYMPHOCYTES NFR BLD AUTO: 24 % (ref 14–44)
MAGNESIUM SERPL-MCNC: 1.3 MG/DL (ref 1.9–2.7)
MCH RBC QN AUTO: 28.9 PG (ref 26.8–34.3)
MCHC RBC AUTO-ENTMCNC: 34.7 G/DL (ref 31.4–37.4)
MCV RBC AUTO: 84 FL (ref 82–98)
MONOCYTES # BLD AUTO: 0.47 THOUSAND/ÂΜL (ref 0.17–1.22)
MONOCYTES NFR BLD AUTO: 11 % (ref 4–12)
NEUTROPHILS # BLD AUTO: 2.77 THOUSANDS/ÂΜL (ref 1.85–7.62)
NEUTS SEG NFR BLD AUTO: 61 % (ref 43–75)
NRBC BLD AUTO-RTO: 1 /100 WBCS
PLATELET # BLD AUTO: 109 THOUSANDS/UL (ref 149–390)
PMV BLD AUTO: 10.6 FL (ref 8.9–12.7)
POTASSIUM SERPL-SCNC: 3.8 MMOL/L (ref 3.5–5.3)
RBC # BLD AUTO: 2.66 MILLION/UL (ref 3.81–5.12)
RH BLD: POSITIVE
SODIUM SERPL-SCNC: 139 MMOL/L (ref 135–147)
SPECIMEN EXPIRATION DATE: NORMAL
WBC # BLD AUTO: 4.45 THOUSAND/UL (ref 4.31–10.16)

## 2025-05-09 PROCEDURE — 70450 CT HEAD/BRAIN W/O DYE: CPT

## 2025-05-09 PROCEDURE — 99233 SBSQ HOSP IP/OBS HIGH 50: CPT | Performed by: INTERNAL MEDICINE

## 2025-05-09 PROCEDURE — 83735 ASSAY OF MAGNESIUM: CPT | Performed by: INTERNAL MEDICINE

## 2025-05-09 PROCEDURE — 37191 INS ENDOVAS VENA CAVA FILTR: CPT | Performed by: RADIOLOGY

## 2025-05-09 PROCEDURE — 37191 INS ENDOVAS VENA CAVA FILTR: CPT

## 2025-05-09 PROCEDURE — 86850 RBC ANTIBODY SCREEN: CPT | Performed by: INTERNAL MEDICINE

## 2025-05-09 PROCEDURE — 76937 US GUIDE VASCULAR ACCESS: CPT

## 2025-05-09 PROCEDURE — 99233 SBSQ HOSP IP/OBS HIGH 50: CPT

## 2025-05-09 PROCEDURE — 82948 REAGENT STRIP/BLOOD GLUCOSE: CPT

## 2025-05-09 PROCEDURE — 85025 COMPLETE CBC W/AUTO DIFF WBC: CPT | Performed by: INTERNAL MEDICINE

## 2025-05-09 PROCEDURE — 86900 BLOOD TYPING SEROLOGIC ABO: CPT | Performed by: INTERNAL MEDICINE

## 2025-05-09 PROCEDURE — 80048 BASIC METABOLIC PNL TOTAL CA: CPT | Performed by: INTERNAL MEDICINE

## 2025-05-09 PROCEDURE — C1769 GUIDE WIRE: HCPCS

## 2025-05-09 PROCEDURE — 85018 HEMOGLOBIN: CPT | Performed by: INTERNAL MEDICINE

## 2025-05-09 PROCEDURE — 85014 HEMATOCRIT: CPT | Performed by: INTERNAL MEDICINE

## 2025-05-09 PROCEDURE — 06H03DZ INSERTION OF INTRALUMINAL DEVICE INTO INFERIOR VENA CAVA, PERCUTANEOUS APPROACH: ICD-10-PCS | Performed by: RADIOLOGY

## 2025-05-09 PROCEDURE — 86901 BLOOD TYPING SEROLOGIC RH(D): CPT | Performed by: INTERNAL MEDICINE

## 2025-05-09 PROCEDURE — 70551 MRI BRAIN STEM W/O DYE: CPT

## 2025-05-09 PROCEDURE — 94762 N-INVAS EAR/PLS OXIMTRY CONT: CPT

## 2025-05-09 PROCEDURE — C1880 VENA CAVA FILTER: HCPCS

## 2025-05-09 RX ORDER — DEXTROSE MONOHYDRATE AND SODIUM CHLORIDE 5; .9 G/100ML; G/100ML
75 INJECTION, SOLUTION INTRAVENOUS CONTINUOUS
Status: DISCONTINUED | OUTPATIENT
Start: 2025-05-09 | End: 2025-05-12

## 2025-05-09 RX ORDER — DEXTROSE MONOHYDRATE 25 G/50ML
50 INJECTION, SOLUTION INTRAVENOUS ONCE
Status: COMPLETED | OUTPATIENT
Start: 2025-05-09 | End: 2025-05-09

## 2025-05-09 RX ORDER — HYDROCORTISONE SODIUM SUCCINATE 100 MG/2ML
50 INJECTION INTRAMUSCULAR; INTRAVENOUS ONCE
Status: COMPLETED | OUTPATIENT
Start: 2025-05-09 | End: 2025-05-09

## 2025-05-09 RX ORDER — DEXTROSE MONOHYDRATE 25 G/50ML
INJECTION, SOLUTION INTRAVENOUS
Status: DISPENSED
Start: 2025-05-09 | End: 2025-05-09

## 2025-05-09 RX ORDER — LIDOCAINE WITH 8.4% SOD BICARB 0.9%(10ML)
SYRINGE (ML) INJECTION AS NEEDED
Status: COMPLETED | OUTPATIENT
Start: 2025-05-09 | End: 2025-05-09

## 2025-05-09 RX ORDER — MAGNESIUM SULFATE HEPTAHYDRATE 40 MG/ML
4 INJECTION, SOLUTION INTRAVENOUS ONCE
Status: COMPLETED | OUTPATIENT
Start: 2025-05-09 | End: 2025-05-09

## 2025-05-09 RX ADMIN — DEXTROSE MONOHYDRATE 50 ML: 25 INJECTION, SOLUTION INTRAVENOUS at 08:06

## 2025-05-09 RX ADMIN — MAGNESIUM SULFATE HEPTAHYDRATE 4 G: 40 INJECTION, SOLUTION INTRAVENOUS at 09:15

## 2025-05-09 RX ADMIN — HYDROCORTISONE SODIUM SUCCINATE 50 MG: 100 INJECTION, POWDER, FOR SOLUTION INTRAMUSCULAR; INTRAVENOUS at 14:31

## 2025-05-09 RX ADMIN — MICONAZOLE NITRATE 1 APPLICATION: 20 CREAM TOPICAL at 09:21

## 2025-05-09 RX ADMIN — MICONAZOLE NITRATE 1 APPLICATION: 20 CREAM TOPICAL at 17:13

## 2025-05-09 RX ADMIN — PANTOPRAZOLE SODIUM 40 MG: 40 TABLET, DELAYED RELEASE ORAL at 05:39

## 2025-05-09 RX ADMIN — DEXTROSE MONOHYDRATE 50 ML: 25 INJECTION, SOLUTION INTRAVENOUS at 14:25

## 2025-05-09 RX ADMIN — Medication 10 ML: at 15:00

## 2025-05-09 RX ADMIN — DEXTROSE AND SODIUM CHLORIDE 75 ML/HR: 5; .9 INJECTION, SOLUTION INTRAVENOUS at 08:10

## 2025-05-09 RX ADMIN — IOHEXOL 20 ML: 350 INJECTION, SOLUTION INTRAVENOUS at 15:21

## 2025-05-09 RX ADMIN — LEVOTHYROXINE SODIUM 75 MCG: 75 TABLET ORAL at 05:39

## 2025-05-09 NOTE — PROGRESS NOTES
Progress Note - Geriatric Medicine   Alecia Kay 80 y.o. female MRN: [id number]  Unit/Bed#: E4 -01 Encounter: [id number]      Assessment/Plan:    Acute Encephalopathy  Patient presented to the hospital for generalized weakness   Baseline mentation is alert and oriented x 4 but forgetful at times   Current cause considerations   Suspected to be multifactorial secondary to age, possible underlying cognitive impairment, hematoochezia, acute PE/DVT, hypoglycemia, atarax use, vision impairment, and prolonged hospitalization  UA on admission negative for UTI   No leukocytosis noted on labs today   Hemoglobin on labs this morning noted to be 7.7  Patient was a rapid response yesterday for acute mental status change   She was a stroke alert   CT of the head was negative   MRI pending   Neurology consulted and patient now on stroke pathway   Patient is noted to be lethargic on my exam today   She is minimally responding to questions   She is ill-appearing   Patient is scheduled for IVC filter today   If patient needs anesthesia would postpone  Primary team will change MRI to STAT to determine if patient had a stroke   Primary team did find POLST from January and patient was level 3 DNR at that time  Son agreeable to this change and patient is now level 3 DNR  Identify and treat reversible causes of confusion including infection, dehydration, electrolyte imbalance, anemia, hypoxia, urinary retention, constipation, pain, and sleep disturbance  Maintain delirium precautions   Provide redirection, reorientation, and distraction techniques  Maintain fall and safety precautions   Assist with ADLs/IADLs  Avoid deliriogenic medications such as tramadol, benzodiazepines, anticholinergics, benadryl  Treat pain using geriatric pain protocol   Encourage oral hydration and nutrition   Monitor for constipation and urinary retention   Implement sleep hygiene and limit night time interuptions   Maintain sleep-wake cycle    Encourage early and frequent mobilization   Most recent EKG on 5/8/2025 revealed a QTc interval  of 447  If all other interventions are unsuccessful for acute agitation and behaviors, can consider Zyprexa 2.5 mg IM Q 8 hours prn   Would avoid benzodiazepines such as Ativan if possible as these medications can cause/worsen delirium   Redirect and reorient as needed  Keep mentally, physically, and socially active    Cognitive Screening  Patient has no documented history of memory loss or cognitive impairment   She notes no issues with her memory at baseline   Patient has been having periods of confusion here  Please see cause considerations as discussed above  Patient was a stroke alert yesterday for altered mental status and speech difficulties   She appears to be declining and is lethargic on my exam today   Primary team to order repeat CT head   MRI pending (primary team to order this STAT)  Most recent TSH POC on 11/13/2024 noted to be 1.81  Consider rechecking on routine labs  Most recent vitamin B 12 level on 4/3/2025 noted to be 564  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 with no acute abnormalities   Patient scored 13/15 on BIMS on 3/5/2025 indicating she is cognitively intact   Maintain delirium precautions as discussed above   Redirect and reorient as needed  Keep physically, mentally and socially active     Deconditioning   Patient is at increased risk for deconditioning secondary to possible underlying cognitive impairment, hematoochezia status post colonoscopy, anesthesia, acute PE/DVT, atarax use, vision impairment, weakness, gait dysfunction, and prolonged hospitalization  Continue to optimize diet, hydration, and mobility for healing   Chronic Kidney Disease Stage III  Baseline creatinine not entirely clear but recently appears to be 0.8-1.0  Creatinine on labs today noted to be 0.92  Avoid nephrotoxic medication and renal dose medication   Keep hydrated  Type II Diabetes   Most  recent hemoglobin A1C on 11/13/2024 noted to be 5.7  Per chart review, she does not appear to be on any medication for this at baseline  Glucose on labs noted to be 57  Patient is now on blood sugar checks   Continue to monitor blood sugars closely   Avoid hypoglycemia   Anemia   Baseline hemoglobin appears to be 8-10  Patient was noted to have hematochezia which had initially resolved but has returned   Hemoglobin on labs this morning noted to be 7.7  Nursing attempting to obtain repeat labs  Continue to monitor CBC   Transfuse for hemoglobin < 7   Monitor for signs and symptoms of infection, dehydration, DVT, and skin breakdown    Frailty   Clinical Frail Scale: 6- Moderately Frail (current)  Need help with all outside activities  Need help with stairs and bathing  May need assistance with dressing  Most recent albumin on labs today noted to be 2.6  Consider nutrition consult  Encourage protein supplementation     Ambulatory Dysfunction/Falls  Patient notes no recent falls at home   She states she ambulates with a roller walker at baseline   PT/OT consulted to assist with strengthening/mobility and assist with discharge planning to appropriate level of care  Assess patient frequently for physical needs, encourage use of assistant devices as needed and directed by PT/OT  Identify cognitive and physical deficits and behaviors that affect risk of falls  Consider moving patient closer to nursing station to monitor more closely for impulsive behavior which may increase risk of falls  [place] fall and safety precautions   Educate patient/family on patient safety including physical limitations and importance of using call bell for assistance   Modify environment to reduce risk of injury including disconnecting from pole when not in use, ensuring adequate lighting in room and restroom, ensuring that path to restroom is clear and free of trip hazards  Out of bed as tolerated    Impaired Vision   Patient denies vision  impairment   She notes she does have glasses for reading   Recommend use of corrective lenses at all appropriate times  Encourage adequate lighting and encourage use of assistance with ambulation  Keep personal belongings close to avoid reaching  Encourage appropriate footwear at all times  Recommend large font for printed materials provided to patient    Dentition/Appetite   Patient denies denture use  She states she has a good appetite at baseline   She has intermittently been NPO  She was scheduled for an IVC filter yesterday though this was postponed due to stroke alert   She is scheduled for this today so remains NPO  Based on current cognitive status would continue with NPO status   Speech therapy consulted and following   Encourage use of dentures at all appropriate times  Ensure meal consistency is appropriate for all abilities   Consider nutrition consult   Continue aspiration precautions     Elimination   Patient is continent of bowel and bladder at baseline  She appears to be voiding without difficulty   Patient was noted to have hematochezia and was found to have a diverticular bleed   The bleeding had resolved but returned again yesterday   Last documented bowel movement was yesterday    Patient is not currently on a bowel regimen   Monitor for constipation and urinary retention     Insomnia   Patient notes no difficulty sleeping at baseline   She noted yesterday she had been having some vivid dreams here   Her son at the bedside notes this occurred when at rehab as well   Patient notes she did not sleep well last night per chart review   First line is behavioral therapy   Avoid sedative hypnotics including benzodiazepines and benadryl  Encourage staying awake during the day   Encourage daytime activities and morning exercise   Decrease or eliminate daytime naps   Avoid caffeine especially during late afternoon and evening hours  Establish a nighttime routine  Implement sleep hygiene and limit nighttime  interruptions  Can consider melatonin 3 mg daily at bedtime for sleep if needed     Anxiety/Depression  Patient has a documented history of anxiety   PDMP reviewed and patient was prescribed alprazolam 0.25 mg on 4/11/2025  Unclear how often she was taking this   Mood appears stable on exam today   Continue supportive care     Hematochezia   Patient presented to [facility] with generalized weakness  She was initially noted to have a UTI and GAY  Additionally she was found to have acute DVT and PE  Her INR was subtherapeutic and she was started on a heparin drip  She was later noted to have hematochezia and was a rapid response   She was transferred to [facility] due to lack of GI services   GI initially restarted the heparin infusion but this was discontinued due to a large amount of hematochezia  She underwent and EGD/colonoscopy on 5/5  EGD revealed Wang's esophagus with no evidence of bleeding   Colonoscopy revealed several diverticuli with old blood   GI suspected a diverticular bleed   The bleeding had resolved but patient has been continuing to have bleeding   She did receive blood and FFP on 5/7  Patient was scheduled for an IVC filter yesterday but this was postponed due to stroke alert   Hemoglobin on labs today noted to be 7.7  Patient is scheduled for the IVC filter today   Would postpone if patient will require anesthesia  GI continues to follow   Management per GI and primary team     Acute Pulmonary Embolism  Patient found to have extensive right upper and lower PE on CTA on 4/28/2025  Echo noted no evidence of heart strain   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   She is maintained on coumadin 5 mg 5 days per week and 7.5 mg Monday and Friday  Patient had nausea/vomiting for several days PTA and she was not able to take this medication   She had been on anticoagulation but this is again on hold due to hematochezia   GI is following for hematochezia   Plan is for IVC filter tentatively today   Would recommend  "postponing if patient will require anesthesia   Management per primary team     Chronic Osteomyelitis   Patient with previous chronic osteomyelitis of the right foot   Podiatry recommending WBAT in a surgical shoe  She completed 6 weeks of antibiotics PTA   MRI of the right heel and ankle on 4/29/2025 revealed no evidence of remaining osteomyelitis   Patient was re-evaluated by podiatry on 4/30/2025 and no changes to prior recommendations noted   She will require continued outpatient follow-up with podiatry on discharge       Subjective:   The patient is being seen and evaluated today at the bedside for geriatric follow-up. She is noted to be lying in bed comfortably in no acute distress. She is lethargic and is minimally responsive to questions.     Care was coordinated with patients nurse Keisha. She is attempting to get blood work on the patient.     Care was also coordinated with Dr. Gonzalez with internal medicine.       Review of Systems   Unable to perform ROS: Mental status change (lethargic)         Objective:     Vitals: Blood pressure 117/69, pulse 78, temperature 97.6 °F (36.4 °C), temperature source Temporal, resp. rate 20, height 5' 2\" (1.575 m), weight 78 kg (171 lb 15.3 oz), SpO2 95%.,Body mass index is 31.45 kg/m².      Intake/Output Summary (Last 24 hours) at 5/9/2025 1351  Last data filed at 5/9/2025 0829  Gross per 24 hour   Intake 100 ml   Output 475 ml   Net -375 ml       Current Medications: Reviewed    Physical Exam:   Physical Exam  Vitals and nursing note reviewed.   Constitutional:       General: She is not in acute distress.     Appearance: She is ill-appearing.   HENT:      Head: Normocephalic.      Mouth/Throat:      Mouth: Mucous membranes are dry.   Cardiovascular:      Rate and Rhythm: Normal rate and regular rhythm.   Pulmonary:      Effort: Pulmonary effort is normal. No respiratory distress.   Abdominal:      General: Bowel sounds are normal. There is no distension. " "  Musculoskeletal:         General: Swelling (generalized) present.   Skin:     General: Skin is warm and dry.   Neurological:      Mental Status: She is lethargic.          Invasive Devices       Peripheral Intravenous Line  Duration             Peripheral IV 05/05/25 Left;Ventral (anterior) Wrist 4 days              Long-dwell Peripherally Inserted Midline IV Catheter  Duration             Long-Dwell Peripheral IV (Midline) 05/07/25 Right Basilic 2 days                    Lab, Imaging and other studies: Results Review Statement: I reviewed AM labs and CT of the head from yesterday.     Please note:  Voice-recognition software may have been used in the preparation of this document.  Occasional wrong word or \"sound-alike\" substitutions may have occurred due to the inherent limitations of voice recognition software.  Interpretation should be guided by context.    "

## 2025-05-09 NOTE — QUICK NOTE
Addendum:  results update    Repeat Hb 8  Stat head CT: no acute infarct  Repeat BS: 80;  will give 1 amp D50 and continue infusion  Hydrocortisone 50mg x1 now    For IVC filter

## 2025-05-09 NOTE — ASSESSMENT & PLAN NOTE
baseline hemoglobin appears to be variable over the last year ranging 7.5-10  Acute blood loss anemia due to hematochezia secondary to diverticular bleed  Was transfused   1 unit PRBC 5/3   2 units PRBC 5/4.  1 unit packed red blood cells 5/7  2 units FFP 5/7  Acute blood loss anemia secondary to lower GI bleed secondary to GI bleed  Continue to monitor hemoglobin closely  Trended down today:  follow serial H/H      Results from last 7 days   Lab Units 05/09/25  0536 05/08/25  2349 05/08/25  1226 05/08/25  0531 05/08/25  0049 05/07/25 2009   HEMOGLOBIN g/dL 7.7* 7.9* 7.9* 8.2* 8.2* 8.4*

## 2025-05-09 NOTE — ASSESSMENT & PLAN NOTE
Pt was a rapid response/stroke alert yesterday due to acute onset expression aphasia/nonsense speech  CT head/ CTa head/neck: no acute abnormality/stroke  MRI brain: pending  Pt has been lethargic, with mumbling speech since yesterday  Cont ASA/statin:    Await MRI report  Also undergoing mon[place]jo for toxic metabolic encephalopathy:  pt with multiple insults to her system, with recurrent anemia, hypotension, anesthesia, poor sleep.  Pt hypoglycemic today: on D5NS while NPO for IVC filter

## 2025-05-09 NOTE — PROGRESS NOTES
Progress Note - Hospitalist   Name: Alecia Kay 80 y.o. female I MRN: [id number]  Unit/Bed#: E4 -01 I Date of Admission: 5/2/2025   Date of Service: 5/9/2025 I Hospital Day: 7    Assessment & Plan  Hematochezia  Pt is a 79yo female with PMH significant for hypertension, DVT/PE CKD 3, and right heel osteomyelitis initially presented to [facility] 4/25/2025 for generalized weakness.  Initially diagnosed with GAY and MDR UTI: completed course of ertapenem    Subsequently found to have DVT and PE, while INR subtherapeutic, and was started on heparin infusion  Patient then had hematochezia with ALBA and was transferred to [facility] for GI eval  5/5 EGD: Wang's esophagus.  Sliding hiatal hernia.  No evidence of bleeding  5/5 colonoscopy: Petechial mucosa in the cecum/ascending colon attributed to barotrauma as it developed during the procedure.  Melanosis in the splenic flexure, descending colon, sigmoid colon, rectosigmoid, and rectum.  Several diverticuli with old blood   Post endoscopy Case was discussed with GI, they suspected diverticular bleed, since resolved, noted anticoagulation could be restarted  Patient was started on anticoagulation with heparin/Lovenox bridging therapy without bleeding and then given 1 dose of Coumadin however had subsequent large hematochezia, with hypotension and acute blood loss anemia, requiring additional transfusions  CTa bleeding scan: Unremarkable  She was seen by GI who did not recommend additional endoscopy  Anticoagulation has been completely discontinued, and reversed w FFP  Continue to monitor H&H  Likely diverticular bleeds  Acute blood loss anemia (ABLA)  baseline hemoglobin appears to be variable over the last year ranging 7.5-10  Acute blood loss anemia due to hematochezia secondary to diverticular bleed  Was transfused   1 unit PRBC 5/3   2 units PRBC 5/4.  1 unit packed red blood cells 5/7  2 units FFP 5/7  Acute blood loss anemia secondary to lower GI bleed  secondary to GI bleed  Continue to monitor hemoglobin closely  Trended down today:  follow serial H/H      Results from last 7 days   Lab Units 05/09/25  0536 05/08/25  2349 05/08/25  1226 05/08/25  0531 05/08/25  0049 05/07/25 2009   HEMOGLOBIN g/dL 7.7* 7.9* 7.9* 8.2* 8.2* 8.4*     Acute pulmonary embolism (HCC)  Extensive right upper and lower lobe pulmonary embolism seen on 4/28/2025 CTA  2D echocardiogram without evidence of right heart strain  Does have history of DVT and PE: Maintained on warfarin previously, however pt states she had not taken the month prior to admission--(INR 1.18)  Due to subtherapeutic INR, was started on bridging therapy with heparin and then Lovenox, and restarted on Coumadin  Anticoagulation currently contraindicated due to recurrent lower GI bleed on anticoagulation  Appreciate pulmonology evaluation: IVC filter recommended  IR consult: IVC filter placement planned for a.m.  Word finding difficulty  Pt was a rapid response/stroke alert yesterday due to acute onset expression aphasia/nonsense speech  CT head/ CTa head/neck: no acute abnormality/stroke  MRI brain: pending  Pt has been lethargic, with mumbling speech since yesterday  Cont ASA/statin:    Await MRI report  Also undergoing montejo for toxic metabolic encephalopathy:  pt with multiple insults to her system, with recurrent anemia, hypotension, anesthesia, poor sleep.  Pt hypoglycemic today: on D5NS while NPO for IVC filter  Acute respiratory insufficiency  Likely secondary to pulmonary embolism: Was placed on 2 L nasal cannula, since improved  Currently with adequate oxygenation on room air  Hypertension  Patient previously previously on amlodipine/beta-blocker  Torsemide recently discontinued during 3/2025 hospitalization  Blocker HELD for bradycardia/hypotension  Norvasc HELD for hypotension  Monitor for  hypotension with hematochezia  Hypokalemia (Resolved: 5/9/2025)  May be secondary to multiple loose bowel movements.  Added  banana flakes, and repleted potassium  Hypomagnesemia: Repleted    Results from last 7 days   Lab Units 05/09/25  0536 05/08/25  1226 05/08/25  0531 05/07/25  0432 05/06/25  0320 05/05/25  0452 05/04/25  0245   POTASSIUM mmol/L 3.8 3.1* 3.1* 3.6 4.0 3.7 4.1   MAGNESIUM mg/dL 1.3*  --   --   --  2.1 1.8* 1.6*     Acute DVT (deep venous thrombosis) ([facility])  History of DVT   However during 4/25/2025 [facility] admission found to have bilateral extensive extensive acute vs subacute DVTs, in the setting of subtherapeutic INR  IVC filter plan due to recurrent bleeding on anticoagulation  Chronic kidney disease, stage III (moderate) ([facility])  Initially presented to [facility] 4/25/2025 for generalized weakness found to have kidney injury and MDR UTI  Renal function has returned back to baseline  Torsemide discontinued during March 2025 hospitalization    Results from last 7 days   Lab Units 05/09/25  0536 05/08/25  1226 05/08/25  0531 05/07/25  0432 05/06/25  0320 05/05/25  0452 05/04/25  0245 05/03/25 2042 05/03/25  0606   BUN mg/dL 8 8 6 6 6 6 7 8 8   CREATININE mg/dL 0.92 0.90 0.88 0.84 0.68 0.70 0.72 0.78 0.78   EGFR ml/min/1.73sq m 58 60 61 65 82 81 78 71 71     Hypothyroidism  Continue levothyroxine  Wang's esophagus  Noted on EGD  Will need repeat EGD in 3 years for surveillance  Patient with dysphagia: Continue proton pump inhibitor bid  Insertional Achilles tendinopathy  MRI with advanced distal Achilles tendinopathy with partial thickness tear involving the deep insertional fibers  Also small focal longitudinal split tear of the peroneus brevis, posterior tibialis tendon stenosis and tenosynovitis, and plantar fasciaopathy  PT/OT  Chronic osteomyelitis (HCC)  Per previous records: Chronic osteomyelitis of the toe of right foot  Per podiatry: Weightbearing as tolerated in postop shoe  Completed 6 weeks of IV antibiotics prior to recent admission  MRI right ankle/heel 4/29/2025: No evidence of remaining osteomyelitis  Patient was  reevaluated by podiatry 4/30/2025: Weightbearing as tolerated in surgical shoe.  Local wound care.    outpatient podiatry follow-up  Goals of care, counseling/discussion  Reviewed prior records:  pt filled out POLST 1/31/25 and was DNR, was also DNR on 3/25 admission:  d/w son at bedside- he notes per conversations w pt in the past, she did not want life support/heroic measures  Will uphold pt's POLST for DNR  Hypoglycemia  While NPO:  started D5NS after amp D50 with improvement  Cont to monitor closely  Hypomagnesemia  Replete again.  Hypokalemia normalized s/p repletion    VTE Pharmacologic Prophylaxis: VTE Score: 6 High Risk (Score >/= 5) - Pharmacological DVT Prophylaxis Contraindicated. Sequential Compression Devices Ordered.    Mobility:   Basic Mobility Inpatient Raw Score: 12  JH-HLM Goal: 4: Move to chair/commode  JH-HLM Achieved: 4: Move to chair/commode  JH-HLM Goal achieved. Continue to encourage appropriate mobility.    Patient Centered Rounds: I performed bedside rounds with nursing staff today.   Discussions with Specialists or Other Care Team Provider: CM    Education and Discussions with Family / Patient: updated son Jaron at bedside.    Current Length of Stay: 7 day(s)  Current Patient Status: Inpatient   Certification Statement: The patient will continue to require additional inpatient hospital stay due to lethargy, expressive aphasia, ABLA  Discharge Plan: Anticipate discharge in >72 hrs to rehab facility.    Code Status: Level 1 - Full Code    Subjective   Pt was examined and interviewed earlier this morning.  Had received an amp of D50 prior.  Pt notes she did not sleep well notes she was awakened early for blood draws.  Notes pain in her arms from being stuck.  Denies any pain anywhere else.  Denies any sob/cough.  Denies any abd pain, n/v.  Denies any other complaints.    Pt was re-interviewed later this morning:  more lethargic.  Opens eyes to voice, but very sleepy.    Objective :  Temp:   [94.6 °F (34.8 °C)-98.4 °F (36.9 °C)] 97.5 °F (36.4 °C)  HR:  [73-91] 89  BP: (106-164)/(59-99) 124/66  Resp:  [16-20] 16  SpO2:  [91 %-96 %] 92 %  O2 Device: None (Room air)  Nasal Cannula O2 Flow Rate (L/min):  [0.5 L/min] 0.5 L/min    Body mass index is 31.45 kg/m².     Input and Output Summary (last 24 hours):     Intake/Output Summary (Last 24 hours) at 5/9/2025 0859  Last data filed at 5/8/2025 2202  Gross per 24 hour   Intake 100 ml   Output 475 ml   Net -375 ml       Physical Exam  Gen: lethargic.  Awakens to voice/touch.  Makes eye contact.  Mumbling speech.  Heart: RRR, S1S2 present.  No m/r/g  Lungs:  CTA bilat.  No w/c/r.  No accessory muscle use or resp distress  Abd: soft, nt/nd, nabs.  No guard/rebound, nabs  Ext: no c/c/e.  2+pedal pulses  Neuro: lethargic.  Mumbled speech.  No facial droop.  EOMI    Lines/Drains:        Telemetry:  Telemetry Orders (From admission, onward)               24 Hour Telemetry Monitoring  Continuous x 24 Hours (Telem)        Expiring   Question:  Reason for 24 Hour Telemetry  Answer:  TIA/Suspected CVA/ Confirmed CVA                     Telemetry Reviewed: Normal Sinus Rhythm  Indication for Continued Telemetry Use: Acute CVA               Lab Results: I have reviewed the following results:   Results from last 7 days   Lab Units 05/09/25  0536   WBC Thousand/uL 4.45   HEMOGLOBIN g/dL 7.7*   HEMATOCRIT % 22.2*   PLATELETS Thousands/uL 109*   SEGS PCT % 61   LYMPHO PCT % 24   MONO PCT % 11   EOS PCT % 3     Results from last 7 days   Lab Units 05/09/25  0536 05/08/25  1226 05/08/25  0531   SODIUM mmol/L 139   < > 139   POTASSIUM mmol/L 3.8   < > 3.1*   CHLORIDE mmol/L 102   < > 100   CO2 mmol/L 29   < > 30   BUN mg/dL 8   < > 6   CREATININE mg/dL 0.92   < > 0.88   ANION GAP mmol/L 8   < > 9   CALCIUM mg/dL 8.2*   < > 8.4   ALBUMIN g/dL  --   --  2.6*   TOTAL BILIRUBIN mg/dL  --   --  0.72   ALK PHOS U/L  --   --  55   ALT U/L  --   --  5*   AST U/L  --   --  13   GLUCOSE  RANDOM mg/dL 57*   < > 88    < > = values in this interval not displayed.     Results from last 7 days   Lab Units 05/08/25  0531   INR  1.69*     Results from last 7 days   Lab Units 05/09/25  0841 05/09/25  0802 05/08/25  1135 05/02/25  1001   POC GLUCOSE mg/dl 160* 61* 94 88     Results from last 7 days   Lab Units 05/08/25  1226   HEMOGLOBIN A1C % 6.1*           Recent Cultures (last 7 days):         ==============================================  Imaging  5/9 MRI brain: pending    5/7 CT brain  No significant stenosis of the cervical carotid or vertebral arteries  No significant intracranial stenosis, large vessel occlusion or aneurysm.  Redemonstration of known pulmonary emboli.    5/8 CTa head/neck:  No acute intracranial abnormality. Chronic microangiopathy.     4/30 celiac/mesenteric duplex  The abdominal aorta is patent and normal in caliber, measuring approximately 1.7 cm in its greatest diameter.  The celiac and splenic arteries are patent and normal in caliber. The proximal common hepatic artery was not visualized.  The main Renal Arteries are patent and normal in caliber bilaterally.   The superior and inferior mesenteric arteries are patent and normal in caliber.      4/30: Barium swallow   projection demonstrates degenerative changes of the spine without obvious free air.  Limited examination   No evidence of an obvious fixed esophageal obstruction. Some transient spasm appeared to be present which gave the patient's some symptomatology but contrast did eventually pass into the stomach.     4/29 Arterial lower extremity duplex  RIGHT LOWER LIMB:  Diffuse disease noted throughout the femoral-popliteal arteries without significant focal stenosis.  Ankle/Brachial index:  Unreliable due to poorly compressible tibial vessels.  PVR/ PPG tracings are normal.  Metatarsal pressure of 166 mmHg  Great toe pressure of 109 mmHg, within the healing range   LEFT LOWER LIMB:  Diffuse disease noted throughout  the femoral-popliteal arteries without significant focal stenosis.  Ankle/Brachial index:   Unreliable due to poorly compressible tibial vessels.  PVR/ PPG tracings are normal.  Metatarsal pressure of 168 mmHg  Great toe pressure of 127 mmHg, within the healing range     4/29 MRI right ankle/heel  1.  No convincing evidence of osteomyelitis. There is mild reactive edema.  2.  Advanced distal Achilles tendinopathy, with a partial-thickness tear involving the deep insertional fibers.  3.  Likely small, focal longitudinal split tear of the peroneus brevis.  4.  Mild posterior tibialis tendinosis and tenosynovitis.  5.  Plantar fasciopathy and postsurgical defect.  6.  Degenerative changes, as described.     4/28 CT PE study with abdomen/pelvis  1. Extensive acute thrombus throughout the right upper middle and lower lobe segmental and distal order pulmonary arteries. RV/LV ratio 0.9.  2. No acute intra-abdominal or pelvic process.     4/28 lower extremity venous duplex  RIGHT LOWER LIMB:  There is evidence suggestive of acute vs. sub acute deep vein thrombosis noted in the popliteal, gastrocnemius, paired posterior tibial, and paired peroneal veins.  No evidence of superficial thrombophlebitis noted.  Popliteal, posterior tibial and anterior tibial arterial Doppler waveforms are  triphasic.  LEFT LOWER LIMB:  There is evidence suggestive of acute vs. sub acute deep vein thrombosis noted in the popliteal, paired posterior tibial, and paired peroneal veins.  No evidence of superficial thrombophlebitis noted.  Popliteal, posterior tibial and anterior tibial arterial Doppler waveforms are triphasic.     4/27 right foot x-ray Stable appearance of the calcaneus. No new erosions      4/25 CT chest/abdomen/pelvis  Status post reported esophageal dilatation. No evidence for pneumomediastinum. Small hiatal hernia is noted.  No acute intra-abdominal or pelvic abnormality identified.  Colonic diverticulosis without evidence for  acute diverticulitis.     Micro  4/28 positive FOB  4/28 stool enteric pathogen: neg  4/28: neg c diff  4/25 urine cx: >100,000 Kleb, >100,000ESBL E coli     Procedure  5/5: EGD  The upper third of the esophagus, middle third of the esophagus and lower third of the esophagus appeared normal.  C4M7 Wang's esophagus; electronic chromoendoscopy (NBI) was used  6 cm type I hiatal hernia  The fundus of the stomach, body of the stomach, incisura and antrum appeared normal.  Prior polypectomy site noted at the body along the greater curvature, as evidenced by the convergence of multiple gastric folds.  The duodenal bulb, 1st part of the duodenum and 2nd part of the duodenum appeared normal.  5/5 colonoscopy  The terminal ileum appeared normal.  Mild, localized petechial mucosa in the cecum and ascending colon.    - likely due to barotrauma. The mucosal petechiae developed during the procedure and does not explain her prior episodes of hematochezia  The transverse colon appeared normal.  Melanosis in the splenic flexure, descending colon, sigmoid colon, rectosigmoid and rectum  Multiple small, scattered diverticula of mild severity with no inflammation containing blood clots and causing mild luminal narrowing (traversable) in the descending colon and sigmoid colon; there was stigmata of recent hemorrhage.    - there was some narrowing and a sharp turn at the rectosigmoid junction. There were several diverticuli that had old blood near the mouth. There was also small amounts of old blood mixed with the bowel prep  Internal small hemorrhoids observed during retroflexion  =============================================    Last 24 Hours Medication List:     Current Facility-Administered Medications:     dextrose 50 % IV solution **ADS Override Pull**,     acetaminophen (TYLENOL) tablet 650 mg, Q6H PRN    [Held by provider] amLODIPine (NORVASC) tablet 10 mg, Daily    aspirin chewable tablet 81 mg, Daily    dextrose 5 % and  sodium chloride 0.9 % infusion, Continuous, Last Rate: 75 mL/hr (05/09/25 0810)    hydrOXYzine HCL (ATARAX) tablet 25 mg, Q8H PRN    levothyroxine tablet 75 mcg, Early Morning    magnesium sulfate 4 g/100 mL IVPB (premix) 4 g, Once    meclizine (ANTIVERT) tablet 25 mg, TID PRN    [Held by provider] metoprolol tartrate (LOPRESSOR) tablet 50 mg, Q12H MELANY    mirtazapine (REMERON) tablet 7.5 mg, HS    moisture barrier miconazole 2% cream (aka EDNA MOISTURE BARRIER ANTIFUNGAL CREAM), BID    ondansetron (ZOFRAN) injection 4 mg, Q4H PRN    pantoprazole (PROTONIX) EC tablet 40 mg, BID AC    pravastatin (PRAVACHOL) tablet 80 mg, Daily With Dinner    Administrative Statements   Today, Patient Was Seen By: Monique Gonzalez MD      **Please Note: This note may have been constructed using a voice recognition system.**

## 2025-05-09 NOTE — ASSESSMENT & PLAN NOTE
Initially presented to [facility] 4/25/2025 for generalized weakness found to have kidney injury and MDR UTI  Renal function has returned back to baseline  Torsemide discontinued during March 2025 hospitalization    Results from last 7 days   Lab Units 05/09/25  0536 05/08/25  1226 05/08/25  0531 05/07/25  0432 05/06/25  0320 05/05/25  0452 05/04/25  0245 05/03/25  2042 05/03/25  0606   BUN mg/dL 8 8 6 6 6 6 7 8 8   CREATININE mg/dL 0.92 0.90 0.88 0.84 0.68 0.70 0.72 0.78 0.78   EGFR ml/min/1.73sq m 58 60 61 65 82 81 78 71 71

## 2025-05-09 NOTE — ASSESSMENT & PLAN NOTE
May be secondary to multiple loose bowel movements.  Added banana flakes, and repleted potassium  Hypomagnesemia: Repleted    Results from last 7 days   Lab Units 05/09/25  0536 05/08/25  1226 05/08/25  0531 05/07/25  0432 05/06/25  0320 05/05/25  0452 05/04/25  0245   POTASSIUM mmol/L 3.8 3.1* 3.1* 3.6 4.0 3.7 4.1   MAGNESIUM mg/dL 1.3*  --   --   --  2.1 1.8* 1.6*

## 2025-05-09 NOTE — PLAN OF CARE
Problem: PAIN - ADULT  Goal: Verbalizes/displays adequate comfort level or baseline comfort level  Description: Interventions:- Encourage patient to monitor pain and request assistance- Assess pain using appropriate pain scale- Administer analgesics based on type and severity of pain and evaluate response- Implement non-pharmacological measures as appropriate and evaluate response- Consider cultural and social influences on pain and pain management- Notify physician/advanced practitioner if interventions unsuccessful or patient reports new pain  Outcome: Progressing     Problem: INFECTION - ADULT  Goal: Absence or prevention of progression during hospitalization  Description: INTERVENTIONS:- Assess and monitor for signs and symptoms of infection- Monitor lab/diagnostic results- Monitor all insertion sites, i.e. indwelling lines, tubes, and drains- Monitor endotracheal if appropriate and nasal secretions for changes in amount and color- [place] appropriate cooling/warming therapies per order- Administer medications as ordered- Instruct and encourage patient and family to use good hand hygiene technique- Identify and instruct in appropriate isolation precautions for identified infection/condition  Outcome: Progressing  Goal: Absence of fever/infection during neutropenic period  Description: INTERVENTIONS:- Monitor WBC  Outcome: Progressing     Problem: SAFETY ADULT  Goal: Patient will remain free of falls  Description: INTERVENTIONS:- Educate patient/family on patient safety including physical limitations- Instruct patient to call for assistance with activity - Consult OT/PT to assist with strengthening/mobility - Keep Call bell within reach- Keep bed low and locked with side rails adjusted as appropriate- Keep care items and personal belongings within reach- Initiate and maintain comfort rounds- Make Fall Risk Sign visible to staff- Offer Toileting every 2 Hours, in advance of need- Initiate/Maintain bed alarm-  Obtain necessary fall risk management equipment: - Apply yellow socks and bracelet for high fall risk patients- Consider moving patient to room near nurses station  Outcome: Progressing  Goal: Maintain or return to baseline ADL function  Description: INTERVENTIONS:-  Assess patient's ability to carry out ADLs; assess patient's baseline for ADL function and identify physical deficits which impact ability to perform ADLs (bathing, care of mouth/teeth, toileting, grooming, dressing, etc.)- Assess/evaluate cause of self-care deficits - Assess range of motion- Assess patient's mobility; develop plan if impaired- Assess patient's need for assistive devices and provide as appropriate- Encourage maximum independence but intervene and supervise when necessary- Involve family in performance of ADLs- Assess for home care needs following discharge - Consider OT consult to assist with ADL evaluation and planning for discharge- Provide patient education as appropriate  Outcome: Progressing  Goal: Maintains/Returns to pre admission functional level  Description: INTERVENTIONS:- Perform AM-PAC 6 Click Basic Mobility/ Daily Activity assessment daily.- Set and communicate daily mobility goal to care team and patient/family/caregiver. - Collaborate with rehabilitation services on mobility goals if consulted- Perform Range of Motion 3 times a day.- Reposition patient every 2 hours.- Dangle patient 3 times a day- Stand patient 3 times a day- Ambulate patient 3 times a day- Out of bed to chair 3 times a day - Out of bed for meals 3 times a day- Out of bed for toileting- Record patient progress and toleration of activity level   Outcome: Progressing     Problem: DISCHARGE PLANNING  Goal: Discharge to home or other facility with appropriate resources  Description: INTERVENTIONS:- Identify barriers to discharge w/patient and caregiver- Arrange for needed discharge resources and transportation as appropriate- Identify discharge learning  needs (meds, wound care, etc.)- Arrange for interpretive services to assist at discharge as needed- Refer to Case Management Department for coordinating discharge planning if the patient needs post-hospital services based on physician/advanced practitioner order or complex needs related to functional status, cognitive ability, or social support system  Outcome: Progressing     Problem: Knowledge Deficit  Goal: Patient/family/caregiver demonstrates understanding of disease process, treatment plan, medications, and discharge instructions  Description: Complete learning assessment and assess knowledge base.Interventions:- Provide teaching at level of understanding- Provide teaching via preferred learning methods  Outcome: Progressing     Problem: Nutrition/Hydration-ADULT  Goal: Nutrient/Hydration intake appropriate for improving, restoring or maintaining nutritional needs  Description: Monitor and assess patient's nutrition/hydration status for malnutrition. Collaborate with interdisciplinary team and initiate plan and interventions as ordered.  Monitor patient's weight and dietary intake as ordered or per policy. Utilize nutrition screening tool and intervene as necessary. Determine patient's food preferences and provide high-protein, high-caloric foods as appropriate. INTERVENTIONS:- Monitor oral intake, urinary output, labs, and treatment plans- Assess nutrition and hydration status and recommend course of action- Evaluate amount of meals eaten- Assist patient with eating if necessary - Allow adequate time for meals- Recommend/ encourage appropriate diets, oral nutritional supplements, and vitamin/mineral supplements- Order, calculate, and assess calorie counts as needed- Recommend, monitor, and adjust tube feedings and TPN/PPN based on assessed needs- Assess need for intravenous fluids- Provide specific nutrition/hydration education as appropriate- Include patient/family/caregiver in decisions related to  nutrition  Monitor and assess patient's nutrition/hydration status for malnutrition. Collaborate with interdisciplinary team and initiate plan and interventions as ordered.  Monitor patient's weight and dietary intake as ordered or per policy. Utilize nutrition screening tool and intervene as necessary. Determine patient's food preferences and provide high-protein, high-caloric foods as appropriate. INTERVENTIONS:- Monitor oral intake, urinary output, labs, and treatment plans- Assess nutrition and hydration status and recommend course of action- Evaluate amount of meals eaten- Assist patient with eating if necessary - Allow adequate time for meals- Recommend/ encourage appropriate diets, oral nutritional supplements, and vitamin/mineral supplements- Order, calculate, and assess calorie counts as needed- Recommend, monitor, and adjust tube feedings and TPN/PPN based on assessed needs- Assess need for intravenous fluids- Provide specific nutrition/hydration education as appropriate- Include patient/family/caregiver in decisions related to nutrition  Outcome: Progressing     Problem: Prexisting or High Potential for Compromised Skin Integrity  Goal: Skin integrity is maintained or improved  Description: INTERVENTIONS:- Identify patients at risk for skin breakdown- Assess and monitor skin integrity- Assess and monitor nutrition and hydration status- Monitor labs - Assess for incontinence - Turn and reposition patient- Assist with mobility/ambulation- Relieve pressure over bony prominences- Avoid friction and shearing- Provide appropriate hygiene as needed including keeping skin clean and dry- Evaluate need for skin moisturizer/barrier cream- Collaborate with interdisciplinary team - Patient/family teaching- Consider wound care consult   Outcome: Progressing     Problem: HEMATOLOGIC - ADULT  Goal: Maintains hematologic stability  Description: INTERVENTIONS- Assess for signs and symptoms of bleeding or hemorrhage-  Monitor labs- Administer supportive blood products/factors as ordered and appropriate  Outcome: Progressing     Problem: Communication Impairment  Goal: Ability to express needs and understand communication  Description: Assess patient's communication skills and ability to understand information.  Patient will demonstrate use of effective communication techniques, alternative methods of communication and understanding even if not able to speak. - Encourage communication and provide alternate methods of communication as needed.- Collaborate with case management/ for discharge needs.- Include patient/family/caregiver in decisions related to communication.  Outcome: Progressing

## 2025-05-09 NOTE — PLAN OF CARE
Problem: PAIN - ADULT  Goal: Verbalizes/displays adequate comfort level or baseline comfort level  Description: Interventions:- Encourage patient to monitor pain and request assistance- Assess pain using appropriate pain scale- Administer analgesics based on type and severity of pain and evaluate response- Implement non-pharmacological measures as appropriate and evaluate response- Consider cultural and social influences on pain and pain management- Notify physician/advanced practitioner if interventions unsuccessful or patient reports new pain  5/9/2025 1946 by Mannie Marks RN  Outcome: Progressing  5/9/2025 1946 by Mannie Marks RN  Reactivated     Problem: INFECTION - ADULT  Goal: Absence or prevention of progression during hospitalization  Description: INTERVENTIONS:- Assess and monitor for signs and symptoms of infection- Monitor lab/diagnostic results- Monitor all insertion sites, i.e. indwelling lines, tubes, and drains- Monitor endotracheal if appropriate and nasal secretions for changes in amount and color- [place] appropriate cooling/warming therapies per order- Administer medications as ordered- Instruct and encourage patient and family to use good hand hygiene technique- Identify and instruct in appropriate isolation precautions for identified infection/condition  5/9/2025 1946 by Mannie Marks RN  Outcome: Progressing  5/9/2025 1946 by Mannie Marks RN  Reactivated  Goal: Absence of fever/infection during neutropenic period  Description: INTERVENTIONS:- Monitor WBC  5/9/2025 1946 by Mannie Marks RN  Outcome: Progressing  5/9/2025 1946 by Mannie Marks RN  Reactivated     Problem: SAFETY ADULT  Goal: Patient will remain free of falls  Description: INTERVENTIONS:- Educate patient/family on patient safety including physical limitations- Instruct patient to call for assistance with activity - Consult OT/PT to assist with strengthening/mobility - Keep Call bell within reach- Keep bed low and  locked with side rails adjusted as appropriate- Keep care items and personal belongings within reach- Initiate and maintain comfort rounds- Make Fall Risk Sign visible to staff- Offer Toileting every 2 Hours, in advance of need- Initiate/Maintain bed alarm- Obtain necessary fall risk management equipment: - Apply yellow socks and bracelet for high fall risk patients- Consider moving patient to room near nurses station  5/9/2025 1946 by Mannie Marks RN  Outcome: Progressing  5/9/2025 1946 by Mannie Marks RN  Reactivated  Goal: Maintain or return to baseline ADL function  Description: INTERVENTIONS:-  Assess patient's ability to carry out ADLs; assess patient's baseline for ADL function and identify physical deficits which impact ability to perform ADLs (bathing, care of mouth/teeth, toileting, grooming, dressing, etc.)- Assess/evaluate cause of self-care deficits - Assess range of motion- Assess patient's mobility; develop plan if impaired- Assess patient's need for assistive devices and provide as appropriate- Encourage maximum independence but intervene and supervise when necessary- Involve family in performance of ADLs- Assess for home care needs following discharge - Consider OT consult to assist with ADL evaluation and planning for discharge- Provide patient education as appropriate  5/9/2025 1946 by Mannie Marks RN  Outcome: Progressing  5/9/2025 1946 by Mannie Marks RN  Reactivated  Goal: Maintains/Returns to pre admission functional level  Description: INTERVENTIONS:- Perform AM-PAC 6 Click Basic Mobility/ Daily Activity assessment daily.- Set and communicate daily mobility goal to care team and patient/family/caregiver. - Collaborate with rehabilitation services on mobility goals if consulted- Perform Range of Motion 3 times a day.- Reposition patient every 2 hours.- Dangle patient 3 times a day- Stand patient 3 times a day- Ambulate patient 3 times a day- Out of bed to chair 3 times a day - Out of  bed for meals 3 times a day- Out of bed for toileting- Record patient progress and toleration of activity level   5/9/2025 1946 by Mannie Marks RN  Outcome: Progressing  5/9/2025 1946 by Mannie Marks RN  Reactivated     Problem: DISCHARGE PLANNING  Goal: Discharge to home or other facility with appropriate resources  Description: INTERVENTIONS:- Identify barriers to discharge w/patient and caregiver- Arrange for needed discharge resources and transportation as appropriate- Identify discharge learning needs (meds, wound care, etc.)- Arrange for interpretive services to assist at discharge as needed- Refer to Case Management Department for coordinating discharge planning if the patient needs post-hospital services based on physician/advanced practitioner order or complex needs related to functional status, cognitive ability, or social support system  5/9/2025 1946 by Mannie Marks RN  Outcome: Progressing  5/9/2025 1946 by Mannie Marks RN  Reactivated     Problem: Knowledge Deficit  Goal: Patient/family/caregiver demonstrates understanding of disease process, treatment plan, medications, and discharge instructions  Description: Complete learning assessment and assess knowledge base.Interventions:- Provide teaching at level of understanding- Provide teaching via preferred learning methods  5/9/2025 1946 by Mannie Marks RN  Outcome: Progressing  5/9/2025 1946 by Mannie Marks RN  Reactivated     Problem: Nutrition/Hydration-ADULT  Goal: Nutrient/Hydration intake appropriate for improving, restoring or maintaining nutritional needs  Description: Monitor and assess patient's nutrition/hydration status for malnutrition. Collaborate with interdisciplinary team and initiate plan and interventions as ordered.  Monitor patient's weight and dietary intake as ordered or per policy. Utilize nutrition screening tool and intervene as necessary. Determine patient's food preferences and provide high-protein, high-caloric  foods as appropriate. INTERVENTIONS:- Monitor oral intake, urinary output, labs, and treatment plans- Assess nutrition and hydration status and recommend course of action- Evaluate amount of meals eaten- Assist patient with eating if necessary - Allow adequate time for meals- Recommend/ encourage appropriate diets, oral nutritional supplements, and vitamin/mineral supplements- Order, calculate, and assess calorie counts as needed- Recommend, monitor, and adjust tube feedings and TPN/PPN based on assessed needs- Assess need for intravenous fluids- Provide specific nutrition/hydration education as appropriate- Include patient/family/caregiver in decisions related to nutrition  Monitor and assess patient's nutrition/hydration status for malnutrition. Collaborate with interdisciplinary team and initiate plan and interventions as ordered.  Monitor patient's weight and dietary intake as ordered or per policy. Utilize nutrition screening tool and intervene as necessary. Determine patient's food preferences and provide high-protein, high-caloric foods as appropriate. INTERVENTIONS:- Monitor oral intake, urinary output, labs, and treatment plans- Assess nutrition and hydration status and recommend course of action- Evaluate amount of meals eaten- Assist patient with eating if necessary - Allow adequate time for meals- Recommend/ encourage appropriate diets, oral nutritional supplements, and vitamin/mineral supplements- Order, calculate, and assess calorie counts as needed- Recommend, monitor, and adjust tube feedings and TPN/PPN based on assessed needs- Assess need for intravenous fluids- Provide specific nutrition/hydration education as appropriate- Include patient/family/caregiver in decisions related to nutrition  5/9/2025 1946 by Mannie Marks RN  Outcome: Progressing  5/9/2025 1946 by Mannie Marks RN  Reactivated     Problem: Prexisting or High Potential for Compromised Skin Integrity  Goal: Skin integrity is  maintained or improved  Description: INTERVENTIONS:- Identify patients at risk for skin breakdown- Assess and monitor skin integrity- Assess and monitor nutrition and hydration status- Monitor labs - Assess for incontinence - Turn and reposition patient- Assist with mobility/ambulation- Relieve pressure over bony prominences- Avoid friction and shearing- Provide appropriate hygiene as needed including keeping skin clean and dry- Evaluate need for skin moisturizer/barrier cream- Collaborate with interdisciplinary team - Patient/family teaching- Consider wound care consult   5/9/2025 1946 by Mannie Marks RN  Outcome: Progressing  5/9/2025 1946 by Mannie Marks RN  Reactivated     Problem: HEMATOLOGIC - ADULT  Goal: Maintains hematologic stability  Description: INTERVENTIONS- Assess for signs and symptoms of bleeding or hemorrhage- Monitor labs- Administer supportive blood products/factors as ordered and appropriate  5/9/2025 1946 by Mannie Marks RN  Outcome: Progressing  5/9/2025 1946 by Mannie Marks RN  Reactivated     Problem: Communication Impairment  Goal: Ability to express needs and understand communication  Description: Assess patient's communication skills and ability to understand information.  Patient will demonstrate use of effective communication techniques, alternative methods of communication and understanding even if not able to speak. - Encourage communication and provide alternate methods of communication as needed.- Collaborate with case management/ for discharge needs.- Include patient/family/caregiver in decisions related to communication.  5/9/2025 1946 by Mannie Marks RN  Outcome: Progressing  5/9/2025 1946 by Mannie Marks RN  Reactivated

## 2025-05-09 NOTE — ASSESSMENT & PLAN NOTE
Reviewed prior records:  pt filled out POLST 1/31/25 and was DNR, was also DNR on 3/25 admission:  d/w son at bedside- he notes per conversations w pt in the past, she did not want life support/heroic measures  Will uphold pt's POLST for DNR

## 2025-05-09 NOTE — OCCUPATIONAL THERAPY NOTE
Occupational Therapy         Patient Name: Alecia Kay  Today's Date: 5/9/2025 05/09/25 1035   OT Last Visit   OT Visit Date 05/09/25   Note Type   Note type Cancelled Session   Cancel Reasons Medical status   Additional Comments Attempted to see pt. kimberly Cherry not medically appropriate for skilled therapy services this date. Hold OT. Will continue to follow.     Doris Calvin, OT

## 2025-05-09 NOTE — ASSESSMENT & PLAN NOTE
History of DVT   However during 4/25/2025 [facility] admission found to have bilateral extensive extensive acute vs subacute DVTs, in the setting of subtherapeutic INR  IVC filter plan due to recurrent bleeding on anticoagulation   Partial Purse String (Intermediate) Text: Given the location of the defect and the characteristics of the surrounding skin an intermediate purse string closure was deemed most appropriate.  Undermining was performed circumfirentially around the surgical defect.  A purse string suture was then placed and tightened. Wound tension only allowed a partial closure of the circular defect.

## 2025-05-09 NOTE — BRIEF OP NOTE (RAD/CATH)
INTERVENTIONAL RADIOLOGY PROCEDURE NOTE    Date: 5/9/2025    Procedure: IR IVC FILTER PLACEMENT OPTIONAL/TEMPORARY     Preoperative diagnosis:   1. Hematochezia    2. Delirium    3. Acute pulmonary embolism, unspecified pulmonary embolism type, unspecified whether acute cor pulmonale present (HCC)    4. Acute DVT (deep venous thrombosis) ([facility])    5. Stroke ([facility])         Postoperative diagnosis: Same.    Surgeon: López Rendon MD     Assistant: None. No qualified resident was available.    Blood loss: none    Specimens: none    Findings: Infrarenal filter placed.    Complications: None immediate.    Anesthesia: local

## 2025-05-09 NOTE — ASSESSMENT & PLAN NOTE
Pt is a 81yo female with PMH significant for hypertension, DVT/PE CKD 3, and right heel osteomyelitis initially presented to [facility] 4/25/2025 for generalized weakness.  Initially diagnosed with GAY and MDR UTI: completed course of ertapenem    Subsequently found to have DVT and PE, while INR subtherapeutic, and was started on heparin infusion  Patient then had hematochezia with ALBA and was transferred to [facility] for GI eval  5/5 EGD: Wang's esophagus.  Sliding hiatal hernia.  No evidence of bleeding  5/5 colonoscopy: Petechial mucosa in the cecum/ascending colon attributed to barotrauma as it developed during the procedure.  Melanosis in the splenic flexure, descending colon, sigmoid colon, rectosigmoid, and rectum.  Several diverticuli with old blood   Post endoscopy Case was discussed with GI, they suspected diverticular bleed, since resolved, noted anticoagulation could be restarted  Patient was started on anticoagulation with heparin/Lovenox bridging therapy without bleeding and then given 1 dose of Coumadin however had subsequent large hematochezia, with hypotension and acute blood loss anemia, requiring additional transfusions  CTa bleeding scan: Unremarkable  She was seen by GI who did not recommend additional endoscopy  Anticoagulation has been completely discontinued, and reversed w FFP  Continue to monitor H&H  Likely diverticular bleeds

## 2025-05-09 NOTE — SPEECH THERAPY NOTE
Speech Language/Pathology    Speech/Language Pathology Progress Note    Patient Name: Alecia Kay  Today's Date: 5/9/2025     Problem List  Principal Problem:    Hematochezia  Active Problems:    History of osteomyelitis    Chronic kidney disease, stage III (moderate) (HCC)    Hypothyroidism    Hypertension    Acute DVT (deep venous thrombosis) (HCC)    Acute pulmonary embolism (HCC)    Acute respiratory insufficiency    Acute blood loss anemia ([facility])    Hypokalemia    Wang's esophagus    Insertional Achilles tendinopathy    Chronic osteomyelitis (HCC)    Word finding difficulty       Past Medical History  Past Medical History:   Diagnosis Date    Anemia     Cellulitis     Disease of thyroid gland     GERD (gastroesophageal reflux disease)     Hyperlipidemia     Hypertension     Obesity     Osteomyelitis (HCC)     Pneumonia     Pulmonary embolism (HCC)         Past Surgical History  Past Surgical History:   Procedure Laterality Date    FOOT SURGERY      JOINT REPLACEMENT       Pt NPO again for IR. Last recommended diet was NDD2 mech soft and thin, Will f/u as able.

## 2025-05-09 NOTE — PROGRESS NOTES
Progress Note - Geriatric Medicine   Alecia Kay 80 y.o. female MRN: [id number]  Unit/Bed#: E4 -01 Encounter: [id number]      Assessment/Plan:    Acute Encephalopathy  Patient presented to the hospital for generalized weakness   Baseline mentation is alert and oriented x 4 but forgetful at times   Current cause considerations   Suspected to be multifactorial secondary to age, possible underlying cognitive impairment, hematoochezia status post colonoscopy, anesthesia, acute PE/DVT, atarax use, vision impairment, and prolonged hospitalization  UA on admission negative for UTI   No leukocytosis noted on labs today   Hemoglobin on labs this morning noted to be 8/4  Patient with new episodes of hematochezia this morning   Patient was a rapid response this morning for acute mental status change   She was a stroke alert   CT of the head was negative   Neurology consulted and patient now on stroke pathway   MRI ordered and pending   Patient is alert and oriented x 3 on exam today though she thinks we are in [facility]    She is pleasant, calm, and cooperative on my exam   Identify and treat reversible causes of confusion including infection, dehydration, electrolyte imbalance, anemia, hypoxia, urinary retention, constipation, pain, and sleep disturbance  Maintain delirium precautions   Provide redirection, reorientation, and distraction techniques  Maintain fall and safety precautions   Assist with ADLs/IADLs  Avoid deliriogenic medications such as tramadol, benzodiazepines, anticholinergics, benadryl  Treat pain using geriatric pain protocol   Encourage oral hydration and nutrition   Monitor for constipation and urinary retention   Implement sleep hygiene and limit night time interuptions   Maintain sleep-wake cycle   Encourage early and frequent mobilization   Most recent EKG today revealed a QTc interval  of 447  If all other interventions are unsuccessful for acute agitation and behaviors, can consider  Zyprexa 2.5 mg IM Q 8 hours prn   Would avoid benzodiazepines such as Ativan if possible as these medications can cause/worsen delirium   Redirect and reorient as needed  Keep mentally, physically, and socially active    Cognitive Screening  Patient has no documented history of memory loss or cognitive impairment   She notes no issues with her memory at baseline   Patient has been having periods of confusion here  Please see cause considerations as discussed above  Patient was a stroke alert this morning for altered mental status and speech difficulties   Patient is alert and oriented x 3 on exam today   She is pleasant, calm, and cooperative   Most recent TSH POC on 11/13/2024 noted to be 1.81  Consider rechecking on routine labs  Most recent vitamin B 12 level on 4/3/2025 noted to be 564  No imaging of the head or brain noted in epic   Patient scored 13/15 on BIMS on 3/5/2025 indicating she is cognitively intact   Maintain delirium precautions as discussed above   Redirect and reorient as needed  Keep physically, mentally and socially active     Deconditioning   Patient is at increased risk for deconditioning secondary to possible underlying cognitive impairment, hematoochezia status post colonoscopy, anesthesia, acute PE/DVT, atarax use, vision impairment, weakness, gait dysfunction, and prolonged hospitalization  Continue to optimize diet, hydration, and mobility for healing   Chronic Kidney Disease Stage III  Baseline creatinine not entirely clear but recently appears to be 0.8-1.0  Creatinine on labs today noted to be 0.90  Avoid nephrotoxic medication and renal dose medication   Keep hydrated  Type II Diabetes   Most recent hemoglobin A1C on 11/13/2024 noted to be 5.7  Per chart review, she does not appear to be on any medication for this at baseline  Glucose on labs this morning stable at 88  Continue to monitor glucose on labs   Avoid hypoglycemia   Anemia   Baseline hemoglobin appears to be 8-10  Patient  was noted to have hematochezia which had initially resolved but has returned   Hemoglobin on labs this morning noted to be 7.9  Continue to monitor CBC   Transfuse for hemoglobin < 7   Monitor for signs and symptoms of infection, dehydration, DVT, and skin breakdown    Frailty   Clinical Frail Scale: 6- Moderately Frail (current)  Need help with all outside activities  Need help with stairs and bathing  May need assistance with dressing  Most recent albumin on labs today noted to be 2.6  Consider nutrition consult  Encourage protein supplementation     Ambulatory Dysfunction/Falls  Patient notes no recent falls at home   She states she ambulates with a roller walker at baseline   PT/OT consulted to assist with strengthening/mobility and assist with discharge planning to appropriate level of care  Assess patient frequently for physical needs, encourage use of assistant devices as needed and directed by PT/OT  Identify cognitive and physical deficits and behaviors that affect risk of falls  Consider moving patient closer to nursing station to monitor more closely for impulsive behavior which may increase risk of falls  [place] fall and safety precautions   Educate patient/family on patient safety including physical limitations and importance of using call bell for assistance   Modify environment to reduce risk of injury including disconnecting from pole when not in use, ensuring adequate lighting in room and restroom, ensuring that path to restroom is clear and free of trip hazards  Out of bed as tolerated    Impaired Vision   Patient denies vision impairment   She notes she does have glasses for reading   Recommend use of corrective lenses at all appropriate times  Encourage adequate lighting and encourage use of assistance with ambulation  Keep personal belongings close to avoid reaching  Encourage appropriate footwear at all times  Recommend large font for printed materials provided to  patient    Dentition/Appetite   Patient denies denture use  She states she has a good appetite at baseline   She has intermittently been NPO  She was scheduled for an IVC filter today though this was postponed due to stroke alert   Speech therapy consulted and following   Encourage use of dentures at all appropriate times  Ensure meal consistency is appropriate for all abilities   Consider nutrition consult   Continue aspiration precautions     Elimination   Patient is continent of bowel and bladder at baseline  She appears to be voiding without difficulty   Patient was noted to have hematochezia and was found to have a diverticular bleed   The bleeding had resolved but returned again this morning   Last documented bowel movement was today   Patient is not currently on a bowel regimen   Monitor for constipation and urinary retention     Insomnia   Patient notes no difficulty sleeping at baseline   She noted yesterday she had been having some vivid dreams here   Her son at the bedside notes this occurred when at rehab as well   She notes that she slept okay last night   First line is behavioral therapy   Avoid sedative hypnotics including benzodiazepines and benadryl  Encourage staying awake during the day   Encourage daytime activities and morning exercise   Decrease or eliminate daytime naps   Avoid caffeine especially during late afternoon and evening hours  Establish a nighttime routine  Implement sleep hygiene and limit nighttime interruptions  Can consider melatonin 3 mg daily at bedtime for sleep if needed     Anxiety/Depression  Patient has a documented history of anxiety   PDMP reviewed and patient was prescribed alprazolam 0.25 mg on 4/11/2025  Unclear how often she was taking this   Mood appears stable on exam today   Continue supportive care     Hematochezia   Patient presented to [facility] with generalized weakness  She was initially noted to have a UTI and GAY  Additionally she was found to have acute DVT and  PE  Her INR was subtherapeutic and she was started on a heparin drip  She was later noted to have hematochezia and was a rapid response   She was transferred to [facility] due to lack of GI services   GI initially restarted the heparin infusion but this was discontinued due to a large amount of hematochezia  She underwent and EGD/colonoscopy yesterday   EGD revealed Wang's esophagus with no evidence of bleeding   Colonoscopy revealed several diverticuli with old blood   GI suspected a diverticular bleed   The bleeding had resolved but yesterday the patient was noted to be bleeding again   She did receive blood and FFP  Patient was scheduled for an IVC filter today but this has been postponed due to stroke alert   Hemoglobin on labs today noted to be 7.9  GI continues to follow   Management per GI and primary team     Acute Pulmonary Embolism  Patient found to have extensive right upper and lower PE on CTA on 4/28/2025  Echo noted no evidence of heart strain   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   She is maintained on coumadin 5 mg 5 days per week and 7.5 mg Monday and Friday  Patient had nausea/vomiting for several days PTA and she was not able to take this medication   She had been on anticoagulation but this is again on hold due to hematochezia   GI is following for hematochezia   Plan is for IVC filter   Management per primary team     Chronic Osteomyelitis   Patient with previous chronic osteomyelitis of the right foot   Podiatry recommending WBAT in a surgical shoe  She completed 6 weeks of antibiotics PTA   MRI of the right heel and ankle on 4/29/2025 revealed no evidence of remaining osteomyelitis   Patient was re-evaluated by podiatry on 4/30/2025 and no changes to prior recommendations noted   She will require continued outpatient follow-up with podiatry on discharge       Subjective:   The patient is being seen and evaluated today at the bedside for geriatric follow-up. She is noted to be lying in bed  "comfortably in no acute distress. She is alert and oriented x 3. She is currently denying pain. She denies chest pain and shortness of breath. She notes she is feeling tired today.     Care was coordinated with patients nurse Mirza and Dr. Gonzalez with internal medicine.       Review of Systems   Constitutional:  Positive for activity change, appetite change and fatigue.   HENT:  Negative for dental problem and hearing loss.    Eyes:  Positive for visual disturbance (glasses for reading).   Respiratory:  Negative for cough and shortness of breath.    Cardiovascular:  Negative for chest pain and leg swelling.   Gastrointestinal:  Positive for blood in stool. Negative for abdominal distention and abdominal pain.   Genitourinary:  Negative for difficulty urinating and dysuria.   Musculoskeletal:  Positive for gait problem. Negative for arthralgias.   Skin:  Negative for color change and pallor.   Neurological:  Positive for weakness. Negative for speech difficulty.   Psychiatric/Behavioral:  Positive for confusion (at times) and sleep disturbance. Negative for agitation. The patient is not nervous/anxious.          Objective:     Vitals: Blood pressure 130/70, pulse 83, temperature 98.2 °F (36.8 °C), temperature source Temporal, resp. rate 20, height 5' 2\" (1.575 m), weight 78 kg (171 lb 15.3 oz), SpO2 95%.,Body mass index is 31.45 kg/m².      Intake/Output Summary (Last 24 hours) at 5/8/2025 2113  Last data filed at 5/8/2025 1500  Gross per 24 hour   Intake 100 ml   Output --   Net 100 ml       Current Medications: Reviewed    Physical Exam:   Physical Exam  Vitals and nursing note reviewed.   Constitutional:       General: She is not in acute distress.     Appearance: She is not ill-appearing.   HENT:      Head: Normocephalic.      Mouth/Throat:      Mouth: Mucous membranes are dry.   Eyes:      General: No scleral icterus.     Conjunctiva/sclera: Conjunctivae normal.   Cardiovascular:      Rate and Rhythm: Normal " "rate and regular rhythm.   Pulmonary:      Effort: Pulmonary effort is normal. No respiratory distress.   Abdominal:      General: Bowel sounds are normal. There is no distension.      Palpations: Abdomen is soft.      Tenderness: There is no abdominal tenderness.   Musculoskeletal:         General: No swelling or tenderness.   Skin:     General: Skin is warm and dry.   Neurological:      General: No focal deficit present.      Mental Status: She is alert and oriented to person, place, and time. Mental status is at baseline.      Motor: Weakness present.      Gait: Gait abnormal.          Invasive Devices       Peripheral Intravenous Line  Duration             Peripheral IV 05/05/25 Left;Ventral (anterior) Wrist 3 days              Long-dwell Peripherally Inserted Midline IV Catheter  Duration             Long-Dwell Peripheral IV (Midline) 05/07/25 Right Basilic 1 day                    Lab, Imaging and other studies: Results Review Statement: I reviewed AM labs.     Please note:  Voice-recognition software may have been used in the preparation of this document.  Occasional wrong word or \"sound-alike\" substitutions may have occurred due to the inherent limitations of voice recognition software.  Interpretation should be guided by context.    "

## 2025-05-09 NOTE — PHYSICAL THERAPY NOTE
Physical Therapy Cancellation Note  PT session cancelled. RN reported pt. Medically not appropriate for therapy at this time. Pt. Noted to be very lethargic and could not answer any questions. Will continue to follow as able.

## 2025-05-09 NOTE — CASE MANAGEMENT
Case Management Discharge Planning Note    Patient name Alecia Kay  Location [facility] /E4 -* MRN [id number]  : 1945 Date 2025       Current Admission Date: 2025  Current Admission Diagnosis:Hematochezia   Patient Active Problem List    Diagnosis Date Noted Date Diagnosed    Goals of care, counseling/discussion 2025     Hypoglycemia 2025     Hypomagnesemia 2025     Word finding difficulty 2025     Wang's esophagus 2025     Insertional Achilles tendinopathy 2025     Chronic osteomyelitis (HCC) 2025     Acute blood loss anemia (ABLA) 2025     Acute pulmonary embolism (HCC) 2025     Acute respiratory insufficiency 2025     Hematochezia 2025     Acute DVT (deep venous thrombosis) (HCC) 2025     History of DVT (deep vein thrombosis) 2025     Tachycardia 2025     Acute cystitis 2025     Generalized weakness 2025     Bilateral lower extremity edema 2025     Severe protein-calorie malnutrition (HCC) 2025     History of osteomyelitis 2025     Acute kidney injury (HCC) 2025     Chronic kidney disease, stage III (moderate) (HCC) 2025     Hypothyroidism 2025     Hypertension 2025     Electrolyte imbalance 2025       LOS (days): 7  Geometric Mean LOS (GMLOS) (days): 4.5  Days to GMLOS:-2.3     OBJECTIVE:  Risk of Unplanned Readmission Score: 24.49         Current admission status: Inpatient   Preferred Pharmacy:   [facility] [place], [place] - [address] [facility]  [address] [address]  Phone: [phone] Fax: [phone]    Primary Care Provider: Rambo Perera DO    Primary Insurance: [employer]  Secondary Insurance:     DISCHARGE DETAILS:                                                                                                 Additional Comments: CM informed that the pt's condition has gotten  worse.  Kristian GROVE had met with the family and she is now a DNR level 3. CM met with the family and we will observe to see how she progresses to determine next steps fro dc planning.  They agreed.  CM to follow.

## 2025-05-09 NOTE — DISCHARGE INSTRUCTIONS
Inferior Vena Cava Filter Placement     WHAT YOU NEED TO KNOW:   Inferior vena cava filter placement is surgery to place a filter into your inferior vena cava (IVC). The IVC is a large blood vessel that brings blood from your lower body back to your heart. The filter is a small mesh strainer made of thin wires. It is placed in the center of the IVC to trap blood clots going to your heart or lungs.    Filter types: Permanent Filters are used for patients who can't have anticoagulation medications. The filters are left in place permanently.  Optional filters: Are filters that can be removed when a patient's risk for clotting is decreased.    DISCHARGE INSTRUCTIONS:     Wound care: Keep your wound clean and dry.Band aid may come off in 24 hours.     Self-care:   Limit activity: Do not lift, pull or push heavy objects for 24 hours. Slowly start to do more each day. Return to your daily activities as directed.   Resume your normal diet. Small sips of flat soda will help with nausea.    Contact Interventional Radiology at [phone] if:  You have a fever.   You have chills, a cough, or feel weak and achy.  Persistent nausea or vomiting.   Your wound is red, swollen, or draining pus.   You have questions or concerns about your condition.  Optional filters can and should  be removed when you no longer need it.  Please call Interventional Radiology to make your removal appointment.

## 2025-05-10 LAB
AMMONIA PLAS-SCNC: 18 UMOL/L (ref 18–72)
ANION GAP SERPL CALCULATED.3IONS-SCNC: 7 MMOL/L (ref 4–13)
BASE EX.OXY STD BLDV CALC-SCNC: 93.8 % (ref 60–80)
BASE EXCESS BLDV CALC-SCNC: 3.1 MMOL/L
BASOPHILS # BLD AUTO: 0 THOUSANDS/ÂΜL (ref 0–0.1)
BASOPHILS NFR BLD AUTO: 0 % (ref 0–1)
BUN SERPL-MCNC: 7 MG/DL (ref 5–25)
CALCIUM SERPL-MCNC: 7.6 MG/DL (ref 8.4–10.2)
CHLORIDE SERPL-SCNC: 104 MMOL/L (ref 96–108)
CO2 SERPL-SCNC: 29 MMOL/L (ref 21–32)
CREAT SERPL-MCNC: 0.94 MG/DL (ref 0.6–1.3)
EOSINOPHIL # BLD AUTO: 0 THOUSAND/ÂΜL (ref 0–0.61)
EOSINOPHIL NFR BLD AUTO: 0 % (ref 0–6)
ERYTHROCYTE [DISTWIDTH] IN BLOOD BY AUTOMATED COUNT: 16.7 % (ref 11.6–15.1)
FOLATE SERPL-MCNC: 3.3 NG/ML
GFR SERPL CREATININE-BSD FRML MDRD: 57 ML/MIN/1.73SQ M
GLUCOSE SERPL-MCNC: 110 MG/DL (ref 65–140)
GLUCOSE SERPL-MCNC: 133 MG/DL (ref 65–140)
GLUCOSE SERPL-MCNC: 133 MG/DL (ref 65–140)
GLUCOSE SERPL-MCNC: 94 MG/DL (ref 65–140)
GLUCOSE SERPL-MCNC: 96 MG/DL (ref 65–140)
HCO3 BLDV-SCNC: 27.6 MMOL/L (ref 24–30)
HCT VFR BLD AUTO: 21.5 % (ref 34.8–46.1)
HGB BLD-MCNC: 7.4 G/DL (ref 11.5–15.4)
IMM GRANULOCYTES # BLD AUTO: 0.02 THOUSAND/UL (ref 0–0.2)
IMM GRANULOCYTES NFR BLD AUTO: 1 % (ref 0–2)
LYMPHOCYTES # BLD AUTO: 0.82 THOUSANDS/ÂΜL (ref 0.6–4.47)
LYMPHOCYTES NFR BLD AUTO: 26 % (ref 14–44)
MAGNESIUM SERPL-MCNC: 2 MG/DL (ref 1.9–2.7)
MCH RBC QN AUTO: 29.1 PG (ref 26.8–34.3)
MCHC RBC AUTO-ENTMCNC: 34.4 G/DL (ref 31.4–37.4)
MCV RBC AUTO: 85 FL (ref 82–98)
MONOCYTES # BLD AUTO: 0.26 THOUSAND/ÂΜL (ref 0.17–1.22)
MONOCYTES NFR BLD AUTO: 8 % (ref 4–12)
NEUTROPHILS # BLD AUTO: 2.07 THOUSANDS/ÂΜL (ref 1.85–7.62)
NEUTS SEG NFR BLD AUTO: 65 % (ref 43–75)
NRBC BLD AUTO-RTO: 1 /100 WBCS
O2 CT BLDV-SCNC: 10.3 ML/DL
PCO2 BLDV: 42 MM HG (ref 42–50)
PH BLDV: 7.44 [PH] (ref 7.3–7.4)
PLATELET # BLD AUTO: 105 THOUSANDS/UL (ref 149–390)
PMV BLD AUTO: 10 FL (ref 8.9–12.7)
PO2 BLDV: 94.9 MM HG (ref 35–45)
POTASSIUM SERPL-SCNC: 2.8 MMOL/L (ref 3.5–5.3)
RBC # BLD AUTO: 2.54 MILLION/UL (ref 3.81–5.12)
SODIUM SERPL-SCNC: 140 MMOL/L (ref 135–147)
T4 FREE SERPL-MCNC: 1.91 NG/DL (ref 0.61–1.12)
TSH SERPL DL<=0.05 MIU/L-ACNC: 0.1 UIU/ML (ref 0.45–4.5)
VIT B12 SERPL-MCNC: 1179 PG/ML (ref 180–914)
WBC # BLD AUTO: 3.17 THOUSAND/UL (ref 4.31–10.16)

## 2025-05-10 PROCEDURE — 84439 ASSAY OF FREE THYROXINE: CPT | Performed by: INTERNAL MEDICINE

## 2025-05-10 PROCEDURE — 82140 ASSAY OF AMMONIA: CPT | Performed by: INTERNAL MEDICINE

## 2025-05-10 PROCEDURE — 85025 COMPLETE CBC W/AUTO DIFF WBC: CPT | Performed by: INTERNAL MEDICINE

## 2025-05-10 PROCEDURE — 84443 ASSAY THYROID STIM HORMONE: CPT | Performed by: INTERNAL MEDICINE

## 2025-05-10 PROCEDURE — 80048 BASIC METABOLIC PNL TOTAL CA: CPT | Performed by: INTERNAL MEDICINE

## 2025-05-10 PROCEDURE — 82948 REAGENT STRIP/BLOOD GLUCOSE: CPT

## 2025-05-10 PROCEDURE — 83735 ASSAY OF MAGNESIUM: CPT | Performed by: INTERNAL MEDICINE

## 2025-05-10 PROCEDURE — 82805 BLOOD GASES W/O2 SATURATION: CPT | Performed by: INTERNAL MEDICINE

## 2025-05-10 PROCEDURE — 82746 ASSAY OF FOLIC ACID SERUM: CPT | Performed by: INTERNAL MEDICINE

## 2025-05-10 PROCEDURE — 99232 SBSQ HOSP IP/OBS MODERATE 35: CPT | Performed by: INTERNAL MEDICINE

## 2025-05-10 PROCEDURE — 82607 VITAMIN B-12: CPT | Performed by: INTERNAL MEDICINE

## 2025-05-10 PROCEDURE — 99233 SBSQ HOSP IP/OBS HIGH 50: CPT | Performed by: INTERNAL MEDICINE

## 2025-05-10 RX ORDER — POTASSIUM CHLORIDE 29.8 MG/ML
40 INJECTION INTRAVENOUS 2 TIMES DAILY
Status: DISCONTINUED | OUTPATIENT
Start: 2025-05-10 | End: 2025-05-10

## 2025-05-10 RX ORDER — POTASSIUM CHLORIDE 14.9 MG/ML
20 INJECTION INTRAVENOUS
Status: COMPLETED | OUTPATIENT
Start: 2025-05-10 | End: 2025-05-10

## 2025-05-10 RX ADMIN — POTASSIUM CHLORIDE 20 MEQ: 14.9 INJECTION, SOLUTION INTRAVENOUS at 16:50

## 2025-05-10 RX ADMIN — LEVOTHYROXINE SODIUM 75 MCG: 75 TABLET ORAL at 05:56

## 2025-05-10 RX ADMIN — POTASSIUM CHLORIDE 20 MEQ: 14.9 INJECTION, SOLUTION INTRAVENOUS at 20:41

## 2025-05-10 RX ADMIN — PRAVASTATIN SODIUM 80 MG: 80 TABLET ORAL at 16:50

## 2025-05-10 RX ADMIN — ASPIRIN 81 MG: 81 TABLET, CHEWABLE ORAL at 08:36

## 2025-05-10 RX ADMIN — MICONAZOLE NITRATE: 20 CREAM TOPICAL at 16:50

## 2025-05-10 RX ADMIN — MICONAZOLE NITRATE: 20 CREAM TOPICAL at 08:42

## 2025-05-10 RX ADMIN — POTASSIUM CHLORIDE 20 MEQ: 14.9 INJECTION, SOLUTION INTRAVENOUS at 12:24

## 2025-05-10 RX ADMIN — PANTOPRAZOLE SODIUM 40 MG: 40 TABLET, DELAYED RELEASE ORAL at 16:50

## 2025-05-10 RX ADMIN — POTASSIUM CHLORIDE 20 MEQ: 14.9 INJECTION, SOLUTION INTRAVENOUS at 08:36

## 2025-05-10 RX ADMIN — PANTOPRAZOLE SODIUM 40 MG: 40 TABLET, DELAYED RELEASE ORAL at 05:56

## 2025-05-10 NOTE — ASSESSMENT & PLAN NOTE
Pt was a rapid response/stroke alert 5/8 due to acute onset expression aphasia/nonsense speech  CT head/ CTa head/neck: no acute abnormality/stroke  Repeat CT scan for worsening lethargy the following day: NAD  MRI brain: No acute infarct.  Moderate angiopathy  Pt has been lethargic, with mumbling speech since rapid response 5/8  Discussed with neurology: Discontinue aspirin/statin  undergoing montejo for toxic metabolic encephalopathy:  pt with multiple insults to her system, with recurrent anemia, hypotension, anesthesia, poor sleep.  Patient was also hypoglycemic while n.p.o. for IVC filter 5/9: Currently on IV dextrose fluids  Continue to evaluate and treat any underlying cause for lethargy/metabolic encephalopathy  Check TSH/B12/folate  Appreciate geriatric team evaluation and recommendations

## 2025-05-10 NOTE — ASSESSMENT & PLAN NOTE
Extensive right upper and lower lobe pulmonary embolism seen on 4/28/2025 CTA  2D echocardiogram without evidence of right heart strain  Does have history of DVT and PE: Maintained on warfarin previously, however pt states she had not taken the month prior to admission--(INR 1.18)  Due to subtherapeutic INR, was started on bridging therapy with heparin and then Lovenox, and restarted on Coumadin  Anticoagulation currently contraindicated due to recurrent lower GI bleed on anticoagulation  Appreciate pulmonology evaluation: IVC filter recommended  IR consult: Temporary IVC filter placed 5/9

## 2025-05-10 NOTE — ASSESSMENT & PLAN NOTE
Previously biopsied on her EGD @ OSH on 04/22/25. No dysplasia. Consider repeat EGD in 3 years, although she will be 83 at that point and risks of continued surveillance may outweigh benefits.  Continue PPI twice daily

## 2025-05-10 NOTE — ASSESSMENT & PLAN NOTE
History of DVT   However during 4/25/2025 [facility] admission found to have bilateral extensive extensive acute vs subacute DVTs, in the setting of subtherapeutic INR  IVC filter placed 5/9 due to recurrent bleeding on anticoagulation

## 2025-05-10 NOTE — PLAN OF CARE
Problem: PAIN - ADULT  Goal: Verbalizes/displays adequate comfort level or baseline comfort level  Description: Interventions:- Encourage patient to monitor pain and request assistance- Assess pain using appropriate pain scale- Administer analgesics based on type and severity of pain and evaluate response- Implement non-pharmacological measures as appropriate and evaluate response- Consider cultural and social influences on pain and pain management- Notify physician/advanced practitioner if interventions unsuccessful or patient reports new pain  Outcome: Progressing     Problem: INFECTION - ADULT  Goal: Absence or prevention of progression during hospitalization  Description: INTERVENTIONS:- Assess and monitor for signs and symptoms of infection- Monitor lab/diagnostic results- Monitor all insertion sites, i.e. indwelling lines, tubes, and drains- Monitor endotracheal if appropriate and nasal secretions for changes in amount and color- Mallory appropriate cooling/warming therapies per order- Administer medications as ordered- Instruct and encourage patient and family to use good hand hygiene technique- Identify and instruct in appropriate isolation precautions for identified infection/condition  Outcome: Progressing  Goal: Absence of fever/infection during neutropenic period  Description: INTERVENTIONS:- Monitor WBC  Outcome: Progressing     Problem: SAFETY ADULT  Goal: Patient will remain free of falls  Description: INTERVENTIONS:- Educate patient/family on patient safety including physical limitations- Instruct patient to call for assistance with activity - Consult OT/PT to assist with strengthening/mobility - Keep Call bell within reach- Keep bed low and locked with side rails adjusted as appropriate- Keep care items and personal belongings within reach- Initiate and maintain comfort rounds- Make Fall Risk Sign visible to staff- Offer Toileting every 2 Hours, in advance of need- Initiate/Maintain bed alarm-  Obtain necessary fall risk management equipment: - Apply yellow socks and bracelet for high fall risk patients- Consider moving patient to room near nurses station  Outcome: Progressing  Goal: Maintain or return to baseline ADL function  Description: INTERVENTIONS:-  Assess patient's ability to carry out ADLs; assess patient's baseline for ADL function and identify physical deficits which impact ability to perform ADLs (bathing, care of mouth/teeth, toileting, grooming, dressing, etc.)- Assess/evaluate cause of self-care deficits - Assess range of motion- Assess patient's mobility; develop plan if impaired- Assess patient's need for assistive devices and provide as appropriate- Encourage maximum independence but intervene and supervise when necessary- Involve family in performance of ADLs- Assess for home care needs following discharge - Consider OT consult to assist with ADL evaluation and planning for discharge- Provide patient education as appropriate  Outcome: Progressing  Goal: Maintains/Returns to pre admission functional level  Description: INTERVENTIONS:- Perform AM-PAC 6 Click Basic Mobility/ Daily Activity assessment daily.- Set and communicate daily mobility goal to care team and patient/family/caregiver. - Collaborate with rehabilitation services on mobility goals if consulted- Perform Range of Motion 3 times a day.- Reposition patient every 2 hours.- Dangle patient 3 times a day- Stand patient 3 times a day- Ambulate patient 3 times a day- Out of bed to chair 3 times a day - Out of bed for meals 3 times a day- Out of bed for toileting- Record patient progress and toleration of activity level   Outcome: Progressing     Problem: DISCHARGE PLANNING  Goal: Discharge to home or other facility with appropriate resources  Description: INTERVENTIONS:- Identify barriers to discharge w/patient and caregiver- Arrange for needed discharge resources and transportation as appropriate- Identify discharge learning  needs (meds, wound care, etc.)- Arrange for interpretive services to assist at discharge as needed- Refer to Case Management Department for coordinating discharge planning if the patient needs post-hospital services based on physician/advanced practitioner order or complex needs related to functional status, cognitive ability, or social support system  Outcome: Progressing     Problem: Knowledge Deficit  Goal: Patient/family/caregiver demonstrates understanding of disease process, treatment plan, medications, and discharge instructions  Description: Complete learning assessment and assess knowledge base.Interventions:- Provide teaching at level of understanding- Provide teaching via preferred learning methods  Outcome: Progressing     Problem: Nutrition/Hydration-ADULT  Goal: Nutrient/Hydration intake appropriate for improving, restoring or maintaining nutritional needs  Description: Monitor and assess patient's nutrition/hydration status for malnutrition. Collaborate with interdisciplinary team and initiate plan and interventions as ordered.  Monitor patient's weight and dietary intake as ordered or per policy. Utilize nutrition screening tool and intervene as necessary. Determine patient's food preferences and provide high-protein, high-caloric foods as appropriate. INTERVENTIONS:- Monitor oral intake, urinary output, labs, and treatment plans- Assess nutrition and hydration status and recommend course of action- Evaluate amount of meals eaten- Assist patient with eating if necessary - Allow adequate time for meals- Recommend/ encourage appropriate diets, oral nutritional supplements, and vitamin/mineral supplements- Order, calculate, and assess calorie counts as needed- Recommend, monitor, and adjust tube feedings and TPN/PPN based on assessed needs- Assess need for intravenous fluids- Provide specific nutrition/hydration education as appropriate- Include patient/family/caregiver in decisions related to  nutrition  Monitor and assess patient's nutrition/hydration status for malnutrition. Collaborate with interdisciplinary team and initiate plan and interventions as ordered.  Monitor patient's weight and dietary intake as ordered or per policy. Utilize nutrition screening tool and intervene as necessary. Determine patient's food preferences and provide high-protein, high-caloric foods as appropriate. INTERVENTIONS:- Monitor oral intake, urinary output, labs, and treatment plans- Assess nutrition and hydration status and recommend course of action- Evaluate amount of meals eaten- Assist patient with eating if necessary - Allow adequate time for meals- Recommend/ encourage appropriate diets, oral nutritional supplements, and vitamin/mineral supplements- Order, calculate, and assess calorie counts as needed- Recommend, monitor, and adjust tube feedings and TPN/PPN based on assessed needs- Assess need for intravenous fluids- Provide specific nutrition/hydration education as appropriate- Include patient/family/caregiver in decisions related to nutrition  Outcome: Progressing     Problem: Prexisting or High Potential for Compromised Skin Integrity  Goal: Skin integrity is maintained or improved  Description: INTERVENTIONS:- Identify patients at risk for skin breakdown- Assess and monitor skin integrity- Assess and monitor nutrition and hydration status- Monitor labs - Assess for incontinence - Turn and reposition patient- Assist with mobility/ambulation- Relieve pressure over bony prominences- Avoid friction and shearing- Provide appropriate hygiene as needed including keeping skin clean and dry- Evaluate need for skin moisturizer/barrier cream- Collaborate with interdisciplinary team - Patient/family teaching- Consider wound care consult   Outcome: Progressing     Problem: HEMATOLOGIC - ADULT  Goal: Maintains hematologic stability  Description: INTERVENTIONS- Assess for signs and symptoms of bleeding or hemorrhage-  Monitor labs- Administer supportive blood products/factors as ordered and appropriate  Outcome: Progressing     Problem: Communication Impairment  Goal: Ability to express needs and understand communication  Description: Assess patient's communication skills and ability to understand information.  Patient will demonstrate use of effective communication techniques, alternative methods of communication and understanding even if not able to speak. - Encourage communication and provide alternate methods of communication as needed.- Collaborate with case management/ for discharge needs.- Include patient/family/caregiver in decisions related to communication.  Outcome: Progressing

## 2025-05-10 NOTE — ASSESSMENT & PLAN NOTE
Pt is a 81yo female with PMH significant for hypertension, DVT/PE CKD 3, and right heel osteomyelitis initially presented to [facility] campus 4/25/2025 for generalized weakness.  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  Sliding hiatal hernia.  No evidence of bleeding  5/5 colonoscopy: Petechial mucosa in the cecum/ascending colon attributed to barotrauma as it developed during the procedure.  Melanosis in the splenic flexure, descending colon, sigmoid colon, rectosigmoid, and rectum.  Several diverticuli with old blood   Post endoscopy Case was discussed with GI, they suspected diverticular bleed, since resolved, noted anticoagulation could be restarted  Patient was started on anticoagulation with heparin/Lovenox bridging therapy without bleeding and then given 1 dose of Coumadin however had subsequent large hematochezia, with hypotension and acute blood loss anemia, requiring additional transfusions  CTa bleeding scan: Unremarkable  She was seen by GI who did not recommend additional endoscopy  Anticoagulation has been completely discontinued, and reversed w FFP  Likely diverticular bleeds  Hb ranging 7-8  Additional anticoagulation currently contraindicated

## 2025-05-10 NOTE — PROGRESS NOTES
Progress Note - Hospitalist   Name: Alecia Kay 80 y.o. female I MRN: [id number]  Unit/Bed#: E4 -01 I Date of Admission: 5/2/2025   Date of Service: 5/10/2025 I Hospital Day: 8    Assessment & Plan  Hematochezia  Pt is a 79yo female with PMH significant for hypertension, DVT/PE CKD 3, and right heel osteomyelitis initially presented to [facility] 4/25/2025 for generalized weakness.  Initially diagnosed with GAY and MDR UTI: completed course of ertapenem, and then was transferred to [facility] for GI evaluation for hematochezia/acute blood loss anemia    At [facility]: found to have DVT and PE, while INR subtherapeutic, and was started on heparin infusion, with hematochezia and ABLA.-->transferred to [facility] for GI eval  5/5 EGD: Wang's esophagus.  Sliding hiatal hernia.  No evidence of bleeding  5/5 colonoscopy: Petechial mucosa in the cecum/ascending colon attributed to barotrauma as it developed during the procedure.  Melanosis in the splenic flexure, descending colon, sigmoid colon, rectosigmoid, and rectum.  Several diverticuli with old blood   Post endoscopy Case was discussed with GI, they suspected diverticular bleed, since resolved, noted anticoagulation could be restarted  Patient was started on anticoagulation with heparin/Lovenox bridging therapy without bleeding and then given 1 dose of Coumadin however had subsequent large hematochezia, with hypotension and acute blood loss anemia, requiring additional transfusions  CTa bleeding scan: Unremarkable  She was seen by GI who did not recommend additional endoscopy  Anticoagulation has been completely discontinued, and reversed w FFP  Likely diverticular bleeds  Hb ranging 7-8  Additional anticoagulation currently contraindicated  Acute blood loss anemia (ABLA)  baseline hemoglobin appears to be variable over the last year ranging 7.5-10  Acute blood loss anemia due to hematochezia secondary to diverticular bleed  Was transfused   1 unit PRBC  5/3   2 units PRBC 5/4.  1 unit packed red blood cells 5/7  2 units FFP 5/7  Acute blood loss anemia secondary to lower GI bleed secondary to GI bleed  Continue to monitor hemoglobin closely: Ranging 7-8      Results from last 7 days   Lab Units 05/10/25  0555 05/09/25  1241 05/09/25  0536 05/08/25  2349 05/08/25  1226 05/08/25  0531   HEMOGLOBIN g/dL 7.4* 8.2* 7.7* 7.9* 7.9* 8.2*     Acute pulmonary embolism (HCC)  Extensive right upper and lower lobe pulmonary embolism seen on 4/28/2025 CTA  2D echocardiogram without evidence of right heart strain  Does have history of DVT and PE: Maintained on warfarin previously, however pt states she had not taken the month prior to admission--(INR 1.18)  Due to subtherapeutic INR, was started on bridging therapy with heparin and then Lovenox, and restarted on Coumadin  Anticoagulation currently contraindicated due to recurrent lower GI bleed on anticoagulation  Appreciate pulmonology evaluation: IVC filter recommended  IR consult: Temporary IVC filter placed 5/9  Word finding difficulty  Pt was a rapid response/stroke alert 5/8 due to acute onset expression aphasia/nonsense speech  CT head/ CTa head/neck: no acute abnormality/stroke  Repeat CT scan for worsening lethargy the following day: NAD  MRI brain: No acute infarct.  Moderate angiopathy  Pt has been lethargic, with mumbling speech since rapid response 5/8  Discussed with neurology: Discontinue aspirin/statin  undergoing montejo for toxic metabolic encephalopathy:  pt with multiple insults to her system, with recurrent anemia, hypotension, anesthesia, poor sleep.  Patient was also hypoglycemic while n.p.o. for IVC filter 5/9: Currently on IV dextrose fluids  Continue to evaluate and treat any underlying cause for lethargy/metabolic encephalopathy  Check TSH/B12/folate  Appreciate geriatric team evaluation and recommendations  Acute respiratory insufficiency  Likely secondary to pulmonary embolism: Was placed on 2 L nasal  cannula, since improved  Currently with adequate oxygenation on 2 L  Hypertension  Patient previously previously on amlodipine/beta-blocker  Torsemide recently discontinued during 3/2025 hospitalization  Blocker HELD for bradycardia/hypotension  Norvasc HELD for hypotension  Monitor for  hypotension with hematochezia  Blood pressure 135/71, 140/75  Acute DVT (deep venous thrombosis) ([facility])  History of DVT   However during 4/25/2025 [facility] admission found to have bilateral extensive extensive acute vs subacute DVTs, in the setting of subtherapeutic INR  IVC filter placed 5/9 due to recurrent bleeding on anticoagulation  Chronic kidney disease, stage III (moderate) ([facility])  Initially presented to [facility] 4/25/2025 for generalized weakness found to have kidney injury and MDR UTI  Renal function has returned back to baseline  Torsemide discontinued during March 2025 hospitalization    Results from last 7 days   Lab Units 05/10/25  0555 05/09/25  0536 05/08/25  1226 05/08/25  0531 05/07/25  0432 05/06/25  0320 05/05/25  0452 05/04/25  0245 05/03/25  2042   BUN mg/dL 7 8 8 6 6 6 6 7 8   CREATININE mg/dL 0.94 0.92 0.90 0.88 0.84 0.68 0.70 0.72 0.78   EGFR ml/min/1.73sq m 57 58 60 61 65 82 81 78 71     Hypothyroidism  Continue levothyroxine  Wang's esophagus  Noted on EGD  Will need repeat EGD in 3 years for surveillance  Patient with dysphagia: Continue proton pump inhibitor bid  Insertional Achilles tendinopathy  MRI with advanced distal Achilles tendinopathy with partial thickness tear involving the deep insertional fibers  Also small focal longitudinal split tear of the peroneus brevis, posterior tibialis tendon stenosis and tenosynovitis, and plantar fasciaopathy  PT/OT  Chronic osteomyelitis (HCC)  Per previous records: Chronic osteomyelitis of the toe of right foot  Per podiatry: Weightbearing as tolerated in postop shoe  Completed 6 weeks of IV antibiotics prior to recent admission  MRI right ankle/heel 4/29/2025: No  evidence of remaining osteomyelitis  Patient was reevaluated by podiatry 4/30/2025: Weightbearing as tolerated in surgical shoe.  Local wound care.    outpatient podiatry follow-up  Goals of care, counseling/discussion  Reviewed prior records:  pt filled out POLST 1/31/25 and was DNR, was also DNR on 3/25 admission:  d/w son at bedside- he notes per conversations w pt in the past, she did not want life support/heroic measures  Will uphold pt's POLST for DNR  Hypoglycemia  While NPO:  started D5NS after amp D50 with improvement  Cont to monitor closely, encourage p.o. intake  Hypomagnesemia  Repleted and improved  Replete hypokalemia    VTE Pharmacologic Prophylaxis: VTE Score: 6 High Risk (Score >/= 5) - Pharmacological DVT Prophylaxis Contraindicated. Sequential Compression Devices Ordered.    Mobility:   Basic Mobility Inpatient Raw Score: 8  JH-HLM Goal: 3: Sit at edge of bed  JH-HLM Achieved: 2: Bed activities/Dependent transfer  JH-HLM Goal NOT achieved. Continue with multidisciplinary rounding and encourage appropriate mobility to improve upon JH-HLM goals.    Patient Centered Rounds: I performed bedside rounds with nursing staff today.   Discussions with Specialists or Other Care Team Provider: Neurology: Dr.Zhou- betty gray ASA.  metabolic    Education and Discussions with Family / Patient: Discussed with patient.  Will update her son Jaron when he arrives today, at the bedside    (Addendum:  updated pts son and son in law at bedside)    Current Length of Stay: 8 day(s)  Current Patient Status: Inpatient   Certification Statement: The patient will continue to require additional inpatient hospital stay due to AMS, lethargy, ABLA, GI bleed  Discharge Plan: Anticipate discharge in 48-72 hrs to rehab facility.    Code Status: Level 3 - DNAR and DNI    Subjective   Patient was examined and interviewed earlier today before breakfast.  Opens her eyes to voice and touch.  Mumbling speech.  Denied any pain anywhere.   Denied any upset stomach, nausea or heartburn however stated she did not feel hungry.  Denied any difficulty breathing.  States she did not want to get washed up.    Objective :  Temp:  [96.7 °F (35.9 °C)-99.6 °F (37.6 °C)] 98 °F (36.7 °C)  HR:  [75-98] 80  BP: (117-162)/(60-76) 140/75  Resp:  [16-20] 18  SpO2:  [91 %-98 %] 96 %  O2 Device: Nasal cannula  Nasal Cannula O2 Flow Rate (L/min):  [2 L/min] 2 L/min    Body mass index is 31.45 kg/m².     Input and Output Summary (last 24 hours):     Intake/Output Summary (Last 24 hours) at 5/10/2025 0840  Last data filed at 5/9/2025 1109  Gross per 24 hour   Intake 0 ml   Output --   Net 0 ml       Physical Exam  General: Pleasant female.  Sleeping upon my entrance and awakens to voice and touch.  Makes eye contact.  Continue speaking but closes her eyes while speaking.  Mumbling speech however in multiword phrases.  Heart: Regular rate and rhythm.  S1-S2 present.  No murmur, rub, gallop  Lungs: Clear to auscultation bilaterally.  No wheezes, crackles, rhonchi.  No accessory muscle use or respiratory distress  Abdomen: Soft, nontender with palpation.  Nondistended.  Normal active bowel sounds present.  No guarding or rebound.  No peritoneal signs or mass  Extremities: No clubbing, cyanosis, edema.  2+ pedal pulses bilaterally  Neurologic: Somnolent.  Awakens to voice/touch.  Still very lethargic with mumbling speech.    Lines/Drains:        Lab Results: I have reviewed the following results:   Results from last 7 days   Lab Units 05/10/25  0555   WBC Thousand/uL 3.17*   HEMOGLOBIN g/dL 7.4*   HEMATOCRIT % 21.5*   PLATELETS Thousands/uL 105*   SEGS PCT % 65   LYMPHO PCT % 26   MONO PCT % 8   EOS PCT % 0     Results from last 7 days   Lab Units 05/10/25  0555 05/08/25  1226 05/08/25  0531   SODIUM mmol/L 140   < > 139   POTASSIUM mmol/L 2.8*   < > 3.1*   CHLORIDE mmol/L 104   < > 100   CO2 mmol/L 29   < > 30   BUN mg/dL 7   < > 6   CREATININE mg/dL 0.94   < > 0.88   ANION  GAP mmol/L 7   < > 9   CALCIUM mg/dL 7.6*   < > 8.4   ALBUMIN g/dL  --   --  2.6*   TOTAL BILIRUBIN mg/dL  --   --  0.72   ALK PHOS U/L  --   --  55   ALT U/L  --   --  5*   AST U/L  --   --  13   GLUCOSE RANDOM mg/dL 133   < > 88    < > = values in this interval not displayed.     Results from last 7 days   Lab Units 05/08/25  0531   INR  1.69*     Results from last 7 days   Lab Units 05/10/25  0736 05/09/25  2127 05/09/25  1629 05/09/25  1423 05/09/25  0841 05/09/25  0802 05/08/25  1135   POC GLUCOSE mg/dl 133 116 124 89 160* 61* 94     Results from last 7 days   Lab Units 05/08/25  1226   HEMOGLOBIN A1C % 6.1*           Recent Cultures (last 7 days):       ==============================================  Imaging  5/9 MRI brain:   1.  No acute infarction, intracranial hemorrhage or mass effect.  2.  Moderate, chronic microangiopathy    5/9: CT brain  No acute intracranial abnormality.  New anterior subluxation of bilateral temporomandibular joints (left worse than right). Question if this is due to open-mouth position.  Similar moderate chronic microangiopathy    5/7 CT brain  No significant stenosis of the cervical carotid or vertebral arteries  No significant intracranial stenosis, large vessel occlusion or aneurysm.  Redemonstration of known pulmonary emboli.     5/8 CTa head/neck:  No acute intracranial abnormality. Chronic microangiopathy.      4/30 celiac/mesenteric duplex  The abdominal aorta is patent and normal in caliber, measuring approximately 1.7 cm in its greatest diameter.  The celiac and splenic arteries are patent and normal in caliber. The proximal common hepatic artery was not visualized.  The main Renal Arteries are patent and normal in caliber bilaterally.   The superior and inferior mesenteric arteries are patent and normal in caliber.      4/30: Barium swallow   projection demonstrates degenerative changes of the spine without obvious free air.  Limited examination   No evidence of an  obvious fixed esophageal obstruction. Some transient spasm appeared to be present which gave the patient's some symptomatology but contrast did eventually pass into the stomach.     4/29 Arterial lower extremity duplex  RIGHT LOWER LIMB:  Diffuse disease noted throughout the femoral-popliteal arteries without significant focal stenosis.  Ankle/Brachial index:  Unreliable due to poorly compressible tibial vessels.  PVR/ PPG tracings are normal.  Metatarsal pressure of 166 mmHg  Great toe pressure of 109 mmHg, within the healing range   LEFT LOWER LIMB:  Diffuse disease noted throughout the femoral-popliteal arteries without significant focal stenosis.  Ankle/Brachial index:   Unreliable due to poorly compressible tibial vessels.  PVR/ PPG tracings are normal.  Metatarsal pressure of 168 mmHg  Great toe pressure of 127 mmHg, within the healing range     4/29 MRI right ankle/heel  1.  No convincing evidence of osteomyelitis. There is mild reactive edema.  2.  Advanced distal Achilles tendinopathy, with a partial-thickness tear involving the deep insertional fibers.  3.  Likely small, focal longitudinal split tear of the peroneus brevis.  4.  Mild posterior tibialis tendinosis and tenosynovitis.  5.  Plantar fasciopathy and postsurgical defect.  6.  Degenerative changes, as described.     4/28 CT PE study with abdomen/pelvis  1. Extensive acute thrombus throughout the right upper middle and lower lobe segmental and distal order pulmonary arteries. RV/LV ratio 0.9.  2. No acute intra-abdominal or pelvic process.     4/28 lower extremity venous duplex  RIGHT LOWER LIMB:  There is evidence suggestive of acute vs. sub acute deep vein thrombosis noted in the popliteal, gastrocnemius, paired posterior tibial, and paired peroneal veins.  No evidence of superficial thrombophlebitis noted.  Popliteal, posterior tibial and anterior tibial arterial Doppler waveforms are  triphasic.  LEFT LOWER LIMB:  There is evidence suggestive  of acute vs. sub acute deep vein thrombosis noted in the popliteal, paired posterior tibial, and paired peroneal veins.  No evidence of superficial thrombophlebitis noted.  Popliteal, posterior tibial and anterior tibial arterial Doppler waveforms are triphasic.     4/27 right foot x-ray Stable appearance of the calcaneus. No new erosions      4/25 CT chest/abdomen/pelvis  Status post reported esophageal dilatation. No evidence for pneumomediastinum. Small hiatal hernia is noted.  No acute intra-abdominal or pelvic abnormality identified.  Colonic diverticulosis without evidence for acute diverticulitis.     Micro  4/28 positive FOB  4/28 stool enteric pathogen: neg  4/28: neg c diff  4/25 urine cx: >100,000 Kleb, >100,000ESBL E coli     Procedure  5/5: EGD  The upper third of the esophagus, middle third of the esophagus and lower third of the esophagus appeared normal.  C4M7 Wang's esophagus; electronic chromoendoscopy (NBI) was used  6 cm type I hiatal hernia  The fundus of the stomach, body of the stomach, incisura and antrum appeared normal.  Prior polypectomy site noted at the body along the greater curvature, as evidenced by the convergence of multiple gastric folds.  The duodenal bulb, 1st part of the duodenum and 2nd part of the duodenum appeared normal.  5/5 colonoscopy  The terminal ileum appeared normal.  Mild, localized petechial mucosa in the cecum and ascending colon.    - likely due to barotrauma. The mucosal petechiae developed during the procedure and does not explain her prior episodes of hematochezia  The transverse colon appeared normal.  Melanosis in the splenic flexure, descending colon, sigmoid colon, rectosigmoid and rectum  Multiple small, scattered diverticula of mild severity with no inflammation containing blood clots and causing mild luminal narrowing (traversable) in the descending colon and sigmoid colon; there was stigmata of recent hemorrhage.    - there was some narrowing and a  sharp turn at the rectosigmoid junction. There were several diverticuli that had old blood near the mouth. There was also small amounts of old blood mixed with the bowel prep  Internal small hemorrhoids observed during retroflexion  5/9: IR IVC filter placement  =============================================      Last 24 Hours Medication List:     Current Facility-Administered Medications:     acetaminophen (TYLENOL) tablet 650 mg, Q6H PRN    [Held by provider] amLODIPine (NORVASC) tablet 10 mg, Daily    aspirin chewable tablet 81 mg, Daily    dextrose 5 % and sodium chloride 0.9 % infusion, Continuous, Last Rate: 75 mL/hr (05/09/25 0810)    hydrOXYzine HCL (ATARAX) tablet 25 mg, Q8H PRN    levothyroxine tablet 75 mcg, Early Morning    meclizine (ANTIVERT) tablet 25 mg, TID PRN    [Held by provider] metoprolol tartrate (LOPRESSOR) tablet 50 mg, Q12H MELANY    [Held by provider] mirtazapine (REMERON) tablet 7.5 mg, HS    moisture barrier miconazole 2% cream (aka EDNA MOISTURE BARRIER ANTIFUNGAL CREAM), BID    ondansetron (ZOFRAN) injection 4 mg, Q4H PRN    pantoprazole (PROTONIX) EC tablet 40 mg, BID AC    potassium chloride 20 mEq IVPB (premix), Q2H **FOLLOWED BY** potassium chloride 20 mEq IVPB (premix), Q2H    pravastatin (PRAVACHOL) tablet 80 mg, Daily With Dinner    Administrative Statements   Today, Patient Was Seen By: Monique Gonzalez MD      **Please Note: This note may have been constructed using a voice recognition system.**
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
## 2025-05-10 NOTE — ASSESSMENT & PLAN NOTE
baseline hemoglobin appears to be variable over the last year ranging 7.5-10  Acute blood loss anemia due to hematochezia secondary to diverticular bleed  Was transfused   1 unit PRBC 5/3   2 units PRBC 5/4.  1 unit packed red blood cells 5/7  2 units FFP 5/7  Acute blood loss anemia secondary to lower GI bleed secondary to GI bleed  Continue to monitor hemoglobin closely: Ranging 7-8      Results from last 7 days   Lab Units 05/10/25  0555 05/09/25  1241 05/09/25  0536 05/08/25  2349 05/08/25  1226 05/08/25  0531   HEMOGLOBIN g/dL 7.4* 8.2* 7.7* 7.9* 7.9* 8.2*

## 2025-05-10 NOTE — PROGRESS NOTES
Progress Note - Gastroenterology   Name: Alecia Kay 80 y.o. female I MRN: [id number]  Unit/Bed#: E4 -01 I Date of Admission: 5/2/2025   Date of Service: 5/10/2025 I Hospital Day: 8    Assessment & Plan  Hematochezia  80 female with a past medical history of VTE on coumadin prior to admission, GERD with Wang's esophagus, hypertension, HLD, chronic osteomyelitis, and hypothyroidism who initially presented to [place] on 4/25 with generalized weakness and failure to thrive with poor oral intake and a 70 pound weight loss treated for GAY/UTI but also started on heparin drip due to DVT/PE.  Following initiation of heparin drip, patient with bright red blood per rectum.  Hemoglobin had declined from 9.7 on admission to 6.9 with associated symptoms and hemodynamic changes leading to transfer to [facility] on 5/2 for closer monitoring and GI/IR support over the weekend. Patient with further decline in hemoglobin down to 6.9 requiring additional PRBC transfusion with overt GI bleeding in the setting of a supratherapeutic PTT. She underwent EGD/colonoscopy on 05/05/25, which showed C4M7 Wang's, old blood near the sigmoid diverticuli and some proximal colonic barotrauma. She was bridged with lovenox, but developed recurrent hematochezia leading to cessation of anticoagulation and IVC filter placement on 5/9 now with stable hemoglobin of brown bowel movements.    Plan:  Despite active PE, she is clinically well and hopefully can avoid additional anticoagulation for now given c/f recurrent diverticular bleed.  If patient requires further anticoagulation recommend risk versus benefit discussion regarding concern for repeat bleeding  Okay for aspirin from GI standpoint  Trend H&H; transfuse for goal of >7.0   Monitor hemodynamics; avoid episodes of hypotension   Maintain adequate IV access with 2 large bore Ivs  If recurrent bleeding recommend stat CTA  No current plans for endoscopic evaluation  Additional pain  and symptom management per primary team  Acute pulmonary embolism (HCC)  Imaging demonstrating extensive right and lower lobe pulmonary embolism on/20 8/2025 CTA.  Previously on warfarin at home. Pt had LGIB on hep gtt and therapeutic lovenox. She is satting well on RA and w/o hypoxia.  Acute DVT (deep venous thrombosis) (HCC)  Patient with prior history of DVT.  Found to have acute versus subacute bilateral DVTs this admission.  Pt had LGIB on hep gtt and therapeutic lovenox now status post IVC filter  Wang's esophagus  Previously biopsied on her EGD @ OSH on 04/22/25. No dysplasia. Consider repeat EGD in 3 years, although she will be 83 at that point and risks of continued surveillance may outweigh benefits.  Continue PPI twice daily  Goals of care, counseling/discussion      I have discussed the above management plan in detail with the primary service.     Subjective   Patient feeling well overall.  Having brown bowel movements.  Still on supplemental oxygen.  Discussed with family at bedside.    Objective :  Temp:  [96.7 °F (35.9 °C)-99.6 °F (37.6 °C)] 97.5 °F (36.4 °C)  HR:  [69-90] 69  BP: (117-162)/(60-76) 135/71  Resp:  [16-18] 18  SpO2:  [91 %-98 %] 98 %  O2 Device: Nasal cannula  Nasal Cannula O2 Flow Rate (L/min):  [2 L/min] 2 L/min    Physical Exam  Vitals and nursing note reviewed.   Constitutional:       General: She is not in acute distress.     Appearance: She is well-developed and normal weight.   Abdominal:      General: Abdomen is flat. There is no distension.      Palpations: Abdomen is soft.      Tenderness: There is no abdominal tenderness.   Musculoskeletal:         General: No swelling.   Neurological:      Mental Status: She is alert.         Lab Results: I have reviewed the following results:none    Imaging Results Review: No pertinent imaging studies reviewed.  Other Study Results Review: No additional pertinent studies reviewed.    Administrative Statements   I have spent a total time  of 32 minutes in caring for this patient on the day of the visit/encounter including Diagnostic results, Prognosis, Risks and benefits of tx options, Instructions for management, and Patient and family education.

## 2025-05-10 NOTE — ASSESSMENT & PLAN NOTE
While NPO:  started D5NS after amp D50 with improvement  Cont to monitor closely, encourage p.o. intake

## 2025-05-10 NOTE — ASSESSMENT & PLAN NOTE
Patient with prior history of DVT.  Found to have acute versus subacute bilateral DVTs this admission.  Pt had LGIB on hep gtt and therapeutic lovenox now status post IVC filter

## 2025-05-10 NOTE — ASSESSMENT & PLAN NOTE
Patient previously previously on amlodipine/beta-blocker  Torsemide recently discontinued during 3/2025 hospitalization  Blocker HELD for bradycardia/hypotension  Norvasc HELD for hypotension  Monitor for  hypotension with hematochezia  Blood pressure 135/71, 140/75

## 2025-05-10 NOTE — ASSESSMENT & PLAN NOTE
Likely secondary to pulmonary embolism: Was placed on 2 L nasal cannula, since improved  Currently with adequate oxygenation on 2 L

## 2025-05-10 NOTE — ASSESSMENT & PLAN NOTE
Initially presented to [facility] 4/25/2025 for generalized weakness found to have kidney injury and MDR UTI  Renal function has returned back to baseline  Torsemide discontinued during March 2025 hospitalization    Results from last 7 days   Lab Units 05/10/25  0555 05/09/25  0536 05/08/25  1226 05/08/25  0531 05/07/25  0432 05/06/25  0320 05/05/25  0452 05/04/25  0245 05/03/25  2042   BUN mg/dL 7 8 8 6 6 6 6 7 8   CREATININE mg/dL 0.94 0.92 0.90 0.88 0.84 0.68 0.70 0.72 0.78   EGFR ml/min/1.73sq m 57 58 60 61 65 82 81 78 71

## 2025-05-10 NOTE — ASSESSMENT & PLAN NOTE
80 female with a past medical history of VTE on coumadin prior to admission, GERD with Wang's esophagus, hypertension, HLD, chronic osteomyelitis, and hypothyroidism who initially presented to [place] on 4/25 with generalized weakness and failure to thrive with poor oral intake and a 70 pound weight loss treated for GAY/UTI but also started on heparin drip due to DVT/PE.  Following initiation of heparin drip, patient with bright red blood per rectum.  Hemoglobin had declined from 9.7 on admission to 6.9 with associated symptoms and hemodynamic changes leading to transfer to [facility] on 5/2 for closer monitoring and GI/IR support over the weekend. Patient with further decline in hemoglobin down to 6.9 requiring additional PRBC transfusion with overt GI bleeding in the setting of a supratherapeutic PTT. She underwent EGD/colonoscopy on 05/05/25, which showed C4M7 Wang's, old blood near the sigmoid diverticuli and some proximal colonic barotrauma. She was bridged with lovenox, but developed recurrent hematochezia leading to cessation of anticoagulation and IVC filter placement on 5/9 now with stable hemoglobin of brown bowel movements.    Plan:  Despite active PE, she is clinically well and hopefully can avoid additional anticoagulation for now given c/f recurrent diverticular bleed.  If patient requires further anticoagulation recommend risk versus benefit discussion regarding concern for repeat bleeding  Okay for aspirin from GI standpoint  Trend H&H; transfuse for goal of >7.0   Monitor hemodynamics; avoid episodes of hypotension   Maintain adequate IV access with 2 large bore Ivs  If recurrent bleeding recommend stat CTA  No current plans for endoscopic evaluation  Additional pain and symptom management per primary team

## 2025-05-11 PROBLEM — E53.8 FOLIC ACID DEFICIENCY: Status: ACTIVE | Noted: 2025-05-11

## 2025-05-11 PROBLEM — R79.89 ABNORMAL TSH: Status: ACTIVE | Noted: 2025-05-11

## 2025-05-11 LAB
ANION GAP SERPL CALCULATED.3IONS-SCNC: 6 MMOL/L (ref 4–13)
BASOPHILS # BLD AUTO: 0.02 THOUSANDS/ÂΜL (ref 0–0.1)
BASOPHILS NFR BLD AUTO: 0 % (ref 0–1)
BUN SERPL-MCNC: 7 MG/DL (ref 5–25)
CALCIUM SERPL-MCNC: 7.9 MG/DL (ref 8.4–10.2)
CHLORIDE SERPL-SCNC: 109 MMOL/L (ref 96–108)
CO2 SERPL-SCNC: 27 MMOL/L (ref 21–32)
CREAT SERPL-MCNC: 0.93 MG/DL (ref 0.6–1.3)
EOSINOPHIL # BLD AUTO: 0.13 THOUSAND/ÂΜL (ref 0–0.61)
EOSINOPHIL NFR BLD AUTO: 3 % (ref 0–6)
ERYTHROCYTE [DISTWIDTH] IN BLOOD BY AUTOMATED COUNT: 17.1 % (ref 11.6–15.1)
GFR SERPL CREATININE-BSD FRML MDRD: 57 ML/MIN/1.73SQ M
GLUCOSE SERPL-MCNC: 106 MG/DL (ref 65–140)
GLUCOSE SERPL-MCNC: 111 MG/DL (ref 65–140)
GLUCOSE SERPL-MCNC: 117 MG/DL (ref 65–140)
GLUCOSE SERPL-MCNC: 139 MG/DL (ref 65–140)
GLUCOSE SERPL-MCNC: 98 MG/DL (ref 65–140)
HCT VFR BLD AUTO: 23 % (ref 34.8–46.1)
HGB BLD-MCNC: 7.7 G/DL (ref 11.5–15.4)
IMM GRANULOCYTES # BLD AUTO: 0.05 THOUSAND/UL (ref 0–0.2)
IMM GRANULOCYTES NFR BLD AUTO: 1 % (ref 0–2)
LYMPHOCYTES # BLD AUTO: 1.68 THOUSANDS/ÂΜL (ref 0.6–4.47)
LYMPHOCYTES NFR BLD AUTO: 33 % (ref 14–44)
MAGNESIUM SERPL-MCNC: 1.6 MG/DL (ref 1.9–2.7)
MCH RBC QN AUTO: 29.1 PG (ref 26.8–34.3)
MCHC RBC AUTO-ENTMCNC: 33.5 G/DL (ref 31.4–37.4)
MCV RBC AUTO: 87 FL (ref 82–98)
MONOCYTES # BLD AUTO: 0.56 THOUSAND/ÂΜL (ref 0.17–1.22)
MONOCYTES NFR BLD AUTO: 11 % (ref 4–12)
NEUTROPHILS # BLD AUTO: 2.59 THOUSANDS/ÂΜL (ref 1.85–7.62)
NEUTS SEG NFR BLD AUTO: 52 % (ref 43–75)
NRBC BLD AUTO-RTO: 0 /100 WBCS
PLATELET # BLD AUTO: 101 THOUSANDS/UL (ref 149–390)
PMV BLD AUTO: 9.9 FL (ref 8.9–12.7)
POTASSIUM SERPL-SCNC: 3.3 MMOL/L (ref 3.5–5.3)
RBC # BLD AUTO: 2.65 MILLION/UL (ref 3.81–5.12)
SODIUM SERPL-SCNC: 142 MMOL/L (ref 135–147)
WBC # BLD AUTO: 5.03 THOUSAND/UL (ref 4.31–10.16)

## 2025-05-11 PROCEDURE — 80048 BASIC METABOLIC PNL TOTAL CA: CPT | Performed by: INTERNAL MEDICINE

## 2025-05-11 PROCEDURE — 83735 ASSAY OF MAGNESIUM: CPT | Performed by: INTERNAL MEDICINE

## 2025-05-11 PROCEDURE — 82948 REAGENT STRIP/BLOOD GLUCOSE: CPT

## 2025-05-11 PROCEDURE — 99233 SBSQ HOSP IP/OBS HIGH 50: CPT | Performed by: INTERNAL MEDICINE

## 2025-05-11 PROCEDURE — 85025 COMPLETE CBC W/AUTO DIFF WBC: CPT | Performed by: INTERNAL MEDICINE

## 2025-05-11 RX ORDER — POTASSIUM CHLORIDE 14.9 MG/ML
20 INJECTION INTRAVENOUS
Status: COMPLETED | OUTPATIENT
Start: 2025-05-11 | End: 2025-05-11

## 2025-05-11 RX ORDER — POTASSIUM CHLORIDE 29.8 MG/ML
40 INJECTION INTRAVENOUS ONCE
Status: DISCONTINUED | OUTPATIENT
Start: 2025-05-11 | End: 2025-05-11 | Stop reason: SDUPTHER

## 2025-05-11 RX ORDER — MAGNESIUM SULFATE HEPTAHYDRATE 40 MG/ML
2 INJECTION, SOLUTION INTRAVENOUS ONCE
Status: COMPLETED | OUTPATIENT
Start: 2025-05-11 | End: 2025-05-11

## 2025-05-11 RX ORDER — FOLIC ACID 1 MG/1
1 TABLET ORAL DAILY
Status: DISCONTINUED | OUTPATIENT
Start: 2025-05-11 | End: 2025-05-20

## 2025-05-11 RX ADMIN — MICONAZOLE NITRATE: 20 CREAM TOPICAL at 09:02

## 2025-05-11 RX ADMIN — MAGNESIUM SULFATE HEPTAHYDRATE 2 G: 40 INJECTION, SOLUTION INTRAVENOUS at 09:02

## 2025-05-11 RX ADMIN — DEXTROSE AND SODIUM CHLORIDE 75 ML/HR: 5; .9 INJECTION, SOLUTION INTRAVENOUS at 00:45

## 2025-05-11 RX ADMIN — POTASSIUM CHLORIDE 20 MEQ: 14.9 INJECTION, SOLUTION INTRAVENOUS at 11:00

## 2025-05-11 RX ADMIN — MAGNESIUM SULFATE HEPTAHYDRATE 2 G: 40 INJECTION, SOLUTION INTRAVENOUS at 16:43

## 2025-05-11 RX ADMIN — LEVOTHYROXINE SODIUM 75 MCG: 75 TABLET ORAL at 06:00

## 2025-05-11 RX ADMIN — DEXTROSE AND SODIUM CHLORIDE 75 ML/HR: 5; .9 INJECTION, SOLUTION INTRAVENOUS at 22:39

## 2025-05-11 RX ADMIN — PANTOPRAZOLE SODIUM 40 MG: 40 TABLET, DELAYED RELEASE ORAL at 16:42

## 2025-05-11 RX ADMIN — POTASSIUM CHLORIDE 20 MEQ: 14.9 INJECTION, SOLUTION INTRAVENOUS at 09:02

## 2025-05-11 RX ADMIN — DEXTROSE AND SODIUM CHLORIDE 75 ML/HR: 5; .9 INJECTION, SOLUTION INTRAVENOUS at 06:33

## 2025-05-11 RX ADMIN — MICONAZOLE NITRATE: 20 CREAM TOPICAL at 16:42

## 2025-05-11 RX ADMIN — PRAVASTATIN SODIUM 80 MG: 80 TABLET ORAL at 16:43

## 2025-05-11 RX ADMIN — FOLIC ACID 1 MG: 1 TABLET ORAL at 09:02

## 2025-05-11 RX ADMIN — PANTOPRAZOLE SODIUM 40 MG: 40 TABLET, DELAYED RELEASE ORAL at 06:00

## 2025-05-11 NOTE — ASSESSMENT & PLAN NOTE
History of DVT   However during 4/25/2025 [facility] admission found to have bilateral extensive extensive acute vs subacute DVTs, in the setting of subtherapeutic INR  IVC filter placed 5/9 due to recurrent bleeding on anticoagulation

## 2025-05-11 NOTE — ASSESSMENT & PLAN NOTE
Patient previously previously on amlodipine/beta-blocker  Medications held for hypotension with hematochezia  Blood pressure currently adequate: Monitor for indication to restart

## 2025-05-11 NOTE — QUICK NOTE
GASTROENTEROLOGY QUICK NOTE:    Patient's chart reviewed today.  Patient now status post IVC filter on 5/9.  Currently being monitored off AP/AC.  Hemoglobin currently stable at 7.7.  Given current hemoglobin stability, no further plans for GI intervention.  GI will sign off.  Please reach out to GI team with any questions or concerns.  Thank you.    Renea Harrison DO, PGY-5  [facility] Gastroenterology Fellow

## 2025-05-11 NOTE — PROGRESS NOTES
Progress Note - Hospitalist   Name: Alecia Kay 80 y.o. female I MRN: [id number]  Unit/Bed#: E4 -01 I Date of Admission: 5/2/2025   Date of Service: 5/11/2025 I Hospital Day: 9    Assessment & Plan  Hematochezia  Pt is a 81yo female with PMH significant for hypertension, DVT/PE CKD 3, and right heel osteomyelitis initially presented to [facility] campus 4/25/2025 for generalized weakness.  Initially diagnosed with GAY and MDR UTI: completed course of ertapenem, and then was transferred to [facility] for GI evaluation for hematochezia/acute blood loss anemia    At [facility]: found to have DVT and PE, while INR subtherapeutic, and was started on heparin infusion, with hematochezia and ABLA.-->transferred to [facility] for GI eval  5/5 EGD:   Wang's esophagus.  Sliding hiatal hernia.  No evidence of bleeding  5/5 colonoscopy:   Petechial mucosa in the cecum/ascending colon attributed to barotrauma as it developed during the procedure.  Melanosis in the splenic flexure, descending colon, sigmoid colon, rectosigmoid, and rectum.  Several diverticuli with old blood   Post endoscopy Case was discussed with GI, they suspected diverticular bleed, since resolved, noted anticoagulation could be restarted  Patient was started on anticoagulation with heparin/Lovenox bridging therapy without bleeding and then given 1 dose of Coumadin however had subsequent large hematochezia, with hypotension and acute blood loss anemia, requiring additional transfusions  CTa bleeding scan: Unremarkable  She was seen by GI who did not recommend additional endoscopy  Anticoagulation has been completely discontinued, and reversed w FFP  Likely diverticular bleeds  Hb ranging 7-8  Additional anticoagulation currently contraindicated  Acute blood loss anemia (ABLA)  baseline hemoglobin appears to be variable over the last year ranging 7.5-10  Acute blood loss anemia due to hematochezia secondary to diverticular bleed  Was transfused   1 unit  PRBC 5/3   2 units PRBC 5/4.  1 unit packed red blood cells 5/7  2 units FFP 5/7  Acute blood loss anemia secondary to lower GI bleed secondary to GI bleed  Continue to monitor hemoglobin closely: Ranging 7-8      Results from last 7 days   Lab Units 05/11/25  0608 05/10/25  0555 05/09/25  1241 05/09/25  0536 05/08/25  2349 05/08/25  1226   HEMOGLOBIN g/dL 7.7* 7.4* 8.2* 7.7* 7.9* 7.9*     Acute pulmonary embolism (HCC)  Extensive right upper and lower lobe pulmonary embolism seen on 4/28/2025 CTA  2D echocardiogram without evidence of right heart strain  Does have history of DVT and PE: Maintained on warfarin previously, however pt states she had not taken the month prior to admission--(INR 1.18)  Due to subtherapeutic INR, was started on bridging therapy with anticoagulation however had intractable GI bleed  Anticoagulation currently contraindicated due to recurrent lower GI bleed on anticoagulation  Appreciate pulmonology evaluation: IVC filter recommended  Temporary IVC filter placed 5/9 by IR: Outpatient follow-up for removal  Word finding difficulty  Pt was a rapid response/stroke alert 5/8 due to acute onset expression aphasia/nonsense speech  CT head/ CTa head/neck: no acute abnormality/stroke  Repeat CT scan for worsening lethargy the following day: NAD  MRI brain: No acute infarct.  Moderate angiopathy  Pt has been lethargic, with mumbling speech since rapid response 5/8  Discussed with neurology: Discontinue aspirin/statin  undergoing montejo for toxic metabolic encephalopathy:  pt with multiple insults to her system, with recurrent anemia, hypotension, anesthesia, poor sleep, and previous Atarax.  Patient was also hypoglycemic while n.p.o. for IVC filter 5/9:  Continue to evaluate and treat any underlying cause for lethargy/metabolic encephalopathy  Folate deficient: Replete  Normal B12  Appreciate geriatric team evaluation and recommendations  Patient markedly improved today: Awake and responsive  Acute  respiratory insufficiency  Likely secondary to pulmonary embolism: Was placed on 2 L nasal cannula, since improved  Currently with adequate oxygenation on 2 L  Hypertension  Patient previously previously on amlodipine/beta-blocker  Medications held for hypotension with hematochezia  Blood pressure currently adequate: Monitor for indication to restart  Acute DVT (deep venous thrombosis) ([facility])  History of DVT   However during 4/25/2025 [facility] admission found to have bilateral extensive extensive acute vs subacute DVTs, in the setting of subtherapeutic INR  IVC filter placed 5/9 due to recurrent bleeding on anticoagulation  Chronic kidney disease, stage III (moderate) (HCC)  Initially presented to [facility] 4/25/2025 for generalized weakness found to have kidney injury and MDR UTI  Renal function has returned back to baseline  Torsemide discontinued during March 2025 hospitalization    Results from last 7 days   Lab Units 05/11/25  0608 05/10/25  0555 05/09/25  0536 05/08/25  1226 05/08/25  0531 05/07/25  0432 05/06/25  0320 05/05/25  0452   BUN mg/dL 7 7 8 8 6 6 6 6   CREATININE mg/dL 0.93 0.94 0.92 0.90 0.88 0.84 0.68 0.70   EGFR ml/min/1.73sq m 57 57 58 60 61 65 82 81     Hypothyroidism  Continue levothyroxine  Wang's esophagus  Noted on EGD  Will need repeat EGD in 3 years for surveillance  Patient with dysphagia: Continue proton pump inhibitor bid  Insertional Achilles tendinopathy  MRI with advanced distal Achilles tendinopathy with partial thickness tear involving the deep insertional fibers  Also small focal longitudinal split tear of the peroneus brevis, posterior tibialis tendon stenosis and tenosynovitis, and plantar fasciaopathy  PT/OT  Chronic osteomyelitis (HCC)  Per previous records: Chronic osteomyelitis of the toe of right foot  Per podiatry: Weightbearing as tolerated in postop shoe  Completed 6 weeks of IV antibiotics prior to recent admission  MRI right ankle/heel 4/29/2025: No evidence of remaining  osteomyelitis  Patient was reevaluated by podiatry 4/30/2025: Weightbearing as tolerated in surgical shoe.  Local wound care.    outpatient podiatry follow-up  Goals of care, counseling/discussion  Reviewed prior records:  pt filled out POLST 1/31/25 and was DNR, was also DNR on 3/25 admission:  d/w son at bedside- he notes per conversations w pt in the past, she did not want life support/heroic measures  Will uphold pt's POLST for DNR  Hypoglycemia  While NPO:  started D5NS after amp D50 with improvement  Cont to monitor closely, encourage p.o. intake  Hypomagnesemia  Repleted and improved  Replete hypokalemia  Folic acid deficiency  Supplement start  Abnormal TSH  Pt with low TSH and elevated free T4: However in the setting of acute illness, recent IV contrast, as well as IV steroid:  Reviewed with endocrinology on-call team: Not accurate in the setting of acute illness and above factors  Recommend recheck full TFTs in 2-4 weeks as an outpatient.    VTE Pharmacologic Prophylaxis: VTE Score: 6 High Risk (Score >/= 5) - Pharmacological DVT Prophylaxis Contraindicated. Sequential Compression Devices Ordered.    Mobility:   Basic Mobility Inpatient Raw Score: 8  JH-HLM Goal: 3: Sit at edge of bed  JH-HLM Achieved: 5: Stand (1 or more minutes)  JH-HLM Goal achieved. Continue to encourage appropriate mobility.    Patient Centered Rounds: I performed bedside rounds with nursing staff today.   Discussions with Specialists or Other Care Team Provider: EDVIN    Education and Discussions with Family / Patient: Updated patient, her son, and son-in-law at the bedside.  Reviewed test results, treatment plan.  Answered all questions    Current Length of Stay: 9 day(s)  Current Patient Status: Inpatient   Certification Statement: The patient will continue to require additional inpatient hospital stay due to unresponsive episode yesterday, improved today.  Metabolic encephalopathy.  Planning for short-term rehab  Discharge Plan:  Anticipate discharge in 48-72 hrs to rehab facility.    Code Status: Level 3 - DNAR and DNI    Subjective   Patient denies any current pain anywhere.  Denies any difficulty breathing.  Denies any upset stomach.  Denies any nausea, vomiting, diarrhea.  Notes she does not feel hungry however she will try to eat.  Denies any difficulty breathing or cough.  Denies any pain anywhere at this time    Objective :  Temp:  [96.9 °F (36.1 °C)-97.5 °F (36.4 °C)] 96.9 °F (36.1 °C)  HR:  [69-80] 80  BP: (114-135)/(67-79) 132/75  Resp:  [18-20] 20  SpO2:  [94 %-98 %] 96 %  O2 Device: Nasal cannula  Nasal Cannula O2 Flow Rate (L/min):  [2 L/min] 2 L/min    Body mass index is 31.45 kg/m².     Input and Output Summary (last 24 hours):     Intake/Output Summary (Last 24 hours) at 5/11/2025 0833  Last data filed at 5/10/2025 2103  Gross per 24 hour   Intake 340 ml   Output 350 ml   Net -10 ml       Physical Exam  General: Pleasant female.  No acute distress.  Nontachypneic and not dyspneic  Heart: Regular rate and rhythm.  S1-S2 present.  No murmur, rub, gallop  Lungs: Clear to auscultation bilaterally.  Good air movement.  No wheezes, crackles, rhonchi.  No accessory muscle use or respiratory distress  Abdomen: Soft, nontender with palpation.  Nondistended.  Normal active bowel sounds present.  No guarding or rebound.  No peritoneal signs or mass  Extremities: No clubbing, cyanosis, edema.  2+ pedal pulses bilaterally  Neurologic: Awake.  Makes eye contact.  Communicative.  Interactive.  Not lethargic.  A lot less somnolent than yesterday.  Smiles with symmetrical smile.  Cooperates with exam    Lines/Drains:              Lab Results: I have reviewed the following results:   Results from last 7 days   Lab Units 05/11/25  0608   WBC Thousand/uL 5.03   HEMOGLOBIN g/dL 7.7*   HEMATOCRIT % 23.0*   PLATELETS Thousands/uL 101*   SEGS PCT % 52   LYMPHO PCT % 33   MONO PCT % 11   EOS PCT % 3     Results from last 7 days   Lab Units  05/11/25  0608 05/08/25  1226 05/08/25  0531   SODIUM mmol/L 142   < > 139   POTASSIUM mmol/L 3.3*   < > 3.1*   CHLORIDE mmol/L 109*   < > 100   CO2 mmol/L 27   < > 30   BUN mg/dL 7   < > 6   CREATININE mg/dL 0.93   < > 0.88   ANION GAP mmol/L 6   < > 9   CALCIUM mg/dL 7.9*   < > 8.4   ALBUMIN g/dL  --   --  2.6*   TOTAL BILIRUBIN mg/dL  --   --  0.72   ALK PHOS U/L  --   --  55   ALT U/L  --   --  5*   AST U/L  --   --  13   GLUCOSE RANDOM mg/dL 98   < > 88    < > = values in this interval not displayed.     Results from last 7 days   Lab Units 05/08/25  0531   INR  1.69*     Results from last 7 days   Lab Units 05/11/25  0723 05/10/25  2032 05/10/25  1621 05/10/25  1101 05/10/25  0736 05/09/25  2127 05/09/25  1629 05/09/25  1423 05/09/25  0841 05/09/25  0802 05/08/25  1135   POC GLUCOSE mg/dl 111 94 96 110 133 116 124 89 160* 61* 94     Results from last 7 days   Lab Units 05/08/25  1226   HEMOGLOBIN A1C % 6.1*           Recent Cultures (last 7 days):       ==============================================  Imaging  5/9 MRI brain:   1.  No acute infarction, intracranial hemorrhage or mass effect.  2.  Moderate, chronic microangiopathy     5/9: CT brain  No acute intracranial abnormality.  New anterior subluxation of bilateral temporomandibular joints (left worse than right). Question if this is due to open-mouth position.  Similar moderate chronic microangiopathy     5/7 CT brain  No significant stenosis of the cervical carotid or vertebral arteries  No significant intracranial stenosis, large vessel occlusion or aneurysm.  Redemonstration of known pulmonary emboli.     5/8 CTa head/neck:  No acute intracranial abnormality. Chronic microangiopathy.      4/30 celiac/mesenteric duplex  The abdominal aorta is patent and normal in caliber, measuring approximately 1.7 cm in its greatest diameter.  The celiac and splenic arteries are patent and normal in caliber. The proximal common hepatic artery was not visualized.  The  main Renal Arteries are patent and normal in caliber bilaterally.   The superior and inferior mesenteric arteries are patent and normal in caliber.      4/30: Barium swallow   projection demonstrates degenerative changes of the spine without obvious free air.  Limited examination   No evidence of an obvious fixed esophageal obstruction. Some transient spasm appeared to be present which gave the patient's some symptomatology but contrast did eventually pass into the stomach.     4/29 Arterial lower extremity duplex  RIGHT LOWER LIMB:  Diffuse disease noted throughout the femoral-popliteal arteries without significant focal stenosis.  Ankle/Brachial index:  Unreliable due to poorly compressible tibial vessels.  PVR/ PPG tracings are normal.  Metatarsal pressure of 166 mmHg  Great toe pressure of 109 mmHg, within the healing range   LEFT LOWER LIMB:  Diffuse disease noted throughout the femoral-popliteal arteries without significant focal stenosis.  Ankle/Brachial index:   Unreliable due to poorly compressible tibial vessels.  PVR/ PPG tracings are normal.  Metatarsal pressure of 168 mmHg  Great toe pressure of 127 mmHg, within the healing range     4/29 MRI right ankle/heel  1.  No convincing evidence of osteomyelitis. There is mild reactive edema.  2.  Advanced distal Achilles tendinopathy, with a partial-thickness tear involving the deep insertional fibers.  3.  Likely small, focal longitudinal split tear of the peroneus brevis.  4.  Mild posterior tibialis tendinosis and tenosynovitis.  5.  Plantar fasciopathy and postsurgical defect.  6.  Degenerative changes, as described.     4/28 CT PE study with abdomen/pelvis  1. Extensive acute thrombus throughout the right upper middle and lower lobe segmental and distal order pulmonary arteries. RV/LV ratio 0.9.  2. No acute intra-abdominal or pelvic process.     4/28 lower extremity venous duplex  RIGHT LOWER LIMB:  There is evidence suggestive of acute vs. sub acute  deep vein thrombosis noted in the popliteal, gastrocnemius, paired posterior tibial, and paired peroneal veins.  No evidence of superficial thrombophlebitis noted.  Popliteal, posterior tibial and anterior tibial arterial Doppler waveforms are  triphasic.  LEFT LOWER LIMB:  There is evidence suggestive of acute vs. sub acute deep vein thrombosis noted in the popliteal, paired posterior tibial, and paired peroneal veins.  No evidence of superficial thrombophlebitis noted.  Popliteal, posterior tibial and anterior tibial arterial Doppler waveforms are triphasic.     4/27 right foot x-ray Stable appearance of the calcaneus. No new erosions      4/25 CT chest/abdomen/pelvis  Status post reported esophageal dilatation. No evidence for pneumomediastinum. Small hiatal hernia is noted.  No acute intra-abdominal or pelvic abnormality identified.  Colonic diverticulosis without evidence for acute diverticulitis.     Micro  4/28 positive FOB  4/28 stool enteric pathogen: neg  4/28: neg c diff  4/25 urine cx: >100,000 Kleb, >100,000ESBL E coli     Procedure  5/5: EGD  The upper third of the esophagus, middle third of the esophagus and lower third of the esophagus appeared normal.  C4M7 Wang's esophagus; electronic chromoendoscopy (NBI) was used  6 cm type I hiatal hernia  The fundus of the stomach, body of the stomach, incisura and antrum appeared normal.  Prior polypectomy site noted at the body along the greater curvature, as evidenced by the convergence of multiple gastric folds.  The duodenal bulb, 1st part of the duodenum and 2nd part of the duodenum appeared normal.  5/5 colonoscopy  The terminal ileum appeared normal.  Mild, localized petechial mucosa in the cecum and ascending colon.    - likely due to barotrauma. The mucosal petechiae developed during the procedure and does not explain her prior episodes of hematochezia  The transverse colon appeared normal.  Melanosis in the splenic flexure, descending colon,  sigmoid colon, rectosigmoid and rectum  Multiple small, scattered diverticula of mild severity with no inflammation containing blood clots and causing mild luminal narrowing (traversable) in the descending colon and sigmoid colon; there was stigmata of recent hemorrhage.    - there was some narrowing and a sharp turn at the rectosigmoid junction. There were several diverticuli that had old blood near the mouth. There was also small amounts of old blood mixed with the bowel prep  Internal small hemorrhoids observed during retroflexion  5/9: IR IVC filter placement  =============================================    Last 24 Hours Medication List:     Current Facility-Administered Medications:     acetaminophen (TYLENOL) tablet 650 mg, Q6H PRN    [Held by provider] amLODIPine (NORVASC) tablet 10 mg, Daily    dextrose 5 % and sodium chloride 0.9 % infusion, Continuous, Last Rate: 75 mL/hr (05/11/25 0633)    folic acid (FOLVITE) tablet 1 mg, Daily    hydrOXYzine HCL (ATARAX) tablet 25 mg, Q8H PRN    levothyroxine tablet 75 mcg, Early Morning    magnesium sulfate 2 g/50 mL IVPB (premix) 2 g, Once    meclizine (ANTIVERT) tablet 25 mg, TID PRN    [Held by provider] metoprolol tartrate (LOPRESSOR) tablet 50 mg, Q12H MELANY    [Held by provider] mirtazapine (REMERON) tablet 7.5 mg, HS    moisture barrier miconazole 2% cream (aka EDNA MOISTURE BARRIER ANTIFUNGAL CREAM), BID    ondansetron (ZOFRAN) injection 4 mg, Q4H PRN    pantoprazole (PROTONIX) EC tablet 40 mg, BID AC    potassium chloride 20 mEq IVPB (premix), Q2H    pravastatin (PRAVACHOL) tablet 80 mg, Daily With Dinner    Administrative Statements   Today, Patient Was Seen By: Monique Gonzalez MD      **Please Note: This note may have been constructed using a voice recognition system.**

## 2025-05-11 NOTE — PLAN OF CARE
Problem: PAIN - ADULT  Goal: Verbalizes/displays adequate comfort level or baseline comfort level  Description: Interventions:- Encourage patient to monitor pain and request assistance- Assess pain using appropriate pain scale- Administer analgesics based on type and severity of pain and evaluate response- Implement non-pharmacological measures as appropriate and evaluate response- Consider cultural and social influences on pain and pain management- Notify physician/advanced practitioner if interventions unsuccessful or patient reports new pain  Outcome: Progressing     Problem: INFECTION - ADULT  Goal: Absence or prevention of progression during hospitalization  Description: INTERVENTIONS:- Assess and monitor for signs and symptoms of infection- Monitor lab/diagnostic results- Monitor all insertion sites, i.e. indwelling lines, tubes, and drains- Monitor endotracheal if appropriate and nasal secretions for changes in amount and color- [place] appropriate cooling/warming therapies per order- Administer medications as ordered- Instruct and encourage patient and family to use good hand hygiene technique- Identify and instruct in appropriate isolation precautions for identified infection/condition  Outcome: Progressing  Goal: Absence of fever/infection during neutropenic period  Description: INTERVENTIONS:- Monitor WBC  Outcome: Progressing     Problem: SAFETY ADULT  Goal: Patient will remain free of falls  Description: INTERVENTIONS:- Educate patient/family on patient safety including physical limitations- Instruct patient to call for assistance with activity - Consult OT/PT to assist with strengthening/mobility - Keep Call bell within reach- Keep bed low and locked with side rails adjusted as appropriate- Keep care items and personal belongings within reach- Initiate and maintain comfort rounds- Make Fall Risk Sign visible to staff- Offer Toileting every 2 Hours, in advance of need- Initiate/Maintain bed alarm-  Obtain necessary fall risk management equipment: - Apply yellow socks and bracelet for high fall risk patients- Consider moving patient to room near nurses station  Outcome: Progressing  Goal: Maintain or return to baseline ADL function  Description: INTERVENTIONS:-  Assess patient's ability to carry out ADLs; assess patient's baseline for ADL function and identify physical deficits which impact ability to perform ADLs (bathing, care of mouth/teeth, toileting, grooming, dressing, etc.)- Assess/evaluate cause of self-care deficits - Assess range of motion- Assess patient's mobility; develop plan if impaired- Assess patient's need for assistive devices and provide as appropriate- Encourage maximum independence but intervene and supervise when necessary- Involve family in performance of ADLs- Assess for home care needs following discharge - Consider OT consult to assist with ADL evaluation and planning for discharge- Provide patient education as appropriate  Outcome: Progressing  Goal: Maintains/Returns to pre admission functional level  Description: INTERVENTIONS:- Perform AM-PAC 6 Click Basic Mobility/ Daily Activity assessment daily.- Set and communicate daily mobility goal to care team and patient/family/caregiver. - Collaborate with rehabilitation services on mobility goals if consulted- Perform Range of Motion 3 times a day.- Reposition patient every 2 hours.- Dangle patient 3 times a day- Stand patient 3 times a day- Ambulate patient 3 times a day- Out of bed to chair 3 times a day - Out of bed for meals 3 times a day- Out of bed for toileting- Record patient progress and toleration of activity level   Outcome: Progressing     Problem: DISCHARGE PLANNING  Goal: Discharge to home or other facility with appropriate resources  Description: INTERVENTIONS:- Identify barriers to discharge w/patient and caregiver- Arrange for needed discharge resources and transportation as appropriate- Identify discharge learning  needs (meds, wound care, etc.)- Arrange for interpretive services to assist at discharge as needed- Refer to Case Management Department for coordinating discharge planning if the patient needs post-hospital services based on physician/advanced practitioner order or complex needs related to functional status, cognitive ability, or social support system  Outcome: Progressing     Problem: Knowledge Deficit  Goal: Patient/family/caregiver demonstrates understanding of disease process, treatment plan, medications, and discharge instructions  Description: Complete learning assessment and assess knowledge base.Interventions:- Provide teaching at level of understanding- Provide teaching via preferred learning methods  Outcome: Progressing     Problem: Nutrition/Hydration-ADULT  Goal: Nutrient/Hydration intake appropriate for improving, restoring or maintaining nutritional needs  Description: Monitor and assess patient's nutrition/hydration status for malnutrition. Collaborate with interdisciplinary team and initiate plan and interventions as ordered.  Monitor patient's weight and dietary intake as ordered or per policy. Utilize nutrition screening tool and intervene as necessary. Determine patient's food preferences and provide high-protein, high-caloric foods as appropriate. INTERVENTIONS:- Monitor oral intake, urinary output, labs, and treatment plans- Assess nutrition and hydration status and recommend course of action- Evaluate amount of meals eaten- Assist patient with eating if necessary - Allow adequate time for meals- Recommend/ encourage appropriate diets, oral nutritional supplements, and vitamin/mineral supplements- Order, calculate, and assess calorie counts as needed- Recommend, monitor, and adjust tube feedings and TPN/PPN based on assessed needs- Assess need for intravenous fluids- Provide specific nutrition/hydration education as appropriate- Include patient/family/caregiver in decisions related to  nutrition  Monitor and assess patient's nutrition/hydration status for malnutrition. Collaborate with interdisciplinary team and initiate plan and interventions as ordered.  Monitor patient's weight and dietary intake as ordered or per policy. Utilize nutrition screening tool and intervene as necessary. Determine patient's food preferences and provide high-protein, high-caloric foods as appropriate. INTERVENTIONS:- Monitor oral intake, urinary output, labs, and treatment plans- Assess nutrition and hydration status and recommend course of action- Evaluate amount of meals eaten- Assist patient with eating if necessary - Allow adequate time for meals- Recommend/ encourage appropriate diets, oral nutritional supplements, and vitamin/mineral supplements- Order, calculate, and assess calorie counts as needed- Recommend, monitor, and adjust tube feedings and TPN/PPN based on assessed needs- Assess need for intravenous fluids- Provide specific nutrition/hydration education as appropriate- Include patient/family/caregiver in decisions related to nutrition  Outcome: Progressing     Problem: Prexisting or High Potential for Compromised Skin Integrity  Goal: Skin integrity is maintained or improved  Description: INTERVENTIONS:- Identify patients at risk for skin breakdown- Assess and monitor skin integrity- Assess and monitor nutrition and hydration status- Monitor labs - Assess for incontinence - Turn and reposition patient- Assist with mobility/ambulation- Relieve pressure over bony prominences- Avoid friction and shearing- Provide appropriate hygiene as needed including keeping skin clean and dry- Evaluate need for skin moisturizer/barrier cream- Collaborate with interdisciplinary team - Patient/family teaching- Consider wound care consult   Outcome: Progressing     Problem: HEMATOLOGIC - ADULT  Goal: Maintains hematologic stability  Description: INTERVENTIONS- Assess for signs and symptoms of bleeding or hemorrhage-  Monitor labs- Administer supportive blood products/factors as ordered and appropriate  Outcome: Progressing     Problem: Communication Impairment  Goal: Ability to express needs and understand communication  Description: Assess patient's communication skills and ability to understand information.  Patient will demonstrate use of effective communication techniques, alternative methods of communication and understanding even if not able to speak. - Encourage communication and provide alternate methods of communication as needed.- Collaborate with case management/ for discharge needs.- Include patient/family/caregiver in decisions related to communication.  Outcome: Progressing

## 2025-05-11 NOTE — ASSESSMENT & PLAN NOTE
Pt with low TSH and elevated free T4: However in the setting of acute illness, recent IV contrast, as well as IV steroid:  Reviewed with endocrinology on-call team: Not accurate in the setting of acute illness and above factors  Recommend recheck full TFTs in 2-4 weeks as an outpatient.

## 2025-05-11 NOTE — ASSESSMENT & PLAN NOTE
Pt was a rapid response/stroke alert 5/8 due to acute onset expression aphasia/nonsense speech  CT head/ CTa head/neck: no acute abnormality/stroke  Repeat CT scan for worsening lethargy the following day: NAD  MRI brain: No acute infarct.  Moderate angiopathy  Pt has been lethargic, with mumbling speech since rapid response 5/8  Discussed with neurology: Discontinue aspirin/statin  undergoing mon[place]jo for toxic metabolic encephalopathy:  pt with multiple insults to her system, with recurrent anemia, hypotension, anesthesia, poor sleep, and previous Atarax.  Patient was also hypoglycemic while n.p.o. for IVC filter 5/9:  Continue to evaluate and treat any underlying cause for lethargy/metabolic encephalopathy  Folate deficient: Replete  Normal B12  Appreciate geriatric team evaluation and recommendations  Patient markedly improved today: Awake and responsive

## 2025-05-11 NOTE — PLAN OF CARE
Problem: PAIN - ADULT  Goal: Verbalizes/displays adequate comfort level or baseline comfort level  Description: Interventions:- Encourage patient to monitor pain and request assistance- Assess pain using appropriate pain scale- Administer analgesics based on type and severity of pain and evaluate response- Implement non-pharmacological measures as appropriate and evaluate response- Consider cultural and social influences on pain and pain management- Notify physician/advanced practitioner if interventions unsuccessful or patient reports new pain  Outcome: Progressing     Problem: INFECTION - ADULT  Goal: Absence or prevention of progression during hospitalization  Description: INTERVENTIONS:- Assess and monitor for signs and symptoms of infection- Monitor lab/diagnostic results- Monitor all insertion sites, i.e. indwelling lines, tubes, and drains- Monitor endotracheal if appropriate and nasal secretions for changes in amount and color- [place] appropriate cooling/warming therapies per order- Administer medications as ordered- Instruct and encourage patient and family to use good hand hygiene technique- Identify and instruct in appropriate isolation precautions for identified infection/condition  Outcome: Progressing  Goal: Absence of fever/infection during neutropenic period  Description: INTERVENTIONS:- Monitor WBC  Outcome: Progressing     Problem: SAFETY ADULT  Goal: Patient will remain free of falls  Description: INTERVENTIONS:- Educate patient/family on patient safety including physical limitations- Instruct patient to call for assistance with activity - Consult OT/PT to assist with strengthening/mobility - Keep Call bell within reach- Keep bed low and locked with side rails adjusted as appropriate- Keep care items and personal belongings within reach- Initiate and maintain comfort rounds- Make Fall Risk Sign visible to staff- Offer Toileting every 2 Hours, in advance of need- Initiate/Maintain bed alarm-  Obtain necessary fall risk management equipment: - Apply yellow socks and bracelet for high fall risk patients- Consider moving patient to room near nurses station  Outcome: Progressing  Goal: Maintain or return to baseline ADL function  Description: INTERVENTIONS:-  Assess patient's ability to carry out ADLs; assess patient's baseline for ADL function and identify physical deficits which impact ability to perform ADLs (bathing, care of mouth/teeth, toileting, grooming, dressing, etc.)- Assess/evaluate cause of self-care deficits - Assess range of motion- Assess patient's mobility; develop plan if impaired- Assess patient's need for assistive devices and provide as appropriate- Encourage maximum independence but intervene and supervise when necessary- Involve family in performance of ADLs- Assess for home care needs following discharge - Consider OT consult to assist with ADL evaluation and planning for discharge- Provide patient education as appropriate  Outcome: Progressing  Goal: Maintains/Returns to pre admission functional level  Description: INTERVENTIONS:- Perform AM-PAC 6 Click Basic Mobility/ Daily Activity assessment daily.- Set and communicate daily mobility goal to care team and patient/family/caregiver. - Collaborate with rehabilitation services on mobility goals if consulted- Perform Range of Motion 3 times a day.- Reposition patient every 2 hours.- Dangle patient 3 times a day- Stand patient 3 times a day- Ambulate patient 3 times a day- Out of bed to chair 3 times a day - Out of bed for meals 3 times a day- Out of bed for toileting- Record patient progress and toleration of activity level   Outcome: Progressing     Problem: DISCHARGE PLANNING  Goal: Discharge to home or other facility with appropriate resources  Description: INTERVENTIONS:- Identify barriers to discharge w/patient and caregiver- Arrange for needed discharge resources and transportation as appropriate- Identify discharge learning  needs (meds, wound care, etc.)- Arrange for interpretive services to assist at discharge as needed- Refer to Case Management Department for coordinating discharge planning if the patient needs post-hospital services based on physician/advanced practitioner order or complex needs related to functional status, cognitive ability, or social support system  Outcome: Progressing     Problem: Knowledge Deficit  Goal: Patient/family/caregiver demonstrates understanding of disease process, treatment plan, medications, and discharge instructions  Description: Complete learning assessment and assess knowledge base.Interventions:- Provide teaching at level of understanding- Provide teaching via preferred learning methods  Outcome: Progressing     Problem: Nutrition/Hydration-ADULT  Goal: Nutrient/Hydration intake appropriate for improving, restoring or maintaining nutritional needs  Description: Monitor and assess patient's nutrition/hydration status for malnutrition. Collaborate with interdisciplinary team and initiate plan and interventions as ordered.  Monitor patient's weight and dietary intake as ordered or per policy. Utilize nutrition screening tool and intervene as necessary. Determine patient's food preferences and provide high-protein, high-caloric foods as appropriate. INTERVENTIONS:- Monitor oral intake, urinary output, labs, and treatment plans- Assess nutrition and hydration status and recommend course of action- Evaluate amount of meals eaten- Assist patient with eating if necessary - Allow adequate time for meals- Recommend/ encourage appropriate diets, oral nutritional supplements, and vitamin/mineral supplements- Order, calculate, and assess calorie counts as needed- Recommend, monitor, and adjust tube feedings and TPN/PPN based on assessed needs- Assess need for intravenous fluids- Provide specific nutrition/hydration education as appropriate- Include patient/family/caregiver in decisions related to  nutrition  Monitor and assess patient's nutrition/hydration status for malnutrition. Collaborate with interdisciplinary team and initiate plan and interventions as ordered.  Monitor patient's weight and dietary intake as ordered or per policy. Utilize nutrition screening tool and intervene as necessary. Determine patient's food preferences and provide high-protein, high-caloric foods as appropriate. INTERVENTIONS:- Monitor oral intake, urinary output, labs, and treatment plans- Assess nutrition and hydration status and recommend course of action- Evaluate amount of meals eaten- Assist patient with eating if necessary - Allow adequate time for meals- Recommend/ encourage appropriate diets, oral nutritional supplements, and vitamin/mineral supplements- Order, calculate, and assess calorie counts as needed- Recommend, monitor, and adjust tube feedings and TPN/PPN based on assessed needs- Assess need for intravenous fluids- Provide specific nutrition/hydration education as appropriate- Include patient/family/caregiver in decisions related to nutrition  Outcome: Progressing     Problem: Prexisting or High Potential for Compromised Skin Integrity  Goal: Skin integrity is maintained or improved  Description: INTERVENTIONS:- Identify patients at risk for skin breakdown- Assess and monitor skin integrity- Assess and monitor nutrition and hydration status- Monitor labs - Assess for incontinence - Turn and reposition patient- Assist with mobility/ambulation- Relieve pressure over bony prominences- Avoid friction and shearing- Provide appropriate hygiene as needed including keeping skin clean and dry- Evaluate need for skin moisturizer/barrier cream- Collaborate with interdisciplinary team - Patient/family teaching- Consider wound care consult   Outcome: Progressing     Problem: HEMATOLOGIC - ADULT  Goal: Maintains hematologic stability  Description: INTERVENTIONS- Assess for signs and symptoms of bleeding or hemorrhage-  Monitor labs- Administer supportive blood products/factors as ordered and appropriate  Outcome: Progressing     Problem: Communication Impairment  Goal: Ability to express needs and understand communication  Description: Assess patient's communication skills and ability to understand information.  Patient will demonstrate use of effective communication techniques, alternative methods of communication and understanding even if not able to speak. - Encourage communication and provide alternate methods of communication as needed.- Collaborate with case management/ for discharge needs.- Include patient/family/caregiver in decisions related to communication.  Outcome: Progressing

## 2025-05-11 NOTE — ASSESSMENT & PLAN NOTE
Pt is a 79yo female with PMH significant for hypertension, DVT/PE CKD 3, and right heel osteomyelitis initially presented to [facility] 4/25/2025 for generalized weakness.  Initially diagnosed with GAY and MDR UTI: completed course of ertapenem, and then was transferred to [facility] for GI evaluation for hematochezia/acute blood loss anemia    At [facility]: found to have DVT and PE, while INR subtherapeutic, and was started on heparin infusion, with hematochezia and ABLA.-->transferred to [facility] for GI eval  5/5 EGD:   Wang's esophagus.  Sliding hiatal hernia.  No evidence of bleeding  5/5 colonoscopy:   Petechial mucosa in the cecum/ascending colon attributed to barotrauma as it developed during the procedure.  Melanosis in the splenic flexure, descending colon, sigmoid colon, rectosigmoid, and rectum.  Several diverticuli with old blood   Post endoscopy Case was discussed with GI, they suspected diverticular bleed, since resolved, noted anticoagulation could be restarted  Patient was started on anticoagulation with heparin/Lovenox bridging therapy without bleeding and then given 1 dose of Coumadin however had subsequent large hematochezia, with hypotension and acute blood loss anemia, requiring additional transfusions  CTa bleeding scan: Unremarkable  She was seen by GI who did not recommend additional endoscopy  Anticoagulation has been completely discontinued, and reversed w FFP  Likely diverticular bleeds  Hb ranging 7-8  Additional anticoagulation currently contraindicated

## 2025-05-11 NOTE — ASSESSMENT & PLAN NOTE
baseline hemoglobin appears to be variable over the last year ranging 7.5-10  Acute blood loss anemia due to hematochezia secondary to diverticular bleed  Was transfused   1 unit PRBC 5/3   2 units PRBC 5/4.  1 unit packed red blood cells 5/7  2 units FFP 5/7  Acute blood loss anemia secondary to lower GI bleed secondary to GI bleed  Continue to monitor hemoglobin closely: Ranging 7-8      Results from last 7 days   Lab Units 05/11/25  0608 05/10/25  0555 05/09/25  1241 05/09/25  0536 05/08/25  2349 05/08/25  1226   HEMOGLOBIN g/dL 7.7* 7.4* 8.2* 7.7* 7.9* 7.9*

## 2025-05-11 NOTE — ASSESSMENT & PLAN NOTE
Initially presented to [facility] 4/25/2025 for generalized weakness found to have kidney injury and MDR UTI  Renal function has returned back to baseline  Torsemide discontinued during March 2025 hospitalization    Results from last 7 days   Lab Units 05/11/25  0608 05/10/25  0555 05/09/25  0536 05/08/25  1226 05/08/25  0531 05/07/25  0432 05/06/25  0320 05/05/25  0452   BUN mg/dL 7 7 8 8 6 6 6 6   CREATININE mg/dL 0.93 0.94 0.92 0.90 0.88 0.84 0.68 0.70   EGFR ml/min/1.73sq m 57 57 58 60 61 65 82 81

## 2025-05-11 NOTE — ASSESSMENT & PLAN NOTE
Extensive right upper and lower lobe pulmonary embolism seen on 4/28/2025 CTA  2D echocardiogram without evidence of right heart strain  Does have history of DVT and PE: Maintained on warfarin previously, however pt states she had not taken the month prior to admission--(INR 1.18)  Due to subtherapeutic INR, was started on bridging therapy with anticoagulation however had intractable GI bleed  Anticoagulation currently contraindicated due to recurrent lower GI bleed on anticoagulation  Appreciate pulmonology evaluation: IVC filter recommended  Temporary IVC filter placed 5/9 by IR: Outpatient follow-up for removal

## 2025-05-12 PROBLEM — E83.42 HYPOMAGNESEMIA: Status: RESOLVED | Noted: 2025-05-09 | Resolved: 2025-05-12

## 2025-05-12 PROBLEM — E16.2 HYPOGLYCEMIA: Status: RESOLVED | Noted: 2025-05-09 | Resolved: 2025-05-12

## 2025-05-12 LAB
ALBUMIN SERPL BCG-MCNC: 2.5 G/DL (ref 3.5–5)
ALP SERPL-CCNC: 54 U/L (ref 34–104)
ALT SERPL W P-5'-P-CCNC: 6 U/L (ref 7–52)
ANION GAP SERPL CALCULATED.3IONS-SCNC: 6 MMOL/L (ref 4–13)
AST SERPL W P-5'-P-CCNC: 14 U/L (ref 13–39)
BASOPHILS # BLD AUTO: 0.01 THOUSANDS/ÂΜL (ref 0–0.1)
BASOPHILS NFR BLD AUTO: 0 % (ref 0–1)
BILIRUB SERPL-MCNC: 0.57 MG/DL (ref 0.2–1)
BUN SERPL-MCNC: 7 MG/DL (ref 5–25)
CALCIUM ALBUM COR SERPL-MCNC: 8.9 MG/DL (ref 8.3–10.1)
CALCIUM SERPL-MCNC: 7.7 MG/DL (ref 8.4–10.2)
CHLORIDE SERPL-SCNC: 109 MMOL/L (ref 96–108)
CO2 SERPL-SCNC: 27 MMOL/L (ref 21–32)
CREAT SERPL-MCNC: 0.91 MG/DL (ref 0.6–1.3)
EOSINOPHIL # BLD AUTO: 0.08 THOUSAND/ÂΜL (ref 0–0.61)
EOSINOPHIL NFR BLD AUTO: 2 % (ref 0–6)
ERYTHROCYTE [DISTWIDTH] IN BLOOD BY AUTOMATED COUNT: 16.6 % (ref 11.6–15.1)
GFR SERPL CREATININE-BSD FRML MDRD: 59 ML/MIN/1.73SQ M
GLUCOSE SERPL-MCNC: 112 MG/DL (ref 65–140)
GLUCOSE SERPL-MCNC: 122 MG/DL (ref 65–140)
HCT VFR BLD AUTO: 22.8 % (ref 34.8–46.1)
HGB BLD-MCNC: 7.4 G/DL (ref 11.5–15.4)
IMM GRANULOCYTES # BLD AUTO: 0.02 THOUSAND/UL (ref 0–0.2)
IMM GRANULOCYTES NFR BLD AUTO: 1 % (ref 0–2)
LYMPHOCYTES # BLD AUTO: 1.2 THOUSANDS/ÂΜL (ref 0.6–4.47)
LYMPHOCYTES NFR BLD AUTO: 31 % (ref 14–44)
MAGNESIUM SERPL-MCNC: 2.4 MG/DL (ref 1.9–2.7)
MCH RBC QN AUTO: 28.2 PG (ref 26.8–34.3)
MCHC RBC AUTO-ENTMCNC: 32.5 G/DL (ref 31.4–37.4)
MCV RBC AUTO: 87 FL (ref 82–98)
MONOCYTES # BLD AUTO: 0.42 THOUSAND/ÂΜL (ref 0.17–1.22)
MONOCYTES NFR BLD AUTO: 11 % (ref 4–12)
NEUTROPHILS # BLD AUTO: 2.14 THOUSANDS/ÂΜL (ref 1.85–7.62)
NEUTS SEG NFR BLD AUTO: 55 % (ref 43–75)
NRBC BLD AUTO-RTO: 0 /100 WBCS
PLATELET # BLD AUTO: 80 THOUSANDS/UL (ref 149–390)
PMV BLD AUTO: 9.8 FL (ref 8.9–12.7)
POTASSIUM SERPL-SCNC: 2.9 MMOL/L (ref 3.5–5.3)
PROT SERPL-MCNC: 4.9 G/DL (ref 6.4–8.4)
RBC # BLD AUTO: 2.62 MILLION/UL (ref 3.81–5.12)
SODIUM SERPL-SCNC: 142 MMOL/L (ref 135–147)
WBC # BLD AUTO: 3.87 THOUSAND/UL (ref 4.31–10.16)

## 2025-05-12 PROCEDURE — 99233 SBSQ HOSP IP/OBS HIGH 50: CPT

## 2025-05-12 PROCEDURE — 83735 ASSAY OF MAGNESIUM: CPT | Performed by: INTERNAL MEDICINE

## 2025-05-12 PROCEDURE — 80053 COMPREHEN METABOLIC PANEL: CPT | Performed by: INTERNAL MEDICINE

## 2025-05-12 PROCEDURE — 82948 REAGENT STRIP/BLOOD GLUCOSE: CPT

## 2025-05-12 PROCEDURE — 85025 COMPLETE CBC W/AUTO DIFF WBC: CPT | Performed by: INTERNAL MEDICINE

## 2025-05-12 PROCEDURE — 99232 SBSQ HOSP IP/OBS MODERATE 35: CPT | Performed by: INTERNAL MEDICINE

## 2025-05-12 RX ORDER — POTASSIUM CHLORIDE 14.9 MG/ML
20 INJECTION INTRAVENOUS ONCE
Status: COMPLETED | OUTPATIENT
Start: 2025-05-12 | End: 2025-05-12

## 2025-05-12 RX ORDER — POTASSIUM CHLORIDE 1500 MG/1
40 TABLET, EXTENDED RELEASE ORAL ONCE
Status: COMPLETED | OUTPATIENT
Start: 2025-05-12 | End: 2025-05-12

## 2025-05-12 RX ADMIN — PANTOPRAZOLE SODIUM 40 MG: 40 TABLET, DELAYED RELEASE ORAL at 17:09

## 2025-05-12 RX ADMIN — FOLIC ACID 1 MG: 1 TABLET ORAL at 08:48

## 2025-05-12 RX ADMIN — DEXTROSE AND SODIUM CHLORIDE 75 ML/HR: 5; .9 INJECTION, SOLUTION INTRAVENOUS at 06:21

## 2025-05-12 RX ADMIN — PANTOPRAZOLE SODIUM 40 MG: 40 TABLET, DELAYED RELEASE ORAL at 06:21

## 2025-05-12 RX ADMIN — POTASSIUM CHLORIDE 20 MEQ: 14.9 INJECTION, SOLUTION INTRAVENOUS at 17:43

## 2025-05-12 RX ADMIN — POTASSIUM CHLORIDE 40 MEQ: 1500 TABLET, EXTENDED RELEASE ORAL at 17:43

## 2025-05-12 RX ADMIN — MICONAZOLE NITRATE: 20 CREAM TOPICAL at 08:48

## 2025-05-12 RX ADMIN — LEVOTHYROXINE SODIUM 75 MCG: 75 TABLET ORAL at 06:21

## 2025-05-12 RX ADMIN — PRAVASTATIN SODIUM 80 MG: 80 TABLET ORAL at 17:09

## 2025-05-12 RX ADMIN — MICONAZOLE NITRATE: 20 CREAM TOPICAL at 17:10

## 2025-05-12 NOTE — ASSESSMENT & PLAN NOTE
Pt is a 79yo female with PMH significant for hypertension, DVT/PE CKD 3, and right heel osteomyelitis initially presented to [facility] 4/25/2025 for generalized weakness.  Initially diagnosed with GAY and MDR UTI: completed course of ertapenem, and then was transferred to [facility] for GI evaluation for hematochezia/acute blood loss anemia    At [facility]: found to have DVT and PE, while INR subtherapeutic, and was started on heparin infusion, with hematochezia and ABLA.-->transferred to [facility] for GI eval  5/5 EGD:   Wang's esophagus.  Sliding hiatal hernia.  No evidence of bleeding  5/5 colonoscopy:   Petechial mucosa in the cecum/ascending colon attributed to barotrauma as it developed during the procedure.  Melanosis in the splenic flexure, descending colon, sigmoid colon, rectosigmoid, and rectum.  Several diverticuli with old blood   Post endoscopy Case was discussed with GI, they suspected diverticular bleed, since resolved, noted anticoagulation could be restarted  Patient was started on anticoagulation with heparin/Lovenox bridging therapy without bleeding and then given 1 dose of Coumadin however had subsequent large hematochezia, with hypotension and acute blood loss anemia, requiring additional transfusions  CTa bleeding scan: Unremarkable  She was seen by GI who did not recommend additional endoscopy  Her hematochezia was likely due to diverticular bleed on top of anticoagulation  Anticoagulation has been completely discontinued, and reversed w FFP  Additional anticoagulation currently contraindicated

## 2025-05-12 NOTE — PLAN OF CARE
Problem: PAIN - ADULT  Goal: Verbalizes/displays adequate comfort level or baseline comfort level  Description: Interventions:- Encourage patient to monitor pain and request assistance- Assess pain using appropriate pain scale- Administer analgesics based on type and severity of pain and evaluate response- Implement non-pharmacological measures as appropriate and evaluate response- Consider cultural and social influences on pain and pain management- Notify physician/advanced practitioner if interventions unsuccessful or patient reports new pain  Outcome: Progressing     Problem: INFECTION - ADULT  Goal: Absence or prevention of progression during hospitalization  Description: INTERVENTIONS:- Assess and monitor for signs and symptoms of infection- Monitor lab/diagnostic results- Monitor all insertion sites, i.e. indwelling lines, tubes, and drains- Monitor endotracheal if appropriate and nasal secretions for changes in amount and color- [place] appropriate cooling/warming therapies per order- Administer medications as ordered- Instruct and encourage patient and family to use good hand hygiene technique- Identify and instruct in appropriate isolation precautions for identified infection/condition  Outcome: Progressing  Goal: Absence of fever/infection during neutropenic period  Description: INTERVENTIONS:- Monitor WBC  Outcome: Progressing     Problem: SAFETY ADULT  Goal: Patient will remain free of falls  Description: INTERVENTIONS:- Educate patient/family on patient safety including physical limitations- Instruct patient to call for assistance with activity - Consult OT/PT to assist with strengthening/mobility - Keep Call bell within reach- Keep bed low and locked with side rails adjusted as appropriate- Keep care items and personal belongings within reach- Initiate and maintain comfort rounds- Make Fall Risk Sign visible to staff- Offer Toileting every 2 Hours, in advance of need- Initiate/Maintain bed alarm-  Obtain necessary fall risk management equipment: - Apply yellow socks and bracelet for high fall risk patients- Consider moving patient to room near nurses station  Outcome: Progressing  Goal: Maintain or return to baseline ADL function  Description: INTERVENTIONS:-  Assess patient's ability to carry out ADLs; assess patient's baseline for ADL function and identify physical deficits which impact ability to perform ADLs (bathing, care of mouth/teeth, toileting, grooming, dressing, etc.)- Assess/evaluate cause of self-care deficits - Assess range of motion- Assess patient's mobility; develop plan if impaired- Assess patient's need for assistive devices and provide as appropriate- Encourage maximum independence but intervene and supervise when necessary- Involve family in performance of ADLs- Assess for home care needs following discharge - Consider OT consult to assist with ADL evaluation and planning for discharge- Provide patient education as appropriate  Outcome: Progressing  Goal: Maintains/Returns to pre admission functional level  Description: INTERVENTIONS:- Perform AM-PAC 6 Click Basic Mobility/ Daily Activity assessment daily.- Set and communicate daily mobility goal to care team and patient/family/caregiver. - Collaborate with rehabilitation services on mobility goals if consulted- Perform Range of Motion 3 times a day.- Reposition patient every 2 hours.- Dangle patient 3 times a day- Stand patient 3 times a day- Ambulate patient 3 times a day- Out of bed to chair 3 times a day - Out of bed for meals 3 times a day- Out of bed for toileting- Record patient progress and toleration of activity level   Outcome: Progressing     Problem: DISCHARGE PLANNING  Goal: Discharge to home or other facility with appropriate resources  Description: INTERVENTIONS:- Identify barriers to discharge w/patient and caregiver- Arrange for needed discharge resources and transportation as appropriate- Identify discharge learning  needs (meds, wound care, etc.)- Arrange for interpretive services to assist at discharge as needed- Refer to Case Management Department for coordinating discharge planning if the patient needs post-hospital services based on physician/advanced practitioner order or complex needs related to functional status, cognitive ability, or social support system  Outcome: Progressing     Problem: Knowledge Deficit  Goal: Patient/family/caregiver demonstrates understanding of disease process, treatment plan, medications, and discharge instructions  Description: Complete learning assessment and assess knowledge base.Interventions:- Provide teaching at level of understanding- Provide teaching via preferred learning methods  Outcome: Progressing     Problem: Nutrition/Hydration-ADULT  Goal: Nutrient/Hydration intake appropriate for improving, restoring or maintaining nutritional needs  Description: Monitor and assess patient's nutrition/hydration status for malnutrition. Collaborate with interdisciplinary team and initiate plan and interventions as ordered.  Monitor patient's weight and dietary intake as ordered or per policy. Utilize nutrition screening tool and intervene as necessary. Determine patient's food preferences and provide high-protein, high-caloric foods as appropriate. INTERVENTIONS:- Monitor oral intake, urinary output, labs, and treatment plans- Assess nutrition and hydration status and recommend course of action- Evaluate amount of meals eaten- Assist patient with eating if necessary - Allow adequate time for meals- Recommend/ encourage appropriate diets, oral nutritional supplements, and vitamin/mineral supplements- Order, calculate, and assess calorie counts as needed- Recommend, monitor, and adjust tube feedings and TPN/PPN based on assessed needs- Assess need for intravenous fluids- Provide specific nutrition/hydration education as appropriate- Include patient/family/caregiver in decisions related to  nutrition  Monitor and assess patient's nutrition/hydration status for malnutrition. Collaborate with interdisciplinary team and initiate plan and interventions as ordered.  Monitor patient's weight and dietary intake as ordered or per policy. Utilize nutrition screening tool and intervene as necessary. Determine patient's food preferences and provide high-protein, high-caloric foods as appropriate. INTERVENTIONS:- Monitor oral intake, urinary output, labs, and treatment plans- Assess nutrition and hydration status and recommend course of action- Evaluate amount of meals eaten- Assist patient with eating if necessary - Allow adequate time for meals- Recommend/ encourage appropriate diets, oral nutritional supplements, and vitamin/mineral supplements- Order, calculate, and assess calorie counts as needed- Recommend, monitor, and adjust tube feedings and TPN/PPN based on assessed needs- Assess need for intravenous fluids- Provide specific nutrition/hydration education as appropriate- Include patient/family/caregiver in decisions related to nutrition  Outcome: Progressing     Problem: Prexisting or High Potential for Compromised Skin Integrity  Goal: Skin integrity is maintained or improved  Description: INTERVENTIONS:- Identify patients at risk for skin breakdown- Assess and monitor skin integrity- Assess and monitor nutrition and hydration status- Monitor labs - Assess for incontinence - Turn and reposition patient- Assist with mobility/ambulation- Relieve pressure over bony prominences- Avoid friction and shearing- Provide appropriate hygiene as needed including keeping skin clean and dry- Evaluate need for skin moisturizer/barrier cream- Collaborate with interdisciplinary team - Patient/family teaching- Consider wound care consult   Outcome: Progressing     Problem: HEMATOLOGIC - ADULT  Goal: Maintains hematologic stability  Description: INTERVENTIONS- Assess for signs and symptoms of bleeding or hemorrhage-  Monitor labs- Administer supportive blood products/factors as ordered and appropriate  Outcome: Progressing     Problem: Communication Impairment  Goal: Ability to express needs and understand communication  Description: Assess patient's communication skills and ability to understand information.  Patient will demonstrate use of effective communication techniques, alternative methods of communication and understanding even if not able to speak. - Encourage communication and provide alternate methods of communication as needed.- Collaborate with case management/ for discharge needs.- Include patient/family/caregiver in decisions related to communication.  Outcome: Progressing

## 2025-05-12 NOTE — CASE MANAGEMENT
Case Management Discharge Planning Note    Patient name Alecia Kay  [facility] -* MRN [id number]  : 1945 Date 2025       Current Admission Date: 2025  Current Admission Diagnosis:Hematochezia   Patient Active Problem List    Diagnosis Date Noted Date Diagnosed    Folic acid deficiency 2025     Abnormal TSH 2025     Goals of care, counseling/discussion 2025     Hypoglycemia 2025     Hypomagnesemia 2025     Word finding difficulty 2025     Wang's esophagus 2025     Insertional Achilles tendinopathy 2025     Chronic osteomyelitis (HCC) 2025     Acute blood loss anemia (ABLA) 2025     Acute pulmonary embolism (HCC) 2025     Acute respiratory insufficiency 2025     Hematochezia 2025     Acute DVT (deep venous thrombosis) (HCC) 2025     History of DVT (deep vein thrombosis) 2025     Tachycardia 2025     Acute cystitis 2025     Generalized weakness 2025     Bilateral lower extremity edema 2025     Severe protein-calorie malnutrition (HCC) 2025     History of osteomyelitis 2025     Acute kidney injury (HCC) 2025     Chronic kidney disease, stage III (moderate) (HCC) 2025     Hypothyroidism 2025     Hypertension 2025     Electrolyte imbalance 2025       LOS (days): 10  Geometric Mean LOS (GMLOS) (days): 4.5  Days to GMLOS:-5.5     OBJECTIVE:  Risk of Unplanned Readmission Score: 22.84         Current admission status: Inpatient   Preferred Pharmacy:   [place], [place] - [address] [facility]  [address] [facility] [address]  Phone: [phone] Fax: [phone]    Primary Care Provider: Rambo Perera DO    Primary Insurance: [employer]  Secondary Insurance:     DISCHARGE DETAILS:                                                                                                  Additional Comments: CM had met with the pt and son, Jaron at the bedside.  Pt appears more alert today and appropriate although speech still seems garbled.  Per son, she seemed much better by Sunday.  CM spoke about next steps after acute care.  We discussed the the referral list for SNF that was provided to them last week.  He stated to [facility] since it was closer to her home and to her nephew that lives with her.  They asked about the remainder of her skilled coverage.  They were told that she was starting to get billed privately.  CM confirmed to them [facility] the previous facility told CM that her last skilled day was on 3/24/25 and that she was being billed privately.,  Therefore she should have about 39 skilled days remaining.  They stated that they clarified her assests and she does have part of a duplex that is still under her name or was sold/given to the nephew less than 5 years ago.  They think that asset is worth about $40,000.  They were told that that asset would need to be spent done and then she could possibly qualify for MA. for lTC.  They were informed she should be able to satart under her Medicare to start at the facility and they should speak to a SW asap to speak about plans and possible long term care and MA.  They still spoke about possibly taking her home.  CM encouraged them to a couple of plans for discharge and to determine what was needed to succussfully meet those discharge plans.  They verbalized understanding.

## 2025-05-12 NOTE — ASSESSMENT & PLAN NOTE
MRI with advanced distal Achilles tendinopathy with partial thickness tear involving the deep insertional fibers  Also small focal longitudinal split tear of the peroneus brevis, posterior tibialis tendon stenosis and tenosynovitis, and plantar fasciaopathy  PT/OT  Short-term rehab placement pending

## 2025-05-12 NOTE — ASSESSMENT & PLAN NOTE
Pt was a rapid response/stroke alert 5/8 due to acute onset expression aphasia/nonsense speech  CT head/ CTa head/neck: no acute abnormality/stroke  Repeat CT scan for worsening lethargy the following day: NAD  MRI brain: No acute infarct.  Moderate angiopathy  Pt has been lethargic, with mumbling speech since rapid response 5/8  Discussed with neurology: Discontinue aspirin/statin  undergoing montejo for toxic metabolic encephalopathy:  pt with multiple insults to her system, with recurrent anemia, hypotension, anesthesia, poor sleep, and previous Atarax.  Patient was also hypoglycemic while n.p.o. for IVC filter 5/9:  Continue to evaluate and treat any underlying cause for lethargy/metabolic encephalopathy  Folate deficient: Replete  Normal B12

## 2025-05-12 NOTE — ASSESSMENT & PLAN NOTE
Extensive right upper and lower lobe pulmonary embolism seen on 4/28/2025 CTA  2D echocardiogram without evidence of right heart strain  Does have history of DVT and PE: Maintained on warfarin previously, however pt states she had not taken the month prior to admission--(INR 1.18)  Due to subtherapeutic INR, was started on bridging therapy with anticoagulation however had intractable GI bleed  Anticoagulation currently contraindicated due to recurrent lower GI bleed on anticoagulation  She was evaluated by pulmonology who recommended  IVC filter placement.  Temporary IVC filter placed 5/9 by IR: Outpatient follow-up for removal

## 2025-05-12 NOTE — PROGRESS NOTES
Progress Note - Hospitalist   Name: Alecia Kay 80 y.o. female I MRN: [id number]  Unit/Bed#: E4 -01 I Date of Admission: 5/2/2025   Date of Service: 5/12/2025 I Hospital Day: 10    Assessment & Plan  Hematochezia  Pt is a 79yo female with PMH significant for hypertension, DVT/PE CKD 3, and right heel osteomyelitis initially presented to [facility] 4/25/2025 for generalized weakness.  Initially diagnosed with GAY and MDR UTI: completed course of ertapenem, and then was transferred to [facility] for GI evaluation for hematochezia/acute blood loss anemia    At [facility]: found to have DVT and PE, while INR subtherapeutic, and was started on heparin infusion, with hematochezia and ABLA.-->transferred to [facility] for GI eval  5/5 EGD:   Wang's esophagus.  Sliding hiatal hernia.  No evidence of bleeding  5/5 colonoscopy:   Petechial mucosa in the cecum/ascending colon attributed to barotrauma as it developed during the procedure.  Melanosis in the splenic flexure, descending colon, sigmoid colon, rectosigmoid, and rectum.  Several diverticuli with old blood   Post endoscopy Case was discussed with GI, they suspected diverticular bleed, since resolved, noted anticoagulation could be restarted  Patient was started on anticoagulation with heparin/Lovenox bridging therapy without bleeding and then given 1 dose of Coumadin however had subsequent large hematochezia, with hypotension and acute blood loss anemia, requiring additional transfusions  CTa bleeding scan: Unremarkable  She was seen by GI who did not recommend additional endoscopy  Her hematochezia was likely due to diverticular bleed on top of anticoagulation  Anticoagulation has been completely discontinued, and reversed w FFP  Additional anticoagulation currently contraindicated  Acute blood loss anemia (ABLA)  baseline hemoglobin appears to be variable over the last year ranging 7.5-10  Acute blood loss anemia due to hematochezia secondary to  diverticular bleed  Was transfused   1 unit PRBC 5/3   2 units PRBC 5/4.  1 unit packed red blood cells 5/7  2 units FFP 5/7  Acute blood loss anemia secondary to lower GI bleed secondary to GI bleed  Continue to monitor hemoglobin closely: Ranging 7-8  Repeat CBC in a.m.      Results from last 7 days   Lab Units 05/12/25  0624 05/11/25  0608 05/10/25  0555 05/09/25  1241 05/09/25  0536 05/08/25  2349   HEMOGLOBIN g/dL 7.4* 7.7* 7.4* 8.2* 7.7* 7.9*     Acute pulmonary embolism (HCC)  Extensive right upper and lower lobe pulmonary embolism seen on 4/28/2025 CTA  2D echocardiogram without evidence of right heart strain  Does have history of DVT and PE: Maintained on warfarin previously, however pt states she had not taken the month prior to admission--(INR 1.18)  Due to subtherapeutic INR, was started on bridging therapy with anticoagulation however had intractable GI bleed  Anticoagulation currently contraindicated due to recurrent lower GI bleed on anticoagulation  She was evaluated by pulmonology who recommended  IVC filter placement.  Temporary IVC filter placed 5/9 by IR: Outpatient follow-up for removal  Word finding difficulty  Pt was a rapid response/stroke alert 5/8 due to acute onset expression aphasia/nonsense speech  CT head/ CTa head/neck: no acute abnormality/stroke  Repeat CT scan for worsening lethargy the following day: NAD  MRI brain: No acute infarct.  Moderate angiopathy  Pt has been lethargic, with mumbling speech since rapid response 5/8  Discussed with neurology: Discontinue aspirin/statin  undergoing montejo for toxic metabolic encephalopathy:  pt with multiple insults to her system, with recurrent anemia, hypotension, anesthesia, poor sleep, and previous Atarax.  Patient was also hypoglycemic while n.p.o. for IVC filter 5/9:  Continue to evaluate and treat any underlying cause for lethargy/metabolic encephalopathy  Folate deficient: Replete  Normal B12  Acute respiratory insufficiency  Likely  secondary to pulmonary embolism: Was placed on 2 L nasal cannula, since improved  Currently with adequate oxygenation on 2 L  Hypertension  Patient previously previously on amlodipine/beta-blocker  Medications held for hypotension with hematochezia  Monitor  Acute DVT (deep venous thrombosis) ([facility])  History of DVT   However during 4/25/2025 [facility] admission found to have bilateral extensive extensive acute vs subacute DVTs, in the setting of subtherapeutic INR  IVC filter placed 5/9 due to recurrent bleeding on anticoagulation  Chronic kidney disease, stage III (moderate) ([facility])  Initially presented to [facility] 4/25/2025 for generalized weakness found to have kidney injury and MDR UTI  Renal function has returned back to baseline  Torsemide discontinued during March 2025 hospitalization    Results from last 7 days   Lab Units 05/12/25  0624 05/11/25  0608 05/10/25  0555 05/09/25  0536 05/08/25  1226 05/08/25  0531 05/07/25  0432 05/06/25  0320   BUN mg/dL 7 7 7 8 8 6 6 6   CREATININE mg/dL 0.91 0.93 0.94 0.92 0.90 0.88 0.84 0.68   EGFR ml/min/1.73sq m 59 57 57 58 60 61 65 82     Hypothyroidism  Continue levothyroxine  Wang's esophagus  Noted on EGD  Will need repeat EGD in 3 years for surveillance  Patient with dysphagia: Continue proton pump inhibitor bid  Insertional Achilles tendinopathy  MRI with advanced distal Achilles tendinopathy with partial thickness tear involving the deep insertional fibers  Also small focal longitudinal split tear of the peroneus brevis, posterior tibialis tendon stenosis and tenosynovitis, and plantar fasciaopathy  PT/OT  Short-term rehab placement pending  Chronic osteomyelitis ([facility])  Per previous records: Chronic osteomyelitis of the toe of right foot  Per podiatry: Weightbearing as tolerated in postop shoe  Completed 6 weeks of IV antibiotics prior to recent admission  MRI right ankle/heel 4/29/2025: No evidence of remaining osteomyelitis  Patient was reevaluated by podiatry 4/30/2025:  Weightbearing as tolerated in surgical shoe.  Local wound care.    outpatient podiatry follow-up  Goals of care, counseling/discussion  Reviewed prior records:  pt filled out POLST 1/31/25 and was DNR, was also DNR on 3/25 admission:  d/w son at bedside- he notes per conversations w pt in the past, she did not want life support/heroic measures  Will uphold pt's POLST for DNR  Hypoglycemia (Resolved: 5/12/2025)  While NPO:  started D5NS after amp D50 with improvement  Cont to monitor closely, encourage p.o. intake  Hypomagnesemia (Resolved: 5/12/2025)  Repleted and improved  Replete hypokalemia  Folic acid deficiency  Supplement started  Abnormal TSH  Pt with low TSH and elevated free T4: However in the setting of acute illness, recent IV contrast, as well as IV steroid:  Reviewed with endocrinology on-call team: Not accurate in the setting of acute illness and above factors  Recommend recheck full TFTs in 2-4 weeks as an outpatient.    VTE Pharmacologic Prophylaxis: VTE Score: 6 High Risk (Score >/= 5) - Pharmacological DVT Prophylaxis Contraindicated. Sequential Compression Devices Ordered.      Patient Centered Rounds: Discussed with her nurse  Discussions with Specialists or Other Care Team Provider: Chart reviewed  Education and Discussions with Family / Patient: Updated  (son) via phone.    Current Length of Stay: 10 day(s)  Current Patient Status: Inpatient   Certification Statement: The patient will continue to require additional inpatient hospital stay due to anemia from GI bleed  Discharge Plan: Anticipate discharge in 24-48 hrs to rehab facility.    Code Status: Level 3 - DNAR and DNI    Subjective   Seen and examined.  Denied any shortness of breath.  No reported bleeding overnight.    Objective :  Temp:  [96.1 °F (35.6 °C)-98.4 °F (36.9 °C)] 98.4 °F (36.9 °C)  HR:  [64-78] 68  BP: (122-141)/(69-76) 141/75  Resp:  [16-18] 18  SpO2:  [94 %-95 %] 95 %  O2 Device: None (Room air)    Body mass  index is 31.45 kg/m².     Input and Output Summary (last 24 hours):     Intake/Output Summary (Last 24 hours) at 5/12/2025 1729  Last data filed at 5/11/2025 2148  Gross per 24 hour   Intake 120 ml   Output --   Net 120 ml       Physical Exam  Vitals reviewed.   HENT:      Head: Normocephalic and atraumatic.      Nose: No congestion or rhinorrhea.   Eyes:      General: No scleral icterus.  Cardiovascular:      Rate and Rhythm: Normal rate and regular rhythm.   Pulmonary:      Breath sounds: No wheezing or rhonchi.   Abdominal:      Palpations: Abdomen is soft.      Tenderness: There is no abdominal tenderness. There is no guarding.   Musculoskeletal:      Right lower leg: No edema.      Left lower leg: No edema.   Skin:     General: Skin is warm and dry.   Neurological:      Comments: Awake alert cooperative   Psychiatric:         Behavior: Behavior normal.         Lab Results: I have reviewed the following results:   Results from last 7 days   Lab Units 05/12/25  0624   WBC Thousand/uL 3.87*   HEMOGLOBIN g/dL 7.4*   HEMATOCRIT % 22.8*   PLATELETS Thousands/uL 80*   SEGS PCT % 55   LYMPHO PCT % 31   MONO PCT % 11   EOS PCT % 2     Results from last 7 days   Lab Units 05/12/25  0624   SODIUM mmol/L 142   POTASSIUM mmol/L 2.9*   CHLORIDE mmol/L 109*   CO2 mmol/L 27   BUN mg/dL 7   CREATININE mg/dL 0.91   ANION GAP mmol/L 6   CALCIUM mg/dL 7.7*   ALBUMIN g/dL 2.5*   TOTAL BILIRUBIN mg/dL 0.57   ALK PHOS U/L 54   ALT U/L 6*   AST U/L 14   GLUCOSE RANDOM mg/dL 112     Results from last 7 days   Lab Units 05/08/25  0531   INR  1.69*     Results from last 7 days   Lab Units 05/11/25  2109 05/11/25  1548 05/11/25  1115 05/11/25  0723 05/10/25  2032 05/10/25  1621 05/10/25  1101 05/10/25  0736 05/09/25  2127 05/09/25  1629 05/09/25  1423 05/09/25  0841   POC GLUCOSE mg/dl 139 117 106 111 94 96 110 133 116 124 89 160*     Results from last 7 days   Lab Units 05/08/25  1226   HEMOGLOBIN A1C % 6.1*           Recent Cultures  (last 7 days):         Imaging Results Review: I reviewed radiology reports from this admission including: CT chest, CT abdomen/pelvis, and MRI brain.      Last 24 Hours Medication List:     Current Facility-Administered Medications:     acetaminophen (TYLENOL) tablet 650 mg, Q6H PRN    [Held by provider] amLODIPine (NORVASC) tablet 10 mg, Daily    folic acid (FOLVITE) tablet 1 mg, Daily    hydrOXYzine HCL (ATARAX) tablet 25 mg, Q8H PRN    levothyroxine tablet 75 mcg, Early Morning    meclizine (ANTIVERT) tablet 25 mg, TID PRN    [Held by provider] metoprolol tartrate (LOPRESSOR) tablet 50 mg, Q12H MELANY    [Held by provider] mirtazapine (REMERON) tablet 7.5 mg, HS    moisture barrier miconazole 2% cream (aka EDNA MOISTURE BARRIER ANTIFUNGAL CREAM), BID    ondansetron (ZOFRAN) injection 4 mg, Q4H PRN    pantoprazole (PROTONIX) EC tablet 40 mg, BID AC    potassium chloride (Klor-Con M20) CR tablet 40 mEq, Once    potassium chloride 20 mEq IVPB (premix), Once    pravastatin (PRAVACHOL) tablet 80 mg, Daily With Dinner    Administrative Statements   Today, Patient Was Seen By: Oleksandr Peace MD      **Please Note: This note may have been constructed using a voice recognition system.**

## 2025-05-12 NOTE — PLAN OF CARE
Problem: PAIN - ADULT  Goal: Verbalizes/displays adequate comfort level or baseline comfort level  Description: Interventions:- Encourage patient to monitor pain and request assistance- Assess pain using appropriate pain scale- Administer analgesics based on type and severity of pain and evaluate response- Implement non-pharmacological measures as appropriate and evaluate response- Consider cultural and social influences on pain and pain management- Notify physician/advanced practitioner if interventions unsuccessful or patient reports new pain  Outcome: Progressing     Problem: INFECTION - ADULT  Goal: Absence or prevention of progression during hospitalization  Description: INTERVENTIONS:- Assess and monitor for signs and symptoms of infection- Monitor lab/diagnostic results- Monitor all insertion sites, i.e. indwelling lines, tubes, and drains- Monitor endotracheal if appropriate and nasal secretions for changes in amount and color- [place] appropriate cooling/warming therapies per order- Administer medications as ordered- Instruct and encourage patient and family to use good hand hygiene technique- Identify and instruct in appropriate isolation precautions for identified infection/condition  Outcome: Progressing  Goal: Absence of fever/infection during neutropenic period  Description: INTERVENTIONS:- Monitor WBC  Outcome: Progressing     Problem: SAFETY ADULT  Goal: Patient will remain free of falls  Description: INTERVENTIONS:- Educate patient/family on patient safety including physical limitations- Instruct patient to call for assistance with activity - Consult OT/PT to assist with strengthening/mobility - Keep Call bell within reach- Keep bed low and locked with side rails adjusted as appropriate- Keep care items and personal belongings within reach- Initiate and maintain comfort rounds- Make Fall Risk Sign visible to staff- Offer Toileting every 2 Hours, in advance of need- Initiate/Maintain bed alarm-  Obtain necessary fall risk management equipment: - Apply yellow socks and bracelet for high fall risk patients- Consider moving patient to room near nurses station  Outcome: Progressing  Goal: Maintain or return to baseline ADL function  Description: INTERVENTIONS:-  Assess patient's ability to carry out ADLs; assess patient's baseline for ADL function and identify physical deficits which impact ability to perform ADLs (bathing, care of mouth/teeth, toileting, grooming, dressing, etc.)- Assess/evaluate cause of self-care deficits - Assess range of motion- Assess patient's mobility; develop plan if impaired- Assess patient's need for assistive devices and provide as appropriate- Encourage maximum independence but intervene and supervise when necessary- Involve family in performance of ADLs- Assess for home care needs following discharge - Consider OT consult to assist with ADL evaluation and planning for discharge- Provide patient education as appropriate  Outcome: Progressing  Goal: Maintains/Returns to pre admission functional level  Description: INTERVENTIONS:- Perform AM-PAC 6 Click Basic Mobility/ Daily Activity assessment daily.- Set and communicate daily mobility goal to care team and patient/family/caregiver. - Collaborate with rehabilitation services on mobility goals if consulted- Perform Range of Motion 3 times a day.- Reposition patient every 2 hours.- Dangle patient 3 times a day- Stand patient 3 times a day- Ambulate patient 3 times a day- Out of bed to chair 3 times a day - Out of bed for meals 3 times a day- Out of bed for toileting- Record patient progress and toleration of activity level   Outcome: Progressing     Problem: DISCHARGE PLANNING  Goal: Discharge to home or other facility with appropriate resources  Description: INTERVENTIONS:- Identify barriers to discharge w/patient and caregiver- Arrange for needed discharge resources and transportation as appropriate- Identify discharge learning  needs (meds, wound care, etc.)- Arrange for interpretive services to assist at discharge as needed- Refer to Case Management Department for coordinating discharge planning if the patient needs post-hospital services based on physician/advanced practitioner order or complex needs related to functional status, cognitive ability, or social support system  Outcome: Progressing     Problem: Knowledge Deficit  Goal: Patient/family/caregiver demonstrates understanding of disease process, treatment plan, medications, and discharge instructions  Description: Complete learning assessment and assess knowledge base.Interventions:- Provide teaching at level of understanding- Provide teaching via preferred learning methods  Outcome: Progressing     Problem: Nutrition/Hydration-ADULT  Goal: Nutrient/Hydration intake appropriate for improving, restoring or maintaining nutritional needs  Description: Monitor and assess patient's nutrition/hydration status for malnutrition. Collaborate with interdisciplinary team and initiate plan and interventions as ordered.  Monitor patient's weight and dietary intake as ordered or per policy. Utilize nutrition screening tool and intervene as necessary. Determine patient's food preferences and provide high-protein, high-caloric foods as appropriate. INTERVENTIONS:- Monitor oral intake, urinary output, labs, and treatment plans- Assess nutrition and hydration status and recommend course of action- Evaluate amount of meals eaten- Assist patient with eating if necessary - Allow adequate time for meals- Recommend/ encourage appropriate diets, oral nutritional supplements, and vitamin/mineral supplements- Order, calculate, and assess calorie counts as needed- Recommend, monitor, and adjust tube feedings and TPN/PPN based on assessed needs- Assess need for intravenous fluids- Provide specific nutrition/hydration education as appropriate- Include patient/family/caregiver in decisions related to  nutrition  Monitor and assess patient's nutrition/hydration status for malnutrition. Collaborate with interdisciplinary team and initiate plan and interventions as ordered.  Monitor patient's weight and dietary intake as ordered or per policy. Utilize nutrition screening tool and intervene as necessary. Determine patient's food preferences and provide high-protein, high-caloric foods as appropriate. INTERVENTIONS:- Monitor oral intake, urinary output, labs, and treatment plans- Assess nutrition and hydration status and recommend course of action- Evaluate amount of meals eaten- Assist patient with eating if necessary - Allow adequate time for meals- Recommend/ encourage appropriate diets, oral nutritional supplements, and vitamin/mineral supplements- Order, calculate, and assess calorie counts as needed- Recommend, monitor, and adjust tube feedings and TPN/PPN based on assessed needs- Assess need for intravenous fluids- Provide specific nutrition/hydration education as appropriate- Include patient/family/caregiver in decisions related to nutrition  Outcome: Progressing     Problem: Prexisting or High Potential for Compromised Skin Integrity  Goal: Skin integrity is maintained or improved  Description: INTERVENTIONS:- Identify patients at risk for skin breakdown- Assess and monitor skin integrity- Assess and monitor nutrition and hydration status- Monitor labs - Assess for incontinence - Turn and reposition patient- Assist with mobility/ambulation- Relieve pressure over bony prominences- Avoid friction and shearing- Provide appropriate hygiene as needed including keeping skin clean and dry- Evaluate need for skin moisturizer/barrier cream- Collaborate with interdisciplinary team - Patient/family teaching- Consider wound care consult   Outcome: Progressing     Problem: HEMATOLOGIC - ADULT  Goal: Maintains hematologic stability  Description: INTERVENTIONS- Assess for signs and symptoms of bleeding or hemorrhage-  Monitor labs- Administer supportive blood products/factors as ordered and appropriate  Outcome: Progressing     Problem: Communication Impairment  Goal: Ability to express needs and understand communication  Description: Assess patient's communication skills and ability to understand information.  Patient will demonstrate use of effective communication techniques, alternative methods of communication and understanding even if not able to speak. - Encourage communication and provide alternate methods of communication as needed.- Collaborate with case management/ for discharge needs.- Include patient/family/caregiver in decisions related to communication.  Outcome: Progressing

## 2025-05-12 NOTE — PROGRESS NOTES
Patient:    MRN:  [id number]    Randy Request ID:  [id number]    Level of care reserved:  Skilled Nursing Facility    Partner Reserved:  [facility], [place] [id number] [phone]    Clinical needs requested:  physical therapy, occupational therapy    Geography searched:  10 miles around [phone]    Start of Service:    Request sent:  4:00pm EDT on 5/6/2025 by Evelia Bailey    Partner reserved:  4:37pm EDT on 5/12/2025 by López Eisenberg    Choice list shared:  2:16pm EDT on 5/7/2025 by López Eisenberg

## 2025-05-12 NOTE — ASSESSMENT & PLAN NOTE
History of DVT   However during 4/25/2025 [facility] admission found to have bilateral extensive extensive acute vs subacute DVTs, in the setting of subtherapeutic INR  IVC filter placed 5/9 due to recurrent bleeding on anticoagulation

## 2025-05-12 NOTE — ASSESSMENT & PLAN NOTE
Patient previously previously on amlodipine/beta-blocker  Medications held for hypotension with hematochezia  Monitor

## 2025-05-12 NOTE — ASSESSMENT & PLAN NOTE
Initially presented to [facility] 4/25/2025 for generalized weakness found to have kidney injury and MDR UTI  Renal function has returned back to baseline  Torsemide discontinued during March 2025 hospitalization    Results from last 7 days   Lab Units 05/12/25  0624 05/11/25  0608 05/10/25  0555 05/09/25  0536 05/08/25  1226 05/08/25  0531 05/07/25  0432 05/06/25  0320   BUN mg/dL 7 7 7 8 8 6 6 6   CREATININE mg/dL 0.91 0.93 0.94 0.92 0.90 0.88 0.84 0.68   EGFR ml/min/1.73sq m 59 57 57 58 60 61 65 82

## 2025-05-12 NOTE — ASSESSMENT & PLAN NOTE
baseline hemoglobin appears to be variable over the last year ranging 7.5-10  Acute blood loss anemia due to hematochezia secondary to diverticular bleed  Was transfused   1 unit PRBC 5/3   2 units PRBC 5/4.  1 unit packed red blood cells 5/7  2 units FFP 5/7  Acute blood loss anemia secondary to lower GI bleed secondary to GI bleed  Continue to monitor hemoglobin closely: Ranging 7-8  Repeat CBC in a.m.      Results from last 7 days   Lab Units 05/12/25  0624 05/11/25  0608 05/10/25  0555 05/09/25  1241 05/09/25  0536 05/08/25  2349   HEMOGLOBIN g/dL 7.4* 7.7* 7.4* 8.2* 7.7* 7.9*

## 2025-05-13 PROBLEM — T68.XXXA HYPOTHERMIA: Status: ACTIVE | Noted: 2025-05-13

## 2025-05-13 LAB
ANION GAP SERPL CALCULATED.3IONS-SCNC: 8 MMOL/L (ref 4–13)
BASOPHILS # BLD AUTO: 0.01 THOUSANDS/ÂΜL (ref 0–0.1)
BASOPHILS NFR BLD AUTO: 0 % (ref 0–1)
BUN SERPL-MCNC: 6 MG/DL (ref 5–25)
CALCIUM SERPL-MCNC: 7.5 MG/DL (ref 8.4–10.2)
CHLORIDE SERPL-SCNC: 109 MMOL/L (ref 96–108)
CO2 SERPL-SCNC: 24 MMOL/L (ref 21–32)
CREAT SERPL-MCNC: 0.77 MG/DL (ref 0.6–1.3)
EOSINOPHIL # BLD AUTO: 0.03 THOUSAND/ÂΜL (ref 0–0.61)
EOSINOPHIL NFR BLD AUTO: 1 % (ref 0–6)
ERYTHROCYTE [DISTWIDTH] IN BLOOD BY AUTOMATED COUNT: 16.2 % (ref 11.6–15.1)
GFR SERPL CREATININE-BSD FRML MDRD: 72 ML/MIN/1.73SQ M
GLUCOSE SERPL-MCNC: 104 MG/DL (ref 65–140)
GLUCOSE SERPL-MCNC: 59 MG/DL (ref 65–140)
GLUCOSE SERPL-MCNC: 69 MG/DL (ref 65–140)
GLUCOSE SERPL-MCNC: 83 MG/DL (ref 65–140)
GLUCOSE SERPL-MCNC: 87 MG/DL (ref 65–140)
HCT VFR BLD AUTO: 22.3 % (ref 34.8–46.1)
HGB BLD-MCNC: 7.4 G/DL (ref 11.5–15.4)
IMM GRANULOCYTES # BLD AUTO: 0.03 THOUSAND/UL (ref 0–0.2)
IMM GRANULOCYTES NFR BLD AUTO: 1 % (ref 0–2)
LYMPHOCYTES # BLD AUTO: 0.53 THOUSANDS/ÂΜL (ref 0.6–4.47)
LYMPHOCYTES NFR BLD AUTO: 19 % (ref 14–44)
MCH RBC QN AUTO: 28.4 PG (ref 26.8–34.3)
MCHC RBC AUTO-ENTMCNC: 33.2 G/DL (ref 31.4–37.4)
MCV RBC AUTO: 85 FL (ref 82–98)
MONOCYTES # BLD AUTO: 0.25 THOUSAND/ÂΜL (ref 0.17–1.22)
MONOCYTES NFR BLD AUTO: 9 % (ref 4–12)
NEUTROPHILS # BLD AUTO: 1.88 THOUSANDS/ÂΜL (ref 1.85–7.62)
NEUTS SEG NFR BLD AUTO: 70 % (ref 43–75)
NRBC BLD AUTO-RTO: 0 /100 WBCS
PLATELET # BLD AUTO: 76 THOUSANDS/UL (ref 149–390)
PMV BLD AUTO: 9.4 FL (ref 8.9–12.7)
POTASSIUM SERPL-SCNC: 3.3 MMOL/L (ref 3.5–5.3)
RBC # BLD AUTO: 2.61 MILLION/UL (ref 3.81–5.12)
SODIUM SERPL-SCNC: 141 MMOL/L (ref 135–147)
WBC # BLD AUTO: 2.73 THOUSAND/UL (ref 4.31–10.16)

## 2025-05-13 PROCEDURE — 99233 SBSQ HOSP IP/OBS HIGH 50: CPT

## 2025-05-13 PROCEDURE — 99232 SBSQ HOSP IP/OBS MODERATE 35: CPT | Performed by: INTERNAL MEDICINE

## 2025-05-13 PROCEDURE — 87040 BLOOD CULTURE FOR BACTERIA: CPT | Performed by: INTERNAL MEDICINE

## 2025-05-13 PROCEDURE — 80048 BASIC METABOLIC PNL TOTAL CA: CPT | Performed by: INTERNAL MEDICINE

## 2025-05-13 PROCEDURE — 82948 REAGENT STRIP/BLOOD GLUCOSE: CPT

## 2025-05-13 PROCEDURE — 85025 COMPLETE CBC W/AUTO DIFF WBC: CPT | Performed by: INTERNAL MEDICINE

## 2025-05-13 RX ORDER — DEXTROSE MONOHYDRATE AND SODIUM CHLORIDE 5; .45 G/100ML; G/100ML
75 INJECTION, SOLUTION INTRAVENOUS CONTINUOUS
Status: DISCONTINUED | OUTPATIENT
Start: 2025-05-13 | End: 2025-05-15

## 2025-05-13 RX ORDER — POTASSIUM CHLORIDE 1500 MG/1
40 TABLET, EXTENDED RELEASE ORAL ONCE
Status: DISCONTINUED | OUTPATIENT
Start: 2025-05-13 | End: 2025-05-17

## 2025-05-13 RX ADMIN — MICONAZOLE NITRATE: 20 CREAM TOPICAL at 09:30

## 2025-05-13 RX ADMIN — LEVOTHYROXINE SODIUM 75 MCG: 75 TABLET ORAL at 05:18

## 2025-05-13 RX ADMIN — MICONAZOLE NITRATE: 20 CREAM TOPICAL at 16:14

## 2025-05-13 RX ADMIN — DEXTROSE AND SODIUM CHLORIDE 75 ML/HR: 5; .45 INJECTION, SOLUTION INTRAVENOUS at 11:51

## 2025-05-13 RX ADMIN — PANTOPRAZOLE SODIUM 40 MG: 40 TABLET, DELAYED RELEASE ORAL at 05:18

## 2025-05-13 RX ADMIN — ONDANSETRON 4 MG: 2 INJECTION INTRAMUSCULAR; INTRAVENOUS at 05:30

## 2025-05-13 NOTE — PHYSICAL THERAPY NOTE
Physical Therapy Cancellation Note      PT session cancelled as pt. Is not medically appropriate for PT. Pt. Had a rough night and not alert at this time. Will continue to follow epr PT POC

## 2025-05-13 NOTE — ASSESSMENT & PLAN NOTE
Likely secondary to pulmonary embolism: Was placed on 2 L nasal cannula, since improved  Currently with adequate oxygenation on 2 L   138.8

## 2025-05-13 NOTE — ASSESSMENT & PLAN NOTE
Secondary to metabolic encephalopathy due to multiple issues and prolonged hospitalization  She was a rapid response and underwent stroke workup which came back negative.

## 2025-05-13 NOTE — ASSESSMENT & PLAN NOTE
Patient previously previously on amlodipine/beta-blocker  Medications held for hypotension with hematochezia

## 2025-05-13 NOTE — ASSESSMENT & PLAN NOTE
She was noted to have hypothermia today requiring Tyrone hugger  Could be related to advanced age, anemia, medications  Rule out infection  Blood cultures x 2 sent.

## 2025-05-13 NOTE — PROGRESS NOTES
Progress Note - Hospitalist   Name: Alecia Kay 80 y.o. female I MRN: [id number]  Unit/Bed#: E4 -01 I Date of Admission: 5/2/2025   Date of Service: 5/13/2025 I Hospital Day: 11    Assessment & Plan  Hematochezia  Pt is a 81yo female with PMH significant for hypertension, DVT/PE CKD 3, and right heel osteomyelitis initially presented to [facility] 4/25/2025 for generalized weakness.  Initially diagnosed with GAY and MDR UTI: completed course of ertapenem, and then was transferred to [facility] for GI evaluation for hematochezia/acute blood loss anemia    Hematochezia secondary to diverticular bleed on top of anticoagulation  She was not felt to be a candidate for further anticoagulation so an IVC filter was placed  Acute blood loss anemia (ABLA)  Secondary to GI bleed  Baseline hemoglobin appears to be variable over the last year ranging 7.5-10  Acute blood loss anemia due to hematochezia secondary to diverticular bleed  Status post total of 6 units packed red blood cell transfusion during this stay    Acute pulmonary embolism (HCC)  Extensive right upper and lower lobe pulmonary embolism seen on 4/28/2025 CTA  2D echocardiogram without evidence of right heart strain  Does have history of DVT and PE: Maintained on warfarin previously, however pt states she had not taken the month prior to admission--(INR 1.18)  Due to subtherapeutic INR, was started on bridging therapy with anticoagulation however had intractable GI bleed  Anticoagulation currently contraindicated due to recurrent lower GI bleed on anticoagulation  She was evaluated by pulmonology who recommended  IVC filter placement.  Temporary IVC filter placed 5/9 by IR: Outpatient follow-up for removal  Acute DVT (deep venous thrombosis) (HCC)  History of DVT   However during 4/25/2025 [facility] admission found to have bilateral extensive extensive acute vs subacute DVTs, in the setting of subtherapeutic INR  Not a candidate for anticoagulation  so IVC filter was felt to be the best course of action  IVC filter placed 5/9   Word finding difficulty  Secondary to metabolic encephalopathy due to multiple issues and prolonged hospitalization  She was a rapid response and underwent stroke workup which came back negative.  Acute respiratory insufficiency  Likely secondary to pulmonary embolism: Was placed on 2 L nasal cannula, since improved  Currently with adequate oxygenation on 2 L  Hypothermia  She was noted to have hypothermia today requiring Tyrone hugger  Could be related to advanced age, anemia, medications  Rule out infection  Blood cultures x 2 sent.  Hypertension  Patient previously previously on amlodipine/beta-blocker  Medications held for hypotension with hematochezia  Hypothyroidism  Continue levothyroxine  Wang's esophagus  Noted on EGD  Will need repeat EGD in 3 years for surveillance  Patient with dysphagia: Continue proton pump inhibitor bid  Abnormal TSH  Pt with low TSH and elevated free T4: However in the setting of acute illness, recent IV contrast, as well as IV steroid:  Reviewed with endocrinology on-call team: Not accurate in the setting of acute illness and above factors  Recommend recheck full TFTs in 2-4 weeks as an outpatient.    VTE Pharmacologic Prophylaxis: VTE Score: 6 High Risk (Score >/= 5) - Pharmacological DVT Prophylaxis Contraindicated. Sequential Compression Devices Ordered.      Patient Centered Rounds: I performed bedside rounds with nursing staff today.   Discussions with Specialists or Other Care Team Provider: Case management  Education and Discussions with Family / Patient: Updated  (son) at bedside.    Current Length of Stay: 11 day(s)  Current Patient Status: Inpatient   Certification Statement: The patient will continue to require additional inpatient hospital stay due to hypothermia, encephalopathy  Discharge Plan: Anticipate discharge in 48-72 hrs to rehab facility.    Code Status: Level 3 - DNAR  and DNI    Subjective   Seen and examined this morning.  She was noted to be more confused.  She could not carry out a coherent conversation  She was also on a Tyrone hugger    Objective :  Temp:  [91.5 °F (33.1 °C)-98 °F (36.7 °C)] 98 °F (36.7 °C)  HR:  [56-79] 79  BP: (119-124)/(74-88) 124/75  Resp:  [18] 18  SpO2:  [95 %-98 %] 95 %  O2 Device: None (Room air)    Body mass index is 31.45 kg/m².     Input and Output Summary (last 24 hours):   No intake or output data in the 24 hours ending 05/13/25 1725    Physical Exam  Vitals reviewed.   HENT:      Head: Normocephalic and atraumatic.      Nose: No congestion or rhinorrhea.   Eyes:      General: No scleral icterus.  Cardiovascular:      Rate and Rhythm: Normal rate and regular rhythm.   Pulmonary:      Breath sounds: No wheezing or rhonchi.   Abdominal:      Palpations: Abdomen is soft.      Tenderness: There is no abdominal tenderness. There is no guarding.   Musculoskeletal:      Right lower leg: No edema.      Left lower leg: No edema.   Skin:     General: Skin is warm and dry.   Neurological:      Mental Status: She is disoriented.   Psychiatric:      Comments: Dysphoric mood         Lab Results: I have reviewed the following results:   Results from last 7 days   Lab Units 05/13/25  0459   WBC Thousand/uL 2.73*   HEMOGLOBIN g/dL 7.4*   HEMATOCRIT % 22.3*   PLATELETS Thousands/uL 76*   SEGS PCT % 70   LYMPHO PCT % 19   MONO PCT % 9   EOS PCT % 1     Results from last 7 days   Lab Units 05/13/25  0459 05/12/25  0624   SODIUM mmol/L 141 142   POTASSIUM mmol/L 3.3* 2.9*   CHLORIDE mmol/L 109* 109*   CO2 mmol/L 24 27   BUN mg/dL 6 7   CREATININE mg/dL 0.77 0.91   ANION GAP mmol/L 8 6   CALCIUM mg/dL 7.5* 7.7*   ALBUMIN g/dL  --  2.5*   TOTAL BILIRUBIN mg/dL  --  0.57   ALK PHOS U/L  --  54   ALT U/L  --  6*   AST U/L  --  14   GLUCOSE RANDOM mg/dL 104 112     Results from last 7 days   Lab Units 05/08/25  0531   INR  1.69*     Results from last 7 days   Lab Units  05/13/25  1616 05/13/25  1113 05/13/25  0739 05/12/25  2122 05/11/25  2109 05/11/25  1548 05/11/25  1115 05/11/25  0723 05/10/25  2032 05/10/25  1621 05/10/25  1101 05/10/25  0736   POC GLUCOSE mg/dl 83 59* 87 122 139 117 106 111 94 96 110 133     Results from last 7 days   Lab Units 05/08/25  1226   HEMOGLOBIN A1C % 6.1*           Recent Cultures (last 7 days):   Results from last 7 days   Lab Units 05/13/25  1106   BLOOD CULTURE  Received in Microbiology Lab. Culture in Progress.  Received in Microbiology Lab. Culture in Progress.       Imaging Results Review: I reviewed radiology reports from this admission including: MRI brain.      Last 24 Hours Medication List:     Current Facility-Administered Medications:     acetaminophen (TYLENOL) tablet 650 mg, Q6H PRN    [Held by provider] amLODIPine (NORVASC) tablet 10 mg, Daily    dextrose 5 % and sodium chloride 0.45 % infusion, Continuous, Last Rate: 75 mL/hr (05/13/25 1151)    folic acid (FOLVITE) tablet 1 mg, Daily    hydrOXYzine HCL (ATARAX) tablet 25 mg, Q8H PRN    levothyroxine tablet 75 mcg, Early Morning    meclizine (ANTIVERT) tablet 25 mg, TID PRN    [Held by provider] metoprolol tartrate (LOPRESSOR) tablet 50 mg, Q12H MELANY    [Held by provider] mirtazapine (REMERON) tablet 7.5 mg, HS    moisture barrier miconazole 2% cream (aka EDNA MOISTURE BARRIER ANTIFUNGAL CREAM), BID    ondansetron (ZOFRAN) injection 4 mg, Q4H PRN    pantoprazole (PROTONIX) EC tablet 40 mg, BID AC    potassium chloride (Klor-Con M20) CR tablet 40 mEq, Once    pravastatin (PRAVACHOL) tablet 80 mg, Daily With Dinner    Administrative Statements   Today, Patient Was Seen By: Oleksandr Peace MD      **Please Note: This note may have been constructed using a voice recognition system.**

## 2025-05-13 NOTE — ASSESSMENT & PLAN NOTE
Pt is a 79yo female with PMH significant for hypertension, DVT/PE CKD 3, and right heel osteomyelitis initially presented to [facility] 4/25/2025 for generalized weakness.  Initially diagnosed with GAY and MDR UTI: completed course of ertapenem, and then was transferred to [facility] for GI evaluation for hematochezia/acute blood loss anemia    Hematochezia secondary to diverticular bleed on top of anticoagulation  She was not felt to be a candidate for further anticoagulation so an IVC filter was placed

## 2025-05-13 NOTE — ASSESSMENT & PLAN NOTE
History of DVT   However during 4/25/2025 [facility] [place] admission found to have bilateral extensive extensive acute vs subacute DVTs, in the setting of subtherapeutic INR  Not a candidate for anticoagulation so IVC filter was felt to be the best course of action  IVC filter placed 5/9

## 2025-05-13 NOTE — ASSESSMENT & PLAN NOTE
Secondary to GI bleed  Baseline hemoglobin appears to be variable over the last year ranging 7.5-10  Acute blood loss anemia due to hematochezia secondary to diverticular bleed  Status post total of 6 units packed red blood cell transfusion during this stay

## 2025-05-13 NOTE — PROGRESS NOTES
Progress Note - Geriatric Medicine   Alecia Kay 80 y.o. female MRN: [id number]  Unit/Bed#: E4 -01 Encounter: [id number]      Assessment/Plan:    Acute Encephalopathy  Patient presented to the hospital for generalized weakness   Baseline mentation is alert and oriented x 4 but forgetful at times   Current cause considerations   Suspected to be multifactorial secondary to age, possible underlying cognitive impairment, hematoochezia, acute PE/DVT, electrolyte imbalances, anemia, vision impairment, and prolonged hospitalization  UA on admission negative for UTI   No leukocytosis noted on labs today   Hemoglobin on labs this morning noted to be 7.4  Patient was a rapid response last week for acute mental status change   She was a stroke alert   CT of the head and MRI were negative for any acute infarcts    Over the past few days her cognition and mentation appear to be improving a bit   She is alert and oriented to person and place on my exam but is generally confused   She did undergo IVC filter placement on 5/9  Primary team did find POLST from January and patient was level 3 DNR at that time  Son agreeable to this change and patient is now level 3 DNR  Identify and treat reversible causes of confusion including infection, dehydration, electrolyte imbalance, anemia, hypoxia, urinary retention, constipation, pain, and sleep disturbance  Maintain delirium precautions   Provide redirection, reorientation, and distraction techniques  Maintain fall and safety precautions   Assist with ADLs/IADLs  Avoid deliriogenic medications such as tramadol, benzodiazepines, anticholinergics, benadryl  Treat pain using geriatric pain protocol   Encourage oral hydration and nutrition   Monitor for constipation and urinary retention   Implement sleep hygiene and limit night time interuptions   Maintain sleep-wake cycle   Encourage early and frequent mobilization   Most recent EKG on 5/8/2025 revealed a QTc interval  of  447  If all other interventions are unsuccessful for acute agitation and behaviors, can consider Zyprexa 2.5 mg IM Q 8 hours prn   Would avoid benzodiazepines such as Ativan if possible as these medications can cause/worsen delirium   Redirect and reorient as needed  Keep mentally, physically, and socially active    Cognitive Screening  Patient has no documented history of memory loss or cognitive impairment   She notes no issues with her memory at baseline   Patient has been having periods of confusion here  Please see cause considerations as discussed above  Patient was a stroke alert las week for altered mental status and speech difficulties   She appears to be slowly improving though her mentation continues to wax and wane   She is more alert and awake then when I saw her last week   Most recent TSH POC on 11/13/2024 noted to be 1.81  Consider rechecking on routine labs  Most recent vitamin B 12 level on 4/3/2025 noted to be 564  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 with no acute abnormalities   MRI of the brain on 5/9 revealed moderate microangiopathy   Patient scored 13/15 on BIMS on 3/5/2025 indicating she is cognitively intact   Maintain delirium precautions as discussed above   Redirect and reorient as needed  Keep physically, mentally and socially active     Deconditioning   Patient is at increased risk for deconditioning secondary to possible underlying cognitive impairment, hematochezia, acute PE/DVT, electrolyte imbalance, anemia, vision impairment, weakness, gait dysfunction, and prolonged hospitalization  Continue to optimize diet, hydration, and mobility for healing   Chronic Kidney Disease Stage III  Baseline creatinine not entirely clear but recently appears to be 0.8-1.0  Creatinine on labs today noted to be 0.91  Avoid nephrotoxic medication and renal dose medication   Keep hydrated  Type II Diabetes   Most recent hemoglobin A1C on 11/13/2024 noted to be 5.7  Per chart  review, she does not appear to be on any medication for this at baseline  Glucose on labs today noted to be stable at 112  Patient is now on blood sugar checks   Continue to monitor blood sugars closely   Avoid hypoglycemia   Anemia   Baseline hemoglobin appears to be 8-10  Patient was noted to have hematochezia which had initially resolved but returned at the end of last week   Hemoglobin on labs this morning noted to be 7.4  Continue to monitor CBC   Transfuse for hemoglobin < 7   Monitor for signs and symptoms of infection, dehydration, DVT, and skin breakdown    Frailty   Clinical Frail Scale: 6- Moderately Frail (current)  Need help with all outside activities  Need help with stairs and bathing  May need assistance with dressing  Most recent albumin on labs today noted to be 2.5  Consider nutrition consult  Encourage protein supplementation     Ambulatory Dysfunction/Falls  Patient notes no recent falls at home   She states she ambulates with a roller walker at baseline   PT/OT consulted to assist with strengthening/mobility and assist with discharge planning to appropriate level of care  Assess patient frequently for physical needs, encourage use of assistant devices as needed and directed by PT/OT  Identify cognitive and physical deficits and behaviors that affect risk of falls  Consider moving patient closer to nursing station to monitor more closely for impulsive behavior which may increase risk of falls  [place] fall and safety precautions   Educate patient/family on patient safety including physical limitations and importance of using call bell for assistance   Modify environment to reduce risk of injury including disconnecting from pole when not in use, ensuring adequate lighting in room and restroom, ensuring that path to restroom is clear and free of trip hazards  Out of bed as tolerated    Impaired Vision   Patient denies vision impairment   She notes she does have glasses for reading   Recommend  use of corrective lenses at all appropriate times  Encourage adequate lighting and encourage use of assistance with ambulation  Keep personal belongings close to avoid reaching  Encourage appropriate footwear at all times  Recommend large font for printed materials provided to patient    Dentition/Appetite   Patient denies denture use  She states she has a good appetite at baseline   She is currently on a dysphagia I nectar thick liquid diet   Patients son and son-in-law at the bedside are concerned about poor oral intake   Consider this to be related to the consistency of the food vs taste vs lack of appetite   Speech therapy consulted and following  Consider restarting mirtazapine once stable as this medication can be used as an appetite stimulant    Encourage use of dentures at all appropriate times  Ensure meal consistency is appropriate for all abilities   Consider nutrition consult   Continue aspiration precautions     Elimination   Patient is continent of bowel and bladder at baseline  She appears to be voiding without difficulty   Patient was noted to have hematochezia and was found to have a diverticular bleed   The bleeding appears to have resolved at this point   Last documented bowel movement was yesterday    Patient is not currently on a bowel regimen   Monitor for constipation and urinary retention     Insomnia   Patient notes no difficulty sleeping at baseline   She noted yesterday she had been having some vivid dreams here   Her son at the bedside notes this occurred when at rehab as well   Patient has had some difficulty sleeping here   Consider restarting mirtazapine vs trialing melatonin   First line is behavioral therapy   Avoid sedative hypnotics including benzodiazepines and benadryl  Encourage staying awake during the day   Encourage daytime activities and morning exercise   Decrease or eliminate daytime naps   Avoid caffeine especially during late afternoon and evening hours  Establish a  nighttime routine  Implement sleep hygiene and limit nighttime interruptions  Can consider melatonin 3 mg daily at bedtime for sleep if needed     Anxiety/Depression  Patient has a documented history of anxiety   PDMP reviewed and patient was prescribed alprazolam 0.25 mg on 4/11/2025  Unclear how often she was taking this   Mood appears stable on exam today   Continue supportive care     Hematochezia   Patient presented to [facility] with generalized weakness  She was initially noted to have a UTI and GAY  Additionally she was found to have acute DVT and PE  Her INR was subtherapeutic and she was started on a heparin drip  She was later noted to have hematochezia and was a rapid response   She was transferred to [facility] due to lack of GI services   GI initially restarted the heparin infusion but this was discontinued due to a large amount of hematochezia  She underwent and EGD/colonoscopy on 5/5  EGD revealed Wang's esophagus with no evidence of bleeding   Colonoscopy revealed several diverticuli with old blood   GI suspected a diverticular bleed   The bleeding had resolved but the returned again   She did receive blood and FFP on 5/7  She underwent IVC filter placement on 5/9 and bleeding appears to have resolved   Hemoglobin on labs today noted to be 7.4  GI has since signed off   Management per primary team     Acute Pulmonary Embolism  Patient found to have extensive right upper and lower PE on CTA on 4/28/2025  Echo noted no evidence of heart strain   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   She is maintained on coumadin 5 mg 5 days per week and 7.5 mg Monday and Friday  Patient had nausea/vomiting for several days PTA and she was not able to take this medication   She had been on anticoagulation but this is again on hold due to hematochezia   GI is following for hematochezia   Patient underwent IVC filter on 5/9  Management per primary team     Chronic Osteomyelitis   Patient with previous chronic osteomyelitis of the  "right foot   Podiatry recommending WBAT in a surgical shoe  She completed 6 weeks of antibiotics PTA   MRI of the right heel and ankle on 4/29/2025 revealed no evidence of remaining osteomyelitis   Patient was re-evaluated by podiatry on 4/30/2025 and no changes to prior recommendations noted   She will require continued outpatient follow-up with podiatry on discharge       Subjective:   The patient is being seen and evaluated today at the bedside for geriatric follow-up. She is noted to be lying in bed comfortably in no acute distress. She is alert and awake on my exam today. She is alert and oriented to person and place. She is currently denying pain. Her mentation appears to be waxing and waning.      Care was coordinated with patients nurse Keisha. She notes improvement in patients mentation since last week.     Care was also coordinated with patients son and son-in-law at the bedside. All questions and concerns were addressed at the time of the visit.     Care was also coordinated with López Eisenberg with case management.       Review of Systems   Unable to perform ROS: Mental status change         Objective:     Vitals: Blood pressure 141/75, pulse 68, temperature 98.4 °F (36.9 °C), temperature source Temporal, resp. rate 18, height 5' 2\" (1.575 m), weight 78 kg (171 lb 15.3 oz), SpO2 95%.,Body mass index is 31.45 kg/m².      Intake/Output Summary (Last 24 hours) at 5/12/2025 2119  Last data filed at 5/12/2025 1508  Gross per 24 hour   Intake 170 ml   Output --   Net 170 ml       Current Medications: Reviewed    Physical Exam:   Physical Exam  Vitals and nursing note reviewed.   Constitutional:       General: She is not in acute distress.  HENT:      Head: Normocephalic.      Mouth/Throat:      Mouth: Mucous membranes are dry.   Eyes:      General: No scleral icterus.     Conjunctiva/sclera: Conjunctivae normal.   Cardiovascular:      Rate and Rhythm: Normal rate and regular rhythm.   Pulmonary:      Effort: " "Pulmonary effort is normal. No respiratory distress.   Abdominal:      General: Bowel sounds are normal. There is no distension.   Musculoskeletal:         General: Swelling (generalized) present. No tenderness.   Skin:     General: Skin is warm and dry.   Neurological:      Mental Status: She is alert. She is disoriented.      Motor: Weakness present.      Gait: Gait abnormal.      Comments: Oriented to person and place  Disoriented to time  Confused in general conversation          Invasive Devices       Long-dwell Peripherally Inserted Midline IV Catheter  Duration             Long-Dwell Peripheral IV (Midline) 05/07/25 Right Basilic 5 days                    Lab, Imaging and other studies: Results Review Statement: I reviewed AM labs and MRI of the brain from 5/9.     Please note:  Voice-recognition software may have been used in the preparation of this document.  Occasional wrong word or \"sound-alike\" substitutions may have occurred due to the inherent limitations of voice recognition software.  Interpretation should be guided by context.    "

## 2025-05-13 NOTE — SPEECH THERAPY NOTE
Speech Language/Pathology    Speech/Language Pathology Progress Note    Patient Name: Alecia Kay  Today's Date: 5/13/2025     Problem List  Principal Problem:    Hematochezia  Active Problems:    History of osteomyelitis    Chronic kidney disease, stage III (moderate) (HCC)    Hypothyroidism    Hypertension    Acute DVT (deep venous thrombosis) (HCC)    Acute pulmonary embolism (HCC)    Acute respiratory insufficiency    Acute blood loss anemia (ABLA)    Wang's esophagus    Insertional Achilles tendinopathy    Chronic osteomyelitis (HCC)    Word finding difficulty    Goals of care, counseling/discussion    Folic acid deficiency    Abnormal TSH       Past Medical History  Past Medical History:   Diagnosis Date    Anemia     Cellulitis     Disease of thyroid gland     GERD (gastroesophageal reflux disease)     Hyperlipidemia     Hypertension     Obesity     Osteomyelitis (HCC)     Pneumonia     Pulmonary embolism (HCC)         Past Surgical History  Past Surgical History:   Procedure Laterality Date    FOOT SURGERY      IR IVC FILTER PLACEMENT OPTIONAL/TEMPORARY  5/9/2025    JOINT REPLACEMENT       Unable to alert sufficiently for PO. Nurse reported pt has been lethargic and unable to tale oral meds. Will f/u as able. ?GOC

## 2025-05-13 NOTE — OCCUPATIONAL THERAPY NOTE
Occupational Therapy         Patient Name: Alecia Kay  Today's Date: 5/13/2025 05/13/25 1354   OT Last Visit   OT Visit Date 05/13/25   Note Type   Note type Cancelled Session   Cancel Reasons Medical status   Additional Comments Will continue to follow and treat at medically appropriate.     Doirs Calvin, OT

## 2025-05-13 NOTE — PROGRESS NOTES
Progress Note - Geriatric Medicine   Alecia Kay 80 y.o. female MRN: [id number]  Unit/Bed#: E4 -01 Encounter: [id number]      Assessment/Plan:    Acute Encephalopathy  Patient presented to the hospital for generalized weakness   Baseline mentation is alert and oriented x 4 but forgetful at times   Current cause considerations   Suspected to be multifactorial secondary to age, possible underlying cognitive impairment, hematoochezia, acute PE/DVT, electrolyte imbalances, hypothermia, anemia, vision impairment, and prolonged hospitalization  UA on admission negative for UTI   No leukocytosis noted on labs today   Hemoglobin on labs this morning noted to be 7.4  Patient was a rapid response last week for acute mental status change   She was a stroke alert   CT of the head and MRI were negative for any acute infarcts   Her mental status appeared improved over the weekend and even yesterday   Patient is noted to be lethargic today and is minimally answering questions    Nursing notes she was hypothermic overnight last night   New blood cultures were obtained   She did undergo IVC filter placement on 5/9  Primary team did find POLST from January and patient was level 3 DNR at that time  Son agreeable to this change and patient is now level 3 DNR  Identify and treat reversible causes of confusion including infection, dehydration, electrolyte imbalance, anemia, hypoxia, urinary retention, constipation, pain, and sleep disturbance  Maintain delirium precautions   Provide redirection, reorientation, and distraction techniques  Maintain fall and safety precautions   Assist with ADLs/IADLs  Avoid deliriogenic medications such as tramadol, benzodiazepines, anticholinergics, benadryl  Treat pain using geriatric pain protocol   Encourage oral hydration and nutrition   Monitor for constipation and urinary retention   Implement sleep hygiene and limit night time interuptions   Maintain sleep-wake cycle   Encourage early  and frequent mobilization   Most recent EKG on 5/8/2025 revealed a QTc interval  of 447  If all other interventions are unsuccessful for acute agitation and behaviors, can consider Zyprexa 2.5 mg IM Q 8 hours prn   Would avoid benzodiazepines such as Ativan if possible as these medications can cause/worsen delirium   Redirect and reorient as needed  Keep mentally, physically, and socially active    Cognitive Screening  Patient has no documented history of memory loss or cognitive impairment   She notes no issues with her memory at baseline   Patient has been having periods of confusion here  Please see cause considerations as discussed above  Patient was a stroke alert las week for altered mental status and speech difficulties   She had been slowly improving though her mentation continues to wax and wane   She is noted to be lethargic on my exam today and is minimally answering questions    Most recent TSH on 5/10/2025 noted to be 0.104  Most recent vitamin B 12 level on 5/10/2025 noted to be 1179  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 with no acute abnormalities   MRI of the brain on 5/9 revealed moderate microangiopathy   Patient scored 13/15 on BIMS on 3/5/2025 indicating she is cognitively intact   Maintain delirium precautions as discussed above   Redirect and reorient as needed  Keep physically, mentally and socially active     Deconditioning   Patient is at increased risk for deconditioning secondary to possible underlying cognitive impairment, hematochezia, acute PE/DVT, electrolyte imbalance, hypothermia, anemia, vision impairment, weakness, gait dysfunction, and prolonged hospitalization  Continue to optimize diet, hydration, and mobility for healing   Chronic Kidney Disease Stage III  Baseline creatinine not entirely clear but recently appears to be 0.8-1.0  Creatinine on labs today noted to be 0.77  Avoid nephrotoxic medication and renal dose medication   Keep hydrated  Type II  Diabetes   Most recent hemoglobin A1C on 11/13/2024 noted to be 5.7  Per chart review, she does not appear to be on any medication for this at baseline  Glucose on labs today noted to be stable at 104  Patient is now on blood sugar checks   Continue to monitor blood sugars closely   Avoid hypoglycemia   Anemia   Baseline hemoglobin appears to be 8-10  Patient was noted to have hematochezia which had initially resolved but returned at the end of last week   Hemoglobin on labs this morning noted to be 7.4  Continue to monitor CBC   Transfuse for hemoglobin < 7   Monitor for signs and symptoms of infection, dehydration, DVT, and skin breakdown    Frailty   Clinical Frail Scale: 6- Moderately Frail (current)  Need help with all outside activities  Need help with stairs and bathing  May need assistance with dressing  Most recent albumin on 5/12/2025 noted to be 2.5  Consider nutrition consult  Encourage protein supplementation     Ambulatory Dysfunction/Falls  Patient notes no recent falls at home   She states she ambulates with a roller walker at baseline   PT/OT consulted to assist with strengthening/mobility and assist with discharge planning to appropriate level of care  Assess patient frequently for physical needs, encourage use of assistant devices as needed and directed by PT/OT  Identify cognitive and physical deficits and behaviors that affect risk of falls  Consider moving patient closer to nursing station to monitor more closely for impulsive behavior which may increase risk of falls  [place] fall and safety precautions   Educate patient/family on patient safety including physical limitations and importance of using call bell for assistance   Modify environment to reduce risk of injury including disconnecting from pole when not in use, ensuring adequate lighting in room and restroom, ensuring that path to restroom is clear and free of trip hazards  Out of bed as tolerated    Impaired Vision   Patient denies  vision impairment   She notes she does have glasses for reading   Recommend use of corrective lenses at all appropriate times  Encourage adequate lighting and encourage use of assistance with ambulation  Keep personal belongings close to avoid reaching  Encourage appropriate footwear at all times  Recommend large font for printed materials provided to patient    Dentition/Appetite   Patient denies denture use  She states she has a good appetite at baseline   She is currently on a dysphagia I nectar thick liquid diet   Patients son and son-in-law at the bedside are concerned about poor oral intake  No meals documented in the past 24 hours   Consider this to be related to the consistency of the food vs taste vs lack of appetite   Speech therapy consulted and following  Consider restarting mirtazapine once stable as this medication can be used as an appetite stimulant    Encourage use of dentures at all appropriate times  Ensure meal consistency is appropriate for all abilities   Consider nutrition consult   Continue aspiration precautions     Elimination   Patient is continent of bowel and bladder at baseline  She appears to be voiding without difficulty   Patient was noted to have hematochezia and was found to have a diverticular bleed   The bleeding appears to have resolved at this point   Last documented bowel movement was on 5/11    Patient is not currently on a bowel regimen   Monitor for constipation and urinary retention     Insomnia   Patient notes no difficulty sleeping at baseline   She noted yesterday she had been having some vivid dreams here   Her son at the bedside notes this occurred when at rehab as well   Patient has had some difficulty sleeping here   Nursing is not sure if she slept last night but she was noted to be hypothermic overnight   Can consider restarting mirtazapine vs trialing melatonin   First line is behavioral therapy   Avoid sedative hypnotics including benzodiazepines and  benadryl  Encourage staying awake during the day   Encourage daytime activities and morning exercise   Decrease or eliminate daytime naps   Avoid caffeine especially during late afternoon and evening hours  Establish a nighttime routine  Implement sleep hygiene and limit nighttime interruptions  Can consider melatonin 3 mg daily at bedtime for sleep if needed     Anxiety/Depression  Patient has a documented history of anxiety   PDMP reviewed and patient was prescribed alprazolam 0.25 mg on 4/11/2025  Unclear how often she was taking this   Patient is lethargic on exam today   Continue supportive care     Hematochezia   Patient presented to [facility] with generalized weakness  She was initially noted to have a UTI and GAY  Additionally she was found to have acute DVT and PE  Her INR was subtherapeutic and she was started on a heparin drip  She was later noted to have hematochezia and was a rapid response   She was transferred to [facility] due to lack of GI services   GI initially restarted the heparin infusion but this was discontinued due to a large amount of hematochezia  She underwent and EGD/colonoscopy on 5/5  EGD revealed Wang's esophagus with no evidence of bleeding   Colonoscopy revealed several diverticuli with old blood   GI suspected a diverticular bleed   The bleeding had resolved but the returned again   She did receive blood and FFP on 5/7  She underwent IVC filter placement on 5/9 and bleeding appears to have resolved   Hemoglobin on labs today noted to be 7.4  GI has since signed off   Management per primary team     Acute Pulmonary Embolism  Patient found to have extensive right upper and lower PE on CTA on 4/28/2025  Echo noted no evidence of heart strain   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   She is maintained on coumadin 5 mg 5 days per week and 7.5 mg Monday and Friday  Patient had nausea/vomiting for several days PTA and she was not able to take this medication   She had been on anticoagulation but  "this is again on hold due to hematochezia   GI is following for hematochezia   Patient underwent IVC filter on 5/9  Management per primary team     Chronic Osteomyelitis   Patient with previous chronic osteomyelitis of the right foot   Podiatry recommending WBAT in a surgical shoe  She completed 6 weeks of antibiotics PTA   MRI of the right heel and ankle on 4/29/2025 revealed no evidence of remaining osteomyelitis   Patient was re-evaluated by podiatry on 4/30/2025 and no changes to prior recommendations noted   She will require continued outpatient follow-up with podiatry on discharge       Subjective:   The patient is being seen and evaluated today at the bedside for geriatric follow-up. She is noted to be lying in bed comfortably in no acute distress. She is noted to be lethargic on my exam today. She does respond to her name initially and is minimally responsive to questions. She does not appear to be in any pain or discomfort.     Care was coordinated with patients nurse Manuel. He notes the patient has been lethargic today and was hypothermic overnight.     Care was also coordinated with López Eisenberg with case management.       Review of Systems   Unable to perform ROS: Mental status change         Objective:     Vitals: Blood pressure 124/75, pulse 79, temperature 98 °F (36.7 °C), temperature source Temporal, resp. rate 18, height 5' 2\" (1.575 m), weight 78 kg (171 lb 15.3 oz), SpO2 95%.,Body mass index is 31.45 kg/m².    No intake or output data in the 24 hours ending 05/13/25 1928      Current Medications: Reviewed    Physical Exam:   Physical Exam  Vitals and nursing note reviewed.   Constitutional:       General: She is not in acute distress.  HENT:      Head: Normocephalic.   Cardiovascular:      Rate and Rhythm: Normal rate and regular rhythm.   Pulmonary:      Effort: Pulmonary effort is normal. No respiratory distress.   Abdominal:      General: Bowel sounds are normal. There is no distension. " "  Musculoskeletal:         General: Swelling (generalized) present. No tenderness.   Skin:     General: Skin is warm and dry.   Neurological:      Mental Status: She is lethargic.          Invasive Devices       Long-dwell Peripherally Inserted Midline IV Catheter  Duration             Long-Dwell Peripheral IV (Midline) 05/07/25 Right Basilic 6 days                    Lab, Imaging and other studies: Results Review Statement: I reviewed AM labs.     Please note:  Voice-recognition software may have been used in the preparation of this document.  Occasional wrong word or \"sound-alike\" substitutions may have occurred due to the inherent limitations of voice recognition software.  Interpretation should be guided by context.    "

## 2025-05-13 NOTE — PLAN OF CARE
Problem: PAIN - ADULT  Goal: Verbalizes/displays adequate comfort level or baseline comfort level  Description: Interventions:- Encourage patient to monitor pain and request assistance- Assess pain using appropriate pain scale- Administer analgesics based on type and severity of pain and evaluate response- Implement non-pharmacological measures as appropriate and evaluate response- Consider cultural and social influences on pain and pain management- Notify physician/advanced practitioner if interventions unsuccessful or patient reports new pain  Outcome: Progressing     Problem: INFECTION - ADULT  Goal: Absence or prevention of progression during hospitalization  Description: INTERVENTIONS:- Assess and monitor for signs and symptoms of infection- Monitor lab/diagnostic results- Monitor all insertion sites, i.e. indwelling lines, tubes, and drains- Monitor endotracheal if appropriate and nasal secretions for changes in amount and color- [place] appropriate cooling/warming therapies per order- Administer medications as ordered- Instruct and encourage patient and family to use good hand hygiene technique- Identify and instruct in appropriate isolation precautions for identified infection/condition  Outcome: Progressing  Goal: Absence of fever/infection during neutropenic period  Description: INTERVENTIONS:- Monitor WBC  Outcome: Progressing

## 2025-05-14 ENCOUNTER — TELEPHONE (OUTPATIENT)
Age: 80
End: 2025-05-14

## 2025-05-14 PROBLEM — G93.41 METABOLIC ENCEPHALOPATHY: Status: ACTIVE | Noted: 2025-05-08

## 2025-05-14 LAB
ABO GROUP BLD: NORMAL
ANION GAP SERPL CALCULATED.3IONS-SCNC: 6 MMOL/L (ref 4–13)
BASOPHILS # BLD AUTO: 0.01 THOUSANDS/ÂΜL (ref 0–0.1)
BASOPHILS NFR BLD AUTO: 0 % (ref 0–1)
BLD GP AB SCN SERPL QL: NEGATIVE
BUN SERPL-MCNC: 7 MG/DL (ref 5–25)
CALCIUM SERPL-MCNC: 7.2 MG/DL (ref 8.4–10.2)
CHLORIDE SERPL-SCNC: 107 MMOL/L (ref 96–108)
CO2 SERPL-SCNC: 26 MMOL/L (ref 21–32)
CREAT SERPL-MCNC: 0.99 MG/DL (ref 0.6–1.3)
EOSINOPHIL # BLD AUTO: 0.15 THOUSAND/ÂΜL (ref 0–0.61)
EOSINOPHIL NFR BLD AUTO: 5 % (ref 0–6)
ERYTHROCYTE [DISTWIDTH] IN BLOOD BY AUTOMATED COUNT: 16.9 % (ref 11.6–15.1)
GFR SERPL CREATININE-BSD FRML MDRD: 53 ML/MIN/1.73SQ M
GLUCOSE SERPL-MCNC: 77 MG/DL (ref 65–140)
GLUCOSE SERPL-MCNC: 78 MG/DL (ref 65–140)
GLUCOSE SERPL-MCNC: 84 MG/DL (ref 65–140)
GLUCOSE SERPL-MCNC: 87 MG/DL (ref 65–140)
HCT VFR BLD AUTO: 21.7 % (ref 34.8–46.1)
HGB BLD-MCNC: 7.1 G/DL (ref 11.5–15.4)
IMM GRANULOCYTES # BLD AUTO: 0.03 THOUSAND/UL (ref 0–0.2)
IMM GRANULOCYTES NFR BLD AUTO: 1 % (ref 0–2)
LYMPHOCYTES # BLD AUTO: 1.04 THOUSANDS/ÂΜL (ref 0.6–4.47)
LYMPHOCYTES NFR BLD AUTO: 35 % (ref 14–44)
MCH RBC QN AUTO: 28.9 PG (ref 26.8–34.3)
MCHC RBC AUTO-ENTMCNC: 32.7 G/DL (ref 31.4–37.4)
MCV RBC AUTO: 88 FL (ref 82–98)
MONOCYTES # BLD AUTO: 0.37 THOUSAND/ÂΜL (ref 0.17–1.22)
MONOCYTES NFR BLD AUTO: 13 % (ref 4–12)
NEUTROPHILS # BLD AUTO: 1.37 THOUSANDS/ÂΜL (ref 1.85–7.62)
NEUTS SEG NFR BLD AUTO: 46 % (ref 43–75)
NRBC BLD AUTO-RTO: 0 /100 WBCS
PLATELET # BLD AUTO: 80 THOUSANDS/UL (ref 149–390)
PMV BLD AUTO: 10.2 FL (ref 8.9–12.7)
POTASSIUM SERPL-SCNC: 3.1 MMOL/L (ref 3.5–5.3)
RBC # BLD AUTO: 2.46 MILLION/UL (ref 3.81–5.12)
RH BLD: POSITIVE
SODIUM SERPL-SCNC: 139 MMOL/L (ref 135–147)
SPECIMEN EXPIRATION DATE: NORMAL
WBC # BLD AUTO: 2.97 THOUSAND/UL (ref 4.31–10.16)

## 2025-05-14 PROCEDURE — 99233 SBSQ HOSP IP/OBS HIGH 50: CPT | Performed by: INTERNAL MEDICINE

## 2025-05-14 PROCEDURE — 85025 COMPLETE CBC W/AUTO DIFF WBC: CPT | Performed by: INTERNAL MEDICINE

## 2025-05-14 PROCEDURE — 94762 N-INVAS EAR/PLS OXIMTRY CONT: CPT

## 2025-05-14 PROCEDURE — 86923 COMPATIBILITY TEST ELECTRIC: CPT

## 2025-05-14 PROCEDURE — 92526 ORAL FUNCTION THERAPY: CPT

## 2025-05-14 PROCEDURE — 80048 BASIC METABOLIC PNL TOTAL CA: CPT | Performed by: INTERNAL MEDICINE

## 2025-05-14 PROCEDURE — 86850 RBC ANTIBODY SCREEN: CPT | Performed by: INTERNAL MEDICINE

## 2025-05-14 PROCEDURE — P9016 RBC LEUKOCYTES REDUCED: HCPCS

## 2025-05-14 PROCEDURE — 86900 BLOOD TYPING SEROLOGIC ABO: CPT | Performed by: INTERNAL MEDICINE

## 2025-05-14 PROCEDURE — 86901 BLOOD TYPING SEROLOGIC RH(D): CPT | Performed by: INTERNAL MEDICINE

## 2025-05-14 PROCEDURE — 99233 SBSQ HOSP IP/OBS HIGH 50: CPT

## 2025-05-14 PROCEDURE — 82948 REAGENT STRIP/BLOOD GLUCOSE: CPT

## 2025-05-14 RX ADMIN — PRAVASTATIN SODIUM 80 MG: 80 TABLET ORAL at 16:51

## 2025-05-14 RX ADMIN — FOLIC ACID 1 MG: 1 TABLET ORAL at 08:44

## 2025-05-14 RX ADMIN — DEXTROSE AND SODIUM CHLORIDE 75 ML/HR: 5; .45 INJECTION, SOLUTION INTRAVENOUS at 13:38

## 2025-05-14 RX ADMIN — MICONAZOLE NITRATE: 20 CREAM TOPICAL at 17:52

## 2025-05-14 RX ADMIN — PANTOPRAZOLE SODIUM 40 MG: 40 TABLET, DELAYED RELEASE ORAL at 16:52

## 2025-05-14 RX ADMIN — DEXTROSE AND SODIUM CHLORIDE 75 ML/HR: 5; .45 INJECTION, SOLUTION INTRAVENOUS at 01:05

## 2025-05-14 RX ADMIN — MICONAZOLE NITRATE: 20 CREAM TOPICAL at 08:44

## 2025-05-14 NOTE — TELEPHONE ENCOUNTER
STILL ADMITTED:5/2/2025 - present (12 days)  [facility]          HFU/ SL ALL/ Word finding difficulty     ----- Message from Humberto Flaherty MD sent at 5/10/2025  9:39 AM EDT -----  Alecia Kay will need follow-up in in 4 weeks with general neurology team for Other= ATTENDING  in 60 minute appointment. They will not require outpatient neurological testing

## 2025-05-14 NOTE — SPEECH THERAPY NOTE
Speech Language/Pathology    Speech/Language Pathology Progress Note    Patient Name: Alecia Kay  Today's Date: 5/14/2025     Problem List  Principal Problem:    Hematochezia  Active Problems:    History of osteomyelitis    Chronic kidney disease, stage III (moderate) (HCC)    Hypothyroidism    Hypertension    Acute DVT (deep venous thrombosis) (HCC)    Acute pulmonary embolism (HCC)    Acute respiratory insufficiency    Acute blood loss anemia (ABLA)    Wang's esophagus    Insertional Achilles tendinopathy    Chronic osteomyelitis (HCC)    Word finding difficulty    Goals of care, counseling/discussion    Folic acid deficiency    Abnormal TSH    Hypothermia       Past Medical History  Past Medical History:   Diagnosis Date    Anemia     Cellulitis     Disease of thyroid gland     GERD (gastroesophageal reflux disease)     Hyperlipidemia     Hypertension     Obesity     Osteomyelitis (HCC)     Pneumonia     Pulmonary embolism (HCC)         Past Surgical History  Past Surgical History:   Procedure Laterality Date    FOOT SURGERY      IR IVC FILTER PLACEMENT OPTIONAL/TEMPORARY  5/9/2025    JOINT REPLACEMENT           Subjective:  Pt lethargic but slightly arousable when seated upright. Responded/mumbled yes/no or nodded slightly in reponse to ?'s. (Eventually stopped responding)  Objective:  Pt seen for po potential . See above for alertness level. Noted mouth quite dry. Pt allowed me to do oral care. I asked if it felt better. Nodded yes. Trialed small ice chips. Pt made no attempt to manipulate them and trials were dcd.  I asked the pt if she wanted to try anything to eat or drink. Min nod no.   Assessment:  Arouses only minimally. Not appropriate for po at this time.   Staff have not been attempting to feed pt due to lethargy.   Plan/Recommendations:  NPO as pt is not alert. ? GOC, Alternate nutrition of indicated. Will check status, f/u if indicated. Please notify if signif improvement in status. Will  check peripherally for now.

## 2025-05-14 NOTE — ASSESSMENT & PLAN NOTE
She had a stroke alert and underwent stroke workup due to word finding difficulty  Stroke workup was negative.  She has metabolic encephalopathy due to multiple issues and prolonged hospitalization  She continues to have lethargy, poor oral intake  Continue D5 half saline to avoid hypoglycemia  Avoid sedating medications

## 2025-05-14 NOTE — ASSESSMENT & PLAN NOTE
She was noted to have hypothermia today requiring Tyrone hugger  Could be related to advanced age, anemia, medications  Repeat blood cultures sent on 5/13/2025 to rule out infection currently without growth for 24 hours

## 2025-05-14 NOTE — PLAN OF CARE
Problem: PAIN - ADULT  Goal: Verbalizes/displays adequate comfort level or baseline comfort level  Description: Interventions:- Encourage patient to monitor pain and request assistance- Assess pain using appropriate pain scale- Administer analgesics based on type and severity of pain and evaluate response- Implement non-pharmacological measures as appropriate and evaluate response- Consider cultural and social influences on pain and pain management- Notify physician/advanced practitioner if interventions unsuccessful or patient reports new pain  Outcome: Progressing     Problem: INFECTION - ADULT  Goal: Absence or prevention of progression during hospitalization  Description: INTERVENTIONS:- Assess and monitor for signs and symptoms of infection- Monitor lab/diagnostic results- Monitor all insertion sites, i.e. indwelling lines, tubes, and drains- Monitor endotracheal if appropriate and nasal secretions for changes in amount and color- [place] appropriate cooling/warming therapies per order- Administer medications as ordered- Instruct and encourage patient and family to use good hand hygiene technique- Identify and instruct in appropriate isolation precautions for identified infection/condition  Outcome: Progressing  Goal: Absence of fever/infection during neutropenic period  Description: INTERVENTIONS:- Monitor WBC  Outcome: Progressing     Problem: SAFETY ADULT  Goal: Patient will remain free of falls  Description: INTERVENTIONS:- Educate patient/family on patient safety including physical limitations- Instruct patient to call for assistance with activity - Consult OT/PT to assist with strengthening/mobility - Keep Call bell within reach- Keep bed low and locked with side rails adjusted as appropriate- Keep care items and personal belongings within reach- Initiate and maintain comfort rounds- Make Fall Risk Sign visible to staff- Offer Toileting every 2 Hours, in advance of need- Initiate/Maintain bed alarm-  Obtain necessary fall risk management equipment: - Apply yellow socks and bracelet for high fall risk patients- Consider moving patient to room near nurses station  Outcome: Progressing  Goal: Maintain or return to baseline ADL function  Description: INTERVENTIONS:-  Assess patient's ability to carry out ADLs; assess patient's baseline for ADL function and identify physical deficits which impact ability to perform ADLs (bathing, care of mouth/teeth, toileting, grooming, dressing, etc.)- Assess/evaluate cause of self-care deficits - Assess range of motion- Assess patient's mobility; develop plan if impaired- Assess patient's need for assistive devices and provide as appropriate- Encourage maximum independence but intervene and supervise when necessary- Involve family in performance of ADLs- Assess for home care needs following discharge - Consider OT consult to assist with ADL evaluation and planning for discharge- Provide patient education as appropriate  Outcome: Progressing  Goal: Maintains/Returns to pre admission functional level  Description: INTERVENTIONS:- Perform AM-PAC 6 Click Basic Mobility/ Daily Activity assessment daily.- Set and communicate daily mobility goal to care team and patient/family/caregiver. - Collaborate with rehabilitation services on mobility goals if consulted- Perform Range of Motion 3 times a day.- Reposition patient every 2 hours.- Dangle patient 3 times a day- Stand patient 3 times a day- Ambulate patient 3 times a day- Out of bed to chair 3 times a day - Out of bed for meals 3 times a day- Out of bed for toileting- Record patient progress and toleration of activity level   Outcome: Progressing     Problem: DISCHARGE PLANNING  Goal: Discharge to home or other facility with appropriate resources  Description: INTERVENTIONS:- Identify barriers to discharge w/patient and caregiver- Arrange for needed discharge resources and transportation as appropriate- Identify discharge learning  needs (meds, wound care, etc.)- Arrange for interpretive services to assist at discharge as needed- Refer to Case Management Department for coordinating discharge planning if the patient needs post-hospital services based on physician/advanced practitioner order or complex needs related to functional status, cognitive ability, or social support system  Outcome: Progressing     Problem: Knowledge Deficit  Goal: Patient/family/caregiver demonstrates understanding of disease process, treatment plan, medications, and discharge instructions  Description: Complete learning assessment and assess knowledge base.Interventions:- Provide teaching at level of understanding- Provide teaching via preferred learning methods  Outcome: Progressing     Problem: Nutrition/Hydration-ADULT  Goal: Nutrient/Hydration intake appropriate for improving, restoring or maintaining nutritional needs  Description: Monitor and assess patient's nutrition/hydration status for malnutrition. Collaborate with interdisciplinary team and initiate plan and interventions as ordered.  Monitor patient's weight and dietary intake as ordered or per policy. Utilize nutrition screening tool and intervene as necessary. Determine patient's food preferences and provide high-protein, high-caloric foods as appropriate. INTERVENTIONS:- Monitor oral intake, urinary output, labs, and treatment plans- Assess nutrition and hydration status and recommend course of action- Evaluate amount of meals eaten- Assist patient with eating if necessary - Allow adequate time for meals- Recommend/ encourage appropriate diets, oral nutritional supplements, and vitamin/mineral supplements- Order, calculate, and assess calorie counts as needed- Recommend, monitor, and adjust tube feedings and TPN/PPN based on assessed needs- Assess need for intravenous fluids- Provide specific nutrition/hydration education as appropriate- Include patient/family/caregiver in decisions related to  nutrition  Monitor and assess patient's nutrition/hydration status for malnutrition. Collaborate with interdisciplinary team and initiate plan and interventions as ordered.  Monitor patient's weight and dietary intake as ordered or per policy. Utilize nutrition screening tool and intervene as necessary. Determine patient's food preferences and provide high-protein, high-caloric foods as appropriate. INTERVENTIONS:- Monitor oral intake, urinary output, labs, and treatment plans- Assess nutrition and hydration status and recommend course of action- Evaluate amount of meals eaten- Assist patient with eating if necessary - Allow adequate time for meals- Recommend/ encourage appropriate diets, oral nutritional supplements, and vitamin/mineral supplements- Order, calculate, and assess calorie counts as needed- Recommend, monitor, and adjust tube feedings and TPN/PPN based on assessed needs- Assess need for intravenous fluids- Provide specific nutrition/hydration education as appropriate- Include patient/family/caregiver in decisions related to nutrition  Outcome: Progressing     Problem: Prexisting or High Potential for Compromised Skin Integrity  Goal: Skin integrity is maintained or improved  Description: INTERVENTIONS:- Identify patients at risk for skin breakdown- Assess and monitor skin integrity- Assess and monitor nutrition and hydration status- Monitor labs - Assess for incontinence - Turn and reposition patient- Assist with mobility/ambulation- Relieve pressure over bony prominences- Avoid friction and shearing- Provide appropriate hygiene as needed including keeping skin clean and dry- Evaluate need for skin moisturizer/barrier cream- Collaborate with interdisciplinary team - Patient/family teaching- Consider wound care consult   Outcome: Progressing     Problem: HEMATOLOGIC - ADULT  Goal: Maintains hematologic stability  Description: INTERVENTIONS- Assess for signs and symptoms of bleeding or hemorrhage-  Monitor labs- Administer supportive blood products/factors as ordered and appropriate  Outcome: Progressing     Problem: Communication Impairment  Goal: Ability to express needs and understand communication  Description: Assess patient's communication skills and ability to understand information.  Patient will demonstrate use of effective communication techniques, alternative methods of communication and understanding even if not able to speak. - Encourage communication and provide alternate methods of communication as needed.- Collaborate with case management/ for discharge needs.- Include patient/family/caregiver in decisions related to communication.  Outcome: Progressing

## 2025-05-14 NOTE — ASSESSMENT & PLAN NOTE
History of DVT   However during 4/25/2025 [facility] admission found to have bilateral extensive extensive acute vs subacute DVTs, in the setting of subtherapeutic INR  Not a candidate for anticoagulation so IVC filter was felt to be the best course of action  IVC filter placed 5/9

## 2025-05-14 NOTE — ASSESSMENT & PLAN NOTE
Extensive right upper and lower lobe pulmonary embolism seen on 4/28/2025 CTA  2D echocardiogram without evidence of right heart strain  She already had a history of DVT and PE: Maintained on warfarin previously, but was not taking it in the past month prior to admission--(INR 1.18)  Due to subtherapeutic INR, was started on bridging therapy with anticoagulation however had intractable GI bleed  Anticoagulation contraindicated due to recurrent lower GI bleed   She was evaluated by pulmonology who recommended  IVC filter placement.  Temporary IVC filter placed 5/9 by IR

## 2025-05-14 NOTE — OCCUPATIONAL THERAPY NOTE
Occupational Therapy         Patient Name: Alecia Kay  Today's Date: 5/14/2025 05/14/25 1356   OT Last Visit   OT Visit Date 05/14/25   Note Type   Note type Cancelled Session   Cancel Reasons Medical status   Additional Comments Pt not medically appropriate for skilled services this date. will continue to follow and treat as medically appropriate.     Doris Calvin, OT

## 2025-05-14 NOTE — ASSESSMENT & PLAN NOTE
Discussed overall condition and plan of care with her son and  son-in-law at the bedside  They are aware that she has been through significant medical conditions over the past few weeks.  They also are aware of her difficult weight loss even prior to this admission.  We discussed possible tube feeding (NGT and feeding tube) if she continues to have poor oral intake  We discussed trying a blood transfusion to see if this improves her encephalopathy and poor oral intake.  They are agreeable to the blood transfusion but no to the feeding tube at this time.  We discussed the possibility of transitioning her to hospice if she does not improve  At this time she is not stable to go to a short-term rehab which was originally planned.

## 2025-05-14 NOTE — ASSESSMENT & PLAN NOTE
Pt is a 81yo female with PMH significant for hypertension, DVT/PE CKD 3, and right heel osteomyelitis initially presented to [facility] 4/25/2025 for generalized weakness.  Initially diagnosed with GAY and MDR UTI: completed course of ertapenem, and then was transferred to [facility] for GI evaluation for hematochezia/acute blood loss anemia    Hematochezia secondary to diverticular bleed on top of anticoagulation  She was not felt to be a candidate for further anticoagulation so an IVC filter was placed

## 2025-05-14 NOTE — PLAN OF CARE
Problem: PAIN - ADULT  Goal: Verbalizes/displays adequate comfort level or baseline comfort level  Description: Interventions:- Encourage patient to monitor pain and request assistance- Assess pain using appropriate pain scale- Administer analgesics based on type and severity of pain and evaluate response- Implement non-pharmacological measures as appropriate and evaluate response- Consider cultural and social influences on pain and pain management- Notify physician/advanced practitioner if interventions unsuccessful or patient reports new pain  Outcome: Progressing     Problem: INFECTION - ADULT  Goal: Absence or prevention of progression during hospitalization  Description: INTERVENTIONS:- Assess and monitor for signs and symptoms of infection- Monitor lab/diagnostic results- Monitor all insertion sites, i.e. indwelling lines, tubes, and drains- Monitor endotracheal if appropriate and nasal secretions for changes in amount and color- [place] appropriate cooling/warming therapies per order- Administer medications as ordered- Instruct and encourage patient and family to use good hand hygiene technique- Identify and instruct in appropriate isolation precautions for identified infection/condition  Outcome: Progressing  Goal: Absence of fever/infection during neutropenic period  Description: INTERVENTIONS:- Monitor WBC  Outcome: Progressing     Problem: SAFETY ADULT  Goal: Patient will remain free of falls  Description: INTERVENTIONS:- Educate patient/family on patient safety including physical limitations- Instruct patient to call for assistance with activity - Consult OT/PT to assist with strengthening/mobility - Keep Call bell within reach- Keep bed low and locked with side rails adjusted as appropriate- Keep care items and personal belongings within reach- Initiate and maintain comfort rounds- Make Fall Risk Sign visible to staff- Offer Toileting every 2 Hours, in advance of need- Initiate/Maintain bed alarm-  Obtain necessary fall risk management equipment: - Apply yellow socks and bracelet for high fall risk patients- Consider moving patient to room near nurses station  Outcome: Progressing  Goal: Maintain or return to baseline ADL function  Description: INTERVENTIONS:-  Assess patient's ability to carry out ADLs; assess patient's baseline for ADL function and identify physical deficits which impact ability to perform ADLs (bathing, care of mouth/teeth, toileting, grooming, dressing, etc.)- Assess/evaluate cause of self-care deficits - Assess range of motion- Assess patient's mobility; develop plan if impaired- Assess patient's need for assistive devices and provide as appropriate- Encourage maximum independence but intervene and supervise when necessary- Involve family in performance of ADLs- Assess for home care needs following discharge - Consider OT consult to assist with ADL evaluation and planning for discharge- Provide patient education as appropriate  Outcome: Progressing  Goal: Maintains/Returns to pre admission functional level  Description: INTERVENTIONS:- Perform AM-PAC 6 Click Basic Mobility/ Daily Activity assessment daily.- Set and communicate daily mobility goal to care team and patient/family/caregiver. - Collaborate with rehabilitation services on mobility goals if consulted- Perform Range of Motion 3 times a day.- Reposition patient every 2 hours.- Dangle patient 3 times a day- Stand patient 3 times a day- Ambulate patient 3 times a day- Out of bed to chair 3 times a day - Out of bed for meals 3 times a day- Out of bed for toileting- Record patient progress and toleration of activity level   Outcome: Progressing     Problem: DISCHARGE PLANNING  Goal: Discharge to home or other facility with appropriate resources  Description: INTERVENTIONS:- Identify barriers to discharge w/patient and caregiver- Arrange for needed discharge resources and transportation as appropriate- Identify discharge learning  needs (meds, wound care, etc.)- Arrange for interpretive services to assist at discharge as needed- Refer to Case Management Department for coordinating discharge planning if the patient needs post-hospital services based on physician/advanced practitioner order or complex needs related to functional status, cognitive ability, or social support system  Outcome: Progressing     Problem: Knowledge Deficit  Goal: Patient/family/caregiver demonstrates understanding of disease process, treatment plan, medications, and discharge instructions  Description: Complete learning assessment and assess knowledge base.Interventions:- Provide teaching at level of understanding- Provide teaching via preferred learning methods  Outcome: Progressing     Problem: Nutrition/Hydration-ADULT  Goal: Nutrient/Hydration intake appropriate for improving, restoring or maintaining nutritional needs  Description: Monitor and assess patient's nutrition/hydration status for malnutrition. Collaborate with interdisciplinary team and initiate plan and interventions as ordered.  Monitor patient's weight and dietary intake as ordered or per policy. Utilize nutrition screening tool and intervene as necessary. Determine patient's food preferences and provide high-protein, high-caloric foods as appropriate. INTERVENTIONS:- Monitor oral intake, urinary output, labs, and treatment plans- Assess nutrition and hydration status and recommend course of action- Evaluate amount of meals eaten- Assist patient with eating if necessary - Allow adequate time for meals- Recommend/ encourage appropriate diets, oral nutritional supplements, and vitamin/mineral supplements- Order, calculate, and assess calorie counts as needed- Recommend, monitor, and adjust tube feedings and TPN/PPN based on assessed needs- Assess need for intravenous fluids- Provide specific nutrition/hydration education as appropriate- Include patient/family/caregiver in decisions related to  nutrition  Monitor and assess patient's nutrition/hydration status for malnutrition. Collaborate with interdisciplinary team and initiate plan and interventions as ordered.  Monitor patient's weight and dietary intake as ordered or per policy. Utilize nutrition screening tool and intervene as necessary. Determine patient's food preferences and provide high-protein, high-caloric foods as appropriate. INTERVENTIONS:- Monitor oral intake, urinary output, labs, and treatment plans- Assess nutrition and hydration status and recommend course of action- Evaluate amount of meals eaten- Assist patient with eating if necessary - Allow adequate time for meals- Recommend/ encourage appropriate diets, oral nutritional supplements, and vitamin/mineral supplements- Order, calculate, and assess calorie counts as needed- Recommend, monitor, and adjust tube feedings and TPN/PPN based on assessed needs- Assess need for intravenous fluids- Provide specific nutrition/hydration education as appropriate- Include patient/family/caregiver in decisions related to nutrition  Outcome: Progressing     Problem: Prexisting or High Potential for Compromised Skin Integrity  Goal: Skin integrity is maintained or improved  Description: INTERVENTIONS:- Identify patients at risk for skin breakdown- Assess and monitor skin integrity- Assess and monitor nutrition and hydration status- Monitor labs - Assess for incontinence - Turn and reposition patient- Assist with mobility/ambulation- Relieve pressure over bony prominences- Avoid friction and shearing- Provide appropriate hygiene as needed including keeping skin clean and dry- Evaluate need for skin moisturizer/barrier cream- Collaborate with interdisciplinary team - Patient/family teaching- Consider wound care consult   Outcome: Progressing     Problem: HEMATOLOGIC - ADULT  Goal: Maintains hematologic stability  Description: INTERVENTIONS- Assess for signs and symptoms of bleeding or hemorrhage-  Monitor labs- Administer supportive blood products/factors as ordered and appropriate  Outcome: Progressing     Problem: Communication Impairment  Goal: Ability to express needs and understand communication  Description: Assess patient's communication skills and ability to understand information.  Patient will demonstrate use of effective communication techniques, alternative methods of communication and understanding even if not able to speak. - Encourage communication and provide alternate methods of communication as needed.- Collaborate with case management/ for discharge needs.- Include patient/family/caregiver in decisions related to communication.  Outcome: Progressing

## 2025-05-14 NOTE — PROGRESS NOTES
Progress Note - Hospitalist   Name: Alecia Kay 80 y.o. female I MRN: [id number]  Unit/Bed#: E4 -01 I Date of Admission: 5/2/2025   Date of Service: 5/14/2025 I Hospital Day: 12    Assessment & Plan  Hematochezia  Pt is a 81yo female with PMH significant for hypertension, DVT/PE CKD 3, and right heel osteomyelitis initially presented to [facility] 4/25/2025 for generalized weakness.  Initially diagnosed with GAY and MDR UTI: completed course of ertapenem, and then was transferred to [facility] for GI evaluation for hematochezia/acute blood loss anemia    Hematochezia secondary to diverticular bleed on top of anticoagulation  She was not felt to be a candidate for further anticoagulation so an IVC filter was placed  Acute blood loss anemia (ABLA)  Secondary to GI bleed  Baseline hemoglobin appears to be variable over the last year ranging 7.5-10  Acute blood loss anemia due to hematochezia secondary to diverticular bleed  Status post total of 6 units packed red blood cell transfusion prior to today.    Due to ongoing encephalopathy and weakness, with hemoglobin of 7.1, will transfuse 1 unit to see if this helps improve her mental status and activity.  Metabolic encephalopathy  She had a stroke alert and underwent stroke workup due to word finding difficulty  Stroke workup was negative.  She has metabolic encephalopathy due to multiple issues and prolonged hospitalization  She continues to have lethargy, poor oral intake  Continue D5 half saline to avoid hypoglycemia  Avoid sedating medications  Goals of care, counseling/discussion  Discussed overall condition and plan of care with her son and  son-in-law at the bedside  They are aware that she has been through significant medical conditions over the past few weeks.  They also are aware of her difficult weight loss even prior to this admission.  We discussed possible tube feeding (NGT and feeding tube) if she continues to have poor oral  intake  We discussed trying a blood transfusion to see if this improves her encephalopathy and poor oral intake.  They are agreeable to the blood transfusion but no to the feeding tube at this time.  We discussed the possibility of transitioning her to hospice if she does not improve  At this time she is not stable to go to a short-term rehab which was originally planned.  Hypothermia  She was noted to have hypothermia today requiring Tyrone hugger  Could be related to advanced age, anemia, medications  Repeat blood cultures sent on 5/13/2025 to rule out infection currently without growth for 24 hours    Acute pulmonary embolism (HCC)  Extensive right upper and lower lobe pulmonary embolism seen on 4/28/2025 CTA  2D echocardiogram without evidence of right heart strain  She already had a history of DVT and PE: Maintained on warfarin previously, but was not taking it in the past month prior to admission--(INR 1.18)  Due to subtherapeutic INR, was started on bridging therapy with anticoagulation however had intractable GI bleed  Anticoagulation contraindicated due to recurrent lower GI bleed   She was evaluated by pulmonology who recommended  IVC filter placement.  Temporary IVC filter placed 5/9 by IR  Acute DVT (deep venous thrombosis) (HCC)  History of DVT   However during 4/25/20[facility] admission found to have bilateral extensive extensive acute vs subacute DVTs, in the setting of subtherapeutic INR  Not a candidate for anticoagulation so IVC filter was felt to be the best course of action  IVC filter placed 5/9   Acute respiratory insufficiency  Likely secondary to pulmonary embolism: Was placed on 2 L nasal cannula, since improved  Currently with adequate oxygenation on 2 L  Hypertension  Patient previously previously on amlodipine/beta-blocker  Medications held for hypotension with hematochezia  Hypothyroidism  Continue levothyroxine  Wang's esophagus  Noted on EGD  Will need repeat EGD in 3 years for  surveillance  Patient with dysphagia: Continue proton pump inhibitor bid  Abnormal TSH  Pt with low TSH and elevated free T4: However in the setting of acute illness, recent IV contrast, as well as IV steroid:  Reviewed with endocrinology on-call team: Not accurate in the setting of acute illness and above factors  Recommend recheck full TFTs in 2-4 weeks as an outpatient.    VTE Pharmacologic Prophylaxis: VTE Score: 6 High Risk (Score >/= 5) - Pharmacological DVT Prophylaxis Contraindicated. Sequential Compression Devices Ordered.    Patient Centered Rounds: I performed bedside rounds with nursing staff today.   Discussions with Specialists or Other Care Team Provider: Case management    Education and Discussions with Family / Patient: Updated  (son and son in law) at bedside.    Current Length of Stay: 12 day(s)  Current Patient Status: Inpatient   Certification Statement: The patient will continue to require additional inpatient hospital stay due to ongoing encephalopathy and developing failure to thrive  Discharge Plan: Anticipate discharge in 48-72 hrs to TBD    Code Status: Level 3 - DNAR and DNI    Subjective   She remains lethargic  She would wake up briefly and go back to sleep  Poor oral intake    Objective :  Temp:  [96.6 °F (35.9 °C)-97.6 °F (36.4 °C)] 97.4 °F (36.3 °C)  HR:  [68-84] 71  BP: (114-143)/(67-84) 143/80  Resp:  [18-20] 20  SpO2:  [90 %-96 %] 95 %  O2 Device: None (Room air)    Body mass index is 31.45 kg/m².     Input and Output Summary (last 24 hours):     Intake/Output Summary (Last 24 hours) at 5/14/2025 1610  Last data filed at 5/14/2025 1554  Gross per 24 hour   Intake 420.83 ml   Output --   Net 420.83 ml       Physical Exam  Constitutional:       Appearance: She is ill-appearing.   HENT:      Head: Normocephalic and atraumatic.      Nose: No congestion or rhinorrhea.     Eyes:      General: No scleral icterus.      Cardiovascular:      Rate and Rhythm: Normal rate and  regular rhythm.   Pulmonary:      Breath sounds: No wheezing, rhonchi or rales.      Comments: Poor effort  Abdominal:      Tenderness: There is no abdominal tenderness. There is no guarding.     Musculoskeletal:      Right lower leg: No edema.      Left lower leg: No edema.     Skin:     General: Skin is warm and dry.     Neurological:      Comments: lethargic   Psychiatric:      Comments: Depressed mood       Lab Results: I have reviewed the following results:   Results from last 7 days   Lab Units 05/14/25  0531   WBC Thousand/uL 2.97*   HEMOGLOBIN g/dL 7.1*   HEMATOCRIT % 21.7*   PLATELETS Thousands/uL 80*   SEGS PCT % 46   LYMPHO PCT % 35   MONO PCT % 13*   EOS PCT % 5     Results from last 7 days   Lab Units 05/14/25  0531 05/13/25  0459 05/12/25  0624   SODIUM mmol/L 139   < > 142   POTASSIUM mmol/L 3.1*   < > 2.9*   CHLORIDE mmol/L 107   < > 109*   CO2 mmol/L 26   < > 27   BUN mg/dL 7   < > 7   CREATININE mg/dL 0.99   < > 0.91   ANION GAP mmol/L 6   < > 6   CALCIUM mg/dL 7.2*   < > 7.7*   ALBUMIN g/dL  --   --  2.5*   TOTAL BILIRUBIN mg/dL  --   --  0.57   ALK PHOS U/L  --   --  54   ALT U/L  --   --  6*   AST U/L  --   --  14   GLUCOSE RANDOM mg/dL 78   < > 112    < > = values in this interval not displayed.     Results from last 7 days   Lab Units 05/08/25  0531   INR  1.69*     Results from last 7 days   Lab Units 05/14/25  1113 05/14/25  0729 05/13/25 2128 05/13/25  1616 05/13/25  1113 05/13/25  0739 05/12/25 2122 05/11/25  2109 05/11/25  1548 05/11/25  1115 05/11/25  0723 05/10/25  2032   POC GLUCOSE mg/dl 87 84 69 83 59* 87 122 139 117 106 111 94     Results from last 7 days   Lab Units 05/08/25  1226   HEMOGLOBIN A1C % 6.1*           Recent Cultures (last 7 days):   Results from last 7 days   Lab Units 05/13/25  1106   BLOOD CULTURE  No Growth at 24 hrs.  No Growth at 24 hrs.       Imaging Results Review: I reviewed radiology reports from this admission including: CT abdomen/pelvis, CT head, MRI  brain, and procedure reports.      Last 24 Hours Medication List:     Current Facility-Administered Medications:     acetaminophen (TYLENOL) tablet 650 mg, Q6H PRN    [Held by provider] amLODIPine (NORVASC) tablet 10 mg, Daily    dextrose 5 % and sodium chloride 0.45 % infusion, Continuous, Last Rate: 75 mL/hr (05/14/25 1338)    folic acid (FOLVITE) tablet 1 mg, Daily    hydrOXYzine HCL (ATARAX) tablet 25 mg, Q8H PRN    levothyroxine tablet 75 mcg, Early Morning    meclizine (ANTIVERT) tablet 25 mg, TID PRN    [Held by provider] metoprolol tartrate (LOPRESSOR) tablet 50 mg, Q12H MELANY    [Held by provider] mirtazapine (REMERON) tablet 7.5 mg, HS    moisture barrier miconazole 2% cream (aka EDNA MOISTURE BARRIER ANTIFUNGAL CREAM), BID    ondansetron (ZOFRAN) injection 4 mg, Q4H PRN    pantoprazole (PROTONIX) EC tablet 40 mg, BID AC    potassium chloride (Klor-Con M20) CR tablet 40 mEq, Once    pravastatin (PRAVACHOL) tablet 80 mg, Daily With Dinner    Administrative Statements   Today, Patient Was Seen By: Oleksandr Peace MD      **Please Note: This note may have been constructed using a voice recognition system.**

## 2025-05-14 NOTE — ASSESSMENT & PLAN NOTE
Secondary to GI bleed  Baseline hemoglobin appears to be variable over the last year ranging 7.5-10  Acute blood loss anemia due to hematochezia secondary to diverticular bleed  Status post total of 6 units packed red blood cell transfusion prior to today.    Due to ongoing encephalopathy and weakness, with hemoglobin of 7.1, will transfuse 1 unit to see if this helps improve her mental status and activity.

## 2025-05-14 NOTE — PROGRESS NOTES
Progress Note - Geriatric Medicine   Alecia Kay 80 y.o. female MRN: [id number]  Unit/Bed#: E4 -01 Encounter: [id number]      Assessment/Plan:    Acute Encephalopathy  Patient presented to the hospital for generalized weakness   Baseline mentation is alert and oriented x 4 but forgetful at times   Current cause considerations   Suspected to be multifactorial secondary to age, possible underlying cognitive impairment, hematoochezia, acute PE/DVT, electrolyte imbalances, hypothermia, anemia, vision impairment, and prolonged hospitalization  UA on admission negative for UTI   No leukocytosis noted on labs today   Hemoglobin on labs this morning noted to be 7.1  Patient was a rapid response last week for acute mental status change   She was a stroke alert   CT of the head and MRI were negative for any acute infarcts   Her mental status appeared improved over the weekend and was even improving on 5/12  Patient was noted to be lethargic yesterday and is again lethargic today   She is minimally answering questions   Nursing notes she was hypothermic overnight from 5/12-5/13  New blood cultures were also obtained   She did undergo IVC filter placement on 5/9  Primary team did find POLST from January and patient was level 3 DNR at that time  Son agreeable to this change and patient is now level 3 DNR  Consider blood transfusion   If electrolyte imbalances and hemoglobin improve but patients condition does not may need to consider goals of care discussion   Identify and treat reversible causes of confusion including infection, dehydration, electrolyte imbalance, anemia, hypoxia, urinary retention, constipation, pain, and sleep disturbance  Maintain delirium precautions   Provide redirection, reorientation, and distraction techniques  Maintain fall and safety precautions   Assist with ADLs/IADLs  Avoid deliriogenic medications such as tramadol, benzodiazepines, anticholinergics, benadryl  Treat pain using  geriatric pain protocol   Encourage oral hydration and nutrition   Monitor for constipation and urinary retention   Implement sleep hygiene and limit night time interuptions   Maintain sleep-wake cycle   Encourage early and frequent mobilization   Most recent EKG on 5/8/2025 revealed a QTc interval  of 447  If all other interventions are unsuccessful for acute agitation and behaviors, can consider Zyprexa 2.5 mg IM Q 8 hours prn   Would avoid benzodiazepines such as Ativan if possible as these medications can cause/worsen delirium   Redirect and reorient as needed  Keep mentally, physically, and socially active    Cognitive Screening  Patient has no documented history of memory loss or cognitive impairment   She notes no issues with her memory at baseline   Patient has been having periods of confusion here  Please see cause considerations as discussed above  Patient was a stroke alert las week for altered mental status and speech difficulties   She had been slowly improving though her mentation continues to wax and wane   She was noted to be lethargic yesterday and is lethargic again today   Most recent TSH on 5/10/2025 noted to be 0.104  Most recent vitamin B 12 level on 5/10/2025 noted to be 1179  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 with no acute abnormalities   MRI of the brain on 5/9 revealed moderate microangiopathy   Patient scored 13/15 on BIMS on 3/5/2025 indicating she is cognitively intact   Maintain delirium precautions as discussed above   Redirect and reorient as needed  Keep physically, mentally and socially active     Deconditioning   Patient is at increased risk for deconditioning secondary to possible underlying cognitive impairment, hematochezia, acute PE/DVT, electrolyte imbalance, hypothermia, anemia, vision impairment, weakness, gait dysfunction, and prolonged hospitalization  Continue to optimize diet, hydration, and mobility for healing   Chronic Kidney Disease Stage  III  Baseline creatinine not entirely clear but recently appears to be 0.8-1.0  Creatinine on labs today noted to be 0.99  Avoid nephrotoxic medication and renal dose medication   Keep hydrated  Type II Diabetes   Most recent hemoglobin A1C on 11/13/2024 noted to be 5.7  Per chart review, she does not appear to be on any medication for this at baseline  Glucose on labs today noted to be stable at 104  Patient is now on blood sugar checks   Continue to monitor blood sugars closely   Avoid hypoglycemia   Anemia   Baseline hemoglobin appears to be 8-10  Patient was noted to have hematochezia which had initially resolved but returned at the end of last week   Hemoglobin on labs this morning noted to be 7.1  Continue to monitor CBC   Transfuse for hemoglobin < 7   Monitor for signs and symptoms of infection, dehydration, DVT, and skin breakdown    Frailty   Clinical Frail Scale: 6- Moderately Frail (current)  Need help with all outside activities  Need help with stairs and bathing  May need assistance with dressing  Most recent albumin on 5/12/2025 noted to be 2.5  Consider nutrition consult  Encourage protein supplementation     Ambulatory Dysfunction/Falls  Patient notes no recent falls at home   She states she ambulates with a roller walker at baseline   PT/OT consulted to assist with strengthening/mobility and assist with discharge planning to appropriate level of care  Assess patient frequently for physical needs, encourage use of assistant devices as needed and directed by PT/OT  Identify cognitive and physical deficits and behaviors that affect risk of falls  Consider moving patient closer to nursing station to monitor more closely for impulsive behavior which may increase risk of falls  [place] fall and safety precautions   Educate patient/family on patient safety including physical limitations and importance of using call bell for assistance   Modify environment to reduce risk of injury including disconnecting  from pole when not in use, ensuring adequate lighting in room and restroom, ensuring that path to restroom is clear and free of trip hazards  Out of bed as tolerated    Impaired Vision   Patient denies vision impairment   She notes she does have glasses for reading   Recommend use of corrective lenses at all appropriate times  Encourage adequate lighting and encourage use of assistance with ambulation  Keep personal belongings close to avoid reaching  Encourage appropriate footwear at all times  Recommend large font for printed materials provided to patient    Dentition/Appetite   Patient denies denture use  She states she has a good appetite at baseline   She is currently on a dysphagia I nectar thick liquid diet   Patients son and son-in-law at the bedside are concerned about poor oral intake  Patient has not been eating due to increased lethargy   Speech therapy consulted and following and is recommending NPO due to increased lethargy   Ensure meal consistency is appropriate for all abilities   Consider nutrition consult   Continue aspiration precautions     Elimination   Patient is continent of bowel and bladder at baseline  She appears to be voiding without difficulty   Patient was noted to have hematochezia and was found to have a diverticular bleed   The bleeding appears to have resolved at this point   Last documented bowel movement was on 5/11    Patient is not currently on a bowel regimen   Monitor for constipation and urinary retention     Insomnia   Patient notes no difficulty sleeping at baseline   She noted yesterday she had been having some vivid dreams here   Her son at the bedside notes this occurred when at rehab as well   Patient has had some difficulty sleeping here   Nursing notes no acute issues or events overnight   Can consider restarting mirtazapine vs trialing melatonin   First line is behavioral therapy   Avoid sedative hypnotics including benzodiazepines and benadryl  Encourage staying  awake during the day   Encourage daytime activities and morning exercise   Decrease or eliminate daytime naps   Avoid caffeine especially during late afternoon and evening hours  Establish a nighttime routine  Implement sleep hygiene and limit nighttime interruptions  Can consider melatonin 3 mg daily at bedtime for sleep if needed     Anxiety/Depression  Patient has a documented history of anxiety   PDMP reviewed and patient was prescribed alprazolam 0.25 mg on 4/11/2025  Unclear how often she was taking this   Patient is lethargic on exam today   Continue supportive care     Hematochezia   Patient presented to [facility] with generalized weakness  She was initially noted to have a UTI and GAY  Additionally she was found to have acute DVT and PE  Her INR was subtherapeutic and she was started on a heparin drip  She was later noted to have hematochezia and was a rapid response   She was transferred to [facility] due to lack of GI services   GI initially restarted the heparin infusion but this was discontinued due to a large amount of hematochezia  She underwent and EGD/colonoscopy on 5/5  EGD revealed Wang's esophagus with no evidence of bleeding   Colonoscopy revealed several diverticuli with old blood   GI suspected a diverticular bleed   The bleeding had resolved but the returned again   She did receive blood and FFP on 5/7  She underwent IVC filter placement on 5/9 and bleeding appears to have resolved   Hemoglobin on labs today noted to be 7.1  Consider blood transfusion   GI has since signed off   Management per primary team     Acute Pulmonary Embolism  Patient found to have extensive right upper and lower PE on CTA on 4/28/2025  Echo noted no evidence of heart strain   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   She is maintained on coumadin 5 mg 5 days per week and 7.5 mg Monday and Friday  Patient had nausea/vomiting for several days PTA and she was not able to take this medication   She had been on anticoagulation but  "this is again on hold due to hematochezia   GI is following for hematochezia   Patient underwent IVC filter on 5/9  Management per primary team     Chronic Osteomyelitis   Patient with previous chronic osteomyelitis of the right foot   Podiatry recommending WBAT in a surgical shoe  She completed 6 weeks of antibiotics PTA   MRI of the right heel and ankle on 4/29/2025 revealed no evidence of remaining osteomyelitis   Patient was re-evaluated by podiatry on 4/30/2025 and no changes to prior recommendations noted   She will require continued outpatient follow-up with podiatry on discharge       Subjective:   The patient is being seen and evaluated today at the bedside for geriatric follow-up. She is noted to be lying in bed comfortably in no acute distress. She is noted to be lethargic on my exam today. She does respond to her name initially and opened her eyes for a few seconds. She is minimally responsive to questions. She does not appear to be in any pain or discomfort.     Care was coordinated with patients nurse Janett. She notes the patient has been lethargic today.         Review of Systems   Unable to perform ROS: Mental status change (lethargic)         Objective:     Vitals: Blood pressure 114/84, pulse 84, temperature 97.6 °F (36.4 °C), temperature source Axillary, resp. rate 18, height 5' 2\" (1.575 m), weight 78 kg (171 lb 15.3 oz), SpO2 94%.,Body mass index is 31.45 kg/m².      Intake/Output Summary (Last 24 hours) at 5/14/2025 1237  Last data filed at 5/14/2025 0901  Gross per 24 hour   Intake 0 ml   Output --   Net 0 ml         Current Medications: Reviewed    Physical Exam:   Physical Exam  Vitals and nursing note reviewed.   Constitutional:       General: She is not in acute distress.  HENT:      Head: Normocephalic.      Mouth/Throat:      Mouth: Mucous membranes are dry.     Cardiovascular:      Rate and Rhythm: Normal rate and regular rhythm.   Pulmonary:      Effort: Pulmonary effort is normal. No " "respiratory distress.   Abdominal:      General: Bowel sounds are normal. There is no distension.     Musculoskeletal:         General: Swelling (generalized) present. No tenderness.     Skin:     General: Skin is warm and dry.     Neurological:      Mental Status: She is lethargic.          Invasive Devices       Long-dwell Peripherally Inserted Midline IV Catheter  Duration             Long-Dwell Peripheral IV (Midline) 05/07/25 Right Basilic 7 days                    Lab, Imaging and other studies: Results Review Statement: I reviewed AM labs.     Please note:  Voice-recognition software may have been used in the preparation of this document.  Occasional wrong word or \"sound-alike\" substitutions may have occurred due to the inherent limitations of voice recognition software.  Interpretation should be guided by context.    "

## 2025-05-14 NOTE — PHYSICAL THERAPY NOTE
Physical Therapy Cancellation Note            PT  is not medically appropriate for PT treatment interventions at this time. Will cancel and continue to follow at a later time as per PT POC.     Harleen Sen, PTA                                                                              Postop Diagnosis: same

## 2025-05-15 LAB
ABO GROUP BLD BPU: NORMAL
ANION GAP SERPL CALCULATED.3IONS-SCNC: 8 MMOL/L (ref 4–13)
BASOPHILS # BLD AUTO: 0.02 THOUSANDS/ÂΜL (ref 0–0.1)
BASOPHILS NFR BLD AUTO: 1 % (ref 0–1)
BPU ID: NORMAL
BUN SERPL-MCNC: 10 MG/DL (ref 5–25)
CALCIUM SERPL-MCNC: 6.9 MG/DL (ref 8.4–10.2)
CHLORIDE SERPL-SCNC: 104 MMOL/L (ref 96–108)
CO2 SERPL-SCNC: 26 MMOL/L (ref 21–32)
CREAT SERPL-MCNC: 0.91 MG/DL (ref 0.6–1.3)
CROSSMATCH: NORMAL
EOSINOPHIL # BLD AUTO: 0.13 THOUSAND/ÂΜL (ref 0–0.61)
EOSINOPHIL NFR BLD AUTO: 3 % (ref 0–6)
ERYTHROCYTE [DISTWIDTH] IN BLOOD BY AUTOMATED COUNT: 16.6 % (ref 11.6–15.1)
GFR SERPL CREATININE-BSD FRML MDRD: 59 ML/MIN/1.73SQ M
GLUCOSE SERPL-MCNC: 107 MG/DL (ref 65–140)
GLUCOSE SERPL-MCNC: 79 MG/DL (ref 65–140)
GLUCOSE SERPL-MCNC: 88 MG/DL (ref 65–140)
GLUCOSE SERPL-MCNC: 91 MG/DL (ref 65–140)
GLUCOSE SERPL-MCNC: 93 MG/DL (ref 65–140)
GLUCOSE SERPL-MCNC: 98 MG/DL (ref 65–140)
GLUCOSE SERPL-MCNC: 99 MG/DL (ref 65–140)
HCT VFR BLD AUTO: 28.9 % (ref 34.8–46.1)
HGB BLD-MCNC: 9.7 G/DL (ref 11.5–15.4)
IMM GRANULOCYTES # BLD AUTO: 0.02 THOUSAND/UL (ref 0–0.2)
IMM GRANULOCYTES NFR BLD AUTO: 1 % (ref 0–2)
LYMPHOCYTES # BLD AUTO: 1.21 THOUSANDS/ÂΜL (ref 0.6–4.47)
LYMPHOCYTES NFR BLD AUTO: 30 % (ref 14–44)
MCH RBC QN AUTO: 28.4 PG (ref 26.8–34.3)
MCHC RBC AUTO-ENTMCNC: 33.6 G/DL (ref 31.4–37.4)
MCV RBC AUTO: 85 FL (ref 82–98)
MONOCYTES # BLD AUTO: 0.36 THOUSAND/ÂΜL (ref 0.17–1.22)
MONOCYTES NFR BLD AUTO: 9 % (ref 4–12)
NEUTROPHILS # BLD AUTO: 2.3 THOUSANDS/ÂΜL (ref 1.85–7.62)
NEUTS SEG NFR BLD AUTO: 56 % (ref 43–75)
NRBC BLD AUTO-RTO: 0 /100 WBCS
PLATELET # BLD AUTO: 52 THOUSANDS/UL (ref 149–390)
PMV BLD AUTO: 11.2 FL (ref 8.9–12.7)
POTASSIUM SERPL-SCNC: 2.7 MMOL/L (ref 3.5–5.3)
RBC # BLD AUTO: 3.41 MILLION/UL (ref 3.81–5.12)
SODIUM SERPL-SCNC: 138 MMOL/L (ref 135–147)
UNIT DISPENSE STATUS: NORMAL
UNIT PRODUCT CODE: NORMAL
UNIT PRODUCT VOLUME: 350 ML
UNIT RH: NORMAL
WBC # BLD AUTO: 4.04 THOUSAND/UL (ref 4.31–10.16)

## 2025-05-15 PROCEDURE — 97530 THERAPEUTIC ACTIVITIES: CPT | Performed by: PHYSICAL THERAPIST

## 2025-05-15 PROCEDURE — 97110 THERAPEUTIC EXERCISES: CPT

## 2025-05-15 PROCEDURE — 99233 SBSQ HOSP IP/OBS HIGH 50: CPT

## 2025-05-15 PROCEDURE — 97110 THERAPEUTIC EXERCISES: CPT | Performed by: PHYSICAL THERAPIST

## 2025-05-15 PROCEDURE — 85025 COMPLETE CBC W/AUTO DIFF WBC: CPT | Performed by: INTERNAL MEDICINE

## 2025-05-15 PROCEDURE — 97535 SELF CARE MNGMENT TRAINING: CPT

## 2025-05-15 PROCEDURE — 80048 BASIC METABOLIC PNL TOTAL CA: CPT | Performed by: INTERNAL MEDICINE

## 2025-05-15 PROCEDURE — 82948 REAGENT STRIP/BLOOD GLUCOSE: CPT

## 2025-05-15 PROCEDURE — 97530 THERAPEUTIC ACTIVITIES: CPT

## 2025-05-15 PROCEDURE — 99232 SBSQ HOSP IP/OBS MODERATE 35: CPT | Performed by: INTERNAL MEDICINE

## 2025-05-15 RX ORDER — POTASSIUM CHLORIDE 14.9 MG/ML
20 INJECTION INTRAVENOUS EVERY 6 HOURS
Status: COMPLETED | OUTPATIENT
Start: 2025-05-15 | End: 2025-05-16

## 2025-05-15 RX ADMIN — POTASSIUM CHLORIDE 20 MEQ: 14.9 INJECTION, SOLUTION INTRAVENOUS at 22:43

## 2025-05-15 RX ADMIN — POTASSIUM CHLORIDE 20 MEQ: 14.9 INJECTION, SOLUTION INTRAVENOUS at 10:59

## 2025-05-15 RX ADMIN — MICONAZOLE NITRATE: 20 CREAM TOPICAL at 16:18

## 2025-05-15 RX ADMIN — ONDANSETRON 4 MG: 2 INJECTION INTRAMUSCULAR; INTRAVENOUS at 10:56

## 2025-05-15 RX ADMIN — MICONAZOLE NITRATE: 20 CREAM TOPICAL at 08:59

## 2025-05-15 RX ADMIN — POTASSIUM CHLORIDE 20 MEQ: 14.9 INJECTION, SOLUTION INTRAVENOUS at 16:18

## 2025-05-15 NOTE — PHYSICAL THERAPY NOTE
PT PROGRESS NOTE    Name: Alecia Kay  AGE: 80 y.o.  MRN: [id number]  LENGTH OF STAY: 13     05/15/25 1219   PT Last Visit   PT Visit Date 05/15/25   Note Type   Note Type Treatment for insurance authorization   Pain Assessment   Pain Assessment Tool 0-10   Pain Score No Pain   Restrictions/Precautions   Weight Bearing Precautions Per Order No   RLE Weight Bearing Per Order WBAT   Other Precautions Cognitive;Bed Alarm;WBS;Multiple lines;Fall Risk;Contact/isolation   General   Chart Reviewed Yes   Response to Previous Treatment Patient with no complaints from previous session.   Family/Caregiver Present Yes   Cognition   Overall Cognitive Status Impaired   Arousal/Participation Alert;Responsive;Cooperative   Attention Attends with cues to redirect   Orientation Level Oriented to person;Oriented to place;Oriented to time   Following Commands Follows one step commands with increased time or repetition   Subjective   Subjective I am dizzy.   Bed Mobility   Rolling R 4  Minimal assistance   Additional items Assist x 1;Bedrails;Increased time required;Verbal cues;LE management   Rolling L 4  Minimal assistance   Additional items Assist x 1;Bedrails;Increased time required;Verbal cues;LE management   Supine to Sit 3  Moderate assistance   Additional items Assist x 2;HOB elevated;Bedrails;Increased time required;Verbal cues;LE management   Sit to Supine 1  Dependent   Additional items Assist x 2;Increased time required;Verbal cues;LE management  (and trunk)   Additional Comments BP in supine 96/71. BP post interventions in supine 100/73. BP EOB 90/59. Pt dizzy with all positions. Pt able to static sit ~5 min EOB without support.   Transfers   Sit to Stand 3  Moderate assistance   Additional items Assist x 2;Increased time required;Verbal cues  (w/ RW)   Stand to Sit 3  Moderate assistance   Additional items Assist x 2;Increased time required;Verbal cues  (w/ RW)   Additional Comments Able to perform static  standing with RW and mod-maxAx1 ~30-40s.   Balance   Static Sitting Fair   Dynamic Sitting Fair -   Static Standing Poor -  (w/ RW)   Endurance Deficit   Endurance Deficit Yes   Endurance Deficit Description fatigue, dizziness   Activity Tolerance   Activity Tolerance Patient limited by fatigue   Medical Staff Made Aware MEHRAN Peguero   Nurse Made Aware KARIS Mora   Exercises   Heelslides Sitting;10 reps;AROM;Bilateral   Hip Abduction Sitting;10 reps;AROM;Bilateral   Hip Adduction Sitting;10 reps;AROM;Bilateral   Ankle Pumps Sitting;10 reps;AROM;Bilateral   Assessment   Prognosis Fair   Problem List Decreased strength;Decreased range of motion;Impaired balance;Decreased mobility;Impaired judgement;Decreased cognition;Decreased coordination;Decreased skin integrity;Obesity;Decreased safety awareness   Assessment Patient was seen today per POC. Overall, pt demonstrated improved mobility and inc tolerance to activity. ModAx2 for supine to sit, minAx1 for rolling L and R, modAx2 for sit<>stand, dependentx2 for sit to supine. Dependent transfer due to dizziness and fatigue, BP unable to read BP value at this time. Able to perform static standing with RW and mod-maxAx1. Significant fatigue with mobility. Inc dizziness throughout session. See flowsheet for BP values. Good tolerance to interventions with inc fatigue as anticipated. Will continue to see pt per POC as tolerated. From PT standpoint continued recommendation for level II, (moderate resource intensity) at D/C when medically cleared based current function. The patient's AM-PAC Basic Mobility Inpatient Short Form Raw Score is 10. A Raw score of less than or equal to 16 suggests the patient may benefit from discharge to post-acute rehabilitation services. Please also refer to the recommendation of the Physical Therapist for safe discharge planning.   Goals   Patient Goals to lay down   STG Expiration Date 05/19/25   PT Treatment Day 4   Plan   Treatment/Interventions  Functional transfer training;LE strengthening/ROM;Therapeutic exercise;Endurance training;Patient/family training;Bed mobility;Gait training;Spoke to nursing;OT   Progress Slow progress, decreased activity tolerance   PT Frequency 3-5x/wk   Discharge Recommendation   Rehab Resource Intensity Level, PT II (Moderate Resource Intensity)   AM-PAC Basic Mobility Inpatient   Turning in Flat Bed Without Bedrails 2   Lying on Back to Sitting on Edge of Flat Bed Without Bedrails 2   Moving Bed to Chair 2   Standing Up From Chair Using Arms 2   Walk in Room 1   Climb 3-5 Stairs With Railing 1   Basic Mobility Inpatient Raw Score 10   Turning Head Towards Sound 3   Follow Simple Instructions 3   Low Function Basic Mobility Raw Score  16   Low Function Basic Mobility Standardized Score  25.72   [facility] Highest Level Of Mobility   -HLM Goal 4: Move to chair/commode   -HLM Achieved 3: Sit at edge of bed   Education   Education Provided Mobility training;Home exercise program;Assistive device   Patient Reinforcement needed   End of Consult   Patient Position at End of Consult Supine;Bed/Chair alarm activated;All needs within reach   End of Consult Comments BP in supine 111/74.       Isidra Ruiz, PT

## 2025-05-15 NOTE — ASSESSMENT & PLAN NOTE
She was noted to have hypothermia requiring Tyrone hugger  Could be related to advanced age, anemia, medications  Repeat blood cultures sent on 5/13/2025 to rule out infection currently without growth for 48 hours  Hypothermia resolved

## 2025-05-15 NOTE — ASSESSMENT & PLAN NOTE
Likely secondary to pulmonary embolism and anemia  Was placed on 2 L nasal cannula  Resolved.  Currently on room air

## 2025-05-15 NOTE — OCCUPATIONAL THERAPY NOTE
Occupational Therapy Progress Note     Patient Name: Alecia Kay  Today's Date: 5/15/2025  Problem List  Principal Problem:    Hematochezia  Active Problems:    History of osteomyelitis    Chronic kidney disease, stage III (moderate) (HCC)    Hypothyroidism    Hypertension    Acute DVT (deep venous thrombosis) (HCC)    Acute pulmonary embolism (HCC)    Acute respiratory insufficiency    Acute blood loss anemia (ABLA)    Wang's esophagus    Insertional Achilles tendinopathy    Chronic osteomyelitis (HCC)    Metabolic encephalopathy    Goals of care, counseling/discussion    Folic acid deficiency    Abnormal TSH    Hypothermia            05/15/25 1140   OT Last Visit   OT Visit Date 05/15/25   Note Type   Note Type Treatment for insurance authorization   Pain Assessment   Pain Assessment Tool 0-10   Pain Score No Pain   Restrictions/Precautions   RLE Weight Bearing Per Order WBAT   Other Precautions Cognitive;Chair Alarm;Bed Alarm;Multiple lines;Fall Risk   ADL   LB Bathing Assistance 2  Maximal Assistance   LB Bathing Deficit Setup;Verbal cueing;Supervision/safety;Increased time to complete   LB Dressing Assistance 2  Maximal Assistance   LB Dressing Deficit Setup;Verbal cueing;Supervision/safety;Increased time to complete   Functional Standing Tolerance   Time 30- 40 seconds   Activity static standing. RW for support. Ax 2   Bed Mobility   Rolling R 4  Minimal assistance   Additional items Assist x 1;Increased time required;Bedrails;LE management   Rolling L 4  Minimal assistance   Additional items Assist x 1;Bedrails;Increased time required;Verbal cues;LE management   Supine to Sit 3  Moderate assistance   Additional items Assist x 2;HOB elevated;Bedrails;Increased time required;Verbal cues;LE management   Sit to Supine 1  Dependent   Additional items Assist x 2;Increased time required;Verbal cues;LE management   Transfers   Sit to Stand 3  Moderate assistance   Additional items Assist x 2;Increased  time required;Verbal cues   Stand to Sit 3  Moderate assistance   Additional items Assist x 2;Increased time required;Verbal cues   Additional Comments cues for safety, hand placement and best tech.   Functional Mobility   Additional Comments mod- maxA x1 with RW to maintain standing balance   Therapeutic Exercise - ROM   UE-ROM Yes   ROM- Right Upper Extremities   R Shoulder AROM;Flexion;Horizontal ABduction   R Elbow AROM;Elbow flexion;Elbow extension   R Wrist AROM;Wrist flexion;Wrist extension   R Position Supine   R Weight/Reps/Sets x 10 reps   ROM - Left Upper Extremities    L Shoulder AROM;Flexion;Horizontal ABduction   L Elbow AROM;Elbow flexion;Elbow extension   L Wrist AROM;Wrist flexion;Wrist extension   L Position Supine   L Weight/Reps/Sets x 10 reps   Cognition   Overall Cognitive Status Impaired   Arousal/Participation Alert;Responsive;Cooperative   Attention Attends with cues to redirect   Orientation Level Oriented to person;Oriented to place;Oriented to time   Memory Decreased short term memory;Decreased recall of recent events   Following Commands Follows one step commands with increased time or repetition   Additional Activities   Additional Activities Comments tolerated sitting EOB   Activity Tolerance   Activity Tolerance Patient tolerated treatment well;Patient limited by fatigue;Treatment limited secondary to medical complications (Comment)   Medical Staff Made Aware PT, RN   Assessment   Assessment Pt seen for skilled OT tx this date. Tx focused on improving strength, activity tolerance and balance, safety awareness to increase independence with self care tasks. Pt tolerated session fair. Pt was limited by weakness, dizziness and self limiting at times. No c/o pain during session. Pt performed LB dressing / bathing maxA , Toileting GONZALO,  bed mobility rolling [place] x1, supine>sit modAx2, sit> supine depx2, transfers sit<> stand modAx2, static standing mod- maxAx A to maintain standing. Pt  demonstrated fair -  sitting balance during functional tasks. Pt demonstrated ability to safely and appropriately attend to all tasks during session. Pt required moderate verbal cuing during session to safely complete tasks. Pt completed 1x10 of various BUE exercises to increase strength. Performed while supine. Tolerated well.   Vitals: supine 96/71> supine post exercise 100/73> seated EOB 90/59> supine post standing 111/74. Unable to get reading in standing but pt c/o lightheadedness and weakness.   Current OT DC recommendations for pt is level 2 resources.   Plan   Treatment Interventions ADL retraining;Functional transfer training;UE strengthening/ROM;Endurance training;Cognitive reorientation;Patient/family training;Equipment evaluation/education;Compensatory technique education;Activityengagement;Energy conservation   Goal Expiration Date 05/17/25   OT Treatment Day 4   OT Frequency 3-5x/wk   Discharge Recommendation   Rehab Resource Intensity Level, OT II (Moderate Resource Intensity)   AM-PAC Daily Activity Inpatient   Lower Body Dressing 1   Bathing 2   Toileting 1   Upper Body Dressing 2   Grooming 2   Eating 3   Daily Activity Raw Score 11   Daily Activity Standardized Score (Calc for Raw Score >=11) 29.04   AM-PAC Applied Cognition Inpatient   Following a Speech/Presentation 3   Understanding Ordinary Conversation 3   Taking Medications 2   Remembering Where Things Are Placed or Put Away 2   Remembering List of 4-5 Errands 2   Taking Care of Complicated Tasks 2   Applied Cognition Raw Score 14   Applied Cognition Standardized Score 32.02   Doris Calvin, OT

## 2025-05-15 NOTE — SPEECH THERAPY NOTE
Speech Language/Pathology    Speech/Language Pathology Progress Note    Patient Name: Alecia Kay  Today's Date: 5/15/2025     Problem List  Principal Problem:    Hematochezia  Active Problems:    History of osteomyelitis    Chronic kidney disease, stage III (moderate) (HCC)    Hypothyroidism    Hypertension    Acute DVT (deep venous thrombosis) (HCC)    Acute pulmonary embolism (HCC)    Acute respiratory insufficiency    Acute blood loss anemia (ABLA)    Wang's esophagus    Insertional Achilles tendinopathy    Chronic osteomyelitis (HCC)    Metabolic encephalopathy    Goals of care, counseling/discussion    Folic acid deficiency    Abnormal TSH    Hypothermia       Past Medical History  Past Medical History:   Diagnosis Date    Anemia     Cellulitis     Disease of thyroid gland     GERD (gastroesophageal reflux disease)     Hyperlipidemia     Hypertension     Obesity     Osteomyelitis (HCC)     Pneumonia     Pulmonary embolism (HCC)         Past Surgical History  Past Surgical History:   Procedure Laterality Date    FOOT SURGERY      IR IVC FILTER PLACEMENT OPTIONAL/TEMPORARY  5/9/2025    JOINT REPLACEMENT       Pt more alert. Spoke a few words to me. Appropriate.  Attempted multiple times to get her to eat and drink but she refused. Even refused water. Nurse reported she was able to get pt to take 1 sip of water and 1 tsp oatmeal at breakfast. Pt has h/o tortuous esophagus, Wang's esophagus, and 6 cm type 1 hiatal hernia. Reportedly had poor intake at home and was losing weight. Pt placed on current diet by SLIM (puree and nectar). Consider changing to thin as tolerated. Will f/u as able.

## 2025-05-15 NOTE — ASSESSMENT & PLAN NOTE
She had a stroke alert and underwent stroke workup due to word finding difficulty  Stroke workup was negative.  She has metabolic encephalopathy due to multiple issues and prolonged hospitalization  Lethargy and poor oral intake improved after blood transfusion.

## 2025-05-15 NOTE — NURSING NOTE
QUICK NOTE - Stat RN  Alecia Kay 80 y.o. female MRN: [id number]  Unit/Bed#: E4 -01 Encounter: [id number]    Date Paged: 05/15/25  Time Paged: 0115  Room #: 461  Arrival Time: 0115  Deterioration index score at time of page: 53.94  %  Spoke with KARIS Griffin  from primary team      PROBLEMS resulting in high DI score:   GCS of 11   Age   Respiratory Rate     PLAN:    Per primary RN, no acute changes or concerns with patient, continue current plan of care     Please contact critical care via [facility] with any questions or concerns.     Vitals:   Vitals:    25 1425 25 1500 25 1554 25 2250   BP: 137/74 143/80 143/80 129/76   BP Location:    Left arm   Pulse: 68 71 71 68   Resp: 19 20 20 20   Temp: (!) 97.2 °F (36.2 °C) (!) 97.4 °F (36.3 °C) (!) 97.4 °F (36.3 °C) (!) 96.5 °F (35.8 °C)   TempSrc: Tympanic Tympanic Tympanic Temporal   SpO2:  95%  93%   Weight:       Height:           Respiratory:  SpO2: SpO2: 93 %, SpO2 Activity: SpO2 Activity: At Rest, SpO2 Device: O2 Device: None (Room air)  Nasal Cannula O2 Flow Rate (L/min): 2 L/min    Temperature: Temp (24hrs), Av.1 °F (36.2 °C), Min:96.5 °F (35.8 °C), Max:97.6 °F (36.4 °C)  Current: Temperature: (!) 96.5 °F (35.8 °C)    Code Status: Level 3 - DNAR and DNI

## 2025-05-15 NOTE — PLAN OF CARE
Problem: OCCUPATIONAL THERAPY ADULT  Goal: Performs self-care activities at highest level of function for planned discharge setting.  See evaluation for individualized goals.  Description: Treatment Interventions: ADL retraining, Functional transfer training, UE strengthening/ROM, Endurance training, Cognitive reorientation, Patient/family training, Equipment evaluation/education, Compensatory technique education, Activityengagement, Energy conservation          See flowsheet documentation for full assessment, interventions and recommendations.   5/15/2025 1351 by Doris Calvin OT  Outcome: Progressing  Note: Limitation: Decreased ADL status, Decreased UE strength, Decreased Safe judgement during ADL, Decreased cognition, Decreased endurance, Decreased self-care trans, Decreased high-level ADLs  Prognosis: Fair, Guarded  Assessment: Pt seen for skilled OT tx this date. Tx focused on improving strength, activity tolerance and balance, safety awareness to increase independence with self care tasks. Pt tolerated session fair. Pt was limited by weakness, dizziness and self limiting at times. No c/o pain during session. Pt performed LB dressing / bathing maxA , Toileting GONZALO,  bed mobility rolling [place] x1, supine>sit modAx2, sit> supine depx2, transfers sit<> stand modAx2, static standing mod- maxAx A to maintain standing. Pt demonstrated fair -  sitting balance during functional tasks. Pt demonstrated ability to safely and appropriately attend to all tasks during session. Pt required moderate verbal cuing during session to safely complete tasks. Pt completed 1x10 of various BUE exercises to increase strength. Performed while supine. Tolerated well.   Vitals: supine 96/71> supine post exercise 100/73> seated EOB 90/59> supine post standing 111/74. Unable to get reading in standing but pt c/o lightheadedness and weakness.   Current OT DC recommendations for pt is level 2 resources.     Rehab Resource Intensity  Level, OT: II (Moderate Resource Intensity)       5/15/2025 1351 by Doris Calvin OT  Outcome: Progressing  Note: Limitation: Decreased ADL status, Decreased UE strength, Decreased Safe judgement during ADL, Decreased cognition, Decreased endurance, Decreased self-care trans, Decreased high-level ADLs  Prognosis: Fair, Guarded  Assessment: Pt seen for skilled OT tx this date. Tx focused on improving strength, activity tolerance and balance, safety awareness to increase independence with self care tasks. Pt tolerated session fair. Pt was limited by weakness, dizziness and self limiting at times. No c/o pain during session. Pt performed LB dressing / bathing maxA , Toileting GONZALO,  bed mobility rolling [place] x1, supine>sit modAx2, sit> supine depx2, transfers sit<> stand modAx2, static standing mod- maxAx A to maintain standing. Pt demonstrated fair -  sitting balance during functional tasks. Pt demonstrated ability to safely and appropriately attend to all tasks during session. Pt required moderate verbal cuing during session to safely complete tasks. Pt completed 1x10 of various BUE exercises to increase strength. Performed while supine. Tolerated well.   Vitals: supine 96/71> supine post exercise 100/73> seated EOB 90/59> supine post standing 111/74. Unable to get reading in standing but pt c/o lightheadedness and weakness.   Current OT DC recommendations for pt is level 2 resources.     Rehab Resource Intensity Level, OT: II (Moderate Resource Intensity)

## 2025-05-15 NOTE — ASSESSMENT & PLAN NOTE
History of DVT   However during 4/25/2025 [facility] admission found to have bilateral extensive extensive acute vs subacute DVTs, in the setting of subtherapeutic INR  Not a candidate for anticoagulation so IVC filter was felt to be the best course of action  IVC filter placed 5/9

## 2025-05-15 NOTE — PLAN OF CARE
Problem: PAIN - ADULT  Goal: Verbalizes/displays adequate comfort level or baseline comfort level  Description: Interventions:- Encourage patient to monitor pain and request assistance- Assess pain using appropriate pain scale- Administer analgesics based on type and severity of pain and evaluate response- Implement non-pharmacological measures as appropriate and evaluate response- Consider cultural and social influences on pain and pain management- Notify physician/advanced practitioner if interventions unsuccessful or patient reports new pain  Outcome: Progressing     Problem: INFECTION - ADULT  Goal: Absence or prevention of progression during hospitalization  Description: INTERVENTIONS:- Assess and monitor for signs and symptoms of infection- Monitor lab/diagnostic results- Monitor all insertion sites, i.e. indwelling lines, tubes, and drains- Monitor endotracheal if appropriate and nasal secretions for changes in amount and color- [place] appropriate cooling/warming therapies per order- Administer medications as ordered- Instruct and encourage patient and family to use good hand hygiene technique- Identify and instruct in appropriate isolation precautions for identified infection/condition  Outcome: Progressing  Goal: Absence of fever/infection during neutropenic period  Description: INTERVENTIONS:- Monitor WBC  Outcome: Progressing     Problem: SAFETY ADULT  Goal: Patient will remain free of falls  Description: INTERVENTIONS:- Educate patient/family on patient safety including physical limitations- Instruct patient to call for assistance with activity - Consult OT/PT to assist with strengthening/mobility - Keep Call bell within reach- Keep bed low and locked with side rails adjusted as appropriate- Keep care items and personal belongings within reach- Initiate and maintain comfort rounds- Make Fall Risk Sign visible to staff- Offer Toileting every 2 Hours, in advance of need- Initiate/Maintain bed alarm-  Obtain necessary fall risk management equipment: - Apply yellow socks and bracelet for high fall risk patients- Consider moving patient to room near nurses station  Outcome: Progressing  Goal: Maintain or return to baseline ADL function  Description: INTERVENTIONS:-  Assess patient's ability to carry out ADLs; assess patient's baseline for ADL function and identify physical deficits which impact ability to perform ADLs (bathing, care of mouth/teeth, toileting, grooming, dressing, etc.)- Assess/evaluate cause of self-care deficits - Assess range of motion- Assess patient's mobility; develop plan if impaired- Assess patient's need for assistive devices and provide as appropriate- Encourage maximum independence but intervene and supervise when necessary- Involve family in performance of ADLs- Assess for home care needs following discharge - Consider OT consult to assist with ADL evaluation and planning for discharge- Provide patient education as appropriate  Outcome: Progressing  Goal: Maintains/Returns to pre admission functional level  Description: INTERVENTIONS:- Perform AM-PAC 6 Click Basic Mobility/ Daily Activity assessment daily.- Set and communicate daily mobility goal to care team and patient/family/caregiver. - Collaborate with rehabilitation services on mobility goals if consulted- Perform Range of Motion 3 times a day.- Reposition patient every 2 hours.- Dangle patient 3 times a day- Stand patient 3 times a day- Ambulate patient 3 times a day- Out of bed to chair 3 times a day - Out of bed for meals 3 times a day- Out of bed for toileting- Record patient progress and toleration of activity level   Outcome: Progressing     Problem: DISCHARGE PLANNING  Goal: Discharge to home or other facility with appropriate resources  Description: INTERVENTIONS:- Identify barriers to discharge w/patient and caregiver- Arrange for needed discharge resources and transportation as appropriate- Identify discharge learning  needs (meds, wound care, etc.)- Arrange for interpretive services to assist at discharge as needed- Refer to Case Management Department for coordinating discharge planning if the patient needs post-hospital services based on physician/advanced practitioner order or complex needs related to functional status, cognitive ability, or social support system  Outcome: Progressing     Problem: Knowledge Deficit  Goal: Patient/family/caregiver demonstrates understanding of disease process, treatment plan, medications, and discharge instructions  Description: Complete learning assessment and assess knowledge base.Interventions:- Provide teaching at level of understanding- Provide teaching via preferred learning methods  Outcome: Progressing     Problem: Nutrition/Hydration-ADULT  Goal: Nutrient/Hydration intake appropriate for improving, restoring or maintaining nutritional needs  Description: Monitor and assess patient's nutrition/hydration status for malnutrition. Collaborate with interdisciplinary team and initiate plan and interventions as ordered.  Monitor patient's weight and dietary intake as ordered or per policy. Utilize nutrition screening tool and intervene as necessary. Determine patient's food preferences and provide high-protein, high-caloric foods as appropriate. INTERVENTIONS:- Monitor oral intake, urinary output, labs, and treatment plans- Assess nutrition and hydration status and recommend course of action- Evaluate amount of meals eaten- Assist patient with eating if necessary - Allow adequate time for meals- Recommend/ encourage appropriate diets, oral nutritional supplements, and vitamin/mineral supplements- Order, calculate, and assess calorie counts as needed- Recommend, monitor, and adjust tube feedings and TPN/PPN based on assessed needs- Assess need for intravenous fluids- Provide specific nutrition/hydration education as appropriate- Include patient/family/caregiver in decisions related to  nutrition  Monitor and assess patient's nutrition/hydration status for malnutrition. Collaborate with interdisciplinary team and initiate plan and interventions as ordered.  Monitor patient's weight and dietary intake as ordered or per policy. Utilize nutrition screening tool and intervene as necessary. Determine patient's food preferences and provide high-protein, high-caloric foods as appropriate. INTERVENTIONS:- Monitor oral intake, urinary output, labs, and treatment plans- Assess nutrition and hydration status and recommend course of action- Evaluate amount of meals eaten- Assist patient with eating if necessary - Allow adequate time for meals- Recommend/ encourage appropriate diets, oral nutritional supplements, and vitamin/mineral supplements- Order, calculate, and assess calorie counts as needed- Recommend, monitor, and adjust tube feedings and TPN/PPN based on assessed needs- Assess need for intravenous fluids- Provide specific nutrition/hydration education as appropriate- Include patient/family/caregiver in decisions related to nutrition  Outcome: Progressing     Problem: Prexisting or High Potential for Compromised Skin Integrity  Goal: Skin integrity is maintained or improved  Description: INTERVENTIONS:- Identify patients at risk for skin breakdown- Assess and monitor skin integrity- Assess and monitor nutrition and hydration status- Monitor labs - Assess for incontinence - Turn and reposition patient- Assist with mobility/ambulation- Relieve pressure over bony prominences- Avoid friction and shearing- Provide appropriate hygiene as needed including keeping skin clean and dry- Evaluate need for skin moisturizer/barrier cream- Collaborate with interdisciplinary team - Patient/family teaching- Consider wound care consult   Outcome: Progressing     Problem: HEMATOLOGIC - ADULT  Goal: Maintains hematologic stability  Description: INTERVENTIONS- Assess for signs and symptoms of bleeding or hemorrhage-  Monitor labs- Administer supportive blood products/factors as ordered and appropriate  Outcome: Progressing     Problem: Communication Impairment  Goal: Ability to express needs and understand communication  Description: Assess patient's communication skills and ability to understand information.  Patient will demonstrate use of effective communication techniques, alternative methods of communication and understanding even if not able to speak. - Encourage communication and provide alternate methods of communication as needed.- Collaborate with case management/ for discharge needs.- Include patient/family/caregiver in decisions related to communication.  Outcome: Progressing

## 2025-05-15 NOTE — ASSESSMENT & PLAN NOTE
Discussed overall condition and plan of care with her son and  son-in-law at the bedside  She responded well to the blood transfusion  So the plan is now to go to short-term rehab

## 2025-05-15 NOTE — ASSESSMENT & PLAN NOTE
Pt is a 81yo female with PMH significant for hypertension, DVT/PE CKD 3, and right heel osteomyelitis initially presented to [facility] 4/25/2025 for generalized weakness.  Initially diagnosed with GAY and MDR UTI: completed course of ertapenem, and then was transferred to [facility] for GI evaluation for hematochezia/acute blood loss anemia    Hematochezia secondary to diverticular bleed on top of anticoagulation  She was not felt to be a candidate for further anticoagulation so an IVC filter was placed

## 2025-05-15 NOTE — PLAN OF CARE
Problem: PAIN - ADULT  Goal: Verbalizes/displays adequate comfort level or baseline comfort level  Description: Interventions:- Encourage patient to monitor pain and request assistance- Assess pain using appropriate pain scale- Administer analgesics based on type and severity of pain and evaluate response- Implement non-pharmacological measures as appropriate and evaluate response- Consider cultural and social influences on pain and pain management- Notify physician/advanced practitioner if interventions unsuccessful or patient reports new pain  Outcome: Progressing     Problem: INFECTION - ADULT  Goal: Absence or prevention of progression during hospitalization  Description: INTERVENTIONS:- Assess and monitor for signs and symptoms of infection- Monitor lab/diagnostic results- Monitor all insertion sites, i.e. indwelling lines, tubes, and drains- Monitor endotracheal if appropriate and nasal secretions for changes in amount and color- [place] appropriate cooling/warming therapies per order- Administer medications as ordered- Instruct and encourage patient and family to use good hand hygiene technique- Identify and instruct in appropriate isolation precautions for identified infection/condition  Outcome: Progressing  Goal: Absence of fever/infection during neutropenic period  Description: INTERVENTIONS:- Monitor WBC  Outcome: Progressing     Problem: SAFETY ADULT  Goal: Patient will remain free of falls  Description: INTERVENTIONS:- Educate patient/family on patient safety including physical limitations- Instruct patient to call for assistance with activity - Consult OT/PT to assist with strengthening/mobility - Keep Call bell within reach- Keep bed low and locked with side rails adjusted as appropriate- Keep care items and personal belongings within reach- Initiate and maintain comfort rounds- Make Fall Risk Sign visible to staff- Offer Toileting every 2 Hours, in advance of need- Initiate/Maintain bed alarm-  Obtain necessary fall risk management equipment: - Apply yellow socks and bracelet for high fall risk patients- Consider moving patient to room near nurses station  Outcome: Progressing  Goal: Maintain or return to baseline ADL function  Description: INTERVENTIONS:-  Assess patient's ability to carry out ADLs; assess patient's baseline for ADL function and identify physical deficits which impact ability to perform ADLs (bathing, care of mouth/teeth, toileting, grooming, dressing, etc.)- Assess/evaluate cause of self-care deficits - Assess range of motion- Assess patient's mobility; develop plan if impaired- Assess patient's need for assistive devices and provide as appropriate- Encourage maximum independence but intervene and supervise when necessary- Involve family in performance of ADLs- Assess for home care needs following discharge - Consider OT consult to assist with ADL evaluation and planning for discharge- Provide patient education as appropriate  Outcome: Progressing  Goal: Maintains/Returns to pre admission functional level  Description: INTERVENTIONS:- Perform AM-PAC 6 Click Basic Mobility/ Daily Activity assessment daily.- Set and communicate daily mobility goal to care team and patient/family/caregiver. - Collaborate with rehabilitation services on mobility goals if consulted- Perform Range of Motion 3 times a day.- Reposition patient every 2 hours.- Dangle patient 3 times a day- Stand patient 3 times a day- Ambulate patient 3 times a day- Out of bed to chair 3 times a day - Out of bed for meals 3 times a day- Out of bed for toileting- Record patient progress and toleration of activity level   Outcome: Progressing     Problem: DISCHARGE PLANNING  Goal: Discharge to home or other facility with appropriate resources  Description: INTERVENTIONS:- Identify barriers to discharge w/patient and caregiver- Arrange for needed discharge resources and transportation as appropriate- Identify discharge learning  needs (meds, wound care, etc.)- Arrange for interpretive services to assist at discharge as needed- Refer to Case Management Department for coordinating discharge planning if the patient needs post-hospital services based on physician/advanced practitioner order or complex needs related to functional status, cognitive ability, or social support system  Outcome: Progressing     Problem: Knowledge Deficit  Goal: Patient/family/caregiver demonstrates understanding of disease process, treatment plan, medications, and discharge instructions  Description: Complete learning assessment and assess knowledge base.Interventions:- Provide teaching at level of understanding- Provide teaching via preferred learning methods  Outcome: Progressing     Problem: Nutrition/Hydration-ADULT  Goal: Nutrient/Hydration intake appropriate for improving, restoring or maintaining nutritional needs  Description: Monitor and assess patient's nutrition/hydration status for malnutrition. Collaborate with interdisciplinary team and initiate plan and interventions as ordered.  Monitor patient's weight and dietary intake as ordered or per policy. Utilize nutrition screening tool and intervene as necessary. Determine patient's food preferences and provide high-protein, high-caloric foods as appropriate. INTERVENTIONS:- Monitor oral intake, urinary output, labs, and treatment plans- Assess nutrition and hydration status and recommend course of action- Evaluate amount of meals eaten- Assist patient with eating if necessary - Allow adequate time for meals- Recommend/ encourage appropriate diets, oral nutritional supplements, and vitamin/mineral supplements- Order, calculate, and assess calorie counts as needed- Recommend, monitor, and adjust tube feedings and TPN/PPN based on assessed needs- Assess need for intravenous fluids- Provide specific nutrition/hydration education as appropriate- Include patient/family/caregiver in decisions related to  nutrition  Monitor and assess patient's nutrition/hydration status for malnutrition. Collaborate with interdisciplinary team and initiate plan and interventions as ordered.  Monitor patient's weight and dietary intake as ordered or per policy. Utilize nutrition screening tool and intervene as necessary. Determine patient's food preferences and provide high-protein, high-caloric foods as appropriate. INTERVENTIONS:- Monitor oral intake, urinary output, labs, and treatment plans- Assess nutrition and hydration status and recommend course of action- Evaluate amount of meals eaten- Assist patient with eating if necessary - Allow adequate time for meals- Recommend/ encourage appropriate diets, oral nutritional supplements, and vitamin/mineral supplements- Order, calculate, and assess calorie counts as needed- Recommend, monitor, and adjust tube feedings and TPN/PPN based on assessed needs- Assess need for intravenous fluids- Provide specific nutrition/hydration education as appropriate- Include patient/family/caregiver in decisions related to nutrition  Outcome: Progressing     Problem: Prexisting or High Potential for Compromised Skin Integrity  Goal: Skin integrity is maintained or improved  Description: INTERVENTIONS:- Identify patients at risk for skin breakdown- Assess and monitor skin integrity- Assess and monitor nutrition and hydration status- Monitor labs - Assess for incontinence - Turn and reposition patient- Assist with mobility/ambulation- Relieve pressure over bony prominences- Avoid friction and shearing- Provide appropriate hygiene as needed including keeping skin clean and dry- Evaluate need for skin moisturizer/barrier cream- Collaborate with interdisciplinary team - Patient/family teaching- Consider wound care consult   Outcome: Progressing     Problem: HEMATOLOGIC - ADULT  Goal: Maintains hematologic stability  Description: INTERVENTIONS- Assess for signs and symptoms of bleeding or hemorrhage-  Monitor labs- Administer supportive blood products/factors as ordered and appropriate  Outcome: Progressing     Problem: Communication Impairment  Goal: Ability to express needs and understand communication  Description: Assess patient's communication skills and ability to understand information.  Patient will demonstrate use of effective communication techniques, alternative methods of communication and understanding even if not able to speak. - Encourage communication and provide alternate methods of communication as needed.- Collaborate with case management/ for discharge needs.- Include patient/family/caregiver in decisions related to communication.  Outcome: Progressing

## 2025-05-15 NOTE — PROGRESS NOTES
Progress Note - Geriatric Medicine   Alecia Kay 80 y.o. female MRN: [id number]  Unit/Bed#: E4 -01 Encounter: [id number]      Assessment/Plan:    Acute Encephalopathy  Patient presented to the hospital for generalized weakness   Baseline mentation is alert and oriented x 4 but forgetful at times   Current cause considerations   Suspected to be multifactorial secondary to age, possible underlying cognitive impairment, hematoochezia, acute PE/DVT, electrolyte imbalances, hypothermia, anemia, vision impairment, and prolonged hospitalization  UA on admission negative for UTI   No leukocytosis noted on labs today   Hemoglobin on labs this morning noted to be 7.1  Patient was a rapid response last week for acute mental status change   She was a stroke alert   CT of the head and MRI were negative for any acute infarcts   Her mental status appeared improved over the weekend and was even improving on 5/12  Patient was noted to be lethargic the past 2 days   She did receive blood yesterday and nursing notes she was alert, awake, and oriented this morning  She did work with therapy and since then has been more sleepy   She is lethargic on my exam today   New blood cultures were obtained on 5/13 and have been negative for 48 hours    She did undergo IVC filter placement on 5/9  Primary team did find POLST from January and patient was level 3 DNR at that time  Son agreeable to this change and patient is now level 3 DNR  Consider blood transfusion   If electrolyte imbalances and hemoglobin improve but patients condition does not may need to consider goals of care discussion   Identify and treat reversible causes of confusion including infection, dehydration, electrolyte imbalance, anemia, hypoxia, urinary retention, constipation, pain, and sleep disturbance  Maintain delirium precautions   Provide redirection, reorientation, and distraction techniques  Maintain fall and safety precautions   Assist with  ADLs/IADLs  Avoid deliriogenic medications such as tramadol, benzodiazepines, anticholinergics, benadryl  Treat pain using geriatric pain protocol   Encourage oral hydration and nutrition   Monitor for constipation and urinary retention   Implement sleep hygiene and limit night time interuptions   Maintain sleep-wake cycle   Encourage early and frequent mobilization   Most recent EKG on 5/8/2025 revealed a QTc interval  of 447  If all other interventions are unsuccessful for acute agitation and behaviors, can consider Zyprexa 2.5 mg IM Q 8 hours prn   Would avoid benzodiazepines such as Ativan if possible as these medications can cause/worsen delirium   Redirect and reorient as needed  Keep mentally, physically, and socially active    Cognitive Screening  Patient has no documented history of memory loss or cognitive impairment   She notes no issues with her memory at baseline   Patient has been having periods of confusion here  Please see cause considerations as discussed above  Patient was a stroke alert las week for altered mental status and speech difficulties   She had been slowly improving though her mentation continues to wax and wane   She has been lethargic for the past two days   She received blood yesterday and nursing noted she was more alert, awake, and oriented this morning   She did work with therapy and since then has been more sleepy   She is noted to be lethargic on my exam today   Most recent TSH on 5/10/2025 noted to be 0.104  Most recent vitamin B 12 level on 5/10/2025 noted to be 1179  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 with no acute abnormalities   MRI of the brain on 5/9 revealed moderate microangiopathy   Patient scored 13/15 on BIMS on 3/5/2025 indicating she is cognitively intact   Maintain delirium precautions as discussed above   Redirect and reorient as needed  Keep physically, mentally and socially active     Deconditioning   Patient is at increased risk for  deconditioning secondary to possible underlying cognitive impairment, hematochezia, acute PE/DVT, electrolyte imbalance, hypothermia, anemia, vision impairment, weakness, gait dysfunction, and prolonged hospitalization  Continue to optimize diet, hydration, and mobility for healing   Chronic Kidney Disease Stage III  Baseline creatinine not entirely clear but recently appears to be 0.8-1.0  Creatinine on labs today noted to be 0.91  Avoid nephrotoxic medication and renal dose medication   Keep hydrated  Type II Diabetes   Most recent hemoglobin A1C on 11/13/2024 noted to be 5.7  Per chart review, she does not appear to be on any medication for this at baseline  Glucose on labs today noted to be stable at 88  Patient is now on blood sugar checks   Continue to monitor blood sugars closely   Avoid hypoglycemia   Anemia   Baseline hemoglobin appears to be 8-10  Patient was noted to have hematochezia which had initially resolved but returned at the end of last week   She did receive one unit of PRBCs yesterday   Hemoglobin on labs this morning noted to be 9.7  Continue to monitor CBC   Transfuse for hemoglobin < 7   Monitor for signs and symptoms of infection, dehydration, DVT, and skin breakdown    Frailty   Clinical Frail Scale: 6- Moderately Frail (current)  Need help with all outside activities  Need help with stairs and bathing  May need assistance with dressing  Most recent albumin on 5/12/2025 noted to be 2.5  Consider nutrition consult  Encourage protein supplementation     Ambulatory Dysfunction/Falls  Patient notes no recent falls at home   She states she ambulates with a roller walker at baseline   PT/OT consulted to assist with strengthening/mobility and assist with discharge planning to appropriate level of care  Assess patient frequently for physical needs, encourage use of assistant devices as needed and directed by PT/OT  Identify cognitive and physical deficits and behaviors that affect risk of  falls  Consider moving patient closer to nursing station to monitor more closely for impulsive behavior which may increase risk of falls  [place] fall and safety precautions   Educate patient/family on patient safety including physical limitations and importance of using call bell for assistance   Modify environment to reduce risk of injury including disconnecting from pole when not in use, ensuring adequate lighting in room and restroom, ensuring that path to restroom is clear and free of trip hazards  Out of bed as tolerated    Impaired Vision   Patient denies vision impairment   She notes she does have glasses for reading   Recommend use of corrective lenses at all appropriate times  Encourage adequate lighting and encourage use of assistance with ambulation  Keep personal belongings close to avoid reaching  Encourage appropriate footwear at all times  Recommend large font for printed materials provided to patient    Dentition/Appetite   Patient denies denture use  She states she has a good appetite at baseline   She is currently on a dysphagia I nectar thick liquid diet   Patient has not been eating due to increased lethargy   Speech therapy consulted and following   Ensure meal consistency is appropriate for all abilities   Consider nutrition consult   Continue aspiration precautions     Elimination   Patient is continent of bowel and bladder at baseline  She appears to be voiding without difficulty   Patient was noted to have hematochezia and was found to have a diverticular bleed   The bleeding appears to have resolved at this point   Last documented bowel movement was on 5/11    Patient is not currently on a bowel regimen   Monitor for constipation and urinary retention     Insomnia   Patient notes no difficulty sleeping at baseline   She noted yesterday she had been having some vivid dreams here   Her son at the bedside notes this occurred when at rehab as well   Patient has had some difficulty sleeping  here   Nursing notes no acute issues or events overnight   Can consider restarting mirtazapine vs trialing melatonin if needed  First line is behavioral therapy   Avoid sedative hypnotics including benzodiazepines and benadryl  Encourage staying awake during the day   Encourage daytime activities and morning exercise   Decrease or eliminate daytime naps   Avoid caffeine especially during late afternoon and evening hours  Establish a nighttime routine  Implement sleep hygiene and limit nighttime interruptions    Anxiety/Depression  Patient has a documented history of anxiety   PDMP reviewed and patient was prescribed alprazolam 0.25 mg on 4/11/2025  Unclear how often she was taking this   Patient is lethargic on exam today   Continue supportive care     Hematochezia   Patient presented to [facility] with generalized weakness  She was initially noted to have a UTI and GAY  Additionally she was found to have acute DVT and PE  Her INR was subtherapeutic and she was started on a heparin drip  She was later noted to have hematochezia and was a rapid response   She was transferred to [facility] due to lack of GI services   GI initially restarted the heparin infusion but this was discontinued due to a large amount of hematochezia  She underwent and EGD/colonoscopy on 5/5  EGD revealed Wang's esophagus with no evidence of bleeding   Colonoscopy revealed several diverticuli with old blood   GI suspected a diverticular bleed   The bleeding had resolved but the returned again   She did receive blood and FFP on 5/7  She underwent IVC filter placement on 5/9 and bleeding appears to have resolved   Her hemoglobin yesterday was 7.1  She received one unit of PRBCs yesterday  Hemoglobin on labs today noted to be 9.7  GI has since signed off   Management per primary team     Acute Pulmonary Embolism  Patient found to have extensive right upper and lower PE on CTA on 4/28/2025  Echo noted no evidence of heart strain   She does have a history of  "DVT and PE   She is maintained on coumadin 5 mg 5 days per week and 7.5 mg Monday and Friday  Patient had nausea/vomiting for several days PTA and she was not able to take this medication   She had been on anticoagulation but this is again on hold due to hematochezia   GI is following for hematochezia   Patient underwent IVC filter on 5/9  Management per primary team     Chronic Osteomyelitis   Patient with previous chronic osteomyelitis of the right foot   Podiatry recommending WBAT in a surgical shoe  She completed 6 weeks of antibiotics PTA   MRI of the right heel and ankle on 4/29/2025 revealed no evidence of remaining osteomyelitis   Patient was re-evaluated by podiatry on 4/30/2025 and no changes to prior recommendations noted   She will require continued outpatient follow-up with podiatry on discharge       Subjective:   The patient is being seen and evaluated today at the bedside for geriatric follow-up. She is noted to be lying in bed comfortably in no acute distress. She is noted to be lethargic on my exam today. She does not appear to be in any pain or discomfort.     Care was coordinated with patients nurse Abby. She states the patient was alert, awake, and oriented this morning. She notes that she did work with PT/OT and since working with them has been sleepy.         Review of Systems   Unable to perform ROS: Mental status change (lethargic)         Objective:     Vitals: Blood pressure 134/90, pulse 56, temperature 97.8 °F (36.6 °C), temperature source Temporal, resp. rate 20, height 5' 2\" (1.575 m), weight 78 kg (171 lb 15.3 oz), SpO2 94%.,Body mass index is 31.45 kg/m².    No intake or output data in the 24 hours ending 05/15/25 1603        Current Medications: Reviewed    Physical Exam:   Physical Exam  Vitals and nursing note reviewed.   Constitutional:       General: She is not in acute distress.  HENT:      Head: Normocephalic.      Mouth/Throat:      Mouth: Mucous membranes are dry. " "    Cardiovascular:      Rate and Rhythm: Normal rate and regular rhythm.   Pulmonary:      Effort: Pulmonary effort is normal. No respiratory distress.   Abdominal:      General: Bowel sounds are normal. There is no distension.     Musculoskeletal:         General: Swelling (generalized) present. No tenderness.     Skin:     General: Skin is warm and dry.     Neurological:      Mental Status: She is lethargic.          Invasive Devices       Long-dwell Peripherally Inserted Midline IV Catheter  Duration             Long-Dwell Peripheral IV (Midline) 05/07/25 Right Basilic 8 days                    Lab, Imaging and other studies: Results Review Statement: I reviewed AM labs.     Please note:  Voice-recognition software may have been used in the preparation of this document.  Occasional wrong word or \"sound-alike\" substitutions may have occurred due to the inherent limitations of voice recognition software.  Interpretation should be guided by context.    "

## 2025-05-15 NOTE — PLAN OF CARE
Problem: PHYSICAL THERAPY ADULT  Goal: Performs mobility at highest level of function for planned discharge setting.  See evaluation for individualized goals.  Description: Treatment/Interventions: Functional transfer training, LE strengthening/ROM, Therapeutic exercise, Patient/family training, Equipment eval/education, Bed mobility, Gait training, Continued evaluation, Compensatory technique education, Cognitive reorientation, Spoke to nursing, OT, Family          See flowsheet documentation for full assessment, interventions and recommendations.  Outcome: Progressing  Note: Prognosis: Fair  Problem List: Decreased strength, Decreased range of motion, Impaired balance, Decreased mobility, Impaired judgement, Decreased cognition, Decreased coordination, Decreased skin integrity, Obesity, Decreased safety awareness  Assessment: Patient was seen today per POC. Overall, pt demonstrated improved mobility and inc tolerance to activity. ModAx2 for supine to sit, minAx1 for rolling L and R, modAx2 for sit<>stand, dependentx2 for sit to supine. Dependent transfer due to dizziness and fatigue, BP unable to read BP value at this time. Able to perform static standing with RW and mod-maxAx1. Significant fatigue with mobility. Inc dizziness throughout session. See flowsheet for BP values. Good tolerance to interventions with inc fatigue as anticipated. Will continue to see pt per POC as tolerated. From PT standpoint continued recommendation for level II, (moderate resource intensity) at D/C when medically cleared based current function. The patient's AM-PAC Basic Mobility Inpatient Short Form Raw Score is 10. A Raw score of less than or equal to 16 suggests the patient may benefit from discharge to post-acute rehabilitation services. Please also refer to the recommendation of the Physical Therapist for safe discharge planning.  Barriers to Discharge: None  Barriers to Discharge Comments: unclear if family can care for patient  at current LOF  Rehab Resource Intensity Level, PT: II (Moderate Resource Intensity)    See flowsheet documentation for full assessment.

## 2025-05-15 NOTE — PROGRESS NOTES
Progress Note - Hospitalist   Name: Alecia Kay 80 y.o. female I MRN: [id number]  Unit/Bed#: E4 -01 I Date of Admission: 5/2/2025   Date of Service: 5/15/2025 I Hospital Day: 13    Assessment & Plan  Hematochezia  Pt is a 81yo female with PMH significant for hypertension, DVT/PE CKD 3, and right heel osteomyelitis initially presented to [facility] 4/25/2025 for generalized weakness.  Initially diagnosed with GAY and MDR UTI: completed course of ertapenem, and then was transferred to [facility] for GI evaluation for hematochezia/acute blood loss anemia    Hematochezia secondary to diverticular bleed on top of anticoagulation  She was not felt to be a candidate for further anticoagulation so an IVC filter was placed  Acute blood loss anemia (ABLA)  Secondary to GI bleed  Baseline hemoglobin appears to be variable over the last year ranging 7.5-10  Acute blood loss anemia due to hematochezia secondary to diverticular bleed  Status post total of  7 units PRBC during this admission  Last transfusion was 5/14/2025  Hemoglobin improved from 7.1-9.7  She responded well with improved mental status  Metabolic encephalopathy  She had a stroke alert and underwent stroke workup due to word finding difficulty  Stroke workup was negative.  She has metabolic encephalopathy due to multiple issues and prolonged hospitalization  Lethargy and poor oral intake improved after blood transfusion.  Goals of care, counseling/discussion  Discussed overall condition and plan of care with her son and  son-in-law at the bedside  She responded well to the blood transfusion  So the plan is now to go to short-term rehab   Hypothermia  She was noted to have hypothermia requiring Tyrone hugger  Could be related to advanced age, anemia, medications  Repeat blood cultures sent on 5/13/2025 to rule out infection currently without growth for 48 hours  Hypothermia resolved    Acute pulmonary embolism (HCC)  Extensive right upper and  lower lobe pulmonary embolism seen on 4/28/2025 CTA  2D echocardiogram without evidence of right heart strain  She already had a history of DVT and PE: Maintained on warfarin previously, but was not taking it in the past month prior to admission--(INR 1.18)  Due to subtherapeutic INR, was started on bridging therapy with anticoagulation however had intractable GI bleed  Anticoagulation contraindicated due to recurrent lower GI bleed   She was evaluated by pulmonology who recommended  IVC filter placement.  Temporary IVC filter placed 5/9 by IR  Acute DVT (deep venous thrombosis) (HCC)  History of DVT   However during 4/25/2025 [facility] admission found to have bilateral extensive extensive acute vs subacute DVTs, in the setting of subtherapeutic INR  Not a candidate for anticoagulation so IVC filter was felt to be the best course of action  IVC filter placed 5/9   Acute respiratory insufficiency  Likely secondary to pulmonary embolism and anemia  Was placed on 2 L nasal cannula  Resolved.  Currently on room air  Hypertension  Patient previously previously on amlodipine/beta-blocker  Medications held for hypotension with hematochezia  Hypothyroidism  Continue levothyroxine  Wang's esophagus  Noted on EGD  Will need repeat EGD in 3 years for surveillance  Patient with dysphagia: Continue proton pump inhibitor bid  Abnormal TSH  Pt with low TSH and elevated free T4: However in the setting of acute illness, recent IV contrast, as well as IV steroid:  Reviewed with endocrinology on-call team: Not accurate in the setting of acute illness and above factors  Recommend recheck full TFTs in 2-4 weeks as an outpatient.    VTE Pharmacologic Prophylaxis: VTE Score: 6 High Risk (Score >/= 5) - Pharmacological DVT Prophylaxis Contraindicated. Sequential Compression Devices Ordered.    Patient Centered Rounds: I performed bedside rounds with nursing staff today.   Discussions with Specialists or Other Care Team Provider: Case  management    Education and Discussions with Family / Patient: Updated  (son and son in law) at bedside.    Current Length of Stay: 13 day(s)  Current Patient Status: Inpatient   Certification Statement: The patient will continue to require additional inpatient hospital stay due to DC planning  Discharge Plan: Anticipate discharge tomorrow to rehab facility.    Code Status: Level 3 - DNAR and DNI    Subjective   Seen and examined.  More awake today  No further hypothermia  No fever    Objective :  Temp:  [96.5 °F (35.8 °C)-98 °F (36.7 °C)] 97.8 °F (36.6 °C)  HR:  [56-68] 56  BP: (114-134)/(67-90) 134/90  Resp:  [20] 20  SpO2:  [93 %-94 %] 94 %  O2 Device: None (Room air)    Body mass index is 31.45 kg/m².     Input and Output Summary (last 24 hours):   No intake or output data in the 24 hours ending 05/15/25 1723    Physical Exam  Vitals reviewed.   HENT:      Head: Normocephalic and atraumatic.      Nose: No congestion or rhinorrhea.     Eyes:      General: No scleral icterus.      Cardiovascular:      Rate and Rhythm: Normal rate and regular rhythm.   Pulmonary:      Breath sounds: No wheezing, rhonchi or rales.   Abdominal:      Palpations: Abdomen is soft.      Tenderness: There is no abdominal tenderness. There is no guarding.     Musculoskeletal:      Right lower leg: No edema.      Left lower leg: No edema.     Skin:     General: Skin is warm.     Neurological:      Mental Status: She is disoriented.     Psychiatric:         Mood and Affect: Mood normal.         Behavior: Behavior normal.           Lines/Drains:              Lab Results: I have reviewed the following results:   Results from last 7 days   Lab Units 05/15/25  0743   WBC Thousand/uL 4.04*   HEMOGLOBIN g/dL 9.7*   HEMATOCRIT % 28.9*   PLATELETS Thousands/uL 52*   SEGS PCT % 56   LYMPHO PCT % 30   MONO PCT % 9   EOS PCT % 3     Results from last 7 days   Lab Units 05/15/25  0743 05/13/25  0459 05/12/25  0624   SODIUM mmol/L 138   < >  142   POTASSIUM mmol/L 2.7*   < > 2.9*   CHLORIDE mmol/L 104   < > 109*   CO2 mmol/L 26   < > 27   BUN mg/dL 10   < > 7   CREATININE mg/dL 0.91   < > 0.91   ANION GAP mmol/L 8   < > 6   CALCIUM mg/dL 6.9*   < > 7.7*   ALBUMIN g/dL  --   --  2.5*   TOTAL BILIRUBIN mg/dL  --   --  0.57   ALK PHOS U/L  --   --  54   ALT U/L  --   --  6*   AST U/L  --   --  14   GLUCOSE RANDOM mg/dL 88   < > 112    < > = values in this interval not displayed.         Results from last 7 days   Lab Units 05/15/25  1628 05/15/25  1112 05/15/25  0736 05/15/25  0632 05/15/25  0114 05/14/25  1622 05/14/25  1113 05/14/25  0729 05/13/25  2128 05/13/25  1616 05/13/25  1113 05/13/25  0739   POC GLUCOSE mg/dl 107 93 98 91 99 77 87 84 69 83 59* 87               Recent Cultures (last 7 days):   Results from last 7 days   Lab Units 05/13/25  1106   BLOOD CULTURE  No Growth at 48 hrs.  No Growth at 48 hrs.       No new imaging      Last 24 Hours Medication List:     Current Facility-Administered Medications:     acetaminophen (TYLENOL) tablet 650 mg, Q6H PRN    [Held by provider] amLODIPine (NORVASC) tablet 10 mg, Daily    dextrose 5 % and sodium chloride 0.45 % infusion, Continuous, Last Rate: 75 mL/hr (05/14/25 1338)    folic acid (FOLVITE) tablet 1 mg, Daily    hydrOXYzine HCL (ATARAX) tablet 25 mg, Q8H PRN    levothyroxine tablet 75 mcg, Early Morning    meclizine (ANTIVERT) tablet 25 mg, TID PRN    [Held by provider] metoprolol tartrate (LOPRESSOR) tablet 50 mg, Q12H MELANY    [Held by provider] mirtazapine (REMERON) tablet 7.5 mg, HS    moisture barrier miconazole 2% cream (aka EDNA MOISTURE BARRIER ANTIFUNGAL CREAM), BID    ondansetron (ZOFRAN) injection 4 mg, Q4H PRN    pantoprazole (PROTONIX) EC tablet 40 mg, BID AC    potassium chloride (Klor-Con M20) CR tablet 40 mEq, Once    potassium chloride 20 mEq IVPB (premix), Q6H, Last Rate: 20 mEq (05/15/25 1618)    pravastatin (PRAVACHOL) tablet 80 mg, Daily With Dinner    Administrative Statements    Today, Patient Was Seen By: Oleksandr Peace MD      **Please Note: This note may have been constructed using a voice recognition system.**

## 2025-05-15 NOTE — ASSESSMENT & PLAN NOTE
Secondary to GI bleed  Baseline hemoglobin appears to be variable over the last year ranging 7.5-10  Acute blood loss anemia due to hematochezia secondary to diverticular bleed  Status post total of  7 units PRBC during this admission  Last transfusion was 5/14/2025  Hemoglobin improved from 7.1-9.7  She responded well with improved mental status

## 2025-05-15 NOTE — CASE MANAGEMENT
Case Management Discharge Planning Note    Patient name Alecia Kay  Location [facility] /E4 -* MRN [id number]  : 1945 Date 5/15/2025       Current Admission Date: 2025  Current Admission Diagnosis:Hematochezia   Patient Active Problem List    Diagnosis Date Noted    Hypothermia 2025    Folic acid deficiency 2025    Abnormal TSH 2025    Goals of care, counseling/discussion 2025    Metabolic encephalopathy 2025    Wang's esophagus 2025    Insertional Achilles tendinopathy 2025    Chronic osteomyelitis (HCC) 2025    Acute blood loss anemia (ABLA) 2025    Acute pulmonary embolism (HCC) 2025    Acute respiratory insufficiency 2025    Hematochezia 2025    Acute DVT (deep venous thrombosis) (HCC) 2025    History of DVT (deep vein thrombosis) 2025    Tachycardia 2025    Acute cystitis 2025    Generalized weakness 2025    Bilateral lower extremity edema 2025    Severe protein-calorie malnutrition (HCC) 2025    History of osteomyelitis 2025    Acute kidney injury (HCC) 2025    Chronic kidney disease, stage III (moderate) (HCC) 2025    Hypothyroidism 2025    Hypertension 2025    Electrolyte imbalance 2025      LOS (days): 13  Geometric Mean LOS (GMLOS) (days): 4.5  Days to GMLOS:-8.5     OBJECTIVE:  Risk of Unplanned Readmission Score: 25.29         Current admission status: Inpatient   Preferred Pharmacy:   [facility] [place], [place] - [address]  [address] [address]  Phone: [phone] Fax: [phone]    Primary Care Provider: Rambo Perera DO    Primary Insurance: [employer]  Secondary Insurance:     DISCHARGE DETAILS:                                                                                                 Additional Comments: CM informed that after receiving blood that she has  responded well and the anticipated dc for STR is 5/16/25.  PT and OT revisited and recommended level 2 resources.  CM met with the son, Jaron and he agrees.  Request for Auth for STR at [facility] intitaited with SOC for 5/16/25.

## 2025-05-16 LAB
ANION GAP SERPL CALCULATED.3IONS-SCNC: 6 MMOL/L (ref 4–13)
BASOPHILS # BLD AUTO: 0.03 THOUSANDS/ÂΜL (ref 0–0.1)
BASOPHILS NFR BLD AUTO: 1 % (ref 0–1)
BUN SERPL-MCNC: 12 MG/DL (ref 5–25)
CALCIUM SERPL-MCNC: 7.3 MG/DL (ref 8.4–10.2)
CHLORIDE SERPL-SCNC: 105 MMOL/L (ref 96–108)
CO2 SERPL-SCNC: 27 MMOL/L (ref 21–32)
CREAT SERPL-MCNC: 0.89 MG/DL (ref 0.6–1.3)
EOSINOPHIL # BLD AUTO: 0.04 THOUSAND/ÂΜL (ref 0–0.61)
EOSINOPHIL NFR BLD AUTO: 1 % (ref 0–6)
ERYTHROCYTE [DISTWIDTH] IN BLOOD BY AUTOMATED COUNT: 16.7 % (ref 11.6–15.1)
GFR SERPL CREATININE-BSD FRML MDRD: 60 ML/MIN/1.73SQ M
GLUCOSE SERPL-MCNC: 65 MG/DL (ref 65–140)
GLUCOSE SERPL-MCNC: 73 MG/DL (ref 65–140)
GLUCOSE SERPL-MCNC: 75 MG/DL (ref 65–140)
GLUCOSE SERPL-MCNC: 80 MG/DL (ref 65–140)
GLUCOSE SERPL-MCNC: 87 MG/DL (ref 65–140)
HCT VFR BLD AUTO: 27.8 % (ref 34.8–46.1)
HGB BLD-MCNC: 9.5 G/DL (ref 11.5–15.4)
IMM GRANULOCYTES # BLD AUTO: 0.02 THOUSAND/UL (ref 0–0.2)
IMM GRANULOCYTES NFR BLD AUTO: 1 % (ref 0–2)
LYMPHOCYTES # BLD AUTO: 1.26 THOUSANDS/ÂΜL (ref 0.6–4.47)
LYMPHOCYTES NFR BLD AUTO: 29 % (ref 14–44)
MCH RBC QN AUTO: 28.6 PG (ref 26.8–34.3)
MCHC RBC AUTO-ENTMCNC: 34.2 G/DL (ref 31.4–37.4)
MCV RBC AUTO: 84 FL (ref 82–98)
MONOCYTES # BLD AUTO: 0.34 THOUSAND/ÂΜL (ref 0.17–1.22)
MONOCYTES NFR BLD AUTO: 8 % (ref 4–12)
NEUTROPHILS # BLD AUTO: 2.7 THOUSANDS/ÂΜL (ref 1.85–7.62)
NEUTS SEG NFR BLD AUTO: 60 % (ref 43–75)
NRBC BLD AUTO-RTO: 0 /100 WBCS
PLATELET # BLD AUTO: 73 THOUSANDS/UL (ref 149–390)
PMV BLD AUTO: 9.7 FL (ref 8.9–12.7)
POTASSIUM SERPL-SCNC: 3.5 MMOL/L (ref 3.5–5.3)
RBC # BLD AUTO: 3.32 MILLION/UL (ref 3.81–5.12)
SODIUM SERPL-SCNC: 138 MMOL/L (ref 135–147)
WBC # BLD AUTO: 4.39 THOUSAND/UL (ref 4.31–10.16)

## 2025-05-16 PROCEDURE — 99233 SBSQ HOSP IP/OBS HIGH 50: CPT

## 2025-05-16 PROCEDURE — 80048 BASIC METABOLIC PNL TOTAL CA: CPT | Performed by: INTERNAL MEDICINE

## 2025-05-16 PROCEDURE — 94762 N-INVAS EAR/PLS OXIMTRY CONT: CPT

## 2025-05-16 PROCEDURE — 82948 REAGENT STRIP/BLOOD GLUCOSE: CPT

## 2025-05-16 PROCEDURE — 85025 COMPLETE CBC W/AUTO DIFF WBC: CPT | Performed by: INTERNAL MEDICINE

## 2025-05-16 PROCEDURE — 99232 SBSQ HOSP IP/OBS MODERATE 35: CPT | Performed by: INTERNAL MEDICINE

## 2025-05-16 RX ORDER — DEXTROSE MONOHYDRATE AND SODIUM CHLORIDE 5; .9 G/100ML; G/100ML
75 INJECTION, SOLUTION INTRAVENOUS CONTINUOUS
Status: DISCONTINUED | OUTPATIENT
Start: 2025-05-16 | End: 2025-05-19

## 2025-05-16 RX ADMIN — MICONAZOLE NITRATE: 20 CREAM TOPICAL at 21:20

## 2025-05-16 RX ADMIN — DEXTROSE AND SODIUM CHLORIDE 75 ML/HR: 5; .9 INJECTION, SOLUTION INTRAVENOUS at 17:37

## 2025-05-16 NOTE — ASSESSMENT & PLAN NOTE
"Discussed overall condition and plan of care with her son and  son-in-law at the bedside  She is lethargic again today.  Discussed possible tube feedings  According to the son, when she was awake and alert yesterday he asked her about this and she said \" absolutely not\"  "

## 2025-05-16 NOTE — DISCHARGE SUPPORT
Case Management Assessment & Discharge Planning Note    Patient name Alecia Kay  Location [facility] /E4 -* MRN [id number]  : 1945 Date 2025       Current Admission Date: 2025  Current Admission Diagnosis:Hematochezia   Patient Active Problem List    Diagnosis Date Noted    Hypothermia 2025    Folic acid deficiency 2025    Abnormal TSH 2025    Goals of care, counseling/discussion 2025    Metabolic encephalopathy 2025    Wang's esophagus 2025    Insertional Achilles tendinopathy 2025    Chronic osteomyelitis (HCC) 2025    Acute blood loss anemia (ABLA) 2025    Acute pulmonary embolism (HCC) 2025    Acute respiratory insufficiency 2025    Hematochezia 2025    Acute DVT (deep venous thrombosis) (HCC) 2025    History of DVT (deep vein thrombosis) 2025    Tachycardia 2025    Acute cystitis 2025    Generalized weakness 2025    Bilateral lower extremity edema 2025    Severe protein-calorie malnutrition (HCC) 2025    History of osteomyelitis 2025    Acute kidney injury (HCC) 2025    Chronic kidney disease, stage III (moderate) (HCC) 2025    Hypothyroidism 2025    Hypertension 2025    Electrolyte imbalance 2025      LOS (days): 14  Geometric Mean LOS (GMLOS) (days): 4.5  Days to GMLOS:-9.2   Facility Authorization Approved  [facility] received approved auth for: SNF  Insurance: CBC  Determination made after peer to peer was completed?: Not applicable  Authorization Obtained Via: Phone  Name/Phone # of Rep who called in determination: Alecia  Facility Name: [facility]  Approved Authorization Number: [id number]  Start of Care Date: 25  Next Review Date: 25  Submit Next Review to: [phone]   notified: López GUTIERREZ     Updates to authorization status will be noted in chart.    Please reach out to SANJANA  for updates on any clinical information.

## 2025-05-16 NOTE — ASSESSMENT & PLAN NOTE
Pt is a 81yo female with PMH significant for hypertension, DVT/PE CKD 3, and right heel osteomyelitis initially presented to [facility] 4/25/2025 for generalized weakness.  Initially diagnosed with GAY and MDR UTI: completed course of ertapenem, and then was transferred to [facility] for GI evaluation for hematochezia/acute blood loss anemia    Hematochezia secondary to diverticular bleed on top of anticoagulation  She was not felt to be a candidate for further anticoagulation so an IVC filter was placed

## 2025-05-16 NOTE — PLAN OF CARE
Problem: INFECTION - ADULT  Goal: Absence or prevention of progression during hospitalization  Description: INTERVENTIONS:- Assess and monitor for signs and symptoms of infection- Monitor lab/diagnostic results- Monitor all insertion sites, i.e. indwelling lines, tubes, and drains- Monitor endotracheal if appropriate and nasal secretions for changes in amount and color- [place] appropriate cooling/warming therapies per order- Administer medications as ordered- Instruct and encourage patient and family to use good hand hygiene technique- Identify and instruct in appropriate isolation precautions for identified infection/condition  Outcome: Progressing  Goal: Absence of fever/infection during neutropenic period  Description: INTERVENTIONS:- Monitor WBC  Outcome: Progressing     Problem: SAFETY ADULT  Goal: Patient will remain free of falls  Description: INTERVENTIONS:- Educate patient/family on patient safety including physical limitations- Instruct patient to call for assistance with activity - Consult OT/PT to assist with strengthening/mobility - Keep Call bell within reach- Keep bed low and locked with side rails adjusted as appropriate- Keep care items and personal belongings within reach- Initiate and maintain comfort rounds- Make Fall Risk Sign visible to staff- Offer Toileting every 2 Hours, in advance of need- Initiate/Maintain bed alarm- Obtain necessary fall risk management equipment: - Apply yellow socks and bracelet for high fall risk patients- Consider moving patient to room near nurses station  Outcome: Progressing  Goal: Maintain or return to baseline ADL function  Description: INTERVENTIONS:-  Assess patient's ability to carry out ADLs; assess patient's baseline for ADL function and identify physical deficits which impact ability to perform ADLs (bathing, care of mouth/teeth, toileting, grooming, dressing, etc.)- Assess/evaluate cause of self-care deficits - Assess range of motion- Assess patient's  mobility; develop plan if impaired- Assess patient's need for assistive devices and provide as appropriate- Encourage maximum independence but intervene and supervise when necessary- Involve family in performance of ADLs- Assess for home care needs following discharge - Consider OT consult to assist with ADL evaluation and planning for discharge- Provide patient education as appropriate  Outcome: Progressing  Goal: Maintains/Returns to pre admission functional level  Description: INTERVENTIONS:- Perform AM-PAC 6 Click Basic Mobility/ Daily Activity assessment daily.- Set and communicate daily mobility goal to care team and patient/family/caregiver. - Collaborate with rehabilitation services on mobility goals if consulted- Perform Range of Motion 3 times a day.- Reposition patient every 2 hours.- Dangle patient 3 times a day- Stand patient 3 times a day- Ambulate patient 3 times a day- Out of bed to chair 3 times a day - Out of bed for meals 3 times a day- Out of bed for toileting- Record patient progress and toleration of activity level   Outcome: Progressing     Problem: DISCHARGE PLANNING  Goal: Discharge to home or other facility with appropriate resources  Description: INTERVENTIONS:- Identify barriers to discharge w/patient and caregiver- Arrange for needed discharge resources and transportation as appropriate- Identify discharge learning needs (meds, wound care, etc.)- Arrange for interpretive services to assist at discharge as needed- Refer to Case Management Department for coordinating discharge planning if the patient needs post-hospital services based on physician/advanced practitioner order or complex needs related to functional status, cognitive ability, or social support system  Outcome: Progressing     Problem: Knowledge Deficit  Goal: Patient/family/caregiver demonstrates understanding of disease process, treatment plan, medications, and discharge instructions  Description: Complete learning  assessment and assess knowledge base.Interventions:- Provide teaching at level of understanding- Provide teaching via preferred learning methods  Outcome: Progressing     Problem: Nutrition/Hydration-ADULT  Goal: Nutrient/Hydration intake appropriate for improving, restoring or maintaining nutritional needs  Description: Monitor and assess patient's nutrition/hydration status for malnutrition. Collaborate with interdisciplinary team and initiate plan and interventions as ordered.  Monitor patient's weight and dietary intake as ordered or per policy. Utilize nutrition screening tool and intervene as necessary. Determine patient's food preferences and provide high-protein, high-caloric foods as appropriate. INTERVENTIONS:- Monitor oral intake, urinary output, labs, and treatment plans- Assess nutrition and hydration status and recommend course of action- Evaluate amount of meals eaten- Assist patient with eating if necessary - Allow adequate time for meals- Recommend/ encourage appropriate diets, oral nutritional supplements, and vitamin/mineral supplements- Order, calculate, and assess calorie counts as needed- Recommend, monitor, and adjust tube feedings and TPN/PPN based on assessed needs- Assess need for intravenous fluids- Provide specific nutrition/hydration education as appropriate- Include patient/family/caregiver in decisions related to nutrition  Monitor and assess patient's nutrition/hydration status for malnutrition. Collaborate with interdisciplinary team and initiate plan and interventions as ordered.  Monitor patient's weight and dietary intake as ordered or per policy. Utilize nutrition screening tool and intervene as necessary. Determine patient's food preferences and provide high-protein, high-caloric foods as appropriate. INTERVENTIONS:- Monitor oral intake, urinary output, labs, and treatment plans- Assess nutrition and hydration status and recommend course of action- Evaluate amount of meals  eaten- Assist patient with eating if necessary - Allow adequate time for meals- Recommend/ encourage appropriate diets, oral nutritional supplements, and vitamin/mineral supplements- Order, calculate, and assess calorie counts as needed- Recommend, monitor, and adjust tube feedings and TPN/PPN based on assessed needs- Assess need for intravenous fluids- Provide specific nutrition/hydration education as appropriate- Include patient/family/caregiver in decisions related to nutrition  Outcome: Progressing     Problem: Prexisting or High Potential for Compromised Skin Integrity  Goal: Skin integrity is maintained or improved  Description: INTERVENTIONS:- Identify patients at risk for skin breakdown- Assess and monitor skin integrity- Assess and monitor nutrition and hydration status- Monitor labs - Assess for incontinence - Turn and reposition patient- Assist with mobility/ambulation- Relieve pressure over bony prominences- Avoid friction and shearing- Provide appropriate hygiene as needed including keeping skin clean and dry- Evaluate need for skin moisturizer/barrier cream- Collaborate with interdisciplinary team - Patient/family teaching- Consider wound care consult   Outcome: Progressing     Problem: Communication Impairment  Goal: Ability to express needs and understand communication  Description: Assess patient's communication skills and ability to understand information.  Patient will demonstrate use of effective communication techniques, alternative methods of communication and understanding even if not able to speak. - Encourage communication and provide alternate methods of communication as needed.- Collaborate with case management/ for discharge needs.- Include patient/family/caregiver in decisions related to communication.  Outcome: Progressing   Problem: PAIN - ADULT  Goal: Verbalizes/displays adequate comfort level or baseline comfort level  Description: Interventions:- Encourage patient to  monitor pain and request assistance- Assess pain using appropriate pain scale- Administer analgesics based on type and severity of pain and evaluate response- Implement non-pharmacological measures as appropriate and evaluate response- Consider cultural and social influences on pain and pain management- Notify physician/advanced practitioner if interventions unsuccessful or patient reports new pain  Outcome: Progressing

## 2025-05-16 NOTE — SPEECH THERAPY NOTE
Speech Language/Pathology    Speech/Language Pathology Progress Note    Patient Name: Alecia Kay  Today's Date: 5/16/2025     Problem List  Principal Problem:    Hematochezia  Active Problems:    History of osteomyelitis    Chronic kidney disease, stage III (moderate) (HCC)    Hypothyroidism    Hypertension    Acute DVT (deep venous thrombosis) (HCC)    Acute pulmonary embolism (HCC)    Acute respiratory insufficiency    Acute blood loss anemia (ABLA)    Wang's esophagus    Insertional Achilles tendinopathy    Chronic osteomyelitis (HCC)    Metabolic encephalopathy    Goals of care, counseling/discussion    Folic acid deficiency    Abnormal TSH    Hypothermia       Past Medical History  Past Medical History:   Diagnosis Date    Anemia     Cellulitis     Disease of thyroid gland     GERD (gastroesophageal reflux disease)     Hyperlipidemia     Hypertension     Obesity     Osteomyelitis (HCC)     Pneumonia     Pulmonary embolism (HCC)         Past Surgical History  Past Surgical History:   Procedure Laterality Date    FOOT SURGERY      IR IVC FILTER PLACEMENT OPTIONAL/TEMPORARY  5/9/2025    JOINT REPLACEMENT       Pt refused PO for both PCA and nurse this am. Will f/u as able and indicated.

## 2025-05-16 NOTE — ASSESSMENT & PLAN NOTE
History of DVT   However during 4/25/2025 [facility] admission found to have bilateral extensive extensive acute vs subacute DVTs, in the setting of subtherapeutic INR  Not a candidate for anticoagulation so IVC filter was felt to be the best course of action  IVC filter placed 5/9

## 2025-05-16 NOTE — ASSESSMENT & PLAN NOTE
Ongoing  metabolic encephalopathy due to multiple issues: Prolonged hospitalization, anemia, recent infection, recent UTI, advanced age, deconditioning, poor oral intake  Mental status improved briefly after her last blood transfusion but is lethargic again today  Stroke has been ruled out  No ongoing infection  Discussed with son and son-in-law at the bedside.   She would not allow tube feedings  Start D5 normal saline to avoid hypoglycemia

## 2025-05-16 NOTE — PROGRESS NOTES
Progress Note - Geriatric Medicine   Alecia Kay 80 y.o. female MRN: [id number]  Unit/Bed#: E4 -01 Encounter: [id number]      Assessment/Plan:    Acute Encephalopathy  Patient presented to the hospital for generalized weakness   Baseline mentation is alert and oriented x 4 but forgetful at times   Current cause considerations   Suspected to be multifactorial secondary to age, possible underlying cognitive impairment, hematoochezia, acute PE/DVT, electrolyte imbalances, hypothermia, hypoglycemia, anemia, vision impairment, and prolonged hospitalization  UA on admission negative for UTI   No leukocytosis noted on labs today   Hemoglobin on labs this morning noted to be 9.5  Patient was a rapid response last week for acute mental status change   She was a stroke alert   CT of the head and MRI were negative for any acute infarcts   Her mental status appeared improved over the weekend and was even improving on 5/12  Patient was noted to be lethargic on 5/13 and 5/14  She did receive blood on 5/14 and nursing noted yesterday she was alert, awake, and oriented in the morning   She was able to work with PT  When I saw her yesterday she was again lethargic   She continues to be lethargic today   New blood cultures were obtained on 5/13 and have been negative for 48 hours    She did undergo IVC filter placement on 5/9  Primary team did find POLST from January and patient was level 3 DNR at that time  Son agreeable to this change and patient is now level 3 DNR  Identify and treat reversible causes of confusion including infection, dehydration, electrolyte imbalance, anemia, hypoxia, urinary retention, constipation, pain, and sleep disturbance  Maintain delirium precautions   Provide redirection, reorientation, and distraction techniques  Maintain fall and safety precautions   Assist with ADLs/IADLs  Avoid deliriogenic medications such as tramadol, benzodiazepines, anticholinergics, benadryl  Treat pain using  geriatric pain protocol   Encourage oral hydration and nutrition   Monitor for constipation and urinary retention   Implement sleep hygiene and limit night time interuptions   Maintain sleep-wake cycle   Encourage early and frequent mobilization   Most recent EKG on 5/8/2025 revealed a QTc interval  of 447  If all other interventions are unsuccessful for acute agitation and behaviors, can consider Zyprexa 2.5 mg IM Q 8 hours prn   Would avoid benzodiazepines such as Ativan if possible as these medications can cause/worsen delirium   Redirect and reorient as needed  Keep mentally, physically, and socially active    Cognitive Screening  Patient has no documented history of memory loss or cognitive impairment   She notes no issues with her memory at baseline   Patient has been having periods of confusion here  Please see cause considerations as discussed above  Patient was a stroke alert las week for altered mental status and speech difficulties   She had been slowly improving though her mentation continues to wax and wane   Patient was noted to be lethargic on 5/13 and 5/14  She did receive blood on 5/14 and nursing noted yesterday she was alert, awake, and oriented in the morning   She was able to work with PT  When I saw her yesterday she was again lethargic   She continues to be lethargic today   Most recent TSH on 5/10/2025 noted to be 0.104  Most recent vitamin B 12 level on 5/10/2025 noted to be 1179  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 with no acute abnormalities   MRI of the brain on 5/9 revealed moderate microangiopathy   Patient scored 13/15 on BIMS on 3/5/2025 indicating she is cognitively intact   Maintain delirium precautions as discussed above   Redirect and reorient as needed  Keep physically, mentally and socially active     Deconditioning   Patient is at increased risk for deconditioning secondary to possible underlying cognitive impairment, hematochezia, acute PE/DVT,  electrolyte imbalance, hypothermia, hypoglycemia, anemia, vision impairment, weakness, gait dysfunction, and prolonged hospitalization  Continue to optimize diet, hydration, and mobility for healing   Chronic Kidney Disease Stage III  Baseline creatinine not entirely clear but recently appears to be 0.8-1.0  Creatinine on labs today noted to be 0.89  Avoid nephrotoxic medication and renal dose medication   Keep hydrated  Type II Diabetes   Most recent hemoglobin A1C on 11/13/2024 noted to be 5.7  Per chart review, she does not appear to be on any medication for this at baseline  Glucose on labs today noted to be 65  Patient is now on blood sugar checks and repeat sugars have been in the 70's  Continue to monitor blood sugars closely   Avoid hypoglycemia   Anemia   Baseline hemoglobin appears to be 8-10  Patient was noted to have hematochezia which had initially resolved but returned at the end of last week   She did receive one unit of PRBCs on 5/14  Hemoglobin on labs this morning noted to be 9.7  Continue to monitor CBC   Transfuse for hemoglobin < 7   Monitor for signs and symptoms of infection, dehydration, DVT, and skin breakdown    Frailty   Clinical Frail Scale: 6- Moderately Frail (current)  Need help with all outside activities  Need help with stairs and bathing  May need assistance with dressing  Most recent albumin on 5/12/2025 noted to be 2.5  Consider nutrition consult  Encourage protein supplementation     Ambulatory Dysfunction/Falls  Patient notes no recent falls at home   She states she ambulates with a roller walker at baseline   PT/OT consulted to assist with strengthening/mobility and assist with discharge planning to appropriate level of care  Assess patient frequently for physical needs, encourage use of assistant devices as needed and directed by PT/OT  Identify cognitive and physical deficits and behaviors that affect risk of falls  Consider moving patient closer to nursing station to monitor  more closely for impulsive behavior which may increase risk of falls  [place] fall and safety precautions   Educate patient/family on patient safety including physical limitations and importance of using call bell for assistance   Modify environment to reduce risk of injury including disconnecting from pole when not in use, ensuring adequate lighting in room and restroom, ensuring that path to restroom is clear and free of trip hazards  Out of bed as tolerated    Impaired Vision   Patient denies vision impairment   She notes she does have glasses for reading   Recommend use of corrective lenses at all appropriate times  Encourage adequate lighting and encourage use of assistance with ambulation  Keep personal belongings close to avoid reaching  Encourage appropriate footwear at all times  Recommend large font for printed materials provided to patient    Dentition/Appetite   Patient denies denture use  She states she has a good appetite at baseline   She is currently on a dysphagia I nectar thick liquid diet   Patient has not been eating due to increased lethargy   Speech therapy consulted and following   Ensure meal consistency is appropriate for all abilities   Consider nutrition consult   Continue aspiration precautions     Elimination   Patient is continent of bowel and bladder at baseline  She appears to be voiding without difficulty   Patient was noted to have hematochezia and was found to have a diverticular bleed   The bleeding appears to have resolved at this point   Last documented bowel movement was on 5/11    Patient is not currently on a bowel regimen   Monitor for constipation and urinary retention     Insomnia   Patient notes no difficulty sleeping at baseline   She noted yesterday she had been having some vivid dreams here   Her son at the bedside notes this occurred when at rehab as well   Patient has had some difficulty sleeping here   Patient has been lethargic for several days  Nursing notes no  acute issues or events overnight   Can consider restarting mirtazapine vs trialing melatonin if needed  First line is behavioral therapy   Avoid sedative hypnotics including benzodiazepines and benadryl  Encourage staying awake during the day   Encourage daytime activities and morning exercise   Decrease or eliminate daytime naps   Avoid caffeine especially during late afternoon and evening hours  Establish a nighttime routine  Implement sleep hygiene and limit nighttime interruptions    Anxiety/Depression  Patient has a documented history of anxiety   PDMP reviewed and patient was prescribed alprazolam 0.25 mg on 4/11/2025  Unclear how often she was taking this   Patient is lethargic on exam today   Continue supportive care     Hematochezia   Patient presented to [facility] with generalized weakness  She was initially noted to have a UTI and GAY  Additionally she was found to have acute DVT and PE  Her INR was subtherapeutic and she was started on a heparin drip  She was later noted to have hematochezia and was a rapid response   She was transferred to [facility] due to lack of GI services   GI initially restarted the heparin infusion but this was discontinued due to a large amount of hematochezia  She underwent and EGD/colonoscopy on 5/5  EGD revealed Wang's esophagus with no evidence of bleeding   Colonoscopy revealed several diverticuli with old blood   GI suspected a diverticular bleed   The bleeding had resolved but the returned again   She did receive blood and FFP on 5/7  She underwent IVC filter placement on 5/9 and bleeding appears to have resolved   Her hemoglobin on 5/14 was 7.1  She received one unit of PRBCs on 5/14  Hemoglobin on labs today noted to be 9.5  GI has since signed off   Management per primary team     Acute Pulmonary Embolism  Patient found to have extensive right upper and lower PE on CTA on 4/28/2025  Echo noted no evidence of heart strain   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   She is  "maintained on coumadin 5 mg 5 days per week and 7.5 mg Monday and Friday  Patient had nausea/vomiting for several days PTA and she was not able to take this medication   She had been on anticoagulation but this is again on hold due to hematochezia   GI is following for hematochezia   Patient underwent IVC filter on 5/9  Management per primary team     Chronic Osteomyelitis   Patient with previous chronic osteomyelitis of the right foot   Podiatry recommending WBAT in a surgical shoe  She completed 6 weeks of antibiotics PTA   MRI of the right heel and ankle on 4/29/2025 revealed no evidence of remaining osteomyelitis   Patient was re-evaluated by podiatry on 4/30/2025 and no changes to prior recommendations noted   She will require continued outpatient follow-up with podiatry on discharge       Subjective:   The patient is being seen and evaluated today at the bedside for geriatric follow-up. She is noted to be lying in bed comfortably in no acute distress. She is noted to be lethargic on my exam today. She does not appear to be in any pain or discomfort.     Care was coordinated with patients nurse Blue. He notes she continues to be lethargic and refused AM medication.     Care was coordinated with patients son Jaron over the phone. He did have questions regarding hospice. We did review palliative vs hospice and possible palliative consult either here or at rehab if she improves. All questions and concerns were addressed at the time of the call.         Review of Systems   Unable to perform ROS: Mental status change (lethargic)         Objective:     Vitals: Blood pressure 130/78, pulse 85, temperature 98.3 °F (36.8 °C), temperature source Temporal, resp. rate 16, height 5' 2\" (1.575 m), weight 78 kg (171 lb 15.3 oz), SpO2 94%.,Body mass index is 31.45 kg/m².    No intake or output data in the 24 hours ending 05/16/25 1526        Current Medications: Reviewed    Physical Exam:   Physical Exam  Vitals and nursing " "note reviewed.   Constitutional:       General: She is not in acute distress.  HENT:      Head: Normocephalic.      Mouth/Throat:      Mouth: Mucous membranes are dry.     Cardiovascular:      Rate and Rhythm: Normal rate and regular rhythm.   Pulmonary:      Effort: Pulmonary effort is normal. No respiratory distress.   Abdominal:      General: Bowel sounds are normal. There is no distension.     Musculoskeletal:         General: Swelling (generalized) present. No tenderness.     Skin:     General: Skin is warm and dry.     Neurological:      Mental Status: She is lethargic.          Invasive Devices       Long-dwell Peripherally Inserted Midline IV Catheter  Duration             Long-Dwell Peripheral IV (Midline) 05/07/25 Right Basilic 9 days                    Lab, Imaging and other studies: Results Review Statement: I reviewed AM labs.     Please note:  Voice-recognition software may have been used in the preparation of this document.  Occasional wrong word or \"sound-alike\" substitutions may have occurred due to the inherent limitations of voice recognition software.  Interpretation should be guided by context.    "

## 2025-05-16 NOTE — CASE MANAGEMENT
Case Management Discharge Planning Note    Patient name Alecia Kay  Location [facility] /E4 -* MRN [id number]  : 1945 Date 2025       Current Admission Date: 2025  Current Admission Diagnosis:Hematochezia   Patient Active Problem List    Diagnosis Date Noted    Hypothermia 2025    Folic acid deficiency 2025    Abnormal TSH 2025    Goals of care, counseling/discussion 2025    Metabolic encephalopathy 2025    Wang's esophagus 2025    Insertional Achilles tendinopathy 2025    Chronic osteomyelitis (HCC) 2025    Acute blood loss anemia (ABLA) 2025    Acute pulmonary embolism (HCC) 2025    Acute respiratory insufficiency 2025    Hematochezia 2025    Acute DVT (deep venous thrombosis) (HCC) 2025    History of DVT (deep vein thrombosis) 2025    Tachycardia 2025    Acute cystitis 2025    Generalized weakness 2025    Bilateral lower extremity edema 2025    Severe protein-calorie malnutrition (HCC) 2025    History of osteomyelitis 2025    Acute kidney injury (HCC) 2025    Chronic kidney disease, stage III (moderate) (HCC) 2025    Hypothyroidism 2025    Hypertension 2025    Electrolyte imbalance 2025      LOS (days): 14  Geometric Mean LOS (GMLOS) (days): 4.5  Days to GMLOS:-9.5     OBJECTIVE:  Risk of Unplanned Readmission Score: 25.63         Current admission status: Inpatient   Preferred Pharmacy:   [facility] [place], [place] - [address] [facility]  [address] [facility] [address]  Phone: [phone] Fax: [phone]    Primary Care Provider: Rambo Perera DO    Primary Insurance: [employer]  Secondary Insurance:     DISCHARGE DETAILS:                                                                                                 Additional Comments: CM met with the family this morning.  Pt is now weak  and not eating.  Authorization fro [facility] obtained ffor [facility] 5/16 and good until 5/23.  Provider made aware and family also aware.  Will ocntinuye to monitor and evaluate progress.  Family did confirm that they had spoken to the pt and she reinforced to them that if she is not able to eat that she does not want any alternative feeding interventions.  CM encourage them to discuss her GOC if she does not bounce bacvk well after this episode.  They verbalized understanding.

## 2025-05-16 NOTE — PROGRESS NOTES
"Progress Note - Hospitalist   Name: Alecia Kay 80 y.o. female I MRN: [id number]  Unit/Bed#: E4 -01 I Date of Admission: 5/2/2025   Date of Service: 5/16/2025 I Hospital Day: 14    Assessment & Plan  Hematochezia  Pt is a 81yo female with PMH significant for hypertension, DVT/PE CKD 3, and right heel osteomyelitis initially presented to [facility] 4/25/2025 for generalized weakness.  Initially diagnosed with GAY and MDR UTI: completed course of ertapenem, and then was transferred to [facility] for GI evaluation for hematochezia/acute blood loss anemia    Hematochezia secondary to diverticular bleed on top of anticoagulation  She was not felt to be a candidate for further anticoagulation so an IVC filter was placed  Acute blood loss anemia (ABLA)  Secondary to GI bleed  Baseline hemoglobin appears to be variable over the last year ranging 7.5-10  Acute blood loss anemia due to hematochezia secondary to diverticular bleed  Status post total of  7 units PRBC during this admission  Last transfusion was 5/14/2025  Hemoglobin stable at 9.5  Metabolic encephalopathy  Ongoing  metabolic encephalopathy due to multiple issues: Prolonged hospitalization, anemia, recent infection, recent UTI, advanced age, deconditioning, poor oral intake  Mental status improved briefly after her last blood transfusion but is lethargic again today  Stroke has been ruled out  No ongoing infection  Discussed with son and son-in-law at the bedside.   She would not allow tube feedings  Start D5 normal saline to avoid hypoglycemia  Goals of care, counseling/discussion  Discussed overall condition and plan of care with her son and  son-in-law at the bedside  She is lethargic again today.  Discussed possible tube feedings  According to the son, when she was awake and alert yesterday he asked her about this and she said \" absolutely not\"  Hypothermia  She was noted to have hypothermia requiring Tyrone hugger  Could be related to " advanced age, anemia, medications  Repeat blood cultures sent on 5/13/2025 to rule out infection currently without growth for 48 hours  Hypothermia resolved    Acute pulmonary embolism (HCC)  Extensive right upper and lower lobe pulmonary embolism seen on 4/28/2025 CTA  2D echocardiogram without evidence of right heart strain  She already had a history of DVT and PE: Maintained on warfarin previously, but was not taking it in the past month prior to admission--(INR 1.18)  Due to subtherapeutic INR, was started on bridging therapy with anticoagulation however had intractable GI bleed  Anticoagulation contraindicated due to recurrent lower GI bleed   She was evaluated by pulmonology who recommended  IVC filter placement.  Temporary IVC filter placed 5/9 by IR  Acute DVT (deep venous thrombosis) (HCC)  History of DVT   However during 4/25/20[facility] admission found to have bilateral extensive extensive acute vs subacute DVTs, in the setting of subtherapeutic INR  Not a candidate for anticoagulation so IVC filter was felt to be the best course of action  IVC filter placed 5/9   Acute respiratory insufficiency  Likely secondary to pulmonary embolism and anemia  Was placed on 2 L nasal cannula  Resolved.  Currently on room air  Hypertension  Patient previously previously on amlodipine/beta-blocker  Medications held for hypotension with hematochezia  Hypothyroidism  Continue levothyroxine  Wang's esophagus  Noted on EGD  Will need repeat EGD in 3 years for surveillance  Patient with dysphagia: Continue proton pump inhibitor bid  Abnormal TSH  Pt with low TSH and elevated free T4: However in the setting of acute illness, recent IV contrast, as well as IV steroid:  Reviewed with endocrinology on-call team: Not accurate in the setting of acute illness and above factors  Recommend recheck full TFTs in 2-4 weeks as an outpatient.    VTE Pharmacologic Prophylaxis: VTE Score: 6 High Risk (Score >/= 5) - Pharmacological DVT  Prophylaxis Contraindicated. Sequential Compression Devices Ordered.    Patient Centered Rounds: I performed bedside rounds with nursing staff today.   Discussions with Specialists or Other Care Team Provider: Case management  Education and Discussions with Family / Patient: Updated  (son and son in law) at bedside.    Current Length of Stay: 14 day(s)  Current Patient Status: Inpatient   Certification Statement: The patient will continue to require additional inpatient hospital stay due to encephalopathy  Discharge Plan: TBD    Code Status: Level 3 - DNAR and DNI    Subjective   Lethargic  Poor oral intake    Objective :  Temp:  [94.3 °F (34.6 °C)-99.3 °F (37.4 °C)] 98.3 °F (36.8 °C)  HR:  [56-86] 85  BP: (112-135)/(54-90) 130/78  Resp:  [16-20] 16  SpO2:  [94 %-97 %] 94 %  O2 Device: None (Room air)    Body mass index is 31.45 kg/m².     Input and Output Summary (last 24 hours):   No intake or output data in the 24 hours ending 05/16/25 1522    Physical Exam  Vitals reviewed.   HENT:      Head: Normocephalic and atraumatic.      Nose: No congestion or rhinorrhea.     Eyes:      General: No scleral icterus.      Cardiovascular:      Rate and Rhythm: Normal rate and regular rhythm.   Pulmonary:      Breath sounds: No wheezing or rhonchi.   Abdominal:      Tenderness: There is no abdominal tenderness. There is no guarding.     Musculoskeletal:      Comments: Trace edema     Skin:     General: Skin is warm and dry.     Neurological:      Comments: lethargic     Lab Results: I have reviewed the following results:   Results from last 7 days   Lab Units 05/16/25  0620   WBC Thousand/uL 4.39   HEMOGLOBIN g/dL 9.5*   HEMATOCRIT % 27.8*   PLATELETS Thousands/uL 73*   SEGS PCT % 60   LYMPHO PCT % 29   MONO PCT % 8   EOS PCT % 1     Results from last 7 days   Lab Units 05/16/25  0620 05/13/25  0459 05/12/25  0624   SODIUM mmol/L 138   < > 142   POTASSIUM mmol/L 3.5   < > 2.9*   CHLORIDE mmol/L 105   < > 109*    CO2 mmol/L 27   < > 27   BUN mg/dL 12   < > 7   CREATININE mg/dL 0.89   < > 0.91   ANION GAP mmol/L 6   < > 6   CALCIUM mg/dL 7.3*   < > 7.7*   ALBUMIN g/dL  --   --  2.5*   TOTAL BILIRUBIN mg/dL  --   --  0.57   ALK PHOS U/L  --   --  54   ALT U/L  --   --  6*   AST U/L  --   --  14   GLUCOSE RANDOM mg/dL 65   < > 112    < > = values in this interval not displayed.         Results from last 7 days   Lab Units 05/16/25  1159 05/16/25  0735 05/15/25  2031 05/15/25  1628 05/15/25  1112 05/15/25  0736 05/15/25  0632 05/15/25  0114 05/14/25  1622 05/14/25  1113 05/14/25  0729 05/13/25  2128   POC GLUCOSE mg/dl 75 73 79 107 93 98 91 99 77 87 84 69               Recent Cultures (last 7 days):   Results from last 7 days   Lab Units 05/13/25  1106   BLOOD CULTURE  No Growth at 48 hrs.  No Growth at 48 hrs.             Last 24 Hours Medication List:     Current Facility-Administered Medications:     acetaminophen (TYLENOL) tablet 650 mg, Q6H PRN    [Held by provider] amLODIPine (NORVASC) tablet 10 mg, Daily    dextrose 5 % and sodium chloride 0.9 % infusion, Continuous    folic acid (FOLVITE) tablet 1 mg, Daily    hydrOXYzine HCL (ATARAX) tablet 25 mg, Q8H PRN    levothyroxine tablet 75 mcg, Early Morning    meclizine (ANTIVERT) tablet 25 mg, TID PRN    [Held by provider] metoprolol tartrate (LOPRESSOR) tablet 50 mg, Q12H MELANY    [Held by provider] mirtazapine (REMERON) tablet 7.5 mg, HS    moisture barrier miconazole 2% cream (aka EDNA MOISTURE BARRIER ANTIFUNGAL CREAM), BID    ondansetron (ZOFRAN) injection 4 mg, Q4H PRN    pantoprazole (PROTONIX) EC tablet 40 mg, BID AC    potassium chloride (Klor-Con M20) CR tablet 40 mEq, Once    pravastatin (PRAVACHOL) tablet 80 mg, Daily With Dinner    Administrative Statements   Today, Patient Was Seen By: Oleksandr Peace MD      **Please Note: This note may have been constructed using a voice recognition system.**

## 2025-05-16 NOTE — PLAN OF CARE
Problem: PAIN - ADULT  Goal: Verbalizes/displays adequate comfort level or baseline comfort level  Description: Interventions:- Encourage patient to monitor pain and request assistance- Assess pain using appropriate pain scale- Administer analgesics based on type and severity of pain and evaluate response- Implement non-pharmacological measures as appropriate and evaluate response- Consider cultural and social influences on pain and pain management- Notify physician/advanced practitioner if interventions unsuccessful or patient reports new pain  Outcome: Progressing     Problem: INFECTION - ADULT  Goal: Absence or prevention of progression during hospitalization  Description: INTERVENTIONS:- Assess and monitor for signs and symptoms of infection- Monitor lab/diagnostic results- Monitor all insertion sites, i.e. indwelling lines, tubes, and drains- Monitor endotracheal if appropriate and nasal secretions for changes in amount and color- [place] appropriate cooling/warming therapies per order- Administer medications as ordered- Instruct and encourage patient and family to use good hand hygiene technique- Identify and instruct in appropriate isolation precautions for identified infection/condition  Outcome: Progressing  Goal: Absence of fever/infection during neutropenic period  Description: INTERVENTIONS:- Monitor WBC  Outcome: Progressing     Problem: SAFETY ADULT  Goal: Patient will remain free of falls  Description: INTERVENTIONS:- Educate patient/family on patient safety including physical limitations- Instruct patient to call for assistance with activity - Consult OT/PT to assist with strengthening/mobility - Keep Call bell within reach- Keep bed low and locked with side rails adjusted as appropriate- Keep care items and personal belongings within reach- Initiate and maintain comfort rounds- Make Fall Risk Sign visible to staff- Offer Toileting every 2 Hours, in advance of need- Initiate/Maintain bed alarm-  Obtain necessary fall risk management equipment: - Apply yellow socks and bracelet for high fall risk patients- Consider moving patient to room near nurses station  Outcome: Progressing  Goal: Maintain or return to baseline ADL function  Description: INTERVENTIONS:-  Assess patient's ability to carry out ADLs; assess patient's baseline for ADL function and identify physical deficits which impact ability to perform ADLs (bathing, care of mouth/teeth, toileting, grooming, dressing, etc.)- Assess/evaluate cause of self-care deficits - Assess range of motion- Assess patient's mobility; develop plan if impaired- Assess patient's need for assistive devices and provide as appropriate- Encourage maximum independence but intervene and supervise when necessary- Involve family in performance of ADLs- Assess for home care needs following discharge - Consider OT consult to assist with ADL evaluation and planning for discharge- Provide patient education as appropriate  Outcome: Progressing  Goal: Maintains/Returns to pre admission functional level  Description: INTERVENTIONS:- Perform AM-PAC 6 Click Basic Mobility/ Daily Activity assessment daily.- Set and communicate daily mobility goal to care team and patient/family/caregiver. - Collaborate with rehabilitation services on mobility goals if consulted- Perform Range of Motion 3 times a day.- Reposition patient every 2 hours.- Dangle patient 3 times a day- Stand patient 3 times a day- Ambulate patient 3 times a day- Out of bed to chair 3 times a day - Out of bed for meals 3 times a day- Out of bed for toileting- Record patient progress and toleration of activity level   Outcome: Progressing     Problem: DISCHARGE PLANNING  Goal: Discharge to home or other facility with appropriate resources  Description: INTERVENTIONS:- Identify barriers to discharge w/patient and caregiver- Arrange for needed discharge resources and transportation as appropriate- Identify discharge learning  needs (meds, wound care, etc.)- Arrange for interpretive services to assist at discharge as needed- Refer to Case Management Department for coordinating discharge planning if the patient needs post-hospital services based on physician/advanced practitioner order or complex needs related to functional status, cognitive ability, or social support system  Outcome: Progressing     Problem: Knowledge Deficit  Goal: Patient/family/caregiver demonstrates understanding of disease process, treatment plan, medications, and discharge instructions  Description: Complete learning assessment and assess knowledge base.Interventions:- Provide teaching at level of understanding- Provide teaching via preferred learning methods  Outcome: Progressing     Problem: Nutrition/Hydration-ADULT  Goal: Nutrient/Hydration intake appropriate for improving, restoring or maintaining nutritional needs  Description: Monitor and assess patient's nutrition/hydration status for malnutrition. Collaborate with interdisciplinary team and initiate plan and interventions as ordered.  Monitor patient's weight and dietary intake as ordered or per policy. Utilize nutrition screening tool and intervene as necessary. Determine patient's food preferences and provide high-protein, high-caloric foods as appropriate. INTERVENTIONS:- Monitor oral intake, urinary output, labs, and treatment plans- Assess nutrition and hydration status and recommend course of action- Evaluate amount of meals eaten- Assist patient with eating if necessary - Allow adequate time for meals- Recommend/ encourage appropriate diets, oral nutritional supplements, and vitamin/mineral supplements- Order, calculate, and assess calorie counts as needed- Recommend, monitor, and adjust tube feedings and TPN/PPN based on assessed needs- Assess need for intravenous fluids- Provide specific nutrition/hydration education as appropriate- Include patient/family/caregiver in decisions related to  nutrition  Monitor and assess patient's nutrition/hydration status for malnutrition. Collaborate with interdisciplinary team and initiate plan and interventions as ordered.  Monitor patient's weight and dietary intake as ordered or per policy. Utilize nutrition screening tool and intervene as necessary. Determine patient's food preferences and provide high-protein, high-caloric foods as appropriate. INTERVENTIONS:- Monitor oral intake, urinary output, labs, and treatment plans- Assess nutrition and hydration status and recommend course of action- Evaluate amount of meals eaten- Assist patient with eating if necessary - Allow adequate time for meals- Recommend/ encourage appropriate diets, oral nutritional supplements, and vitamin/mineral supplements- Order, calculate, and assess calorie counts as needed- Recommend, monitor, and adjust tube feedings and TPN/PPN based on assessed needs- Assess need for intravenous fluids- Provide specific nutrition/hydration education as appropriate- Include patient/family/caregiver in decisions related to nutrition  Outcome: Progressing     Problem: Prexisting or High Potential for Compromised Skin Integrity  Goal: Skin integrity is maintained or improved  Description: INTERVENTIONS:- Identify patients at risk for skin breakdown- Assess and monitor skin integrity- Assess and monitor nutrition and hydration status- Monitor labs - Assess for incontinence - Turn and reposition patient- Assist with mobility/ambulation- Relieve pressure over bony prominences- Avoid friction and shearing- Provide appropriate hygiene as needed including keeping skin clean and dry- Evaluate need for skin moisturizer/barrier cream- Collaborate with interdisciplinary team - Patient/family teaching- Consider wound care consult   Outcome: Progressing     Problem: HEMATOLOGIC - ADULT  Goal: Maintains hematologic stability  Description: INTERVENTIONS- Assess for signs and symptoms of bleeding or hemorrhage-  Monitor labs- Administer supportive blood products/factors as ordered and appropriate  Outcome: Progressing     Problem: Communication Impairment  Goal: Ability to express needs and understand communication  Description: Assess patient's communication skills and ability to understand information.  Patient will demonstrate use of effective communication techniques, alternative methods of communication and understanding even if not able to speak. - Encourage communication and provide alternate methods of communication as needed.- Collaborate with case management/ for discharge needs.- Include patient/family/caregiver in decisions related to communication.  Outcome: Progressing

## 2025-05-16 NOTE — ASSESSMENT & PLAN NOTE
Secondary to GI bleed  Baseline hemoglobin appears to be variable over the last year ranging 7.5-10  Acute blood loss anemia due to hematochezia secondary to diverticular bleed  Status post total of  7 units PRBC during this admission  Last transfusion was 5/14/2025  Hemoglobin stable at 9.5

## 2025-05-17 PROBLEM — D69.6 THROMBOCYTOPENIA (HCC): Status: ACTIVE | Noted: 2025-05-17

## 2025-05-17 LAB
ANION GAP SERPL CALCULATED.3IONS-SCNC: 6 MMOL/L (ref 4–13)
BASOPHILS # BLD AUTO: 0.02 THOUSANDS/ÂΜL (ref 0–0.1)
BASOPHILS NFR BLD AUTO: 1 % (ref 0–1)
BUN SERPL-MCNC: 14 MG/DL (ref 5–25)
CALCIUM SERPL-MCNC: 7.3 MG/DL (ref 8.4–10.2)
CHLORIDE SERPL-SCNC: 107 MMOL/L (ref 96–108)
CO2 SERPL-SCNC: 27 MMOL/L (ref 21–32)
CREAT SERPL-MCNC: 1.04 MG/DL (ref 0.6–1.3)
EOSINOPHIL # BLD AUTO: 0.06 THOUSAND/ÂΜL (ref 0–0.61)
EOSINOPHIL NFR BLD AUTO: 2 % (ref 0–6)
ERYTHROCYTE [DISTWIDTH] IN BLOOD BY AUTOMATED COUNT: 17 % (ref 11.6–15.1)
GFR SERPL CREATININE-BSD FRML MDRD: 50 ML/MIN/1.73SQ M
GLUCOSE SERPL-MCNC: 84 MG/DL (ref 65–140)
GLUCOSE SERPL-MCNC: 84 MG/DL (ref 65–140)
GLUCOSE SERPL-MCNC: 86 MG/DL (ref 65–140)
GLUCOSE SERPL-MCNC: 88 MG/DL (ref 65–140)
GLUCOSE SERPL-MCNC: 92 MG/DL (ref 65–140)
HCT VFR BLD AUTO: 27 % (ref 34.8–46.1)
HGB BLD-MCNC: 8.8 G/DL (ref 11.5–15.4)
IMM GRANULOCYTES # BLD AUTO: 0.02 THOUSAND/UL (ref 0–0.2)
IMM GRANULOCYTES NFR BLD AUTO: 1 % (ref 0–2)
LYMPHOCYTES # BLD AUTO: 1.3 THOUSANDS/ÂΜL (ref 0.6–4.47)
LYMPHOCYTES NFR BLD AUTO: 34 % (ref 14–44)
MCH RBC QN AUTO: 28.2 PG (ref 26.8–34.3)
MCHC RBC AUTO-ENTMCNC: 32.6 G/DL (ref 31.4–37.4)
MCV RBC AUTO: 87 FL (ref 82–98)
MONOCYTES # BLD AUTO: 0.35 THOUSAND/ÂΜL (ref 0.17–1.22)
MONOCYTES NFR BLD AUTO: 9 % (ref 4–12)
NEUTROPHILS # BLD AUTO: 2.04 THOUSANDS/ÂΜL (ref 1.85–7.62)
NEUTS SEG NFR BLD AUTO: 53 % (ref 43–75)
NRBC BLD AUTO-RTO: 0 /100 WBCS
PLATELET # BLD AUTO: 62 THOUSANDS/UL (ref 149–390)
PMV BLD AUTO: 10.5 FL (ref 8.9–12.7)
POTASSIUM SERPL-SCNC: 3.1 MMOL/L (ref 3.5–5.3)
RBC # BLD AUTO: 3.12 MILLION/UL (ref 3.81–5.12)
SODIUM SERPL-SCNC: 140 MMOL/L (ref 135–147)
WBC # BLD AUTO: 3.79 THOUSAND/UL (ref 4.31–10.16)

## 2025-05-17 PROCEDURE — 85025 COMPLETE CBC W/AUTO DIFF WBC: CPT | Performed by: INTERNAL MEDICINE

## 2025-05-17 PROCEDURE — 82948 REAGENT STRIP/BLOOD GLUCOSE: CPT

## 2025-05-17 PROCEDURE — 80048 BASIC METABOLIC PNL TOTAL CA: CPT | Performed by: INTERNAL MEDICINE

## 2025-05-17 PROCEDURE — 99232 SBSQ HOSP IP/OBS MODERATE 35: CPT | Performed by: INTERNAL MEDICINE

## 2025-05-17 RX ADMIN — DEXTROSE AND SODIUM CHLORIDE 75 ML/HR: 5; .9 INJECTION, SOLUTION INTRAVENOUS at 05:28

## 2025-05-17 RX ADMIN — MICONAZOLE NITRATE: 20 CREAM TOPICAL at 18:01

## 2025-05-17 RX ADMIN — DEXTROSE AND SODIUM CHLORIDE 75 ML/HR: 5; .9 INJECTION, SOLUTION INTRAVENOUS at 18:00

## 2025-05-17 RX ADMIN — MICONAZOLE NITRATE: 20 CREAM TOPICAL at 09:08

## 2025-05-17 NOTE — ASSESSMENT & PLAN NOTE
"Discussed overall poor condition and worsening prognosis with her son at bedside  She is lethargic again today and has not eaten in days   He reiterated that she would not want any feeding tube or aggressive measures  We also talked about her thrombocytopenia and possible HIT which if positive is another dilemma because she cannot be on anticoagulation due to bleeding  He is agreeable for her to transition to hospice preferably at home (\"She would want that\")  Consult placed to hospice by case management.  "

## 2025-05-17 NOTE — ASSESSMENT & PLAN NOTE
She was noted to have hypothermia requiring Tyrone hugger  Could be related to advanced age, anemia, medications  Repeat blood cultures sent on 5/13/2025 to rule out infection currently without growth   Hypothermia resolved

## 2025-05-17 NOTE — ASSESSMENT & PLAN NOTE
History of DVT   However during 4/25/2025 [facility] admission found to have bilateral extensive extensive acute vs subacute DVTs, in the setting of subtherapeutic INR  Not a candidate for anticoagulation so IVC filter was felt to be the best course of action  IVC filter placed 5/9

## 2025-05-17 NOTE — ASSESSMENT & PLAN NOTE
Ongoing  metabolic encephalopathy and lethargy due to multiple issues: Prolonged hospitalization, anemia, recent infection, recent UTI, advanced age, deconditioning, anemia, poor oral intake  Mental status improved briefly after her last blood transfusion on 5/14/25 but has been lethargic again   Stroke workup was done and stroke was ruled out  No  obvious sign of ongoing infection  Off sedating meds  She has not eaten in days and would not want artificial feedings according to her son and her wishes.

## 2025-05-17 NOTE — ASSESSMENT & PLAN NOTE
She has had persistent low platelets over the past few days.  Her platelets were normal prior to 5/2/25.  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

## 2025-05-17 NOTE — PROGRESS NOTES
"Progress Note - Hospitalist   Name: Alecia Kay 80 y.o. female I MRN: [id number]  Unit/Bed#: E4 -01 I Date of Admission: 5/2/2025   Date of Service: 5/17/2025 I Hospital Day: 15    Assessment & Plan  Metabolic encephalopathy  Ongoing  metabolic encephalopathy and lethargy due to multiple issues: Prolonged hospitalization, anemia, recent infection, recent UTI, advanced age, deconditioning, anemia, poor oral intake  Mental status improved briefly after her last blood transfusion on 5/14/25 but has been lethargic again   Stroke workup was done and stroke was ruled out  No  obvious sign of ongoing infection  Off sedating meds  She has not eaten in days and would not want artificial feedings according to her son and her wishes.  Goals of care, counseling/discussion  Discussed overall poor condition and worsening prognosis with her son at bedside  She is lethargic again today and has not eaten in days   He reiterated that she would not want any feeding tube or aggressive measures  We also talked about her thrombocytopenia and possible HIT which if positive is another dilemma because she cannot be on anticoagulation due to bleeding  He is agreeable for her to transition to hospice preferably at home (\"She would want that\")  Consult placed to hospice by case management.  Hematochezia  Pt is a 79yo female with PMH significant for hypertension, DVT/PE CKD 3, and right heel osteomyelitis initially presented to [facility] 4/25/2025 for generalized weakness.  Initially diagnosed with GAY and MDR UTI: completed course of ertapenem, and then was transferred to [facility] for GI evaluation for hematochezia/acute blood loss anemia    Hematochezia secondary to diverticular bleed on top of anticoagulation  She was not felt to be a candidate for further anticoagulation so an IVC filter was placed  Acute blood loss anemia (ABLA)  Secondary to GI bleed  Baseline hemoglobin appears to be variable over the last year " ranging 7.5-10  Acute blood loss anemia due to hematochezia secondary to diverticular bleed  Status post total of  7 units PRBC during this admission  Last transfusion was 5/14/2025  Hemoglobin stable is slightly lower today at 8.8  Acute pulmonary embolism (HCC)  Extensive right upper and lower lobe pulmonary embolism seen on 4/28/2025 CTA  2D echocardiogram without evidence of right heart strain  She already had a history of DVT and PE: Maintained on warfarin previously, but was not taking it in the past month prior to admission--(INR 1.18)  Due to subtherapeutic INR, was started on bridging therapy with anticoagulation however had intractable GI bleed  Anticoagulation contraindicated due to recurrent lower GI bleed   She was evaluated by pulmonology who recommended  IVC filter placement.  Temporary IVC filter placed 5/9 by IR  Acute DVT (deep venous thrombosis) (HCC)  History of DVT   However during 4/25/2025 [facility] admission found to have bilateral extensive extensive acute vs subacute DVTs, in the setting of subtherapeutic INR  Not a candidate for anticoagulation so IVC filter was felt to be the best course of action  IVC filter placed 5/9   Thrombocytopenia (HCC)  She has had persistent low platelets over the past few days.  Her platelets were normal prior to 5/2/25.  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  Currently on room  air  Hypertension  Patient previously previously on amlodipine/beta-blocker  Medications held for hypotension with hematochezia  Hypothyroidism  Continue levothyroxine.  However oral intake is limited by lethargy  Wang's esophagus  Noted on EGD  Will need repeat EGD in 3 years for surveillance  Patient with dysphagia: Continue proton pump inhibitor bid    VTE Pharmacologic Prophylaxis: VTE Score: 6 High Risk (Score >/= 5) - Pharmacological DVT Prophylaxis Contraindicated. Sequential Compression Devices Ordered.    Patient Centered Rounds: Discussed with  her nurse.  Discussions with Specialists or Other Care Team Provider: Communicated with case management regarding hospice referral  Education and Discussions with Family / Patient: Updated  (son) at bedside.    Current Length of Stay: 15 day(s)  Current Patient Status: Inpatient   Certification Statement: The patient will continue to require additional inpatient hospital stay due to DC planning for home hospice.  Poor oral intake, ongoing encephalopathy  Discharge Plan: Anticipate discharge in 48-72 hrs to home.    Code Status: Level 3 - DNAR and DNI    Subjective   Lethargic.  Has not eaten in days.    Objective :  Temp:  [97.5 °F (36.4 °C)-97.8 °F (36.6 °C)] 97.5 °F (36.4 °C)  HR:  [77-82] 77  BP: (133-146)/(75) 146/75  Resp:  [16-20] 16  SpO2:  [94 %-95 %] 95 %  O2 Device: None (Room air)    Body mass index is 31.45 kg/m².     Input and Output Summary (last 24 hours):     Intake/Output Summary (Last 24 hours) at 5/17/2025 1440  Last data filed at 5/17/2025 0527  Gross per 24 hour   Intake 887.5 ml   Output --   Net 887.5 ml       Physical Exam  Vitals reviewed.   HENT:      Head: Normocephalic and atraumatic.      Nose: No congestion or rhinorrhea.     Eyes:      General: No scleral icterus.      Cardiovascular:      Rate and Rhythm: Normal rate and regular rhythm.   Pulmonary:      Breath sounds: No wheezing or rhonchi.      Comments: Poor  effort  Abdominal:      Tenderness: There is no abdominal tenderness. There is no guarding.     Musculoskeletal:      Comments: Trace edema     Skin:     Findings: Bruising present.     Neurological:      Comments: Lethargic       Lab Results: I have reviewed the following results:   Results from last 7 days   Lab Units 05/17/25  0529   WBC Thousand/uL 3.79*   HEMOGLOBIN g/dL 8.8*   HEMATOCRIT % 27.0*   PLATELETS Thousands/uL 62*   SEGS PCT % 53   LYMPHO PCT % 34   MONO PCT % 9   EOS PCT % 2     Results from last 7 days   Lab Units 05/17/25  0529 05/13/25  0459 05/12/25  0624   SODIUM mmol/L 140   < > 142   POTASSIUM mmol/L 3.1*   < > 2.9*   CHLORIDE mmol/L 107   < > 109*   CO2 mmol/L 27   < > 27   BUN mg/dL 14   < > 7   CREATININE mg/dL 1.04   < > 0.91   ANION GAP mmol/L 6   < > 6   CALCIUM mg/dL 7.3*   < > 7.7*   ALBUMIN g/dL  --   --  2.5*   TOTAL BILIRUBIN mg/dL  --   --  0.57   ALK PHOS U/L  --   --  54   ALT U/L  --   --  6*   AST U/L  --   --  14   GLUCOSE RANDOM mg/dL 86   < > 112    < > = values in this interval not displayed.         Results from last 7 days   Lab Units 05/17/25  1112 05/17/25  0608 05/17/25  0015 05/16/25  2055 05/16/25  1733 05/16/25  1159 05/16/25  0735 05/15/25  2031 05/15/25  1628 05/15/25  1112 05/15/25  0736 05/15/25  0632   POC GLUCOSE mg/dl 92 88 84 87 80 75 73 79 107 93 98 91               Recent Cultures (last 7 days):   Results from last 7 days   Lab Units 05/13/25  1106   BLOOD CULTURE  No Growth at 72 hrs.  No Growth at 72 hrs.       No new imaging    Last 24 Hours Medication List:     Current Facility-Administered Medications:     acetaminophen (TYLENOL) tablet 650 mg, Q6H PRN    [Held by provider] amLODIPine (NORVASC) tablet 10 mg, Daily    dextrose 5 % and sodium chloride 0.9 % infusion, Continuous, Last Rate: 75 mL/hr (05/17/25 0528)    folic acid (FOLVITE) tablet 1 mg, Daily    hydrOXYzine HCL (ATARAX) tablet 25 mg, Q8H PRN    levothyroxine tablet 75 mcg, Early  Morning    meclizine (ANTIVERT) tablet 25 mg, TID PRN    [Held by provider] metoprolol tartrate (LOPRESSOR) tablet 50 mg, Q12H MELANY    [Held by provider] mirtazapine (REMERON) tablet 7.5 mg, HS    moisture barrier miconazole 2% cream (aka EDNA MOISTURE BARRIER ANTIFUNGAL CREAM), BID    ondansetron (ZOFRAN) injection 4 mg, Q4H PRN    pantoprazole (PROTONIX) EC tablet 40 mg, BID AC    potassium chloride (Klor-Con M20) CR tablet 40 mEq, Once    pravastatin (PRAVACHOL) tablet 80 mg, Daily With Dinner    Administrative Statements   Today, Patient Was Seen By: Oleksandr Peace MD      **Please Note: This note may have been constructed using a voice recognition system.**

## 2025-05-17 NOTE — ASSESSMENT & PLAN NOTE
Pt is a 79yo female with PMH significant for hypertension, DVT/PE CKD 3, and right heel osteomyelitis initially presented to [facility] 4/25/2025 for generalized weakness.  Initially diagnosed with GAY and MDR UTI: completed course of ertapenem, and then was transferred to [facility] for GI evaluation for hematochezia/acute blood loss anemia    Hematochezia secondary to diverticular bleed on top of anticoagulation  She was not felt to be a candidate for further anticoagulation so an IVC filter was placed

## 2025-05-17 NOTE — ASSESSMENT & PLAN NOTE
Secondary to GI bleed  Baseline hemoglobin appears to be variable over the last year ranging 7.5-10  Acute blood loss anemia due to hematochezia secondary to diverticular bleed  Status post total of  7 units PRBC during this admission  Last transfusion was 5/14/2025  Hemoglobin stable is slightly lower today at 8.8

## 2025-05-17 NOTE — PLAN OF CARE
Problem: PAIN - ADULT  Goal: Verbalizes/displays adequate comfort level or baseline comfort level  Description: Interventions:- Encourage patient to monitor pain and request assistance- Assess pain using appropriate pain scale- Administer analgesics based on type and severity of pain and evaluate response- Implement non-pharmacological measures as appropriate and evaluate response- Consider cultural and social influences on pain and pain management- Notify physician/advanced practitioner if interventions unsuccessful or patient reports new pain  Outcome: Progressing     Problem: INFECTION - ADULT  Goal: Absence or prevention of progression during hospitalization  Description: INTERVENTIONS:- Assess and monitor for signs and symptoms of infection- Monitor lab/diagnostic results- Monitor all insertion sites, i.e. indwelling lines, tubes, and drains- Monitor endotracheal if appropriate and nasal secretions for changes in amount and color- [place] appropriate cooling/warming therapies per order- Administer medications as ordered- Instruct and encourage patient and family to use good hand hygiene technique- Identify and instruct in appropriate isolation precautions for identified infection/condition  Outcome: Progressing  Goal: Absence of fever/infection during neutropenic period  Description: INTERVENTIONS:- Monitor WBC  Outcome: Progressing     Problem: SAFETY ADULT  Goal: Patient will remain free of falls  Description: INTERVENTIONS:- Educate patient/family on patient safety including physical limitations- Instruct patient to call for assistance with activity - Consult OT/PT to assist with strengthening/mobility - Keep Call bell within reach- Keep bed low and locked with side rails adjusted as appropriate- Keep care items and personal belongings within reach- Initiate and maintain comfort rounds- Make Fall Risk Sign visible to staff- Offer Toileting every 2 Hours, in advance of need- Initiate/Maintain bed alarm-  Obtain necessary fall risk management equipment: - Apply yellow socks and bracelet for high fall risk patients- Consider moving patient to room near nurses station  Outcome: Progressing  Goal: Maintain or return to baseline ADL function  Description: INTERVENTIONS:-  Assess patient's ability to carry out ADLs; assess patient's baseline for ADL function and identify physical deficits which impact ability to perform ADLs (bathing, care of mouth/teeth, toileting, grooming, dressing, etc.)- Assess/evaluate cause of self-care deficits - Assess range of motion- Assess patient's mobility; develop plan if impaired- Assess patient's need for assistive devices and provide as appropriate- Encourage maximum independence but intervene and supervise when necessary- Involve family in performance of ADLs- Assess for home care needs following discharge - Consider OT consult to assist with ADL evaluation and planning for discharge- Provide patient education as appropriate  Outcome: Progressing  Goal: Maintains/Returns to pre admission functional level  Description: INTERVENTIONS:- Perform AM-PAC 6 Click Basic Mobility/ Daily Activity assessment daily.- Set and communicate daily mobility goal to care team and patient/family/caregiver. - Collaborate with rehabilitation services on mobility goals if consulted- Perform Range of Motion 3 times a day.- Reposition patient every 2 hours.- Dangle patient 3 times a day- Stand patient 3 times a day- Ambulate patient 3 times a day- Out of bed to chair 3 times a day - Out of bed for meals 3 times a day- Out of bed for toileting- Record patient progress and toleration of activity level   Outcome: Progressing     Problem: DISCHARGE PLANNING  Goal: Discharge to home or other facility with appropriate resources  Description: INTERVENTIONS:- Identify barriers to discharge w/patient and caregiver- Arrange for needed discharge resources and transportation as appropriate- Identify discharge learning  needs (meds, wound care, etc.)- Arrange for interpretive services to assist at discharge as needed- Refer to Case Management Department for coordinating discharge planning if the patient needs post-hospital services based on physician/advanced practitioner order or complex needs related to functional status, cognitive ability, or social support system  Outcome: Progressing     Problem: Nutrition/Hydration-ADULT  Goal: Nutrient/Hydration intake appropriate for improving, restoring or maintaining nutritional needs  Description: Monitor and assess patient's nutrition/hydration status for malnutrition. Collaborate with interdisciplinary team and initiate plan and interventions as ordered.  Monitor patient's weight and dietary intake as ordered or per policy. Utilize nutrition screening tool and intervene as necessary. Determine patient's food preferences and provide high-protein, high-caloric foods as appropriate. INTERVENTIONS:- Monitor oral intake, urinary output, labs, and treatment plans- Assess nutrition and hydration status and recommend course of action- Evaluate amount of meals eaten- Assist patient with eating if necessary - Allow adequate time for meals- Recommend/ encourage appropriate diets, oral nutritional supplements, and vitamin/mineral supplements- Order, calculate, and assess calorie counts as needed- Recommend, monitor, and adjust tube feedings and TPN/PPN based on assessed needs- Assess need for intravenous fluids- Provide specific nutrition/hydration education as appropriate- Include patient/family/caregiver in decisions related to nutrition  Monitor and assess patient's nutrition/hydration status for malnutrition. Collaborate with interdisciplinary team and initiate plan and interventions as ordered.  Monitor patient's weight and dietary intake as ordered or per policy. Utilize nutrition screening tool and intervene as necessary. Determine patient's food preferences and provide high-protein,  high-caloric foods as appropriate. INTERVENTIONS:- Monitor oral intake, urinary output, labs, and treatment plans- Assess nutrition and hydration status and recommend course of action- Evaluate amount of meals eaten- Assist patient with eating if necessary - Allow adequate time for meals- Recommend/ encourage appropriate diets, oral nutritional supplements, and vitamin/mineral supplements- Order, calculate, and assess calorie counts as needed- Recommend, monitor, and adjust tube feedings and TPN/PPN based on assessed needs- Assess need for intravenous fluids- Provide specific nutrition/hydration education as appropriate- Include patient/family/caregiver in decisions related to nutrition  Outcome: Progressing     Problem: Prexisting or High Potential for Compromised Skin Integrity  Goal: Skin integrity is maintained or improved  Description: INTERVENTIONS:- Identify patients at risk for skin breakdown- Assess and monitor skin integrity- Assess and monitor nutrition and hydration status- Monitor labs - Assess for incontinence - Turn and reposition patient- Assist with mobility/ambulation- Relieve pressure over bony prominences- Avoid friction and shearing- Provide appropriate hygiene as needed including keeping skin clean and dry- Evaluate need for skin moisturizer/barrier cream- Collaborate with interdisciplinary team - Patient/family teaching- Consider wound care consult   Outcome: Progressing

## 2025-05-17 NOTE — PLAN OF CARE
Problem: PAIN - ADULT  Goal: Verbalizes/displays adequate comfort level or baseline comfort level  Description: Interventions:- Encourage patient to monitor pain and request assistance- Assess pain using appropriate pain scale- Administer analgesics based on type and severity of pain and evaluate response- Implement non-pharmacological measures as appropriate and evaluate response- Consider cultural and social influences on pain and pain management- Notify physician/advanced practitioner if interventions unsuccessful or patient reports new pain  Outcome: Progressing     Problem: INFECTION - ADULT  Goal: Absence or prevention of progression during hospitalization  Description: INTERVENTIONS:- Assess and monitor for signs and symptoms of infection- Monitor lab/diagnostic results- Monitor all insertion sites, i.e. indwelling lines, tubes, and drains- Monitor endotracheal if appropriate and nasal secretions for changes in amount and color- [place] appropriate cooling/warming therapies per order- Administer medications as ordered- Instruct and encourage patient and family to use good hand hygiene technique- Identify and instruct in appropriate isolation precautions for identified infection/condition  Outcome: Progressing  Goal: Absence of fever/infection during neutropenic period  Description: INTERVENTIONS:- Monitor WBC  Outcome: Progressing     Problem: SAFETY ADULT  Goal: Patient will remain free of falls  Description: INTERVENTIONS:- Educate patient/family on patient safety including physical limitations- Instruct patient to call for assistance with activity - Consult OT/PT to assist with strengthening/mobility - Keep Call bell within reach- Keep bed low and locked with side rails adjusted as appropriate- Keep care items and personal belongings within reach- Initiate and maintain comfort rounds- Make Fall Risk Sign visible to staff- Offer Toileting every 2 Hours, in advance of need- Initiate/Maintain bed alarm-  Obtain necessary fall risk management equipment: - Apply yellow socks and bracelet for high fall risk patients- Consider moving patient to room near nurses station  Outcome: Progressing  Goal: Maintain or return to baseline ADL function  Description: INTERVENTIONS:-  Assess patient's ability to carry out ADLs; assess patient's baseline for ADL function and identify physical deficits which impact ability to perform ADLs (bathing, care of mouth/teeth, toileting, grooming, dressing, etc.)- Assess/evaluate cause of self-care deficits - Assess range of motion- Assess patient's mobility; develop plan if impaired- Assess patient's need for assistive devices and provide as appropriate- Encourage maximum independence but intervene and supervise when necessary- Involve family in performance of ADLs- Assess for home care needs following discharge - Consider OT consult to assist with ADL evaluation and planning for discharge- Provide patient education as appropriate  Outcome: Progressing  Goal: Maintains/Returns to pre admission functional level  Description: INTERVENTIONS:- Perform AM-PAC 6 Click Basic Mobility/ Daily Activity assessment daily.- Set and communicate daily mobility goal to care team and patient/family/caregiver. - Collaborate with rehabilitation services on mobility goals if consulted- Perform Range of Motion 3 times a day.- Reposition patient every 2 hours.- Dangle patient 3 times a day- Stand patient 3 times a day- Ambulate patient 3 times a day- Out of bed to chair 3 times a day - Out of bed for meals 3 times a day- Out of bed for toileting- Record patient progress and toleration of activity level   Outcome: Progressing     Problem: DISCHARGE PLANNING  Goal: Discharge to home or other facility with appropriate resources  Description: INTERVENTIONS:- Identify barriers to discharge w/patient and caregiver- Arrange for needed discharge resources and transportation as appropriate- Identify discharge learning  needs (meds, wound care, etc.)- Arrange for interpretive services to assist at discharge as needed- Refer to Case Management Department for coordinating discharge planning if the patient needs post-hospital services based on physician/advanced practitioner order or complex needs related to functional status, cognitive ability, or social support system  Outcome: Progressing     Problem: Knowledge Deficit  Goal: Patient/family/caregiver demonstrates understanding of disease process, treatment plan, medications, and discharge instructions  Description: Complete learning assessment and assess knowledge base.Interventions:- Provide teaching at level of understanding- Provide teaching via preferred learning methods  Outcome: Progressing     Problem: Nutrition/Hydration-ADULT  Goal: Nutrient/Hydration intake appropriate for improving, restoring or maintaining nutritional needs  Description: Monitor and assess patient's nutrition/hydration status for malnutrition. Collaborate with interdisciplinary team and initiate plan and interventions as ordered.  Monitor patient's weight and dietary intake as ordered or per policy. Utilize nutrition screening tool and intervene as necessary. Determine patient's food preferences and provide high-protein, high-caloric foods as appropriate. INTERVENTIONS:- Monitor oral intake, urinary output, labs, and treatment plans- Assess nutrition and hydration status and recommend course of action- Evaluate amount of meals eaten- Assist patient with eating if necessary - Allow adequate time for meals- Recommend/ encourage appropriate diets, oral nutritional supplements, and vitamin/mineral supplements- Order, calculate, and assess calorie counts as needed- Recommend, monitor, and adjust tube feedings and TPN/PPN based on assessed needs- Assess need for intravenous fluids- Provide specific nutrition/hydration education as appropriate- Include patient/family/caregiver in decisions related to  nutrition  Monitor and assess patient's nutrition/hydration status for malnutrition. Collaborate with interdisciplinary team and initiate plan and interventions as ordered.  Monitor patient's weight and dietary intake as ordered or per policy. Utilize nutrition screening tool and intervene as necessary. Determine patient's food preferences and provide high-protein, high-caloric foods as appropriate. INTERVENTIONS:- Monitor oral intake, urinary output, labs, and treatment plans- Assess nutrition and hydration status and recommend course of action- Evaluate amount of meals eaten- Assist patient with eating if necessary - Allow adequate time for meals- Recommend/ encourage appropriate diets, oral nutritional supplements, and vitamin/mineral supplements- Order, calculate, and assess calorie counts as needed- Recommend, monitor, and adjust tube feedings and TPN/PPN based on assessed needs- Assess need for intravenous fluids- Provide specific nutrition/hydration education as appropriate- Include patient/family/caregiver in decisions related to nutrition  Outcome: Progressing     Problem: Prexisting or High Potential for Compromised Skin Integrity  Goal: Skin integrity is maintained or improved  Description: INTERVENTIONS:- Identify patients at risk for skin breakdown- Assess and monitor skin integrity- Assess and monitor nutrition and hydration status- Monitor labs - Assess for incontinence - Turn and reposition patient- Assist with mobility/ambulation- Relieve pressure over bony prominences- Avoid friction and shearing- Provide appropriate hygiene as needed including keeping skin clean and dry- Evaluate need for skin moisturizer/barrier cream- Collaborate with interdisciplinary team - Patient/family teaching- Consider wound care consult   Outcome: Progressing     Problem: HEMATOLOGIC - ADULT  Goal: Maintains hematologic stability  Description: INTERVENTIONS- Assess for signs and symptoms of bleeding or hemorrhage-  Monitor labs- Administer supportive blood products/factors as ordered and appropriate  Outcome: Progressing     Problem: Communication Impairment  Goal: Ability to express needs and understand communication  Description: Assess patient's communication skills and ability to understand information.  Patient will demonstrate use of effective communication techniques, alternative methods of communication and understanding even if not able to speak. - Encourage communication and provide alternate methods of communication as needed.- Collaborate with case management/ for discharge needs.- Include patient/family/caregiver in decisions related to communication.  Outcome: Progressing

## 2025-05-18 LAB
BACTERIA BLD CULT: NORMAL
BACTERIA BLD CULT: NORMAL
GLUCOSE SERPL-MCNC: 108 MG/DL (ref 65–140)
GLUCOSE SERPL-MCNC: 109 MG/DL (ref 65–140)
GLUCOSE SERPL-MCNC: 117 MG/DL (ref 65–140)
GLUCOSE SERPL-MCNC: 122 MG/DL (ref 65–140)
GLUCOSE SERPL-MCNC: 98 MG/DL (ref 65–140)

## 2025-05-18 PROCEDURE — 82948 REAGENT STRIP/BLOOD GLUCOSE: CPT

## 2025-05-18 PROCEDURE — 99232 SBSQ HOSP IP/OBS MODERATE 35: CPT | Performed by: INTERNAL MEDICINE

## 2025-05-18 RX ADMIN — MICONAZOLE NITRATE: 20 CREAM TOPICAL at 15:37

## 2025-05-18 RX ADMIN — MICONAZOLE NITRATE: 20 CREAM TOPICAL at 08:01

## 2025-05-18 NOTE — ASSESSMENT & PLAN NOTE
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

## 2025-05-18 NOTE — PROGRESS NOTES
Progress Note - Hospitalist   Name: Alecia Kay 80 y.o. female I MRN: [id number]  Unit/Bed#: E4 -01 I Date of Admission: 5/2/2025   Date of Service: 5/18/2025 I Hospital Day: 16    Assessment & Plan  Metabolic encephalopathy  Ongoing  metabolic encephalopathy and lethargy due to multiple issues: Prolonged hospitalization, anemia, recent infection, extensive PE, advanced age, anemia requiring multiple transfusion, deconditioning, ongoing poor oral intake  Mental status improved briefly after her last blood transfusion on 5/14/25 but has been lethargic again ever since  Stroke workup was done and stroke was ruled out  No  obvious sign of ongoing infection  Off sedating meds  She has not eaten in days and would not want artificial feedings according to her son and her wishes.  Goals of care, counseling/discussion  Discussed overall poor condition and worsening prognosis with her son Jaron again at bedside at bedside  He is waiting for the hospice liaison to talk to him regarding home hospice  He reiterated that she would not want any feeding tube or aggressive measures  We talked about possibly switching her to comfort care today.  He said not yet.  He would like to speak with his brother and cousin first before making that final decision  For now only D5 saline is allowed but no tube feedings or artificial nutrition  Hematochezia  Pt is a 81yo female with PMH significant for hypertension, DVT/PE CKD 3, and right heel osteomyelitis initially presented to [facility] 4/25/2025 for generalized weakness.  Initially diagnosed with GAY and MDR UTI: completed course of ertapenem, and then was transferred to [facility] for GI evaluation for hematochezia/acute blood loss anemia    Hematochezia secondary to diverticular bleed on top of anticoagulation  She was not felt to be a candidate for further anticoagulation so an IVC filter was placed  Acute blood loss anemia (ABLA)  Secondary to GI bleed  Baseline  hemoglobin appears to be variable over the last year ranging 7.5-10  Acute blood loss anemia due to hematochezia secondary to diverticular bleed  Status post total of  7 units PRBC during this admission  Last transfusion was 5/14/2025  Hemoglobin stable is slightly lower today at 8.8  Acute pulmonary embolism (HCC)  Extensive right upper and lower lobe pulmonary embolism seen on 4/28/2025 CTA  2D echocardiogram without evidence of right heart strain  Anticoagulation contraindicated due to recurrent lower GI bleed   She was evaluated by pulmonology who recommended  IVC filter placement.  Temporary IVC filter placed 5/9 by IR  Acute DVT (deep venous thrombosis) ([facility])  History of DVT   However during 4/25/2025 [facility] admission found to have bilateral extensive extensive acute vs subacute DVTs, in the setting of subtherapeutic INR  Not a candidate for anticoagulation so IVC filter was felt to be the best course of action  IVC filter placed 5/9   Thrombocytopenia ([facility])  She has had persistent low platelets over the past few days.  Her platelets were normal prior to 5/2/25.  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  Currently on room air  Hypertension  Patient previously previously on amlodipine/beta-blocker  Medications held for hypotension with hematochezia  Hypothyroidism  Continue levothyroxine.  However oral intake is limited by lethargy  Wang's esophagus  Noted  on EGD  Will need repeat EGD in 3 years for surveillance  Patient with dysphagia: Continue proton pump inhibitor bid    VTE Pharmacologic Prophylaxis: VTE Score: 6 High Risk (Score >/= 5) - Pharmacological DVT Prophylaxis Contraindicated. Sequential Compression Devices Ordered.    Patient Centered Rounds: I performed bedside rounds with nursing staff today.   Discussions with Specialists or Other Care Team Provider: chart reviewed  Education and Discussions with Family / Patient: Updated  (son) at bedside.    Current Length of Stay: 16 day(s)  Current Patient Status: Inpatient   Certification Statement: The patient will continue to require additional inpatient hospital stay due to dc planning  Discharge Plan: TBD    Code Status: Level 3 - DNAR and DNI    Subjective   Still lethargic   According to her son, she woke up briefly for him but still didn't want to eat/drink    Objective :  Temp:  [97.1 °F (36.2 °C)-98 °F (36.7 °C)] 98 °F (36.7 °C)  HR:  [54-80] 56  BP: ()/(57-66) 122/66  Resp:  [16-18] 18  SpO2:  [94 %-99 %] 99 %  O2 Device: None (Room air)    Body mass index is 31.45 kg/m².     Input and Output Summary (last 24 hours):     Intake/Output Summary (Last 24 hours) at 5/18/2025 1222  Last data filed at 5/18/2025 0900  Gross per 24 hour   Intake 240 ml   Output --   Net 240 ml       Physical Exam  Vitals reviewed.   HENT:      Head: Normocephalic and atraumatic.      Nose: No congestion or rhinorrhea.     Eyes:      General: No scleral icterus.      Cardiovascular:      Rate and Rhythm: Normal rate.   Pulmonary:      Breath sounds: No wheezing or rhonchi.   Abdominal:      Tenderness: There is no abdominal tenderness. There is no guarding.     Musculoskeletal:      Comments: Trace edema     Skin:     Findings: Bruising present.      Comments: Bruising at the palms from previous blood draw     Neurological:      Comments: Lethargic   Psychiatric:      Comments: Depressed mood       Lab  Results: I have reviewed the following results:   Results from last 7 days   Lab Units 05/17/25  0529   WBC Thousand/uL 3.79*   HEMOGLOBIN g/dL 8.8*   HEMATOCRIT % 27.0*   PLATELETS Thousands/uL 62*   SEGS PCT % 53   LYMPHO PCT % 34   MONO PCT % 9   EOS PCT % 2     Results from last 7 days   Lab Units 05/17/25  0529 05/13/25  0459 05/12/25  0624   SODIUM mmol/L 140   < > 142   POTASSIUM mmol/L 3.1*   < > 2.9*   CHLORIDE mmol/L 107   < > 109*   CO2 mmol/L 27   < > 27   BUN mg/dL 14   < > 7   CREATININE mg/dL 1.04   < > 0.91   ANION GAP mmol/L 6   < > 6   CALCIUM mg/dL 7.3*   < > 7.7*   ALBUMIN g/dL  --   --  2.5*   TOTAL BILIRUBIN mg/dL  --   --  0.57   ALK PHOS U/L  --   --  54   ALT U/L  --   --  6*   AST U/L  --   --  14   GLUCOSE RANDOM mg/dL 86   < > 112    < > = values in this interval not displayed.         Results from last 7 days   Lab Units 05/18/25  0902 05/18/25  0708 05/17/25  2359 05/17/25  1838 05/17/25  1112 05/17/25  0608 05/17/25  0015 05/16/25  2055 05/16/25  1733 05/16/25  1159 05/16/25  0735 05/15/25  2031   POC GLUCOSE mg/dl 117 109 98 84 92 88 84 87 80 75 73 79               Recent Cultures (last 7 days):   Results from last 7 days   Lab Units 05/13/25  1106   BLOOD CULTURE  No Growth After 4 Days.  No Growth After 4 Days.       Imaging Results Review: I reviewed radiology reports from this admission including: CT chest, CT abdomen/pelvis, and MRI brain.      Last 24 Hours Medication List:     Current Facility-Administered Medications:     acetaminophen (TYLENOL) tablet 650 mg, Q6H PRN    [Held by provider] amLODIPine (NORVASC) tablet 10 mg, Daily    dextrose 5 % and sodium chloride 0.9 % infusion, Continuous, Last Rate: 75 mL/hr (05/17/25 1800)    folic acid (FOLVITE) tablet 1 mg, Daily    levothyroxine tablet 75 mcg, Early Morning    [Held by provider] metoprolol tartrate (LOPRESSOR) tablet 50 mg, Q12H [facility]    moisture barrier miconazole 2% cream (aka EDNA MOISTURE BARRIER ANTIFUNGAL CREAM),  BID    ondansetron (ZOFRAN) injection 4 mg, Q4H PRN    pantoprazole (PROTONIX) EC tablet 40 mg, BID AC    pravastatin (PRAVACHOL) tablet 80 mg, Daily With Dinner    Administrative Statements   Today, Patient Was Seen By: Oleksandr Peace MD      **Please Note: This note may have been constructed using a voice recognition system.**

## 2025-05-18 NOTE — CASE MANAGEMENT
Case Management Discharge Planning Note    Patient name Alecia Kay  Location [facility] /E4 -* MRN [id number]  : 1945 Date 2025       Current Admission Date: 2025  Current Admission Diagnosis:Hematochezia   Patient Active Problem List    Diagnosis Date Noted    Thrombocytopenia (HCC) 2025    Hypothermia 2025    Folic acid deficiency 2025    Abnormal TSH 2025    Goals of care, counseling/discussion 2025    Metabolic encephalopathy 2025    Wang's esophagus 2025    Insertional Achilles tendinopathy 2025    Chronic osteomyelitis (HCC) 2025    Acute blood loss anemia (ABLA) 2025    Acute pulmonary embolism (HCC) 2025    Acute respiratory insufficiency 2025    Hematochezia 2025    Acute DVT (deep venous thrombosis) (HCC) 2025    History of DVT (deep vein thrombosis) 2025    Tachycardia 2025    Acute cystitis 2025    Generalized weakness 2025    Bilateral lower extremity edema 2025    Severe protein-calorie malnutrition (HCC) 2025    History of osteomyelitis 2025    Acute kidney injury (HCC) 2025    Chronic kidney disease, stage III (moderate) (HCC) 2025    Hypothyroidism 2025    Hypertension 2025    Electrolyte imbalance 2025      LOS (days): 16  Geometric Mean LOS (GMLOS) (days): 4.5  Days to GMLOS:-11.4     OBJECTIVE:  Risk of Unplanned Readmission Score: 25.05         Current admission status: Inpatient   Preferred Pharmacy:   [place] - [address]  [address] [address]  Phone: [phone] Fax: [phone]    Primary Care Provider: Rambo Perera DO    Primary Insurance: BLUE CROSS  REP  Secondary Insurance:     DISCHARGE DETAILS:                                                                                                 Additional Comments: Son was made  aware that  Hospice does not service their area and agreed to Hospice referrals to other agencies.

## 2025-05-18 NOTE — ASSESSMENT & PLAN NOTE
Pt is a 81yo female with PMH significant for hypertension, DVT/PE CKD 3, and right heel osteomyelitis initially presented to [facility] 4/25/2025 for generalized weakness.  Initially diagnosed with GAY and MDR UTI: completed course of ertapenem, and then was transferred to [facility] for GI evaluation for hematochezia/acute blood loss anemia    Hematochezia secondary to diverticular bleed on top of anticoagulation  She was not felt to be a candidate for further anticoagulation so an IVC filter was placed

## 2025-05-18 NOTE — ASSESSMENT & PLAN NOTE
Ongoing  metabolic encephalopathy and lethargy due to multiple issues: Prolonged hospitalization, anemia, recent infection, extensive PE, advanced age, anemia requiring multiple transfusion, deconditioning, ongoing poor oral intake  Mental status improved briefly after her last blood transfusion on 5/14/25 but has been lethargic again ever since  Stroke workup was done and stroke was ruled out  No  obvious sign of ongoing infection  Off sedating meds  She has not eaten in days and would not want artificial feedings according to her son and her wishes.

## 2025-05-18 NOTE — ASSESSMENT & PLAN NOTE
She has had persistent low platelets over the past few days.  Her platelets were normal prior to 5/2/25.  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

## 2025-05-18 NOTE — ASSESSMENT & PLAN NOTE
Discussed overall poor condition and worsening prognosis with her son Jaron again at bedside at bedside  He is waiting for the hospice liaison to talk to him regarding home hospice  He reiterated that she would not want any feeding tube or aggressive measures  We talked about possibly switching her to comfort care today.  He said not yet.  He would like to speak with his brother and cousin first before making that final decision  For now only D5 saline is allowed but no tube feedings or artificial nutrition

## 2025-05-18 NOTE — PLAN OF CARE
Problem: PAIN - ADULT  Goal: Verbalizes/displays adequate comfort level or baseline comfort level  Description: Interventions:- Encourage patient to monitor pain and request assistance- Assess pain using appropriate pain scale- Administer analgesics based on type and severity of pain and evaluate response- Implement non-pharmacological measures as appropriate and evaluate response- Consider cultural and social influences on pain and pain management- Notify physician/advanced practitioner if interventions unsuccessful or patient reports new pain  Outcome: Progressing     Problem: INFECTION - ADULT  Goal: Absence or prevention of progression during hospitalization  Description: INTERVENTIONS:- Assess and monitor for signs and symptoms of infection- Monitor lab/diagnostic results- Monitor all insertion sites, i.e. indwelling lines, tubes, and drains- Monitor endotracheal if appropriate and nasal secretions for changes in amount and color- [place] appropriate cooling/warming therapies per order- Administer medications as ordered- Instruct and encourage patient and family to use good hand hygiene technique- Identify and instruct in appropriate isolation precautions for identified infection/condition  Outcome: Progressing  Goal: Absence of fever/infection during neutropenic period  Description: INTERVENTIONS:- Monitor WBC  Outcome: Progressing     Problem: SAFETY ADULT  Goal: Patient will remain free of falls  Description: INTERVENTIONS:- Educate patient/family on patient safety including physical limitations- Instruct patient to call for assistance with activity - Consult OT/PT to assist with strengthening/mobility - Keep Call bell within reach- Keep bed low and locked with side rails adjusted as appropriate- Keep care items and personal belongings within reach- Initiate and maintain comfort rounds- Make Fall Risk Sign visible to staff- Offer Toileting every 2 Hours, in advance of need- Initiate/Maintain bed alarm-  Obtain necessary fall risk management equipment: - Apply yellow socks and bracelet for high fall risk patients- Consider moving patient to room near nurses station  Outcome: Progressing  Goal: Maintain or return to baseline ADL function  Description: INTERVENTIONS:-  Assess patient's ability to carry out ADLs; assess patient's baseline for ADL function and identify physical deficits which impact ability to perform ADLs (bathing, care of mouth/teeth, toileting, grooming, dressing, etc.)- Assess/evaluate cause of self-care deficits - Assess range of motion- Assess patient's mobility; develop plan if impaired- Assess patient's need for assistive devices and provide as appropriate- Encourage maximum independence but intervene and supervise when necessary- Involve family in performance of ADLs- Assess for home care needs following discharge - Consider OT consult to assist with ADL evaluation and planning for discharge- Provide patient education as appropriate  Outcome: Progressing  Goal: Maintains/Returns to pre admission functional level  Description: INTERVENTIONS:- Perform AM-PAC 6 Click Basic Mobility/ Daily Activity assessment daily.- Set and communicate daily mobility goal to care team and patient/family/caregiver. - Collaborate with rehabilitation services on mobility goals if consulted- Perform Range of Motion 3 times a day.- Reposition patient every 2 hours.- Dangle patient 3 times a day- Stand patient 3 times a day- Ambulate patient 3 times a day- Out of bed to chair 3 times a day - Out of bed for meals 3 times a day- Out of bed for toileting- Record patient progress and toleration of activity level   Outcome: Progressing     Problem: DISCHARGE PLANNING  Goal: Discharge to home or other facility with appropriate resources  Description: INTERVENTIONS:- Identify barriers to discharge w/patient and caregiver- Arrange for needed discharge resources and transportation as appropriate- Identify discharge learning  needs (meds, wound care, etc.)- Arrange for interpretive services to assist at discharge as needed- Refer to Case Management Department for coordinating discharge planning if the patient needs post-hospital services based on physician/advanced practitioner order or complex needs related to functional status, cognitive ability, or social support system  Outcome: Progressing     Problem: Knowledge Deficit  Goal: Patient/family/caregiver demonstrates understanding of disease process, treatment plan, medications, and discharge instructions  Description: Complete learning assessment and assess knowledge base.Interventions:- Provide teaching at level of understanding- Provide teaching via preferred learning methods  Outcome: Progressing     Problem: Nutrition/Hydration-ADULT  Goal: Nutrient/Hydration intake appropriate for improving, restoring or maintaining nutritional needs  Description: Monitor and assess patient's nutrition/hydration status for malnutrition. Collaborate with interdisciplinary team and initiate plan and interventions as ordered.  Monitor patient's weight and dietary intake as ordered or per policy. Utilize nutrition screening tool and intervene as necessary. Determine patient's food preferences and provide high-protein, high-caloric foods as appropriate. INTERVENTIONS:- Monitor oral intake, urinary output, labs, and treatment plans- Assess nutrition and hydration status and recommend course of action- Evaluate amount of meals eaten- Assist patient with eating if necessary - Allow adequate time for meals- Recommend/ encourage appropriate diets, oral nutritional supplements, and vitamin/mineral supplements- Order, calculate, and assess calorie counts as needed- Recommend, monitor, and adjust tube feedings and TPN/PPN based on assessed needs- Assess need for intravenous fluids- Provide specific nutrition/hydration education as appropriate- Include patient/family/caregiver in decisions related to  nutrition  Monitor and assess patient's nutrition/hydration status for malnutrition. Collaborate with interdisciplinary team and initiate plan and interventions as ordered.  Monitor patient's weight and dietary intake as ordered or per policy. Utilize nutrition screening tool and intervene as necessary. Determine patient's food preferences and provide high-protein, high-caloric foods as appropriate. INTERVENTIONS:- Monitor oral intake, urinary output, labs, and treatment plans- Assess nutrition and hydration status and recommend course of action- Evaluate amount of meals eaten- Assist patient with eating if necessary - Allow adequate time for meals- Recommend/ encourage appropriate diets, oral nutritional supplements, and vitamin/mineral supplements- Order, calculate, and assess calorie counts as needed- Recommend, monitor, and adjust tube feedings and TPN/PPN based on assessed needs- Assess need for intravenous fluids- Provide specific nutrition/hydration education as appropriate- Include patient/family/caregiver in decisions related to nutrition  Outcome: Progressing     Problem: Prexisting or High Potential for Compromised Skin Integrity  Goal: Skin integrity is maintained or improved  Description: INTERVENTIONS:- Identify patients at risk for skin breakdown- Assess and monitor skin integrity- Assess and monitor nutrition and hydration status- Monitor labs - Assess for incontinence - Turn and reposition patient- Assist with mobility/ambulation- Relieve pressure over bony prominences- Avoid friction and shearing- Provide appropriate hygiene as needed including keeping skin clean and dry- Evaluate need for skin moisturizer/barrier cream- Collaborate with interdisciplinary team - Patient/family teaching- Consider wound care consult   Outcome: Progressing     Problem: HEMATOLOGIC - ADULT  Goal: Maintains hematologic stability  Description: INTERVENTIONS- Assess for signs and symptoms of bleeding or hemorrhage-  Monitor labs- Administer supportive blood products/factors as ordered and appropriate  Outcome: Progressing     Problem: Communication Impairment  Goal: Ability to express needs and understand communication  Description: Assess patient's communication skills and ability to understand information.  Patient will demonstrate use of effective communication techniques, alternative methods of communication and understanding even if not able to speak. - Encourage communication and provide alternate methods of communication as needed.- Collaborate with case management/ for discharge needs.- Include patient/family/caregiver in decisions related to communication.  Outcome: Progressing

## 2025-05-19 LAB
GLUCOSE SERPL-MCNC: 102 MG/DL (ref 65–140)
GLUCOSE SERPL-MCNC: 103 MG/DL (ref 65–140)
GLUCOSE SERPL-MCNC: 97 MG/DL (ref 65–140)

## 2025-05-19 PROCEDURE — 82948 REAGENT STRIP/BLOOD GLUCOSE: CPT

## 2025-05-19 PROCEDURE — 99233 SBSQ HOSP IP/OBS HIGH 50: CPT

## 2025-05-19 PROCEDURE — 99232 SBSQ HOSP IP/OBS MODERATE 35: CPT | Performed by: INTERNAL MEDICINE

## 2025-05-19 RX ORDER — CALCIUM CARBONATE 500 MG/1
500 TABLET, CHEWABLE ORAL DAILY PRN
Status: CANCELLED | OUTPATIENT
Start: 2025-05-19

## 2025-05-19 RX ADMIN — MICONAZOLE NITRATE: 20 CREAM TOPICAL at 07:43

## 2025-05-19 NOTE — ASSESSMENT & PLAN NOTE
Hematochezia secondary to diverticular bleed on top of anticoagulation  She was not felt to be a candidate for further anticoagulation so an IVC filter was placed

## 2025-05-19 NOTE — PROGRESS NOTES
Progress Note - Geriatric Medicine   Alecia Kay 80 y.o. female MRN: [id number]  Unit/Bed#: E4 -01 Encounter: [id number]      Assessment/Plan:    Acute Encephalopathy  Patient presented to the hospital for generalized weakness   Baseline mentation is alert and oriented x 4 but forgetful at times   Current cause considerations   Suspected to be multifactorial secondary to age, possible underlying cognitive impairment, hematoochezia, acute PE/DVT, electrolyte imbalances, hypothermia, hypoglycemia, anemia, failure to thrive, vision impairment, and prolonged hospitalization  UA on admission negative for UTI   No leukocytosis noted on 5/17/2025  Hemoglobin on 5/17/2025 noted to be 8.8  Patient was a rapid response on 5/9 for acute mental status change   She was a stroke alert   CT of the head and MRI were negative for any acute infarcts   Her mental status appeared improved over the weekend last weekend and was even improving on 5/12  Patient was noted to be lethargic all week for me last week  She did receive blood on 5/14 and nursing noted yesterday she was alert, awake, and oriented in the morning   She was able to work with PT  New blood cultures obtained on 5/13 were negative   She did undergo IVC filter placement on 5/9  Primary team did find POLST from January and patient was level 3 DNR at that time  Son agreeable to this change and patient is now level 3 DNR  Patient continues to be lethargic and has not been taking any medication or anything orally   Family notes that they are ready for hospice and that case management is assisting in setting up hospice services for the patient as she has not been eating or drinking for some time  Plan is for home hospice on discharge   Identify and treat reversible causes of confusion including infection, dehydration, electrolyte imbalance, anemia, hypoxia, urinary retention, constipation, pain, and sleep disturbance  Maintain delirium precautions   Provide  redirection, reorientation, and distraction techniques  Maintain fall and safety precautions   Assist with ADLs/IADLs  Avoid deliriogenic medications such as tramadol, benzodiazepines, anticholinergics, benadryl  Treat pain using geriatric pain protocol   Encourage oral hydration and nutrition   Monitor for constipation and urinary retention   Implement sleep hygiene and limit night time interuptions   Maintain sleep-wake cycle   Encourage early and frequent mobilization   Most recent EKG on 5/8/2025 revealed a QTc interval  of 447  If all other interventions are unsuccessful for acute agitation and behaviors, can consider Zyprexa 2.5 mg IM Q 8 hours prn   Would avoid benzodiazepines such as Ativan if possible as these medications can cause/worsen delirium   Redirect and reorient as needed  Keep mentally, physically, and socially active    Cognitive Screening  Patient has no documented history of memory loss or cognitive impairment   She notes no issues with her memory at baseline   Patient has been having periods of confusion here  Please see cause considerations as discussed above  Patient was a stroke alert on 5/9 for altered mental status and speech difficulties   She was noted to be lethargic the majority of last week and continues to be lethargic on my exam today   Family notes they are ready for hospice and services are being arranged for home hospice   Most recent TSH on 5/10/2025 noted to be 0.104  Most recent vitamin B 12 level on 5/10/2025 noted to be 1179  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 with no acute abnormalities   MRI of the brain on 5/9 revealed moderate microangiopathy   Patient scored 13/15 on BIMS on 3/5/2025 indicating she is cognitively intact   Maintain delirium precautions as discussed above   Redirect and reorient as needed  Keep physically, mentally and socially active     Deconditioning   Patient is at increased risk for deconditioning secondary to possible  underlying cognitive impairment, hematochezia, acute PE/DVT, electrolyte imbalance, hypothermia, hypoglycemia, anemia, failure to thrive, vision impairment, weakness, gait dysfunction, and prolonged hospitalization  Continue to optimize diet, hydration, and mobility for healing   Chronic Kidney Disease Stage III  Baseline creatinine not entirely clear but recently appears to be 0.8-1.0  Creatinine on 5/17/2025 noted to be 1.04  Avoid nephrotoxic medication and renal dose medication   Keep hydrated  Type II Diabetes   Most recent hemoglobin A1C on 11/13/2024 noted to be 5.7  Per chart review, she does not appear to be on any medication for this at baseline  Glucose on labs on 5/17 noted to be 86  She is currently on dextrose 5% NaCl 0.9% due to poor oral intake   Continue to monitor blood sugars closely   Avoid hypoglycemia   Anemia   Baseline hemoglobin appears to be 8-10  Patient was noted to have hematochezia which had initially resolved but returned at the end of last week   She did receive one unit of PRBCs on 5/14  Hemoglobin on 5/17 noted to be 8.8  Continue to monitor CBC   Transfuse for hemoglobin < 7   Monitor for signs and symptoms of infection, dehydration, DVT, and skin breakdown    Frailty   Clinical Frail Scale: 6- Moderately Frail (current)  Need help with all outside activities  Need help with stairs and bathing  May need assistance with dressing  Most recent albumin on 5/12/2025 noted to be 2.5  Consider nutrition consult  Encourage protein supplementation     Ambulatory Dysfunction/Falls  Patient notes no recent falls at home   She states she ambulates with a roller walker at baseline   PT/OT consulted to assist with strengthening/mobility and assist with discharge planning to appropriate level of care  Assess patient frequently for physical needs, encourage use of assistant devices as needed and directed by PT/OT  Identify cognitive and physical deficits and behaviors that affect risk of  falls  Consider moving patient closer to nursing station to monitor more closely for impulsive behavior which may increase risk of falls  [place] fall and safety precautions   Educate patient/family on patient safety including physical limitations and importance of using call bell for assistance   Modify environment to reduce risk of injury including disconnecting from pole when not in use, ensuring adequate lighting in room and restroom, ensuring that path to restroom is clear and free of trip hazards  Out of bed as tolerated    Impaired Vision   Patient denies vision impairment   She notes she does have glasses for reading   Recommend use of corrective lenses at all appropriate times  Encourage adequate lighting and encourage use of assistance with ambulation  Keep personal belongings close to avoid reaching  Encourage appropriate footwear at all times  Recommend large font for printed materials provided to patient    Dentition/Appetite   Patient denies denture use  She previously stated she has a good appetite at baseline   She is noted to be failure to thrive and minimal if any oral intake over the past week   Family is ready for hospice which is being arranged   Can do pleasure feeds once on hospice care   She is currently on a dysphagia I nectar thick liquid diet   Ensure meal consistency is appropriate for all abilities   Consider nutrition consult   Continue aspiration precautions     Elimination   Patient is continent of bowel and bladder at baseline  She appears to be voiding without difficulty   Patient was noted to have hematochezia and was found to have a diverticular bleed   The bleeding appears to have resolved at this point   Last documented bowel movement was today (documented as a smear)  Patient is not currently on a bowel regimen   Monitor for constipation and urinary retention     Insomnia   Patient notes no difficulty sleeping at baseline   Patient has been lethargic over the past week    Nursing notes no acute issues or events overnight   First line is behavioral therapy   Avoid sedative hypnotics including benzodiazepines and benadryl  Encourage staying awake during the day   Encourage daytime activities and morning exercise   Decrease or eliminate daytime naps   Avoid caffeine especially during late afternoon and evening hours  Establish a nighttime routine  Implement sleep hygiene and limit nighttime interruptions    Anxiety/Depression  Patient has a documented history of anxiety   PDMP reviewed and patient was prescribed alprazolam 0.25 mg on 4/11/2025  Unclear how often she was taking this   Patient is lethargic on exam today   Continue supportive care     Hematochezia   Patient presented to [facility] with generalized weakness  She was initially noted to have a UTI and GAY  Additionally she was found to have acute DVT and PE  Her INR was subtherapeutic and she was started on a heparin drip  She was later noted to have hematochezia and was a rapid response   She was transferred to [facility] due to lack of GI services   GI initially restarted the heparin infusion but this was discontinued due to a large amount of hematochezia  She underwent and EGD/colonoscopy on 5/5  EGD revealed Wang's esophagus with no evidence of bleeding   Colonoscopy revealed several diverticuli with old blood   GI suspected a diverticular bleed   The bleeding had resolved but the returned again   She did receive blood and FFP on 5/7  She underwent IVC filter placement on 5/9 and bleeding appears to have resolved   Her hemoglobin on 5/14 was 7.1  She received one unit of PRBCs on 5/14  Hemoglobin on 5/17 noted to be 8.8  GI has since signed off   Management per primary team     Acute Pulmonary Embolism  Patient found to have extensive right upper and lower PE on CTA on 4/28/2025  Echo noted no evidence of heart strain   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   She is maintained on coumadin 5 mg 5 days per week and 7.5 mg Monday and  Friday  Patient had nausea/vomiting for several days PTA and she was not able to take this medication   She had been on anticoagulation but this is again on hold due to hematochezia   GI is following for hematochezia   Patient underwent IVC filter on 5/9  Management per primary team     Chronic Osteomyelitis   Patient with previous chronic osteomyelitis of the right foot   Podiatry recommending WBAT in a surgical shoe  She completed 6 weeks of antibiotics PTA   MRI of the right heel and ankle on 4/29/2025 revealed no evidence of remaining osteomyelitis   Patient was re-evaluated by podiatry on 4/30/2025 and no changes to prior recommendations noted   She will require continued outpatient follow-up with podiatry on discharge       Subjective:   The patient is being seen and evaluated today at the bedside for geriatric follow-up. She is noted to be lying in bed comfortably in no acute distress. She is noted to be lethargic on my exam today. She does not appear to be in any pain or discomfort.     Care was coordinated with patients nurse Jackson. He notes she continues to be lethargic and refused AM medication.     Care was coordinated with patients carol Salmeron at the bedside. He notes that at this time they are ready for hospice care. He states that case management has been assisting in getting services arranged. He notes that he would like to take the patient home to her house as she was born in that house and he would like her to pass there. He notes that he will be staying in the area and working from here so he will be available to assist as needed. Patients michelle Moon with whom she lives with will also be available.     Care was also coordinated with López Eisenberg with case management who notes that hospice services are being arranged.       Review of Systems   Unable to perform ROS: Mental status change (lethargic)         Objective:     Vitals: Blood pressure 126/66, pulse 67, temperature (!) 96.8 °F (36 °C),  "temperature source Temporal, resp. rate 18, height 5' 2\" (1.575 m), weight 78 kg (171 lb 15.3 oz), SpO2 96%.,Body mass index is 31.45 kg/m².      Intake/Output Summary (Last 24 hours) at 5/19/2025 1023  Last data filed at 5/18/2025 2305  Gross per 24 hour   Intake 0 ml   Output --   Net 0 ml           Current Medications: Reviewed    Physical Exam:   Physical Exam  Vitals and nursing note reviewed.   Constitutional:       General: She is not in acute distress.  HENT:      Head: Normocephalic.      Mouth/Throat:      Mouth: Mucous membranes are dry.     Cardiovascular:      Rate and Rhythm: Normal rate and regular rhythm.   Pulmonary:      Effort: Pulmonary effort is normal. No respiratory distress.   Abdominal:      General: Bowel sounds are normal. There is no distension.     Musculoskeletal:         General: Swelling (generalized) present. No tenderness.     Skin:     General: Skin is warm and dry.     Neurological:      Mental Status: She is lethargic.          Invasive Devices       Long-dwell Peripherally Inserted Midline IV Catheter  Duration             Long-Dwell Peripheral IV (Midline) 05/07/25 Right Basilic 12 days                    Lab, Imaging and other studies: Results Review Statement: No pertinent labs or imaging reviewed.     Please note:  Voice-recognition software may have been used in the preparation of this document.  Occasional wrong word or \"sound-alike\" substitutions may have occurred due to the inherent limitations of voice recognition software.  Interpretation should be guided by context.    "

## 2025-05-19 NOTE — ASSESSMENT & PLAN NOTE
This could be heparin induced thrombocytopenia.  No specific intervention is indicated since the patient will be going on hospice.

## 2025-05-19 NOTE — PLAN OF CARE
Problem: PAIN - ADULT  Goal: Verbalizes/displays adequate comfort level or baseline comfort level  Description: Interventions:- Encourage patient to monitor pain and request assistance- Assess pain using appropriate pain scale- Administer analgesics based on type and severity of pain and evaluate response- Implement non-pharmacological measures as appropriate and evaluate response- Consider cultural and social influences on pain and pain management- Notify physician/advanced practitioner if interventions unsuccessful or patient reports new pain  Outcome: Progressing     Problem: INFECTION - ADULT  Goal: Absence or prevention of progression during hospitalization  Description: INTERVENTIONS:- Assess and monitor for signs and symptoms of infection- Monitor lab/diagnostic results- Monitor all insertion sites, i.e. indwelling lines, tubes, and drains- Monitor endotracheal if appropriate and nasal secretions for changes in amount and color- Concepcion appropriate cooling/warming therapies per order- Administer medications as ordered- Instruct and encourage patient and family to use good hand hygiene technique- Identify and instruct in appropriate isolation precautions for identified infection/condition  Outcome: Progressing     Problem: Nutrition/Hydration-ADULT  Goal: Nutrient/Hydration intake appropriate for improving, restoring or maintaining nutritional needs  Description: Monitor and assess patient's nutrition/hydration status for malnutrition. Collaborate with interdisciplinary team and initiate plan and interventions as ordered.  Monitor patient's weight and dietary intake as ordered or per policy. Utilize nutrition screening tool and intervene as necessary. Determine patient's food preferences and provide high-protein, high-caloric foods as appropriate. INTERVENTIONS:- Monitor oral intake, urinary output, labs, and treatment plans- Assess nutrition and hydration status and recommend course of action- Evaluate  amount of meals eaten- Assist patient with eating if necessary - Allow adequate time for meals- Recommend/ encourage appropriate diets, oral nutritional supplements, and vitamin/mineral supplements- Order, calculate, and assess calorie counts as needed- Recommend, monitor, and adjust tube feedings and TPN/PPN based on assessed needs- Assess need for intravenous fluids- Provide specific nutrition/hydration education as appropriate- Include patient/family/caregiver in decisions related to nutrition  Monitor and assess patient's nutrition/hydration status for malnutrition. Collaborate with interdisciplinary team and initiate plan and interventions as ordered.  Monitor patient's weight and dietary intake as ordered or per policy. Utilize nutrition screening tool and intervene as necessary. Determine patient's food preferences and provide high-protein, high-caloric foods as appropriate. INTERVENTIONS:- Monitor oral intake, urinary output, labs, and treatment plans- Assess nutrition and hydration status and recommend course of action- Evaluate amount of meals eaten- Assist patient with eating if necessary - Allow adequate time for meals- Recommend/ encourage appropriate diets, oral nutritional supplements, and vitamin/mineral supplements- Order, calculate, and assess calorie counts as needed- Recommend, monitor, and adjust tube feedings and TPN/PPN based on assessed needs- Assess need for intravenous fluids- Provide specific nutrition/hydration education as appropriate- Include patient/family/caregiver in decisions related to nutrition  Outcome: Progressing     Problem: HEMATOLOGIC - ADULT  Goal: Maintains hematologic stability  Description: INTERVENTIONS- Assess for signs and symptoms of bleeding or hemorrhage- Monitor labs- Administer supportive blood products/factors as ordered and appropriate  Outcome: Progressing     Problem: Communication Impairment  Goal: Ability to express needs and understand  communication  Description: Assess patient's communication skills and ability to understand information.  Patient will demonstrate use of effective communication techniques, alternative methods of communication and understanding even if not able to speak. - Encourage communication and provide alternate methods of communication as needed.- Collaborate with case management/ for discharge needs.- Include patient/family/caregiver in decisions related to communication.  Outcome: Progressing     Problem: Knowledge Deficit  Goal: Patient/family/caregiver demonstrates understanding of disease process, treatment plan, medications, and discharge instructions  Description: Complete learning assessment and assess knowledge base.Interventions:- Provide teaching at level of understanding- Provide teaching via preferred learning methods  Outcome: Progressing     Problem: DISCHARGE PLANNING  Goal: Discharge to home or other facility with appropriate resources  Description: INTERVENTIONS:- Identify barriers to discharge w/patient and caregiver- Arrange for needed discharge resources and transportation as appropriate- Identify discharge learning needs (meds, wound care, etc.)- Arrange for interpretive services to assist at discharge as needed- Refer to Case Management Department for coordinating discharge planning if the patient needs post-hospital services based on physician/advanced practitioner order or complex needs related to functional status, cognitive ability, or social support system  Outcome: Progressing

## 2025-05-19 NOTE — ASSESSMENT & PLAN NOTE
Discussed overall poor condition and worsening prognosis with her son Jaron again at bedside at bedside  Hospice discussed the situation with the patient's family.  Arrangements are being made for the commencement of hospice care tomorrow.  The patient will be changed to comfort care status.

## 2025-05-19 NOTE — CASE MANAGEMENT
Case Management Discharge Planning Note    Patient name Alecia Kay  Location [facility] /E4 -* MRN [id number]  : 1945 Date 2025       Current Admission Date: 2025  Current Admission Diagnosis:Hematochezia   Patient Active Problem List    Diagnosis Date Noted    Thrombocytopenia (HCC) 2025    Hypothermia 2025    Folic acid deficiency 2025    Abnormal TSH 2025    Goals of care, counseling/discussion 2025    Metabolic encephalopathy 2025    Wang's esophagus 2025    Insertional Achilles tendinopathy 2025    Chronic osteomyelitis (HCC) 2025    Acute blood loss anemia (ABLA) 2025    Acute pulmonary embolism (HCC) 2025    Acute respiratory insufficiency 2025    Hematochezia 2025    Acute DVT (deep venous thrombosis) (HCC) 2025    History of DVT (deep vein thrombosis) 2025    Tachycardia 2025    Acute cystitis 2025    Generalized weakness 2025    Bilateral lower extremity edema 2025    Severe protein-calorie malnutrition (HCC) 2025    History of osteomyelitis 2025    Acute kidney injury (HCC) 2025    Chronic kidney disease, stage III (moderate) (HCC) 2025    Hypothyroidism 2025    Hypertension 2025    Electrolyte imbalance 2025      LOS (days): 17  Geometric Mean LOS (GMLOS) (days): 4.5  Days to GMLOS:-12.5     OBJECTIVE:  Risk of Unplanned Readmission Score: 24.49         Current admission status: Inpatient   Preferred Pharmacy:   [facility] [place], [place] - [address]  [address] [address]  Phone: [phone] Fax: [phone]    Primary Care Provider: Rambo Perera DO    Primary Insurance: BLUE CROSS  REP  Secondary Insurance:     DISCHARGE DETAILS:                                Requested Home Health Care         Home Health Discipline requested:: Other (comment), Nursing (Home  Hospice care)  Home Health [facility] Name:: [facility]  HHA External Referral Reason (only applicable if external HHA name selected): Patient has established relationship with provider  Home Health Follow-Up Provider:: PCP  Home Health Services Needed:: Other (comment) (Home Hospice)  Homebound Criteria Met:: Requires the Assistance of Another Person for Safe Ambulation or to Leave the Home  Supporting Clincal Findings:: Bed Bound or Wheelchair Bound, Limited Endurance    DME Referral Provided  Referral made for DME?: No              Treatment Team Recommendation: Home, Hospice  Discharge Destination Plan:: Home, Hospice  Transport at Discharge : [facility]     Number/Name of Dispatcher: Roundtrip     ETA of Transport (Date): 05/20/25  ETA of Transport (Time): 1100                       Additional Comments: SANJANA met with the sonJaron and reviewed Hospice Agencies.  He chose [facility].  Thsi was reserved in [facility].  SANJANA spoke to the liasionCarmela at [phone] to coordinate services.  She was able to contact the son and DME will be delivered on 5/20 between [id number].  Transportation will be set up and it was requested for 1100.  SANJANA had called the son, Jaron.  He confirmed the delivery and his nephew will be at the home.  He also agreed with the  time.

## 2025-05-19 NOTE — ASSESSMENT & PLAN NOTE
Extensive right upper and lower lobe pulmonary embolism seen on 4/28/2025 CTA  2D echocardiogram without evidence of right heart strain  Anticoagulation contraindicated due to recurrent lower GI bleed   She was evaluated by pulmonology who recommended  IVC filter placement.  Temporary IVC filter placed 5/9 by IR

## 2025-05-19 NOTE — PROGRESS NOTES
Progress Note - Hospitalist   Name: Alecia Kay 80 y.o. female I MRN: [id number]  Unit/Bed#: E4 -01 I Date of Admission: 5/2/2025   Date of Service: 5/19/2025 I Hospital Day: 17    Assessment & Plan  Metabolic encephalopathy  Ongoing  metabolic encephalopathy and lethargy due to multiple issues: Prolonged hospitalization, anemia, recent infection, extensive PE, advanced age, anemia requiring multiple transfusion, deconditioning, ongoing poor oral intake  Mental status improved briefly after her last blood transfusion on 5/14/25 but has been lethargic again ever since  Stroke workup was done and stroke was ruled out  No  obvious sign of ongoing infection  Off sedating meds  She has not eaten in days and would not want artificial feedings according to her son and her wishes.  Goals of care, counseling/discussion  Discussed overall poor condition and worsening prognosis with her son Jaron again at bedside at bedside  Hospice discussed the situation with the patient's family.  Arrangements are being made for the commencement of hospice care tomorrow.  The patient will be changed to comfort care status.  Hematochezia  Hematochezia secondary to diverticular bleed on top of anticoagulation  She was not felt to be a candidate for further anticoagulation so an IVC filter was placed  Acute blood loss anemia (ABLA)  Secondary to GI bleed  Baseline hemoglobin appears to be variable over the last year ranging 7.5-10  Acute blood loss anemia due to hematochezia secondary to diverticular bleed  Status post total of  7 units PRBC during this admission  Last transfusion was 5/14/2025  Hemoglobin stable is slightly lower today at 8.8  Acute pulmonary embolism (HCC)  Extensive right upper and lower lobe pulmonary embolism seen on 4/28/2025 CTA  2D echocardiogram without evidence of right heart strain  Anticoagulation contraindicated due to recurrent lower GI bleed   She was evaluated by pulmonology who recommended  IVC  filter placement.  Temporary IVC filter placed 5/9 by IR  Acute DVT (deep venous thrombosis) (HCC)  History of DVT   However during 4/25/[facility] admission found to have bilateral extensive extensive acute vs subacute DVTs, in the setting of subtherapeutic INR  Not a candidate for anticoagulation so IVC filter was felt to be the best course of action  IVC filter placed 5/9   Thrombocytopenia (HCC)  This could be heparin induced thrombocytopenia.  No specific intervention is indicated since the patient will be going on hospice.  Hypothermia  She was noted to have hypothermia requiring Tyrone hugger  Could be related to advanced age, anemia, medications  Repeat blood cultures sent on 5/13/2025 to rule out infection currently without growth   Hypothermia resolved    Acute respiratory insufficiency  Likely secondary to pulmonary embolism and anemia  Was placed on 2 L nasal cannula  Resolved.  Currently on room air  Hypertension  Patient previously previously on amlodipine/beta-blocker  Medications held for hypotension with hematochezia  Hypothyroidism  Continue levothyroxine.  However oral intake is limited by lethargy  Wang's esophagus  Noted on EGD  Patient with dysphagia: Continue proton pump inhibitor bid      Subjective:  The patient is minimally responsive.    Physical Exam:   Temp:  [94.1 °F (34.5 °C)-97.5 °F (36.4 °C)] 96.8 °F (36 °C)  HR:  [56-67] 67  BP: (102-129)/(61-72) 126/66  Resp:  [16-18] 18  SpO2:  [96 %-98 %] 96 %  O2 Device: None (Room air)    Gen: Well-developed, well-nourished, in no distress.  Neck: Supple.  No lymphadenopathy or goiter.  Heart: Regular rhythm.  I heard no murmur, gallop, or rub.  Lungs: Diminished breath sounds diffusely.  No wheezing, rales, or rhonchi.  Abd: Soft with active bowel sounds.  No mass or tenderness.  Extremities: No clubbing, cyanosis, or edema.  No calf tenderness.  Neuro: Minimally responsive.   Skin: Warm and dry.      LABS: No new labs.      VTE Pharmacologic  Prophylaxis: Reason for no pharmacologic prophylaxis comfort care  VTE Mechanical Prophylaxis: reason for no mechanical VTE prophylaxis comfort care

## 2025-05-19 NOTE — NURSING NOTE
Assumed care of pt at this time. Pt asleep, easily arousable. Pt repositioned and changed at this time. Bed alarm activated, call acosta within reach.   Janice Solares RN

## 2025-05-20 VITALS
BODY MASS INDEX: 31.64 KG/M2 | DIASTOLIC BLOOD PRESSURE: 58 MMHG | HEIGHT: 62 IN | WEIGHT: 171.96 LBS | SYSTOLIC BLOOD PRESSURE: 147 MMHG | RESPIRATION RATE: 20 BRPM | TEMPERATURE: 97 F | OXYGEN SATURATION: 99 % | HEART RATE: 58 BPM

## 2025-05-20 PROCEDURE — 99239 HOSP IP/OBS DSCHRG MGMT >30: CPT | Performed by: INTERNAL MEDICINE

## 2025-05-20 RX ORDER — HYDROMORPHONE HCL IN WATER/PF 6 MG/30 ML
0.2 PATIENT CONTROLLED ANALGESIA SYRINGE INTRAVENOUS EVERY 6 HOURS PRN
Refills: 0 | Status: DISCONTINUED | OUTPATIENT
Start: 2025-05-20 | End: 2025-05-20 | Stop reason: HOSPADM

## 2025-05-20 RX ORDER — OXYCODONE HYDROCHLORIDE 5 MG/1
5 TABLET ORAL EVERY 6 HOURS PRN
Refills: 0 | Status: DISCONTINUED | OUTPATIENT
Start: 2025-05-20 | End: 2025-05-20 | Stop reason: HOSPADM

## 2025-05-20 NOTE — CASE MANAGEMENT
Case Management Discharge Planning Note    Patient name Alecia Kay  Location [facility] 4 /E4 -* MRN [id number]  : 1945 Date 2025       Current Admission Date: 2025  Current Admission Diagnosis:Hematochezia   Patient Active Problem List    Diagnosis Date Noted    Thrombocytopenia (HCC) 2025    Hypothermia 2025    Folic acid deficiency 2025    Abnormal TSH 2025    Goals of care, counseling/discussion 2025    Metabolic encephalopathy 2025    Wang's esophagus 2025    Insertional Achilles tendinopathy 2025    Chronic osteomyelitis (HCC) 2025    Acute blood loss anemia (ABLA) 2025    Acute pulmonary embolism (HCC) 2025    Acute respiratory insufficiency 2025    Hematochezia 2025    Acute DVT (deep venous thrombosis) (HCC) 2025    History of DVT (deep vein thrombosis) 2025    Tachycardia 2025    Acute cystitis 2025    Generalized weakness 2025    Bilateral lower extremity edema 2025    Severe protein-calorie malnutrition (HCC) 2025    History of osteomyelitis 2025    Acute kidney injury (HCC) 2025    Chronic kidney disease, stage III (moderate) (HCC) 2025    Hypothyroidism 2025    Hypertension 2025    Electrolyte imbalance 2025      LOS (days): 18  Geometric Mean LOS (GMLOS) (days): 4.5  Days to GMLOS:-13.2     OBJECTIVE:  Risk of Unplanned Readmission Score: 19.95         Current admission status: Inpatient   Preferred Pharmacy:   [place], [place] - [address]  [address] [address]  Phone: [phone] Fax: [phone]    Primary Care Provider: Rambo Perera DO    Primary Insurance: BLUE CROSS  REP  Secondary Insurance:     DISCHARGE DETAILS:                                                                   Transported by (Company and Unit #): [facility] [phone]  ETA of Transport (Date): 05/20/25  ETA of Transport (Time): 1130                       Additional Comments: CM met with the son, Jaron at the bedside.  He had contacted his nephew and confirmed that the hospital bed has been delivered, and he was made aware that the S transport has been confirmed for 1130.  Carmela at [facility] was also called at [phone] and was informed about the .  Pt to go home with home Hospice.  Medical Necesstiy form in the chart and OON DNR in the chart.

## 2025-05-20 NOTE — PLAN OF CARE
Problem: PAIN - ADULT  Goal: Verbalizes/displays adequate comfort level or baseline comfort level  Description: Interventions:- Encourage patient to monitor pain and request assistance- Assess pain using appropriate pain scale- Administer analgesics based on type and severity of pain and evaluate response- Implement non-pharmacological measures as appropriate and evaluate response- Consider cultural and social influences on pain and pain management- Notify physician/advanced practitioner if interventions unsuccessful or patient reports new pain  Outcome: Progressing     Problem: INFECTION - ADULT  Goal: Absence or prevention of progression during hospitalization  Description: INTERVENTIONS:- Assess and monitor for signs and symptoms of infection- Monitor lab/diagnostic results- Monitor all insertion sites, i.e. indwelling lines, tubes, and drains- Monitor endotracheal if appropriate and nasal secretions for changes in amount and color- [place] appropriate cooling/warming therapies per order- Administer medications as ordered- Instruct and encourage patient and family to use good hand hygiene technique- Identify and instruct in appropriate isolation precautions for identified infection/condition  Outcome: Progressing  Goal: Absence of fever/infection during neutropenic period  Description: INTERVENTIONS:- Monitor WBC  Outcome: Progressing     Problem: SAFETY ADULT  Goal: Patient will remain free of falls  Description: INTERVENTIONS:- Educate patient/family on patient safety including physical limitations- Instruct patient to call for assistance with activity - Consult OT/PT to assist with strengthening/mobility - Keep Call bell within reach- Keep bed low and locked with side rails adjusted as appropriate- Keep care items and personal belongings within reach- Initiate and maintain comfort rounds- Make Fall Risk Sign visible to staff- Offer Toileting every 2 Hours, in advance of need- Initiate/Maintain bed alarm-  Obtain necessary fall risk management equipment: - Apply yellow socks and bracelet for high fall risk patients- Consider moving patient to room near nurses station  Outcome: Progressing  Goal: Maintain or return to baseline ADL function  Description: INTERVENTIONS:-  Assess patient's ability to carry out ADLs; assess patient's baseline for ADL function and identify physical deficits which impact ability to perform ADLs (bathing, care of mouth/teeth, toileting, grooming, dressing, etc.)- Assess/evaluate cause of self-care deficits - Assess range of motion- Assess patient's mobility; develop plan if impaired- Assess patient's need for assistive devices and provide as appropriate- Encourage maximum independence but intervene and supervise when necessary- Involve family in performance of ADLs- Assess for home care needs following discharge - Consider OT consult to assist with ADL evaluation and planning for discharge- Provide patient education as appropriate  Outcome: Progressing  Goal: Maintains/Returns to pre admission functional level  Description: INTERVENTIONS:- Perform AM-PAC 6 Click Basic Mobility/ Daily Activity assessment daily.- Set and communicate daily mobility goal to care team and patient/family/caregiver. - Collaborate with rehabilitation services on mobility goals if consulted- Perform Range of Motion 3 times a day.- Reposition patient every 2 hours.- Dangle patient 3 times a day- Stand patient 3 times a day- Ambulate patient 3 times a day- Out of bed to chair 3 times a day - Out of bed for meals 3 times a day- Out of bed for toileting- Record patient progress and toleration of activity level   Outcome: Progressing     Problem: DISCHARGE PLANNING  Goal: Discharge to home or other facility with appropriate resources  Description: INTERVENTIONS:- Identify barriers to discharge w/patient and caregiver- Arrange for needed discharge resources and transportation as appropriate- Identify discharge learning  needs (meds, wound care, etc.)- Arrange for interpretive services to assist at discharge as needed- Refer to Case Management Department for coordinating discharge planning if the patient needs post-hospital services based on physician/advanced practitioner order or complex needs related to functional status, cognitive ability, or social support system  Outcome: Progressing     Problem: Knowledge Deficit  Goal: Patient/family/caregiver demonstrates understanding of disease process, treatment plan, medications, and discharge instructions  Description: Complete learning assessment and assess knowledge base.Interventions:- Provide teaching at level of understanding- Provide teaching via preferred learning methods  Outcome: Progressing     Problem: Nutrition/Hydration-ADULT  Goal: Nutrient/Hydration intake appropriate for improving, restoring or maintaining nutritional needs  Description: Monitor and assess patient's nutrition/hydration status for malnutrition. Collaborate with interdisciplinary team and initiate plan and interventions as ordered.  Monitor patient's weight and dietary intake as ordered or per policy. Utilize nutrition screening tool and intervene as necessary. Determine patient's food preferences and provide high-protein, high-caloric foods as appropriate. INTERVENTIONS:- Monitor oral intake, urinary output, labs, and treatment plans- Assess nutrition and hydration status and recommend course of action- Evaluate amount of meals eaten- Assist patient with eating if necessary - Allow adequate time for meals- Recommend/ encourage appropriate diets, oral nutritional supplements, and vitamin/mineral supplements- Order, calculate, and assess calorie counts as needed- Recommend, monitor, and adjust tube feedings and TPN/PPN based on assessed needs- Assess need for intravenous fluids- Provide specific nutrition/hydration education as appropriate- Include patient/family/caregiver in decisions related to  nutrition  Monitor and assess patient's nutrition/hydration status for malnutrition. Collaborate with interdisciplinary team and initiate plan and interventions as ordered.  Monitor patient's weight and dietary intake as ordered or per policy. Utilize nutrition screening tool and intervene as necessary. Determine patient's food preferences and provide high-protein, high-caloric foods as appropriate. INTERVENTIONS:- Monitor oral intake, urinary output, labs, and treatment plans- Assess nutrition and hydration status and recommend course of action- Evaluate amount of meals eaten- Assist patient with eating if necessary - Allow adequate time for meals- Recommend/ encourage appropriate diets, oral nutritional supplements, and vitamin/mineral supplements- Order, calculate, and assess calorie counts as needed- Recommend, monitor, and adjust tube feedings and TPN/PPN based on assessed needs- Assess need for intravenous fluids- Provide specific nutrition/hydration education as appropriate- Include patient/family/caregiver in decisions related to nutrition  Outcome: Progressing     Problem: Prexisting or High Potential for Compromised Skin Integrity  Goal: Skin integrity is maintained or improved  Description: INTERVENTIONS:- Identify patients at risk for skin breakdown- Assess and monitor skin integrity- Assess and monitor nutrition and hydration status- Monitor labs - Assess for incontinence - Turn and reposition patient- Assist with mobility/ambulation- Relieve pressure over bony prominences- Avoid friction and shearing- Provide appropriate hygiene as needed including keeping skin clean and dry- Evaluate need for skin moisturizer/barrier cream- Collaborate with interdisciplinary team - Patient/family teaching- Consider wound care consult   Outcome: Progressing     Problem: HEMATOLOGIC - ADULT  Goal: Maintains hematologic stability  Description: INTERVENTIONS- Assess for signs and symptoms of bleeding or hemorrhage-  Monitor labs- Administer supportive blood products/factors as ordered and appropriate  Outcome: Progressing     Problem: Communication Impairment  Goal: Ability to express needs and understand communication  Description: Assess patient's communication skills and ability to understand information.  Patient will demonstrate use of effective communication techniques, alternative methods of communication and understanding even if not able to speak. - Encourage communication and provide alternate methods of communication as needed.- Collaborate with case management/ for discharge needs.- Include patient/family/caregiver in decisions related to communication.  Outcome: Progressing

## 2025-05-20 NOTE — DISCHARGE SUMMARY
Discharge Summary - Alecia Kay, 1945, [id number]        Admission Date: 5/2/2025  Discharge Date: 5/20/2025    Discharge Diagnosis:   1.  Hematochezia secondary to diverticular bleeding  2.  Acute blood loss anemia  3.  Acute DVT and acute pulmonary embolism  4.  Acute respiratory insufficiency secondary to #3  5.  Hypertension  6.  Hypothyroidism  7.  Wang's esophagus  8.  Severe metabolic encephalopathy, multifactorial    Consulting Physicians:  1.  Dr. Martinez, gastroenterology  2.  CARLOS Ferguson, geriatric medicine  3.  Dr. Bernard, pulmonary medicine  4.  Dr. Flaherty, neurology    Procedures Performed:   1.  Placement of IVC filter  2.  Colonoscopy  3.  EGD    HPI: The patient is an 80-year-old woman with a complex past medical history who was hospitalized that [facility].  This was for for urinary tract infection.  While there, she developed a pulmonary embolism.  She was initially anticoagulated but developed rectal bleeding.  She was transferred to [facility] for additional care.    Hospital Course: The patient was admitted to the hospital and monitored carefully.  Anticoagulants were stopped.  She was evaluated by pulmonary and by GI.  IVC filter was recommended.  GI recommended endoscopic evaluation.  This revealed evidence of a recent diverticular hemorrhage.  Fortunately, with interruption of the patient's anticoagulation, her bleeding remitted spontaneously.    As mentioned, the patient had DVT and pulmonary embolism.  She is not on anticoagulants because of bleeding.  An IVC filter was placed.    The patient developed acute blood loss anemia.  She required transfusion.    The patient had acute respiratory insufficiency related to her pulmonary embolism.  She was treated with supplemental oxygen.    The patient's mental status deteriorated.  This was thought to be related to a toxic/metabolic encephalopathy which was multifactorial.  The patient has generally been deteriorating for  months now.  The situation was discussed with consultants and also with the patient's family.  No easily reversible contributor to the situation was found.  The patient had clearly expressed the desire never to have tube feeding.  After careful consideration, hospice care was elected.  Considering the patient's current mental status which has not changed for several days and her resultant inability to swallow, I believe that her longevity will be less than 6 months.  She clearly appears to be a suitable candidate for hospice.    Disposition: The patient was discharged home for hospice care on May 20.  Diet and activity will be as tolerated though both are likely to be quite limited.  The patient will be under the care of the hospice physicians.    Discharge instructions/Information to patient and family:   See after visit summary for information provided to patient and family.      Provisions for Follow-Up Care:  See after visit summary for information related to follow-up care and any pertinent home health orders.      Planned Readmission: No    Discharge Statement   I spent 40 minutes discharging the patient. This time was spent on the day of discharge. I had direct contact with the patient on the day of discharge.     Discharge Medications:  See after visit summary for reconciled discharge medications provided to patient and family.

## 2025-05-21 NOTE — UTILIZATION REVIEW
NOTIFICATION OF ADMISSION DISCHARGE   This is a Notification of Discharge from [facility]. Please be advised that this patient has been discharge from our facility. Below you will find the admission and discharge date and time including the patient’s disposition.   UTILIZATION REVIEW CONTACT:  Utilization Review Assistants  Network Utilization Review Department  Phone: [phone] x carefully listen to the prompts. All voicemails are confidential.  Email: [email or web address]     ADMISSION INFORMATION  PRESENTATION DATE: 5/2/2025  5:19 PM  OBERVATION ADMISSION DATE: N/A  INPATIENT ADMISSION DATE: 5/2/25  5:19 PM   DISCHARGE DATE: 5/20/2025 12:05 PM   DISPOSITION:Home with Hospice Care    Network Utilization Review Department  ATTENTION: Please call with any questions or concerns to [phone] and carefully listen to the prompts so that you are directed to the right person. All voicemails are confidential.   For Discharge needs, contact Care Management DC Support Team at [phone] opt. 2  Send all requests for admission clinical reviews, approved or denied determinations and any other requests to dedicated fax number below belonging to the campus where the patient is receiving treatment. List of dedicated fax numbers for the Facilities:  FACILITY NAME UR FAX NUMBER   ADMISSION DENIALS (Administrative/Medical Necessity) [phone]   DISCHARGE SUPPORT TEAM ([facility]) [phone]   PARENT CHILD HEALTH (Maternity/NICU/Pediatrics) [phone]   General acute hospital [phone]   [facility] [phone]   [facility] [phone]   [facility] [phone]   [facility] [phone]   [facility] [phone]   [facility] [phone]   [facility] [phone]   [facility]  [facility] [phone]   [facility] [phone]   [facility] [phone]   [facility] [phone]

## 2025-05-25 PROBLEM — N30.00 ACUTE CYSTITIS: Status: RESOLVED | Noted: 2025-04-25 | Resolved: 2025-05-25

## 2025-08-25 ENCOUNTER — APPOINTMENT (OUTPATIENT)
Dept: URBAN - METROPOLITAN AREA CLINIC 294 | Age: 80
Setting detail: DERMATOLOGY
End: 2025-08-28

## 2025-08-25 DIAGNOSIS — L57.0 ACTINIC KERATOSIS: ICD-10-CM

## 2025-08-25 DIAGNOSIS — L82.0 INFLAMED SEBORRHEIC KERATOSIS: ICD-10-CM

## 2025-08-25 DIAGNOSIS — L82.1 OTHER SEBORRHEIC KERATOSIS: ICD-10-CM

## 2025-08-25 PROCEDURE — 17110 DESTRUCT B9 LESION 1-14: CPT

## 2025-08-25 PROCEDURE — OTHER COUNSELING: OTHER

## 2025-08-25 PROCEDURE — OTHER MIPS QUALITY: OTHER

## 2025-08-25 PROCEDURE — OTHER PRESCRIPTION MEDICATION MANAGEMENT: OTHER

## 2025-08-25 PROCEDURE — OTHER PRESCRIPTION: OTHER

## 2025-08-25 PROCEDURE — 99213 OFFICE O/P EST LOW 20 MIN: CPT | Mod: 25

## 2025-08-25 PROCEDURE — OTHER LIQUID NITROGEN: OTHER

## 2025-08-25 RX ORDER — FLUOROURACIL 5 MG/G
CREAM TOPICAL
Qty: 40 | Refills: 0 | Status: CANCELLED | COMMUNITY
Start: 2025-08-25

## 2025-08-25 ASSESSMENT — LOCATION SIMPLE DESCRIPTION DERM
LOCATION SIMPLE: RIGHT EYEBROW
LOCATION SIMPLE: LEFT OCCIPITAL SCALP
LOCATION SIMPLE: RIGHT ANTERIOR NECK
LOCATION SIMPLE: RIGHT LIP
LOCATION SIMPLE: LEFT FOREHEAD
LOCATION SIMPLE: SCALP
LOCATION SIMPLE: ABDOMEN
LOCATION SIMPLE: RIGHT FOREHEAD

## 2025-08-25 ASSESSMENT — LOCATION DETAILED DESCRIPTION DERM
LOCATION DETAILED: RIGHT SUPERIOR MEDIAL FOREHEAD
LOCATION DETAILED: RIGHT FOREHEAD
LOCATION DETAILED: RIGHT SUPERIOR LATERAL NECK
LOCATION DETAILED: PERIUMBILICAL SKIN
LOCATION DETAILED: RIGHT LATERAL EYEBROW
LOCATION DETAILED: RIGHT LATERAL FOREHEAD
LOCATION DETAILED: LEFT FOREHEAD
LOCATION DETAILED: LEFT SUPERIOR FOREHEAD
LOCATION DETAILED: RIGHT UPPER CUTANEOUS LIP
LOCATION DETAILED: LEFT SUPERIOR OCCIPITAL SCALP
LOCATION DETAILED: LEFT SUPERIOR PARIETAL SCALP

## 2025-08-25 ASSESSMENT — LOCATION ZONE DERM
LOCATION ZONE: FACE
LOCATION ZONE: SCALP
LOCATION ZONE: NECK
LOCATION ZONE: LIP
LOCATION ZONE: TRUNK